# Patient Record
Sex: FEMALE | Race: WHITE | NOT HISPANIC OR LATINO | Employment: OTHER | URBAN - METROPOLITAN AREA
[De-identification: names, ages, dates, MRNs, and addresses within clinical notes are randomized per-mention and may not be internally consistent; named-entity substitution may affect disease eponyms.]

---

## 2017-04-10 ENCOUNTER — APPOINTMENT (OUTPATIENT)
Dept: MRI IMAGING | Facility: HOSPITAL | Age: 67
End: 2017-04-10

## 2017-10-05 ENCOUNTER — TRANSCRIBE ORDERS (OUTPATIENT)
Dept: ADMINISTRATIVE | Facility: HOSPITAL | Age: 67
End: 2017-10-05

## 2017-10-05 DIAGNOSIS — Z12.39 SCREENING BREAST EXAMINATION: Primary | ICD-10-CM

## 2017-10-18 ENCOUNTER — HOSPITAL ENCOUNTER (OUTPATIENT)
Dept: MAMMOGRAPHY | Facility: HOSPITAL | Age: 67
Discharge: HOME | End: 2017-10-18
Admitting: INTERNAL MEDICINE

## 2017-10-18 DIAGNOSIS — Z12.39 SCREENING BREAST EXAMINATION: ICD-10-CM

## 2017-10-18 PROCEDURE — G0202 SCR MAMMO BI INCL CAD: HCPCS

## 2018-08-21 ENCOUNTER — OFFICE VISIT (OUTPATIENT)
Dept: ORTHOPEDIC SURGERY | Facility: CLINIC | Age: 68
End: 2018-08-21

## 2018-08-21 VITALS — BODY MASS INDEX: 26.13 KG/M2 | WEIGHT: 142 LBS | TEMPERATURE: 98.1 F | HEIGHT: 62 IN

## 2018-08-21 DIAGNOSIS — G89.29 CHRONIC LOW BACK PAIN, UNSPECIFIED BACK PAIN LATERALITY, WITH SCIATICA PRESENCE UNSPECIFIED: Primary | ICD-10-CM

## 2018-08-21 DIAGNOSIS — M48.061 SPINAL STENOSIS OF LUMBAR REGION WITHOUT NEUROGENIC CLAUDICATION: ICD-10-CM

## 2018-08-21 DIAGNOSIS — M43.16 SPONDYLOLISTHESIS OF LUMBAR REGION: ICD-10-CM

## 2018-08-21 DIAGNOSIS — M54.5 CHRONIC LOW BACK PAIN, UNSPECIFIED BACK PAIN LATERALITY, WITH SCIATICA PRESENCE UNSPECIFIED: Primary | ICD-10-CM

## 2018-08-21 DIAGNOSIS — R32 URINARY INCONTINENCE, UNSPECIFIED TYPE: ICD-10-CM

## 2018-08-21 PROCEDURE — 99204 OFFICE O/P NEW MOD 45 MIN: CPT | Performed by: ORTHOPAEDIC SURGERY

## 2018-08-21 PROCEDURE — 72100 X-RAY EXAM L-S SPINE 2/3 VWS: CPT | Performed by: ORTHOPAEDIC SURGERY

## 2018-08-21 RX ORDER — ESTRADIOL 0.1 MG/G
2 CREAM VAGINAL DAILY PRN
COMMUNITY
Start: 2018-08-20 | End: 2021-01-27

## 2018-08-21 RX ORDER — SULFAMETHOXAZOLE AND TRIMETHOPRIM 400; 80 MG/1; MG/1
1 TABLET ORAL DAILY
Refills: 3 | Status: ON HOLD | COMMUNITY
Start: 2018-08-14 | End: 2018-10-01

## 2018-08-21 RX ORDER — LANOLIN ALCOHOL/MO/W.PET/CERES
1 CREAM (GRAM) TOPICAL DAILY
Refills: 0 | COMMUNITY
Start: 2018-06-28 | End: 2018-09-21

## 2018-08-21 RX ORDER — CEPHALEXIN 500 MG/1
CAPSULE ORAL
COMMUNITY
Start: 2018-08-15 | End: 2018-09-21

## 2018-08-21 NOTE — PROGRESS NOTES
New patient or new problem visit    Chief Complaint   Patient presents with   • Lumbar Spine - Pain, Establish Care       HPI: She is complaining of urinary incontinence, and occasional back pain.  Not much in way of leg pain.  I saw her in 2006 and at that time was the getting ready to perform surgery for high-grade spondylolisthesis.  She had disappeared then the digit cardiovascular exercise and got along well for many years obviously.  Her symptoms are mild apart from the the urinary incontinence which has occurred over the last year or 2.  She sees a urologist and they're contemplating placing a stimulator after which she could get no further MRI images.  Pain presently is mild worse with activity denies balance or bowel symptoms.    PFSH: See chart- reviewed    Review of Systems   Constitutional: Negative for chills, fever and unexpected weight change.   HENT: Negative for trouble swallowing and voice change.    Eyes: Positive for visual disturbance.   Respiratory: Negative for cough and shortness of breath.    Cardiovascular: Negative for chest pain and leg swelling.   Gastrointestinal: Negative for abdominal pain, nausea and vomiting.   Endocrine: Negative for cold intolerance and heat intolerance.   Genitourinary: Negative for difficulty urinating, frequency and urgency.   Musculoskeletal: Positive for back pain.   Skin: Negative for rash and wound.   Allergic/Immunologic: Negative for immunocompromised state.   Neurological: Positive for numbness. Negative for weakness.   Hematological: Does not bruise/bleed easily.   Psychiatric/Behavioral: Negative for dysphoric mood. The patient is not nervous/anxious.        PE: Constitutional: Vital signs above-noted.  Awake, alert and oriented    Psychiatric: Affect and insight do not appear grossly disturbed.    Pulmonary: Breathing is unlabored, color is good.    Skin: Warm, dry and normal turgor    Cardiac:  pedal pulses intact.  No edema.    Eyesight and hearing  appear adequate for examination purposes      Musculoskeletal:  There is no tenderness to percussion and palpation of the spine. Motion appears undisturbed.  Posture is unremarkable to coronal and sagittal inspection.    The skin about the area is intact.  There is a visible and palpable indentation at the lumbosacral junction deformity.  There is no local spasm.       Neurologic:   Reflexes are 2+ and symmetrical in the patellae and absent in the Achilles.   Motor function is undisturbed in quadriceps, EHL, and gastrocnemius      Sensation appears symmetrically intact to light touch   .  In the bilateral lower extremities there is no evidence of atrophy.   Clonus is absent..  Gait appears undisturbed.  SLR test negative      MEDICAL DECISION MAKING    XRAY: She has a grade 3 spondylolisthesis at L5-S1 and otherwise good looking spine.  No comparison views are available.    Other: Presently I do not have access to prior records but am attempting to obtain those    Impression: Grade 3 spondylolisthesis with urinary incontinence which is likely neurogenic    Plan: We need MRI for further evaluation.  She's having little symptoms otherwise and there still could be a role for bladder stimulation but I want to see if she has severe stenosis.

## 2018-08-23 DIAGNOSIS — R32 URINARY INCONTINENCE, UNSPECIFIED TYPE: ICD-10-CM

## 2018-08-28 ENCOUNTER — TELEPHONE (OUTPATIENT)
Dept: ORTHOPEDIC SURGERY | Facility: CLINIC | Age: 68
End: 2018-08-28

## 2018-08-30 ENCOUNTER — TELEPHONE (OUTPATIENT)
Dept: ORTHOPEDIC SURGERY | Facility: CLINIC | Age: 68
End: 2018-08-30

## 2018-08-30 ENCOUNTER — PREP FOR SURGERY (OUTPATIENT)
Dept: OTHER | Facility: HOSPITAL | Age: 68
End: 2018-08-30

## 2018-08-30 DIAGNOSIS — M48.062 SPINAL STENOSIS OF LUMBAR REGION WITH NEUROGENIC CLAUDICATION: Primary | ICD-10-CM

## 2018-08-30 RX ORDER — CEFAZOLIN SODIUM 2 G/100ML
2 INJECTION, SOLUTION INTRAVENOUS ONCE
Status: CANCELLED | OUTPATIENT
Start: 2018-10-01 | End: 2018-10-01

## 2018-09-04 PROBLEM — M48.062 SPINAL STENOSIS OF LUMBAR REGION WITH NEUROGENIC CLAUDICATION: Status: ACTIVE | Noted: 2018-09-04

## 2018-09-13 ENCOUNTER — TELEPHONE (OUTPATIENT)
Dept: ORTHOPEDIC SURGERY | Facility: CLINIC | Age: 68
End: 2018-09-13

## 2018-09-21 ENCOUNTER — APPOINTMENT (OUTPATIENT)
Dept: PREADMISSION TESTING | Facility: HOSPITAL | Age: 68
End: 2018-09-21

## 2018-09-21 VITALS
TEMPERATURE: 97.9 F | HEART RATE: 79 BPM | BODY MASS INDEX: 25.76 KG/M2 | SYSTOLIC BLOOD PRESSURE: 122 MMHG | HEIGHT: 62 IN | DIASTOLIC BLOOD PRESSURE: 85 MMHG | OXYGEN SATURATION: 98 % | WEIGHT: 140 LBS

## 2018-09-21 LAB
ANION GAP SERPL CALCULATED.3IONS-SCNC: 12.9 MMOL/L
BUN BLD-MCNC: 24 MG/DL (ref 8–23)
BUN/CREAT SERPL: 28.6 (ref 7–25)
CALCIUM SPEC-SCNC: 9.8 MG/DL (ref 8.6–10.5)
CHLORIDE SERPL-SCNC: 102 MMOL/L (ref 98–107)
CO2 SERPL-SCNC: 22.1 MMOL/L (ref 22–29)
CREAT BLD-MCNC: 0.84 MG/DL (ref 0.57–1)
DEPRECATED RDW RBC AUTO: 43.1 FL (ref 37–54)
ERYTHROCYTE [DISTWIDTH] IN BLOOD BY AUTOMATED COUNT: 11.9 % (ref 11.7–13)
GFR SERPL CREATININE-BSD FRML MDRD: 67 ML/MIN/1.73
GLUCOSE BLD-MCNC: 107 MG/DL (ref 65–99)
HCT VFR BLD AUTO: 41.7 % (ref 35.6–45.5)
HGB BLD-MCNC: 13.9 G/DL (ref 11.9–15.5)
MCH RBC QN AUTO: 33.1 PG (ref 26.9–32)
MCHC RBC AUTO-ENTMCNC: 33.3 G/DL (ref 32.4–36.3)
MCV RBC AUTO: 99.3 FL (ref 80.5–98.2)
PLATELET # BLD AUTO: 177 10*3/MM3 (ref 140–500)
PMV BLD AUTO: 11.7 FL (ref 6–12)
POTASSIUM BLD-SCNC: 3.9 MMOL/L (ref 3.5–5.2)
RBC # BLD AUTO: 4.2 10*6/MM3 (ref 3.9–5.2)
SODIUM BLD-SCNC: 137 MMOL/L (ref 136–145)
WBC NRBC COR # BLD: 6.82 10*3/MM3 (ref 4.5–10.7)

## 2018-09-21 PROCEDURE — 93005 ELECTROCARDIOGRAM TRACING: CPT

## 2018-09-21 PROCEDURE — 85027 COMPLETE CBC AUTOMATED: CPT | Performed by: ORTHOPAEDIC SURGERY

## 2018-09-21 PROCEDURE — 36415 COLL VENOUS BLD VENIPUNCTURE: CPT

## 2018-09-21 PROCEDURE — 93010 ELECTROCARDIOGRAM REPORT: CPT | Performed by: INTERNAL MEDICINE

## 2018-09-21 PROCEDURE — 80048 BASIC METABOLIC PNL TOTAL CA: CPT | Performed by: ORTHOPAEDIC SURGERY

## 2018-09-21 RX ORDER — CIPROFLOXACIN 500 MG/1
500 TABLET, FILM COATED ORAL 2 TIMES DAILY
Status: ON HOLD | COMMUNITY
End: 2018-10-01

## 2018-09-21 RX ORDER — SULFAMETHOXAZOLE AND TRIMETHOPRIM 800; 160 MG/1; MG/1
1 TABLET ORAL 2 TIMES DAILY PRN
Status: ON HOLD | COMMUNITY
End: 2018-10-01

## 2018-09-21 RX ORDER — IBUPROFEN 200 MG
200 TABLET ORAL EVERY 6 HOURS PRN
Status: ON HOLD | COMMUNITY
End: 2018-10-01

## 2018-09-21 NOTE — DISCHARGE INSTRUCTIONS
Take the following medications the morning of surgery with a small sip of water:    AMLODIPINE    ARRIVE AT 9:30    General Instructions:  • Do not eat solid food after midnight the night before surgery.  • You may drink clear liquids day of surgery but must stop at least one hour before your hospital arrival time.  • It is beneficial for you to have a clear drink that contains carbohydrates the day of surgery.  We suggest a 12 to 20 ounce bottle of Gatorade or Powerade for non-diabetic patients or a 12 to 20 ounce bottle of G2 or Powerade Zero for diabetic patients. (Pediatric patients, are not advised to drink a 12 to 20 ounce carbohydrate drink)    Clear liquids are liquids you can see through.  Nothing red in color.     Plain water                               Sports drinks  Sodas                                   Gelatin (Jell-O)  Fruit juices without pulp such as white grape juice and apple juice  Popsicles that contain no fruit or yogurt  Tea or coffee (no cream or milk added)  Gatorade / Powerade  G2 / Powerade Zero    • Infants may have breast milk up to four hours before surgery.  • Infants drinking formula may drink formula up to six hours before surgery.   • Patients who avoid smoking, chewing tobacco and alcohol for 4 weeks prior to surgery have a reduced risk of post-operative complications.  Quit smoking as many days before surgery as you can.  • Do not smoke, use chewing tobacco or drink alcohol the day of surgery.   • If applicable bring your C-PAP/ BI-PAP machine.  • Bring any papers given to you in the doctor’s office.  • Wear clean comfortable clothes and socks.  • Do not wear contact lenses or make-up.  Bring a case for your glasses.   • Bring crutches or walker if applicable.  • Remove all piercings.  Leave jewelry and any other valuables at home.  • Hair extensions with metal clips must be removed prior to surgery.  • The Pre-Admission Testing nurse will instruct you to bring medications if  unable to obtain an accurate list in Pre-Admission Testing.        If you were given a blood bank ID arm band remember to bring it with you the day of surgery.    Preventing a Surgical Site Infection:  • For 2 to 3 days before surgery, avoid shaving with a razor because the razor can irritate skin and make it easier to develop an infection.    • Any areas of open skin can increase the risk of a post-operative wound infection by allowing bacteria to enter and travel throughout the body.  Notify your surgeon if you have any skin wounds / rashes even if it is not near the expected surgical site.  The area will need assessed to determine if surgery should be delayed until it is healed.  • The night prior to surgery sleep in a clean bed with clean clothing.  Do not allow pets to sleep with you.  • Shower on the morning of surgery using a fresh bar of anti-bacterial soap (such as Dial) and clean washcloth.  Dry with a clean towel and dress in clean clothing.  • Ask your surgeon if you will be receiving antibiotics prior to surgery.  • Make sure you, your family, and all healthcare providers clean their hands with soap and water or an alcohol based hand  before caring for you or your wound.    Day of surgery:  Upon arrival, a Pre-op nurse and Anesthesiologist will review your health history, obtain vital signs, and answer questions you may have.  The only belongings needed at this time will be your home medications and if applicable your C-PAP/BI-PAP machine.  If you are staying overnight your family can leave the rest of your belongings in the car and bring them to your room later.  A Pre-op nurse will start an IV and you may receive medication in preparation for surgery, including something to help you relax.  Your family will be able to see you in the Pre-op area.  While you are in surgery your family should notify the waiting room  if they leave the waiting room area and provide a contact phone  number.    Please be aware that surgery does come with discomfort.  We want to make every effort to control your discomfort so please discuss any uncontrolled symptoms with your nurse.   Your doctor will most likely have prescribed pain medications.      If you are going home after surgery you will receive individualized written care instructions before being discharged.  A responsible adult must drive you to and from the hospital on the day of your surgery and stay with you for 24 hours.    If you are staying overnight following surgery, you will be transported to your hospital room following the recovery period.  [facility] has all private rooms.    You have received a list of surgical assistants for your reference.  If you have any questions please call Pre-Admission Testing at [phone].  Deductibles and co-payments are collected on the day of service. Please be prepared to pay the required co-pay, deductible or deposit on the day of service as defined by your plan.

## 2018-10-01 ENCOUNTER — APPOINTMENT (OUTPATIENT)
Dept: GENERAL RADIOLOGY | Facility: HOSPITAL | Age: 68
End: 2018-10-01

## 2018-10-01 ENCOUNTER — ANESTHESIA (OUTPATIENT)
Dept: PERIOP | Facility: HOSPITAL | Age: 68
End: 2018-10-01

## 2018-10-01 ENCOUNTER — ANESTHESIA EVENT (OUTPATIENT)
Dept: PERIOP | Facility: HOSPITAL | Age: 68
End: 2018-10-01

## 2018-10-01 ENCOUNTER — HOSPITAL ENCOUNTER (INPATIENT)
Facility: HOSPITAL | Age: 68
LOS: 3 days | Discharge: HOME HEALTH | End: 2018-10-04
Attending: ORTHOPAEDIC SURGERY | Admitting: ORTHOPAEDIC SURGERY

## 2018-10-01 DIAGNOSIS — R26.2 DIFFICULTY WALKING: ICD-10-CM

## 2018-10-01 DIAGNOSIS — R09.02 HYPOXIA: ICD-10-CM

## 2018-10-01 DIAGNOSIS — M48.062 SPINAL STENOSIS OF LUMBAR REGION WITH NEUROGENIC CLAUDICATION: Primary | ICD-10-CM

## 2018-10-01 PROBLEM — L29.8 ITCHING DUE TO DRUG: Status: ACTIVE | Noted: 2018-10-01

## 2018-10-01 PROBLEM — N39.3 STRESS INCONTINENCE: Chronic | Status: ACTIVE | Noted: 2018-10-01

## 2018-10-01 PROBLEM — T50.905A ITCHING DUE TO DRUG: Status: ACTIVE | Noted: 2018-10-01

## 2018-10-01 PROBLEM — N39.0 FREQUENT UTI: Chronic | Status: ACTIVE | Noted: 2018-10-01

## 2018-10-01 PROBLEM — I10 HYPERTENSION: Chronic | Status: ACTIVE | Noted: 2018-10-01

## 2018-10-01 PROBLEM — M48.061 LUMBAR SPINAL STENOSIS: Status: ACTIVE | Noted: 2018-10-01

## 2018-10-01 LAB
HCT VFR BLD AUTO: 40.7 % (ref 35.6–45.5)
HGB BLD-MCNC: 12.7 G/DL (ref 11.9–15.5)

## 2018-10-01 PROCEDURE — 0SG0071 FUSION OF LUMBAR VERTEBRAL JOINT WITH AUTOLOGOUS TISSUE SUBSTITUTE, POSTERIOR APPROACH, POSTERIOR COLUMN, OPEN APPROACH: ICD-10-PCS | Performed by: ORTHOPAEDIC SURGERY

## 2018-10-01 PROCEDURE — 22614 ARTHRD PST TQ 1NTRSPC EA ADD: CPT | Performed by: ORTHOPAEDIC SURGERY

## 2018-10-01 PROCEDURE — 85018 HEMOGLOBIN: CPT | Performed by: ORTHOPAEDIC SURGERY

## 2018-10-01 PROCEDURE — 25810000003 POTASSIUM CHLORIDE PER 2 MEQ: Performed by: ORTHOPAEDIC SURGERY

## 2018-10-01 PROCEDURE — 85014 HEMATOCRIT: CPT | Performed by: ORTHOPAEDIC SURGERY

## 2018-10-01 PROCEDURE — 25010000002 FENTANYL CITRATE (PF) 100 MCG/2ML SOLUTION: Performed by: NURSE ANESTHETIST, CERTIFIED REGISTERED

## 2018-10-01 PROCEDURE — 25010000002 DEXAMETHASONE PER 1 MG: Performed by: NURSE ANESTHETIST, CERTIFIED REGISTERED

## 2018-10-01 PROCEDURE — 63710000001 DIPHENHYDRAMINE PER 50 MG: Performed by: HOSPITALIST

## 2018-10-01 PROCEDURE — 72100 X-RAY EXAM L-S SPINE 2/3 VWS: CPT

## 2018-10-01 PROCEDURE — 63047 LAM FACETEC & FORAMOT LUMBAR: CPT | Performed by: ORTHOPAEDIC SURGERY

## 2018-10-01 PROCEDURE — 22842 INSERT SPINE FIXATION DEVICE: CPT | Performed by: ORTHOPAEDIC SURGERY

## 2018-10-01 PROCEDURE — 25010000002 PHENYLEPHRINE PER 1 ML: Performed by: NURSE ANESTHETIST, CERTIFIED REGISTERED

## 2018-10-01 PROCEDURE — 25010000002 HYDROMORPHONE PER 4 MG: Performed by: NURSE ANESTHETIST, CERTIFIED REGISTERED

## 2018-10-01 PROCEDURE — 0SG3071 FUSION OF LUMBOSACRAL JOINT WITH AUTOLOGOUS TISSUE SUBSTITUTE, POSTERIOR APPROACH, POSTERIOR COLUMN, OPEN APPROACH: ICD-10-PCS | Performed by: ORTHOPAEDIC SURGERY

## 2018-10-01 PROCEDURE — C1713 ANCHOR/SCREW BN/BN,TIS/BN: HCPCS | Performed by: ORTHOPAEDIC SURGERY

## 2018-10-01 PROCEDURE — 25010000002 ONDANSETRON PER 1 MG: Performed by: NURSE ANESTHETIST, CERTIFIED REGISTERED

## 2018-10-01 PROCEDURE — 25010000002 HEPARIN (PORCINE) PER 1000 UNITS: Performed by: ORTHOPAEDIC SURGERY

## 2018-10-01 PROCEDURE — 25010000002 PROPOFOL 10 MG/ML EMULSION: Performed by: NURSE ANESTHETIST, CERTIFIED REGISTERED

## 2018-10-01 PROCEDURE — 25010000002 PROMETHAZINE PER 50 MG: Performed by: NURSE ANESTHETIST, CERTIFIED REGISTERED

## 2018-10-01 PROCEDURE — 25010000003 CEFAZOLIN PER 500 MG: Performed by: ORTHOPAEDIC SURGERY

## 2018-10-01 PROCEDURE — 25010000002 MIDAZOLAM PER 1 MG: Performed by: ANESTHESIOLOGY

## 2018-10-01 PROCEDURE — 22612 ARTHRD PST TQ 1NTRSPC LUMBAR: CPT | Performed by: ORTHOPAEDIC SURGERY

## 2018-10-01 PROCEDURE — S0260 H&P FOR SURGERY: HCPCS | Performed by: ORTHOPAEDIC SURGERY

## 2018-10-01 PROCEDURE — 63048 LAM FACETEC &FORAMOT EA ADDL: CPT | Performed by: ORTHOPAEDIC SURGERY

## 2018-10-01 PROCEDURE — 25010000003 CEFAZOLIN IN DEXTROSE 2-4 GM/100ML-% SOLUTION: Performed by: ORTHOPAEDIC SURGERY

## 2018-10-01 PROCEDURE — 76000 FLUOROSCOPY <1 HR PHYS/QHP: CPT

## 2018-10-01 DEVICE — SCRW EXPEDIUM PA TI 7X45MM: Type: IMPLANTABLE DEVICE | Site: SPINE LUMBAR | Status: FUNCTIONAL

## 2018-10-01 DEVICE — SCRW INNR EXPEDIUM: Type: IMPLANTABLE DEVICE | Site: SPINE LUMBAR | Status: FUNCTIONAL

## 2018-10-01 DEVICE — SCRW EXPEDIUM PA TI 7X35MM: Type: IMPLANTABLE DEVICE | Site: SPINE LUMBAR | Status: FUNCTIONAL

## 2018-10-01 DEVICE — ROD PRELRD SPINE EXPEDIUM W/LINE 65MM: Type: IMPLANTABLE DEVICE | Site: SPINE LUMBAR | Status: FUNCTIONAL

## 2018-10-01 DEVICE — SCRW EXPEDIUM PA TI 7X40MM: Type: IMPLANTABLE DEVICE | Site: SPINE LUMBAR | Status: FUNCTIONAL

## 2018-10-01 DEVICE — SEALANT FIBRIN TISSEEL FZ 4ML: Type: IMPLANTABLE DEVICE | Site: SPINE LUMBAR | Status: FUNCTIONAL

## 2018-10-01 DEVICE — SCRW EXPEDIUM PA TI 6X50MM: Type: IMPLANTABLE DEVICE | Site: SPINE LUMBAR | Status: FUNCTIONAL

## 2018-10-01 RX ORDER — DEXAMETHASONE SODIUM PHOSPHATE 10 MG/ML
INJECTION INTRAMUSCULAR; INTRAVENOUS AS NEEDED
Status: DISCONTINUED | OUTPATIENT
Start: 2018-10-01 | End: 2018-10-01 | Stop reason: SURG

## 2018-10-01 RX ORDER — FENTANYL CITRATE 50 UG/ML
50 INJECTION, SOLUTION INTRAMUSCULAR; INTRAVENOUS
Status: DISCONTINUED | OUTPATIENT
Start: 2018-10-01 | End: 2018-10-01 | Stop reason: HOSPADM

## 2018-10-01 RX ORDER — ENALAPRIL MALEATE 20 MG/1
20 TABLET ORAL EVERY MORNING
Status: DISCONTINUED | OUTPATIENT
Start: 2018-10-02 | End: 2018-10-03

## 2018-10-01 RX ORDER — OXYCODONE HYDROCHLORIDE AND ACETAMINOPHEN 5; 325 MG/1; MG/1
2 TABLET ORAL EVERY 4 HOURS PRN
Status: DISCONTINUED | OUTPATIENT
Start: 2018-10-01 | End: 2018-10-04 | Stop reason: HOSPADM

## 2018-10-01 RX ORDER — ONDANSETRON 2 MG/ML
4 INJECTION INTRAMUSCULAR; INTRAVENOUS ONCE AS NEEDED
Status: COMPLETED | OUTPATIENT
Start: 2018-10-01 | End: 2018-10-01

## 2018-10-01 RX ORDER — LIDOCAINE HYDROCHLORIDE 10 MG/ML
0.5 INJECTION, SOLUTION EPIDURAL; INFILTRATION; INTRACAUDAL; PERINEURAL ONCE AS NEEDED
Status: DISCONTINUED | OUTPATIENT
Start: 2018-10-01 | End: 2018-10-01 | Stop reason: HOSPADM

## 2018-10-01 RX ORDER — LABETALOL HYDROCHLORIDE 5 MG/ML
5 INJECTION, SOLUTION INTRAVENOUS
Status: DISCONTINUED | OUTPATIENT
Start: 2018-10-01 | End: 2018-10-01 | Stop reason: HOSPADM

## 2018-10-01 RX ORDER — CEFAZOLIN SODIUM 2 G/100ML
2 INJECTION, SOLUTION INTRAVENOUS ONCE
Status: COMPLETED | OUTPATIENT
Start: 2018-10-01 | End: 2018-10-01

## 2018-10-01 RX ORDER — SODIUM CHLORIDE 0.9 % (FLUSH) 0.9 %
1-10 SYRINGE (ML) INJECTION AS NEEDED
Status: DISCONTINUED | OUTPATIENT
Start: 2018-10-01 | End: 2018-10-01 | Stop reason: HOSPADM

## 2018-10-01 RX ORDER — EPHEDRINE SULFATE 50 MG/ML
5 INJECTION, SOLUTION INTRAVENOUS ONCE AS NEEDED
Status: DISCONTINUED | OUTPATIENT
Start: 2018-10-01 | End: 2018-10-01 | Stop reason: HOSPADM

## 2018-10-01 RX ORDER — ROCURONIUM BROMIDE 10 MG/ML
INJECTION, SOLUTION INTRAVENOUS AS NEEDED
Status: DISCONTINUED | OUTPATIENT
Start: 2018-10-01 | End: 2018-10-01 | Stop reason: SURG

## 2018-10-01 RX ORDER — SULFAMETHOXAZOLE AND TRIMETHOPRIM 400; 80 MG/1; MG/1
1 TABLET ORAL DAILY
COMMUNITY
End: 2018-10-04 | Stop reason: HOSPADM

## 2018-10-01 RX ORDER — ONDANSETRON 2 MG/ML
4 INJECTION INTRAMUSCULAR; INTRAVENOUS EVERY 6 HOURS PRN
Status: DISCONTINUED | OUTPATIENT
Start: 2018-10-01 | End: 2018-10-04 | Stop reason: HOSPADM

## 2018-10-01 RX ORDER — TEMAZEPAM 15 MG/1
15 CAPSULE ORAL NIGHTLY PRN
Status: DISCONTINUED | OUTPATIENT
Start: 2018-10-01 | End: 2018-10-04 | Stop reason: HOSPADM

## 2018-10-01 RX ORDER — SODIUM CHLORIDE 0.9 % (FLUSH) 0.9 %
1-10 SYRINGE (ML) INJECTION AS NEEDED
Status: DISCONTINUED | OUTPATIENT
Start: 2018-10-01 | End: 2018-10-04 | Stop reason: HOSPADM

## 2018-10-01 RX ORDER — MIDAZOLAM HYDROCHLORIDE 1 MG/ML
2 INJECTION INTRAMUSCULAR; INTRAVENOUS
Status: DISCONTINUED | OUTPATIENT
Start: 2018-10-01 | End: 2018-10-01 | Stop reason: HOSPADM

## 2018-10-01 RX ORDER — SENNA AND DOCUSATE SODIUM 50; 8.6 MG/1; MG/1
1 TABLET, FILM COATED ORAL 2 TIMES DAILY
Status: DISCONTINUED | OUTPATIENT
Start: 2018-10-01 | End: 2018-10-04 | Stop reason: HOSPADM

## 2018-10-01 RX ORDER — BISACODYL 10 MG
10 SUPPOSITORY, RECTAL RECTAL DAILY PRN
Status: DISCONTINUED | OUTPATIENT
Start: 2018-10-01 | End: 2018-10-04 | Stop reason: HOSPADM

## 2018-10-01 RX ORDER — SODIUM CHLORIDE, SODIUM LACTATE, POTASSIUM CHLORIDE, CALCIUM CHLORIDE 600; 310; 30; 20 MG/100ML; MG/100ML; MG/100ML; MG/100ML
9 INJECTION, SOLUTION INTRAVENOUS CONTINUOUS
Status: DISCONTINUED | OUTPATIENT
Start: 2018-10-01 | End: 2018-10-02

## 2018-10-01 RX ORDER — FENTANYL CITRATE 50 UG/ML
INJECTION, SOLUTION INTRAMUSCULAR; INTRAVENOUS AS NEEDED
Status: DISCONTINUED | OUTPATIENT
Start: 2018-10-01 | End: 2018-10-01 | Stop reason: SURG

## 2018-10-01 RX ORDER — PROPOFOL 10 MG/ML
VIAL (ML) INTRAVENOUS AS NEEDED
Status: DISCONTINUED | OUTPATIENT
Start: 2018-10-01 | End: 2018-10-01 | Stop reason: SURG

## 2018-10-01 RX ORDER — PROMETHAZINE HYDROCHLORIDE 25 MG/ML
12.5 INJECTION, SOLUTION INTRAMUSCULAR; INTRAVENOUS ONCE AS NEEDED
Status: COMPLETED | OUTPATIENT
Start: 2018-10-01 | End: 2018-10-01

## 2018-10-01 RX ORDER — HYDROCODONE BITARTRATE AND ACETAMINOPHEN 5; 325 MG/1; MG/1
1 TABLET ORAL ONCE AS NEEDED
Status: DISCONTINUED | OUTPATIENT
Start: 2018-10-01 | End: 2018-10-01 | Stop reason: HOSPADM

## 2018-10-01 RX ORDER — FAMOTIDINE 10 MG/ML
20 INJECTION, SOLUTION INTRAVENOUS ONCE
Status: COMPLETED | OUTPATIENT
Start: 2018-10-01 | End: 2018-10-01

## 2018-10-01 RX ORDER — CYCLOBENZAPRINE HCL 10 MG
10 TABLET ORAL 3 TIMES DAILY PRN
Status: DISCONTINUED | OUTPATIENT
Start: 2018-10-01 | End: 2018-10-04 | Stop reason: HOSPADM

## 2018-10-01 RX ORDER — LIDOCAINE HYDROCHLORIDE 20 MG/ML
INJECTION, SOLUTION INFILTRATION; PERINEURAL AS NEEDED
Status: DISCONTINUED | OUTPATIENT
Start: 2018-10-01 | End: 2018-10-01 | Stop reason: SURG

## 2018-10-01 RX ORDER — DIPHENHYDRAMINE HCL 25 MG
25 CAPSULE ORAL EVERY 6 HOURS PRN
Status: DISCONTINUED | OUTPATIENT
Start: 2018-10-01 | End: 2018-10-04 | Stop reason: HOSPADM

## 2018-10-01 RX ORDER — PROMETHAZINE HYDROCHLORIDE 25 MG/1
25 TABLET ORAL ONCE AS NEEDED
Status: COMPLETED | OUTPATIENT
Start: 2018-10-01 | End: 2018-10-01

## 2018-10-01 RX ORDER — HYDROMORPHONE HYDROCHLORIDE 1 MG/ML
0.25 INJECTION, SOLUTION INTRAMUSCULAR; INTRAVENOUS; SUBCUTANEOUS
Status: DISCONTINUED | OUTPATIENT
Start: 2018-10-01 | End: 2018-10-01 | Stop reason: HOSPADM

## 2018-10-01 RX ORDER — SODIUM CHLORIDE, SODIUM LACTATE, POTASSIUM CHLORIDE, CALCIUM CHLORIDE 600; 310; 30; 20 MG/100ML; MG/100ML; MG/100ML; MG/100ML
100 INJECTION, SOLUTION INTRAVENOUS CONTINUOUS
Status: DISCONTINUED | OUTPATIENT
Start: 2018-10-01 | End: 2018-10-02

## 2018-10-01 RX ORDER — ONDANSETRON 2 MG/ML
INJECTION INTRAMUSCULAR; INTRAVENOUS AS NEEDED
Status: DISCONTINUED | OUTPATIENT
Start: 2018-10-01 | End: 2018-10-01 | Stop reason: SURG

## 2018-10-01 RX ORDER — ONDANSETRON 4 MG/1
4 TABLET, ORALLY DISINTEGRATING ORAL EVERY 6 HOURS PRN
Status: DISCONTINUED | OUTPATIENT
Start: 2018-10-01 | End: 2018-10-04 | Stop reason: HOSPADM

## 2018-10-01 RX ORDER — SODIUM CHLORIDE AND POTASSIUM CHLORIDE 150; 450 MG/100ML; MG/100ML
100 INJECTION, SOLUTION INTRAVENOUS CONTINUOUS
Status: DISCONTINUED | OUTPATIENT
Start: 2018-10-01 | End: 2018-10-02

## 2018-10-01 RX ORDER — NALOXONE HCL 0.4 MG/ML
0.1 VIAL (ML) INJECTION
Status: DISCONTINUED | OUTPATIENT
Start: 2018-10-01 | End: 2018-10-04 | Stop reason: HOSPADM

## 2018-10-01 RX ORDER — HYDROMORPHONE HCL IN 0.9% NACL 10 MG/50ML
PATIENT CONTROLLED ANALGESIA SYRINGE INTRAVENOUS CONTINUOUS
Status: DISCONTINUED | OUTPATIENT
Start: 2018-10-01 | End: 2018-10-02

## 2018-10-01 RX ORDER — CEFAZOLIN SODIUM 2 G/100ML
2 INJECTION, SOLUTION INTRAVENOUS EVERY 8 HOURS
Status: COMPLETED | OUTPATIENT
Start: 2018-10-01 | End: 2018-10-02

## 2018-10-01 RX ORDER — MIDAZOLAM HYDROCHLORIDE 1 MG/ML
1 INJECTION INTRAMUSCULAR; INTRAVENOUS
Status: DISCONTINUED | OUTPATIENT
Start: 2018-10-01 | End: 2018-10-01 | Stop reason: HOSPADM

## 2018-10-01 RX ORDER — ALBUTEROL SULFATE 2.5 MG/3ML
2.5 SOLUTION RESPIRATORY (INHALATION) ONCE AS NEEDED
Status: DISCONTINUED | OUTPATIENT
Start: 2018-10-01 | End: 2018-10-01 | Stop reason: HOSPADM

## 2018-10-01 RX ORDER — PROMETHAZINE HYDROCHLORIDE 25 MG/1
25 SUPPOSITORY RECTAL ONCE AS NEEDED
Status: COMPLETED | OUTPATIENT
Start: 2018-10-01 | End: 2018-10-01

## 2018-10-01 RX ORDER — AMLODIPINE BESYLATE 10 MG/1
10 TABLET ORAL DAILY
Status: DISCONTINUED | OUTPATIENT
Start: 2018-10-01 | End: 2018-10-03

## 2018-10-01 RX ORDER — NALOXONE HCL 0.4 MG/ML
0.2 VIAL (ML) INJECTION AS NEEDED
Status: DISCONTINUED | OUTPATIENT
Start: 2018-10-01 | End: 2018-10-01 | Stop reason: HOSPADM

## 2018-10-01 RX ORDER — FLUMAZENIL 0.1 MG/ML
0.2 INJECTION INTRAVENOUS AS NEEDED
Status: DISCONTINUED | OUTPATIENT
Start: 2018-10-01 | End: 2018-10-01 | Stop reason: HOSPADM

## 2018-10-01 RX ORDER — ESTRADIOL 0.1 MG/G
2 CREAM VAGINAL DAILY
Status: DISCONTINUED | OUTPATIENT
Start: 2018-10-01 | End: 2018-10-04 | Stop reason: HOSPADM

## 2018-10-01 RX ORDER — ONDANSETRON 4 MG/1
4 TABLET, FILM COATED ORAL EVERY 6 HOURS PRN
Status: DISCONTINUED | OUTPATIENT
Start: 2018-10-01 | End: 2018-10-04 | Stop reason: HOSPADM

## 2018-10-01 RX ADMIN — FENTANYL CITRATE 50 MCG: 50 INJECTION, SOLUTION INTRAMUSCULAR; INTRAVENOUS at 14:57

## 2018-10-01 RX ADMIN — HYDROMORPHONE HYDROCHLORIDE 0.25 MG: 1 INJECTION, SOLUTION INTRAMUSCULAR; INTRAVENOUS; SUBCUTANEOUS at 15:05

## 2018-10-01 RX ADMIN — HYDROMORPHONE HYDROCHLORIDE 0.25 MG: 1 INJECTION, SOLUTION INTRAMUSCULAR; INTRAVENOUS; SUBCUTANEOUS at 14:53

## 2018-10-01 RX ADMIN — ONDANSETRON 4 MG: 2 INJECTION INTRAMUSCULAR; INTRAVENOUS at 15:17

## 2018-10-01 RX ADMIN — ROCURONIUM BROMIDE 50 MG: 10 INJECTION INTRAVENOUS at 11:19

## 2018-10-01 RX ADMIN — FENTANYL CITRATE 25 MCG: 50 INJECTION INTRAMUSCULAR; INTRAVENOUS at 14:33

## 2018-10-01 RX ADMIN — PHENYLEPHRINE HYDROCHLORIDE 50 MCG: 10 INJECTION INTRAVENOUS at 12:54

## 2018-10-01 RX ADMIN — FAMOTIDINE 20 MG: 10 INJECTION, SOLUTION INTRAVENOUS at 10:37

## 2018-10-01 RX ADMIN — POTASSIUM CHLORIDE AND SODIUM CHLORIDE 100 ML/HR: 450; 150 INJECTION, SOLUTION INTRAVENOUS at 18:48

## 2018-10-01 RX ADMIN — PROMETHAZINE HYDROCHLORIDE 6.25 MG: 25 INJECTION, SOLUTION INTRAMUSCULAR; INTRAVENOUS at 15:20

## 2018-10-01 RX ADMIN — FENTANYL CITRATE 100 MCG: 50 INJECTION INTRAMUSCULAR; INTRAVENOUS at 11:18

## 2018-10-01 RX ADMIN — HYDROMORPHONE HYDROCHLORIDE: 10 INJECTION INTRAMUSCULAR; INTRAVENOUS; SUBCUTANEOUS at 14:42

## 2018-10-01 RX ADMIN — HYDROMORPHONE HYDROCHLORIDE 0.25 MG: 1 INJECTION, SOLUTION INTRAMUSCULAR; INTRAVENOUS; SUBCUTANEOUS at 14:35

## 2018-10-01 RX ADMIN — ONDANSETRON 4 MG: 2 INJECTION INTRAMUSCULAR; INTRAVENOUS at 13:52

## 2018-10-01 RX ADMIN — AMLODIPINE BESYLATE 10 MG: 10 TABLET ORAL at 18:48

## 2018-10-01 RX ADMIN — SODIUM CHLORIDE, POTASSIUM CHLORIDE, SODIUM LACTATE AND CALCIUM CHLORIDE: 600; 310; 30; 20 INJECTION, SOLUTION INTRAVENOUS at 13:59

## 2018-10-01 RX ADMIN — SODIUM CHLORIDE, POTASSIUM CHLORIDE, SODIUM LACTATE AND CALCIUM CHLORIDE 9 ML/HR: 600; 310; 30; 20 INJECTION, SOLUTION INTRAVENOUS at 10:37

## 2018-10-01 RX ADMIN — OXYCODONE HYDROCHLORIDE AND ACETAMINOPHEN 2 TABLET: 5; 325 TABLET ORAL at 23:35

## 2018-10-01 RX ADMIN — SUGAMMADEX 200 MG: 100 INJECTION, SOLUTION INTRAVENOUS at 14:13

## 2018-10-01 RX ADMIN — MIDAZOLAM HYDROCHLORIDE 2 MG: 2 INJECTION, SOLUTION INTRAMUSCULAR; INTRAVENOUS at 10:37

## 2018-10-01 RX ADMIN — DEXAMETHASONE SODIUM PHOSPHATE 8 MG: 10 INJECTION INTRAMUSCULAR; INTRAVENOUS at 12:08

## 2018-10-01 RX ADMIN — FENTANYL CITRATE 50 MCG: 50 INJECTION, SOLUTION INTRAMUSCULAR; INTRAVENOUS at 14:48

## 2018-10-01 RX ADMIN — CEFAZOLIN SODIUM 2 G: 2 INJECTION, SOLUTION INTRAVENOUS at 11:15

## 2018-10-01 RX ADMIN — SODIUM CHLORIDE, POTASSIUM CHLORIDE, SODIUM LACTATE AND CALCIUM CHLORIDE 100 ML/HR: 600; 310; 30; 20 INJECTION, SOLUTION INTRAVENOUS at 15:57

## 2018-10-01 RX ADMIN — HYDROMORPHONE HYDROCHLORIDE 0.25 MG: 1 INJECTION, SOLUTION INTRAMUSCULAR; INTRAVENOUS; SUBCUTANEOUS at 15:00

## 2018-10-01 RX ADMIN — LIDOCAINE HYDROCHLORIDE 100 MG: 20 INJECTION, SOLUTION INFILTRATION; PERINEURAL at 11:19

## 2018-10-01 RX ADMIN — CEFAZOLIN SODIUM 2 G: 2 INJECTION, SOLUTION INTRAVENOUS at 18:48

## 2018-10-01 RX ADMIN — PROPOFOL 160 MG: 10 INJECTION, EMULSION INTRAVENOUS at 11:19

## 2018-10-01 RX ADMIN — DIPHENHYDRAMINE HYDROCHLORIDE 25 MG: 25 CAPSULE ORAL at 20:00

## 2018-10-01 RX ADMIN — FENTANYL CITRATE 50 MCG: 50 INJECTION, SOLUTION INTRAMUSCULAR; INTRAVENOUS at 15:04

## 2018-10-01 RX ADMIN — FENTANYL CITRATE 25 MCG: 50 INJECTION INTRAMUSCULAR; INTRAVENOUS at 13:09

## 2018-10-01 RX ADMIN — PHENYLEPHRINE HYDROCHLORIDE 50 MCG: 10 INJECTION INTRAVENOUS at 13:35

## 2018-10-01 RX ADMIN — FENTANYL CITRATE 50 MCG: 50 INJECTION INTRAMUSCULAR; INTRAVENOUS at 14:27

## 2018-10-01 NOTE — ANESTHESIA POSTPROCEDURE EVALUATION
"Patient: Tayler Lugo    Procedure Summary     Date:  10/01/18 Room / Location:  [facility] OR [address] [place] [address] MAIN OR    Anesthesia Start:  1115 Anesthesia Stop:  1435    Procedure:  L4-5, L5-S1  laminectomy and fusion with instrumentation (N/A Spine Lumbar) Diagnosis:       Spinal stenosis of lumbar region with neurogenic claudication      (Spinal stenosis of lumbar region with neurogenic claudication [M48.062])    Surgeon:  Juno Kim MD Provider:  Tera Hernandez MD    Anesthesia Type:  general ASA Status:  3          Anesthesia Type: general  Last vitals  BP   101/54 (10/01/18 1530)   Temp   36.8 °C (98.2 °F) (10/01/18 1530)   Pulse   76 (10/01/18 1530)   Resp   16 (10/01/18 1530)     SpO2   95 % (10/01/18 1530)     Post Anesthesia Care and Evaluation    Patient location during evaluation: bedside  Patient participation: complete - patient participated  Level of consciousness: awake and alert  Pain score: 3  Pain management: adequate  Airway patency: patent  Anesthetic complications: No anesthetic complications  PONV Status: none  Cardiovascular status: acceptable  Respiratory status: acceptable  Hydration status: acceptable    Comments: /54 (BP Location: Right arm, Patient Position: Lying)   Pulse 76   Temp 36.8 °C (98.2 °F) (Oral)   Resp 16   Ht 157.5 cm (62\")   Wt 64 kg (141 lb)   SpO2 95%   BMI 25.79 kg/m²         "

## 2018-10-01 NOTE — OP NOTE
Operative note    Pre-op diagnosis: L4-5 disc degeneration, L5-S1 grade 3 spondylolisthesis with spinal stenosis    Postoperative diagnosis: Same    Procedure: L4 to S1 laminectomy medial facetectomy foraminotomy and bilateral posterior lateral fusion with [employer] expedient segmental instrumentation with local bone graft  Surgeon: Juno Kim Jr, M.D.    Asst.: Lindsay Orona    EBL: 300 cc    Anesthetic: Gen.    Condition: Satisfactory      Description of procedure: Patient was anesthetized and positioned prone.  Bony prominences were well padded.  The back was prepped and draped in sterile fashion.  Incision was made down to lumbodorsal fascia.  The muscle was stripped subperiosteally to the tips of transverse processes.  Curettes and rongeurs removed adherent soft tissue.  Packs were placed for hemostasis.  The graft was decorticated.  A Steffee probe was inserted at the intersection of the anatomic landmarks.  A flexible probe was inserted to check the integrity of the pedicle.  Screws were placed from L4 to S1 since I did not feel I could get adequate fixation at the L5 levels alone.  I also ultimately needed to extend the decompression into the L4 5 area as well as this tightness was not appreciated on the preoperative the MRI.  Screws were placed then from L4 to S1 on the left and at L4 and S1 on the right.  Contoured rods were later added, nuts were tightened and torqued.  Biplanar image intensification showed appropriate screw position and anatomic level and satisfactory posture.  I performed a Morris laminectomy at L5-S1 for decompression.  I then performed an L4 5 laminectomy:  The interspinous ligament was excised.  The upper lamina was removed [completely] out to within 8-10 mm of the pars intra-articularis.  A small portion of the cephalad aspect of the caudal lamina was excised with a Kerrison rongeur.  Medial facetectomies were performed bilaterally using Kerrison rongeur and osteotomes.  A  high-speed scott was used as well.  Foraminotomies were accomplished with a foraminotomy rongeur.   [The disc was determined to be not impinging and stable and was not excised].  The L5 roots remained quite tight bilaterally even after decompression and I attempted to place an interbody fusion cage but couldn't achieve no distraction at the L5-S1 disc space.  There was clearly motion but not enough to allow insertion of an interbody fusion cage to widen the foramina.  I did completely unroofed the spinal canal and was able to pass a ball probe between the pedicles bilaterally.  Ends of scarring was present at the spondylolisthesis site and there emanated small amount of clear fluid, the source of which was never determined.  Clearly this was CSF but I could not locate a leak in the dural sac.  Upon completion of the decompression I could pass a ball probe through the affected foramina, and easily retract  the nerve roots  toward the midline.  Bleeding was controlled with bipolar cautery,and Gelfoam with thrombin and/ or FloSeal hemostatic matrix.  .     Laminectomy bone, and bone from decortication of the graft bed was cleaned at the back table throughout the case and available for bone graft.  The morcellized bone was placed in the decorticated lateral gutter bilaterally.   Hemostasis was assured.  I then applied Tisseel in layers allowing each layer to cure before applying a second layer after which there was no evidence of any CSF fluid to.  This appeared stable to Valsalva maneuver.  The dural sac was full with normal turgor at the time of wound closure.  The wound was then closed with running and interrupted #1 Vicryl for the dorsal fascia, 2-0 Vicryl subcutaneously, then 4-0 Monocryl and Dermabond for the skin.  A sterile dressing was applied.  The patient is about to be recovered.

## 2018-10-01 NOTE — ANESTHESIA PROCEDURE NOTES
Airway  Urgency: elective    Airway not difficult    General Information and Staff    Patient location during procedure: OR  CRNA: DAVID [place]    Indications and Patient Condition  Indications for airway management: airway protection    Preoxygenated: yes  MILS maintained throughout  Mask difficulty assessment: 1 - vent by mask    Final Airway Details  Final airway type: endotracheal airway      Successful airway: ETT    Successful intubation technique: direct laryngoscopy  Facilitating devices/methods: Bougie  Blade: Zoila  Blade size: 3  ETT size: 7.0 mm  Cormack-Lehane Classification: grade I - full view of glottis  Placement verified by: chest auscultation   Measured from: teeth  ETT to teeth (cm): 21  Number of attempts at approach: 1

## 2018-10-01 NOTE — ANESTHESIA PREPROCEDURE EVALUATION
Anesthesia Evaluation     Patient summary reviewed and Nursing notes reviewed   NPO Solid Status: > 8 hours  NPO Liquid Status: > 4 hours           Airway   Mallampati: II  TM distance: >3 FB  Neck ROM: full  no difficulty expected  Dental - normal exam     Pulmonary - normal exam   Cardiovascular - normal exam    (+) hypertension,       Neuro/Psych  GI/Hepatic/Renal/Endo    (+)   liver disease (Fatty Liver),     Musculoskeletal     Abdominal  - normal exam   Substance History      OB/GYN          Other   (+) arthritis                     Anesthesia Plan    ASA 3     general     intravenous induction   Anesthetic plan, all risks, benefits, and alternatives have been provided, discussed and informed consent has been obtained with: patient.

## 2018-10-01 NOTE — H&P
Lumbar Spine - Pain, Establish Care         HPI: She is complaining of urinary incontinence, and occasional back pain.  Not much in way of leg pain.  I saw her in 2006 and at that time was the getting ready to perform surgery for high-grade spondylolisthesis.  She had disappeared then the digit cardiovascular exercise and got along well for many years obviously.  Her symptoms are mild apart from the the urinary incontinence which has occurred over the last year or 2.  She sees a urologist and they're contemplating placing a stimulator after which she could get no further MRI images.  Pain presently is mild worse with activity denies balance or bowel symptoms.     PFSH: See chart- reviewed     Review of Systems   Constitutional: Negative for chills, fever and unexpected weight change.   HENT: Negative for trouble swallowing and voice change.    Eyes: Positive for visual disturbance.   Respiratory: Negative for cough and shortness of breath.    Cardiovascular: Negative for chest pain and leg swelling.   Gastrointestinal: Negative for abdominal pain, nausea and vomiting.   Endocrine: Negative for cold intolerance and heat intolerance.   Genitourinary: Negative for difficulty urinating, frequency and urgency.   Musculoskeletal: Positive for back pain.   Skin: Negative for rash and wound.   Allergic/Immunologic: Negative for immunocompromised state.   Neurological: Positive for numbness. Negative for weakness.   Hematological: Does not bruise/bleed easily.   Psychiatric/Behavioral: Negative for dysphoric mood. The patient is not nervous/anxious.          PE: Constitutional: Vital signs above-noted.  Awake, alert and oriented     Psychiatric: Affect and insight do not appear grossly disturbed.     Pulmonary: Breathing is unlabored, color is good.     Skin: Warm, dry and normal turgor     Cardiac:  pedal pulses intact.  No edema.     Eyesight and hearing appear adequate for examination purposes        Musculoskeletal:   There is no tenderness to percussion and palpation of the spine. Motion appears undisturbed.  Posture is unremarkable to coronal and sagittal inspection.    The skin about the area is intact.  There is a visible and palpable indentation at the lumbosacral junction deformity.  There is no local spasm.       Neurologic:   Reflexes are 2+ and symmetrical in the patellae and absent in the Achilles.   Motor function is undisturbed in quadriceps, EHL, and gastrocnemius      Sensation appears symmetrically intact to light touch   .  In the bilateral lower extremities there is no evidence of atrophy.   Clonus is absent..  Gait appears undisturbed.  SLR test negative        MEDICAL DECISION MAKING     XRAY: She has a grade 3 spondylolisthesis at L5-S1 and otherwise good looking spine.  No comparison views are available.     Other: Presently I do not have access to prior records but am attempting to obtain those     Impression: Grade 3 spondylolisthesis with urinary incontinence which is likely neurogenic     Plan:I spoke personally with the patient and we spent 15 minutes in conversation.  I apologize for having kept her waiting, but reassured her that I been looking at the MRI and the thinking a lot about this.  There is a substantial chance the decompression will not relieve her urinary incontinence, but the extent of notices is so significant, and the spondylolisthesis so substantial, that I think that we should do a lumbar decompression and fusion before she undergoes a salvage procedure for urinary incontinence.  She does have some leg pain and some back pain from which she stands to benefit as well.  Therefore I plan L5-S1 laminectomy and fusion with instrumentation.I discussed the risks and benefits of posterior spinal fusion, including where applicable, laminectomy.  Risks include adverse anesthetic events such as death, stroke and myocardial infarction.  More specific surgical risks include infection, nonunion,  hardware failure, spinal fluid leakage, nerve root injury or paralysis, visceral or vascular injury, persistent pain, deep venous thrombosis, and pulmonary embolism among others. There is a 70 to 90 % chance of success.   Alternatives have been discussed.  After careful consideration the patient wishes to proceed with surgery.

## 2018-10-02 LAB
ALBUMIN SERPL-MCNC: 3.5 G/DL (ref 3.5–5.2)
ALBUMIN/GLOB SERPL: 1.5 G/DL
ALP SERPL-CCNC: 56 U/L (ref 39–117)
ALT SERPL W P-5'-P-CCNC: 99 U/L (ref 1–33)
ANION GAP SERPL CALCULATED.3IONS-SCNC: 9.8 MMOL/L
AST SERPL-CCNC: 77 U/L (ref 1–32)
BASOPHILS # BLD AUTO: 0.02 10*3/MM3 (ref 0–0.2)
BASOPHILS NFR BLD AUTO: 0.1 % (ref 0–1.5)
BILIRUB SERPL-MCNC: 0.2 MG/DL (ref 0.1–1.2)
BUN BLD-MCNC: 12 MG/DL (ref 8–23)
BUN/CREAT SERPL: 16.7 (ref 7–25)
CALCIUM SPEC-SCNC: 8.6 MG/DL (ref 8.6–10.5)
CHLORIDE SERPL-SCNC: 100 MMOL/L (ref 98–107)
CO2 SERPL-SCNC: 25.2 MMOL/L (ref 22–29)
CREAT BLD-MCNC: 0.72 MG/DL (ref 0.57–1)
DEPRECATED RDW RBC AUTO: 41.8 FL (ref 37–54)
EOSINOPHIL # BLD AUTO: 0 10*3/MM3 (ref 0–0.7)
EOSINOPHIL NFR BLD AUTO: 0 % (ref 0.3–6.2)
ERYTHROCYTE [DISTWIDTH] IN BLOOD BY AUTOMATED COUNT: 11.5 % (ref 11.7–13)
GFR SERPL CREATININE-BSD FRML MDRD: 80 ML/MIN/1.73
GLOBULIN UR ELPH-MCNC: 2.4 GM/DL
GLUCOSE BLD-MCNC: 146 MG/DL (ref 65–99)
HCT VFR BLD AUTO: 35.3 % (ref 35.6–45.5)
HGB BLD-MCNC: 11.2 G/DL (ref 11.9–15.5)
IMM GRANULOCYTES # BLD: 0.05 10*3/MM3 (ref 0–0.03)
IMM GRANULOCYTES NFR BLD: 0.3 % (ref 0–0.5)
LYMPHOCYTES # BLD AUTO: 1.45 10*3/MM3 (ref 0.9–4.8)
LYMPHOCYTES NFR BLD AUTO: 9 % (ref 19.6–45.3)
MCH RBC QN AUTO: 31.8 PG (ref 26.9–32)
MCHC RBC AUTO-ENTMCNC: 31.7 G/DL (ref 32.4–36.3)
MCV RBC AUTO: 100.3 FL (ref 80.5–98.2)
MONOCYTES # BLD AUTO: 2.13 10*3/MM3 (ref 0.2–1.2)
MONOCYTES NFR BLD AUTO: 13.3 % (ref 5–12)
NEUTROPHILS # BLD AUTO: 12.42 10*3/MM3 (ref 1.9–8.1)
NEUTROPHILS NFR BLD AUTO: 77.3 % (ref 42.7–76)
PLATELET # BLD AUTO: 252 10*3/MM3 (ref 140–500)
PMV BLD AUTO: 11.6 FL (ref 6–12)
POTASSIUM BLD-SCNC: 4.5 MMOL/L (ref 3.5–5.2)
PROT SERPL-MCNC: 5.9 G/DL (ref 6–8.5)
RBC # BLD AUTO: 3.52 10*6/MM3 (ref 3.9–5.2)
SODIUM BLD-SCNC: 135 MMOL/L (ref 136–145)
WBC NRBC COR # BLD: 16.07 10*3/MM3 (ref 4.5–10.7)

## 2018-10-02 PROCEDURE — 63710000001 DIPHENHYDRAMINE PER 50 MG: Performed by: HOSPITALIST

## 2018-10-02 PROCEDURE — 85025 COMPLETE CBC W/AUTO DIFF WBC: CPT | Performed by: HOSPITALIST

## 2018-10-02 PROCEDURE — 80053 COMPREHEN METABOLIC PANEL: CPT | Performed by: HOSPITALIST

## 2018-10-02 PROCEDURE — 94799 UNLISTED PULMONARY SVC/PX: CPT

## 2018-10-02 PROCEDURE — 25810000003 POTASSIUM CHLORIDE PER 2 MEQ: Performed by: ORTHOPAEDIC SURGERY

## 2018-10-02 PROCEDURE — 25010000003 CEFAZOLIN IN DEXTROSE 2-4 GM/100ML-% SOLUTION: Performed by: ORTHOPAEDIC SURGERY

## 2018-10-02 PROCEDURE — 99024 POSTOP FOLLOW-UP VISIT: CPT | Performed by: ORTHOPAEDIC SURGERY

## 2018-10-02 RX ORDER — AMPICILLIN 500 MG/1
500 CAPSULE ORAL 3 TIMES DAILY
Status: DISCONTINUED | OUTPATIENT
Start: 2018-10-03 | End: 2018-10-04 | Stop reason: HOSPADM

## 2018-10-02 RX ADMIN — ENALAPRIL MALEATE 20 MG: 20 TABLET ORAL at 04:59

## 2018-10-02 RX ADMIN — OXYCODONE HYDROCHLORIDE AND ACETAMINOPHEN 2 TABLET: 5; 325 TABLET ORAL at 09:13

## 2018-10-02 RX ADMIN — AMLODIPINE BESYLATE 10 MG: 10 TABLET ORAL at 09:14

## 2018-10-02 RX ADMIN — DIPHENHYDRAMINE HYDROCHLORIDE 25 MG: 25 CAPSULE ORAL at 16:37

## 2018-10-02 RX ADMIN — OXYCODONE HYDROCHLORIDE AND ACETAMINOPHEN 2 TABLET: 5; 325 TABLET ORAL at 12:57

## 2018-10-02 RX ADMIN — OXYCODONE HYDROCHLORIDE AND ACETAMINOPHEN 2 TABLET: 5; 325 TABLET ORAL at 17:01

## 2018-10-02 RX ADMIN — POTASSIUM CHLORIDE AND SODIUM CHLORIDE 100 ML/HR: 450; 150 INJECTION, SOLUTION INTRAVENOUS at 06:31

## 2018-10-02 RX ADMIN — CEFAZOLIN SODIUM 2 G: 2 INJECTION, SOLUTION INTRAVENOUS at 03:43

## 2018-10-02 RX ADMIN — OXYCODONE HYDROCHLORIDE AND ACETAMINOPHEN 1 TABLET: 5; 325 TABLET ORAL at 04:57

## 2018-10-02 RX ADMIN — DOCUSATE SODIUM -SENNOSIDES 1 TABLET: 50; 8.6 TABLET, COATED ORAL at 09:14

## 2018-10-02 RX ADMIN — DOCUSATE SODIUM -SENNOSIDES 1 TABLET: 50; 8.6 TABLET, COATED ORAL at 21:40

## 2018-10-02 RX ADMIN — POLYETHYLENE GLYCOL 3350 17 G: 17 POWDER, FOR SOLUTION ORAL at 09:14

## 2018-10-02 RX ADMIN — OXYCODONE HYDROCHLORIDE AND ACETAMINOPHEN 2 TABLET: 5; 325 TABLET ORAL at 21:40

## 2018-10-02 NOTE — PLAN OF CARE
Problem: Patient Care Overview  Goal: Plan of Care Review  Outcome: Ongoing (interventions implemented as appropriate)   10/02/18 0327   Coping/Psychosocial   Plan of Care Reviewed With patient   Plan of Care Review   Progress no change   OTHER   Outcome Summary pt s/p lami with dura tear. HOB flat, F/C in place. Pt c/o Dilaudid PCA causing itching. Using Percocet for pain relief. Will continue to monitor and await further orders       Problem: Fall Risk (Adult)  Goal: Identify Related Risk Factors and Signs and Symptoms  Outcome: Ongoing (interventions implemented as appropriate)    Goal: Absence of Fall  Outcome: Ongoing (interventions implemented as appropriate)      Problem: Skin Injury Risk (Adult)  Goal: Identify Related Risk Factors and Signs and Symptoms  Outcome: Ongoing (interventions implemented as appropriate)    Goal: Skin Health and Integrity  Outcome: Ongoing (interventions implemented as appropriate)

## 2018-10-02 NOTE — NURSING NOTE
"This nurse attempted to remove Shipman Cath however patient refused per statement \"I cannot empty my bladder completely this is why I had this surgery and when my bladder cannot empty I will have a urinary tract infection please call my doctor at [facility]. I will not let you remove this catheter until I can get out of bed.\" Educated the patient on the risks of leaving the Shipman cath in, patient continues to refuse the removal of Shipman cath until she is allowed out of bed. Will continue to monitor & brook THOMAS will await call back.   "

## 2018-10-02 NOTE — PROGRESS NOTES
Postop day 1: No headache.  AVSS awake alert oriented no new complaints back pain but no leg pain.  Moves the legs well.  Hemoglobin 11.2.  Lets keep her flat in bed today.

## 2018-10-02 NOTE — PROGRESS NOTES
Discharge Planning Assessment  [facility]     Patient Name: Tayler Lugo  MRN: [id number]  Today's Date: 10/2/2018    Admit Date: 10/1/2018          Discharge Needs Assessment     Row Name 10/02/18 1557       Living Environment    Lives With alone    Current Living Arrangements home/apartment/condo    Primary Care Provided by self    Provides Primary Care For no one, unable/limited ability to care for self    Family Caregiver if Needed sibling(s)    Family Caregiver Names Sister Jonnathan Manjarrez [phone]    Quality of Family Relationships helpful    Able to Return to Prior Arrangements yes       Resource/Environmental Concerns    Resource/Environmental Concerns home accessibility    Home Accessibility Concerns stairs to enter home   her home- 3 steps with 2 handrails/single story home; sister's home- 3 steps with 2 handrails to enter 2 story home and patient will remain on the main level in the home; home in [place]- 4 steps with 1 handrail to enter her single story home.     Transportation Concerns car, none       Transition Planning    Patient/Family Anticipates Transition to home with family    Patient/Family Anticipated Services at Transition none    Transportation Anticipated family or friend will provide       Discharge Needs Assessment    Concerns to be Addressed discharge planning    Equipment Currently Used at Home cane, straight    Anticipated Changes Related to Illness none    Equipment Needed After Discharge walker, rolling;shower chair;commode    Outpatient/Agency/Support Group Needs homecare agency    Discharge Facility/Level of Care Needs home with home health    Offered/Gave Vendor List yes    Current Discharge Risk physical impairment            Discharge Plan     Row Name 10/02/18 7127       Plan    Plan Initially to her sister's home in [place] after d/c.  Follow for any IV antibiotic, HH or DME needs.    Patient/Family in Agreement with Plan yes    Plan Comments Met with the patient at  bedside; explained role of CCP, checked IMM, verified facesheet and discussed discharge planning needs.   The patient plans to discharge to her sister's home in [place] after d/c as the patient resides in [facility]) alone.  She has a cane and at her home- 3 steps with 2 handrails/single story home; sister's home- 3 steps with 2 handrails to enter 2 story home and patient will remain on the main level in the home; home in [place]- 4 steps with 1 handrail to enter her single story home.  The patient uses [facility] in [place] when residing in [place], [facility] [address] [facility] when residing in [place] and [facility] [place] in [place].  The patient denies any trouble remembering to take her medication or with affording her medication, denies any HH/SNF history, was provided with a HH/SNF list and currently denies any d/c needs.  The patient hs her duaghters listed as her Healthcare Surrogate in [facility] and her sister will transport her home upon d/c.  CCP will follow up to obtain the patient's sister's address and will follow for any IV antibiotic, HH or DME needs.  NISHA Dykes        Destination     No service coordination in this encounter.      Durable Medical Equipment     No service coordination in this encounter.      Dialysis/Infusion     No service coordination in this encounter.      Home Medical Care     No service coordination in this encounter.      Social Care     No service coordination in this encounter.                Demographic Summary     Row Name 10/02/18 8194       General Information    Admission Type inpatient    Arrived From home    Referral Source physician;nursing    Reason for Consult discharge planning    Preferred Language English     Used During This Interaction no            Functional Status     Row Name 10/02/18 0537       Functional Status    Usual Activity Tolerance good    Current Activity Tolerance fair       Functional  Status, IADL    Medications assistive equipment    Meal Preparation assistive equipment    Housekeeping assistive equipment    Laundry assistive equipment    Shopping assistive equipment       Mental Status    General Appearance WDL WDL       Mental Status Summary    Recent Changes in Mental Status/Cognitive Functioning no changes            Psychosocial    No documentation.           Abuse/Neglect    No documentation.           Legal    No documentation.           Substance Abuse    No documentation.           Patient Forms     Row Name 10/02/18 1556       Patient Forms    Provider Choice List Delivered    Delivered to Patient    Method of delivery In person          NISHA Harris

## 2018-10-02 NOTE — PROGRESS NOTES
"     LOS: 1 day   Primary Care Physician: Tayler Trujillo MD     Subjective   Left eye feels fine now.  Is at bedrest, flat in the bed.  No shortness of breath.  On O2.  Last bowel movement was yesterday.  No abdominal pain  Concerned about her urinary retention.  Has been seeing urology.    Vital Signs  Body mass index is 25.79 kg/m².  Temp:  [97.8 °F (36.6 °C)-98.2 °F (36.8 °C)] 98.2 °F (36.8 °C)  Heart Rate:  [73-90] 90  Resp:  [16-18] 18  BP: (108-124)/(66-77) 117/71    On 2 L oxygen    Objective:  General Appearance:  In no acute distress.    Vital signs: (most recent): Blood pressure 117/71, pulse 90, temperature 98.2 °F (36.8 °C), temperature source Oral, resp. rate 18, height 157.5 cm (62\"), weight 64 kg (141 lb), SpO2 91 %.    HEENT: (Very minimal injection left conjunctiva.  No thrush.  Neck supple.  No lymphadenopathy.  Trachea midline.)    Lungs:  She is not in respiratory distress.  No stridor.  There are decreased breath sounds.  No rales, wheezes or rhonchi.    Heart: Normal rate.  Regular rhythm.  No murmur.   Abdomen: Abdomen is soft and non-distended.  Bowel sounds are normal.   There is no abdominal tenderness.   There is no splenomegaly. There is no hepatomegaly.   Extremities: There is no dependent edema.    Neurological: Patient is alert.    Skin:  Warm and dry.          Results Review:    I reviewed the patient's new clinical results.      Results from last 7 days  Lab Units 10/02/18  0529 10/01/18  1711   WBC 10*3/mm3 16.07*  --    HEMOGLOBIN g/dL 11.2* 12.7   PLATELETS 10*3/mm3 252  --        Results from last 7 days  Lab Units 10/02/18  0529   SODIUM mmol/L 135*   POTASSIUM mmol/L 4.5   CHLORIDE mmol/L 100   CO2 mmol/L 25.2   BUN mg/dL 12   CREATININE mg/dL 0.72   CALCIUM mg/dL 8.6   GLUCOSE mg/dL 146*         Hemoglobin A1C:No results found for: HGBA1C    Glucose Range:No results found for: POCGLU    No results found for: [id number]    No results found for: TSH    Assessment & " Plan      Medication Review: Yes    Active Hospital Problems    Diagnosis Date Noted   • **Spinal stenosis of lumbar region with neurogenic claudication [M48.062] 09/04/2018   • Lumbar spinal stenosis [M48.061] 10/01/2018   • Frequent UTI [N39.0] 10/01/2018   • Hypertension [I10] 10/01/2018   • Stress incontinence [N39.3] 10/01/2018   • Itching due to drug [L29.8, T50.905A] 10/01/2018      Resolved Hospital Problems    Diagnosis Date Noted Date Resolved   No resolved problems to display.       Assessment/Plan  1.  Left eye pain, resolved.  Likely some dryness.  Eyedrops as needed.  2.  Hypoxia.  Continue incentive spirometer.  Activity when able.  3.  Hypertension.  I wrote parameters for which to hold Vasotec  4.  History of urinary retention.  She still has Shipman.  I put in a request for her urologist to see.    Safia Odom MD  10/02/18  4:22 PM

## 2018-10-02 NOTE — PLAN OF CARE
Problem: Patient Care Overview  Goal: Plan of Care Review  Outcome: Ongoing (interventions implemented as appropriate)   10/02/18 0851   Coping/Psychosocial   Plan of Care Reviewed With patient   Plan of Care Review   Progress no change   OTHER   Outcome Summary vital signs stable. no C/O nausea or vomiting. LOCX4. PT refuses removal of Shipman Cath after Education on risk of CAUTI. PT remains flat in bed until tomorrow due to dural tear. pain well controlled with PRN pain medications. Dilaudid PCA D/C'ed this shift per patient request. will continue to monitor.        Problem: Fall Risk (Adult)  Goal: Identify Related Risk Factors and Signs and Symptoms  Outcome: Ongoing (interventions implemented as appropriate)   10/02/18 0327   Fall Risk (Adult)   Related Risk Factors (Fall Risk) age-related changes;fatigue/slow reaction;fear of falling;gait/mobility problems;history of falls;homeostatic imbalance;inadequate lighting;sensory deficits;sleep pattern alteration;environment unfamiliar   Signs and Symptoms (Fall Risk) presence of risk factors     Goal: Absence of Fall  Outcome: Ongoing (interventions implemented as appropriate)      Problem: Skin Injury Risk (Adult)  Goal: Identify Related Risk Factors and Signs and Symptoms  Outcome: Ongoing (interventions implemented as appropriate)   10/02/18 0327   Skin Injury Risk (Adult)   Related Risk Factors (Skin Injury Risk) medication;mobility impaired;moisture;tissue perfusion altered     Goal: Skin Health and Integrity  Outcome: Ongoing (interventions implemented as appropriate)

## 2018-10-03 LAB
ANION GAP SERPL CALCULATED.3IONS-SCNC: 9.7 MMOL/L
BUN BLD-MCNC: 9 MG/DL (ref 8–23)
BUN/CREAT SERPL: 16.4 (ref 7–25)
CALCIUM SPEC-SCNC: 8.8 MG/DL (ref 8.6–10.5)
CHLORIDE SERPL-SCNC: 101 MMOL/L (ref 98–107)
CO2 SERPL-SCNC: 26.3 MMOL/L (ref 22–29)
CREAT BLD-MCNC: 0.55 MG/DL (ref 0.57–1)
DEPRECATED RDW RBC AUTO: 42.7 FL (ref 37–54)
ERYTHROCYTE [DISTWIDTH] IN BLOOD BY AUTOMATED COUNT: 11.6 % (ref 11.7–13)
GFR SERPL CREATININE-BSD FRML MDRD: 109 ML/MIN/1.73
GLUCOSE BLD-MCNC: 112 MG/DL (ref 65–99)
HCT VFR BLD AUTO: 32.8 % (ref 35.6–45.5)
HGB BLD-MCNC: 10.3 G/DL (ref 11.9–15.5)
MCH RBC QN AUTO: 31.7 PG (ref 26.9–32)
MCHC RBC AUTO-ENTMCNC: 31.4 G/DL (ref 32.4–36.3)
MCV RBC AUTO: 100.9 FL (ref 80.5–98.2)
PLATELET # BLD AUTO: 208 10*3/MM3 (ref 140–500)
PMV BLD AUTO: 11.5 FL (ref 6–12)
POTASSIUM BLD-SCNC: 4.2 MMOL/L (ref 3.5–5.2)
RBC # BLD AUTO: 3.25 10*6/MM3 (ref 3.9–5.2)
SODIUM BLD-SCNC: 137 MMOL/L (ref 136–145)
WBC NRBC COR # BLD: 14.99 10*3/MM3 (ref 4.5–10.7)

## 2018-10-03 PROCEDURE — 99024 POSTOP FOLLOW-UP VISIT: CPT | Performed by: ORTHOPAEDIC SURGERY

## 2018-10-03 PROCEDURE — 94799 UNLISTED PULMONARY SVC/PX: CPT

## 2018-10-03 PROCEDURE — 85027 COMPLETE CBC AUTOMATED: CPT | Performed by: INTERNAL MEDICINE

## 2018-10-03 PROCEDURE — 97162 PT EVAL MOD COMPLEX 30 MIN: CPT

## 2018-10-03 PROCEDURE — 97110 THERAPEUTIC EXERCISES: CPT

## 2018-10-03 PROCEDURE — 80048 BASIC METABOLIC PNL TOTAL CA: CPT | Performed by: INTERNAL MEDICINE

## 2018-10-03 RX ORDER — AMLODIPINE BESYLATE 5 MG/1
5 TABLET ORAL DAILY
Status: DISCONTINUED | OUTPATIENT
Start: 2018-10-04 | End: 2018-10-04 | Stop reason: HOSPADM

## 2018-10-03 RX ORDER — ENALAPRIL MALEATE 10 MG/1
10 TABLET ORAL DAILY
Status: DISCONTINUED | OUTPATIENT
Start: 2018-10-04 | End: 2018-10-04 | Stop reason: HOSPADM

## 2018-10-03 RX ADMIN — CYCLOBENZAPRINE 10 MG: 10 TABLET, FILM COATED ORAL at 08:44

## 2018-10-03 RX ADMIN — AMLODIPINE BESYLATE 10 MG: 10 TABLET ORAL at 08:45

## 2018-10-03 RX ADMIN — POLYETHYLENE GLYCOL 3350 17 G: 17 POWDER, FOR SOLUTION ORAL at 08:43

## 2018-10-03 RX ADMIN — OXYCODONE HYDROCHLORIDE AND ACETAMINOPHEN 2 TABLET: 5; 325 TABLET ORAL at 02:39

## 2018-10-03 RX ADMIN — CYCLOBENZAPRINE 10 MG: 10 TABLET, FILM COATED ORAL at 14:37

## 2018-10-03 RX ADMIN — AMPICILLIN 500 MG: 500 CAPSULE ORAL at 08:45

## 2018-10-03 RX ADMIN — OXYCODONE HYDROCHLORIDE AND ACETAMINOPHEN 2 TABLET: 5; 325 TABLET ORAL at 08:44

## 2018-10-03 RX ADMIN — ENALAPRIL MALEATE 20 MG: 20 TABLET ORAL at 08:45

## 2018-10-03 RX ADMIN — ESTRADIOL 2 G: 0.1 CREAM VAGINAL at 08:45

## 2018-10-03 RX ADMIN — OXYCODONE HYDROCHLORIDE AND ACETAMINOPHEN 2 TABLET: 5; 325 TABLET ORAL at 14:37

## 2018-10-03 RX ADMIN — AMPICILLIN 500 MG: 500 CAPSULE ORAL at 17:32

## 2018-10-03 RX ADMIN — DOCUSATE SODIUM -SENNOSIDES 1 TABLET: 50; 8.6 TABLET, COATED ORAL at 20:55

## 2018-10-03 RX ADMIN — OXYCODONE HYDROCHLORIDE AND ACETAMINOPHEN 2 TABLET: 5; 325 TABLET ORAL at 20:55

## 2018-10-03 NOTE — PROGRESS NOTES
Orthopedic Progress Note      Patient: Tayler Lugo    YOB: 1950    Medical Record Number: [id number]    Attending Physician: Juno Kim MD    Date of Admission: 10/1/2018  7:18 AM    Admitting Dx:  Spinal stenosis of lumbar region with neurogenic claudication [M48.062]  Lumbar spinal stenosis [M48.061]    Status Post: L5-S1 laminectomy and fusion with instrumentation    Post Operative Day Number: 2    Current Problem List:   Patient Active Problem List   Diagnosis   • Spinal stenosis of lumbar region with neurogenic claudication   • Lumbar spinal stenosis   • Frequent UTI   • Hypertension   • Stress incontinence   • Itching due to drug         Past Medical History:   Diagnosis Date   • Elevated liver enzymes    • Fatty liver    • Frequent UTI    • Hypertension    • Lumbar stenosis    • Spondylarthritis     LOW BACK   • Stress incontinence    • Urinary retention    • UTI (urinary tract infection)     ON ANTIBIOTIC       SUBJECTIVE: 68 y.o.  female. Awake and alert.  Doing well.  Denies Headache    OBJECTIVE:   Vitals:    10/02/18 1727 10/02/18 2206 10/03/18 0450 10/03/18 0845   BP: 111/77 133/76 109/66 133/66   BP Location: Left arm Left arm Left arm Left arm   Patient Position: Lying Lying Lying Lying   Pulse: 94 95 84 100   Resp: 17 16 18 18   Temp: 98.2 °F (36.8 °C) 98.1 °F (36.7 °C) 98.1 °F (36.7 °C) 98 °F (36.7 °C)   TempSrc: Oral Oral Oral Oral   SpO2: 92% 91% 92% 92%   Weight:       Height:         I/O last 3 completed shifts:  In: 1690 [P.O.:1690]  Out: 5100 [Urine:5100]    Current Medications:  Scheduled Meds:  amLODIPine 10 mg Oral Daily   ampicillin 500 mg Oral TID   enalapril 20 mg Oral QAM   estradiol 2 g Vaginal Daily   polyethylene glycol 17 g Oral Daily   sennosides-docusate sodium 1 tablet Oral BID     PRN Meds:.bisacodyl  •  cyclobenzaprine  •  diphenhydrAMINE  •  Glycerin-Hypromellose-  •  naloxone  •  ondansetron **OR** ondansetron ODT **OR** ondansetron  •   oxyCODONE-acetaminophen  •  sodium chloride  •  temazepam    Diagnostic Tests:   Lab Results (last 24 hours)     Procedure Component Value Units Date/Time    Basic Metabolic Panel [[id number]]  (Abnormal) Collected:  10/03/18 0449    Specimen:  Blood Updated:  10/03/18 0548     Glucose 112 (H) mg/dL      BUN 9 mg/dL      Creatinine 0.55 (L) mg/dL      Sodium 137 mmol/L      Potassium 4.2 mmol/L      Chloride 101 mmol/L      CO2 26.3 mmol/L      Calcium 8.8 mg/dL      eGFR Non African Amer 109 mL/min/1.73      BUN/Creatinine Ratio 16.4     Anion Gap 9.7 mmol/L     Narrative:       GFR Normal >60  Chronic Kidney Disease <60  Kidney Failure <15    CBC (No Diff) [[id number]]  (Abnormal) Collected:  10/03/18 0449    Specimen:  Blood Updated:  10/03/18 0525     WBC 14.99 (H) 10*3/mm3      RBC 3.25 (L) 10*6/mm3      Hemoglobin 10.3 (L) g/dL      Hematocrit 32.8 (L) %      .9 (H) fL      MCH 31.7 pg      MCHC 31.4 (L) g/dL      RDW 11.6 (L) %      RDW-SD 42.7 fl      MPV 11.5 fL      Platelets 208 10*3/mm3           PHYSICAL EXAM:  Back dressing dry and intact.  Moving legs well.  Not having much pain although she has not been OOB.       ASSESSMENT & PLAN:   Will restart PT as long as she does not develop a headache when she gets up.  Plans to go to Rehab facility post-op.          Date: 10/3/2018    SHAWNA Kirby MD

## 2018-10-03 NOTE — PLAN OF CARE
Problem: Patient Care Overview  Goal: Plan of Care Review   10/03/18 1141   Coping/Psychosocial   Plan of Care Reviewed With patient   OTHER   Outcome Summary Pt is a 68 y.o. female admitted to [facility] and is s/p L4-S1 laminectomy and bilateral posterior fusion on 10/1/2018 with spinal precautions and back brace to be worn when OOB. Pt presents today with decreased activity tolerance, generalized weakness, and decreased functional mobility. Pt required min A for bed mobility, CGA for STS transfers, and CGA for ambulation with FWW for 30 ft. Pt will benefit from skilled PT to address the previous deficits and improve overall safety with functional mobility. PTA pt lives alone, and amb with no AD, but plans to DC to Sister's house which has 4 WING and opportunity for first floor set up. Pt will likely need FWW at DC as well as HH PT.

## 2018-10-03 NOTE — PROGRESS NOTES
"     LOS: 2 days   Primary Care Physician: Tayler Trujillo MD     Subjective   Feels better today.  Sat up in the chair and walked a little bit.    Vital Signs  Body mass index is 25.79 kg/m².  Temp:  [97.7 °F (36.5 °C)-98.2 °F (36.8 °C)] 97.7 °F (36.5 °C)  Heart Rate:  [] 94  Resp:  [16-18] 18  BP: (109-133)/(64-77) 117/64    On 2 L O2    Objective:  General Appearance:  In no acute distress.    Vital signs: (most recent): Blood pressure 117/64, pulse 94, temperature 97.7 °F (36.5 °C), temperature source Oral, resp. rate 18, height 157.5 cm (62\"), weight 64 kg (141 lb), SpO2 93 %.    Lungs:  She is not in respiratory distress.  No stridor.  There are decreased breath sounds.  No rales, wheezes or rhonchi.    Heart: Normal rate.  Regular rhythm.  Positive for murmur.  (Grade 2 to 3/6 systolic murmur best heard left sternal border)  Abdomen: Abdomen is soft and non-distended.  Bowel sounds are normal.   There is no abdominal tenderness.   There is no splenomegaly. There is no hepatomegaly.   Extremities: There is no dependent edema.    Neurological: Patient is alert.          Results Review:    I reviewed the patient's new clinical results.      Results from last 7 days  Lab Units 10/03/18  0449 10/02/18  0529   WBC 10*3/mm3 14.99* 16.07*   HEMOGLOBIN g/dL 10.3* 11.2*   PLATELETS 10*3/mm3 208 252       Results from last 7 days  Lab Units 10/03/18  0449 10/02/18  0529   SODIUM mmol/L 137 135*   POTASSIUM mmol/L 4.2 4.5   CHLORIDE mmol/L 101 100   CO2 mmol/L 26.3 25.2   BUN mg/dL 9 12   CREATININE mg/dL 0.55* 0.72   CALCIUM mg/dL 8.8 8.6   GLUCOSE mg/dL 112* 146*         Hemoglobin A1C:No results found for: HGBA1C    Glucose Range:No results found for: POCGLU    No results found for: [id number]    No results found for: TSH    Assessment & Plan      Medication Review: Yes    Active Hospital Problems    Diagnosis Date Noted   • **Spinal stenosis of lumbar region with neurogenic claudication [M48.062] 09/04/2018   • " Lumbar spinal stenosis [M48.061] 10/01/2018   • Frequent UTI [N39.0] 10/01/2018   • Hypertension [I10] 10/01/2018   • Stress incontinence [N39.3] 10/01/2018   • Itching due to drug [L29.8, T50.905A] 10/01/2018      Resolved Hospital Problems    Diagnosis Date Noted Date Resolved   No resolved problems to display.       Assessment/Plan  1.  Hypotension.  I decreased Norvasc and vasotec.  Parameters written for which to hold.  Asymptomatic.  Expected acute blood loss anemia noted.  Recheck labs in a.m.  2.  Hypoxia.  Continue with incentive spirometry.  Wean oxygen as tolerated.  Increase activity.  3.  Urinary retention.  Shipman out secondary to some irritation.  Follow protocol.  4.  Leukocytosis, probably reactive.  Repeat in a.m.    Safia Odom MD  10/03/18  3:35 PM

## 2018-10-03 NOTE — PLAN OF CARE
Problem: Patient Care Overview  Goal: Plan of Care Review  Outcome: Ongoing (interventions implemented as appropriate)   10/03/18 6186   Coping/Psychosocial   Plan of Care Reviewed With patient;daughter;sibling   Plan of Care Review   Progress improving   OTHER   Outcome Summary Patient is doing much better today. Shipman D/C'd and patient has voided x 2 this shift and no c/o's of pain while voiding. Has been up OOB and ambulated around unit with walker with standby assist. Education and encouragement given to continue to work with IS as patient is having trouble maintaining O2 saturation. Cough and deep breathing techniques also encouraged.. Patient currently on 3L N/C with sats b/w 87-93% Continue to monitor.       Problem: Fall Risk (Adult)  Goal: Identify Related Risk Factors and Signs and Symptoms  Outcome: Ongoing (interventions implemented as appropriate)    Goal: Absence of Fall  Outcome: Ongoing (interventions implemented as appropriate)      Problem: Pain, Acute (Adult)  Goal: Identify Related Risk Factors and Signs and Symptoms  Outcome: Ongoing (interventions implemented as appropriate)    Goal: Acceptable Pain Control/Comfort Level  Outcome: Ongoing (interventions implemented as appropriate)

## 2018-10-03 NOTE — PROGRESS NOTES
Continued Stay Note  [facility]     Patient Name: Tayler Lugo  MRN: [id number]  Today's Date: 10/3/2018    Admit Date: 10/1/2018          Discharge Plan     Row Name 10/03/18 1601       Plan    Plan To sister's home    Patient/Family in Agreement with Plan yes    Plan Comments Met with patient and her sister at bedside.  Plan is to go to sister's home at discharge Jonnathan Manjarrez, [id number], [id number].  Ordered 3:1 commode and rolling walker from [place]'s to be delivered to room.  Will follow.              Discharge Codes    No documentation.           Yovana Gross RN

## 2018-10-03 NOTE — THERAPY TREATMENT NOTE
Acute Care - Physical Therapy Treatment Note  [facility]     Patient Name: Tayler Lugo  : 1950  MRN: [id number]  Today's Date: 10/3/2018  Onset of Illness/Injury or Date of Surgery: 10/01/18  Date of Referral to PT: 10/03/18  Referring Physician: Juno Kim MD    Admit Date: 10/1/2018    Visit Dx:    ICD-10-CM ICD-9-CM   1. Spinal stenosis of lumbar region with neurogenic claudication M48.062 724.03   2. Difficulty walking R26.2 719.7     Patient Active Problem List   Diagnosis   • Spinal stenosis of lumbar region with neurogenic claudication   • Lumbar spinal stenosis   • Frequent UTI   • Hypertension   • Stress incontinence   • Itching due to drug       Therapy Treatment          Rehabilitation Treatment Summary     Row Name 10/03/18 1300             Treatment Time/Intention    Discipline physical therapist  -CA      Document Type therapy note (daily note)  -CA      Subjective Information complains of;pain  -CA      Mode of Treatment individual therapy;physical therapy  -CA      Patient/Family Observations pt seated in chair, no acute distress noted at rest  -CA      Care Plan Review care plan/treatment goals reviewed  -CA      Patient Effort excellent  -CA      Existing Precautions/Restrictions fall;spinal;brace worn when out of bed  -CA      Recorded by [CA] Angelina Davye, PT 10/03/18 1333      Row Name 10/03/18 1300             Cognitive Assessment/Intervention- PT/OT    Orientation Status (Cognition) oriented x 4  -CA      Follows Commands (Cognition) WFL  -CA      Personal Safety Interventions nonskid shoes/slippers when out of bed;gait belt;fall prevention program maintained  -CA      Recorded by [CA] Angelina Davey, PT 10/03/18 1333      Row Name 10/03/18 1300             Bed Mobility Assessment/Treatment    Comment (Bed Mobility) NT up in chair at start of session, in BR at end of session  -CA      Recorded by [CA] Angelina Davey, PT 10/03/18 1333      Row Name 10/03/18 1300              Sit-Stand Transfer    Sit-Stand [place] (Transfers) contact guard  -CA      Assistive Device (Sit-Stand Transfers) walker, front-wheeled  -CA      Recorded by [CA] Angelina Davey, PT 10/03/18 1333      Row Name 10/03/18 1300             Stand-Sit Transfer    Stand-Sit [place] (Transfers) contact guard  -CA      Assistive Device (Stand-Sit Transfers) walker, front-wheeled  -CA      Recorded by [CA] Angelina Davey, PT 10/03/18 1333      Row Name 10/03/18 1300             Gait/Stairs Assessment/Training    Gait/Stairs Assessment/Training gait/ambulation independence  -CA      [place] Level (Gait) contact guard  -CA      Assistive Device (Gait) walker, front-wheeled  -CA      Distance in Feet (Gait) 40  -CA      Pattern (Gait) step-through  -CA      Deviations/Abnormal Patterns (Gait) gait speed decreased;stride length decreased  -CA      Bilateral Gait Deviations forward flexed posture  -CA      Recorded by [CA] Angelina Davey, PT 10/03/18 1333      Row Name 10/03/18 1300             Positioning and Restraints    Pre-Treatment Position sitting in chair/recliner  -CA      Post Treatment Position bathroom  -CA      Bathroom notified nsg;sitting;call light within reach;encouraged to call for assist;with brace  -CA      Recorded by [CA] Angelina Davey, PT 10/03/18 1333      Row Name 10/03/18 1300             Pain Assessment    Additional Documentation Pain Scale: Numbers Pre/Post-Treatment (Group)  -CA      Recorded by [CA] Angelina Davey, PT 10/03/18 1333      Row Name 10/03/18 1300             Pain Scale: Numbers Pre/Post-Treatment    Pain Scale: Numbers, Pretreatment 5/10  -CA      Pain Scale: Numbers, Post-Treatment 5/10  -CA      Pain Location - Orientation incisional  -CA      Pain Location back  -CA      Pain Intervention(s) Ambulation/increased activity;Repositioned  -CA      Recorded by [CA] Angelina Davey, PT 10/03/18 1333      Row Name                Wound 10/01/18 1209 Other  (See comments) back incision    Wound - Properties Group Date first assessed: 10/01/18 [JT] Time first assessed: 1209 [JT] Side: Other (See comments) [JT] Location: back [JT] Type: incision [JT] Recorded by:  [JACINTA] Norah Ramirez RN 10/01/18 1209    Row Name 10/03/18 1300             Outcome Summary/Treatment Plan (PT)    Daily Summary of Progress (PT) progress toward functional goals is good  -CA      Anticipated Discharge Disposition (PT) home with assist;home with home health  -CA      Recorded by [CA] Angelina Davey, PT 10/03/18 1333        User Key  (r) = Recorded By, (t) = Taken By, (c) = Cosigned By    Initials Name Effective Dates Discipline    Norah Nicholson RN 06/16/16 -  Nurse    Angelina Child, PT 06/13/18 -  PT          Wound 10/01/18 1209 Other (See comments) back incision (Active)   Dressing Appearance dry;intact;no drainage 10/2/2018  9:40 PM   Closure BATOOL 10/2/2018  9:40 PM   Base dressing in place, unable to visualize 10/2/2018  9:40 PM   Drainage Amount none 10/2/2018  9:40 PM   Dressing Care, Wound gauze, dry;foam 10/2/2018  9:40 PM               PT Rehab Goals     Row Name 10/03/18 1100             Bed Mobility Goal 1 (PT)    Activity/Assistive Device (Bed Mobility Goal 1, PT) sidelying to sit/sit to sidelying;rolling to left;rolling to right  -CA      [place] Level/Cues Needed (Bed Mobility Goal 1, PT) supervision required  -CA      Time Frame (Bed Mobility Goal 1, PT) 1 week  -CA         Transfer Goal 1 (PT)    Activity/Assistive Device (Transfer Goal 1, PT) transfers, all  -CA      [place] Level/Cues Needed (Transfer Goal 1, PT) supervision required  -CA      Time Frame (Transfer Goal 1, PT) 1 week  -CA         Gait Training Goal 1 (PT)    Activity/Assistive Device (Gait Training Goal 1, PT) gait (walking locomotion);assistive device use;improve balance and speed;increase endurance/gait distance;walker, rolling  -CA      [place] Level (Gait Training Goal 1,  PT) supervision required  -CA      Distance (Gait Goal 1, PT) 400  -CA      Time Frame (Gait Training Goal 1, PT) 1 week  -CA         Stairs Goal 1 (PT)    Activity/Assistive Device (Stairs Goal 1, PT) stairs, all skills  -CA      [place] Level/Cues Needed (Stairs Goal 1, PT) supervision required  -CA      Number of Stairs (Stairs Goal 1, PT) 4  -CA      Time Frame (Stairs Goal 1, PT) 1 week  -CA         Patient Education Goal (PT)    Activity (Patient Education Goal, PT) Pt will demonstrate and verbalized good understanding of donning/doffing back brace  -CA      [place]/Cues/Accuracy (Memory Goal 2, PT) demonstrates adequately;independent;verbalizes understanding  -CA      Time Frame (Patient Education Goal, PT) 1 week  -CA        User Key  (r) = Recorded By, (t) = Taken By, (c) = Cosigned By    Initials Name Provider Type Discipline    Angelina Child, PT Physical Therapist PT          Physical Therapy Education     Title: PT OT SLP Therapies (Done)     Topic: Physical Therapy (Done)     Point: Mobility training (Done)    Learning Progress Summary     Learner Status Readiness Method Response Comment Documented by    Patient Done Acceptance E VU  CA 10/03/18 1333     Done Acceptance E VU  CA 10/03/18 1141          Point: Home exercise program (Done)    Learning Progress Summary     Learner Status Readiness Method Response Comment Documented by    Patient Done Acceptance E VU  CA 10/03/18 1333     Done Acceptance E VU  CA 10/03/18 1141          Point: Body mechanics (Done)    Learning Progress Summary     Learner Status Readiness Method Response Comment Documented by    Patient Done Acceptance E VU  CA 10/03/18 1333     Done Acceptance E VU  CA 10/03/18 1141          Point: Precautions (Done)    Learning Progress Summary     Learner Status Readiness Method Response Comment Documented by    Patient Done Acceptance E VU  CA 10/03/18 1333     Done Acceptance E VU  CA 10/03/18 1141                       User Key     Initials Effective Dates Name Provider Type Discipline    CA 06/13/18 -  Angelina Davey, PT Physical Therapist PT                    PT Recommendation and Plan  Anticipated Discharge Disposition (PT): home with assist, home with home health  Planned Therapy Interventions (PT Eval): balance training, bed mobility training, gait training, home exercise program, manual therapy techniques, neuromuscular re-education, patient/family education, postural re-education, ROM (range of motion), strengthening, stair training, stretching, transfer training  Therapy Frequency (PT Clinical Impression): 2 times/day  Outcome Summary/Treatment Plan (PT)  Daily Summary of Progress (PT): progress toward functional goals is good  Anticipated Equipment Needs at Discharge (PT): front wheeled walker  Anticipated Discharge Disposition (PT): home with assist, home with home health  Plan of Care Reviewed With: patient  Outcome Summary: Pt is a 68 y.o. female admitted to [facility] and is s/p L4-S1 laminectomy and bilateral posterior fusion on 10/1/2018 with spinal precautions and back brace to be worn when OOB. Pt presents today with decreased activity tolerance, generalized weakness, and decreased functional mobility. Pt required min A for bed mobility, CGA for STS transfers, and CGA for ambulation with FWW for 30 ft. Pt will benefit from skilled PT to address the previous deficits and improve overall safety with functional mobility. PTA pt lives alone, and amb with no AD, but plans to DC to [facility] which has 4 WING and opportunity for first floor set up. Pt will likely need FWW at DC as well as HH PT.          Outcome Measures     Row Name 10/03/18 1300 10/03/18 1100          How much help from another person do you currently need...    Turning from your back to your side while in flat bed without using bedrails? 3  -CA 3  -CA     Moving from lying on back to sitting on the side of a flat bed without bedrails? 3  -CA 3  -CA      Moving to and from a bed to a chair (including a wheelchair)? 3  -CA 3  -CA     Standing up from a chair using your arms (e.g., wheelchair, bedside chair)? 3  -CA 3  -CA     Climbing 3-5 steps with a railing? 2  -CA 2  -CA     To walk in hospital room? 3  -CA 3  -CA     AM-PAC 6 Clicks Score 17  -CA 17  -CA        Functional Assessment    Outcome Measure Options AM-PAC 6 Clicks Basic Mobility (PT)  -CA AM-PAC 6 Clicks Basic Mobility (PT)  -CA       User Key  (r) = Recorded By, (t) = Taken By, (c) = Cosigned By    Initials Name Provider Type    Angelina Child, LAMIN Physical Therapist           Time Calculation:         PT Charges     Row Name 10/03/18 1333 10/03/18 1146 10/03/18 1144       Time Calculation    Start Time 1322  -CA  -- 1115  -CA    Stop Time 1333  -CA  -- 1145  -CA    Time Calculation (min) 11 min  -CA  -- 30 min  -CA    PT Received On 10/03/18  -CA  -- 10/03/18  -CA    PT - Next Appointment 10/04/18  -CA 10/03/18  -CA 10/04/18  -CA    PT Goal Re-Cert Due Date  --  -- 10/10/18  -CA       Time Calculation- PT    Total Timed Code Minutes- PT 11 minute(s)  -CA  -- 10 minute(s)  -CA      User Key  (r) = Recorded By, (t) = Taken By, (c) = Cosigned By    Initials Name Provider Type    Angelina Child, LAMIN Physical Therapist        Therapy Suggested Charges     Code   Minutes Charges    None           Therapy Charges for Today     Code Description Service Date Service Provider Modifiers Qty    32364113574 HC PT EVAL MOD COMPLEXITY 1 10/3/2018 Angelina Davey, PT GP 1    56636426249 HC PT THER PROC EA 15 MIN 10/3/2018 Angelina Davey, PT GP 1    54360343080 HC PT THER PROC EA 15 MIN 10/3/2018 Angelina Davey, PT GP 1          PT G-Codes  Outcome Measure Options: AM-PAC 6 Clicks Basic Mobility (PT)  AM-PAC 6 Clicks Score: 17    Angelina Davey PT  10/3/2018

## 2018-10-03 NOTE — CONSULTS
FIRST UROLOGY CONSULT      Patient Identification:  NAME:  Tayler Lugo  Age:  68 y.o.   Sex:  female   :  1950   MRN:  [id number]       Chief complaint: retention    History of present illness:  68 years old  POD 1 from laminectomy with fusion  Urinary retention, chronic  Sees Dr Ghotra  Has used IC in past  Tried PNE for possible interstim 2018  The interstim permanent unit has not been placed yet  Shipman in place      Past medical history:  Past Medical History:   Diagnosis Date   • Elevated liver enzymes    • Fatty liver    • Frequent UTI    • Hypertension    • Lumbar stenosis    • Spondylarthritis     LOW BACK   • Stress incontinence    • Urinary retention    • UTI (urinary tract infection)     ON ANTIBIOTIC       Past surgical history:  Past Surgical History:   Procedure Laterality Date   • ABDOMINOPLASTY     • BILATERAL BREAST REDUCTION     • CATARACT EXTRACTION Bilateral    •  SECTION      X3   • COLONOSCOPY      2006   • COLONOSCOPY N/A 2016    Procedure: COLONOSCOPY;  Surgeon: Ho Arenas MD;  Location: [facility] ENDOSCOPY;  Service:    • LASIK     • LUMBAR DISCECTOMY FUSION INSTRUMENTATION N/A 10/1/2018    Procedure: L4-5, L5-S1  laminectomy and fusion with instrumentation;  Surgeon: Juno Kim MD;  Location: [facility] MAIN OR;  Service: Orthopedic Spine   • REDUCTION MAMMAPLASTY     • TONSILLECTOMY         Allergies:  Patient has no known allergies.    Home medications:  Prescriptions Prior to Admission   Medication Sig Dispense Refill Last Dose   • amLODIPine (NORVASC) 10 MG tablet Take 10 mg by mouth daily.  2 2018 at 0900   • enalapril (VASOTEC) 20 MG tablet Take 20 mg by mouth Every Morning.  2 2018 at 0900   • sulfamethoxazole-trimethoprim (BACTRIM,SEPTRA) 400-80 MG tablet Take 1 tablet by mouth Daily.   2018 at 0900   • ESTRACE VAGINAL 0.1 MG/GM vaginal cream Insert 2 g into the vagina Daily.   2018        Hospital medications:    amLODIPine  10 mg Oral Daily   [START ON 10/3/2018] ampicillin 500 mg Oral TID   enalapril 20 mg Oral QAM   estradiol 2 g Vaginal Daily   polyethylene glycol 17 g Oral Daily   sennosides-docusate sodium 1 tablet Oral BID        bisacodyl  •  cyclobenzaprine  •  diphenhydrAMINE  •  Glycerin-Hypromellose-  •  naloxone  •  ondansetron **OR** ondansetron ODT **OR** ondansetron  •  oxyCODONE-acetaminophen  •  sodium chloride  •  temazepam    Family history:  Family History   Problem Relation Age of Onset   • Colon polyps Father    • Malig Hyperthermia Neg Hx        Social history:  Social History   Substance Use Topics   • Smoking status: Never Smoker   • Smokeless tobacco: Not on file   • Alcohol use Yes      Comment: 3-4 glasses of wine a week       Review of systems:    gen no fever  Skin neg  Musculoskeletal POD 1 from laminectomy w fusion  Resp neg  Heart no CP  Psych neg  Neuro some lower ext numbness preop   chronic incomplete bladder emptying  GI neg  Endocrine neg      Objective:  TMax 24 hours:   Temp (24hrs), Av.1 °F (36.7 °C), Min:97.8 °F (36.6 °C), Max:98.2 °F (36.8 °C)      Vitals Ranges:   Temp:  [97.8 °F (36.6 °C)-98.2 °F (36.8 °C)] 98.2 °F (36.8 °C)  Heart Rate:  [81-94] 94  Resp:  [16-18] 17  BP: (111-124)/(67-77) 111/77    Intake/Output Last 3 shifts:  I/O last 3 completed shifts:  In: 2630 [P.O.:1330; I.V.:1300]  Out: 3700 [Urine:3550; Blood:150]     Physical Exam:       General Appearance:    Alert, cooperative, in no acute distress   Head:    Normocephalic, without obvious abnormality, atraumatic   Eyes:          PERRL, conjunctivae and corneas clear   Ears:    Normal external inspection   Throat:   No oral lesions, oral mucosa moist   Neck:   Supple, no LAD, trachea midline   Back:     No CVA tenderness   Lungs:     Respirations unlabored, symmetric excursion    Heart:    RRR, intact peripheral pulses   Abdomen:     Soft, NDNT, no masses, no guarding   :  Normal genitalia   Extremities:   No  edema, no deformity   Skin:   No bleeding, bruising or rashes   Neuro/Psych:   Orientation intact, mood/affect pleasant, no focal findings       Results review:   I reviewed the patient's new clinical results.    Data review:  Lab Results (last 24 hours)     Procedure Component Value Units Date/Time    Comprehensive Metabolic Panel [[id number]]  (Abnormal) Collected:  10/02/18 0529    Specimen:  Blood Updated:  10/02/18 0643     Glucose 146 (H) mg/dL      BUN 12 mg/dL      Creatinine 0.72 mg/dL      Sodium 135 (L) mmol/L      Potassium 4.5 mmol/L      Chloride 100 mmol/L      CO2 25.2 mmol/L      Calcium 8.6 mg/dL      Total Protein 5.9 (L) g/dL      Albumin 3.50 g/dL      ALT (SGPT) 99 (H) U/L      AST (SGOT) 77 (H) U/L      Alkaline Phosphatase 56 U/L      Total Bilirubin 0.2 mg/dL      eGFR Non African Amer 80 mL/min/1.73      Globulin 2.4 gm/dL      A/G Ratio 1.5 g/dL      BUN/Creatinine Ratio 16.7     Anion Gap 9.8 mmol/L     CBC & Differential [[id number]] Collected:  10/02/18 0529    Specimen:  Blood Updated:  10/02/18 0617    Narrative:       The following orders were created for panel order CBC & Differential.  Procedure                               Abnormality         Status                     ---------                               -----------         ------                     CBC Auto Differential[[id number]]        Abnormal            Final result                 Please view results for these tests on the individual orders.    CBC Auto Differential [[id number]]  (Abnormal) Collected:  10/02/18 0529    Specimen:  Blood Updated:  10/02/18 0617     WBC 16.07 (H) 10*3/mm3      RBC 3.52 (L) 10*6/mm3      Hemoglobin 11.2 (L) g/dL      Hematocrit 35.3 (L) %      .3 (H) fL      MCH 31.8 pg      MCHC 31.7 (L) g/dL      RDW 11.5 (L) %      RDW-SD 41.8 fl      MPV 11.6 fL      Platelets 252 10*3/mm3      Neutrophil % 77.3 (H) %      Lymphocyte % 9.0 (L) %      Monocyte % 13.3 (H) %      Eosinophil % 0.0 (L)  %      Basophil % 0.1 %      Immature Grans % 0.3 %      Neutrophils, Absolute 12.42 (H) 10*3/mm3      Lymphocytes, Absolute 1.45 10*3/mm3      Monocytes, Absolute 2.13 (H) 10*3/mm3      Eosinophils, Absolute 0.00 10*3/mm3      Basophils, Absolute 0.02 10*3/mm3      Immature Grans, Absolute 0.05 (H) 10*3/mm3            Imaging:  Imaging Results (last 24 hours)     ** No results found for the last 24 hours. **             Assessment:     Principal Problem:    Spinal stenosis of lumbar region with neurogenic claudication  Active Problems:    Lumbar spinal stenosis    Frequent UTI    Hypertension    Stress incontinence    Itching due to drug  urinary retention    Plan:     Shipman for now  Can go back to IC when she has recovered further  Follow up with dr Ghotra after discharge    Lloyd Chand Jr., MD  10/02/18  9:43 PM

## 2018-10-03 NOTE — THERAPY EVALUATION
Acute Care - Physical Therapy Initial Evaluation  [facility]     Patient Name: Tayler Lugo  : 1950  MRN: [id number]  Today's Date: 10/3/2018   Onset of Illness/Injury or Date of Surgery: 10/01/18  Date of Referral to PT: 10/03/18  Referring Physician: Juno Kim MD      Admit Date: 10/1/2018    Visit Dx:     ICD-10-CM ICD-9-CM   1. Spinal stenosis of lumbar region with neurogenic claudication M48.062 724.03   2. Difficulty walking R26.2 719.7     Patient Active Problem List   Diagnosis   • Spinal stenosis of lumbar region with neurogenic claudication   • Lumbar spinal stenosis   • Frequent UTI   • Hypertension   • Stress incontinence   • Itching due to drug     Past Medical History:   Diagnosis Date   • Elevated liver enzymes    • Fatty liver    • Frequent UTI    • Hypertension    • Lumbar stenosis    • Spondylarthritis     LOW BACK   • Stress incontinence    • Urinary retention    • UTI (urinary tract infection)     ON ANTIBIOTIC     Past Surgical History:   Procedure Laterality Date   • ABDOMINOPLASTY     • BILATERAL BREAST REDUCTION     • CATARACT EXTRACTION Bilateral    •  SECTION      X3   • COLONOSCOPY      2006   • COLONOSCOPY N/A 2016    Procedure: COLONOSCOPY;  Surgeon: Ho Arenas MD;  Location: [facility] ENDOSCOPY;  Service:    • LASIK     • LUMBAR DISCECTOMY FUSION INSTRUMENTATION N/A 10/1/2018    Procedure: L4-5, L5-S1  laminectomy and fusion with instrumentation;  Surgeon: Juno Kim MD;  Location: [facility] OR;  Service: Orthopedic Spine   • REDUCTION MAMMAPLASTY     • TONSILLECTOMY          PT ASSESSMENT (last 12 hours)      Physical Therapy Evaluation     Row Name 10/03/18 1100          PT Evaluation Time/Intention    Subjective Information no complaints  -CA     Document Type evaluation  -CA     Mode of Treatment individual therapy;physical therapy  -CA     Patient Effort excellent  -CA     Symptoms Noted During/After Treatment none  -CA     Row Name  10/03/18 1100          General Information    Patient Profile Reviewed? yes  -CA     Onset of Illness/Injury or Date of Surgery 10/01/18  -CA     Referring Physician uJno Kim MD  -CA     Patient Observations alert;cooperative;agree to therapy  -CA     Patient/Family Observations pt supine in bed, no acute distress noted at rest  -CA     Prior Level of Function independent:  -CA     Equipment Currently Used at Home none  -CA     Pertinent History of Current Functional Problem s/p L4-S1 laminectomy and B posterior fusion on 10/1/18  -CA     Existing Precautions/Restrictions fall;spinal;brace worn when out of bed  -CA     Barriers to Rehab none identified  -CA     Row Name 10/03/18 1100          Relationship/Environment    Lives With alone   Plans to go to sister's [place]  -CA     Row Name 10/03/18 1100          Resource/Environmental Concerns    Current Living Arrangements home/apartment/condo  -CA     Row Name 10/03/18 1100          Home Main Entrance    Number of Stairs, Main Entrance four  -CA     Stair Railings, Main Entrance railings on both sides of stairs  -CA     Row Name 10/03/18 1100          Cognitive Assessment/Intervention- PT/OT    Orientation Status (Cognition) oriented x 4  -CA     Follows Commands (Cognition) WFL  -CA     Personal Safety Interventions fall prevention program maintained;gait belt;nonskid shoes/slippers when out of bed  -CA     Row Name 10/03/18 1100          Bed Mobility Assessment/Treatment    Bed Mobility Assessment/Treatment sidelying-sit;rolling right  -CA     Rolling Right [place] (Bed Mobility) independent  -CA     Sidelying-Sit [place] (Bed Mobility) minimum assist (75% patient effort);verbal cues  -CA     Assistive Device (Bed Mobility) bed rails  -CA     Comment (Bed Mobility) Education on Log Roll  -CA     Row Name 10/03/18 1100          Transfer Assessment/Treatment    Transfer Assessment/Treatment sit-stand transfer;stand-sit transfer  -CA     Sit-Stand  [place] (Transfers) contact guard  -CA     Stand-Sit [place] (Transfers) contact guard  -CA     Row Name 10/03/18 1100          Sit-Stand Transfer    Assistive Device (Sit-Stand Transfers) walker, front-wheeled  -CA     Row Name 10/03/18 1100          Stand-Sit Transfer    Assistive Device (Stand-Sit Transfers) walker, front-wheeled  -CA     Row Name 10/03/18 1100          Gait/Stairs Assessment/Training    Gait/Stairs Assessment/Training gait/ambulation independence  -CA     [place] Level (Gait) contact guard  -CA     Assistive Device (Gait) walker, front-wheeled  -CA     Distance in Feet (Gait) 30  -CA     Pattern (Gait) step-through  -CA     Deviations/Abnormal Patterns (Gait) gait speed decreased;stride length decreased  -CA     Bilateral Gait Deviations forward flexed posture  -CA     Row Name 10/03/18 1100          General ROM    GENERAL ROM COMMENTS grossly wfl  -CA     Row Name 10/03/18 1100          MMT (Manual Muscle Testing)    General MMT Comments B LE grossly wfl  -CA     Row Name 10/03/18 1100          Motor Assessment/Intervention    Additional Documentation Therapeutic Exercise Interventions (Group)  -CA     Row Name 10/03/18 1100          Therapeutic Exercise    Comment (Therapeutic Exercise) AP, GS, QS x 10 ea.  -CA     Row Name 10/03/18 1100          Sensory Assessment/Intervention    Sensory General Assessment no sensation deficits identified  -CA     Row Name 10/03/18 1100          Pain Assessment    Additional Documentation Pain Scale: Numbers Pre/Post-Treatment (Group)  -CA     Row Name 10/03/18 1100          Pain Scale: Numbers Pre/Post-Treatment    Pain Scale: Numbers, Pretreatment 0/10 - no pain  -CA     Pain Scale: Numbers, Post-Treatment 0/10 - no pain  -CA     Row Name             Wound 10/01/18 1209 Other (See comments) back incision    Wound - Properties Group Date first assessed: 10/01/18  -JT Time first assessed: 1209  -JT Side: Other (See comments)  -JT Location: back   -JT Type: incision  -JT    Row Name 10/03/18 1100          Physical Therapy Clinical Impression    Date of Referral to PT 10/03/18  -CA     PT Diagnosis (PT Clinical Impression) difficulty walking  -CA     Prognosis (PT Clinical Impression) good  -CA     Functional Level at Time of Evaluation (PT Clinical Impression) min A  -CA     Criteria for Skilled Interventions Met (PT Clinical Impression) yes;treatment indicated  -CA     Impairments Found (describe specific impairments) aerobic capacity/endurance;gait, locomotion, and balance  -CA     Functional Limitations in Following Categories (Describe Specific Limitations) self-care;home management;community/leisure  -CA     Rehab Potential (PT Clinical Summary) good, to achieve stated therapy goals  -CA     Predicted Duration of Therapy (PT) 1 week  -CA     Care Plan Review (PT) evaluation/treatment results reviewed  -CA     Row Name 10/03/18 1100          Physical Therapy Goals    Bed Mobility Goal Selection (PT) bed mobility, PT goal 1  -CA     Transfer Goal Selection (PT) transfer, PT goal 1  -CA     Gait Training Goal Selection (PT) gait training, PT goal 1  -CA     Stairs Goal Selection (PT) stairs, PT goal 1  -CA     Additional Documentation Stairs Goal Selection (PT) (Row)  -CA     Row Name 10/03/18 1100          Bed Mobility Goal 1 (PT)    Activity/Assistive Device (Bed Mobility Goal 1, PT) sidelying to sit/sit to sidelying;rolling to left;rolling to right  -CA     [place] Level/Cues Needed (Bed Mobility Goal 1, PT) supervision required  -CA     Time Frame (Bed Mobility Goal 1, PT) 1 week  -CA     Row Name 10/03/18 1100          Transfer Goal 1 (PT)    Activity/Assistive Device (Transfer Goal 1, PT) transfers, all  -CA     [place] Level/Cues Needed (Transfer Goal 1, PT) supervision required  -CA     Time Frame (Transfer Goal 1, PT) 1 week  -CA     Row Name 10/03/18 1100          Gait Training Goal 1 (PT)    Activity/Assistive Device (Gait Training  Goal 1, PT) gait (walking locomotion);assistive device use;improve balance and speed;increase endurance/gait distance;walker, rolling  -CA     [place] Level (Gait Training Goal 1, PT) supervision required  -CA     Distance (Gait Goal 1, PT) 400  -CA     Time Frame (Gait Training Goal 1, PT) 1 week  -CA     Row Name 10/03/18 1100          Stairs Goal 1 (PT)    Activity/Assistive Device (Stairs Goal 1, PT) stairs, all skills  -CA     [place] Level/Cues Needed (Stairs Goal 1, PT) supervision required  -CA     Number of Stairs (Stairs Goal 1, PT) 4  -CA     Time Frame (Stairs Goal 1, PT) 1 week  -CA     Row Name 10/03/18 1100          Patient Education Goal (PT)    Activity (Patient Education Goal, PT) Pt will demonstrate and verbalized good understanding of donning/doffing back brace  -CA     [place]/Cues/Accuracy (Memory Goal 2, PT) demonstrates adequately;independent;verbalizes understanding  -CA     Time Frame (Patient Education Goal, PT) 1 week  -CA     Row Name 10/03/18 1100          Positioning and Restraints    Pre-Treatment Position in bed  -CA     Post Treatment Position chair  -CA     In Chair notified nsg;reclined;call light within reach;encouraged to call for assist  -CA     Row Name 10/03/18 1100          Living Environment    Home Accessibility stairs to enter home  -CA       User Key  (r) = Recorded By, (t) = Taken By, (c) = Cosigned By    Initials Name Provider Type    Norah Nicholson, RN Registered Nurse    Angelina Child, PT Physical Therapist          Physical Therapy Education     Title: PT OT SLP Therapies (Done)     Topic: Physical Therapy (Done)     Point: Mobility training (Done)    Learning Progress Summary     Learner Status Readiness Method Response Comment Documented by    Patient Done Acceptance E VU  CA 10/03/18 1141          Point: Home exercise program (Done)    Learning Progress Summary     Learner Status Readiness Method Response Comment Documented by     Patient Done Acceptance E [facility] 10/03/18 1141          Point: Body mechanics (Done)    Learning Progress Summary     Learner Status Readiness Method Response Comment Documented by    Patient Done Acceptance E [facility] 10/03/18 1141          Point: Precautions (Done)    Learning Progress Summary     Learner Status Readiness Method Response Comment Documented by    Patient Done Acceptance E [facility] 10/03/18 1141                      User Key     Initials Effective Dates Name Provider Type Discipline    CA 06/13/18 -  Angelina Davey, PT Physical Therapist PT                PT Recommendation and Plan  Anticipated Discharge Disposition (PT): home with assist, home with home health  Planned Therapy Interventions (PT Eval): balance training, bed mobility training, gait training, home exercise program, manual therapy techniques, neuromuscular re-education, patient/family education, postural re-education, ROM (range of motion), strengthening, stair training, stretching, transfer training  Therapy Frequency (PT Clinical Impression): 2 times/day  Outcome Summary/Treatment Plan (PT)  Anticipated Equipment Needs at Discharge (PT): front wheeled walker  Anticipated Discharge Disposition (PT): home with assist, home with home health  Plan of Care Reviewed With: patient  Outcome Summary: Pt is a 68 y.o. female admitted to [facility] and is s/p L4-S1 laminectomy and bilateral posterior fusion on 10/1/2018 with spinal precautions and back brace to be worn when OOB. Pt presents today with decreased activity tolerance, generalized weakness, and decreased functional mobility. Pt required min A for bed mobility, CGA for STS transfers, and CGA for ambulation with FWW for 30 ft. Pt will benefit from skilled PT to address the previous deficits and improve overall safety with functional mobility. PTA pt lives alone, and amb with no AD, but plans to DC to Sister's house which has 4 WING and opportunity for first floor set up. Pt will likely need FWW  at [place] as well as  PT.          Outcome Measures     Row Name 10/03/18 1100             How much help from another person do you currently need...    Turning from your back to your side while in flat bed without using bedrails? 3  -CA      Moving from lying on back to sitting on the side of a flat bed without bedrails? 3  -CA      Moving to and from a bed to a chair (including a wheelchair)? 3  -CA      Standing up from a chair using your arms (e.g., wheelchair, bedside chair)? 3  -CA      Climbing 3-5 steps with a railing? 2  -CA      To walk in hospital room? 3  -CA      AM-PAC 6 Clicks Score 17  -CA         Functional Assessment    Outcome Measure Options AM-PAC 6 Clicks Basic Mobility (PT)  -CA        User Key  (r) = Recorded By, (t) = Taken By, (c) = Cosigned By    Initials Name Provider Type    Angelina Child, PT Physical Therapist           Time Calculation:         PT Charges     Row Name 10/03/18 1144             Time Calculation    Start Time 1115  -CA      Stop Time 1145  -CA      Time Calculation (min) 30 min  -CA      PT Received On 10/03/18  -CA      PT - Next Appointment 10/04/18  -CA      PT Goal Re-Cert Due Date 10/10/18  -CA         Time Calculation- PT    Total Timed Code Minutes- PT 10 minute(s)  -CA        User Key  (r) = Recorded By, (t) = Taken By, (c) = Cosigned By    Initials Name Provider Type    Angelina Child, PT Physical Therapist        Therapy Suggested Charges     Code   Minutes Charges    None           Therapy Charges for Today     Code Description Service Date Service Provider Modifiers Qty    24843168899  PT EVAL MOD COMPLEXITY 1 10/3/2018 Angelina Davey, PT GP 1    [id number]  PT THER PROC EA 15 MIN 10/3/2018 Angelina Davey, PT GP 1          PT G-Codes  Outcome Measure Options: AM-PAC 6 Clicks Basic Mobility (PT)  AM-PAC 6 Clicks Score: 17      Angelina Davey PT  10/3/2018

## 2018-10-04 VITALS
TEMPERATURE: 97.1 F | SYSTOLIC BLOOD PRESSURE: 98 MMHG | HEIGHT: 62 IN | DIASTOLIC BLOOD PRESSURE: 67 MMHG | HEART RATE: 80 BPM | BODY MASS INDEX: 25.95 KG/M2 | WEIGHT: 141 LBS | RESPIRATION RATE: 16 BRPM | OXYGEN SATURATION: 85 %

## 2018-10-04 PROBLEM — J96.01 ACUTE RESPIRATORY FAILURE WITH HYPOXIA (HCC): Status: ACTIVE | Noted: 2018-10-04

## 2018-10-04 LAB
DEPRECATED RDW RBC AUTO: 41.2 FL (ref 37–54)
ERYTHROCYTE [DISTWIDTH] IN BLOOD BY AUTOMATED COUNT: 11.4 % (ref 11.7–13)
HCT VFR BLD AUTO: 30.9 % (ref 35.6–45.5)
HGB BLD-MCNC: 9.8 G/DL (ref 11.9–15.5)
MCH RBC QN AUTO: 31.7 PG (ref 26.9–32)
MCHC RBC AUTO-ENTMCNC: 31.7 G/DL (ref 32.4–36.3)
MCV RBC AUTO: 100 FL (ref 80.5–98.2)
PLATELET # BLD AUTO: 216 10*3/MM3 (ref 140–500)
PMV BLD AUTO: 11.6 FL (ref 6–12)
RBC # BLD AUTO: 3.09 10*6/MM3 (ref 3.9–5.2)
WBC NRBC COR # BLD: 13.65 10*3/MM3 (ref 4.5–10.7)

## 2018-10-04 PROCEDURE — 97116 GAIT TRAINING THERAPY: CPT

## 2018-10-04 PROCEDURE — 85027 COMPLETE CBC AUTOMATED: CPT | Performed by: INTERNAL MEDICINE

## 2018-10-04 PROCEDURE — 99024 POSTOP FOLLOW-UP VISIT: CPT | Performed by: ORTHOPAEDIC SURGERY

## 2018-10-04 RX ORDER — SENNA AND DOCUSATE SODIUM 50; 8.6 MG/1; MG/1
1 TABLET, FILM COATED ORAL 2 TIMES DAILY
Qty: 30 TABLET | Refills: 1 | Status: SHIPPED | OUTPATIENT
Start: 2018-10-04 | End: 2019-06-06

## 2018-10-04 RX ORDER — AMPICILLIN 500 MG/1
500 CAPSULE ORAL 3 TIMES DAILY
Qty: 27 CAPSULE | Refills: 0 | Status: SHIPPED | OUTPATIENT
Start: 2018-10-04 | End: 2018-10-13

## 2018-10-04 RX ORDER — OXYCODONE HYDROCHLORIDE AND ACETAMINOPHEN 5; 325 MG/1; MG/1
TABLET ORAL
Qty: 50 TABLET | Refills: 0 | Status: SHIPPED | OUTPATIENT
Start: 2018-10-04 | End: 2018-10-10 | Stop reason: SDUPTHER

## 2018-10-04 RX ADMIN — AMPICILLIN 500 MG: 500 CAPSULE ORAL at 08:15

## 2018-10-04 RX ADMIN — CYCLOBENZAPRINE 10 MG: 10 TABLET, FILM COATED ORAL at 00:27

## 2018-10-04 RX ADMIN — DOCUSATE SODIUM -SENNOSIDES 1 TABLET: 50; 8.6 TABLET, COATED ORAL at 08:16

## 2018-10-04 RX ADMIN — OXYCODONE HYDROCHLORIDE AND ACETAMINOPHEN 2 TABLET: 5; 325 TABLET ORAL at 08:15

## 2018-10-04 RX ADMIN — AMPICILLIN 500 MG: 500 CAPSULE ORAL at 00:27

## 2018-10-04 RX ADMIN — OXYCODONE HYDROCHLORIDE AND ACETAMINOPHEN 2 TABLET: 5; 325 TABLET ORAL at 04:15

## 2018-10-04 RX ADMIN — POLYETHYLENE GLYCOL 3350 17 G: 17 POWDER, FOR SOLUTION ORAL at 08:15

## 2018-10-04 NOTE — THERAPY TREATMENT NOTE
Acute Care - Physical Therapy Treatment Note  [facility]     Patient Name: Tayler Lugo  : 1950  MRN: [id number]  Today's Date: 10/4/2018  Onset of Illness/Injury or Date of Surgery: 10/01/18  Date of Referral to PT: 10/03/18  Referring Physician: Juno Kim MD    Admit Date: 10/1/2018    Visit Dx:    ICD-10-CM ICD-9-CM   1. Spinal stenosis of lumbar region with neurogenic claudication M48.062 724.03   2. Difficulty walking R26.2 719.7     Patient Active Problem List   Diagnosis   • Spinal stenosis of lumbar region with neurogenic claudication   • Lumbar spinal stenosis   • Frequent UTI   • Hypertension   • Stress incontinence   • Itching due to drug       Therapy Treatment          Rehabilitation Treatment Summary     Row Name 10/04/18 0943             Treatment Time/Intention    Discipline physical therapy assistant  -      Document Type therapy note (daily note)  -      Subjective Information no complaints  -EH      Mode of Treatment physical therapy  -EH      Patient/Family Observations Pt sitting in chair  -      Care Plan Review patient/other agree to care plan  -      Therapy Frequency (PT Clinical Impression) daily  -EH      Patient Effort excellent  -EH      Existing Precautions/Restrictions fall;spinal;brace worn when out of bed  -EH      Recorded by [EH] Maru Blair PTA 10/04/18 1046      Row Name 10/04/18 0943             Vital Signs    Pre SpO2 (%) 95  -EH      O2 Delivery Pre Treatment supplemental O2  -EH      Post SpO2 (%) 89  -EH      O2 Delivery Post Treatment room air  -EH      Recorded by [EH] Maru Blair PTA 10/04/18 1046      Row Name 10/04/18 0943             Cognitive Assessment/Intervention- PT/OT    Orientation Status (Cognition) oriented x 4  -EH      Follows Commands (Cognition) WFL  -EH      Personal Safety Interventions fall prevention program maintained;gait belt;nonskid shoes/slippers when out of bed  -EH      Recorded by [EH] Maru Blair PTA  10/04/18 1046      Row Name 10/04/18 0943             Sit-Stand Transfer    Sit-Stand [place] (Transfers) contact guard  -EH      Assistive Device (Sit-Stand Transfers) walker, front-wheeled  -EH      Recorded by [EH] Maru Blair, PTA 10/04/18 1046      Row Name 10/04/18 0943             Stand-Sit Transfer    Stand-Sit [place] (Transfers) contact guard  -EH      Assistive Device (Stand-Sit Transfers) walker, front-wheeled  -EH      Recorded by [EH] Maru Blair, PTA 10/04/18 1046      Row Name 10/04/18 0943             Gait/Stairs Assessment/Training    [place] Level (Gait) contact guard  -EH      Assistive Device (Gait) walker, front-wheeled  -EH      Distance in Feet (Gait) 400  -EH      Pattern (Gait) step-through  -EH      Deviations/Abnormal Patterns (Gait) stride length decreased;gait speed decreased  -EH      Bilateral Gait Deviations forward flexed posture  -EH      [place] Level (Stairs) contact guard  -EH      Assistive Device (Stairs) --   No AD  -EH      Handrail Location (Stairs) both sides  -EH      Number of Steps (Stairs) 4  -EH      Ascending Technique (Stairs) step-to-step  -EH      Descending Technique (Stairs) step-to-step  -EH      Comment (Gait/Stairs) Pt performed ascending/descending stairs X2 in a safe manner  -EH      Recorded by [EH] Maru Blair, PTA 10/04/18 1046      Row Name 10/04/18 0943             Positioning and Restraints    Pre-Treatment Position sitting in chair/recliner  -EH      Post Treatment Position chair  -EH      In Chair reclined;call light within reach;encouraged to call for assist  -EH      Recorded by [EH] Maru Blair, PTA 10/04/18 1046      Row Name                Wound 10/01/18 1209 Other (See comments) back incision    Wound - Properties Group Date first assessed: 10/01/18 [JT] Time first assessed: 1209 [JT] Side: Other (See comments) [JT] Location: back [JT] Type: incision [JT] Recorded by:  [JT] Norah Ramirez RN 10/01/18 1207       User Key  (r) = Recorded By, (t) = Taken By, (c) = Cosigned By    Initials Name Effective Dates Discipline    Norah Nicholson RN 06/16/16 -  Nurse     Maru Blair PTA 08/19/18 -  PT          Wound 10/01/18 1209 Other (See comments) back incision (Active)   Dressing Appearance dry;intact 10/4/2018  8:15 AM   Closure BATOOL 10/3/2018  8:55 PM   Base dressing in place, unable to visualize 10/3/2018  8:55 PM   Drainage Amount none 10/4/2018  8:15 AM   Dressing Care, Wound gauze 10/4/2018  8:15 AM             Physical Therapy Education     Title: PT OT SLP Therapies (Done)     Topic: Physical Therapy (Done)     Point: Mobility training (Done)    Learning Progress Summary     Learner Status Readiness Method Response Comment Documented by    [facility] 10/04/18 1049     Done Acceptance E VU  CA 10/03/18 1333     Done Acceptance E VU  CA 10/03/18 1141          Point: Home exercise program (Done)    Learning Progress Summary     Learner Status Readiness Method Response Comment Documented by    Patient Done Acceptance E,[facility] 10/04/18 1049     Done Acceptance E VU  CA 10/03/18 1333     Done Acceptance E VU  CA 10/03/18 1141          Point: Body mechanics (Done)    Learning Progress Summary     Learner Status Readiness Method Response Comment Documented by    Patient Done [facility] 10/04/18 1049     Done Acceptance E VU  CA 10/03/18 1333     Done Acceptance E VU  CA 10/03/18 1141          Point: Precautions (Done)    Learning Progress Summary     Learner Status Readiness Method Response Comment Documented by    Patient Done [facility] 10/04/18 1049     Done Acceptance E VU  CA 10/03/18 1333     Done Acceptance E VU  CA 10/03/18 1141                      User Key     Initials Effective Dates Name Provider Type Discipline     08/19/18 -  Maru Blair PTA Physical Therapy Assistant PT    CA 06/13/18 -  Angelina Davey PT Physical Therapist PT                     PT Recommendation and Plan  Therapy Frequency (PT Clinical Impression): daily  Plan of Care Reviewed With: patient  Progress: improving  Outcome Summary: Pt tolerated treatment today without complaints. Pt ambulated 400ft with CGA, rwx. Pt performed stairs ascending/descending 4 steps twice with hr on both side without AD. Pt  able to ambulate stairs in a ssafe manner.          Outcome Measures     Row Name 10/04/18 1000 10/03/18 1300 10/03/18 1100       How much help from another person do you currently need...    Turning from your back to your side while in flat bed without using bedrails? 3  -EH 3  -CA 3  -CA    Moving from lying on back to sitting on the side of a flat bed without bedrails? 3  -EH 3  -CA 3  -CA    Moving to and from a bed to a chair (including a wheelchair)? 3  -EH 3  -CA 3  -CA    Standing up from a chair using your arms (e.g., wheelchair, bedside chair)? 3  -EH 3  -CA 3  -CA    Climbing 3-5 steps with a railing? 3  -EH 2  -CA 2  -CA    To walk in hospital room? 3  -EH 3  -CA 3  -CA    AM-PAC 6 Clicks Score 18  -EH 17  -CA 17  -CA       Functional Assessment    Outcome Measure Options  -- AM-PAC 6 Clicks Basic Mobility (PT)  -CA AM-PAC 6 Clicks Basic Mobility (PT)  -CA      User Key  (r) = Recorded By, (t) = Taken By, (c) = Cosigned By    Initials Name Provider Type     Maru Blair PTA Physical Therapy Assistant    Angelina Child, PT Physical Therapist           Time Calculation:         PT Charges     Row Name 10/04/18 1049             Time Calculation    Start Time 0943  -      Stop Time 0957  -      Time Calculation (min) 14 min  -      PT Received On 10/04/18  -      PT - Next Appointment 10/05/18  -         Time Calculation- PT    Total Timed Code Minutes- PT 14 minute(s)  -        User Key  (r) = Recorded By, (t) = Taken By, (c) = Cosigned By    Initials Name Provider Type     Maru Blair PTA Physical Therapy Assistant        Therapy Suggested Charges      Code   Minutes Charges    None           Therapy Charges for Today     Code Description Service Date Service Provider Modifiers Qty    [id number] HC GAIT TRAINING EA 15 MIN 10/4/2018 Maru Blair, PTA GP 1          PT G-Codes  Outcome Measure Options: AM-PAC 6 Clicks Basic Mobility (PT)  AM-PAC 6 Clicks Score: 18    Maru Blair PTA  10/4/2018

## 2018-10-04 NOTE — DISCHARGE INSTRUCTIONS
LUMBAR FUSION SURGERY   HOME INSTRUCTIONS     1.     You will need to check your dressing or have a family member to check            your dressing EVERYDAY for the following signs:              Increase in redness            Increase in swelling around the incision or low back area            Increase in pain            Any drainage noted on the dressing or from the incision            Edges of the incision are pulling apart once dressing is removed            Increase in overall body temperature (greater than 100.5 degrees)            Dressings are to be changed only if directed by the physician             Call your physician if any of these listed above occur after you are           discharged from the hospital.  DO NOT wait until your office visit to           inform the physician.     2.     Continue with the exercise program twice a day as instructed by the           physical therapist at the hospital.  A more involved physical therapy           program may be started 3 months after your surgery.     3.     Walking must be done on a daily basis.  You should walk at least twice           a day and more if you are able.  Start with short distances and gradually           add a little distance everyday.     4.     You may wish to use an elevated toilet seat for up to12 weeks after surgery.     5.     You MUST wear your brace anytime you are up.  You do not have to wear             your brace at nighttime when walking a short distance to the bathroom.     6.     You may shower three (3) days after your surgery, as long as there is no                 drainage.  The dressing is waterproof and should be left on and intact until you are         seen in the office for your first post-operative visit. You may remove           your brace to shower.                                                                                                             7.     You can sit in a high-straight back chair with arms as  "tolerated, unless   instructed otherwise.  Do not flex your hips more than 90 degrees when        sitting.  Avoid low, soft chairs.     8.     You may want to sleep on a firm mattress.  Avoid waterbeds for 3-6 months             after surgery.     9.     Patients should not smoke.  Smoking interferes with the fusion and the                 healing time.     10.   Avoid pushing or pulling.  There will be no lifting of anything over 5 pounds for the           first few months.     11.   You may drive a car, usually 2-4 weeks after surgery, but only after you           have checked with your physician.     12.   You may ride in a car after your surgery and for no more than           30 minutes to one hour at a time.  Short distance riding to and from the           physician's office for follow-up visits is the only time you should be in a           car until after the 2-4 weeks time frame.     13.   You may go home in a car.  You must wear your seat belt.     14.   You will be sent home with pain medication, but it will only be used up to             4 weeks after surgery.  Refills may be obtained, but ONLY during office           hours.     15.   You may go and down stairs, but take it easy at first.  It is preferable that   the first few weeks at home, try to only use the stairs once or twice a day          until you have had a chance to regain your strength.     16.   NO bending at the waist.  You may need to use your \"reacher\" to assist you           in picking up items off of the floor. (not mandatory to have)  If you absolutely                   something, you may squat by bending at your hips and knees.           need to reach for.     17.   Sexual activity should be avoided for 2 weeks after surgery.  Be careful and use              common sense.  You may have to adjust your position to lessen the strain on your         back.     18.   Return to the office in 2 weeks to see Dr. Kim.  "

## 2018-10-04 NOTE — PROGRESS NOTES
Continued Stay Note  [facility]     Patient Name: Tayler Lugo  MRN: [id number]  Today's Date: 10/4/2018    Admit Date: 10/1/2018          Discharge Plan     Row Name 10/04/18 1345       Plan    Plan Comments Spoke with Bill with [facility] who will follow for nursing and PT.  Patient will need oxygen at home and arranged through [employer].  Walker and commode have been delivered    Final Discharge Disposition Code 06 - home with home health care    Final Note Home with [facility]              Discharge Codes    No documentation.       Expected Discharge Date and Time     Expected Discharge Date Expected Discharge Time    Oct 4, 2018             Yovana Gross RN

## 2018-10-04 NOTE — PROGRESS NOTES
Postop day 3: AVSS awake alert oriented no headache ambulating well good strength in the legs.  Dressing is dry.  She wants to go home today and will stay with her sister.  This seems safe to me in lieu of rehabilitation.  She will follow up in 2 weeks

## 2018-10-04 NOTE — PLAN OF CARE
Problem: Patient Care Overview  Goal: Plan of Care Review  Outcome: Ongoing (interventions implemented as appropriate)   10/04/18 1047   Coping/Psychosocial   Plan of Care Reviewed With patient   Plan of Care Review   Progress improving   OTHER   Outcome Summary Pt tolerated treatment today without complaints. Pt ambulated 400ft with CGA, rwx. Pt performed stairs ascending/descending 4 steps twice with hr on both side without AD. Pt able to ambulate stairs in a ssafe manner.

## 2018-10-04 NOTE — DISCHARGE SUMMARY
Orthopedic Discharge Summary      Patient: Tayler Lugo  YOB: 1950  Medical Record Number: [id number]    Attending Physician: Juno Kim MD  Consulting Physician(s):   Consults     Date and Time Order Name Status Description    10/2/2018 1622 Inpatient Urology Consult Completed     10/1/2018 1558 Inpatient Hospitalist Consult Completed           Date of Admission: 10/1/2018  7:18 AM  Date of Discharge:10/4/2018    Discharge Diagnosis: L5-S1 laminectomy and fusion with instrumentation,   Acute Blood Loss Anemia      Presenting Problem/History of Present Illness: Spinal stenosis of lumbar region with neurogenic claudication [M48.062]  Lumbar spinal stenosis [M48.061]        Allergies: No Known Allergies    Discharge Medications     Discharge Medications      New Medications      Instructions Start Date   ampicillin 500 MG capsule  Commonly known as:  PRINCIPEN   500 mg, Oral, 3 Times Daily      oxyCODONE-acetaminophen 5-325 MG per tablet  Commonly known as:  PERCOCET   1-2 tablets by mouth every 4-6 hours when necessary pain      sennosides-docusate sodium 8.6-50 MG tablet  Commonly known as:  SENOKOT-S   1 tablet, Oral, 2 Times Daily         Continue These Medications      Instructions Start Date   amLODIPine 10 MG tablet  Commonly known as:  NORVASC   10 mg, Oral, Daily      enalapril 20 MG tablet  Commonly known as:  VASOTEC   20 mg, Oral, Every Morning      ESTRACE VAGINAL 0.1 MG/GM vaginal cream  Generic drug:  estradiol   2 g, Vaginal, Daily         Stop These Medications    sulfamethoxazole-trimethoprim 400-80 MG tablet  Commonly known as:  BACTRIM,SEPTRA              Past Medical History:   Diagnosis Date   • Elevated liver enzymes    • Fatty liver    • Frequent UTI    • Hypertension    • Lumbar stenosis    • Spondylarthritis     LOW BACK   • Stress incontinence    • Urinary retention    • UTI (urinary tract infection)     ON ANTIBIOTIC        Past Surgical History:   Procedure  Laterality Date   • ABDOMINOPLASTY     • BILATERAL BREAST REDUCTION     • CATARACT EXTRACTION Bilateral    •  SECTION      X3   • COLONOSCOPY      2006   • COLONOSCOPY N/A 2016    Procedure: COLONOSCOPY;  Surgeon: Ho Arenas MD;  Location: [facility] ENDOSCOPY;  Service:    • LASIK     • LUMBAR DISCECTOMY FUSION INSTRUMENTATION N/A 10/1/2018    Procedure: L4-5, L5-S1  laminectomy and fusion with instrumentation;  Surgeon: Juno Kim MD;  Location: [facility] MAIN OR;  Service: Orthopedic Spine   • REDUCTION MAMMAPLASTY     • TONSILLECTOMY          Social History     Occupational History   • Not on file.     Social History Main Topics   • Smoking status: Never Smoker   • Smokeless tobacco: Not on file   • Alcohol use Yes      Comment: 3-4 glasses of wine a week   • Drug use: No   • Sexual activity: Defer    Social History     Social History Narrative   • No narrative on file        Family History   Problem Relation Age of Onset   • Colon polyps Father    • Malig Hyperthermia Neg Hx          Physical Exam: 68 y.o. female  General Appearance:    Alert, cooperative, in no acute distress                    Vitals:    10/03/18 1330 10/03/18 1731 10/03/18 2245 10/04/18 0523   BP: 117/64 115/64 102/61 96/59   BP Location: Right arm Left arm Left arm Left arm   Patient Position: Sitting Lying Lying Lying   Pulse: 94 95 87 81   Resp:  16   Temp: 97.7 °F (36.5 °C) 98.3 °F (36.8 °C) 98.4 °F (36.9 °C) 98.2 °F (36.8 °C)   TempSrc: Oral Oral Oral Oral   SpO2: 93% 93% 93% 93%   Weight:       Height:            DIAGNOSTIC TESTS:   Admission on 10/01/2018   Component Date Value Ref Range Status   • Hemoglobin 10/01/2018 12.7  11.9 - 15.5 g/dL Final   • Hematocrit 10/01/2018 40.7  35.6 - 45.5 % Final   • Glucose 10/02/2018 146* 65 - 99 mg/dL Final   • BUN 10/02/2018 12  8 - 23 mg/dL Final   • Creatinine 10/02/2018 0.72  0.57 - 1.00 mg/dL Final   • Sodium 10/02/2018 135* 136 - 145 mmol/L Final   • Potassium  10/02/2018 4.5  3.5 - 5.2 mmol/L Final   • Chloride 10/02/2018 100  98 - 107 mmol/L Final   • CO2 10/02/2018 25.2  22.0 - 29.0 mmol/L Final   • Calcium 10/02/2018 8.6  8.6 - 10.5 mg/dL Final   • Total Protein 10/02/2018 5.9* 6.0 - 8.5 g/dL Final   • Albumin 10/02/2018 3.50  3.50 - 5.20 g/dL Final   • ALT (SGPT) 10/02/2018 99* 1 - 33 U/L Final   • AST (SGOT) 10/02/2018 77* 1 - 32 U/L Final   • Alkaline Phosphatase 10/02/2018 56  39 - 117 U/L Final   • Total Bilirubin 10/02/2018 0.2  0.1 - 1.2 mg/dL Final   • eGFR Non African Amer 10/02/2018 80  >60 mL/min/1.73 Final   • Globulin 10/02/2018 2.4  gm/dL Final   • A/G Ratio 10/02/2018 1.5  g/dL Final   • BUN/Creatinine Ratio 10/02/2018 16.7  7.0 - 25.0 Final   • Anion Gap 10/02/2018 9.8  mmol/L Final   • WBC 10/02/2018 16.07* 4.50 - 10.70 10*3/mm3 Final   • RBC 10/02/2018 3.52* 3.90 - 5.20 10*6/mm3 Final   • Hemoglobin 10/02/2018 11.2* 11.9 - 15.5 g/dL Final   • Hematocrit 10/02/2018 35.3* 35.6 - 45.5 % Final   • MCV 10/02/2018 100.3* 80.5 - 98.2 fL Final   • MCH 10/02/2018 31.8  26.9 - 32.0 pg Final   • MCHC 10/02/2018 31.7* 32.4 - 36.3 g/dL Final   • RDW 10/02/2018 11.5* 11.7 - 13.0 % Final   • RDW-SD 10/02/2018 41.8  37.0 - 54.0 fl Final   • MPV 10/02/2018 11.6  6.0 - 12.0 fL Final   • Platelets 10/02/2018 252  140 - 500 10*3/mm3 Final   • Neutrophil % 10/02/2018 77.3* 42.7 - 76.0 % Final   • Lymphocyte % 10/02/2018 9.0* 19.6 - 45.3 % Final   • Monocyte % 10/02/2018 13.3* 5.0 - 12.0 % Final   • Eosinophil % 10/02/2018 0.0* 0.3 - 6.2 % Final   • Basophil % 10/02/2018 0.1  0.0 - 1.5 % Final   • Immature Grans % 10/02/2018 0.3  0.0 - 0.5 % Final   • Neutrophils, Absolute 10/02/2018 12.42* 1.90 - 8.10 10*3/mm3 Final   • Lymphocytes, Absolute 10/02/2018 1.45  0.90 - 4.80 10*3/mm3 Final   • Monocytes, Absolute 10/02/2018 2.13* 0.20 - 1.20 10*3/mm3 Final   • Eosinophils, Absolute 10/02/2018 0.00  0.00 - 0.70 10*3/mm3 Final   • Basophils, Absolute 10/02/2018 0.02  0.00 - 0.20  10*3/mm3 Final   • Immature Grans, Absolute 10/02/2018 0.05* 0.00 - 0.03 10*3/mm3 Final   • Glucose 10/03/2018 112* 65 - 99 mg/dL Final   • BUN 10/03/2018 9  8 - 23 mg/dL Final   • Creatinine 10/03/2018 0.55* 0.57 - 1.00 mg/dL Final   • Sodium 10/03/2018 137  136 - 145 mmol/L Final   • Potassium 10/03/2018 4.2  3.5 - 5.2 mmol/L Final   • Chloride 10/03/2018 101  98 - 107 mmol/L Final   • CO2 10/03/2018 26.3  22.0 - 29.0 mmol/L Final   • Calcium 10/03/2018 8.8  8.6 - 10.5 mg/dL Final   • eGFR Non African Amer 10/03/2018 109  >60 mL/min/1.73 Final   • BUN/Creatinine Ratio 10/03/2018 16.4  7.0 - 25.0 Final   • Anion Gap 10/03/2018 9.7  mmol/L Final   • WBC 10/03/2018 14.99* 4.50 - 10.70 10*3/mm3 Final   • RBC 10/03/2018 3.25* 3.90 - 5.20 10*6/mm3 Final   • Hemoglobin 10/03/2018 10.3* 11.9 - 15.5 g/dL Final   • Hematocrit 10/03/2018 32.8* 35.6 - 45.5 % Final   • MCV 10/03/2018 100.9* 80.5 - 98.2 fL Final   • MCH 10/03/2018 31.7  26.9 - 32.0 pg Final   • MCHC 10/03/2018 31.4* 32.4 - 36.3 g/dL Final   • RDW 10/03/2018 11.6* 11.7 - 13.0 % Final   • RDW-SD 10/03/2018 42.7  37.0 - 54.0 fl Final   • MPV 10/03/2018 11.5  6.0 - 12.0 fL Final   • Platelets 10/03/2018 208  140 - 500 10*3/mm3 Final   • WBC 10/04/2018 13.65* 4.50 - 10.70 10*3/mm3 Final   • RBC 10/04/2018 3.09* 3.90 - 5.20 10*6/mm3 Final   • Hemoglobin 10/04/2018 9.8* 11.9 - 15.5 g/dL Final   • Hematocrit 10/04/2018 30.9* 35.6 - 45.5 % Final   • MCV 10/04/2018 100.0* 80.5 - 98.2 fL Final   • MCH 10/04/2018 31.7  26.9 - 32.0 pg Final   • MCHC 10/04/2018 31.7* 32.4 - 36.3 g/dL Final   • RDW 10/04/2018 11.4* 11.7 - 13.0 % Final   • RDW-SD 10/04/2018 41.2  37.0 - 54.0 fl Final   • MPV 10/04/2018 11.6  6.0 - 12.0 fL Final   • Platelets 10/04/2018 216  140 - 500 10*3/mm3 Final       No results found.    Hospital Course:  68 y.o. female admitted to [facility] to services of Juno Kim MD with Spinal stenosis of lumbar region with neurogenic claudication  [M48.062]  Lumbar spinal stenosis [M48.061] on 10/1/2018 and underwent L5-S1 laminectomy and fusion with instrumentation  Per Juno Kim MD. Antibiotic and VTE prophylaxis were per [facility] protocols.  The patient was admitted to the floor where IV and/or oral pain medications were administered for postoperative pain.  At discharge the incisional pain was tolerable and preop neurologic function was intact.  The dressing was dry and the wound was clean.    Condition on Discharge:  Stable    Discharge Instructions: . Patient may weight bear as tolerated unless otherwise specified. Continue SHAKA hose daily (for two weeks) and ice regularly. Patient also instructed on incentive spirometer during hospitalization and encouraged to continue to use at home regularly. Patient may shower on POD #3 if and when all wound drainage has stopped.  Where applicable, the brace should be worn when up and about.  It need not be worn to the bathroom and certainly not in the shower.  A detailed list of instructions specific to the operation was given to the patient at the time of discharge.    Follow up Instructions:  Follow up in the office with Dr. Juno Kim Jr. in 2-3 weeks - patient to call the office at [phone] to schedule. Prescriptions were given for pain medication.    Follow-up Appointments  Future Appointments  Date Time Provider Department Center   10/17/2018 2:20 PM Juno Kim MD [facility] [address] None         Discharge Disposition Plan:today to home    Date: 10/4/2018    Juno Kim MD  10/04/18  7:58 AM

## 2018-10-04 NOTE — PROGRESS NOTES
"     LOS: 3 days   Primary Care Physician: Tayler Trujillo MD     Subjective   Feels okay.  Not much results with bowel movements but no abdominal pain.  He is working on deep breathing and coughing.  Still requiring 1 L of oxygen and desaturates to 88% on room air.    Vital Signs  Body mass index is 25.79 kg/m².  Temp:  [97.1 °F (36.2 °C)-98.4 °F (36.9 °C)] 97.1 °F (36.2 °C)  Heart Rate:  [80-95] 80  Resp:  [16-18] 16  BP: ()/(59-67) 98/67      Objective:  General Appearance:  In no acute distress.    Vital signs: (most recent): Blood pressure 98/67, pulse 80, temperature 97.1 °F (36.2 °C), temperature source Oral, resp. rate 16, height 157.5 cm (62\"), weight 64 kg (141 lb), SpO2 (!) 85 %.    Lungs:  She is not in respiratory distress.  No stridor.  There are decreased breath sounds.  No rales, wheezes or rhonchi.    Heart: Normal rate.  Regular rhythm.  No murmur.   Abdomen: Abdomen is soft and non-distended.  (Obese).  Bowel sounds are normal.   There is no abdominal tenderness.   There is no splenomegaly. There is no hepatomegaly.   Extremities: There is no dependent edema.    Neurological: Patient is alert.    Skin:  Warm and dry.          Results Review:    I reviewed the patient's new clinical results.      Results from last 7 days  Lab Units 10/04/18  0602 10/03/18  0449   WBC 10*3/mm3 13.65* 14.99*   HEMOGLOBIN g/dL 9.8* 10.3*   PLATELETS 10*3/mm3 216 208       Results from last 7 days  Lab Units 10/03/18  0449 10/02/18  0529   SODIUM mmol/L 137 135*   POTASSIUM mmol/L 4.2 4.5   CHLORIDE mmol/L 101 100   CO2 mmol/L 26.3 25.2   BUN mg/dL 9 12   CREATININE mg/dL 0.55* 0.72   CALCIUM mg/dL 8.8 8.6   GLUCOSE mg/dL 112* 146*         Hemoglobin A1C:No results found for: HGBA1C    Glucose Range:No results found for: POCGLU    No results found for: [id number]    No results found for: TSH    Assessment & Plan      Medication Review: Yes    Active Hospital Problems    Diagnosis Date Noted   • **Spinal stenosis " of lumbar region with neurogenic claudication [M48.062] 09/04/2018   • Lumbar spinal stenosis [M48.061] 10/01/2018   • Frequent UTI [N39.0] 10/01/2018   • Hypertension [I10] 10/01/2018   • Stress incontinence [N39.3] 10/01/2018   • Itching due to drug [L29.8, T50.905A] 10/01/2018      Resolved Hospital Problems    Diagnosis Date Noted Date Resolved   No resolved problems to display.       Assessment/Plan  1.  Hypotension.  I stopped Norvasc and Vasotec.  I removed them from the discharge med rec.  Discussed with patient.  Home health can check her blood pressure and she will follow-up with her primary care provider in one week.  2.  Acute hypoxic respiratory failure.  She will need O2 at discharge.  I ordered it.  Increase activity as tolerated.  Continue spirometry.  3.  Leukocytosis.  Improving.  No evidence of infection  4.  Lumbar spinal stenosis.  Plans for discharge home noted.  Expected acute blood loss anemia.    Safia Odom MD  10/04/18  2:04 PM

## 2018-10-10 RX ORDER — OXYCODONE HYDROCHLORIDE AND ACETAMINOPHEN 5; 325 MG/1; MG/1
TABLET ORAL
Qty: 50 TABLET | Refills: 0 | Status: SHIPPED | OUTPATIENT
Start: 2018-10-10 | End: 2018-10-10 | Stop reason: SDUPTHER

## 2018-10-10 RX ORDER — OXYCODONE HYDROCHLORIDE AND ACETAMINOPHEN 5; 325 MG/1; MG/1
TABLET ORAL
Qty: 50 TABLET | Refills: 0 | Status: SHIPPED | OUTPATIENT
Start: 2018-10-10 | End: 2018-10-17

## 2018-10-10 NOTE — TELEPHONE ENCOUNTER
SX 10/01/18 L4-5, L5-S1  laminectomy and fusion with instrumentation    Per Jah last filled percocet 5/325 mg qty 50 # days 7   10/04/18     jah on file

## 2018-10-17 ENCOUNTER — OFFICE VISIT (OUTPATIENT)
Dept: ORTHOPEDIC SURGERY | Facility: CLINIC | Age: 68
End: 2018-10-17

## 2018-10-17 VITALS — HEIGHT: 62 IN | WEIGHT: 138 LBS | TEMPERATURE: 97.4 F | BODY MASS INDEX: 25.4 KG/M2

## 2018-10-17 DIAGNOSIS — Z98.1 S/P LUMBAR FUSION: Primary | ICD-10-CM

## 2018-10-17 PROCEDURE — 72100 X-RAY EXAM L-S SPINE 2/3 VWS: CPT | Performed by: ORTHOPAEDIC SURGERY

## 2018-10-17 PROCEDURE — 99024 POSTOP FOLLOW-UP VISIT: CPT | Performed by: ORTHOPAEDIC SURGERY

## 2018-10-17 RX ORDER — HYDROCODONE BITARTRATE AND ACETAMINOPHEN 5; 325 MG/1; MG/1
1 TABLET ORAL EVERY 6 HOURS PRN
Qty: 40 TABLET | Refills: 0 | Status: SHIPPED | OUTPATIENT
Start: 2018-10-17 | End: 2018-12-05

## 2018-10-17 RX ORDER — SULFAMETHOXAZOLE AND TRIMETHOPRIM 400; 80 MG/1; MG/1
1 TABLET ORAL 2 TIMES DAILY
COMMUNITY
End: 2019-06-06

## 2018-10-17 NOTE — PROGRESS NOTES
Postoperatively the patient expresses normal incisional pain.  There is little or no radiating pain.  Good strength on exam.  The wound is intact.  2 view x-rays of the lumbar spine obtained to evaluate hardware position and fusion bone show good position thereof and are unchanged from intraoperative images.  Instructions given.  We'll need lumbar x-rays on return visit.  We will stop supplemental oxygen and I refilled hydrocodone instead of Percocet

## 2018-12-05 ENCOUNTER — OFFICE VISIT (OUTPATIENT)
Dept: ORTHOPEDIC SURGERY | Facility: CLINIC | Age: 68
End: 2018-12-05

## 2018-12-05 VITALS — BODY MASS INDEX: 25.4 KG/M2 | HEIGHT: 62 IN | TEMPERATURE: 98.2 F | WEIGHT: 138 LBS

## 2018-12-05 DIAGNOSIS — Z98.1 S/P LAMINECTOMY WITH SPINAL FUSION: Primary | ICD-10-CM

## 2018-12-05 PROCEDURE — 72100 X-RAY EXAM L-S SPINE 2/3 VWS: CPT | Performed by: ORTHOPAEDIC SURGERY

## 2018-12-05 PROCEDURE — 99024 POSTOP FOLLOW-UP VISIT: CPT | Performed by: ORTHOPAEDIC SURGERY

## 2018-12-05 RX ORDER — NITROFURANTOIN 25; 75 MG/1; MG/1
CAPSULE ORAL
COMMUNITY
Start: 2018-12-04 | End: 2019-06-06

## 2018-12-05 RX ORDER — VENLAFAXINE 75 MG/1
75 TABLET ORAL
COMMUNITY
End: 2019-06-06

## 2019-04-18 ENCOUNTER — TRANSCRIBE ORDERS (OUTPATIENT)
Dept: ADMINISTRATIVE | Facility: HOSPITAL | Age: 69
End: 2019-04-18

## 2019-04-18 DIAGNOSIS — R79.89 ABNORMAL LFTS: Primary | ICD-10-CM

## 2019-04-24 ENCOUNTER — HOSPITAL ENCOUNTER (OUTPATIENT)
Dept: CT IMAGING | Facility: HOSPITAL | Age: 69
Discharge: HOME | End: 2019-04-24
Admitting: INTERNAL MEDICINE

## 2019-04-24 DIAGNOSIS — R79.89 ABNORMAL LFTS: ICD-10-CM

## 2019-04-24 LAB — CREAT BLDA-MCNC: 0.4 MG/DL (ref 0.6–1.3)

## 2019-04-24 PROCEDURE — 74177 CT ABD & PELVIS W/CONTRAST: CPT

## 2019-04-24 PROCEDURE — 25010000002 IOPAMIDOL 61 % SOLUTION: Performed by: INTERNAL MEDICINE

## 2019-04-24 PROCEDURE — 82565 ASSAY OF CREATININE: CPT

## 2019-04-24 RX ADMIN — IOPAMIDOL 85 ML: 612 INJECTION, SOLUTION INTRAVENOUS at 15:31

## 2019-05-07 ENCOUNTER — OFFICE VISIT (OUTPATIENT)
Dept: ORTHOPEDIC SURGERY | Facility: CLINIC | Age: 69
End: 2019-05-07

## 2019-05-07 VITALS — WEIGHT: 134 LBS | BODY MASS INDEX: 24.66 KG/M2 | TEMPERATURE: 97.9 F | HEIGHT: 62 IN

## 2019-05-07 DIAGNOSIS — Z98.1 S/P LUMBAR FUSION: Primary | ICD-10-CM

## 2019-05-07 PROCEDURE — 72100 X-RAY EXAM L-S SPINE 2/3 VWS: CPT | Performed by: ORTHOPAEDIC SURGERY

## 2019-05-07 PROCEDURE — 99213 OFFICE O/P EST LOW 20 MIN: CPT | Performed by: ORTHOPAEDIC SURGERY

## 2019-05-07 RX ORDER — POTASSIUM CHLORIDE 600 MG/1
8 TABLET, FILM COATED, EXTENDED RELEASE ORAL 2 TIMES DAILY PRN
COMMUNITY
Start: 2019-04-03 | End: 2020-11-17 | Stop reason: HOSPADM

## 2019-05-07 RX ORDER — AMLODIPINE BESYLATE 10 MG/1
10 TABLET ORAL EVERY MORNING
Refills: 3 | COMMUNITY
Start: 2019-02-06 | End: 2020-11-17 | Stop reason: HOSPADM

## 2019-05-07 RX ORDER — ENALAPRIL MALEATE 20 MG/1
TABLET ORAL
Refills: 3 | COMMUNITY
Start: 2019-03-04 | End: 2019-06-06

## 2019-05-07 RX ORDER — METOPROLOL SUCCINATE 25 MG/1
25 TABLET, EXTENDED RELEASE ORAL EVERY MORNING
COMMUNITY
Start: 2019-02-15 | End: 2020-11-17 | Stop reason: HOSPADM

## 2019-05-07 NOTE — PROGRESS NOTES
She is 4 months out and doing great no new complaints.  Status post laminectomy and fusion with instrumentation good strength on exam no balance difficulties good gait.  X-rays suggest a solid fusion compared to prior films doing well follow-up PRN

## 2019-05-13 ENCOUNTER — OFFICE VISIT (OUTPATIENT)
Dept: ORTHOPEDIC SURGERY | Facility: CLINIC | Age: 69
End: 2019-05-13

## 2019-05-13 VITALS — HEIGHT: 62 IN | TEMPERATURE: 97.6 F | WEIGHT: 132 LBS | BODY MASS INDEX: 24.29 KG/M2

## 2019-05-13 DIAGNOSIS — M75.101 TEAR OF RIGHT ROTATOR CUFF, UNSPECIFIED TEAR EXTENT: ICD-10-CM

## 2019-05-13 DIAGNOSIS — M25.511 ACUTE PAIN OF RIGHT SHOULDER: Primary | ICD-10-CM

## 2019-05-13 PROCEDURE — 73030 X-RAY EXAM OF SHOULDER: CPT | Performed by: ORTHOPAEDIC SURGERY

## 2019-05-13 PROCEDURE — 99214 OFFICE O/P EST MOD 30 MIN: CPT | Performed by: ORTHOPAEDIC SURGERY

## 2019-05-13 NOTE — PROGRESS NOTES
Patient: Tayler Lugo    YOB: 1950    Medical Record Number: [id number]    Chief Complaints:   Right shoulder pain    History of Present Illness:     68 y.o. female patient who presents for evaluation of right shoulder pain and weakness.  In May 2018, she tripped in the parking lot of a hotel while loading her car.  Reports she fell into a wall hitting directly on her right palm with her arm fully extended.  She had immediate, severe pain in the shoulder.  She reports constant aching pain since the injury.  Reports her pain is worse at night and when using a hair dryer or curling iron.  She tried physical therapy from February to April while in [place] and has continued to do home exercises with mild improvement.  In January, she had a steroid injection which provided mild relief.  She was told that she needs a shoulder replacement.  She lives here in [place] and wanted to come back here to get that done.    Allergies: No Known Allergies    Home Medications:    Current Outpatient Medications:   •  amLODIPine (NORVASC) 10 MG tablet, Take 10 mg by mouth Daily. Taking 1/2 tablet qd, Disp: , Rfl: 3  •  enalapril (VASOTEC) 20 MG tablet, TK 1 T PO ONCE D, Disp: , Rfl: 3  •  KLOR-CON 8 MEQ CR tablet, , Disp: , Rfl:   •  metoprolol succinate XL (TOPROL-XL) 25 MG 24 hr tablet, , Disp: , Rfl:   •  venlafaxine (EFFEXOR) 75 MG tablet, Take 75 mg by mouth., Disp: , Rfl:   •  ESTRACE VAGINAL 0.1 MG/GM vaginal cream, Insert 2 g into the vagina Daily., Disp: , Rfl:   •  nitrofurantoin, macrocrystal-monohydrate, (MACROBID) 100 MG capsule, , Disp: , Rfl:   •  sennosides-docusate sodium (SENOKOT-S) 8.6-50 MG tablet, Take 1 tablet by mouth 2 (Two) Times a Day., Disp: 30 tablet, Rfl: 1  •  sulfamethoxazole-trimethoprim (BACTRIM,SEPTRA) 400-80 MG tablet, Take 1 tablet by mouth 2 (Two) Times a Day., Disp: , Rfl:     Past Medical History:   Diagnosis Date   • Elevated liver enzymes    • Fatty liver    • Frequent UTI     • Hypertension    • Lumbar stenosis    • Spondylarthritis     LOW BACK   • Stress incontinence    • Urinary retention    • UTI (urinary tract infection)     ON ANTIBIOTIC       Past Surgical History:   Procedure Laterality Date   • ABDOMINOPLASTY     • BILATERAL BREAST REDUCTION     • CATARACT EXTRACTION Bilateral    •  SECTION      X3   • COLONOSCOPY      2006   • COLONOSCOPY N/A 2016    Procedure: COLONOSCOPY;  Surgeon: Ho Arenas MD;  Location: [facility] ENDOSCOPY;  Service:    • LASIK     • LUMBAR DISCECTOMY FUSION INSTRUMENTATION N/A 10/1/2018    Procedure: L4-5, L5-S1  laminectomy and fusion with instrumentation;  Surgeon: Juno Kim MD;  Location: [facility] MAIN OR;  Service: Orthopedic Spine   • REDUCTION MAMMAPLASTY     • TONSILLECTOMY         Social History     Occupational History   • Not on file   Tobacco Use   • Smoking status: Never Smoker   Substance and Sexual Activity   • Alcohol use: Yes     Comment: 3-4 glasses of wine a week   • Drug use: No   • Sexual activity: Defer      Social History     Social History Narrative   • Not on file   She is right-hand dominant.    Family History   Problem Relation Age of Onset   • Colon polyps Father    • Malig Hyperthermia Neg Hx      Review of Systems:      Constitutional: Denies fever, shaking or chills   Eyes: Denies change in visual acuity   HEENT: Denies nasal congestion or sore throat   Respiratory: Denies cough or shortness of breath   Cardiovascular: Denies chest pain or edema  Endocrine: Denies tremors, palpitations, intolerance of heat or cold, polyuria, polydipsia.  GI: Denies abdominal pain, nausea, vomiting, bloody stools or diarrhea  : Denies frequency, urgency, incontinence, retention, or nocturia.  Musculoskeletal: Denies numbness, tingling or loss of motor function except as above  Integument: Denies rash, lesion or ulceration   Neurologic: Denies headache or focal weakness, deficits  Heme: Denies spontaneous or  "excessive bleeding, epistaxis, hematuria, melena, fatigue, enlarged or tender lymph nodes.      All other pertinent positives and negatives as noted above in HPI.    Physical Exam: 68 y.o. female    Vitals:    05/13/19 1319   Temp: 97.6 °F (36.4 °C)   TempSrc: Temporal   Weight: 59.9 kg (132 lb)   Height: 157.5 cm (62\")     General:  Patient is awake and alert.  Appears in no acute distress or discomfort.    Psych:  Affect and demeanor are appropriate.    Eyes:  Conjunctiva and sclera appear grossly normal.  Eyes track well and EOM seem to be intact.    Ears:  No gross abnormalities.  Hearing adequate for the exam.    Cardiovascular:  Regular rate and rhythm.    Lungs:  Good chest expansion.  Breathing unlabored.    Lymph:  No palpable adenopathy about neck or axilla.    Neck:  Supple.  Normal ROM.  Negative Spurling's for shoulder or arm pain.    Right upper extremity:  Skin is benign.  No gross abnormalities on inspection including any atrophy, swellings, or masses.  No palpable masses or adenopathy.  No focal areas of significant tenderness.  Full shoulder motion with discomfort.  No evident instability or apprehension.  Mildly positive Neer, Clement.  NWeakness with forward elevation in the scapular plane and external rotation.  Positive external rotation lag sign but she has a negative Hornblower's.  Belly press is uncomfortable but she seems to have good strength.  Good strength in the deltoid, biceps, triceps, and .  Intact sensation throughout the arm.  Brisk cap refill.  Palpable radial pulse.  Good skin turgor.         Radiology:   AP, scapular Y, and axillary views of the right shoulder are ordered by myself and reviewed to evaluate the patient's complaint.  No comparison films are immediately available.  The x-rays show moderate cuff tear arthropathy with an absent acromiohumeral interval and abutment of the humeral head to the undersurface of the acromion.  MRI is reviewed along with the associated " report.  She has a massive, retracted rotator cuff tear with muscular atrophy and early cuff tear arthropathy.    Assessment/Plan:   Right rotator cuff tear arthropathy    Discussed options in detail including conservative versus surgical options.  She has tried activity modifications, anti-inflammatories, physical therapy and one injection.  Her symptoms have been persistent and progressively worsening.  She has expressed interest in pursuing an arthroplasty.  The risks, benefits and alternatives were discussed and she consents to proceed with that.  We will look at getting that scheduled for her.  I want to see her back for a preoperative visit.    MD Yajaira Fair, APRN    05/13/2019    CC to Tayler Trujillo MD

## 2019-05-14 PROBLEM — M75.101 TEAR OF RIGHT ROTATOR CUFF: Status: ACTIVE | Noted: 2019-05-14

## 2019-05-14 RX ORDER — ACETAMINOPHEN 325 MG/1
1000 TABLET ORAL ONCE
Status: CANCELLED | OUTPATIENT
Start: 2019-06-13 | End: 2019-05-14

## 2019-05-14 RX ORDER — PREGABALIN 75 MG/1
150 CAPSULE ORAL ONCE
Status: CANCELLED | OUTPATIENT
Start: 2019-06-13 | End: 2019-05-14

## 2019-05-14 RX ORDER — CEFAZOLIN SODIUM 2 G/100ML
2 INJECTION, SOLUTION INTRAVENOUS ONCE
Status: CANCELLED | OUTPATIENT
Start: 2019-06-13 | End: 2019-05-14

## 2019-05-14 RX ORDER — MELOXICAM 15 MG/1
15 TABLET ORAL ONCE
Status: CANCELLED | OUTPATIENT
Start: 2019-06-13 | End: 2019-05-14

## 2019-05-14 RX ORDER — VANCOMYCIN HYDROCHLORIDE 1 G/200ML
15 INJECTION, SOLUTION INTRAVENOUS ONCE
Status: CANCELLED | OUTPATIENT
Start: 2019-06-13 | End: 2019-05-14

## 2019-05-29 ENCOUNTER — OFFICE (OUTPATIENT)
Dept: URBAN - METROPOLITAN AREA CLINIC 66 | Facility: CLINIC | Age: 69
End: 2019-05-29
Payer: MEDICARE

## 2019-05-29 ENCOUNTER — TRANSCRIBE ORDERS (OUTPATIENT)
Dept: ADMINISTRATIVE | Facility: HOSPITAL | Age: 69
End: 2019-05-29

## 2019-05-29 VITALS
HEART RATE: 88 BPM | HEIGHT: 63 IN | SYSTOLIC BLOOD PRESSURE: 159 MMHG | WEIGHT: 136 LBS | DIASTOLIC BLOOD PRESSURE: 93 MMHG

## 2019-05-29 DIAGNOSIS — R74.01 ELEVATED AST (SGOT): Primary | ICD-10-CM

## 2019-05-29 DIAGNOSIS — R74.0 NONSPECIFIC ELEVATION OF LEVELS OF TRANSAMINASE AND LACTIC A: ICD-10-CM

## 2019-05-29 DIAGNOSIS — R77.8 OTHER SPECIFIED ABNORMALITIES OF PLASMA PROTEINS: ICD-10-CM

## 2019-05-29 PROCEDURE — 99202 OFFICE O/P NEW SF 15 MIN: CPT

## 2019-06-06 ENCOUNTER — APPOINTMENT (OUTPATIENT)
Dept: PREADMISSION TESTING | Facility: HOSPITAL | Age: 69
End: 2019-06-06

## 2019-06-06 VITALS
DIASTOLIC BLOOD PRESSURE: 79 MMHG | WEIGHT: 135 LBS | TEMPERATURE: 97.8 F | BODY MASS INDEX: 24.84 KG/M2 | OXYGEN SATURATION: 95 % | RESPIRATION RATE: 16 BRPM | HEART RATE: 104 BPM | HEIGHT: 62 IN | SYSTOLIC BLOOD PRESSURE: 153 MMHG

## 2019-06-06 DIAGNOSIS — M75.101 TEAR OF RIGHT ROTATOR CUFF, UNSPECIFIED TEAR EXTENT: ICD-10-CM

## 2019-06-06 LAB
ANION GAP SERPL CALCULATED.3IONS-SCNC: 13.7 MMOL/L
BUN BLD-MCNC: 11 MG/DL (ref 8–23)
BUN/CREAT SERPL: 18.6 (ref 7–25)
CALCIUM SPEC-SCNC: 9.2 MG/DL (ref 8.6–10.5)
CHLORIDE SERPL-SCNC: 98 MMOL/L (ref 98–107)
CO2 SERPL-SCNC: 26.3 MMOL/L (ref 22–29)
CREAT BLD-MCNC: 0.59 MG/DL (ref 0.57–1)
DEPRECATED RDW RBC AUTO: 41.1 FL (ref 37–54)
ERYTHROCYTE [DISTWIDTH] IN BLOOD BY AUTOMATED COUNT: 11.5 % (ref 12.3–15.4)
GFR SERPL CREATININE-BSD FRML MDRD: 101 ML/MIN/1.73
GLUCOSE BLD-MCNC: 120 MG/DL (ref 65–99)
HCT VFR BLD AUTO: 43.6 % (ref 34–46.6)
HGB BLD-MCNC: 14.6 G/DL (ref 12–15.9)
MCH RBC QN AUTO: 32.4 PG (ref 26.6–33)
MCHC RBC AUTO-ENTMCNC: 33.5 G/DL (ref 31.5–35.7)
MCV RBC AUTO: 96.9 FL (ref 79–97)
PLATELET # BLD AUTO: 203 10*3/MM3 (ref 140–450)
PMV BLD AUTO: 11.4 FL (ref 6–12)
POTASSIUM BLD-SCNC: 3.8 MMOL/L (ref 3.5–5.2)
RBC # BLD AUTO: 4.5 10*6/MM3 (ref 3.77–5.28)
SODIUM BLD-SCNC: 138 MMOL/L (ref 136–145)
WBC NRBC COR # BLD: 6.42 10*3/MM3 (ref 3.4–10.8)

## 2019-06-06 PROCEDURE — 93010 ELECTROCARDIOGRAM REPORT: CPT | Performed by: INTERNAL MEDICINE

## 2019-06-06 PROCEDURE — 36415 COLL VENOUS BLD VENIPUNCTURE: CPT

## 2019-06-06 PROCEDURE — 93005 ELECTROCARDIOGRAM TRACING: CPT | Performed by: ORTHOPAEDIC SURGERY

## 2019-06-06 PROCEDURE — 85027 COMPLETE CBC AUTOMATED: CPT | Performed by: ORTHOPAEDIC SURGERY

## 2019-06-06 PROCEDURE — 80048 BASIC METABOLIC PNL TOTAL CA: CPT | Performed by: ORTHOPAEDIC SURGERY

## 2019-06-06 RX ORDER — ENALAPRIL MALEATE 20 MG/1
20 TABLET ORAL DAILY
COMMUNITY
End: 2020-11-17 | Stop reason: HOSPADM

## 2019-06-06 NOTE — DISCHARGE INSTRUCTIONS
PLEASE ARRIVE AT 5:30AM ON 6/13/2019      Take the following medications the morning of surgery with a small sip of water:  AMLODIPINE AND METOPROLOL      General Instructions:  • Do not eat solid food after midnight the night before surgery.  • You may drink clear liquids day of surgery but must stop at least one hour before your hospital arrival time.  **STOP FLUIDS AT 4:30 AM DAY OF SURGERY**  • It is beneficial for you to have a clear drink that contains carbohydrates the day of surgery.  We suggest a 12 to 20 ounce bottle of Gatorade or Powerade for non-diabetic patients or a 12 to 20 ounce bottle of G2 or Powerade Zero for diabetic patients. (Pediatric patients, are not advised to drink a 12 to 20 ounce carbohydrate drink)    Clear liquids are liquids you can see through.  Nothing red in color.     Plain water                               Sports drinks  Sodas                                   Gelatin (Jell-O)  Fruit juices without pulp such as white grape juice and apple juice  Popsicles that contain no fruit or yogurt  Tea or coffee (no cream or milk added)  Gatorade / Powerade  G2 / Powerade Zero    • Infants may have breast milk up to four hours before surgery.  • Infants drinking formula may drink formula up to six hours before surgery.   • Patients who avoid smoking, chewing tobacco and alcohol for 4 weeks prior to surgery have a reduced risk of post-operative complications.  Quit smoking as many days before surgery as you can.  • Do not smoke, use chewing tobacco or drink alcohol the day of surgery.   • If applicable bring your C-PAP/ BI-PAP machine.  • Bring any papers given to you in the doctor’s office.  • Wear clean comfortable clothes and socks.  • Do not wear contact lenses, false eyelashes or make-up.  Bring a case for your glasses.   • Bring crutches or walker if applicable.  • Remove all piercings.  Leave jewelry and any other valuables at home.  • Hair extensions with metal clips must be  removed prior to surgery.  • The Pre-Admission Testing nurse will instruct you to bring medications if unable to obtain an accurate list in Pre-Admission Testing.            Preventing a Surgical Site Infection:  • For 2 to 3 days before surgery, avoid shaving with a razor because the razor can irritate skin and make it easier to develop an infection.    • Any areas of open skin can increase the risk of a post-operative wound infection by allowing bacteria to enter and travel throughout the body.  Notify your surgeon if you have any skin wounds / rashes even if it is not near the expected surgical site.  The area will need assessed to determine if surgery should be delayed until it is healed.  • The night prior to surgery sleep in a clean bed with clean clothing.  Do not allow pets to sleep with you.  • Shower on the morning of surgery using a fresh bar of anti-bacterial soap (such as Dial) and clean washcloth.  Dry with a clean towel and dress in clean clothing.  • Ask your surgeon if you will be receiving antibiotics prior to surgery.  • Make sure you, your family, and all healthcare providers clean their hands with soap and water or an alcohol based hand  before caring for you or your wound.    Day of surgery:  Upon arrival, a Pre-op nurse and Anesthesiologist will review your health history, obtain vital signs, and answer questions you may have.  The only belongings needed at this time will be a list of your home medications and if applicable your C-PAP/BI-PAP machine.  If you are staying overnight your family can leave the rest of your belongings in the car and bring them to your room later.  A Pre-op nurse will start an IV and you may receive medication in preparation for surgery, including something to help you relax.  Your family will be able to see you in the Pre-op area.  While you are in surgery your family should notify the waiting room  if they leave the waiting room area and  provide a contact phone number.    Please be aware that surgery does come with discomfort.  We want to make every effort to control your discomfort so please discuss any uncontrolled symptoms with your nurse.   Your doctor will most likely have prescribed pain medications.      If you are going home after surgery you will receive individualized written care instructions before being discharged.  A responsible adult must drive you to and from the hospital on the day of your surgery and stay with you for 24 hours.    If you are staying overnight following surgery, you will be transported to your hospital room following the recovery period.  [facility] has all private rooms.    You have received a list of surgical assistants for your reference.  If you have any questions please call Pre-Admission Testing at [phone].  Deductibles and co-payments are collected on the day of service. Please be prepared to pay the required co-pay, deductible or deposit on the day of service as defined by your plan.    2% CHLORAHEXIDINE GLUCONATE* CLOTH  Preparing or “prepping” skin before surgery can reduce the risk of infection at the surgical site. To make the process easier, [facility] has chosen disposable cloths moistened with a rinse-free, 2% Chlorhexidine Gluconate (CHG) antiseptic solution. The steps below outline the prepping process and should be carefully followed.        Use the prep cloth on the area that is circled in the diagram             Directions Night before Surgery  1) Shower using a fresh bar of anti-bacterial soap (such as Dial) and clean washcloth.  Use a clean towel to completely dry your skin.  2) Do not use any lotions, oils or creams on your skin.  3) Open the package and remove 1 cloth, wipe your skin for 30 seconds in a circular motion.  Allow to dry for 3 minutes.  4) Repeat #3 with second cloth.  5) Do not touch your eyes, ears, or mouth with the prep cloth.  6) Allow the wet  prep solution to air dry.  7) Discard the prep cloth and wash your hands with soap and water.   8) Dress in clean bed clothes and sleep on fresh clean bed sheets.   9) You may experience some temporary itching after the prep.    Directions Day of Surgery  1) Repeat steps 1,2,3,4,5,6,7, and 9.   2) Dress in clean clothes before coming to the hospital.    BACTROBAN NASAL OINTMENT  There are many germs normally in your nose. Bactroban is an ointment that will help reduce these germs. Please follow these instructions for Bactroban use:      _1ST___The day before surgery in the morning  Date___6/12_____    _2ND___The day before surgery in the evening              Date___6/12_____    __3RD__The day of surgery in the morning    Date__6/13______    **Squirt ½ package of Bactroban Ointment onto a cotton applicator and apply to inside of 1st nostril.  Squirt the remaining Bactroban and apply to the inside of the other nostril.

## 2019-06-07 ENCOUNTER — APPOINTMENT (OUTPATIENT)
Dept: PREADMISSION TESTING | Facility: HOSPITAL | Age: 69
End: 2019-06-07

## 2019-06-10 ENCOUNTER — OFFICE VISIT (OUTPATIENT)
Dept: ORTHOPEDIC SURGERY | Facility: CLINIC | Age: 69
End: 2019-06-10

## 2019-06-10 VITALS — TEMPERATURE: 98.4 F | WEIGHT: 134 LBS | BODY MASS INDEX: 24.66 KG/M2 | HEIGHT: 62 IN

## 2019-06-10 DIAGNOSIS — Z01.818 PREOP EXAMINATION: Primary | ICD-10-CM

## 2019-06-10 PROCEDURE — S0260 H&P FOR SURGERY: HCPCS | Performed by: NURSE PRACTITIONER

## 2019-06-10 NOTE — H&P (VIEW-ONLY)
History & Physical         Patient: Tayler Lugo    YOB: 1950    Medical Record Number: [id number]    Chief Complaints:   Chief Complaint   Patient presents with   • Right Shoulder - Pre-op Exam       History of Present Illness: 68 y.o. female comes in today of upcoming shoulder replacement surgery. Reports a long history of progressively worsening pain, motion loss, and dysfunction.  Describes current pain as severe.  Has failed prolonged conservative treatment.  Denies any new complaints or issues.    Allergies: No Known Allergies    Medications:   Home Medications:    Current Outpatient Medications:   •  amLODIPine (NORVASC) 10 MG tablet, Take 5 mg by mouth Every Morning., Disp: , Rfl: 3  •  enalapril (VASOTEC) 20 MG tablet, Take 20 mg by mouth Daily., Disp: , Rfl:   •  ESTRACE VAGINAL 0.1 MG/GM vaginal cream, Insert 2 g into the vagina Daily., Disp: , Rfl:   •  KLOR-CON 8 MEQ CR tablet, Take 8 mEq by mouth 2 (Two) Times a Day., Disp: , Rfl:   •  metoprolol succinate XL (TOPROL-XL) 25 MG 24 hr tablet, Take 25 mg by mouth Every Morning., Disp: , Rfl:   •  Chlorhexidine Gluconate 2 % pads, Apply  topically. AS DIRECTED FOR PRE OP, Disp: , Rfl:   •  mupirocin (BACTROBAN) 2 % ointment, Apply  topically to the appropriate area as directed. AS DIRECTED FOR PRE OP, Disp: , Rfl:   No current facility-administered medications for this visit.     Facility-Administered Medications Ordered in Other Visits:   •  mupirocin (BACTROBAN) 2 % nasal ointment, , Nasal, BID, David Marion MD    Past Medical History:   Diagnosis Date   • Elevated liver enzymes    • Fatty liver    • Frequent UTI    • History of depression    • Hypertension    • Lumbar stenosis    • OA (osteoarthritis)    • Spondylarthritis     LOW BACK   • Stress incontinence    • Urinary retention         Past Surgical History:   Procedure Laterality Date   • ABDOMINOPLASTY     • CATARACT EXTRACTION Bilateral    •  SECTION      X3   •  "COLONOSCOPY      2006   • COLONOSCOPY N/A 11/1/2016    Procedure: COLONOSCOPY;  Surgeon: Ho Arenas MD;  Location: [facility] ENDOSCOPY;  Service:    • LASIK     • LUMBAR DISCECTOMY FUSION INSTRUMENTATION N/A 10/1/2018    Procedure: L4-5, L5-S1  laminectomy and fusion with instrumentation;  Surgeon: Juno Kim MD;  Location: [facility] MAIN OR;  Service: Orthopedic Spine   • REDUCTION MAMMAPLASTY     • TONSILLECTOMY          Social History     Occupational History   • Not on file   Tobacco Use   • Smoking status: Never Smoker   • Smokeless tobacco: Never Used   Substance and Sexual Activity   • Alcohol use: Yes     Comment: 3-4 glasses of wine a week   • Drug use: No   • Sexual activity: Defer      Social History     Social History Narrative   • Not on file        Family History   Problem Relation Age of Onset   • Colon polyps Father    • Malig Hyperthermia Neg Hx      Review of Systems:     Constitutional:  Denies fever, shaking or chills   Eyes:  Denies change in visual acuity   HEENT:  Denies nasal congestion or sore throat   Respiratory:  Denies cough or shortness of breath   Cardiovascular:  Denies chest pain or edema   GI:  Denies abdominal pain, nausea, vomiting, bloody stools or diarrhea   Musculoskeletal:  Denies numbness tingling or loss of motor function except as outlined above in history of present illness.  Integument:  Denies rash, lesion or ulceration   Neurologic:  Denies headache or focal weakness, deficits      All other pertinent positives and negatives as noted above in HPI.    Physical Exam: 68 y.o. female    Vitals:    06/10/19 1115   Temp: 98.4 °F (36.9 °C)   Weight: 60.8 kg (134 lb)   Height: 157.5 cm (62\")     General:  Patient is awake and alert.  Appears in no acute distress or discomfort.    Psych:  Affect and demeanor are appropriate.    Eyes:  Conjunctiva and sclera appear grossly normal.  Eyes track well and EOM seem to be intact.    Ears:  No gross abnormalities.  Hearing " adequate for the exam.    Cardiovascular:  Regular rate and rhythm.    Lungs:  Good chest expansion.  Breathing unlabored.    Lymph:  No palpable adenopathy about neck or axilla.    Neck:  Supple.  Normal ROM.  Negative Spurling's for shoulder or arm pain.    Right upper extremity:  Skin benign and intact without evidence for swelling, masses or atrophy.  No palpable masses.  Moderate anterior tenderness.  Shoulder ROM limited and uncomfortable.  No evident instability or apprehension.  Weakness with elevation in the scapular plane and external rotation.  Good strength in deltoid, wrist and hand.  Intact sensation in arm, hand.  Palpable radial pulse with brisk cap refill.    Diagnostic Tests:  Lab Results   Component Value Date    GLUCOSE 120 (H) 06/06/2019    CALCIUM 9.2 06/06/2019     06/06/2019    K 3.8 06/06/2019    CO2 26.3 06/06/2019    CL 98 06/06/2019    BUN 11 06/06/2019    CREATININE 0.59 06/06/2019    EGFRIFNONA 101 06/06/2019    BCR 18.6 06/06/2019    ANIONGAP 13.7 06/06/2019     Lab Results   Component Value Date    WBC 6.42 06/06/2019    HGB 14.6 06/06/2019    HCT 43.6 06/06/2019    MCV 96.9 06/06/2019     06/06/2019     No results found for: INR, PROTIME    Imaging:  Previous x-rays of the shoulder are reviewed. The x-rays show significant rotator cuff tear arthropathy with a diminished acromiohumeral interval, joint space narrowing, osteophyte formation, and subchondral sclerosis.      Assessment:  Right shoulder rotator cuff tear arthropathy    Plan: We had a thorough discussion regarding the risks, benefits and alternatives to an arthroplasty and non-surgical management versus surgery.  I explained that surgical risks include infection, hematoma, hardware related complications including failure of fixation, loosening, fracture, persistent pain and/or loss of motion, iatrogenic nerve and/or blood vessel injury resulting in permanent weakness, numbness or dysfunction, DVT, PE,  positioning related neuropraxia, and anesthesia related complications resulting in death.  We discussed the indication for a reverse as opposed to a standard total shoulder and the risks inherent to the reverse including notching, glenoid loosening, instability, and traction related neuropraxia, any of which could result in persistent pain or problems requiring further surgery.  Lastly, we discussed the rehab and all that will be expected of the patient post operatively to ensure an optimal outcome.  The patient voiced understanding of the risks, benefits, and alternative forms of treatment that were discussed and the patient consents to proceed with the reverse arthroplasty.        Date: 6/10/2019    TIKA Brown

## 2019-06-13 ENCOUNTER — APPOINTMENT (OUTPATIENT)
Dept: GENERAL RADIOLOGY | Facility: HOSPITAL | Age: 69
End: 2019-06-13

## 2019-06-13 ENCOUNTER — HOSPITAL ENCOUNTER (INPATIENT)
Facility: HOSPITAL | Age: 69
LOS: 1 days | Discharge: HOME | End: 2019-06-14
Attending: ORTHOPAEDIC SURGERY | Admitting: ORTHOPAEDIC SURGERY

## 2019-06-13 ENCOUNTER — ANESTHESIA EVENT (OUTPATIENT)
Dept: PERIOP | Facility: HOSPITAL | Age: 69
End: 2019-06-13

## 2019-06-13 ENCOUNTER — ANESTHESIA (OUTPATIENT)
Dept: PERIOP | Facility: HOSPITAL | Age: 69
End: 2019-06-13

## 2019-06-13 DIAGNOSIS — M75.101 TEAR OF RIGHT ROTATOR CUFF, UNSPECIFIED TEAR EXTENT: ICD-10-CM

## 2019-06-13 PROBLEM — M25.511 ACUTE PAIN OF RIGHT SHOULDER: Status: ACTIVE | Noted: 2019-06-13

## 2019-06-13 PROCEDURE — 25010000002 FENTANYL CITRATE (PF) 100 MCG/2ML SOLUTION: Performed by: ANESTHESIOLOGY

## 2019-06-13 PROCEDURE — 25010000002 PROPOFOL 10 MG/ML EMULSION: Performed by: NURSE ANESTHETIST, CERTIFIED REGISTERED

## 2019-06-13 PROCEDURE — C1776 JOINT DEVICE (IMPLANTABLE): HCPCS | Performed by: ORTHOPAEDIC SURGERY

## 2019-06-13 PROCEDURE — 25010000002 DIPHENHYDRAMINE PER 50 MG: Performed by: NURSE ANESTHETIST, CERTIFIED REGISTERED

## 2019-06-13 PROCEDURE — C9290 INJ, BUPIVACAINE LIPOSOME: HCPCS | Performed by: ANESTHESIOLOGY

## 2019-06-13 PROCEDURE — 25010000003 CEFAZOLIN IN DEXTROSE 2-4 GM/100ML-% SOLUTION: Performed by: ORTHOPAEDIC SURGERY

## 2019-06-13 PROCEDURE — 25010000002 MIDAZOLAM PER 1 MG: Performed by: ANESTHESIOLOGY

## 2019-06-13 PROCEDURE — 25010000002 ROPIVACAINE PER 1 MG: Performed by: ANESTHESIOLOGY

## 2019-06-13 PROCEDURE — 0LS30ZZ REPOSITION RIGHT UPPER ARM TENDON, OPEN APPROACH: ICD-10-PCS | Performed by: ORTHOPAEDIC SURGERY

## 2019-06-13 PROCEDURE — 25010000002 VANCOMYCIN PER 500 MG: Performed by: ORTHOPAEDIC SURGERY

## 2019-06-13 PROCEDURE — 25010000003 MEPIVACAINE PER 10 ML: Performed by: ANESTHESIOLOGY

## 2019-06-13 PROCEDURE — 25010000002 ONDANSETRON PER 1 MG: Performed by: NURSE ANESTHETIST, CERTIFIED REGISTERED

## 2019-06-13 PROCEDURE — C1713 ANCHOR/SCREW BN/BN,TIS/BN: HCPCS | Performed by: ORTHOPAEDIC SURGERY

## 2019-06-13 PROCEDURE — 23472 RECONSTRUCT SHOULDER JOINT: CPT | Performed by: ORTHOPAEDIC SURGERY

## 2019-06-13 PROCEDURE — 25010000002 NEOSTIGMINE PER 0.5 MG: Performed by: NURSE ANESTHETIST, CERTIFIED REGISTERED

## 2019-06-13 PROCEDURE — 25010000002 DEXAMETHASONE PER 1 MG: Performed by: NURSE ANESTHETIST, CERTIFIED REGISTERED

## 2019-06-13 PROCEDURE — 0RRJ00Z REPLACEMENT OF RIGHT SHOULDER JOINT WITH REVERSE BALL AND SOCKET SYNTHETIC SUBSTITUTE, OPEN APPROACH: ICD-10-PCS | Performed by: ORTHOPAEDIC SURGERY

## 2019-06-13 PROCEDURE — 25010000003 BUPIVACAINE LIPOSOME 1.3 % SUSPENSION: Performed by: ANESTHESIOLOGY

## 2019-06-13 PROCEDURE — 94799 UNLISTED PULMONARY SVC/PX: CPT

## 2019-06-13 PROCEDURE — 73020 X-RAY EXAM OF SHOULDER: CPT

## 2019-06-13 DEVICE — TRY HUM/SHLDR COMPREHENSIVE/REVERSE MINI COCR STD 40MM: Type: IMPLANTABLE DEVICE | Site: SHOULDER | Status: FUNCTIONAL

## 2019-06-13 DEVICE — IMPLANTABLE DEVICE
Type: IMPLANTABLE DEVICE | Site: SHOULDER | Status: FUNCTIONAL
Brand: COMPREHENSIVE SHOULDER SYSTEM

## 2019-06-13 DEVICE — IMPLANTABLE DEVICE
Type: IMPLANTABLE DEVICE | Site: SHOULDER | Status: FUNCTIONAL
Brand: COMPREHENSIVE REVERSE SHOULDER

## 2019-06-13 DEVICE — BEAR HUM PROLNG PLS3 36MM: Type: IMPLANTABLE DEVICE | Site: SHOULDER | Status: FUNCTIONAL

## 2019-06-13 DEVICE — IMPLANTABLE DEVICE
Type: IMPLANTABLE DEVICE | Site: SHOULDER | Status: FUNCTIONAL
Brand: COMPREHENSIVE® REVERSE SHOULDER

## 2019-06-13 DEVICE — SUT NONABS MAXBRAID/PE NMBR2 C7 38IN BLU 900335: Type: IMPLANTABLE DEVICE | Site: SHOULDER | Status: FUNCTIONAL

## 2019-06-13 DEVICE — IMPLANTABLE DEVICE: Type: IMPLANTABLE DEVICE | Site: SHOULDER | Status: FUNCTIONAL

## 2019-06-13 RX ORDER — PROMETHAZINE HYDROCHLORIDE 25 MG/ML
12.5 INJECTION, SOLUTION INTRAMUSCULAR; INTRAVENOUS 3 TIMES DAILY
Status: DISCONTINUED | OUTPATIENT
Start: 2019-06-13 | End: 2019-06-13 | Stop reason: HOSPADM

## 2019-06-13 RX ORDER — ONDANSETRON 2 MG/ML
INJECTION INTRAMUSCULAR; INTRAVENOUS AS NEEDED
Status: DISCONTINUED | OUTPATIENT
Start: 2019-06-13 | End: 2019-06-13 | Stop reason: SURG

## 2019-06-13 RX ORDER — CEFAZOLIN SODIUM 2 G/100ML
2 INJECTION, SOLUTION INTRAVENOUS EVERY 8 HOURS
Status: COMPLETED | OUTPATIENT
Start: 2019-06-13 | End: 2019-06-14

## 2019-06-13 RX ORDER — ROCURONIUM BROMIDE 10 MG/ML
INJECTION, SOLUTION INTRAVENOUS AS NEEDED
Status: DISCONTINUED | OUTPATIENT
Start: 2019-06-13 | End: 2019-06-13 | Stop reason: SURG

## 2019-06-13 RX ORDER — EPHEDRINE SULFATE 50 MG/ML
INJECTION, SOLUTION INTRAVENOUS AS NEEDED
Status: DISCONTINUED | OUTPATIENT
Start: 2019-06-13 | End: 2019-06-13 | Stop reason: SURG

## 2019-06-13 RX ORDER — LIDOCAINE HYDROCHLORIDE 10 MG/ML
0.5 INJECTION, SOLUTION EPIDURAL; INFILTRATION; INTRACAUDAL; PERINEURAL ONCE AS NEEDED
Status: DISCONTINUED | OUTPATIENT
Start: 2019-06-13 | End: 2019-06-13 | Stop reason: HOSPADM

## 2019-06-13 RX ORDER — DIPHENHYDRAMINE HYDROCHLORIDE 50 MG/ML
12.5 INJECTION INTRAMUSCULAR; INTRAVENOUS
Status: DISCONTINUED | OUTPATIENT
Start: 2019-06-13 | End: 2019-06-13 | Stop reason: HOSPADM

## 2019-06-13 RX ORDER — NALOXONE HCL 0.4 MG/ML
0.2 VIAL (ML) INJECTION AS NEEDED
Status: DISCONTINUED | OUTPATIENT
Start: 2019-06-13 | End: 2019-06-13 | Stop reason: HOSPADM

## 2019-06-13 RX ORDER — IPRATROPIUM BROMIDE AND ALBUTEROL SULFATE 2.5; .5 MG/3ML; MG/3ML
3 SOLUTION RESPIRATORY (INHALATION) ONCE AS NEEDED
Status: DISCONTINUED | OUTPATIENT
Start: 2019-06-13 | End: 2019-06-13 | Stop reason: HOSPADM

## 2019-06-13 RX ORDER — MEPERIDINE HYDROCHLORIDE 25 MG/ML
6.25 INJECTION INTRAMUSCULAR; INTRAVENOUS; SUBCUTANEOUS
Status: DISCONTINUED | OUTPATIENT
Start: 2019-06-13 | End: 2019-06-13 | Stop reason: HOSPADM

## 2019-06-13 RX ORDER — PROMETHAZINE HYDROCHLORIDE 25 MG/ML
5 INJECTION, SOLUTION INTRAMUSCULAR; INTRAVENOUS 3 TIMES DAILY
Status: DISCONTINUED | OUTPATIENT
Start: 2019-06-13 | End: 2019-06-13 | Stop reason: HOSPADM

## 2019-06-13 RX ORDER — HYDROMORPHONE HYDROCHLORIDE 1 MG/ML
0.5 INJECTION, SOLUTION INTRAMUSCULAR; INTRAVENOUS; SUBCUTANEOUS
Status: DISCONTINUED | OUTPATIENT
Start: 2019-06-13 | End: 2019-06-13 | Stop reason: HOSPADM

## 2019-06-13 RX ORDER — LIDOCAINE HYDROCHLORIDE 20 MG/ML
INJECTION, SOLUTION INFILTRATION; PERINEURAL AS NEEDED
Status: DISCONTINUED | OUTPATIENT
Start: 2019-06-13 | End: 2019-06-13 | Stop reason: SURG

## 2019-06-13 RX ORDER — PROMETHAZINE HYDROCHLORIDE 25 MG/1
25 SUPPOSITORY RECTAL 3 TIMES DAILY
Status: DISCONTINUED | OUTPATIENT
Start: 2019-06-13 | End: 2019-06-13 | Stop reason: HOSPADM

## 2019-06-13 RX ORDER — ONDANSETRON 2 MG/ML
4 INJECTION INTRAMUSCULAR; INTRAVENOUS ONCE AS NEEDED
Status: DISCONTINUED | OUTPATIENT
Start: 2019-06-13 | End: 2019-06-13 | Stop reason: HOSPADM

## 2019-06-13 RX ORDER — GLYCOPYRROLATE 0.2 MG/ML
INJECTION INTRAMUSCULAR; INTRAVENOUS AS NEEDED
Status: DISCONTINUED | OUTPATIENT
Start: 2019-06-13 | End: 2019-06-13 | Stop reason: SURG

## 2019-06-13 RX ORDER — MIDAZOLAM HYDROCHLORIDE 1 MG/ML
2 INJECTION INTRAMUSCULAR; INTRAVENOUS
Status: DISCONTINUED | OUTPATIENT
Start: 2019-06-13 | End: 2019-06-13 | Stop reason: HOSPADM

## 2019-06-13 RX ORDER — NALOXONE HCL 0.4 MG/ML
0.1 VIAL (ML) INJECTION
Status: DISCONTINUED | OUTPATIENT
Start: 2019-06-13 | End: 2019-06-14 | Stop reason: HOSPADM

## 2019-06-13 RX ORDER — ACETAMINOPHEN 325 MG/1
650 TABLET ORAL ONCE AS NEEDED
Status: DISCONTINUED | OUTPATIENT
Start: 2019-06-13 | End: 2019-06-13 | Stop reason: HOSPADM

## 2019-06-13 RX ORDER — DOCUSATE SODIUM 100 MG/1
100 CAPSULE, LIQUID FILLED ORAL 2 TIMES DAILY
Status: DISCONTINUED | OUTPATIENT
Start: 2019-06-13 | End: 2019-06-14 | Stop reason: HOSPADM

## 2019-06-13 RX ORDER — PROMETHAZINE HYDROCHLORIDE 25 MG/1
25 TABLET ORAL 3 TIMES DAILY
Status: DISCONTINUED | OUTPATIENT
Start: 2019-06-13 | End: 2019-06-13 | Stop reason: HOSPADM

## 2019-06-13 RX ORDER — ENALAPRIL MALEATE 20 MG/1
20 TABLET ORAL DAILY
Status: DISCONTINUED | OUTPATIENT
Start: 2019-06-13 | End: 2019-06-14 | Stop reason: HOSPADM

## 2019-06-13 RX ORDER — HYDROMORPHONE HYDROCHLORIDE 1 MG/ML
0.5 INJECTION, SOLUTION INTRAMUSCULAR; INTRAVENOUS; SUBCUTANEOUS
Status: DISCONTINUED | OUTPATIENT
Start: 2019-06-13 | End: 2019-06-14 | Stop reason: HOSPADM

## 2019-06-13 RX ORDER — ROPIVACAINE HYDROCHLORIDE 5 MG/ML
INJECTION, SOLUTION EPIDURAL; INFILTRATION; PERINEURAL
Status: COMPLETED | OUTPATIENT
Start: 2019-06-13 | End: 2019-06-13

## 2019-06-13 RX ORDER — AMLODIPINE BESYLATE 5 MG/1
5 TABLET ORAL EVERY MORNING
Status: DISCONTINUED | OUTPATIENT
Start: 2019-06-13 | End: 2019-06-14 | Stop reason: HOSPADM

## 2019-06-13 RX ORDER — MIDAZOLAM HYDROCHLORIDE 1 MG/ML
1 INJECTION INTRAMUSCULAR; INTRAVENOUS
Status: DISCONTINUED | OUTPATIENT
Start: 2019-06-13 | End: 2019-06-13 | Stop reason: HOSPADM

## 2019-06-13 RX ORDER — FLUMAZENIL 0.1 MG/ML
0.2 INJECTION INTRAVENOUS AS NEEDED
Status: DISCONTINUED | OUTPATIENT
Start: 2019-06-13 | End: 2019-06-13 | Stop reason: HOSPADM

## 2019-06-13 RX ORDER — EPHEDRINE SULFATE 50 MG/ML
5 INJECTION, SOLUTION INTRAVENOUS ONCE AS NEEDED
Status: DISCONTINUED | OUTPATIENT
Start: 2019-06-13 | End: 2019-06-13 | Stop reason: HOSPADM

## 2019-06-13 RX ORDER — SODIUM CHLORIDE, SODIUM LACTATE, POTASSIUM CHLORIDE, CALCIUM CHLORIDE 600; 310; 30; 20 MG/100ML; MG/100ML; MG/100ML; MG/100ML
9 INJECTION, SOLUTION INTRAVENOUS CONTINUOUS
Status: DISCONTINUED | OUTPATIENT
Start: 2019-06-13 | End: 2019-06-13 | Stop reason: HOSPADM

## 2019-06-13 RX ORDER — SODIUM CHLORIDE, SODIUM LACTATE, POTASSIUM CHLORIDE, CALCIUM CHLORIDE 600; 310; 30; 20 MG/100ML; MG/100ML; MG/100ML; MG/100ML
100 INJECTION, SOLUTION INTRAVENOUS CONTINUOUS
Status: DISCONTINUED | OUTPATIENT
Start: 2019-06-13 | End: 2019-06-14 | Stop reason: HOSPADM

## 2019-06-13 RX ORDER — FENTANYL CITRATE 50 UG/ML
50 INJECTION, SOLUTION INTRAMUSCULAR; INTRAVENOUS
Status: DISCONTINUED | OUTPATIENT
Start: 2019-06-13 | End: 2019-06-13 | Stop reason: HOSPADM

## 2019-06-13 RX ORDER — SODIUM CHLORIDE 0.9 % (FLUSH) 0.9 %
1-10 SYRINGE (ML) INJECTION AS NEEDED
Status: DISCONTINUED | OUTPATIENT
Start: 2019-06-13 | End: 2019-06-13 | Stop reason: HOSPADM

## 2019-06-13 RX ORDER — MELOXICAM 15 MG/1
15 TABLET ORAL ONCE
Status: COMPLETED | OUTPATIENT
Start: 2019-06-13 | End: 2019-06-13

## 2019-06-13 RX ORDER — HYDROCODONE BITARTRATE AND ACETAMINOPHEN 7.5; 325 MG/1; MG/1
1 TABLET ORAL ONCE AS NEEDED
Status: DISCONTINUED | OUTPATIENT
Start: 2019-06-13 | End: 2019-06-13 | Stop reason: HOSPADM

## 2019-06-13 RX ORDER — HYDROCODONE BITARTRATE AND ACETAMINOPHEN 7.5; 325 MG/1; MG/1
1 TABLET ORAL EVERY 4 HOURS PRN
Status: DISCONTINUED | OUTPATIENT
Start: 2019-06-13 | End: 2019-06-14 | Stop reason: HOSPADM

## 2019-06-13 RX ORDER — HYDRALAZINE HYDROCHLORIDE 20 MG/ML
5 INJECTION INTRAMUSCULAR; INTRAVENOUS
Status: DISCONTINUED | OUTPATIENT
Start: 2019-06-13 | End: 2019-06-13 | Stop reason: HOSPADM

## 2019-06-13 RX ORDER — BISACODYL 10 MG
10 SUPPOSITORY, RECTAL RECTAL DAILY PRN
Status: DISCONTINUED | OUTPATIENT
Start: 2019-06-13 | End: 2019-06-14 | Stop reason: HOSPADM

## 2019-06-13 RX ORDER — ACETAMINOPHEN 325 MG/1
1000 TABLET ORAL ONCE
Status: COMPLETED | OUTPATIENT
Start: 2019-06-13 | End: 2019-06-13

## 2019-06-13 RX ORDER — PREGABALIN 75 MG/1
150 CAPSULE ORAL ONCE
Status: COMPLETED | OUTPATIENT
Start: 2019-06-13 | End: 2019-06-13

## 2019-06-13 RX ORDER — DIPHENHYDRAMINE HCL 25 MG
25 CAPSULE ORAL
Status: DISCONTINUED | OUTPATIENT
Start: 2019-06-13 | End: 2019-06-13 | Stop reason: HOSPADM

## 2019-06-13 RX ORDER — LIDOCAINE HYDROCHLORIDE 40 MG/ML
SOLUTION TOPICAL AS NEEDED
Status: DISCONTINUED | OUTPATIENT
Start: 2019-06-13 | End: 2019-06-13 | Stop reason: SURG

## 2019-06-13 RX ORDER — HYDROCODONE BITARTRATE AND ACETAMINOPHEN 7.5; 325 MG/1; MG/1
2 TABLET ORAL EVERY 4 HOURS PRN
Status: DISCONTINUED | OUTPATIENT
Start: 2019-06-13 | End: 2019-06-14 | Stop reason: HOSPADM

## 2019-06-13 RX ORDER — OXYCODONE AND ACETAMINOPHEN 7.5; 325 MG/1; MG/1
1 TABLET ORAL ONCE AS NEEDED
Status: DISCONTINUED | OUTPATIENT
Start: 2019-06-13 | End: 2019-06-13 | Stop reason: HOSPADM

## 2019-06-13 RX ORDER — ACETAMINOPHEN 325 MG/1
650 TABLET ORAL EVERY 4 HOURS PRN
Status: DISCONTINUED | OUTPATIENT
Start: 2019-06-13 | End: 2019-06-14 | Stop reason: HOSPADM

## 2019-06-13 RX ORDER — CEFAZOLIN SODIUM 2 G/100ML
2 INJECTION, SOLUTION INTRAVENOUS ONCE
Status: COMPLETED | OUTPATIENT
Start: 2019-06-13 | End: 2019-06-13

## 2019-06-13 RX ORDER — VANCOMYCIN HYDROCHLORIDE 1 G/200ML
15 INJECTION, SOLUTION INTRAVENOUS ONCE
Status: COMPLETED | OUTPATIENT
Start: 2019-06-13 | End: 2019-06-13

## 2019-06-13 RX ORDER — FAMOTIDINE 10 MG/ML
20 INJECTION, SOLUTION INTRAVENOUS ONCE
Status: COMPLETED | OUTPATIENT
Start: 2019-06-13 | End: 2019-06-13

## 2019-06-13 RX ORDER — DEXAMETHASONE SODIUM PHOSPHATE 10 MG/ML
INJECTION INTRAMUSCULAR; INTRAVENOUS AS NEEDED
Status: DISCONTINUED | OUTPATIENT
Start: 2019-06-13 | End: 2019-06-13 | Stop reason: SURG

## 2019-06-13 RX ORDER — PROPOFOL 10 MG/ML
VIAL (ML) INTRAVENOUS AS NEEDED
Status: DISCONTINUED | OUTPATIENT
Start: 2019-06-13 | End: 2019-06-13 | Stop reason: SURG

## 2019-06-13 RX ORDER — METOPROLOL SUCCINATE 25 MG/1
25 TABLET, EXTENDED RELEASE ORAL EVERY MORNING
Status: DISCONTINUED | OUTPATIENT
Start: 2019-06-14 | End: 2019-06-14 | Stop reason: HOSPADM

## 2019-06-13 RX ADMIN — VANCOMYCIN HYDROCHLORIDE 1000 MG: 1 INJECTION, SOLUTION INTRAVENOUS at 06:08

## 2019-06-13 RX ADMIN — EPHEDRINE SULFATE 10 MG: 50 INJECTION INTRAMUSCULAR; INTRAVENOUS; SUBCUTANEOUS at 07:25

## 2019-06-13 RX ADMIN — MELOXICAM 15 MG: 15 TABLET ORAL at 06:08

## 2019-06-13 RX ADMIN — ACETAMINOPHEN 975 MG: 325 TABLET, FILM COATED ORAL at 06:08

## 2019-06-13 RX ADMIN — SODIUM CHLORIDE, POTASSIUM CHLORIDE, SODIUM LACTATE AND CALCIUM CHLORIDE 9 ML/HR: 600; 310; 30; 20 INJECTION, SOLUTION INTRAVENOUS at 06:35

## 2019-06-13 RX ADMIN — LIDOCAINE HYDROCHLORIDE 40 MG: 20 INJECTION, SOLUTION INFILTRATION; PERINEURAL at 07:11

## 2019-06-13 RX ADMIN — MEPIVACAINE HYDROCHLORIDE 10 ML: 15 INJECTION, SOLUTION EPIDURAL; INFILTRATION at 06:40

## 2019-06-13 RX ADMIN — EPHEDRINE SULFATE 5 MG: 50 INJECTION INTRAMUSCULAR; INTRAVENOUS; SUBCUTANEOUS at 08:15

## 2019-06-13 RX ADMIN — FENTANYL CITRATE 100 MCG: 50 INJECTION INTRAMUSCULAR; INTRAVENOUS at 06:44

## 2019-06-13 RX ADMIN — FAMOTIDINE 20 MG: 10 INJECTION INTRAVENOUS at 06:35

## 2019-06-13 RX ADMIN — NEOSTIGMINE METHYLSULFATE 4 MG: 1 INJECTION INTRAMUSCULAR; INTRAVENOUS; SUBCUTANEOUS at 08:20

## 2019-06-13 RX ADMIN — PROPOFOL 100 MG: 10 INJECTION, EMULSION INTRAVENOUS at 07:11

## 2019-06-13 RX ADMIN — PROPOFOL 50 MG: 10 INJECTION, EMULSION INTRAVENOUS at 07:16

## 2019-06-13 RX ADMIN — DIPHENHYDRAMINE HYDROCHLORIDE 5 MG: 50 INJECTION INTRAMUSCULAR; INTRAVENOUS at 07:28

## 2019-06-13 RX ADMIN — LIDOCAINE HYDROCHLORIDE 1 EACH: 40 SOLUTION TOPICAL at 07:19

## 2019-06-13 RX ADMIN — EPHEDRINE SULFATE 10 MG: 50 INJECTION INTRAMUSCULAR; INTRAVENOUS; SUBCUTANEOUS at 07:30

## 2019-06-13 RX ADMIN — VANCOMYCIN HYDROCHLORIDE 1000 MG: 1 INJECTION, SOLUTION INTRAVENOUS at 17:08

## 2019-06-13 RX ADMIN — CEFAZOLIN SODIUM 2 G: 2 INJECTION, SOLUTION INTRAVENOUS at 07:11

## 2019-06-13 RX ADMIN — ROCURONIUM BROMIDE 40 MG: 10 INJECTION INTRAVENOUS at 07:11

## 2019-06-13 RX ADMIN — BUPIVACAINE 10 ML: 13.3 INJECTION, SUSPENSION, LIPOSOMAL INFILTRATION at 06:40

## 2019-06-13 RX ADMIN — EPHEDRINE SULFATE 5 MG: 50 INJECTION INTRAMUSCULAR; INTRAVENOUS; SUBCUTANEOUS at 08:21

## 2019-06-13 RX ADMIN — DOCUSATE SODIUM 100 MG: 100 CAPSULE, LIQUID FILLED ORAL at 12:54

## 2019-06-13 RX ADMIN — EPHEDRINE SULFATE 10 MG: 50 INJECTION INTRAMUSCULAR; INTRAVENOUS; SUBCUTANEOUS at 08:30

## 2019-06-13 RX ADMIN — ROPIVACAINE HYDROCHLORIDE 10 ML: 5 INJECTION, SOLUTION EPIDURAL; INFILTRATION; PERINEURAL at 06:40

## 2019-06-13 RX ADMIN — MIDAZOLAM 2 MG: 1 INJECTION INTRAMUSCULAR; INTRAVENOUS at 06:44

## 2019-06-13 RX ADMIN — PREGABALIN 150 MG: 75 CAPSULE ORAL at 06:08

## 2019-06-13 RX ADMIN — MUPIROCIN 1 APPLICATION: 20 OINTMENT TOPICAL at 20:03

## 2019-06-13 RX ADMIN — CEFAZOLIN SODIUM 2 G: 2 INJECTION, SOLUTION INTRAVENOUS at 15:31

## 2019-06-13 RX ADMIN — DOCUSATE SODIUM 100 MG: 100 CAPSULE, LIQUID FILLED ORAL at 20:03

## 2019-06-13 RX ADMIN — SUGAMMADEX 123 MG: 100 INJECTION, SOLUTION INTRAVENOUS at 08:49

## 2019-06-13 RX ADMIN — DEXAMETHASONE SODIUM PHOSPHATE 6 MG: 10 INJECTION INTRAMUSCULAR; INTRAVENOUS at 07:30

## 2019-06-13 RX ADMIN — ONDANSETRON 4 MG: 2 INJECTION INTRAMUSCULAR; INTRAVENOUS at 08:30

## 2019-06-13 RX ADMIN — GLYCOPYRROLATE 0.6 MG: 0.2 INJECTION INTRAMUSCULAR; INTRAVENOUS at 08:20

## 2019-06-13 RX ADMIN — HYDROCODONE BITARTRATE AND ACETAMINOPHEN 2 TABLET: 7.5; 325 TABLET ORAL at 12:55

## 2019-06-13 RX ADMIN — HYDROCODONE BITARTRATE AND ACETAMINOPHEN 2 TABLET: 7.5; 325 TABLET ORAL at 20:03

## 2019-06-13 NOTE — BRIEF OP NOTE
TOTAL SHOULDER REVERSE ARTHROPLASTY  Progress Note    Tayler Lugo  6/13/2019    Pre-op Diagnosis:   Tear of right rotator cuff, unspecified tear extent [M75.101]       Post-Op Diagnosis Codes:     * Tear of right rotator cuff, unspecified tear extent [M75.101]    Procedure/CPT® Codes:      Procedure(s):  TOTAL SHOULDER REVERSE ARTHROPLASTY, BICEPS TENODESIS    Surgeon(s):  David Marion MD    Anesthesia: General with Block    Staff:   Circulator: Nayeli Malone RN; Nayeli Irby RN  Scrub Person: Dorothea Wells  Vendor Representative: Vladimir Adams  Assistant: Shaquille Obando CSA    Estimated Blood Loss: minimal    Urine Voided: * No values recorded between 6/13/2019  6:59 AM and 6/13/2019  8:35 AM *    Specimens:                None          Drains:      Findings: SEE DICTATION    Complications: NONE      David Marion MD     Date: 6/13/2019  Time: 8:35 AM

## 2019-06-13 NOTE — PROGRESS NOTES
Discharge Planning Assessment  [facility]     Patient Name: Tayler Lugo  MRN: [id number]  Today's Date: 6/13/2019    Admit Date: 6/13/2019    Discharge Needs Assessment     Row Name 06/13/19 1130       Living Environment    Lives With  alone    Current Living Arrangements  home/apartment/condo    Primary Care Provided by  self    Provides Primary Care For  no one    Family Caregiver if Needed  sibling(s)    Family Caregiver Names  Jonnathan/Sister    Quality of Family Relationships  helpful;involved    Able to Return to Prior Arrangements  yes    Living Arrangement Comments  Pt lives alone in a single level house with 6 steps to enter; plans to stay with sister        Resource/Environmental Concerns    Resource/Environmental Concerns  none       Transition Planning    Patient/Family Anticipates Transition to  home with family    Patient/Family Anticipated Services at Transition  none    Transportation Anticipated  family or friend will provide       Discharge Needs Assessment    Readmission Within the Last 30 Days  no previous admission in last 30 days    Concerns to be Addressed  no discharge needs identified;denies needs/concerns at this time    Equipment Currently Used at Home  none    Anticipated Changes Related to Illness  none    Equipment Needed After Discharge  none    Offered/Gave Vendor List  yes    Discharge Coordination/Progress  Pt plans to go to her sisters house at d/c.         Discharge Plan     Row Name 06/13/19 1131       Plan    Plan  Home with sister at d/c.     Patient/Family in Agreement with Plan  yes    Plan Comments  Checked IMM. Met with pt bedside. Confirmed facesheet correct. Explained role of CCP. Pt reports she lives alone in a single level house with 6 steps in [facility]. Pt reports her sister lives in [place] in [place] and she plans to stay with her for a while after her surgery. Pt reports her PCP is Dr. Tayler Trujillo and she plans to use [facility] for  prescriptions. Pt reports she has used HH in past but not sure of agency. Pt has no hx of SNF. Pt reports she has spoken with [facility] and plans outpatient therapist but if HH needed [facility] is [facility]. CCP will need to get address of sister if that is the case. CCP to follow…Paula CSW        Destination      No service coordination in this encounter.      Durable Medical Equipment      No service coordination in this encounter.      Dialysis/Infusion      No service coordination in this encounter.      Home Medical Care      No service coordination in this encounter.      Therapy      No service coordination in this encounter.      Community Resources      No service coordination in this encounter.          Demographic Summary     Row Name 06/13/19 1128       General Information    Admission Type  inpatient    Required Notices Provided  Important Message from Medicare    Reason for Consult  discharge planning    Preferred Language  English     Used During This Interaction  no       Contact Information    Permission Granted to Share Info With  facility ;family/designee        Functional Status     Row Name 06/13/19 1128       Functional Status    Usual Activity Tolerance  good    Current Activity Tolerance  good       Functional Status, IADL    Medications  independent    Meal Preparation  independent    Housekeeping  independent    Laundry  independent    Shopping  independent       Mental Status    General Appearance WDL  WDL       Mental Status Summary    Recent Changes in Mental Status/Cognitive Functioning  no changes        Psychosocial    No documentation.       Abuse/Neglect    No documentation.       Legal    No documentation.       Substance Abuse    No documentation.       Patient Forms    No documentation.           Patt Rudolph

## 2019-06-13 NOTE — PLAN OF CARE
Problem: Patient Care Overview  Goal: Plan of Care Review  Outcome: Ongoing (interventions implemented as appropriate)   06/13/19 1718   Coping/Psychosocial   Plan of Care Reviewed With patient   Plan of Care Review   Progress improving   OTHER   Outcome Summary A&OX4, UP TO CHAIR, AMBULATING TO BRP, VOIDING, ADEQUATE PO INTAKE, KVO, IV ABX, PAIN CONTROLLED C PO MEDS, IS BLOCK STILL IN EFFECT, VSS     Goal: Individualization and Mutuality  Outcome: Ongoing (interventions implemented as appropriate)    Goal: Discharge Needs Assessment  Outcome: Ongoing (interventions implemented as appropriate)    Goal: Interprofessional Rounds/Family Conf  Outcome: Ongoing (interventions implemented as appropriate)      Problem: Shoulder Arthroplasty (Adult)  Goal: Signs and Symptoms of Listed Potential Problems Will be Absent, Minimized or Managed (Shoulder Arthroplasty)  Outcome: Ongoing (interventions implemented as appropriate)    Goal: Anesthesia/Sedation Recovery  Outcome: Ongoing (interventions implemented as appropriate)      Problem: Fall Risk (Adult)  Goal: Identify Related Risk Factors and Signs and Symptoms  Outcome: Outcome(s) achieved Date Met: 06/13/19 06/13/19 9838   Fall Risk (Adult)   Related Risk Factors (Fall Risk) environment unfamiliar     Goal: Absence of Fall  Outcome: Ongoing (interventions implemented as appropriate)

## 2019-06-13 NOTE — ANESTHESIA PREPROCEDURE EVALUATION
Anesthesia Evaluation     Patient summary reviewed and Nursing notes reviewed                Airway   Mallampati: II  Dental      Pulmonary - negative pulmonary ROS   Cardiovascular     ECG reviewed  Rhythm: regular  Rate: normal    (+) hypertension,       Neuro/Psych- negative ROS  GI/Hepatic/Renal/Endo    (+)   liver disease,     Musculoskeletal     Abdominal    Substance History - negative use     OB/GYN negative ob/gyn ROS         Other   (+) arthritis                     Anesthesia Plan    ASA 2     general with block   (Right ISB Exparel)  intravenous induction   Anesthetic plan, all risks, benefits, and alternatives have been provided, discussed and informed consent has been obtained with: patient.

## 2019-06-13 NOTE — ANESTHESIA PROCEDURE NOTES
Airway  Urgency: elective    Airway not difficult    General Information and Staff    Patient location during procedure: OR  Anesthesiologist: Cecilio Ruiz MD  CRNA: Anne Vu CRNA    Indications and Patient Condition  Indications for airway management: airway protection    Preoxygenated: yes  MILS maintained throughout  Mask difficulty assessment: 1 - vent by mask    Final Airway Details  Final airway type: endotracheal airway      Successful airway: ETT  Cuffed: yes   Successful intubation technique: direct laryngoscopy  Endotracheal tube insertion site: oral  Blade: Zoila  Blade size: 3  ETT size (mm): 7.0  Cormack-Lehane Classification: grade IIb - view of arytenoids or posterior of glottis only  Placement verified by: chest auscultation and capnometry   Cuff volume (mL): 5  Measured from: lips  ETT to lips (cm): 21  Number of attempts at approach: 2    Additional Comments  Atraumatic ET Tube placement.  Teeth as pre-op. BLEBS.  -ABD sounds.  +ET CO2.  Secured to face

## 2019-06-13 NOTE — ANESTHESIA POSTPROCEDURE EVALUATION
Patient: Tayler Lugo    Procedure Summary     Date:  06/13/19 Room / Location:  [facility] OR [address] [place] [address] MAIN OR    Anesthesia Start:  0705 Anesthesia Stop:  0859    Procedure:  TOTAL SHOULDER REVERSE ARTHROPLASTY (Right Shoulder) Diagnosis:       Tear of right rotator cuff, unspecified tear extent      (Tear of right rotator cuff, unspecified tear extent [M75.101])    Surgeon:  David Marion MD Provider:  Cecilio Ruiz MD    Anesthesia Type:  general with block ASA Status:  2          Anesthesia Type: general with block  Last vitals  BP   144/88 (06/13/19 0900)   Temp   36.4 °C (97.6 °F) (06/13/19 0841)   Pulse   85 (06/13/19 0900)   Resp   16 (06/13/19 0900)     SpO2   95 % (06/13/19 0900)     Post Anesthesia Care and Evaluation    Patient location during evaluation: PACU  Patient participation: complete - patient participated  Level of consciousness: awake and alert  Pain management: adequate  Airway patency: patent  Anesthetic complications: No anesthetic complications    Cardiovascular status: acceptable  Respiratory status: acceptable  Hydration status: acceptable    Comments: --------------------            06/13/19 0900     --------------------   BP:       144/88     Pulse:      85       Resp:       16       Temp:                SpO2:      95%      --------------------

## 2019-06-13 NOTE — OP NOTE
Orthopaedic Operative Note    Facility: [facility]    Patient: Tayler Lugo    Medical Record Number: [id number]    YOB: 1950    Dictating Surgeon: David Marion M.D.*    Primary Care Physician: Tayler Trujillo MD    Date of Operation: 6/13/2019    Pre-Operative Diagnosis:  Right rotator cuff tear arthropathy    Post-Operative Diagnosis:  Right rotator cuff tear arthropathy    Procedure Performed:  1.  Right reverse total shoulder arthroplasty  2.  Tenodesis of long head of biceps tendon    Surgeon: David Marion MD     Assistant:  SUGEY Anton    Anesthesia: Regional followed by Gen.    Complications: None.     Estimated Blood Loss: Less than 50 mL.     Implants: 1.  Biomet size 11 mini comprehensive stem  2.  Mini glenoid baseplate with one 6.5 mm central compression screw and 4 peripheral 4.75 mm locking screws  3.  Size 36 glenosphere  4.  Standard mini tray with 36+3 humeral bearing    Specimens: * No orders in the log *    Brief Operative Indication:  Ms. Lugo had a history of worsening shoulder pain and dysfunction which had been nonresponsive to conservative treatment.  We had a thorough discussion regarding the risks, benefits and alternatives to an arthroplasty and non-surgical management versus surgery.  I explained that surgical risks include infection, hematoma, hardware related complications including failure of fixation, loosening, fracture, persistent pain and/or loss of motion, iatrogenic nerve and/or blood vessel injury resulting in permanent weakness, numbness or dysfunction, DVT, PE, positioning related neuropraxia, and anesthesia related complications resulting in death.  We discussed the indication for a reverse as opposed to a standard total shoulder and the risks inherent to the reverse including notching, glenoid loosening, instability, and traction related neuropraxia, any of which could result in persistent pain or problems requiring  further surgery.  Last, we discussed the rehab and all that will be expected of the patient post operatively to ensure an optimal outcome.  The patient voiced understanding of the risks, benefits, and alternative forms of treatment that were discussed and the patient consented to proceed.    Description of the procedure in detail:  The patient and operative site were identified in the preoperative holding area.  The surgical site was marked.  Adequate regional anesthesia was administered.  The patient was then taken to the operating room.  The patient was placed on the operating table where adequate general anesthesia was administered.  The patient was then repositioned into the modified beachchair position with the head and neck in neutral alignment.  All bony prominences were carefully padded and protected.    The right upper extremity was then prepped and draped in the standard, sterile fashion.  The extremity was cleaned with an alcohol solution.  A Hibiclens scrub was performed and then the extremity was prepped with 2 ChloraPrep preps.  I allowed this to dry for 3 minutes before the draping procedure was carried out.    A timeout was taken and preoperative antibiotics administered.  Following this, I began by fashioning an approximately 6 cm incision over the anterior aspect of the shoulder.  This was carried down through the skin and subcutaneous tissues.  Full-thickness medial and lateral skin flaps were developed.  The interval between the deltoid and pectoralis was carefully identified and developed.  The underlying cephalic vein was dissected out and retracted laterally.  This structure was kept protected throughout the case.    The biceps groove was identified.  The biceps tendon was carefully dissected out.  The biceps was released from its attachment to the supraglenoid tubercle using curved Arce scissors.  The diseased, intra-articular portion of the biceps was removed.  The remaining dissection was  tenodesed to the pectoralis tendon using multiple max braid sutures which were tied using 6 throw surgeon's knots.    The clavipectoral fascia was divided and the shoulder exposed.  As expected, there was a large, retracted tear of the rotator cuff involving the entirety of the supraspinatus and infraspinatus.  There was significant arthrosis of the visible portion of the humeral head.  The remaining subscapularis was carefully released under direct visualization.   The humeral head was then completely exposed.  The periarticular osteophytes were carefully removed with a rongeur.    The cutting guide for the head cut was inserted.  This was set to 20° of retroversion which I judged off of the forearm.  The guide in position, I demarcated the cut and then carried out the cut using an oscillating saw.  The cut portion of the bone was removed and taken to the back table for possible bone grafting later in the case, if needed.    Having completed the humeral sided preparations, I then directed my attention to the glenoid.  The axillary nerve was carefully dissected out and identified.  This structure was protected.  Retractors were carefully positioned along the anterior, posterior and inferior glenoid rim.  I carefully exposed the caudal rim of the glenoid using the electrocautery.  The anterior, inferior and posterior glenoid labrum were then carefully debrided and removed along with the remaining biceps stump superiorly.  The centering guide for the baseplate was inserted.  The center pin for the baseplate was drilled in the center of the glenoid vault.  I then carefully reamed and prepared the glenoid in typical fashion, taking care to maintain appropriate inferior tilt for proper positioning of the baseplate.    Once I had completed the reaming process and made sure that the reaming was adequate, the baseplate was impacted into position.  This was secured with a single screw central compression screw which got  great purchase.  The 4 peripheral locking screws were then placed without complication.  All 4 screws were confirmed to lock into the baseplate.  With the baseplate secured, I examined the remaining glenoid.  I did determine that a 36 glenosphere would fit best in this case.  The appropriate size implant was selected for use and then impacted.  I took care to make sure that the De Jesus taper was fully engaged and that the implant was well-seated.  I used a right angle clamp to pass this beneath the implant and pull up.  When doing so, the entire scapula moved.  Once I was satisfied that this was well seated, I then directed my attention back to the humerus.    The humeral sided preparations were carried out in the typical fashion.  I reamed and broached the humerus up to a 11 which seemed to fit well.  The appropriate size implant was impacted into position, taking care to maintain appropriate retroversion as judged off of the forearm.  The implants seated well.  I then trialed off of the stem.  The +3 mm trial seemed to fit best.  This allowed for excellent motion and stability.  The trial component was removed and then the final component impacted.  Again, I took care to make sure that the De Jesus taper was fully engaged before reducing it and carrying the shoulder through range of motion.    Again, the shoulder demonstrated excellent motion and stability.  I could fully elevate, abduct and externally rotate the shoulder without any impingement or limitation.  The shoulder demonstrated excellent motion and absolutely no instability.  There was good tension in the periarticular soft tissue structures.  At this point, I irrigated the wound with 500 cc of a Betadine-containing saline solution.  I then irrigated with 3 L of sterile saline mixed with bacitracin via pulsatile lavage.  I made sure that we had good hemostasis.  The wound was sequentially closed in a layered fashion.  Vicryl was used to repair the subcutaneous  tissues.  A running subcuticular Monocryl stitch was used to close the skin followed by Dermabond.  Sterile dressings were applied.  The drapes were withdrawn.  The arm was placed in a sling.  The patient was awakened and taken to the recovery room in good condition.    David Marion MD  06/13/19

## 2019-06-13 NOTE — ANESTHESIA PROCEDURE NOTES
Peripheral Block    Pre-sedation assessment completed: 6/13/2019 6:40 AM    Patient reassessed immediately prior to procedure    Patient location during procedure: holding area  Start time: 6/13/2019 6:40 AM  Stop time: 6/13/2019 6:50 AM  Reason for block: at surgeon's request and post-op pain management  Performed by  Anesthesiologist: Cecilio Ruiz MD  Preanesthetic Checklist  Completed: patient identified, site marked, surgical consent, pre-op evaluation, timeout performed, IV checked, risks and benefits discussed and monitors and equipment checked  Prep:  Sterile barriers:cap, gloves, gown, mask and sterile barriers  Prep: ChloraPrep  Patient monitoring: blood pressure monitoring, continuous pulse oximetry and EKG  Procedure  Sedation:yes    Guidance:ultrasound guided  ULTRASOUND INTERPRETATION.  Using ultrasound guidance a 21 G gauge needle was placed in close proximity to the brachial plexus nerve, at which point, under ultrasound guidance anesthetic was injected in the area of the nerve and spread of the anesthesia was seen on ultrasound in close proximity thereto.  There were no abnormalities seen on ultrasound; a digital image was taken; and the patient tolerated the procedure with no complications. Images:still images obtained    Laterality:right  Block Type:interscalene  Injection Technique:single-shot  Needle Type:echogenic  Needle Gauge:21 G      Medications Used: bupivacaine liposome (EXPAREL) 1.3 % injection, 10 mL  ropivacaine (NAROPIN) 0.5 % injection, 10 mL  mepivacaine (CARBOCAINE) 1.5 % injection, 10 mL      Post Assessment  Injection Assessment: negative aspiration for heme, no paresthesia on injection and incremental injection  Patient Tolerance:comfortable throughout block  Complications:no

## 2019-06-14 VITALS
RESPIRATION RATE: 16 BRPM | SYSTOLIC BLOOD PRESSURE: 110 MMHG | DIASTOLIC BLOOD PRESSURE: 70 MMHG | TEMPERATURE: 97.5 F | HEART RATE: 83 BPM | WEIGHT: 135.9 LBS | BODY MASS INDEX: 25.01 KG/M2 | OXYGEN SATURATION: 92 % | HEIGHT: 62 IN

## 2019-06-14 PROCEDURE — 25010000003 CEFAZOLIN IN DEXTROSE 2-4 GM/100ML-% SOLUTION: Performed by: ORTHOPAEDIC SURGERY

## 2019-06-14 RX ORDER — HYDROCODONE BITARTRATE AND ACETAMINOPHEN 7.5; 325 MG/1; MG/1
TABLET ORAL
Qty: 60 TABLET | Refills: 0
Start: 2019-06-14 | End: 2019-06-28 | Stop reason: SDUPTHER

## 2019-06-14 RX ORDER — PSEUDOEPHEDRINE HCL 30 MG
100 TABLET ORAL 2 TIMES DAILY
Qty: 60 EACH | Refills: 0 | Status: SHIPPED | OUTPATIENT
Start: 2019-06-14 | End: 2020-10-26

## 2019-06-14 RX ADMIN — CEFAZOLIN SODIUM 2 G: 2 INJECTION, SOLUTION INTRAVENOUS at 00:18

## 2019-06-14 RX ADMIN — HYDROCODONE BITARTRATE AND ACETAMINOPHEN 1 TABLET: 7.5; 325 TABLET ORAL at 12:05

## 2019-06-14 RX ADMIN — METOPROLOL SUCCINATE 25 MG: 25 TABLET, FILM COATED, EXTENDED RELEASE ORAL at 08:55

## 2019-06-14 RX ADMIN — AMLODIPINE BESYLATE 5 MG: 5 TABLET ORAL at 08:55

## 2019-06-14 RX ADMIN — DOCUSATE SODIUM 100 MG: 100 CAPSULE, LIQUID FILLED ORAL at 08:55

## 2019-06-14 RX ADMIN — MUPIROCIN 1 APPLICATION: 20 OINTMENT TOPICAL at 09:00

## 2019-06-14 RX ADMIN — POLYETHYLENE GLYCOL 3350 17 G: 17 POWDER, FOR SOLUTION ORAL at 08:55

## 2019-06-14 RX ADMIN — ENALAPRIL MALEATE 20 MG: 20 TABLET ORAL at 08:55

## 2019-06-14 RX ADMIN — HYDROCODONE BITARTRATE AND ACETAMINOPHEN 2 TABLET: 7.5; 325 TABLET ORAL at 08:55

## 2019-06-14 NOTE — PLAN OF CARE
Problem: Patient Care Overview  Goal: Plan of Care Review   06/14/19 2328   Coping/Psychosocial   Plan of Care Reviewed With patient   OTHER   Outcome Summary Pt admit with RTC tear and is s/p reverse TSA with PT orders for pendulum ex. Pt was indep with pendulum ex from prior OPPT . Pt educated on TSA protocol including Pendulum ex, gripping, elbow fles/ext, forearm pron/sup. Written instructions provided. PT reports block is still in effect. SHe was partially able to perform elbow/hand ex today. Pt will perform pendulums at home when block is worn off. Pt to d/c home this morning with sling in place.

## 2019-06-14 NOTE — DISCHARGE SUMMARY
Date of Admission:  6/13/2019    Date of Discharge:  6/14/2019    Discharge Diagnosis: s/p Right reverse total shoulder arthroplasty    Admitting Physician: David Marion    Consults: none    DETAILS OF HOSPITAL STAY:  Patient is a 68 y.o. female was admitted to the floor following a reverse total shoulder arthroplasty.  Post-operatively the patient was transferred to the post-operative floor where the patient underwent physical therapy including both active and passive ROM exercises. Opioids were titrated to achieve appropriate pain management to allow for participation in mobilization exercises. Vital signs are now stable. The incision is benign without signs or symptoms of infection. Operative extremity neurovascular status remains intact. Appropriate education re: incision care, activity levels, medications, and follow up visits was completed and all questions were answered. The patient is now deemed stable for discharge to home.    Condition on Discharge:  Stable    Discharge Medications     Discharge Medications      New Medications      Instructions Start Date   docusate sodium 100 MG capsule   100 mg, Oral, 2 Times Daily      HYDROcodone-acetaminophen 7.5-325 MG per tablet  Commonly known as:  NORCO   Take 1-2 tabs po q 4 hours prn pain         Continue These Medications      Instructions Start Date   amLODIPine 10 MG tablet  Commonly known as:  NORVASC   5 mg, Oral, Every Morning      enalapril 20 MG tablet  Commonly known as:  VASOTEC   20 mg, Oral, Daily      ESTRACE VAGINAL 0.1 MG/GM vaginal cream  Generic drug:  estradiol   2 g, Vaginal, Daily      KLOR-CON 8 MEQ CR tablet  Generic drug:  potassium chloride   8 mEq, Oral, 2 Times Daily      metoprolol succinate XL 25 MG 24 hr tablet  Commonly known as:  TOPROL-XL   25 mg, Oral, Every Morning         Stop These Medications    Chlorhexidine Gluconate 2 % pads     mupirocin 2 % ointment  Commonly known as:  BACTROBAN            Discharge Diet:  regular    Activity at Discharge: as tolerated    Discharge Instructions:   1)  Patient is to continue with physical therapy exercises daily and continue working with the physical therapist as ordered.  2)  Continue to follow precautions as instructed.   3)  Patient is instructed to continue use of the sling except when performing their physical therapy exercising and while dressing or showering.  4)  Continue to ice regularly. Patient also instructed on incentive spirometer during hospitalization and encouraged to continue to use at home regularly.   5)  The dressing should be left in place. If waterproof dressing is intact the patient may shower immediately following discharge. If the dressing becomes disloged or saturated it should be changed and patient must wait until POD #5 to shower. If dressing is changed, apply dry sterile dressing after showering.  6)  Follow up appointment in 2 weeks - patient to call the office at [phone] to schedule.     Follow-up Appointments  2 weeks with Dr Sanam Vega, RN  06/14/19  9:17 AM

## 2019-06-14 NOTE — PLAN OF CARE
Problem: Patient Care Overview  Goal: Plan of Care Review  Outcome: Ongoing (interventions implemented as appropriate)   06/14/19 0027   Coping/Psychosocial   Plan of Care Reviewed With patient   Plan of Care Review   Progress improving   OTHER   Outcome Summary Pt POD 1 from right reverse total shoulder. Dressing clean dry and intact. Sling in place. A&O x4. Pt voiding without difficulty at this time. Pain well controlled with PO pain medication. Pt plans to d/c home today if stable. Will continue to monitor. Pt educated on importance of BP monitoring.      Goal: Individualization and Mutuality  Outcome: Ongoing (interventions implemented as appropriate)    Goal: Discharge Needs Assessment  Outcome: Ongoing (interventions implemented as appropriate)    Goal: Interprofessional Rounds/Family Conf  Outcome: Ongoing (interventions implemented as appropriate)      Problem: Shoulder Arthroplasty (Adult)  Goal: Signs and Symptoms of Listed Potential Problems Will be Absent, Minimized or Managed (Shoulder Arthroplasty)  Outcome: Ongoing (interventions implemented as appropriate)    Goal: Anesthesia/Sedation Recovery  Outcome: Outcome(s) achieved Date Met: 06/14/19      Problem: Fall Risk (Adult)  Goal: Absence of Fall  Outcome: Ongoing (interventions implemented as appropriate)

## 2019-06-14 NOTE — PROGRESS NOTES
Case Management Discharge Note    Final Note: pt d/c home with family.     Destination      No service has been selected for the patient.      Durable Medical Equipment      No service has been selected for the patient.      Dialysis/Infusion      No service has been selected for the patient.      Home Medical Care      No service has been selected for the patient.      Therapy      No service has been selected for the patient.      Community Resources      No service has been selected for the patient.        Transportation Services  Private: Car    Final Discharge Disposition Code: 01 - home or self-care

## 2019-06-27 ENCOUNTER — HOSPITAL ENCOUNTER (OUTPATIENT)
Dept: ULTRASOUND IMAGING | Facility: HOSPITAL | Age: 69
Discharge: HOME | End: 2019-06-27
Admitting: NURSE PRACTITIONER

## 2019-06-27 DIAGNOSIS — R74.01 ELEVATED AST (SGOT): ICD-10-CM

## 2019-06-27 PROCEDURE — 76705 ECHO EXAM OF ABDOMEN: CPT

## 2019-06-28 ENCOUNTER — OFFICE VISIT (OUTPATIENT)
Dept: ORTHOPEDIC SURGERY | Facility: CLINIC | Age: 69
End: 2019-06-28

## 2019-06-28 VITALS — HEIGHT: 62 IN | TEMPERATURE: 97.7 F | BODY MASS INDEX: 24.29 KG/M2 | WEIGHT: 132 LBS

## 2019-06-28 DIAGNOSIS — Z09 SURGERY FOLLOW-UP: Primary | ICD-10-CM

## 2019-06-28 PROCEDURE — 73030 X-RAY EXAM OF SHOULDER: CPT | Performed by: ORTHOPAEDIC SURGERY

## 2019-06-28 PROCEDURE — 99024 POSTOP FOLLOW-UP VISIT: CPT | Performed by: ORTHOPAEDIC SURGERY

## 2019-06-28 RX ORDER — HYDROCODONE BITARTRATE AND ACETAMINOPHEN 7.5; 325 MG/1; MG/1
TABLET ORAL
Qty: 42 TABLET | Refills: 0 | Status: SHIPPED | OUTPATIENT
Start: 2019-06-28 | End: 2019-08-02

## 2019-06-28 NOTE — PROGRESS NOTES
"Tayler Lugo : 1950 MRN: [id number] DATE: 2019    DIAGNOSIS:  2 week follow up right shoulder arthroplasty      SUBJECTIVE:  Patient returns today for 2 week follow up of right shoulder replacement. Patient reports doing well with no unusual complaints.      OBJECTIVE:    Temp 97.7 °F (36.5 °C) (Temporal)   Ht 157.5 cm (62\")   Wt 59.9 kg (132 lb)   BMI 24.14 kg/m²     Exam:. The incision is well approximated. No erythema or drainage. Shoulder moves fluidly with pendulums . The arm is soft and nontender.  Good strength in hand and wrist.  Distal sensation intact.  Palpable radial pulse.    DIAGNOSTIC STUDIES    Xrays: 2 views of the right shoulder (AP, scapular Y) were ordered and reviewed for evaluation of shoulder replacement.  They demonstrate a well positioned, well aligned replacement without complicating factors noted.  In comparison with previous films, there has been no change.    ASSESSMENT: 2 week follow up right shoulder replacement.    PLAN:   1.  Begin PT per protocol--prescription given along with 2 copies of my protocol.  2.  Continue sling for 2 more weeks.   3.  Counseled patient about appropriate activity modifications and restrictions, including no driving until she is out of the sling.    Yajaira Salazar, APRN    2019      "

## 2019-07-02 ENCOUNTER — TRANSCRIBE ORDERS (OUTPATIENT)
Dept: ADMINISTRATIVE | Facility: HOSPITAL | Age: 69
End: 2019-07-02

## 2019-07-02 ENCOUNTER — TELEPHONE (OUTPATIENT)
Dept: ORTHOPEDIC SURGERY | Facility: CLINIC | Age: 69
End: 2019-07-02

## 2019-07-02 DIAGNOSIS — M81.0 SENILE OSTEOPOROSIS: Primary | ICD-10-CM

## 2019-07-02 DIAGNOSIS — Z96.611 S/P REVERSE TOTAL SHOULDER ARTHROPLASTY, RIGHT: Primary | ICD-10-CM

## 2019-07-02 NOTE — TELEPHONE ENCOUNTER
"Patient called re: her PT referral from 6/28/19. It needs to be corrected and faxed to [facility] @ [phone], Attn: Lolita.    The referral they got is not signed and the diagnosis says \"surgery f/u\" and needs to have the body part/surgery type on the referral.  Patient has appt on 7/8/19.    "

## 2019-07-23 ENCOUNTER — OFFICE (OUTPATIENT)
Dept: URBAN - METROPOLITAN AREA CLINIC 66 | Facility: CLINIC | Age: 69
End: 2019-07-23
Payer: MEDICARE

## 2019-07-23 ENCOUNTER — TRANSCRIBE ORDERS (OUTPATIENT)
Dept: ADMINISTRATIVE | Facility: HOSPITAL | Age: 69
End: 2019-07-23

## 2019-07-23 VITALS
WEIGHT: 135 LBS | HEIGHT: 63 IN | DIASTOLIC BLOOD PRESSURE: 84 MMHG | HEART RATE: 98 BPM | SYSTOLIC BLOOD PRESSURE: 154 MMHG

## 2019-07-23 DIAGNOSIS — R10.11 RIGHT UPPER QUADRANT PAIN: Primary | ICD-10-CM

## 2019-07-23 DIAGNOSIS — F10.20 ALCOHOL DEPENDENCE, UNCOMPLICATED: ICD-10-CM

## 2019-07-23 DIAGNOSIS — K76.0 FATTY (CHANGE OF) LIVER, NOT ELSEWHERE CLASSIFIED: ICD-10-CM

## 2019-07-23 PROCEDURE — 99214 OFFICE O/P EST MOD 30 MIN: CPT

## 2019-08-02 ENCOUNTER — OFFICE VISIT (OUTPATIENT)
Dept: ORTHOPEDIC SURGERY | Facility: CLINIC | Age: 69
End: 2019-08-02

## 2019-08-02 VITALS — TEMPERATURE: 98.3 F | WEIGHT: 132 LBS | BODY MASS INDEX: 24.29 KG/M2 | HEIGHT: 62 IN

## 2019-08-02 DIAGNOSIS — Z96.611 S/P REVERSE TOTAL SHOULDER ARTHROPLASTY, RIGHT: Primary | ICD-10-CM

## 2019-08-02 PROCEDURE — 99024 POSTOP FOLLOW-UP VISIT: CPT | Performed by: NURSE PRACTITIONER

## 2019-08-02 PROCEDURE — 73030 X-RAY EXAM OF SHOULDER: CPT | Performed by: NURSE PRACTITIONER

## 2019-08-02 NOTE — PROGRESS NOTES
"Tayler Lugo : 1950 MRN: [id number] DATE: 2019    DIAGNOSIS: 6 week follow up right shoulder arthroplasty      SUBJECTIVE:  Patient returns today for 6 week follow up of right shoulder replacement. Patient reports doing well with no unusual complaints.     OBJECTIVE:    Temp 98.3 °F (36.8 °C)   Ht 157.5 cm (62\")   Wt 59.9 kg (132 lb)   BMI 24.14 kg/m²     Exam:. The incision is well healed. No erythema or drainage. Shoulder moves fluidly with pendulums.  Motion is on track per protocol.  The arm is soft and nontender.  Good motor and sensory function.  Palpable distal pulses.     DIAGNOSTIC STUDIES    Xrays: 3 views of the right shoulder (AP, scapular Y, and axillary) were ordered and reviewed for evaluation of shoulder replacement. They demonstrate a well positioned, well aligned replacement without complicating factors noted. In comparison with previous films there has been no change.    ASSESSMENT: 6 week follow up right shoulder replacement.    PLAN:   1.  Continue PT per protocol.  2.  Discontinue sling and begin working on progressing ROM as tolerated.  3.  Counseled patient about appropriate activity modifications and restrictions--released to drive at this point.    Yajaira Salazar, APRN     2019      "

## 2019-08-05 ENCOUNTER — HOSPITAL ENCOUNTER (OUTPATIENT)
Dept: BONE DENSITY | Facility: HOSPITAL | Age: 69
Discharge: HOME | End: 2019-08-05
Admitting: INTERNAL MEDICINE

## 2019-08-05 DIAGNOSIS — M81.0 SENILE OSTEOPOROSIS: ICD-10-CM

## 2019-08-05 PROCEDURE — 77080 DXA BONE DENSITY AXIAL: CPT

## 2019-09-11 ENCOUNTER — OFFICE VISIT (OUTPATIENT)
Dept: ORTHOPEDIC SURGERY | Facility: CLINIC | Age: 69
End: 2019-09-11

## 2019-09-11 VITALS — TEMPERATURE: 97.5 F | WEIGHT: 130.1 LBS | BODY MASS INDEX: 23.94 KG/M2 | HEIGHT: 62 IN

## 2019-09-11 DIAGNOSIS — Z96.611 S/P REVERSE TOTAL SHOULDER ARTHROPLASTY, RIGHT: Primary | ICD-10-CM

## 2019-09-11 PROCEDURE — 73030 X-RAY EXAM OF SHOULDER: CPT | Performed by: ORTHOPAEDIC SURGERY

## 2019-09-11 PROCEDURE — 99024 POSTOP FOLLOW-UP VISIT: CPT | Performed by: ORTHOPAEDIC SURGERY

## 2019-09-11 NOTE — PROGRESS NOTES
"CC: Follow-up status post right reverse total shoulder arthroplasty    Ms. Lugo comes in for follow-up of her right shoulder.  She is now about 3 months out from surgery.  She is frustrated.  She feels like there has been little communication between us and her therapist in [place].  She wants to get back to golfing and she feels like she still has a lot of weakness.  She does have some soreness because she states that she \"slept on it wrong last night\" otherwise, her pain is much better.  She does report significant improvement in her motion since surgery.    Incision is healed.  No significant tenderness or effusion.  No bony tenderness over the acromion.  Her motion is actually really good.  She is got forward elevation of 160 degrees, abduction of about 100 degrees.  Her external rotation is a little limited, roughly 20 to 25 degrees.  Internal rotation is only to her side pocket.  She admits that she has not been working on rotation at all in therapy.  She was instructed not to.    AP and scapular Y views of the right shoulder are ordered and reviewed.  These are compared to previous x-rays.  Her components appear well fixed and well-positioned.  I do not see a complicating process.    Assessment: 3-month status post right reverse total shoulder arthroplasty    Plan: She seems really frustrated but I think she is actually doing quite well.  Her motion is tremendously improved relative to before surgery.  Her rotation is poor but she has not been doing anything in therapy to work on this.  I told her that she needs to start working on progressive rotation and especially strengthening.  I am going to have her come back in for follow-up in about 3 months.  She is in agreement with that plan.  "

## 2019-09-16 ENCOUNTER — TRANSCRIBE ORDERS (OUTPATIENT)
Dept: ADMINISTRATIVE | Facility: HOSPITAL | Age: 69
End: 2019-09-16

## 2019-09-16 DIAGNOSIS — N63.10 BREAST MASS, RIGHT: Primary | ICD-10-CM

## 2019-09-18 ENCOUNTER — APPOINTMENT (OUTPATIENT)
Dept: ULTRASOUND IMAGING | Facility: HOSPITAL | Age: 69
End: 2019-09-18

## 2019-09-26 ENCOUNTER — HOSPITAL ENCOUNTER (OUTPATIENT)
Dept: ULTRASOUND IMAGING | Facility: HOSPITAL | Age: 69
Discharge: HOME | End: 2019-09-26

## 2019-09-26 ENCOUNTER — HOSPITAL ENCOUNTER (OUTPATIENT)
Dept: MAMMOGRAPHY | Facility: HOSPITAL | Age: 69
Discharge: HOME | End: 2019-09-26
Admitting: INTERNAL MEDICINE

## 2019-09-26 DIAGNOSIS — N63.10 BREAST MASS, RIGHT: ICD-10-CM

## 2019-09-26 PROCEDURE — 76642 ULTRASOUND BREAST LIMITED: CPT

## 2019-09-26 PROCEDURE — 77065 DX MAMMO INCL CAD UNI: CPT

## 2019-10-02 ENCOUNTER — TRANSCRIBE ORDERS (OUTPATIENT)
Dept: ADMINISTRATIVE | Facility: HOSPITAL | Age: 69
End: 2019-10-02

## 2019-10-02 DIAGNOSIS — Z09 FOLLOW UP: Primary | ICD-10-CM

## 2019-10-24 ENCOUNTER — TELEPHONE (OUTPATIENT)
Dept: ORTHOPEDIC SURGERY | Facility: CLINIC | Age: 69
End: 2019-10-24

## 2019-10-24 DIAGNOSIS — Z96.611 S/P REVERSE TOTAL SHOULDER ARTHROPLASTY, RIGHT: Primary | ICD-10-CM

## 2019-10-24 NOTE — TELEPHONE ENCOUNTER
Patient called to request an outpatient physical therapy order for patient's Right Shldr be faxed to [facility] at FAX # [phone]. Patient will be in [place] till April 2020 (with the exception of coming back for patient's 3 month FU with [facility] on 12/23/19). Thanks /srh

## 2019-12-16 ENCOUNTER — TELEPHONE (OUTPATIENT)
Dept: ORTHOPEDIC SURGERY | Facility: CLINIC | Age: 69
End: 2019-12-16

## 2019-12-16 NOTE — TELEPHONE ENCOUNTER
I spoke with MsNishant Brittney.  I told her she is fine to restart her Alendronate per Dr. Marion.  Also, she told me her physical therapist will send a progress reports prior to her next appointment with Dr. Marion on 12/23/2019.  She appreciated the return call.

## 2019-12-16 NOTE — TELEPHONE ENCOUNTER
Patient would like for [facility] to return her call, says she has a question regarding the Alendronate 70mg tabs. Wants to know if she should take it while in physical therapy.

## 2019-12-20 ENCOUNTER — OFFICE (OUTPATIENT)
Dept: URBAN - METROPOLITAN AREA CLINIC 66 | Facility: CLINIC | Age: 69
End: 2019-12-20
Payer: MEDICARE

## 2019-12-20 VITALS
SYSTOLIC BLOOD PRESSURE: 157 MMHG | WEIGHT: 135 LBS | DIASTOLIC BLOOD PRESSURE: 82 MMHG | HEIGHT: 63 IN | HEART RATE: 89 BPM

## 2019-12-20 DIAGNOSIS — R74.0 NONSPECIFIC ELEVATION OF LEVELS OF TRANSAMINASE AND LACTIC A: ICD-10-CM

## 2019-12-20 DIAGNOSIS — F10.20 ALCOHOL DEPENDENCE, UNCOMPLICATED: ICD-10-CM

## 2019-12-20 DIAGNOSIS — K76.0 FATTY (CHANGE OF) LIVER, NOT ELSEWHERE CLASSIFIED: ICD-10-CM

## 2019-12-20 PROCEDURE — 99214 OFFICE O/P EST MOD 30 MIN: CPT

## 2019-12-23 ENCOUNTER — OFFICE VISIT (OUTPATIENT)
Dept: ORTHOPEDIC SURGERY | Facility: CLINIC | Age: 69
End: 2019-12-23

## 2019-12-23 VITALS — TEMPERATURE: 98.2 F | WEIGHT: 145 LBS | BODY MASS INDEX: 26.68 KG/M2 | HEIGHT: 62 IN

## 2019-12-23 DIAGNOSIS — Z96.611 S/P REVERSE TOTAL SHOULDER ARTHROPLASTY, RIGHT: Primary | ICD-10-CM

## 2019-12-23 DIAGNOSIS — Z09 SURGERY FOLLOW-UP: ICD-10-CM

## 2019-12-23 PROCEDURE — 73030 X-RAY EXAM OF SHOULDER: CPT | Performed by: ORTHOPAEDIC SURGERY

## 2019-12-23 PROCEDURE — 99212 OFFICE O/P EST SF 10 MIN: CPT | Performed by: ORTHOPAEDIC SURGERY

## 2019-12-23 NOTE — PROGRESS NOTES
Chief Complaint: Follow-up now 6-month status post right reverse total shoulder    HPI:  Ms. Lugo is now 6 months out.  She says the shoulder is doing well.  Her motion is steadily getting better.  Pain is fairly minimal.  She is anxious to get back to golfing and she is considering taking up pickleball.    Exam: Her surgical incision is healed.  No tenderness.  Motion is much better.  Forward elevation about 160 degrees, external rotation about 40 to 45 degrees internal rotation to roughly her back pocket.  Good strength with abduction and elevation.    Imaging: AP, scapular Y and axillary views of the right shoulder are ordered, reviewed, and compared to previous x-rays.  No concerning findings noted.  Implants appear well fixed and well-positioned.    Assessment: 6-month status post right reverse total shoulder arthroplasty    Plan: She is doing well.  Antibiotic prophylaxis recommendations were discussed.  Appropriate activity modifications were discussed as well.  I will see her back in 1 year.    David Marion MD     12/23/2019

## 2020-03-02 NOTE — CONSULTS
Inpatient Hospitalist Consult  Consult performed by: ROGER IRENE  Consult ordered by: TSERING PAULA  Reason for consult: HTN, itching, eye irritation, post-op hypoxia          Patient Care Team:  Tayler Trujillo MD as PCP - General    Chief complaint: itching    Subjective     Very pleasant 69yo woman admitted after elective lumbar spine surgery. We are asked to see for medical issues including HTN, chronic urinary incontinence with recurrent UTIs, etc. RN called me to see pt with report of itching all over and left eye irritation/pain/tearing. Benadryl was ordered and pt is feeling much better.         Review of Systems   Constitutional: Negative for appetite change, chills, diaphoresis, fatigue and fever.   HENT: Negative for ear pain, facial swelling, hearing loss, mouth sores, nosebleeds, postnasal drip, rhinorrhea, sinus pressure, sneezing, sore throat, tinnitus, trouble swallowing and voice change.    Eyes: Positive for pain, discharge and itching. Negative for photophobia and visual disturbance.   Respiratory: Negative for cough, choking, chest tightness, shortness of breath, wheezing and stridor.    Cardiovascular: Negative for chest pain, palpitations and leg swelling.   Gastrointestinal: Negative for abdominal pain, blood in stool, diarrhea, nausea and vomiting.   Endocrine: Negative for cold intolerance and heat intolerance.   Genitourinary: Negative for hematuria.   Musculoskeletal: Positive for back pain. Negative for neck pain.   Skin: Negative for color change, pallor and rash.   Allergic/Immunologic: Negative for environmental allergies, food allergies and immunocompromised state.   Neurological: Negative for tremors, seizures, syncope, facial asymmetry, speech difficulty, light-headedness, numbness and headaches.   Hematological: Negative for adenopathy. Does not bruise/bleed easily.   Psychiatric/Behavioral: Negative for behavioral problems, confusion and hallucinations.        Past Medical  History:   Diagnosis Date   • Elevated liver enzymes    • Fatty liver    • Frequent UTI    • Hypertension    • Lumbar stenosis    • Spondylarthritis     LOW BACK   • Stress incontinence    • Urinary retention    • UTI (urinary tract infection)     ON ANTIBIOTIC   ,   Past Surgical History:   Procedure Laterality Date   • ABDOMINOPLASTY     • BILATERAL BREAST REDUCTION     • CATARACT EXTRACTION Bilateral    •  SECTION      X3   • COLONOSCOPY      2006   • COLONOSCOPY N/A 2016    Procedure: COLONOSCOPY;  Surgeon: Ho Arenas MD;  Location: [facility] ENDOSCOPY;  Service:    • LASIK     • REDUCTION MAMMAPLASTY     • TONSILLECTOMY     ,   Family History   Problem Relation Age of Onset   • Colon polyps Father    • Malig Hyperthermia Neg Hx    ,   Social History   Substance Use Topics   • Smoking status: Never Smoker   • Smokeless tobacco: Not on file   • Alcohol use Yes      Comment: 3-4 glasses of wine a week   ,   Prescriptions Prior to Admission   Medication Sig Dispense Refill Last Dose   • amLODIPine (NORVASC) 10 MG tablet Take 10 mg by mouth daily.  2 2018 at 0900   • enalapril (VASOTEC) 20 MG tablet Take 20 mg by mouth Every Morning.  2 2018 at 0900   • sulfamethoxazole-trimethoprim (BACTRIM,SEPTRA) 400-80 MG tablet Take 1 tablet by mouth Daily.   2018 at 0900   • ESTRACE VAGINAL 0.1 MG/GM vaginal cream Insert 2 g into the vagina Daily.   2018   , Scheduled Meds:    amLODIPine 10 mg Oral Daily   ceFAZolin 2 g Intravenous Q8H   [START ON 10/2/2018] enalapril 20 mg Oral QAM   estradiol 2 g Vaginal Daily   polyethylene glycol 17 g Oral Daily   sennosides-docusate sodium 1 tablet Oral BID   , PRN Meds:  bisacodyl  •  cyclobenzaprine  •  diphenhydrAMINE  •  Glycerin-Hypromellose-  •  naloxone  •  ondansetron **OR** ondansetron ODT **OR** ondansetron  •  oxyCODONE-acetaminophen  •  sodium chloride  •  temazepam and Allergies:  Patient has no known allergies.    Objective       Vital Signs  Temp:  [98 °F (36.7 °C)-100 °F (37.8 °C)] 98.1 °F (36.7 °C)  Heart Rate:  [73-86] 86  Resp:  [16-18] 16  BP: (101-136)/(54-75) 124/66    Physical Exam   Constitutional: She is oriented to person, place, and time. She appears well-developed and well-nourished. No distress.   HENT:   Head: Normocephalic and atraumatic.   Mouth/Throat: Oropharynx is clear and moist. No oropharyngeal exudate.   Eyes: Pupils are equal, round, and reactive to light. Conjunctivae and EOM are normal. Right eye exhibits no discharge. Left eye exhibits no discharge. No scleral icterus.   Very mild tearing from left eye   Neck: Normal range of motion. Neck supple. No thyromegaly present.   Cardiovascular: Normal rate, regular rhythm, normal heart sounds and intact distal pulses.    No murmur heard.  Pulmonary/Chest: Effort normal and breath sounds normal. No stridor. No respiratory distress. She has no wheezes.   Abdominal: Soft. Bowel sounds are normal. She exhibits no distension. There is no tenderness.   Musculoskeletal: She exhibits no edema or deformity.   BLEs are neurovascularly intact   Lymphadenopathy:     She has no cervical adenopathy.   Neurological: She is alert and oriented to person, place, and time. No cranial nerve deficit or sensory deficit.   Skin: Skin is warm and dry. Capillary refill takes less than 2 seconds. No rash noted. She is not diaphoretic. No erythema.   Psychiatric: She has a normal mood and affect. Her behavior is normal. Thought content normal.   Nursing note and vitals reviewed.      Results Review:    I reviewed the patient's new clinical results.  I reviewed the patient's other test results and agree with the interpretation  Discussed with patient and RN        Assessment/Plan     Principal Problem:    Spinal stenosis of lumbar region with neurogenic claudication  Active Problems:    Lumbar spinal stenosis    Frequent UTI    Hypertension    Stress incontinence    Itching due to  drug          Plan:   (Thanks for consult!  Seems much better after oral Benadryl--eye isn't bothering her as much now either  RN was worried that she had suffered an injury to eye during surgery or that her recent cataract extraction was somehow affected by lying prone on operating table--exam is very reassuring, most likely was mild mechanical irritation or allergic reaction that has responded very well to antihistamine  Hsipman for now, will have to monitor voiding closely after its removal--also watch for CAUTI  Try to wean O2 as able, encouraged IS use  BPs look good, continue to monitor  Will follow along with you while pt is hospitalized).       I discussed the patients findings and my recommendations with patient and nursing staff    Marek Rodriguez MD  10/01/18  9:44 PM    Time: 35min       Curettage Text: The wound bed was treated with curettage after the biopsy was performed.

## 2020-07-10 ENCOUNTER — TELEPHONE (OUTPATIENT)
Dept: URGENT CARE | Facility: CLINIC | Age: 70
End: 2020-07-10

## 2020-07-10 DIAGNOSIS — N30.01 ACUTE CYSTITIS WITH HEMATURIA: Primary | ICD-10-CM

## 2020-07-10 RX ORDER — NITROFURANTOIN 25; 75 MG/1; MG/1
100 CAPSULE ORAL 2 TIMES DAILY
Qty: 14 CAPSULE | Refills: 0 | Status: SHIPPED | OUTPATIENT
Start: 2020-07-10 | End: 2020-07-17

## 2020-07-10 NOTE — TELEPHONE ENCOUNTER
Pt called stating that she is unable to tolerate Bactrim. Adv pt to discontinue and sent in new rx.

## 2020-08-04 ENCOUNTER — OFFICE (OUTPATIENT)
Dept: URBAN - METROPOLITAN AREA CLINIC 66 | Facility: CLINIC | Age: 70
End: 2020-08-04
Payer: COMMERCIAL

## 2020-08-04 ENCOUNTER — TRANSCRIBE ORDERS (OUTPATIENT)
Dept: ADMINISTRATIVE | Facility: HOSPITAL | Age: 70
End: 2020-08-04

## 2020-08-04 VITALS
HEART RATE: 91 BPM | SYSTOLIC BLOOD PRESSURE: 130 MMHG | TEMPERATURE: 97.8 F | WEIGHT: 132 LBS | HEIGHT: 63 IN | DIASTOLIC BLOOD PRESSURE: 69 MMHG

## 2020-08-04 DIAGNOSIS — K76.0 FATTY (CHANGE OF) LIVER, NOT ELSEWHERE CLASSIFIED: ICD-10-CM

## 2020-08-04 DIAGNOSIS — K70.10 ALCOHOLIC HEPATITIS WITHOUT ASCITES: ICD-10-CM

## 2020-08-04 DIAGNOSIS — K76.0 STEATOSIS OF LIVER: Primary | ICD-10-CM

## 2020-08-04 PROCEDURE — 99214 OFFICE O/P EST MOD 30 MIN: CPT | Performed by: INTERNAL MEDICINE

## 2020-08-11 ENCOUNTER — LAB (OUTPATIENT)
Dept: LAB | Facility: HOSPITAL | Age: 70
End: 2020-08-11

## 2020-08-11 ENCOUNTER — TRANSCRIBE ORDERS (OUTPATIENT)
Dept: ADMINISTRATIVE | Facility: HOSPITAL | Age: 70
End: 2020-08-11

## 2020-08-11 ENCOUNTER — HOSPITAL ENCOUNTER (OUTPATIENT)
Dept: ULTRASOUND IMAGING | Facility: HOSPITAL | Age: 70
Discharge: HOME | End: 2020-08-11
Admitting: INTERNAL MEDICINE

## 2020-08-11 DIAGNOSIS — K76.0 STEATOSIS OF LIVER: ICD-10-CM

## 2020-08-11 DIAGNOSIS — R74.01 ELEVATED AST (SGOT): ICD-10-CM

## 2020-08-11 DIAGNOSIS — K70.0 ALCOHOLIC FATTY LIVER: ICD-10-CM

## 2020-08-11 DIAGNOSIS — F10.20 ACUTE ALCOHOLISM (HCC): Primary | ICD-10-CM

## 2020-08-11 DIAGNOSIS — F10.20 ACUTE ALCOHOLISM (HCC): ICD-10-CM

## 2020-08-11 DIAGNOSIS — R77.8 ELEVATED TOTAL PROTEIN: ICD-10-CM

## 2020-08-11 LAB
ALBUMIN SERPL-MCNC: 4.1 G/DL (ref 3.5–5.2)
ALBUMIN/GLOB SERPL: 1.5 G/DL
ALP SERPL-CCNC: 91 U/L (ref 39–117)
ALT SERPL W P-5'-P-CCNC: 99 U/L (ref 1–33)
ANION GAP SERPL CALCULATED.3IONS-SCNC: 12.1 MMOL/L (ref 5–15)
AST SERPL-CCNC: 181 U/L (ref 1–32)
BASOPHILS # BLD AUTO: 0.05 10*3/MM3 (ref 0–0.2)
BASOPHILS NFR BLD AUTO: 0.8 % (ref 0–1.5)
BILIRUB SERPL-MCNC: 0.7 MG/DL (ref 0–1.2)
BUN SERPL-MCNC: 7 MG/DL (ref 8–23)
BUN/CREAT SERPL: 14.9 (ref 7–25)
CALCIUM SPEC-SCNC: 9.7 MG/DL (ref 8.6–10.5)
CHLORIDE SERPL-SCNC: 99 MMOL/L (ref 98–107)
CO2 SERPL-SCNC: 24.9 MMOL/L (ref 22–29)
CREAT SERPL-MCNC: 0.47 MG/DL (ref 0.57–1)
DEPRECATED RDW RBC AUTO: 42.1 FL (ref 37–54)
EOSINOPHIL # BLD AUTO: 0.06 10*3/MM3 (ref 0–0.4)
EOSINOPHIL NFR BLD AUTO: 0.9 % (ref 0.3–6.2)
ERYTHROCYTE [DISTWIDTH] IN BLOOD BY AUTOMATED COUNT: 12.1 % (ref 12.3–15.4)
GFR SERPL CREATININE-BSD FRML MDRD: 130 ML/MIN/1.73
GLOBULIN UR ELPH-MCNC: 2.8 GM/DL
GLUCOSE SERPL-MCNC: 101 MG/DL (ref 65–99)
HCT VFR BLD AUTO: 34.7 % (ref 34–46.6)
HGB BLD-MCNC: 12.2 G/DL (ref 12–15.9)
IMM GRANULOCYTES # BLD AUTO: 0.02 10*3/MM3 (ref 0–0.05)
IMM GRANULOCYTES NFR BLD AUTO: 0.3 % (ref 0–0.5)
LYMPHOCYTES # BLD AUTO: 1.97 10*3/MM3 (ref 0.7–3.1)
LYMPHOCYTES NFR BLD AUTO: 29.7 % (ref 19.6–45.3)
MCH RBC QN AUTO: 33.7 PG (ref 26.6–33)
MCHC RBC AUTO-ENTMCNC: 35.2 G/DL (ref 31.5–35.7)
MCV RBC AUTO: 95.9 FL (ref 79–97)
MONOCYTES # BLD AUTO: 0.78 10*3/MM3 (ref 0.1–0.9)
MONOCYTES NFR BLD AUTO: 11.8 % (ref 5–12)
NEUTROPHILS NFR BLD AUTO: 3.75 10*3/MM3 (ref 1.7–7)
NEUTROPHILS NFR BLD AUTO: 56.5 % (ref 42.7–76)
NRBC BLD AUTO-RTO: 0 /100 WBC (ref 0–0.2)
PLATELET # BLD AUTO: 212 10*3/MM3 (ref 140–450)
PMV BLD AUTO: 11.2 FL (ref 6–12)
POTASSIUM SERPL-SCNC: 4.2 MMOL/L (ref 3.5–5.2)
PROT SERPL-MCNC: 6.9 G/DL (ref 6–8.5)
RBC # BLD AUTO: 3.62 10*6/MM3 (ref 3.77–5.28)
SODIUM SERPL-SCNC: 136 MMOL/L (ref 136–145)
WBC # BLD AUTO: 6.63 10*3/MM3 (ref 3.4–10.8)

## 2020-08-11 PROCEDURE — 76705 ECHO EXAM OF ABDOMEN: CPT

## 2020-08-11 PROCEDURE — 80053 COMPREHEN METABOLIC PANEL: CPT

## 2020-08-11 PROCEDURE — 36415 COLL VENOUS BLD VENIPUNCTURE: CPT

## 2020-08-11 PROCEDURE — 85025 COMPLETE CBC W/AUTO DIFF WBC: CPT

## 2020-08-31 ENCOUNTER — TRANSCRIBE ORDERS (OUTPATIENT)
Dept: ADMINISTRATIVE | Facility: HOSPITAL | Age: 70
End: 2020-08-31

## 2020-08-31 DIAGNOSIS — N60.01 BREAST CYST, RIGHT: Primary | ICD-10-CM

## 2020-09-02 ENCOUNTER — TRANSCRIBE ORDERS (OUTPATIENT)
Dept: ADMINISTRATIVE | Facility: HOSPITAL | Age: 70
End: 2020-09-02

## 2020-09-02 DIAGNOSIS — N60.01 BREAST CYST, RIGHT: Primary | ICD-10-CM

## 2020-09-21 ENCOUNTER — HOSPITAL ENCOUNTER (OUTPATIENT)
Dept: ULTRASOUND IMAGING | Facility: HOSPITAL | Age: 70
Discharge: HOME | End: 2020-09-21

## 2020-09-21 ENCOUNTER — HOSPITAL ENCOUNTER (OUTPATIENT)
Dept: MAMMOGRAPHY | Facility: HOSPITAL | Age: 70
Discharge: HOME | End: 2020-09-21

## 2020-09-21 DIAGNOSIS — N60.01 BREAST CYST, RIGHT: ICD-10-CM

## 2020-09-21 PROCEDURE — 76642 ULTRASOUND BREAST LIMITED: CPT

## 2020-09-21 PROCEDURE — 77066 DX MAMMO INCL CAD BI: CPT

## 2020-09-21 PROCEDURE — G0279 TOMOSYNTHESIS, MAMMO: HCPCS

## 2020-09-22 ENCOUNTER — TRANSCRIBE ORDERS (OUTPATIENT)
Dept: ADMINISTRATIVE | Facility: HOSPITAL | Age: 70
End: 2020-09-22

## 2020-09-22 DIAGNOSIS — N60.01 BREAST CYST, RIGHT: Primary | ICD-10-CM

## 2020-10-01 ENCOUNTER — APPOINTMENT (OUTPATIENT)
Dept: ULTRASOUND IMAGING | Facility: HOSPITAL | Age: 70
End: 2020-10-01

## 2020-10-08 ENCOUNTER — HOSPITAL ENCOUNTER (OUTPATIENT)
Dept: ULTRASOUND IMAGING | Facility: HOSPITAL | Age: 70
Discharge: HOME | End: 2020-10-08

## 2020-10-08 ENCOUNTER — HOSPITAL ENCOUNTER (OUTPATIENT)
Dept: MAMMOGRAPHY | Facility: HOSPITAL | Age: 70
Discharge: HOME | End: 2020-10-08

## 2020-10-08 VITALS
SYSTOLIC BLOOD PRESSURE: 129 MMHG | HEIGHT: 62 IN | RESPIRATION RATE: 16 BRPM | OXYGEN SATURATION: 98 % | HEART RATE: 98 BPM | DIASTOLIC BLOOD PRESSURE: 81 MMHG | TEMPERATURE: 98.1 F | BODY MASS INDEX: 23.92 KG/M2 | WEIGHT: 130 LBS

## 2020-10-08 DIAGNOSIS — Z98.890 STATUS POST BIOPSY: ICD-10-CM

## 2020-10-08 DIAGNOSIS — N60.01 BREAST CYST, RIGHT: ICD-10-CM

## 2020-10-08 PROCEDURE — 77065 DX MAMMO INCL CAD UNI: CPT

## 2020-10-08 PROCEDURE — 88341 IMHCHEM/IMCYTCHM EA ADD ANTB: CPT | Performed by: INTERNAL MEDICINE

## 2020-10-08 PROCEDURE — 25010000003 LIDOCAINE 1 % SOLUTION: Performed by: RADIOLOGY

## 2020-10-08 PROCEDURE — 88342 IMHCHEM/IMCYTCHM 1ST ANTB: CPT | Performed by: INTERNAL MEDICINE

## 2020-10-08 PROCEDURE — A4648 IMPLANTABLE TISSUE MARKER: HCPCS

## 2020-10-08 PROCEDURE — 88305 TISSUE EXAM BY PATHOLOGIST: CPT | Performed by: INTERNAL MEDICINE

## 2020-10-08 RX ORDER — LIDOCAINE HYDROCHLORIDE 10 MG/ML
10 INJECTION, SOLUTION INFILTRATION; PERINEURAL ONCE
Status: COMPLETED | OUTPATIENT
Start: 2020-10-08 | End: 2020-10-08

## 2020-10-08 RX ORDER — LIDOCAINE HYDROCHLORIDE AND EPINEPHRINE 10; 10 MG/ML; UG/ML
10 INJECTION, SOLUTION INFILTRATION; PERINEURAL ONCE
Status: COMPLETED | OUTPATIENT
Start: 2020-10-08 | End: 2020-10-08

## 2020-10-08 RX ADMIN — LIDOCAINE HYDROCHLORIDE 1 ML: 10 INJECTION, SOLUTION INFILTRATION; PERINEURAL at 15:30

## 2020-10-08 RX ADMIN — LIDOCAINE HYDROCHLORIDE,EPINEPHRINE BITARTRATE 3 ML: 10; .01 INJECTION, SOLUTION INFILTRATION; PERINEURAL at 15:30

## 2020-10-08 NOTE — NURSING NOTE
Biopsy site to right breast clear with Skin Affix and steristrips, clean, dry and intact. No firmness or swelling noted at or around biopsy site. Post mammo completed. Denies pain. Ice pack with protective covering applied to biopsy site. Discharge instructions discussed with understanding voiced by patient. Copies provided to patient. No distress noted. To home via private vehicle.

## 2020-10-08 NOTE — H&P
Name: Tayler Lugo ADMIT: 10/8/2020   : 1950  PCP: Tayler Trujillo MD    MRN: [id number] LOS: 0 days   AGE/SEX: 70 y.o. female  ROOM: Room/bed info not found       Chief complaint right breast mass    Present Illness or Internal History:  Patient is a 70 y.o. female presents with right breast mass for us bx.     Past Surgical History:  Past Surgical History:   Procedure Laterality Date   • ABDOMINOPLASTY     • BREAST SURGERY     • CATARACT EXTRACTION Bilateral    •  SECTION      X3   • COLONOSCOPY      2006   • COLONOSCOPY N/A 2016    Procedure: COLONOSCOPY;  Surgeon: Ho Arenas MD;  Location: [facility] ENDOSCOPY;  Service:    • LASIK     • LUMBAR DISCECTOMY FUSION INSTRUMENTATION N/A 10/1/2018    Procedure: L4-5, L5-S1  laminectomy and fusion with instrumentation;  Surgeon: Juno Kim MD;  Location: [facility] OR;  Service: Orthopedic Spine   • REDUCTION MAMMAPLASTY     • TONSILLECTOMY     • TOTAL SHOULDER ARTHROPLASTY W/ DISTAL CLAVICLE EXCISION Right 2019    Procedure: TOTAL SHOULDER REVERSE ARTHROPLASTY;  Surgeon: David Marion MD;  Location: [facility] OR;  Service: Orthopedics       Past Medical History:  Past Medical History:   Diagnosis Date   • Elevated liver enzymes    • Fatty liver    • Frequent UTI    • History of depression    • Hypertension    • Lumbar stenosis    • OA (osteoarthritis)    • Post-menopausal    • Spondylarthritis     LOW BACK   • Stress incontinence    • Urinary retention        Home Medications:  (Not in a hospital admission)      Allergies:  Patient has no known allergies.    Family History:  Family History   Problem Relation Age of Onset   • Colon polyps Father    • Breast cancer Maternal Aunt    • Malig Hyperthermia Neg Hx        Social History:  Social History     Tobacco Use   • Smoking status: Never Smoker   • Smokeless tobacco: Never Used   Substance Use Topics   • Alcohol use: Yes     Comment: 3-4 glasses of wine a week   •  Drug use: No        Objective     Physical Exam:    No exam performed today,    Vital Signs  Temp:  [98.1 °F (36.7 °C)] 98.1 °F (36.7 °C)  Heart Rate:  [96] 96  Resp:  [16] 16  BP: (98)/(65) 98/65    Anticipated Surgical Procedure:  Right breast ultrasound guided core needle biopsy    The risks, benefits and alternatives of this procedure have been discussed with the patient or responsible party: Yes        Kari Carvalho MD  10/08/20  14:18 EDT

## 2020-10-08 NOTE — NURSING NOTE
VSS. Denies pain. Medical/surgical history and medications reviewed. No distress noted. Procedural education completed with patient's questions addressed.

## 2020-10-12 LAB
CYTO UR: NORMAL
LAB AP CASE REPORT: NORMAL
LAB AP CLINICAL INFORMATION: NORMAL
LAB AP DIAGNOSIS COMMENT: NORMAL
PATH REPORT.FINAL DX SPEC: NORMAL
PATH REPORT.GROSS SPEC: NORMAL

## 2020-11-04 ENCOUNTER — APPOINTMENT (OUTPATIENT)
Dept: GENERAL RADIOLOGY | Facility: HOSPITAL | Age: 70
End: 2020-11-04

## 2020-11-04 ENCOUNTER — HOSPITAL ENCOUNTER (INPATIENT)
Facility: HOSPITAL | Age: 70
LOS: 13 days | Discharge: HOME HEALTH | End: 2020-11-17
Attending: EMERGENCY MEDICINE | Admitting: HOSPITALIST

## 2020-11-04 ENCOUNTER — APPOINTMENT (OUTPATIENT)
Dept: CT IMAGING | Facility: HOSPITAL | Age: 70
End: 2020-11-04

## 2020-11-04 DIAGNOSIS — A41.9 SEPSIS WITH ACUTE RENAL FAILURE, DUE TO UNSPECIFIED ORGANISM, UNSPECIFIED ACUTE RENAL FAILURE TYPE, UNSPECIFIED WHETHER SEPTIC SHOCK PRESENT (HCC): ICD-10-CM

## 2020-11-04 DIAGNOSIS — E43 SEVERE MALNUTRITION (HCC): ICD-10-CM

## 2020-11-04 DIAGNOSIS — R65.20 SEPSIS WITH ACUTE RENAL FAILURE, DUE TO UNSPECIFIED ORGANISM, UNSPECIFIED ACUTE RENAL FAILURE TYPE, UNSPECIFIED WHETHER SEPTIC SHOCK PRESENT (HCC): ICD-10-CM

## 2020-11-04 DIAGNOSIS — K76.7 HEPATORENAL SYNDROME (HCC): Primary | ICD-10-CM

## 2020-11-04 DIAGNOSIS — M48.062 SPINAL STENOSIS OF LUMBAR REGION WITH NEUROGENIC CLAUDICATION: ICD-10-CM

## 2020-11-04 DIAGNOSIS — N17.9 SEPSIS WITH ACUTE RENAL FAILURE, DUE TO UNSPECIFIED ORGANISM, UNSPECIFIED ACUTE RENAL FAILURE TYPE, UNSPECIFIED WHETHER SEPTIC SHOCK PRESENT (HCC): ICD-10-CM

## 2020-11-04 PROBLEM — K70.11 ASCITES DUE TO ALCOHOLIC HEPATITIS: Status: ACTIVE | Noted: 2020-11-04

## 2020-11-04 LAB
ALBUMIN FLD-MCNC: 0.9 G/DL
ALBUMIN SERPL-MCNC: 3.2 G/DL (ref 3.5–5.2)
ALBUMIN/GLOB SERPL: 1.2 G/DL
ALP SERPL-CCNC: 264 U/L (ref 39–117)
ALT SERPL W P-5'-P-CCNC: 96 U/L (ref 1–33)
AMMONIA BLD-SCNC: 37 UMOL/L (ref 11–51)
AMORPH URATE CRY URNS QL MICRO: ABNORMAL /HPF
ANION GAP SERPL CALCULATED.3IONS-SCNC: 28.4 MMOL/L (ref 5–15)
APPEARANCE FLD: CLEAR
APTT PPP: 32.6 SECONDS (ref 24.3–38.1)
AST SERPL-CCNC: 270 U/L (ref 1–32)
BACTERIA UR QL AUTO: ABNORMAL /HPF
BASOPHILS # BLD AUTO: 0.02 10*3/MM3 (ref 0–0.2)
BASOPHILS NFR BLD AUTO: 0.1 % (ref 0–1.5)
BASOPHILS NFR FLD: 0 %
BILIRUB SERPL-MCNC: 9.3 MG/DL (ref 0–1.2)
BILIRUB UR QL STRIP: ABNORMAL
BLASTS NFR FLD: 0 %
BUN SERPL-MCNC: 46 MG/DL (ref 8–23)
BUN/CREAT SERPL: 11.5 (ref 7–25)
CALCIUM SPEC-SCNC: 8.6 MG/DL (ref 8.6–10.5)
CHLORIDE SERPL-SCNC: 78 MMOL/L (ref 98–107)
CK SERPL-CCNC: 195 U/L (ref 20–180)
CLARITY UR: ABNORMAL
CO2 SERPL-SCNC: 13.6 MMOL/L (ref 22–29)
COLOR FLD: YELLOW
COLOR UR: ABNORMAL
CREAT SERPL-MCNC: 3.99 MG/DL (ref 0.57–1)
D-LACTATE SERPL-SCNC: 4.1 MMOL/L (ref 0.5–2)
D-LACTATE SERPL-SCNC: 4.8 MMOL/L (ref 0.5–2)
DEPRECATED RDW RBC AUTO: 59.8 FL (ref 37–54)
DEPRECATED RDW RBC AUTO: 60.2 FL (ref 37–54)
EOSINOPHIL # BLD AUTO: 0.01 10*3/MM3 (ref 0–0.4)
EOSINOPHIL NFR BLD AUTO: 0.1 % (ref 0.3–6.2)
EOSINOPHIL NFR FLD MANUAL: 1 %
ERYTHROCYTE [DISTWIDTH] IN BLOOD BY AUTOMATED COUNT: 14.4 % (ref 12.3–15.4)
ERYTHROCYTE [DISTWIDTH] IN BLOOD BY AUTOMATED COUNT: 14.6 % (ref 12.3–15.4)
FOLATE SERPL-MCNC: 2.45 NG/ML (ref 4.78–24.2)
GFR SERPL CREATININE-BSD FRML MDRD: 11 ML/MIN/1.73
GFR SERPL CREATININE-BSD FRML MDRD: ABNORMAL ML/MIN/{1.73_M2}
GLOBULIN UR ELPH-MCNC: 2.6 GM/DL
GLUCOSE SERPL-MCNC: 59 MG/DL (ref 65–99)
GLUCOSE UR STRIP-MCNC: ABNORMAL MG/DL
HAV IGM SERPL QL IA: NORMAL
HBV CORE IGM SERPL QL IA: NORMAL
HBV SURFACE AG SERPL QL IA: NORMAL
HCT VFR BLD AUTO: 33.5 % (ref 34–46.6)
HCT VFR BLD AUTO: 34 % (ref 34–46.6)
HCV AB SER DONR QL: NORMAL
HGB BLD-MCNC: 11 G/DL (ref 12–15.9)
HGB BLD-MCNC: 11 G/DL (ref 12–15.9)
HGB UR QL STRIP.AUTO: ABNORMAL
HYALINE CASTS UR QL AUTO: ABNORMAL /LPF
IMM GRANULOCYTES # BLD AUTO: 0.16 10*3/MM3 (ref 0–0.05)
IMM GRANULOCYTES NFR BLD AUTO: 0.8 % (ref 0–0.5)
INR PPP: 1.28 (ref 0.9–1.1)
INR PPP: 1.32 (ref 0.9–1.1)
IRON 24H UR-MRATE: 104 MCG/DL (ref 37–145)
IRON SATN MFR SERPL: ABNORMAL %
KETONES UR QL STRIP: ABNORMAL
LACTATE HOLD SPECIMEN: NORMAL
LEUKOCYTE ESTERASE UR QL STRIP.AUTO: ABNORMAL
LIPASE SERPL-CCNC: 364 U/L (ref 13–60)
LYMPHOCYTES # BLD AUTO: 1.59 10*3/MM3 (ref 0.7–3.1)
LYMPHOCYTES NFR BLD AUTO: 8.2 % (ref 19.6–45.3)
LYMPHOCYTES NFR FLD MANUAL: 38 %
MACROCYTES BLD QL SMEAR: NORMAL
MACROPHAGE FLUID: 4 %
MCH RBC QN AUTO: 36.2 PG (ref 26.6–33)
MCH RBC QN AUTO: 36.7 PG (ref 26.6–33)
MCHC RBC AUTO-ENTMCNC: 32.4 G/DL (ref 31.5–35.7)
MCHC RBC AUTO-ENTMCNC: 32.8 G/DL (ref 31.5–35.7)
MCV RBC AUTO: 111.7 FL (ref 79–97)
MCV RBC AUTO: 111.8 FL (ref 79–97)
MESOTHL CELL NFR FLD MANUAL: 0 %
MONOCYTES # BLD AUTO: 2.11 10*3/MM3 (ref 0.1–0.9)
MONOCYTES NFR BLD AUTO: 10.9 % (ref 5–12)
MONOCYTES NFR FLD: 37 %
MONOS+MACROS NFR FLD: 1 %
NEUTROPHILS NFR BLD AUTO: 15.42 10*3/MM3 (ref 1.7–7)
NEUTROPHILS NFR BLD AUTO: 79.9 % (ref 42.7–76)
NEUTROPHILS NFR FLD MANUAL: 19 %
NITRITE UR QL STRIP: POSITIVE
PH UR STRIP.AUTO: 5.5 [PH] (ref 4.5–8)
PLASMA CELLS NFR FLD: 0 %
PLATELET # BLD AUTO: 334 10*3/MM3 (ref 140–450)
PLATELET # BLD AUTO: 340 10*3/MM3 (ref 140–450)
PMV BLD AUTO: 11 FL (ref 6–12)
PMV BLD AUTO: 11 FL (ref 6–12)
POTASSIUM SERPL-SCNC: 5.1 MMOL/L (ref 3.5–5.2)
PROCALCITONIN SERPL-MCNC: 1.04 NG/ML (ref 0–0.25)
PROT SERPL-MCNC: 5.8 G/DL (ref 6–8.5)
PROT UR QL STRIP: ABNORMAL
PROTHROMBIN TIME: 15.6 SECONDS (ref 12.1–15)
PROTHROMBIN TIME: 15.9 SECONDS (ref 12.1–15)
RBC # BLD AUTO: 3 10*6/MM3 (ref 3.77–5.28)
RBC # BLD AUTO: 3.04 10*6/MM3 (ref 3.77–5.28)
RBC # FLD AUTO: 0 /MM3
RBC # UR: ABNORMAL /HPF
REF LAB TEST METHOD: ABNORMAL
SARS-COV-2 RNA PNL SPEC NAA+PROBE: NOT DETECTED
SMALL PLATELETS BLD QL SMEAR: ADEQUATE
SODIUM SERPL-SCNC: 120 MMOL/L (ref 136–145)
SP GR UR STRIP: 1.02 (ref 1–1.03)
SQUAMOUS #/AREA URNS HPF: ABNORMAL /HPF
T4 FREE SERPL-MCNC: 1.03 NG/DL (ref 0.93–1.7)
TIBC SERPL-MCNC: ABNORMAL UG/DL
TSH SERPL DL<=0.05 MIU/L-ACNC: 16.95 UIU/ML (ref 0.27–4.2)
UIBC SERPL-MCNC: <16 MCG/DL (ref 112–346)
UNCLASSIFIED CELLS, FLUID: 0 %
UROBILINOGEN UR QL STRIP: ABNORMAL
VIT B12 BLD-MCNC: 1307 PG/ML (ref 211–946)
WBC # BLD AUTO: 17.47 10*3/MM3 (ref 3.4–10.8)
WBC # BLD AUTO: 19.31 10*3/MM3 (ref 3.4–10.8)
WBC # FLD AUTO: 105 /MM3
WBC MORPH BLD: NORMAL
WBC UR QL AUTO: ABNORMAL /HPF

## 2020-11-04 PROCEDURE — 0097U HC BIOFIRE FILMARRAY GI PANEL: CPT | Performed by: HOSPITALIST

## 2020-11-04 PROCEDURE — 49082 ABD PARACENTESIS: CPT | Performed by: HOSPITALIST

## 2020-11-04 PROCEDURE — 89051 BODY FLUID CELL COUNT: CPT | Performed by: HOSPITALIST

## 2020-11-04 PROCEDURE — 83690 ASSAY OF LIPASE: CPT | Performed by: EMERGENCY MEDICINE

## 2020-11-04 PROCEDURE — 84439 ASSAY OF FREE THYROXINE: CPT | Performed by: HOSPITALIST

## 2020-11-04 PROCEDURE — 85007 BL SMEAR W/DIFF WBC COUNT: CPT | Performed by: EMERGENCY MEDICINE

## 2020-11-04 PROCEDURE — 82550 ASSAY OF CK (CPK): CPT | Performed by: HOSPITALIST

## 2020-11-04 PROCEDURE — 99285 EMERGENCY DEPT VISIT HI MDM: CPT

## 2020-11-04 PROCEDURE — 71045 X-RAY EXAM CHEST 1 VIEW: CPT

## 2020-11-04 PROCEDURE — 85610 PROTHROMBIN TIME: CPT | Performed by: EMERGENCY MEDICINE

## 2020-11-04 PROCEDURE — 80074 ACUTE HEPATITIS PANEL: CPT | Performed by: HOSPITALIST

## 2020-11-04 PROCEDURE — 87040 BLOOD CULTURE FOR BACTERIA: CPT | Performed by: EMERGENCY MEDICINE

## 2020-11-04 PROCEDURE — 81001 URINALYSIS AUTO W/SCOPE: CPT | Performed by: EMERGENCY MEDICINE

## 2020-11-04 PROCEDURE — 82746 ASSAY OF FOLIC ACID SERUM: CPT | Performed by: HOSPITALIST

## 2020-11-04 PROCEDURE — 74176 CT ABD & PELVIS W/O CONTRAST: CPT

## 2020-11-04 PROCEDURE — 87070 CULTURE OTHR SPECIMN AEROBIC: CPT | Performed by: HOSPITALIST

## 2020-11-04 PROCEDURE — 80053 COMPREHEN METABOLIC PANEL: CPT | Performed by: EMERGENCY MEDICINE

## 2020-11-04 PROCEDURE — 25010000002 LORAZEPAM PER 2 MG: Performed by: HOSPITALIST

## 2020-11-04 PROCEDURE — 25010000002 THIAMINE PER 100 MG: Performed by: HOSPITALIST

## 2020-11-04 PROCEDURE — 85027 COMPLETE CBC AUTOMATED: CPT | Performed by: HOSPITALIST

## 2020-11-04 PROCEDURE — 82607 VITAMIN B-12: CPT | Performed by: HOSPITALIST

## 2020-11-04 PROCEDURE — 82140 ASSAY OF AMMONIA: CPT | Performed by: EMERGENCY MEDICINE

## 2020-11-04 PROCEDURE — 83605 ASSAY OF LACTIC ACID: CPT | Performed by: EMERGENCY MEDICINE

## 2020-11-04 PROCEDURE — 84145 PROCALCITONIN (PCT): CPT | Performed by: HOSPITALIST

## 2020-11-04 PROCEDURE — 84443 ASSAY THYROID STIM HORMONE: CPT | Performed by: HOSPITALIST

## 2020-11-04 PROCEDURE — 99223 1ST HOSP IP/OBS HIGH 75: CPT | Performed by: HOSPITALIST

## 2020-11-04 PROCEDURE — 87075 CULTR BACTERIA EXCEPT BLOOD: CPT | Performed by: HOSPITALIST

## 2020-11-04 PROCEDURE — 99285 EMERGENCY DEPT VISIT HI MDM: CPT | Performed by: EMERGENCY MEDICINE

## 2020-11-04 PROCEDURE — 87635 SARS-COV-2 COVID-19 AMP PRB: CPT | Performed by: EMERGENCY MEDICINE

## 2020-11-04 PROCEDURE — 0W9G3ZZ DRAINAGE OF PERITONEAL CAVITY, PERCUTANEOUS APPROACH: ICD-10-PCS | Performed by: HOSPITALIST

## 2020-11-04 PROCEDURE — 87086 URINE CULTURE/COLONY COUNT: CPT | Performed by: EMERGENCY MEDICINE

## 2020-11-04 PROCEDURE — 25010000002 CEFTRIAXONE SODIUM-DEXTROSE 2-2.22 GM-%(50ML) RECONSTITUTED SOLUTION: Performed by: EMERGENCY MEDICINE

## 2020-11-04 PROCEDURE — 82042 OTHER SOURCE ALBUMIN QUAN EA: CPT | Performed by: HOSPITALIST

## 2020-11-04 PROCEDURE — 25010000002 ENOXAPARIN PER 10 MG: Performed by: HOSPITALIST

## 2020-11-04 PROCEDURE — 85025 COMPLETE CBC W/AUTO DIFF WBC: CPT | Performed by: EMERGENCY MEDICINE

## 2020-11-04 PROCEDURE — 85730 THROMBOPLASTIN TIME PARTIAL: CPT | Performed by: EMERGENCY MEDICINE

## 2020-11-04 PROCEDURE — 85610 PROTHROMBIN TIME: CPT | Performed by: HOSPITALIST

## 2020-11-04 PROCEDURE — 87205 SMEAR GRAM STAIN: CPT | Performed by: HOSPITALIST

## 2020-11-04 PROCEDURE — 83550 IRON BINDING TEST: CPT | Performed by: HOSPITALIST

## 2020-11-04 PROCEDURE — 83540 ASSAY OF IRON: CPT | Performed by: HOSPITALIST

## 2020-11-04 RX ORDER — LORAZEPAM 2 MG/ML
1 INJECTION INTRAMUSCULAR
Status: DISCONTINUED | OUTPATIENT
Start: 2020-11-04 | End: 2020-11-11

## 2020-11-04 RX ORDER — LORAZEPAM 2 MG/ML
2 INJECTION INTRAMUSCULAR
Status: DISCONTINUED | OUTPATIENT
Start: 2020-11-04 | End: 2020-11-11

## 2020-11-04 RX ORDER — ALBUMIN (HUMAN) 12.5 G/50ML
12.5 SOLUTION INTRAVENOUS ONCE
Status: CANCELLED | OUTPATIENT
Start: 2020-11-05

## 2020-11-04 RX ORDER — LORAZEPAM 1 MG/1
2 TABLET ORAL
Status: DISCONTINUED | OUTPATIENT
Start: 2020-11-04 | End: 2020-11-11

## 2020-11-04 RX ORDER — LORAZEPAM 2 MG/ML
0.5 INJECTION INTRAMUSCULAR
Status: DISCONTINUED | OUTPATIENT
Start: 2020-11-04 | End: 2020-11-11

## 2020-11-04 RX ORDER — LIDOCAINE HYDROCHLORIDE AND EPINEPHRINE 10; 10 MG/ML; UG/ML
10 INJECTION, SOLUTION INFILTRATION; PERINEURAL ONCE
Status: DISCONTINUED | OUTPATIENT
Start: 2020-11-04 | End: 2020-11-04

## 2020-11-04 RX ORDER — SODIUM CHLORIDE 9 MG/ML
100 INJECTION, SOLUTION INTRAVENOUS CONTINUOUS
Status: DISCONTINUED | OUTPATIENT
Start: 2020-11-04 | End: 2020-11-06

## 2020-11-04 RX ORDER — LORAZEPAM 1 MG/1
1 TABLET ORAL
Status: DISCONTINUED | OUTPATIENT
Start: 2020-11-04 | End: 2020-11-11

## 2020-11-04 RX ORDER — CEFTRIAXONE 2 G/50ML
2 INJECTION, SOLUTION INTRAVENOUS ONCE
Status: DISCONTINUED | OUTPATIENT
Start: 2020-11-05 | End: 2020-11-05

## 2020-11-04 RX ORDER — SODIUM CHLORIDE 0.9 % (FLUSH) 0.9 %
10 SYRINGE (ML) INJECTION EVERY 12 HOURS SCHEDULED
Status: DISCONTINUED | OUTPATIENT
Start: 2020-11-04 | End: 2020-11-06 | Stop reason: SDUPTHER

## 2020-11-04 RX ORDER — LORAZEPAM 2 MG/ML
0.5 INJECTION INTRAMUSCULAR EVERY 8 HOURS
Status: DISCONTINUED | OUTPATIENT
Start: 2020-11-05 | End: 2020-11-06

## 2020-11-04 RX ORDER — SODIUM CHLORIDE 0.9 % (FLUSH) 0.9 %
10 SYRINGE (ML) INJECTION AS NEEDED
Status: DISCONTINUED | OUTPATIENT
Start: 2020-11-04 | End: 2020-11-17 | Stop reason: HOSPADM

## 2020-11-04 RX ORDER — LORAZEPAM 0.5 MG/1
0.5 TABLET ORAL
Status: DISCONTINUED | OUTPATIENT
Start: 2020-11-04 | End: 2020-11-11

## 2020-11-04 RX ORDER — METOPROLOL SUCCINATE 25 MG/1
25 TABLET, EXTENDED RELEASE ORAL EVERY MORNING
Status: DISCONTINUED | OUTPATIENT
Start: 2020-11-05 | End: 2020-11-04

## 2020-11-04 RX ORDER — SODIUM CHLORIDE 9 MG/ML
40 INJECTION, SOLUTION INTRAVENOUS AS NEEDED
Status: DISCONTINUED | OUTPATIENT
Start: 2020-11-04 | End: 2020-11-17 | Stop reason: HOSPADM

## 2020-11-04 RX ORDER — SODIUM CHLORIDE 0.9 % (FLUSH) 0.9 %
10 SYRINGE (ML) INJECTION AS NEEDED
Status: DISCONTINUED | OUTPATIENT
Start: 2020-11-04 | End: 2020-11-06 | Stop reason: SDUPTHER

## 2020-11-04 RX ORDER — LORAZEPAM 2 MG/ML
0.5 INJECTION INTRAMUSCULAR EVERY 6 HOURS
Status: DISCONTINUED | OUTPATIENT
Start: 2020-11-04 | End: 2020-11-05

## 2020-11-04 RX ORDER — CEFTRIAXONE 2 G/50ML
2 INJECTION, SOLUTION INTRAVENOUS ONCE
Status: COMPLETED | OUTPATIENT
Start: 2020-11-04 | End: 2020-11-04

## 2020-11-04 RX ADMIN — LORAZEPAM 0.5 MG: 2 INJECTION INTRAMUSCULAR; INTRAVENOUS at 18:00

## 2020-11-04 RX ADMIN — LORAZEPAM 1 MG: 2 INJECTION INTRAMUSCULAR; INTRAVENOUS at 21:10

## 2020-11-04 RX ADMIN — SODIUM CHLORIDE 500 ML: 9 INJECTION, SOLUTION INTRAVENOUS at 11:50

## 2020-11-04 RX ADMIN — LORAZEPAM 2 MG: 2 INJECTION INTRAMUSCULAR; INTRAVENOUS at 20:25

## 2020-11-04 RX ADMIN — SODIUM CHLORIDE, POTASSIUM CHLORIDE, SODIUM LACTATE AND CALCIUM CHLORIDE 1000 ML: 600; 310; 30; 20 INJECTION, SOLUTION INTRAVENOUS at 12:57

## 2020-11-04 RX ADMIN — ENOXAPARIN SODIUM 30 MG: 40 INJECTION SUBCUTANEOUS at 18:26

## 2020-11-04 RX ADMIN — Medication 10 ML: at 20:41

## 2020-11-04 RX ADMIN — SODIUM CHLORIDE, POTASSIUM CHLORIDE, SODIUM LACTATE AND CALCIUM CHLORIDE 500 ML: 600; 310; 30; 20 INJECTION, SOLUTION INTRAVENOUS at 14:05

## 2020-11-04 RX ADMIN — CEFTRIAXONE 2 G: 2 INJECTION, SOLUTION INTRAVENOUS at 11:51

## 2020-11-04 RX ADMIN — FOLIC ACID 100 ML/HR: 5 INJECTION, SOLUTION INTRAMUSCULAR; INTRAVENOUS; SUBCUTANEOUS at 17:58

## 2020-11-04 RX ADMIN — LORAZEPAM 0.5 MG: 2 INJECTION INTRAMUSCULAR; INTRAVENOUS at 23:54

## 2020-11-04 NOTE — ED NOTES
"Pt stated she fell outside a few days ago after dx UTI, back pain since fall last week.  Pt stated she is not taking any antibiotics anymore because she started having diarrhea and \"sometimes I forget\".       Patt Arellano RN  11/04/20 [id number]    "

## 2020-11-04 NOTE — ED NOTES
PT stated that she was depressed but had not thoughts of hurting herself.  Called Jacqui, daughter and POA to update     Patt Arellano RN  11/04/20 [id number]

## 2020-11-04 NOTE — PLAN OF CARE
Goal Outcome Evaluation:  Plan of Care Reviewed With: patient     Outcome Summary: new admit to ICU with ascites/ and alcohol abuse. since spouse  she drinks to ease her depression, ascites noted to abdomen and bedside paracentesis done per Dr.. Carpenter and 2,160 ml noted output, fluid was clear and sent to lab for analysis. Iv has banana bag infusing at 100 ml hr. she is RA and her o2 sats are 96%, b/p 107/67, afebrile, HR -114. , herrera patent and urine very dark brown, she is jaundiced and Hep panel pending. consult to kathrine, and nephro..Stephanie cl liq, last CIWA 3 and per orders 0.5 mg ativan given

## 2020-11-04 NOTE — H&P
"[facility] HOSPITALIST     Tayler Trujillo MD    CHIEF COMPLAINT: Diarrhea; Increasing Abdominal Girth    HISTORY OF PRESENT ILLNESS:    Ms. Lugo is a pleasant, 71 y/o  female who presents w/ a 1 week hx/o of diarrhea.  So is a difficult historian, but she states she has had intermittent diarrheal episodes since June.  They are non-bloody, but sometimes she'll have yellow mucous.  Initially, she denies associated abdominal pain, but has noticed intermittent generalized pain over the last week.  She doesn't seem to think it gets better w/ movement of her bowels.  She was also treated for \"bronchitis\", but the initial diarrhea before that.  She does not know what drug she was prescribed.  She denies nausea and vomiting.  She describes frequent chills, but no fever.  She denies chest pain and dyspnea.  She does note episodes of thrush when she takes an antibiotic on her tongue.  Slight sore throat.  She denies runny nose and cough.  She has also noticed her pants fitting tighter over about the last month.  She denies lower extremity edema.  She did have a slip and fall a few days ago which was not preceded by near syncope or head strike.  She reports she [place] [address] [place] [address]  She drinks 4-5 Vodkas a night, and has done so since her  passed away 3 years ago.  She prefers to drink in the evening.  She has never gone through withdrawals.    She moved here from [place] to be closer to family.  Her daughter brought her to the ER today because of the diarrhea and edema.       Past Medical History:   Diagnosis Date   • Elevated liver enzymes    • Fatty liver    • Frequent UTI    • History of depression    • Hypertension    • Lumbar stenosis    • OA (osteoarthritis)    • Post-menopausal    • Spondylarthritis     LOW BACK   • Stress incontinence    • Urinary retention      Past Surgical History:   Procedure Laterality Date   • ABDOMINOPLASTY     • BREAST " "SURGERY     • CATARACT EXTRACTION Bilateral    •  SECTION      X3   • COLONOSCOPY      2006   • COLONOSCOPY N/A 2016    Procedure: COLONOSCOPY;  Surgeon: Ho Arenas MD;  Location: [facility] ENDOSCOPY;  Service:    • LASIK     • LUMBAR DISCECTOMY FUSION INSTRUMENTATION N/A 10/1/2018    Procedure: L4-5, L5-S1  laminectomy and fusion with instrumentation;  Surgeon: Juno Kim MD;  Location: [facility] MAIN OR;  Service: Orthopedic Spine   • REDUCTION MAMMAPLASTY     • TONSILLECTOMY     • TOTAL SHOULDER ARTHROPLASTY W/ DISTAL CLAVICLE EXCISION Right 2019    Procedure: TOTAL SHOULDER REVERSE ARTHROPLASTY;  Surgeon: David Marion MD;  Location: [facility] MAIN OR;  Service: Orthopedics     Family History   Problem Relation Age of Onset   • Colon polyps Father    • Breast cancer Maternal Aunt    • Malig Hyperthermia Neg Hx      Social History     Tobacco Use   • Smoking status: Never Smoker   • Smokeless tobacco: Never Used   Substance Use Topics   • Alcohol use: Yes     Comment: \"too much\"   • Drug use: No     Medications Prior to Admission   Medication Sig Dispense Refill Last Dose   • amLODIPine (NORVASC) 10 MG tablet Take 10 mg by mouth Every Morning.  3 11/3/2020 at 0900   • Cholecalciferol (Vitamin D3) 1.25 MG (69067 UT) capsule Pt takes on wednesday   10/28/2020 at 0900   • enalapril (VASOTEC) 20 MG tablet Take 20 mg by mouth Daily.   11/3/2020 at 0900   • ESTRACE VAGINAL 0.1 MG/GM vaginal cream Insert 2 g into the vagina Daily As Needed.   Unknown at Unknown time   • Fluzone High-Dose Quadrivalent 0.7 ML suspension prefilled syringe ADM 0.7ML IM UTD   Unknown at Unknown time   • KLOR-CON 8 MEQ CR tablet Take 8 mEq by mouth 2 (Two) Times a Day As Needed. Pt does not remember when she had this last   Unknown at Unknown time   • metoprolol succinate XL (TOPROL-XL) 25 MG 24 hr tablet Take 25 mg by mouth Every Morning.   11/3/2020 at 0900     Allergies:  Patient has no known allergies.    REVIEW " "OF SYSTEMS:  Please see the above history of present illness for pertinent positives and negatives.  The remainder of the patient's systems have been reviewed and are negative.    Vital Signs  Temp:  [94.6 °F (34.8 °C)-97.1 °F (36.2 °C)] 97.1 °F (36.2 °C)  Heart Rate:  [] 94  Resp:  [24] 24  BP: ()/(52-83) 124/71  Oxygen Therapy  SpO2: 98 %  Pulse Oximetry Type: Continuous  Device (Oxygen Therapy): room air}  Body mass index is 23.78 kg/m².  Flowsheet Rows      First Filed Value   Admission Height  157.5 cm (62\") Documented at 11/04/2020 1107   Admission Weight  59 kg (130 lb) Documented at 11/04/2020 1107           Physical Exam:  Physical Exam   Constitutional: Appears older than stated age in NAD.  HEENT:   Head: Normocephalic and atraumatic.   Eyes:  Pupils are equal, round, and reactive to light. EOM are intact. Sclerae are icteric and noninjected.  Mouth and Throat: Patient has moist mucous membranes. Oropharynx is clear of any erythema or exudate.     Neck: Neck supple. No JVD present. No thyromegaly present. No lymphadenopathy present.  Cardiovascular: Regular rate, regular rhythm, S1 normal and S2 normal.  Exam reveals no gallop and no friction rub.  No murmur heard.  Radial and pedal pulses are 2+ and symmetric.  Pulmonary/Chest: Lungs are clear to auscultation bilaterally. No respiratory distress. No wheezes. No rhonchi. No rales.   Abdominal: Distended but soft.  No peritoneal signs.  Bowel sounds are diminished.  Ascites noted.  Musculoskeletal: Normal muscle tone  Extremities: Trace edema  Neurological: Cranial nerves II-XII are grossly intact with no focal deficits.  Muscle bulk is diminished throughout.  Posterior columns are abnormal.  Skin: Skin is warm. No rash noted. No cyanosis or erythema.  Jaundice present.    Emotional Behavior:    Judgement and Insight: Poor   Mental Status:  Alertness  Normal   Memory:  Appears intact   Mood and Affect:         Depression  None               " Anxiety  None    Debilities:   Physical Weakness  As per HPI   Handicaps  None   Disabilities  None   Agitation  None     Results Review:    I reviewed the patient's new clinical results.  Lab Results (most recent)     Procedure Component Value Units Date/Time    Lactic Acid, Reflex Timer (This will reflex a repeat order 3-3:15 hours after ordered.) [[id number]] Collected: 11/04/20 1136    Specimen: Blood Updated: 11/04/20 1515     Hold Tube Hold for add-ons.     Comment: Auto resulted.       Urinalysis, Microscopic Only - Urine, Clean Catch [[id number]]  (Abnormal) Collected: 11/04/20 1402    Specimen: Urine, Clean Catch Updated: 11/04/20 1435     RBC, UA 0-2 /HPF      WBC, UA 21-30 /HPF      Bacteria, UA 2+ /HPF      Squamous Epithelial Cells, UA 3-6 /HPF      Hyaline Casts, UA 3-6 /LPF      Amorphous Crystals, UA Small/1+ /HPF      Methodology Manual Light Microscopy    Urine Culture - Urine, Urine, Clean Catch [[id number]] Collected: 11/04/20 1402    Specimen: Urine, Clean Catch Updated: 11/04/20 1435    Urinalysis With Culture If Indicated - Urine, Clean Catch [[id number]]  (Abnormal) Collected: 11/04/20 1402    Specimen: Urine, Clean Catch Updated: 11/04/20 1434     Color, UA Other     Appearance, UA Cloudy     pH, UA 5.5     Specific Gravity, UA 1.020     Glucose,  mg/dL (Trace)     Ketones, UA Trace     Bilirubin, UA Large (3+)     Blood, UA Trace     Protein,  mg/dL (2+)     Leuk Esterase, UA Large (3+)     Nitrite, UA Positive     Urobilinogen, UA >=8.0 E.U./dL    Procalcitonin [[id number]]  (Abnormal) Collected: 11/04/20 1136    Specimen: Blood Updated: 11/04/20 1432     Procalcitonin 1.04 ng/mL     Narrative:      Results may be falsely decreased if patient taking Biotin.     COVID-19,[facility] IN-HOUSE,Nasal Swab No Transport Media 3-4 HR TAT - Swab, Nasal Cavity [[id number]]  (Normal) Collected: 11/04/20 1136    Specimen: Swab from Nasal Cavity Updated: 11/04/20 1214     COVID19 Not Detected     Narrative:      Fact sheet for providers: https://www.fda.gov/media/419823/download     Fact sheet for patients: https://www.fda.gov/media/757526/download    Scan Slide [[id number]] Collected: 11/04/20 1136    Specimen: Blood Updated: 11/04/20 1211     Macrocytes Mod/2+     WBC Morphology Normal     Platelet Estimate Adequate    Lactic Acid, Plasma [[id number]]  (Abnormal) Collected: 11/04/20 1136    Specimen: Blood Updated: 11/04/20 1210     Lactate 4.8 mmol/L     Lipase [[id number]]  (Abnormal) Collected: 11/04/20 1136    Specimen: Blood Updated: 11/04/20 1209     Lipase 364 U/L     Comprehensive Metabolic Panel [[id number]]  (Abnormal) Collected: 11/04/20 1136    Specimen: Blood Updated: 11/04/20 1209     Glucose 59 mg/dL      BUN 46 mg/dL      Creatinine 3.99 mg/dL      Sodium 120 mmol/L      Potassium 5.1 mmol/L      Chloride 78 mmol/L      CO2 13.6 mmol/L      Calcium 8.6 mg/dL      Total Protein 5.8 g/dL      Albumin 3.20 g/dL      ALT (SGPT) 96 U/L      AST (SGOT) 270 U/L      Alkaline Phosphatase 264 U/L      Total Bilirubin 9.3 mg/dL      eGFR Non African Amer 11 mL/min/1.73      Comment: <15 Indicative of kidney failure.        eGFR   Amer --     Comment: <15 Indicative of kidney failure.        Globulin 2.6 gm/dL      A/G Ratio 1.2 g/dL      BUN/Creatinine Ratio 11.5     Anion Gap 28.4 mmol/L     Narrative:      GFR Normal >60  Chronic Kidney Disease <60  Kidney Failure <15      Ammonia [[id number]]  (Normal) Collected: 11/04/20 1136    Specimen: Blood Updated: 11/04/20 1207     Ammonia 37 umol/L     Protime-INR [[id number]]  (Abnormal) Collected: 11/04/20 1136    Specimen: Blood Updated: 11/04/20 1157     Protime 15.9 Seconds      INR 1.32    Narrative:      Therapeutic Ranges for INR: 2.0-3.0 (PT 20-30)                              2.5-3.5 (PT 25-34)    aPTT [[id number]]  (Normal) Collected: 11/04/20 1136    Specimen: Blood Updated: 11/04/20 1157     PTT 32.6 seconds     Narrative:      PTT = The  equivalent PTT values for the therapeutic range of heparin levels at 0.1 to 0.7 U/ml are 53 to 110 seconds.      CBC & Differential [[id number]]  (Abnormal) Collected: 11/04/20 1136    Specimen: Blood Updated: 11/04/20 1148    Narrative:      The following orders were created for panel order CBC & Differential.  Procedure                               Abnormality         Status                     ---------                               -----------         ------                     CBC Auto Differential[[id number]]        Abnormal            Final result                 Please view results for these tests on the individual orders.    CBC Auto Differential [[id number]]  (Abnormal) Collected: 11/04/20 1136    Specimen: Blood Updated: 11/04/20 1148     WBC 19.31 10*3/mm3      RBC 3.00 10*6/mm3      Hemoglobin 11.0 g/dL      Hematocrit 33.5 %      .7 fL      MCH 36.7 pg      MCHC 32.8 g/dL      RDW 14.6 %      RDW-SD 60.2 fl      MPV 11.0 fL      Platelets 334 10*3/mm3      Neutrophil % 79.9 %      Lymphocyte % 8.2 %      Monocyte % 10.9 %      Eosinophil % 0.1 %      Basophil % 0.1 %      Immature Grans % 0.8 %      Neutrophils, Absolute 15.42 10*3/mm3      Lymphocytes, Absolute 1.59 10*3/mm3      Monocytes, Absolute 2.11 10*3/mm3      Eosinophils, Absolute 0.01 10*3/mm3      Basophils, Absolute 0.02 10*3/mm3      Immature Grans, Absolute 0.16 10*3/mm3     Blood Culture - Blood, Blood, Venous Line [[id number]] Collected: 11/04/20 1136    Specimen: Blood, Venous Line Updated: 11/04/20 1142          Imaging Results (Most Recent)     Procedure Component Value Units Date/Time    CT Abdomen Pelvis Without Contrast [[id number]] Collected: 11/04/20 1318     Updated: 11/04/20 1320    Narrative:      CT Abdomen Pelvis WO    INDICATION:   70-year-old emergency department patient with abdominal pain diffusely and fever for one week. Recent antibiotic therapy for chronic urinary tract infection.    TECHNIQUE:   CT of the  abdomen and pelvis without IV contrast. Coronal and sagittal reconstructions were obtained.  Radiation dose reduction techniques included automated exposure control or exposure modulation based on body size. Count of known CT and cardiac nuc  med studies performed in previous 12 months: 0.     COMPARISON:   Ultrasound liver 8/11/2020, CT abdomen and pelvis 4/24/2019    FINDINGS:  Abdomen: The included images through the lung bases demonstrate no acute pulmonary density or pleural effusion. The distal esophagus appears normal  On these noncontrasted images the liver is diffusely abnormal with diffuse low attenuation in a mottled appearance. Liver is enlarged measuring 23.6 cm cephalocaudad previously measuring 17.3 cm on the ultrasound of 8/11/2020 and 18.4 cm on the CT scan  of 4/24/2019 suggesting a significant interval increase in size. The spleen is normal in size and density, however there is a new large amount of ascites in the abdomen, paracolic gutters and pelvis. The gallbladder appears dense relative to the low  density liver but no stones are seen. There is pericholecystic fluid likely part of the generalized ascites. The pancreas, pancreatic duct, common bile ducts and adrenal glands are normal. The kidneys are unremarkable    The stomach, small bowel and colon have a normal noncontrasted appearance.    Pelvis: The bladder is distended. The wall appears slightly prominent diffusely but no nodularity is seen. No bladder stone. This is small postmenopausal uterus. No adnexal mass.  There is mild diffuse subcutaneous edema over the anterior abdominal wall which could be part of anasarca.    Bone window images demonstrate demonstrates stable spinal fusion hardware posteriorly from L4 through S1 with severe anterolisthesis of L5 on S1 unchanged.      Impression:        1. Marked interval increase in size of the liver with diffusely mottled low attenuation as compared to the prior studies of 11/20 and  4/24/2019. Patient has had underlying steatosis. The current changes could represent progression of steatosis or acute  superimposed hepatitis. There is new ascites diffusely in the abdomen or pelvis which is moderate to large in amount. No splenomegaly. The portal vein and splenic vein do not appear enlarged.  2. No gallstones seen  3. No definite distention of the stomach, small bowel or colon.  4. The bladder is distended. The wall is mildly thickened but no definite nodularity or bladder stone.          Signer Name: Sheron Lundberg MD   Signed: 11/4/2020 1:18 PM   Workstation Name: [id number]    [facility]    XR Chest 1 View [[id number]] Collected: 11/04/20 1256     Updated: 11/04/20 1259    Narrative:      XR CHEST 1 VW-: 11/4/2020 12:48 PM     INDICATION:   Short of air and weakness several days.      COMPARISON:    None available.     FINDINGS:  Single Portable AP view(s) of the chest. Status post right shoulder  replacement. Cardiac silhouette is within normal limits. Shallow lung  expansion and bronchovascular crowding. Probable platelike atelectasis  in the left base and atelectatic change in the right base. Mild  eventration or elevation of the right hemidiaphragm. No distinct  effusion or pneumothorax.       Impression:         1. Shallow lung expansion with bronchovascular crowding and probable  bibasilar atelectasis.     This report was finalized on 11/4/2020 12:57 PM by Dr. Waqar Jerez MD.           reviewed    ECG/EMG Results (most recent)     None            Assessment/Plan     1.  Ascites: I suspect this is due to Alcoholic Hepatitis from Vodka abuse.  Her discriminant score is less than 32, but her MELD-Na is 35.  I've performed a diagnostic and therapeutic paracentesis, so those labs are pending.  I've discussed the case w/ Dr. Stanton who will see the patient in consultation.  I've added a Hepatitis panel as well.  Continue to monitor in ICU.  Repeat paracentesis  in AM.  Continue Rocephin until culture and cell count data known, but procal is positive.    2.  Acute Renal Failure: Appears to have a normal baseline.  Hepatorenal dose come to mind, but I suspect this is more pre-renal given the diarrhea and poor PO intake described.  Check urine studies.  I will continue her on IVF and monitor.  Plan for repeat paracentesis as noted above.  Ask renal to see.    3.  Anion Gap Metabolic Acidosis:  This is likely multifactorial from uremia and lactic acidosis from decreased clearance due to liver dysfunction.  Repeat lactic acid was decreased so will continue fluids.    4.  Hypotension: She has been stable in the ICU.  Continue to monitor.    5.  EtOH Abuse: Started on protocol.  Last drink was last night.    6.  Diarrhea: None in unit and none reported in ER.  Check GI panel.  C-diff seems unlikely given negative CT findings.    7.  Bladder Prolapse: Has seen [facility] in the past, but was unhappy.  Add urine culture.  May need different [facility] physician given their predominance in the city.    I discussed the patients findings and my recommendations with patient.     Jonathan Carpenter MD  11/04/20  15:45 EST

## 2020-11-04 NOTE — PROCEDURES
After informed consent was procured, a time out was performed.  After this, I marked an area about 3.0cm under the umbilicus line.  The area was prepped and draped in a sterile fashion.  While I was anesthetizing the area, straw colored ascitic fluid was encountered.  I drew 60cc of ascitic fluid which I used to inoculate the culture bottles.  I then filled the tubes w/ the remainder.  After making a stab incision, I easily passed the catheter removing the stylet.  2,050mL of ascitic fluid was removed.  I gently adjusted the catheter to try and obtain more fluid, but none was encountered.  The procedure was stopped.  No immediate complications.   Last prescribe 4/6/18, > almost 2 years since last prescribe.  Last office visit with pcp 4/13/18  Please advise on refill.  Sagrario Madden RN

## 2020-11-04 NOTE — ED NOTES
"Pt stated \"I drink too much\".  She stated her   about 3 years ago and has been drinking too much since then.  Pt appears to have yellowish tone to abdomen and eyes.     Patt Arellano RN  20 1125    "

## 2020-11-04 NOTE — ED PROVIDER NOTES
EMERGENCY DEPARTMENT ENCOUNTER      Room Number: ICU5/1      HPI:    Chief complaint: Diarrhea and chills    Location: Generalized chills    Quality/Severity: Diarrhea is mild to moderate    Timing/Duration: Started a few days ago    Modifying Factors: None    Associated Symptoms: Chills general malaise, frequent urination    Narrative: Pt is a 70 y.o. female who presents complaining of diarrhea for the past few days.  She says there is no blood in it says is watery cannot tell me how any times a day that is happening.  She says that she suspect she is dehydrated due to all the diarrhea.  Additionally, she says about a week ago she was diagnosed with a urinary tract infection after she had started taking some amoxicillin she had around the house and does not think that she finished her course of Macrobid that was prescribed to her from urgent care.  She says that she feels feverish but has not checked her temperature and also has had some chills.     .PMD: Tayler Trujillo MD    REVIEW OF SYSTEMS  Review of Systems   Constitutional: Negative for activity change, appetite change, chills and fever.   Eyes: Negative for discharge and itching.   Respiratory: Negative for chest tightness and shortness of breath.    Cardiovascular: Negative for chest pain and leg swelling.   Gastrointestinal: Positive for abdominal distention. Negative for abdominal pain, nausea and vomiting.   Genitourinary: Negative for difficulty urinating and dysuria.   Musculoskeletal: Negative for arthralgias and joint swelling.   Skin: Positive for color change. Negative for rash.   Neurological: Negative for dizziness and weakness.   Hematological: Negative for adenopathy. Does not bruise/bleed easily.   Psychiatric/Behavioral: Negative for agitation and confusion.         PAST MEDICAL HISTORY  Active Ambulatory Problems     Diagnosis Date Noted   • Spinal stenosis of lumbar region with neurogenic claudication 09/04/2018   • Lumbar spinal stenosis  10/01/2018   • Frequent UTI 10/01/2018   • Hypertension 10/01/2018   • Stress incontinence 10/01/2018   • Itching due to drug 10/01/2018   • Acute respiratory failure with hypoxia (CMS/HCC) 10/04/2018   • Tear of right rotator cuff 2019   • Acute pain of right shoulder 2019     Resolved Ambulatory Problems     Diagnosis Date Noted   • No Resolved Ambulatory Problems     Past Medical History:   Diagnosis Date   • Elevated liver enzymes    • Fatty liver    • History of depression    • Lumbar stenosis    • OA (osteoarthritis)    • Post-menopausal    • Spondylarthritis    • Urinary retention        PAST SURGICAL HISTORY  Past Surgical History:   Procedure Laterality Date   • ABDOMINOPLASTY     • BREAST SURGERY     • CATARACT EXTRACTION Bilateral    •  SECTION      X3   • COLONOSCOPY      2006   • COLONOSCOPY N/A 2016    Procedure: COLONOSCOPY;  Surgeon: Ho Arenas MD;  Location: [facility] ENDOSCOPY;  Service:    • LASIK     • LUMBAR DISCECTOMY FUSION INSTRUMENTATION N/A 10/1/2018    Procedure: L4-5, L5-S1  laminectomy and fusion with instrumentation;  Surgeon: Juno Kim MD;  Location: [facility] OR;  Service: Orthopedic Spine   • REDUCTION MAMMAPLASTY     • TONSILLECTOMY     • TOTAL SHOULDER ARTHROPLASTY W/ DISTAL CLAVICLE EXCISION Right 2019    Procedure: TOTAL SHOULDER REVERSE ARTHROPLASTY;  Surgeon: David Marion MD;  Location: [facility] OR;  Service: Orthopedics       FAMILY HISTORY  Family History   Problem Relation Age of Onset   • Colon polyps Father    • Breast cancer Maternal Aunt    • Malig Hyperthermia Neg Hx        SOCIAL HISTORY  Social History     Socioeconomic History   • Marital status:      Spouse name: Not on file   • Number of children: Not on file   • Years of education: Not on file   • Highest education level: Not on file   Tobacco Use   • Smoking status: Never Smoker   • Smokeless tobacco: Never Used   Substance and Sexual Activity   • Alcohol  "use: Yes     Alcohol/week: 4.0 standard drinks     Types: 4 Shots of liquor per week     Comment: \"too much\" per day sijnce her spouse    • Drug use: No   • Sexual activity: Defer       ALLERGIES  Patient has no known allergies.      Current Facility-Administered Medications:   •  enoxaparin (LOVENOX) syringe 30 mg, 30 mg, Subcutaneous, Q24H, Jonathan Carpenter MD  •  LORazepam (ATIVAN) tablet 0.5 mg, 0.5 mg, Oral, Q2H PRN **OR** LORazepam (ATIVAN) injection 0.5 mg, 0.5 mg, Intravenous, Q2H PRN **OR** LORazepam (ATIVAN) tablet 1 mg, 1 mg, Oral, Q1H PRN **OR** LORazepam (ATIVAN) injection 1 mg, 1 mg, Intravenous, Q1H PRN **OR** LORazepam (ATIVAN) injection 1 mg, 1 mg, Intravenous, Q15 Min PRN **OR** LORazepam (ATIVAN) injection 1 mg, 1 mg, Intramuscular, Q15 Min PRN **OR** LORazepam (ATIVAN) injection 2 mg, 2 mg, Intravenous, Q1H PRN **OR** LORazepam (ATIVAN) tablet 2 mg, 2 mg, Oral, Q1H PRN, Jonathan Carpenter MD  •  LORazepam (ATIVAN) injection 0.5 mg, 0.5 mg, Intravenous, Q6H **FOLLOWED BY** [START ON 2020] LORazepam (ATIVAN) injection 0.5 mg, 0.5 mg, Intravenous, Q8H, Jonathan Carpenter MD  •  multiple vitamin (M.V.I. Adult) 10 mL, thiamine (B-1) 100 mg, folic acid 1 mg in sodium chloride 0.9 % 1,000 mL infusion, 100 mL/hr, Intravenous, Daily, Jonathan Carpenter MD  •  [COMPLETED] Insert peripheral IV, , , Once **AND** sodium chloride 0.9 % flush 10 mL, 10 mL, Intravenous, PRN, Bishop Silverman MD  •  sodium chloride 0.9 % flush 10 mL, 10 mL, Intravenous, PRN, Jonathan Carpenter MD  •  sodium chloride 0.9 % flush 10 mL, 10 mL, Intravenous, Q12H, Jonathan Carpenter MD  •  sodium chloride 0.9 % infusion 40 mL, 40 mL, Intravenous, PRN, Jonathan Carpenter MD  •  sodium chloride 0.9 % infusion, 100 mL/hr, Intravenous, Continuous, Jonathan Carpenter MD    Facility-Administered Medications Ordered in Other Encounters:   •  mupirocin (BACTROBAN) 2 % nasal ointment, , Nasal, BID, David Marion MD    PHYSICAL EXAM  ED Triage Vitals "   Temp Heart Rate Resp BP SpO2   11/04/20 1109 11/04/20 1107 11/04/20 1107 11/04/20 1107 11/04/20 1107   94.6 °F (34.8 °C) 87 24 111/73 99 %      Temp src Heart Rate Source Patient Position BP Location FiO2 (%)   11/04/20 1109 11/04/20 1107 11/04/20 1107 11/04/20 1107 --   Oral Monitor Lying Right arm        Physical Exam  INITIAL VITAL SIGNS: Reviewed by me.  Pulse ox normal  GENERAL: Alert and interactive. No acute distress.  HEAD: Head is normocephalic.  EYES: EOMI. PERRL.  There is bilateral scleral icterus no conjunctival injection.  ENT: Moist mucous membranes.   NECK: Supple. Full range of motion.  RESPIRATORY: No tachypnea. Clear breath sounds bilaterally. No wheezing. No rales. No rhonchi.  CV: Regular rate and rhythm. No murmurs. No rubs or gallops.  ABDOMEN: Grossly distended abdomen that is nontender to palpation. No guarding.   BACK:  No obvious deformity.  EXTREMITIES: No deformity. No clubbing or cyanosis. No edema.   SKIN: Warm and dry.  No rashes, she is diffusely jaundiced but especially on the abdomen  NEUROLOGIC: Alert and oriented. Face is symmetric. Speech is normal. Moves all extremities equally. Motor and sensory distally intact.       LAB RESULTS  Results for orders placed or performed during the hospital encounter of 11/04/20   COVID-19,[facility] IN-HOUSE,Nasal Swab No Transport Media 3-4 HR TAT - Swab, Nasal Cavity    Specimen: Nasal Cavity; Swab   Result Value Ref Range    COVID19 Not Detected Not Detected - Ref. Range   Comprehensive Metabolic Panel    Specimen: Blood   Result Value Ref Range    Glucose 59 (L) 65 - 99 mg/dL    BUN 46 (H) 8 - 23 mg/dL    Creatinine 3.99 (H) 0.57 - 1.00 mg/dL    Sodium 120 (L) 136 - 145 mmol/L    Potassium 5.1 3.5 - 5.2 mmol/L    Chloride 78 (L) 98 - 107 mmol/L    CO2 13.6 (L) 22.0 - 29.0 mmol/L    Calcium 8.6 8.6 - 10.5 mg/dL    Total Protein 5.8 (L) 6.0 - 8.5 g/dL    Albumin 3.20 (L) 3.50 - 5.20 g/dL    ALT (SGPT) 96 (H) 1 - 33 U/L    AST (SGOT) 270 (H) 1  - 32 U/L    Alkaline Phosphatase 264 (H) 39 - 117 U/L    Total Bilirubin 9.3 (H) 0.0 - 1.2 mg/dL    eGFR Non African Amer 11 (L) >60 mL/min/1.73    eGFR  African Amer      Globulin 2.6 gm/dL    A/G Ratio 1.2 g/dL    BUN/Creatinine Ratio 11.5 7.0 - 25.0    Anion Gap 28.4 (H) 5.0 - 15.0 mmol/L   Protime-INR    Specimen: Blood   Result Value Ref Range    Protime 15.9 (H) 12.1 - 15.0 Seconds    INR 1.32 (H) 0.90 - 1.10   aPTT    Specimen: Blood   Result Value Ref Range    PTT 32.6 24.3 - 38.1 seconds   Lipase    Specimen: Blood   Result Value Ref Range    Lipase 364 (H) 13 - 60 U/L   Urinalysis With Culture If Indicated - Urine, Clean Catch    Specimen: Urine, Clean Catch   Result Value Ref Range    Color, UA Other (A) Yellow, Straw    Appearance, UA Cloudy (A) Clear    pH, UA 5.5 4.5 - 8.0    Specific Gravity, UA 1.020 1.003 - 1.030    Glucose,  mg/dL (Trace) (A) Negative    Ketones, UA Trace (A) Negative    Bilirubin, UA Large (3+) (A) Negative    Blood, UA Trace (A) Negative    Protein,  mg/dL (2+) (A) Negative    Leuk Esterase, UA Large (3+) (A) Negative    Nitrite, UA Positive (A) Negative    Urobilinogen, UA >=8.0 E.U./dL (A) 0.2 - 1.0 E.U./dL   Ammonia    Specimen: Blood   Result Value Ref Range    Ammonia 37 11 - 51 umol/L   Lactic Acid, Plasma    Specimen: Blood   Result Value Ref Range    Lactate 4.8 (C) 0.5 - 2.0 mmol/L   CBC Auto Differential    Specimen: Blood   Result Value Ref Range    WBC 19.31 (H) 3.40 - 10.80 10*3/mm3    RBC 3.00 (L) 3.77 - 5.28 10*6/mm3    Hemoglobin 11.0 (L) 12.0 - 15.9 g/dL    Hematocrit 33.5 (L) 34.0 - 46.6 %    .7 (H) 79.0 - 97.0 fL    MCH 36.7 (H) 26.6 - 33.0 pg    MCHC 32.8 31.5 - 35.7 g/dL    RDW 14.6 12.3 - 15.4 %    RDW-SD 60.2 (H) 37.0 - 54.0 fl    MPV 11.0 6.0 - 12.0 fL    Platelets 334 140 - 450 10*3/mm3    Neutrophil % 79.9 (H) 42.7 - 76.0 %    Lymphocyte % 8.2 (L) 19.6 - 45.3 %    Monocyte % 10.9 5.0 - 12.0 %    Eosinophil % 0.1 (L) 0.3 - 6.2 %     Basophil % 0.1 0.0 - 1.5 %    Immature Grans % 0.8 (H) 0.0 - 0.5 %    Neutrophils, Absolute 15.42 (H) 1.70 - 7.00 10*3/mm3    Lymphocytes, Absolute 1.59 0.70 - 3.10 10*3/mm3    Monocytes, Absolute 2.11 (H) 0.10 - 0.90 10*3/mm3    Eosinophils, Absolute 0.01 0.00 - 0.40 10*3/mm3    Basophils, Absolute 0.02 0.00 - 0.20 10*3/mm3    Immature Grans, Absolute 0.16 (H) 0.00 - 0.05 10*3/mm3   Scan Slide    Specimen: Blood   Result Value Ref Range    Macrocytes Mod/2+ None Seen    WBC Morphology Normal Normal    Platelet Estimate Adequate Normal   Lactic Acid, Reflex Timer (This will reflex a repeat order 3-3:15 hours after ordered.)    Specimen: Blood   Result Value Ref Range    Hold Tube Hold for add-ons.    Procalcitonin    Specimen: Blood   Result Value Ref Range    Procalcitonin 1.04 (H) 0.00 - 0.25 ng/mL   Urinalysis, Microscopic Only - Urine, Clean Catch    Specimen: Urine, Clean Catch   Result Value Ref Range    RBC, UA 0-2 (A) None Seen /HPF    WBC, UA 21-30 (A) None Seen /HPF    Bacteria, UA 2+ (A) None Seen /HPF    Squamous Epithelial Cells, UA 3-6 (A) None Seen, 0-2 /HPF    Hyaline Casts, UA 3-6 None Seen /LPF    Amorphous Crystals, UA Small/1+ None Seen /HPF    Methodology Manual Light Microscopy    Lactic Acid, Reflex    Specimen: Blood   Result Value Ref Range    Lactate 4.1 (C) 0.5 - 2.0 mmol/L   CBC (No Diff)    Specimen: Blood   Result Value Ref Range    WBC 17.47 (H) 3.40 - 10.80 10*3/mm3    RBC 3.04 (L) 3.77 - 5.28 10*6/mm3    Hemoglobin 11.0 (L) 12.0 - 15.9 g/dL    Hematocrit 34.0 34.0 - 46.6 %    .8 (H) 79.0 - 97.0 fL    MCH 36.2 (H) 26.6 - 33.0 pg    MCHC 32.4 31.5 - 35.7 g/dL    RDW 14.4 12.3 - 15.4 %    RDW-SD 59.8 (H) 37.0 - 54.0 fl    MPV 11.0 6.0 - 12.0 fL    Platelets 340 140 - 450 10*3/mm3   Protime-INR    Specimen: Blood   Result Value Ref Range    Protime 15.6 (H) 12.1 - 15.0 Seconds    INR 1.28 (H) 0.90 - 1.10   Body fluid cell count - Body Fluid, Peritoneum    Specimen: Peritoneum;  Body Fluid   Result Value Ref Range    Color, Fluid Yellow     Appearance, Fluid Clear Clear    WBC, Fluid 105 /mm3    RBC, Fluid 0 /mm3   Body fluid differential - Body Fluid, Peritoneum    Specimen: Peritoneum; Body Fluid   Result Value Ref Range    Neutrophils, Fluid 19 %    Lymphocytes, Fluid 38 %    Monocytes, Fluid 37 %    Eosinophils, Fluid 1 %    Basophils, Fluid 0 %    Mononuclear, Fluid 1 %    Plasma Cells, Fluid 0 %    Blasts, Fluid 0 %    Unclassified Cells, Fluid 0 %    Mesothelial Cells, Fluid 0 %    Macrophage, Fluid 4 %   CK    Specimen: Blood   Result Value Ref Range    Creatine Kinase 195 (H) 20 - 180 U/L         I ordered the above labs and reviewed the results    RADIOLOGY  Ct Abdomen Pelvis Without Contrast    Result Date: 11/4/2020  CT Abdomen Pelvis WO INDICATION: 70-year-old emergency department patient with abdominal pain diffusely and fever for one week. Recent antibiotic therapy for chronic urinary tract infection. TECHNIQUE: CT of the abdomen and pelvis without IV contrast. Coronal and sagittal reconstructions were obtained.  Radiation dose reduction techniques included automated exposure control or exposure modulation based on body size. Count of known CT and cardiac nuc med studies performed in previous 12 months: 0. COMPARISON: Ultrasound liver 8/11/2020, CT abdomen and pelvis 4/24/2019 FINDINGS: Abdomen: The included images through the lung bases demonstrate no acute pulmonary density or pleural effusion. The distal esophagus appears normal On these noncontrasted images the liver is diffusely abnormal with diffuse low attenuation in a mottled appearance. Liver is enlarged measuring 23.6 cm cephalocaudad previously measuring 17.3 cm on the ultrasound of 8/11/2020 and 18.4 cm on the CT scan of 4/24/2019 suggesting a significant interval increase in size. The spleen is normal in size and density, however there is a new large amount of ascites in the abdomen, paracolic gutters and pelvis.  The gallbladder appears dense relative to the low density liver but no stones are seen. There is pericholecystic fluid likely part of the generalized ascites. The pancreas, pancreatic duct, common bile ducts and adrenal glands are normal. The kidneys are unremarkable The stomach, small bowel and colon have a normal noncontrasted appearance. Pelvis: The bladder is distended. The wall appears slightly prominent diffusely but no nodularity is seen. No bladder stone. This is small postmenopausal uterus. No adnexal mass. There is mild diffuse subcutaneous edema over the anterior abdominal wall which could be part of anasarca. Bone window images demonstrate demonstrates stable spinal fusion hardware posteriorly from L4 through S1 with severe anterolisthesis of L5 on S1 unchanged.     1. Marked interval increase in size of the liver with diffusely mottled low attenuation as compared to the prior studies of 11/20 and 4/24/2019. Patient has had underlying steatosis. The current changes could represent progression of steatosis or acute superimposed hepatitis. There is new ascites diffusely in the abdomen or pelvis which is moderate to large in amount. No splenomegaly. The portal vein and splenic vein do not appear enlarged. 2. No gallstones seen 3. No definite distention of the stomach, small bowel or colon. 4. The bladder is distended. The wall is mildly thickened but no definite nodularity or bladder stone. Signer Name: Sheron Lundberg MD  Signed: 11/4/2020 1:18 PM  Workstation Name: [id number]  [facility]    Xr Chest 1 View    Result Date: 11/4/2020  XR CHEST 1 VW-: 11/4/2020 12:48 PM  INDICATION: Short of air and weakness several days.  COMPARISON:  None available.  FINDINGS: Single Portable AP view(s) of the chest. Status post right shoulder replacement. Cardiac silhouette is within normal limits. Shallow lung expansion and bronchovascular crowding. Probable platelike atelectasis in the left  base and atelectatic change in the right base. Mild eventration or elevation of the right hemidiaphragm. No distinct effusion or pneumothorax.       1. Shallow lung expansion with bronchovascular crowding and probable bibasilar atelectasis.  This report was finalized on 11/4/2020 12:57 PM by Dr. Waqar Jerez MD.        I ordered the above radiologic testing and reviewed the results    PROCEDURES  Procedures      PROGRESS AND CONSULTS           MEDICAL DECISION MAKING      MDM   70-year-old female presents to the ER complaining of diarrhea and recent urinary tract infection and feeling dehydrated.  On exam she is afebrile and without tachycardia, she does have scleral icterus and is generally jaundiced.  She  has  a distended abdomen that does feel like he has a fluid wave in it.  She appears in no distress and admits to drinking heavily for the last 3 years.  She will need to be worked up for urosepsis as well as acute liver failure and ascites.  Given her history of questionably treated UTI I will give her 2 g of Rocephin.    Patient with signs of sepsis, also signs of acute liver failure and acute renal failure, discussed with Dr. Carpenter who will admit her to the ICU.  She will need to have paracentesis to evaluate for SBP and Dr. Carpenter as he can do that in the ICU.  DIAGNOSIS  Final diagnoses:   Hepatorenal syndrome (CMS/HCC)   Sepsis with acute renal failure, due to unspecified organism, unspecified acute renal failure type, unspecified whether septic shock present (CMS/HCC)       Latest Documented Vital Signs:  As of 17:11 EST  BP- 124/71 HR- 94 Temp- 97.1 °F (36.2 °C) (Rectal) O2 sat- 98%    DISPOSITION  ICU      Discussed pertinent labs and imaging findings with the patient/family.  Patient/Family voiced understanding of need to follow-up for recheck, further testing as needed.  Return to the emergency Department warnings were given.         Medication List      No changes were made to your prescriptions  during this visit.                   Dictated utilizing Dragon dictation     Bishop Silverman MD  11/04/20 [id number]

## 2020-11-04 NOTE — ED NOTES
Urine had the appearance of black coffee.  No clots or sediment noted            Patt Arellano RN  11/04/20 1122

## 2020-11-04 NOTE — NURSING NOTE
Paracentesis done at bedside per .Pt tolerated procedure well. Fluid returned clear and output 2,160ml noted. VS monitored and remained stable. B/p 106/57, temp 98.4, resp 20 RA, HR 89, o2 sat 92% on RA.

## 2020-11-04 NOTE — ED NOTES
"Out call to Jacqui, .  She stated she is concerned pt is not being forthright about not taking her medication, about how often she is falling and how much she is drinking.  Jacqui said pt fell Monday going to a hair appointment and \"a  had to help her up and she did not tell me about it\".  She stated she found \"several wine bottles under her bed\".  Jacqui stated she is worried about depression.  She said pt has been admitted in [place] for dehydration and diarrhea twice \"it seems to be a recurring theme\".  She stated pt has UTI about every 6 weeks and does not finish her antibiotics.  She stated she is concerned b/c pt always tries to cover it up.  She said today pt said she was ok but when Jacqui got there her bed was covered in diarrhea and she was just lying in it.       Patt Arellano RN  11/04/20 7871    "

## 2020-11-04 NOTE — CONSULTS
Referring Provider: Dr. GIL Carpenter  Reason for Consultation: MONSTER    Subjective     Chief complaint   Chief Complaint   Patient presents with   • Diarrhea     multiple complaints today, dx UTI last week and fell, noncompliant with medication       History of present illness:  69 yo WF with normal renal function at baseline, alcoholism, chronic urinary retention (for which she had been told in the past to self-cath, which she does not do), admitted today for further evaluation of diarrhea for one week, poor appetite, weakness, and increasing abdominal swelling and discomfort.  SCR 4.0 with serum sodium level 120; still pertinent is bilirubin 9 and significant transaminitis.  Is already undergone 2-liter paracentesis this evening  · She believes she has lost between 5-10 pounds over the last several months  · Takes Vasotec for blood pressure control  · 4 vodkas every night, with last drink yesterday evening  · She has never had a paracentesis until tonight  · No shortness of breath at rest but does have SALAS; no orthopnea; no leg swelling    Past Medical History:   Diagnosis Date   • Elevated liver enzymes    • Fatty liver    • Frequent UTI    • History of depression    • Hypertension    • Lumbar stenosis    • OA (osteoarthritis)    • Post-menopausal    • Spondylarthritis     LOW BACK   • Stress incontinence    • Urinary retention      Past Surgical History:   Procedure Laterality Date   • ABDOMINOPLASTY     • BREAST SURGERY     • CATARACT EXTRACTION Bilateral    •  SECTION      X3   • COLONOSCOPY         • COLONOSCOPY N/A 2016    Procedure: COLONOSCOPY;  Surgeon: Ho Arenas MD;  [place] [facility] [place] [facility];  Service:    • LASIK     • LUMBAR DISCECTOMY FUSION INSTRUMENTATION N/A 10/1/2018    Procedure: L4-5, L5-S1  laminectomy and fusion with instrumentation;  Surgeon: Juno Kim MD;  [place] [facility] [place] [facility];  Service: Orthopedic Spine   • REDUCTION MAMMAPLASTY     • TONSILLECTOMY     •  "TOTAL SHOULDER ARTHROPLASTY W/ DISTAL CLAVICLE EXCISION Right 2019    Procedure: TOTAL SHOULDER REVERSE ARTHROPLASTY;  Surgeon: David Marion MD;  Location: [facility];  Service: Orthopedics     Family History   Problem Relation Age of Onset   • Colon polyps Father    • Breast cancer Maternal Aunt    • Malig Hyperthermia Neg Hx      Social History     Tobacco Use   • Smoking status: Never Smoker   • Smokeless tobacco: Never Used   Substance Use Topics   • Alcohol use: Yes     Alcohol/week: 4.0 standard drinks     Types: 4 Shots of liquor per week     Comment: \"too much\" per day sijnce her spouse    • Drug use: No     Medications Prior to Admission   Medication Sig Dispense Refill Last Dose   • amLODIPine (NORVASC) 10 MG tablet Take 10 mg by mouth Every Morning.  3 11/3/2020 at 0900   • Cholecalciferol (Vitamin D3) 1.25 MG (59645 UT) capsule Pt takes on wednesday   10/28/2020 at 0900   • enalapril (VASOTEC) 20 MG tablet Take 20 mg by mouth Daily.   11/3/2020 at 0900   • ESTRACE VAGINAL 0.1 MG/GM vaginal cream Insert 2 g into the vagina Daily As Needed.   Unknown at Unknown time   • Fluzone High-Dose Quadrivalent 0.7 ML suspension prefilled syringe ADM 0.7ML IM UTD   Unknown at Unknown time   • KLOR-CON 8 MEQ CR tablet Take 8 mEq by mouth 2 (Two) Times a Day As Needed. Pt does not remember when she had this last   Unknown at Unknown time   • metoprolol succinate XL (TOPROL-XL) 25 MG 24 hr tablet Take 25 mg by mouth Every Morning.   11/3/2020 at 0900     Allergies:  Patient has no known allergies.    Review of Systems  14-point ROS performed and all negative except for pertinent +/-'s detailed in HPI.     Objective     Vital Signs  Temp:  [94.6 °F (34.8 °C)-98.4 °F (36.9 °C)] 98.4 °F (36.9 °C)  Heart Rate:  [] 92  Resp:  [16-24] 16  BP: ()/(52-83) 107/67    Flowsheet Rows      First Filed Value   Admission Height  157.5 cm (62\") Documented at 2020 1107   Admission Weight  59 kg (130 " lb) Documented at 11/04/2020 1107           I/O this shift:  In: 2040 [P.O.:240; I.V.:250; IV Piggyback:1550]  Out: 3160 [Urine:1000; Other:2160]  No intake/output data recorded.    Intake/Output Summary (Last 24 hours) at 11/4/2020 1853  Last data filed at 11/4/2020 1829  Gross per 24 hour   Intake 2040 ml   Output 3160 ml   Net -1120 ml       Physical Exam:  NAD; pleasant; oriented fully but sensorium blunted; looks older than stated age  Chronically ill-appearing; jaundiced  Dry MM; AT/NC   No eye discharge; scleral icterus  No JVD; no carotid bruits  CTA bilat; not labored  RRR, no rub  Soft, +T but no G or R, +D, BS+  No edema  No clubbing  No asterixis  Moves all extremities   Speech is slow    Results Review:  Results from last 7 days   Lab Units 11/04/20  1136   SODIUM mmol/L 120*   POTASSIUM mmol/L 5.1   CHLORIDE mmol/L 78*   CO2 mmol/L 13.6*   BUN mg/dL 46*   CREATININE mg/dL 3.99*   CALCIUM mg/dL 8.6   BILIRUBIN mg/dL 9.3*   ALK PHOS U/L 264*   ALT (SGPT) U/L 96*   AST (SGOT) U/L 270*   GLUCOSE mg/dL 59*       Estimated Creatinine Clearance: 11.3 mL/min (A) (by C-G formula based on SCr of 3.99 mg/dL (H)).          Results from last 7 days   Lab Units 11/04/20  1550 11/04/20  1136   WBC 10*3/mm3 17.47* 19.31*   HEMOGLOBIN g/dL 11.0* 11.0*   PLATELETS 10*3/mm3 340 334       Results from last 7 days   Lab Units 11/04/20  1550 11/04/20  1136   INR  1.28* 1.32*       Active Medications  [START ON 11/5/2020] cefTRIAXone, 2 g, Intravenous, Once  enoxaparin, 30 mg, Subcutaneous, Q24H  LORazepam, 0.5 mg, Intravenous, Q6H    Followed by  [START ON 11/5/2020] LORazepam, 0.5 mg, Intravenous, Q8H  IV Fluids 1000 mL + additives, 100 mL/hr, Intravenous, Daily  sodium chloride, 10 mL, Intravenous, Q12H      sodium chloride, 100 mL/hr        Assessment/Plan   Assessment  1.  MONSTER, nonoliguric, likely prerenal due to hypotension, poor oral intake, and compromised renal autoregulation due to Vasotec.  Hepatorenal syndrome is  certainly a consideration, though lack of oliguria argues against this.  Urinalysis with 100 mg/dL of protein and modest pyuria (21-30 WBCs/hpf) along with 2+ bacteria; given her reported urinary retention (for which she has not been compliant with self-catheterization), a degree of pyuria and bacteria would be expected, so unclear whether true UTI present or not.  Hyponatremia multifactorial: liver disease, poor solute intake, hypothyroidism.  Mild hyperkalemia due to MONSTER, + AG metabolic acidosis, ACE inhibitor, and potassium supplement  2.  Alcoholic hepatitis  3.  Elevated lipase  4.  Coagulopathy  5.  Alcoholism  6.  Hypothyroidism with TSH 17      Hepatorenal syndrome (CMS/HCC)    Ascites due to alcoholic hepatitis      Plan  1.  Normal saline for now  2.  Urine studies: FENa and Uosm  3.  Thyroid replacement per Dr. Carpenter  4.  Surveillance labs    I discussed the patient's findings and my recommendations with the patient and with Dr. REMINGTON Odom MD  11/04/20  18:53 EST

## 2020-11-04 NOTE — ED NOTES
Patient stated that she is currently experiencing a sore throat and chills.      Louise Fisher  11/04/20 1109

## 2020-11-05 ENCOUNTER — APPOINTMENT (OUTPATIENT)
Dept: GENERAL RADIOLOGY | Facility: HOSPITAL | Age: 70
End: 2020-11-05

## 2020-11-05 ENCOUNTER — APPOINTMENT (OUTPATIENT)
Dept: ULTRASOUND IMAGING | Facility: HOSPITAL | Age: 70
End: 2020-11-05

## 2020-11-05 PROBLEM — I95.9 SEPSIS ASSOCIATED HYPOTENSION (HCC): Status: ACTIVE | Noted: 2020-11-05

## 2020-11-05 PROBLEM — A41.9 SEPSIS ASSOCIATED HYPOTENSION (HCC): Status: ACTIVE | Noted: 2020-11-05

## 2020-11-05 PROBLEM — F10.10 ALCOHOL ABUSE: Status: ACTIVE | Noted: 2020-11-05

## 2020-11-05 PROBLEM — R41.82 ALTERED MENTAL STATE: Status: ACTIVE | Noted: 2020-11-05

## 2020-11-05 PROBLEM — K72.00 ACUTE LIVER FAILURE: Status: ACTIVE | Noted: 2020-11-05

## 2020-11-05 PROBLEM — N17.9 ACUTE RENAL FAILURE (HCC): Status: ACTIVE | Noted: 2020-11-05

## 2020-11-05 PROBLEM — N81.10 BLADDER PROLAPSE, FEMALE, ACQUIRED: Status: ACTIVE | Noted: 2020-11-05

## 2020-11-05 LAB
ADV 40+41 DNA STL QL NAA+NON-PROBE: NOT DETECTED
ALBUMIN SERPL-MCNC: 2.5 G/DL (ref 3.5–5.2)
ALBUMIN/GLOB SERPL: 1.1 G/DL
ALP SERPL-CCNC: 199 U/L (ref 39–117)
ALT SERPL W P-5'-P-CCNC: 82 U/L (ref 1–33)
AMMONIA BLD-SCNC: 87 UMOL/L (ref 11–51)
ANION GAP SERPL CALCULATED.3IONS-SCNC: 18.2 MMOL/L (ref 5–15)
ANION GAP SERPL CALCULATED.3IONS-SCNC: 18.8 MMOL/L (ref 5–15)
AST SERPL-CCNC: 217 U/L (ref 1–32)
ASTRO TYP 1-8 RNA STL QL NAA+NON-PROBE: NOT DETECTED
BACTERIA SPEC AEROBE CULT: NO GROWTH
BASOPHILS # BLD AUTO: 0.03 10*3/MM3 (ref 0–0.2)
BASOPHILS NFR BLD AUTO: 0.2 % (ref 0–1.5)
BILIRUB SERPL-MCNC: 8 MG/DL (ref 0–1.2)
BUN SERPL-MCNC: 48 MG/DL (ref 8–23)
BUN SERPL-MCNC: 48 MG/DL (ref 8–23)
BUN/CREAT SERPL: 13.6 (ref 7–25)
BUN/CREAT SERPL: 16.2 (ref 7–25)
C CAYETANENSIS DNA STL QL NAA+NON-PROBE: NOT DETECTED
CALCIUM SPEC-SCNC: 7.2 MG/DL (ref 8.6–10.5)
CALCIUM SPEC-SCNC: 7.5 MG/DL (ref 8.6–10.5)
CAMPY SP DNA.DIARRHEA STL QL NAA+PROBE: NOT DETECTED
CHLORIDE SERPL-SCNC: 89 MMOL/L (ref 98–107)
CHLORIDE SERPL-SCNC: 95 MMOL/L (ref 98–107)
CO2 SERPL-SCNC: 10.8 MMOL/L (ref 22–29)
CO2 SERPL-SCNC: 16.2 MMOL/L (ref 22–29)
CORTIS SERPL-MCNC: 21.27 MCG/DL
CREAT SERPL-MCNC: 2.97 MG/DL (ref 0.57–1)
CREAT SERPL-MCNC: 3.54 MG/DL (ref 0.57–1)
CREAT UR-MCNC: 237.5 MG/DL
CRYPTOSP STL CULT: NOT DETECTED
DEPRECATED RDW RBC AUTO: 58.4 FL (ref 37–54)
E COLI DNA SPEC QL NAA+PROBE: NOT DETECTED
E HISTOLYT AG STL-ACNC: NOT DETECTED
EAEC PAA PLAS AGGR+AATA ST NAA+NON-PRB: NOT DETECTED
EC STX1 + STX2 GENES STL NAA+PROBE: NOT DETECTED
EOSINOPHIL # BLD AUTO: 0.03 10*3/MM3 (ref 0–0.4)
EOSINOPHIL NFR BLD AUTO: 0.2 % (ref 0.3–6.2)
EPEC EAE GENE STL QL NAA+NON-PROBE: NOT DETECTED
ERYTHROCYTE [DISTWIDTH] IN BLOOD BY AUTOMATED COUNT: 14.6 % (ref 12.3–15.4)
ETEC LTA+ST1A+ST1B TOX ST NAA+NON-PROBE: NOT DETECTED
G LAMBLIA DNA SPEC QL NAA+PROBE: NOT DETECTED
GFR SERPL CREATININE-BSD FRML MDRD: 13 ML/MIN/1.73
GFR SERPL CREATININE-BSD FRML MDRD: 16 ML/MIN/1.73
GFR SERPL CREATININE-BSD FRML MDRD: ABNORMAL ML/MIN/{1.73_M2}
GLOBULIN UR ELPH-MCNC: 2.2 GM/DL
GLUCOSE SERPL-MCNC: 83 MG/DL (ref 65–99)
GLUCOSE SERPL-MCNC: 85 MG/DL (ref 65–99)
HCT VFR BLD AUTO: 28 % (ref 34–46.6)
HGB BLD-MCNC: 9.5 G/DL (ref 12–15.9)
IMM GRANULOCYTES # BLD AUTO: 0.16 10*3/MM3 (ref 0–0.05)
IMM GRANULOCYTES NFR BLD AUTO: 1.1 % (ref 0–0.5)
INR PPP: 1.41 (ref 0.9–1.1)
LYMPHOCYTES # BLD AUTO: 1.72 10*3/MM3 (ref 0.7–3.1)
LYMPHOCYTES NFR BLD AUTO: 11.6 % (ref 19.6–45.3)
MAGNESIUM SERPL-MCNC: 1.4 MG/DL (ref 1.6–2.4)
MAGNESIUM SERPL-MCNC: 2.4 MG/DL (ref 1.6–2.4)
MCH RBC QN AUTO: 37.3 PG (ref 26.6–33)
MCHC RBC AUTO-ENTMCNC: 33.9 G/DL (ref 31.5–35.7)
MCV RBC AUTO: 109.8 FL (ref 79–97)
MONOCYTES # BLD AUTO: 1.45 10*3/MM3 (ref 0.1–0.9)
MONOCYTES NFR BLD AUTO: 9.8 % (ref 5–12)
NEUTROPHILS NFR BLD AUTO: 11.38 10*3/MM3 (ref 1.7–7)
NEUTROPHILS NFR BLD AUTO: 77.1 % (ref 42.7–76)
NOROVIRUS GI+II RNA STL QL NAA+NON-PROBE: NOT DETECTED
NRBC BLD AUTO-RTO: 0.5 /100 WBC (ref 0–0.2)
OSMOLALITY UR: 375 MOSM/KG
P SHIGELLOIDES DNA STL QL NAA+PROBE: NOT DETECTED
PHOSPHATE SERPL-MCNC: 2.8 MG/DL (ref 2.5–4.5)
PHOSPHATE SERPL-MCNC: 2.8 MG/DL (ref 2.5–4.5)
PLATELET # BLD AUTO: 257 10*3/MM3 (ref 140–450)
PMV BLD AUTO: 11.8 FL (ref 6–12)
POTASSIUM SERPL-SCNC: 4.9 MMOL/L (ref 3.5–5.2)
POTASSIUM SERPL-SCNC: 5 MMOL/L (ref 3.5–5.2)
PROCALCITONIN SERPL-MCNC: 0.97 NG/ML (ref 0–0.25)
PROT SERPL-MCNC: 4.7 G/DL (ref 6–8.5)
PROTHROMBIN TIME: 16.8 SECONDS (ref 12.1–15)
RBC # BLD AUTO: 2.55 10*6/MM3 (ref 3.77–5.28)
RV RNA STL NAA+PROBE: NOT DETECTED
SALMONELLA DNA SPEC QL NAA+PROBE: NOT DETECTED
SAPO I+II+IV+V RNA STL QL NAA+NON-PROBE: NOT DETECTED
SHIGELLA SP+EIEC IPAH STL QL NAA+PROBE: NOT DETECTED
SODIUM SERPL-SCNC: 124 MMOL/L (ref 136–145)
SODIUM SERPL-SCNC: 124 MMOL/L (ref 136–145)
SODIUM UR-SCNC: 30 MMOL/L
URATE SERPL-MCNC: 12.9 MG/DL (ref 2.4–5.7)
V CHOLERAE DNA SPEC QL NAA+PROBE: NOT DETECTED
VIBRIO DNA SPEC NAA+PROBE: NOT DETECTED
WBC # BLD AUTO: 14.77 10*3/MM3 (ref 3.4–10.8)
YERSINIA STL CULT: NOT DETECTED

## 2020-11-05 PROCEDURE — 84100 ASSAY OF PHOSPHORUS: CPT | Performed by: HOSPITALIST

## 2020-11-05 PROCEDURE — 83935 ASSAY OF URINE OSMOLALITY: CPT | Performed by: INTERNAL MEDICINE

## 2020-11-05 PROCEDURE — 84145 PROCALCITONIN (PCT): CPT | Performed by: HOSPITALIST

## 2020-11-05 PROCEDURE — 25010000003 MAGNESIUM SULFATE 4 GM/100ML SOLUTION: Performed by: INTERNAL MEDICINE

## 2020-11-05 PROCEDURE — 84100 ASSAY OF PHOSPHORUS: CPT | Performed by: INTERNAL MEDICINE

## 2020-11-05 PROCEDURE — C1751 CATH, INF, PER/CENT/MIDLINE: HCPCS

## 2020-11-05 PROCEDURE — 99291 CRITICAL CARE FIRST HOUR: CPT | Performed by: INTERNAL MEDICINE

## 2020-11-05 PROCEDURE — 82533 TOTAL CORTISOL: CPT | Performed by: INTERNAL MEDICINE

## 2020-11-05 PROCEDURE — 76705 ECHO EXAM OF ABDOMEN: CPT

## 2020-11-05 PROCEDURE — 25010000002 ALBUMIN HUMAN 25% PER 50 ML: Performed by: INTERNAL MEDICINE

## 2020-11-05 PROCEDURE — 25010000002 CEFTRIAXONE SODIUM-DEXTROSE 1-3.74 GM-%(50ML) RECONSTITUTED SOLUTION: Performed by: INTERNAL MEDICINE

## 2020-11-05 PROCEDURE — 82570 ASSAY OF URINE CREATININE: CPT | Performed by: HOSPITALIST

## 2020-11-05 PROCEDURE — 80053 COMPREHEN METABOLIC PANEL: CPT | Performed by: HOSPITALIST

## 2020-11-05 PROCEDURE — 85025 COMPLETE CBC W/AUTO DIFF WBC: CPT | Performed by: HOSPITALIST

## 2020-11-05 PROCEDURE — P9047 ALBUMIN (HUMAN), 25%, 50ML: HCPCS | Performed by: INTERNAL MEDICINE

## 2020-11-05 PROCEDURE — 83735 ASSAY OF MAGNESIUM: CPT | Performed by: INTERNAL MEDICINE

## 2020-11-05 PROCEDURE — 25010000002 LORAZEPAM PER 2 MG: Performed by: HOSPITALIST

## 2020-11-05 PROCEDURE — 25010000002 THIAMINE PER 100 MG: Performed by: HOSPITALIST

## 2020-11-05 PROCEDURE — 83735 ASSAY OF MAGNESIUM: CPT | Performed by: HOSPITALIST

## 2020-11-05 PROCEDURE — 99222 1ST HOSP IP/OBS MODERATE 55: CPT | Performed by: INTERNAL MEDICINE

## 2020-11-05 PROCEDURE — 84550 ASSAY OF BLOOD/URIC ACID: CPT | Performed by: INTERNAL MEDICINE

## 2020-11-05 PROCEDURE — 85610 PROTHROMBIN TIME: CPT | Performed by: HOSPITALIST

## 2020-11-05 PROCEDURE — 84300 ASSAY OF URINE SODIUM: CPT | Performed by: HOSPITALIST

## 2020-11-05 PROCEDURE — 82140 ASSAY OF AMMONIA: CPT | Performed by: INTERNAL MEDICINE

## 2020-11-05 PROCEDURE — 71045 X-RAY EXAM CHEST 1 VIEW: CPT

## 2020-11-05 RX ORDER — SODIUM CHLORIDE 0.9 % (FLUSH) 0.9 %
10 SYRINGE (ML) INJECTION EVERY 12 HOURS SCHEDULED
Status: DISCONTINUED | OUTPATIENT
Start: 2020-11-05 | End: 2020-11-06 | Stop reason: SDUPTHER

## 2020-11-05 RX ORDER — ALBUMIN (HUMAN) 12.5 G/50ML
25 SOLUTION INTRAVENOUS ONCE
Status: COMPLETED | OUTPATIENT
Start: 2020-11-05 | End: 2020-11-05

## 2020-11-05 RX ORDER — SODIUM CHLORIDE 0.9 % (FLUSH) 0.9 %
20 SYRINGE (ML) INJECTION AS NEEDED
Status: DISCONTINUED | OUTPATIENT
Start: 2020-11-05 | End: 2020-11-17 | Stop reason: HOSPADM

## 2020-11-05 RX ORDER — LEVOTHYROXINE SODIUM ANHYDROUS 100 UG/5ML
25 INJECTION, POWDER, LYOPHILIZED, FOR SOLUTION INTRAVENOUS
Status: DISCONTINUED | OUTPATIENT
Start: 2020-11-05 | End: 2020-11-08

## 2020-11-05 RX ORDER — SODIUM CHLORIDE 0.9 % (FLUSH) 0.9 %
10 SYRINGE (ML) INJECTION EVERY 12 HOURS SCHEDULED
Status: DISCONTINUED | OUTPATIENT
Start: 2020-11-05 | End: 2020-11-17 | Stop reason: HOSPADM

## 2020-11-05 RX ORDER — MAGNESIUM SULFATE HEPTAHYDRATE 40 MG/ML
4 INJECTION, SOLUTION INTRAVENOUS ONCE
Status: COMPLETED | OUTPATIENT
Start: 2020-11-05 | End: 2020-11-05

## 2020-11-05 RX ORDER — NOREPINEPHRINE BITARTRATE 1 MG/ML
INJECTION, SOLUTION INTRAVENOUS
Status: COMPLETED
Start: 2020-11-05 | End: 2020-11-05

## 2020-11-05 RX ORDER — SODIUM CHLORIDE 0.9 % (FLUSH) 0.9 %
10 SYRINGE (ML) INJECTION AS NEEDED
Status: DISCONTINUED | OUTPATIENT
Start: 2020-11-05 | End: 2020-11-06 | Stop reason: SDUPTHER

## 2020-11-05 RX ORDER — CEFTRIAXONE 1 G/50ML
1 INJECTION, SOLUTION INTRAVENOUS EVERY 24 HOURS
Status: DISCONTINUED | OUTPATIENT
Start: 2020-11-05 | End: 2020-11-13 | Stop reason: CLARIF

## 2020-11-05 RX ADMIN — SODIUM CHLORIDE 100 ML/HR: 9 INJECTION, SOLUTION INTRAVENOUS at 04:44

## 2020-11-05 RX ADMIN — LORAZEPAM 2 MG: 2 INJECTION INTRAMUSCULAR; INTRAVENOUS at 02:56

## 2020-11-05 RX ADMIN — NOREPINEPHRINE BITARTRATE 8000 MCG: 1 INJECTION, SOLUTION, CONCENTRATE INTRAVENOUS at 20:50

## 2020-11-05 RX ADMIN — LORAZEPAM 2 MG: 2 INJECTION INTRAMUSCULAR; INTRAVENOUS at 00:52

## 2020-11-05 RX ADMIN — SODIUM CHLORIDE, PRESERVATIVE FREE 10 ML: 5 INJECTION INTRAVENOUS at 20:55

## 2020-11-05 RX ADMIN — SODIUM CHLORIDE 250 ML: 900 INJECTION, SOLUTION INTRAVENOUS at 20:42

## 2020-11-05 RX ADMIN — MAGNESIUM SULFATE 4 G: 4 INJECTION INTRAVENOUS at 08:26

## 2020-11-05 RX ADMIN — FOLIC ACID 100 ML/HR: 5 INJECTION, SOLUTION INTRAMUSCULAR; INTRAVENOUS; SUBCUTANEOUS at 09:47

## 2020-11-05 RX ADMIN — Medication 10 ML: at 21:00

## 2020-11-05 RX ADMIN — LEVOTHYROXINE SODIUM ANHYDROUS 25 MCG: 100 INJECTION, POWDER, LYOPHILIZED, FOR SOLUTION INTRAVENOUS at 09:58

## 2020-11-05 RX ADMIN — CEFTRIAXONE 1 G: 1 INJECTION, SOLUTION INTRAVENOUS at 09:02

## 2020-11-05 RX ADMIN — SODIUM CHLORIDE, POTASSIUM CHLORIDE, SODIUM LACTATE AND CALCIUM CHLORIDE 1000 ML: 600; 310; 30; 20 INJECTION, SOLUTION INTRAVENOUS at 07:47

## 2020-11-05 RX ADMIN — SODIUM CHLORIDE, PRESERVATIVE FREE 10 ML: 5 INJECTION INTRAVENOUS at 09:47

## 2020-11-05 RX ADMIN — LORAZEPAM 0.5 MG: 2 INJECTION INTRAMUSCULAR; INTRAVENOUS at 18:07

## 2020-11-05 RX ADMIN — SODIUM CHLORIDE 1000 ML: 9 INJECTION, SOLUTION INTRAVENOUS at 08:03

## 2020-11-05 RX ADMIN — SODIUM CHLORIDE, PRESERVATIVE FREE 10 ML: 5 INJECTION INTRAVENOUS at 20:57

## 2020-11-05 RX ADMIN — SODIUM CHLORIDE 100 ML/HR: 9 INJECTION, SOLUTION INTRAVENOUS at 22:10

## 2020-11-05 RX ADMIN — Medication 10 ML: at 08:16

## 2020-11-05 RX ADMIN — ALBUMIN HUMAN 25 G: 0.25 SOLUTION INTRAVENOUS at 08:26

## 2020-11-05 RX ADMIN — LORAZEPAM 0.5 MG: 2 INJECTION INTRAMUSCULAR; INTRAVENOUS at 11:53

## 2020-11-05 RX ADMIN — SODIUM CHLORIDE 1000 ML: 9 INJECTION, SOLUTION INTRAVENOUS at 11:25

## 2020-11-05 RX ADMIN — SODIUM CHLORIDE 1000 ML: 9 INJECTION, SOLUTION INTRAVENOUS at 10:20

## 2020-11-05 RX ADMIN — NOREPINEPHRINE BITARTRATE 0.02 MCG/KG/MIN: 1 INJECTION, SOLUTION, CONCENTRATE INTRAVENOUS at 21:00

## 2020-11-05 NOTE — PROGRESS NOTES
NEPHROLOGY PROGRESS NOTE    PATIENT IDENTIFICATION:   Name:  Tayler Lugo      MRN:  [id number]     70 y.o.  female             Reason for visit: MONSTER    SUBJECTIVE:   Much more lethargic this morning than she was last night; received several doses of Ativan; blood pressure has fallen, but responding to 1-liter saline bolus.  Remains on room air    OBJECTIVE:  Vitals:    11/05/20 0710 11/05/20 0800 11/05/20 0810 11/05/20 0820   BP: (!) 86/59 97/60 90/55 93/60   BP Location: Right arm      Patient Position: Lying      Pulse: 103 102 107 105   Resp:       Temp:       TempSrc:       SpO2: 95% 95% 95% 94%   Weight:       Height:               Body mass index is 25.28 kg/m².    Intake/Output Summary (Last 24 hours) at 11/5/2020 0828  Last data filed at 11/5/2020 0747  Gross per 24 hour   Intake 4198 ml   Output 3460 ml   Net 738 ml     Wt Readings from Last 1 Encounters:   11/05/20 0600 62.7 kg (138 lb 3.7 oz)   11/04/20 1814 65.5 kg (144 lb 8 oz)   11/04/20 1107 59 kg (130 lb)     Wt Readings from Last 3 Encounters:   11/05/20 62.7 kg (138 lb 3.7 oz)   10/26/20 59 kg (130 lb)   10/08/20 59 kg (130 lb)         Exam:  Somnolent; lethargic but does arouse to vigorous stimulation; looks older than stated age  Chronically ill-appearing; jaundiced  Dry MM; AT/NC   No eye discharge; +scleral icterus  No JVD; no carotid bruits  Coarse bilat; shallow breaths; not labored  RR, tachycardic, no rub  Soft, +T but no G or R, +D, BS+  No significant edema  No clubbing  No asterixis  Moves all extremities   Mumbling speech    Scheduled meds:    enoxaparin, 30 mg, Subcutaneous, Q24H  LORazepam, 0.5 mg, Intravenous, Q6H    Followed by  LORazepam, 0.5 mg, Intravenous, Q8H  magnesium sulfate, 4 g, Intravenous, Once  IV Fluids 1000 mL + additives, 100 mL/hr, Intravenous, Daily  piperacillin-tazobactam, 3.375 g, Intravenous, Once  piperacillin-tazobactam, 3.375 g, Intravenous, Q12H  sodium chloride, 1,000 mL, Intravenous, Once  sodium  chloride, 10 mL, Intravenous, Q12H      IV meds:                        sodium chloride, 100 mL/hr, Last Rate: 100 mL/hr (11/05/20 0444)        Data Review:    Results from last 7 days   Lab Units 11/05/20  0601 11/04/20  1136   SODIUM mmol/L 124* 120*   POTASSIUM mmol/L 4.9 5.1   CHLORIDE mmol/L 89* 78*   CO2 mmol/L 16.2* 13.6*   BUN mg/dL 48* 46*   CREATININE mg/dL 3.54* 3.99*   CALCIUM mg/dL 7.5* 8.6   BILIRUBIN mg/dL 8.0* 9.3*   ALK PHOS U/L 199* 264*   ALT (SGPT) U/L 82* 96*   AST (SGOT) U/L 217* 270*   GLUCOSE mg/dL 85 59*     Estimated Creatinine Clearance: 12.5 mL/min (A) (by C-G formula based on SCr of 3.54 mg/dL (H)).  Results from last 7 days   Lab Units 11/05/20  0444   URIC ACID mg/dL 12.9*     Results from last 7 days   Lab Units 11/05/20  0444   MAGNESIUM mg/dL 1.4*   PHOSPHORUS mg/dL 2.8       Results from last 7 days   Lab Units 11/05/20  0444 11/04/20  1550 11/04/20  1136   WBC 10*3/mm3 14.77* 17.47* 19.31*   HEMOGLOBIN g/dL 9.5* 11.0* 11.0*   PLATELETS 10*3/mm3 257 340 334       Results from last 7 days   Lab Units 11/05/20  0444 11/04/20  1550 11/04/20  1136   INR  1.41* 1.28* 1.32*             ASSESSMENT:     Hepatorenal syndrome (CMS/HCC)    Ascites due to alcoholic hepatitis    1.  MONSTER, nonoliguric, improving: likely prerenal due to hypotension, poor oral intake, and compromised renal autoregulation due to Vasotec.  Hepatorenal syndrome is certainly a consideration, though lack of oliguria argues against this.  Urinalysis with 100 mg/dL of protein and modest pyuria (21-30 WBCs/hpf) along with 2+ bacteria; given her reported urinary retention (for which she has not been compliant with self-catheterization), a degree of pyuria and bacteria would be expected, so unclear whether true UTI present or not.  Hyponatremia, better, multifactorial: liver disease, poor solute intake, hypothyroidism.    Hyperkalemia improving, and anion gap starting to close; hypomagnesemia  2.  Alcoholic hepatitis with  coagulopathy: LFTs improving  3.  Hypotension and tachycardia  4.  Coagulopathy  5.  Alcoholism  6.  Hypothyroidism with TSH 17          PLAN:  1.  Saline bolus to support blood pressure  2.  Delay paracentesis until hemodynamics more stable   3.  Await urine studies  4.  Replace Mg  5.  Defer thyroid replacement to IM  6.  Empiric abx; f/u ascites fluid cx  7.  D/w Raza Bailon and Jamir Odom MD  11/5/2020    08:28 EST

## 2020-11-05 NOTE — PROGRESS NOTES
Adult Nutrition  Assessment/PES    Patient Name:  Tayler Lugo  YOB: 1950  MRN: [id number]  Admit Date:  11/4/2020    Assessment Date:  11/5/2020    Recommend:  Advance diet as clinically indicated with mental status.     Consider oral supplement such as Boost Plus once diet advanced.    No plan for EN at this time per MD note      Reason for Assessment     Row Name 11/05/20 1153          Reason for Assessment    Reason For Assessment  identified at risk by screening criteria     Diagnosis  substance use/abuse;infection/sepsis ETOH, Sepsis, MONSTER, Hepatitis, Enceph, s/p paracentesis     Identified At Risk by Screening Criteria  MST SCORE 2+         Nutrition/Diet History     Row Name 11/05/20 1154          Nutrition/Diet History    Typical Food/Fluid Intake  Spoke w RN who reports pt to lethargic to eat this am. Per rounds pt very sick.         Anthropometrics     Row Name 11/05/20 1155 11/05/20 0600       Anthropometrics    Weight  -- 138.2#  62.7 kg (138 lb 3.7 oz)       Body Mass Index (BMI)    BMI Assessment  BMI 25-29.9: overweight  --        Labs/Tests/Procedures/Meds     Row Name 11/05/20 1155          Labs/Procedures/Meds    Lab Results Reviewed  reviewed     Lab Results Comments  Na 124 L, BUn 48/creat 3.5 H, MG 1.4 L, tbili 8 H        Diagnostic Tests/Procedures    Diagnostic Test/Procedure Reviewed  reviewed        Medications    Pertinent Medications Reviewed  reviewed     Pertinent Medications Comments  MVI/Thiamine/Folic acid           Estimated/Assessed Needs     Row Name 11/05/20 1156          Estimated/Assessed Needs    Additional Documentation  Calorie Requirements (Group);Protein Requirements (Group);Fluid Requirements (Group)        Calorie Requirements    Estimated Calorie Need Method  Laura Colin     Estimated Calorie Requirement Comment  1310 kcal        Protein Requirements    Est Protein Requirement Amount (gms/kg)  1.0 gm protein 50 gm pro ( .8 gm/kg) *monitor renal  function        Fluid Requirements    Estimated Fluid Requirement Method  RDA Method 1310 ml         Nutrition Prescription Ordered     Row Name 11/05/20 1158          Nutrition Prescription PO    Current PO Diet  Clear Liquid         Evaluation of Received Nutrient/Fluid Intake     Row Name 11/05/20 1158          Fluid Intake Evaluation    Oral Fluid (mL)  240 insufficient data        PO Evaluation    Number of Meals  1     % PO Intake  25               Problem/Interventions:  Problem 1     Row Name 11/05/20 1158          Nutrition Diagnoses Problem 1    Problem 1  Predicted Suboptimal Intake     Etiology (related to)  Medical Diagnosis;Factors Affecting Nutrition     Signs/Symptoms (evidenced by)  Report/Observation               Intervention Goal     Row Name 11/05/20 1158          Intervention Goal    General  Meet nutritional needs for age/condition     PO  Establish PO;PO intake (%)     PO Intake %  50 % or greater     Weight  No significant weight loss         Nutrition Intervention     Row Name 11/05/20 1159          Nutrition Intervention    RD/Tech Action  Follow Tx progress           Education/Evaluation     Row Name 11/05/20 1159          Education    Education  Education not appropriate at this time        Monitor/Evaluation    Monitor  Per protocol;I&O;PO intake;Pertinent labs;Weight           Electronically signed by:  Maricruz Martinez RD  11/05/20 11:59 EST

## 2020-11-05 NOTE — PROGRESS NOTES
"SERVICE: [facility] HOSPITALIST    CHIEF COMPLAINT: Hypotension    SUBJECTIVE:    Called by nursing very early this morning, for patient's blood pressures being 60s over 40s.  It is responding to fluids, got a liter of fluids in the ER yesterday, and had been stable overnight with 100 cc an hour.  Patient is slightly more responsive when her blood pressures are not hypotensive.     Have discussed the patient's care with GI, nephrology, and intensivist.  Nephrology feels hepatorenal is less likely at this time, but still a possibility.    Located the critical but guarded prognosis to the daughter. daughter at bedside states her mother has been drinking heavily more than the 3 years she reported, and states that she was afraid this was coming for some time.    Abdomen is soft, and do not want to pursue further paracentesis this morning due to soft blood pressures.  Given the need for fluid boluses, patient will likely need paracentesis again tomorrow or in the near future, can put off further paracentesis until then.    Unable to perform review of systems due to mS    OBJECTIVE:    BP 95/58   Pulse 96   Temp 98.1 °F (36.7 °C) (Oral)   Resp 18   Ht 157.5 cm (62\")   Wt 62.7 kg (138 lb 3.7 oz)   LMP  (LMP Unknown)   SpO2 97%   BMI 25.28 kg/m²     MEDS/LABS REVIEWED AND ORDERED    cefTRIAXone, 1 g, Intravenous, Q24H  enoxaparin, 30 mg, Subcutaneous, Q24H  levothyroxine sodium, 25 mcg, Intravenous, Q AM  LORazepam, 0.5 mg, Intravenous, Q6H    Followed by  LORazepam, 0.5 mg, Intravenous, Q8H  IV Fluids 1000 mL + additives, 100 mL/hr, Intravenous, Daily  sodium chloride, 1,000 mL, Intravenous, Once  sodium chloride, 10 mL, Intravenous, Q12H  sodium chloride, 10 mL, Intravenous, Q12H  sodium chloride, 10 mL, Intravenous, Q12H  sodium chloride, 10 mL, Intravenous, Q12H        Physical Exam  Vitals signs and nursing note reviewed.   Constitutional:       General: She is not in acute distress.     " Appearance: She is not diaphoretic.   Eyes:      General: Scleral icterus present.      Pupils: Pupils are equal, round, and reactive to light.   Cardiovascular:      Rate and Rhythm: Normal rate and regular rhythm.      Pulses: Normal pulses.      Heart sounds: Normal heart sounds.   Pulmonary:      Effort: Pulmonary effort is normal. No respiratory distress.      Breath sounds: No wheezing or rhonchi.   Abdominal:      General: Bowel sounds are normal.      Palpations: Abdomen is soft.      Tenderness: There is no abdominal tenderness. There is no guarding.      Comments: Some leakage of clear straw-colored fluid from yesterday's right-sided paracentesis site   Musculoskeletal:      Right lower leg: Edema present.      Left lower leg: Edema present.   Skin:     General: Skin is warm and dry.      Coloration: Skin is jaundiced.   Neurological:      General: No focal deficit present.      Mental Status: She is lethargic.      GCS: GCS eye subscore is 1. GCS verbal subscore is 2. GCS motor subscore is 5.         LAB/DIAGNOSTICS:    Lab Results (last 24 hours)     Procedure Component Value Units Date/Time    Blood Culture - Blood, Blood, Venous Line [[id number]] Collected: 11/04/20 1136    Specimen: Blood, Venous Line Updated: 11/05/20 1145     Blood Culture No growth at 24 hours    Osmolality, Urine - Urine, Clean Catch [[id number]] Collected: 11/05/20 0843    Specimen: Urine, Clean Catch Updated: 11/05/20 1127     Osmolality, Urine 375 mOsm/kg     Narrative:      Osmo Normal Reference Ranges:    Random:  mOsm/kg H2O, depending on fluid intake.  Random: >850 mOsm/kg H20, after 12 hour fluid restriction.    24 Hour: 300-900 mOsm/kg H2O.    Body Fluid Culture - Body Fluid, Peritoneum [[id number]] Collected: 11/04/20 1552    Specimen: Body Fluid from Peritoneum Updated: 11/05/20 1002     Body Fluid Culture No growth     Gram Stain Rare (1+) WBCs seen      No organisms seen    Sodium, Urine, Random - Urine, Clean  Catch [[id number]] Collected: 11/05/20 0843    Specimen: Urine, Clean Catch Updated: 11/05/20 0909     Sodium, Urine 30 mmol/L     Narrative:      Reference intervals for random urine have not been established.  Clinical usage is dependent upon physician's interpretation in combination with other laboratory tests.       Creatinine, Urine, Random - Urine, Clean Catch [[id number]] Collected: 11/05/20 0843    Specimen: Urine, Clean Catch Updated: 11/05/20 0854    Cortisol [[id number]] Collected: 11/05/20 0601    Specimen: Blood Updated: 11/05/20 0842    Comprehensive Metabolic Panel [[id number]]  (Abnormal) Collected: 11/05/20 0601    Specimen: Blood Updated: 11/05/20 0625     Glucose 85 mg/dL      BUN 48 mg/dL      Creatinine 3.54 mg/dL      Sodium 124 mmol/L      Potassium 4.9 mmol/L      Chloride 89 mmol/L      CO2 16.2 mmol/L      Calcium 7.5 mg/dL      Total Protein 4.7 g/dL      Albumin 2.50 g/dL      ALT (SGPT) 82 U/L      AST (SGOT) 217 U/L      Alkaline Phosphatase 199 U/L      Total Bilirubin 8.0 mg/dL      eGFR Non African Amer 13 mL/min/1.73      Comment: <15 Indicative of kidney failure.        eGFR   Amer --     Comment: <15 Indicative of kidney failure.        Globulin 2.2 gm/dL      A/G Ratio 1.1 g/dL      BUN/Creatinine Ratio 13.6     Anion Gap 18.8 mmol/L     Narrative:      GFR Normal >60  Chronic Kidney Disease <60  Kidney Failure <15      CBC & Differential [[id number]]  (Abnormal) Collected: 11/05/20 0444    Specimen: Blood Updated: 11/05/20 0602    Narrative:      The following orders were created for panel order CBC & Differential.  Procedure                               Abnormality         Status                     ---------                               -----------         ------                     CBC Auto Differential[[id number]]        Abnormal            Final result                 Please view results for these tests on the individual orders.    CBC Auto Differential [[id number]]   (Abnormal) Collected: 11/05/20 0444    Specimen: Blood Updated: 11/05/20 0602     WBC 14.77 10*3/mm3      RBC 2.55 10*6/mm3      Hemoglobin 9.5 g/dL      Hematocrit 28.0 %      .8 fL      MCH 37.3 pg      MCHC 33.9 g/dL      RDW 14.6 %      RDW-SD 58.4 fl      MPV 11.8 fL      Platelets 257 10*3/mm3      Neutrophil % 77.1 %      Lymphocyte % 11.6 %      Monocyte % 9.8 %      Eosinophil % 0.2 %      Basophil % 0.2 %      Immature Grans % 1.1 %      Neutrophils, Absolute 11.38 10*3/mm3      Lymphocytes, Absolute 1.72 10*3/mm3      Monocytes, Absolute 1.45 10*3/mm3      Eosinophils, Absolute 0.03 10*3/mm3      Basophils, Absolute 0.03 10*3/mm3      Immature Grans, Absolute 0.16 10*3/mm3      nRBC 0.5 /100 WBC     Procalcitonin [[id number]]  (Abnormal) Collected: 11/05/20 0444    Specimen: Blood Updated: 11/05/20 0549     Procalcitonin 0.97 ng/mL     Narrative:      Results may be falsely decreased if patient taking Biotin.     Phosphorus [[id number]]  (Normal) Collected: 11/05/20 0444    Specimen: Blood Updated: 11/05/20 0549     Phosphorus 2.8 mg/dL     Uric Acid [[id number]]  (Abnormal) Collected: 11/05/20 0444    Specimen: Blood Updated: 11/05/20 0549     Uric Acid 12.9 mg/dL     Magnesium [[id number]]  (Abnormal) Collected: 11/05/20 0444    Specimen: Blood Updated: 11/05/20 0549     Magnesium 1.4 mg/dL     Protime-INR [[id number]]  (Abnormal) Collected: 11/05/20 0444    Specimen: Blood Updated: 11/05/20 0535     Protime 16.8 Seconds      INR 1.41    Narrative:      Therapeutic Ranges for INR: 2.0-3.0 (PT 20-30)                              2.5-3.5 (PT 25-34)    Vitamin B12 & Folate [[id number]]  (Abnormal) Collected: 11/04/20 1800    Specimen: Blood Updated: 11/04/20 2340     Folate 2.45 ng/mL      Vitamin B-12 1,307 pg/mL     Narrative:      Results may be falsely increased if patient taking Biotin.      Albumin, Fluid - Body Fluid, Peritoneum [[id number]] Collected: 11/04/20 1862    Specimen: Body Fluid from  Peritoneum Updated: 11/04/20 2057     Albumin, Fluid 0.90 g/dL     Narrative:      No Reference Ranges Established.    A Serous fluid albumin gradient (serum albumin-fluid) <1.1 g/dL suggests the fluid is an exudate.  Cirrhosis usually results in an ascites fluid albumin gradient >1.1 g/dL.    This test was developed, its performance characteristics determined and judged suitable for clinical purposes by [facility].  It has not been cleared or approved by the FDA.  The laboratory is regulated under CLIA as qualified to perfom high-complexity testing.      T4, Free [[id number]]  (Normal) Collected: 11/04/20 1136    Specimen: Blood Updated: 11/04/20 2053     Free T4 1.03 ng/dL     Narrative:      Results may be falsely increased if patient taking Biotin.      Hepatitis Panel, Acute [[id number]]  (Normal) Collected: 11/04/20 1610    Specimen: Blood Updated: 11/04/20 2050     Hepatitis B Surface Ag Non-Reactive     Hep A IgM Non-Reactive     Hep B C IgM Non-Reactive     Hepatitis C Ab Non-Reactive    Narrative:      Results may be falsely decreased if patient taking Biotin.     Iron Profile [[id number]]  (Abnormal) Collected: 11/04/20 1136    Specimen: Blood Updated: 11/04/20 1947     Iron 104 mcg/dL      Iron Saturation --     Comment: Unable to calculate        UIBC <16 mcg/dL      TIBC --     Comment: Unable to calculate       Gastrointestinal Panel, PCR - Stool, Per Rectum [[id number]] Collected: 11/04/20 1858    Specimen: Stool from Per Rectum Updated: 11/04/20 1901    TSH [[id number]]  (Abnormal) Collected: 11/04/20 1136    Specimen: Blood Updated: 11/04/20 1816     TSH 16.950 uIU/mL     Body Fluid Cell Count With Differential - Body Fluid, Peritoneum [[id number]] Collected: 11/04/20 1553    Specimen: Body Fluid from Peritoneum Updated: 11/04/20 1710    Narrative:      The following orders were created for panel order Body Fluid Cell Count With Differential - Body Fluid, Peritoneum.  Procedure                                Abnormality         Status                     ---------                               -----------         ------                     Body fluid cell count - ...[[id number]]                      Final result               Body fluid differential ...[[id number]]                      Final result                 Please view results for these tests on the individual orders.    Body fluid differential - Body Fluid, Peritoneum [[id number]] Collected: 11/04/20 1553    Specimen: Body Fluid from Peritoneum Updated: 11/04/20 1710     Neutrophils, Fluid 19 %      Lymphocytes, Fluid 38 %      Monocytes, Fluid 37 %      Eosinophils, Fluid 1 %      Basophils, Fluid 0 %      Mononuclear, Fluid 1 %      Plasma Cells, Fluid 0 %      Blasts, Fluid 0 %      Unclassified Cells, Fluid 0 %      Mesothelial Cells, Fluid 0 %      Macrophage, Fluid 4 %     CK [[id number]]  (Abnormal) Collected: 11/04/20 1136    Specimen: Blood Updated: 11/04/20 1705     Creatine Kinase 195 U/L     Body fluid cell count - Body Fluid, Peritoneum [[id number]] Collected: 11/04/20 1553    Specimen: Body Fluid from Peritoneum Updated: 11/04/20 1631     Color, Fluid Yellow     Appearance, Fluid Clear     WBC, Fluid 105 /mm3      RBC, Fluid 0 /mm3     Protime-INR [[id number]]  (Abnormal) Collected: 11/04/20 1550    Specimen: Blood Updated: 11/04/20 1627     Protime 15.6 Seconds      INR 1.28    Narrative:      Therapeutic Ranges for INR: 2.0-3.0 (PT 20-30)                              2.5-3.5 (PT 25-34)    Lactic Acid, Reflex [[id number]]  (Abnormal) Collected: 11/04/20 1550    Specimen: Blood Updated: 11/04/20 1623     Lactate 4.1 mmol/L     CBC (No Diff) [[id number]]  (Abnormal) Collected: 11/04/20 1550    Specimen: Blood Updated: 11/04/20 1620     WBC 17.47 10*3/mm3      RBC 3.04 10*6/mm3      Hemoglobin 11.0 g/dL      Hematocrit 34.0 %      .8 fL      MCH 36.2 pg      MCHC 32.4 g/dL      RDW 14.4 %      RDW-SD 59.8 fl      MPV 11.0  fL      Platelets 340 10*3/mm3     Anaerobic Culture - Body Fluid, Peritoneum [[id number]] Collected: 11/04/20 1552    Specimen: Body Fluid from Peritoneum Updated: 11/04/20 1603    Lactic Acid, Reflex Timer (This will reflex a repeat order 3-3:15 hours after ordered.) [[id number]] Collected: 11/04/20 1136    Specimen: Blood Updated: 11/04/20 1515     Hold Tube Hold for add-ons.     Comment: Auto resulted.       Urinalysis, Microscopic Only - Urine, Clean Catch [[id number]]  (Abnormal) Collected: 11/04/20 1402    Specimen: Urine, Clean Catch Updated: 11/04/20 1435     RBC, UA 0-2 /HPF      WBC, UA 21-30 /HPF      Bacteria, UA 2+ /HPF      Squamous Epithelial Cells, UA 3-6 /HPF      Hyaline Casts, UA 3-6 /LPF      Amorphous Crystals, UA Small/1+ /HPF      Methodology Manual Light Microscopy    Urine Culture - Urine, Urine, Clean Catch [[id number]] Collected: 11/04/20 1402    Specimen: Urine, Clean Catch Updated: 11/04/20 1435    Urinalysis With Culture If Indicated - Urine, Clean Catch [[id number]]  (Abnormal) Collected: 11/04/20 1402    Specimen: Urine, Clean Catch Updated: 11/04/20 1434     Color, UA Other     Appearance, UA Cloudy     pH, UA 5.5     Specific Gravity, UA 1.020     Glucose,  mg/dL (Trace)     Ketones, UA Trace     Bilirubin, UA Large (3+)     Blood, UA Trace     Protein,  mg/dL (2+)     Leuk Esterase, UA Large (3+)     Nitrite, UA Positive     Urobilinogen, UA >=8.0 E.U./dL    Procalcitonin [[id number]]  (Abnormal) Collected: 11/04/20 1136    Specimen: Blood Updated: 11/04/20 1432     Procalcitonin 1.04 ng/mL     Narrative:      Results may be falsely decreased if patient taking Biotin.     COVID-19,[facility] IN-HOUSE,Nasal Swab No Transport Media 3-4 HR TAT - Swab, Nasal Cavity [[id number]]  (Normal) Collected: 11/04/20 1136    Specimen: Swab from Nasal Cavity Updated: 11/04/20 1214     COVID19 Not Detected    Narrative:      Fact sheet for providers: https://www.fda.gov/media/348282/download      Fact sheet for patients: https://www.fda.gov/media/401225/download    Scan Slide [[id number]] Collected: 11/04/20 1136    Specimen: Blood Updated: 11/04/20 1211     Macrocytes Mod/2+     WBC Morphology Normal     Platelet Estimate Adequate    Lactic Acid, Plasma [[id number]]  (Abnormal) Collected: 11/04/20 1136    Specimen: Blood Updated: 11/04/20 1210     Lactate 4.8 mmol/L     Lipase [[id number]]  (Abnormal) Collected: 11/04/20 1136    Specimen: Blood Updated: 11/04/20 1209     Lipase 364 U/L     Comprehensive Metabolic Panel [[id number]]  (Abnormal) Collected: 11/04/20 1136    Specimen: Blood Updated: 11/04/20 1209     Glucose 59 mg/dL      BUN 46 mg/dL      Creatinine 3.99 mg/dL      Sodium 120 mmol/L      Potassium 5.1 mmol/L      Chloride 78 mmol/L      CO2 13.6 mmol/L      Calcium 8.6 mg/dL      Total Protein 5.8 g/dL      Albumin 3.20 g/dL      ALT (SGPT) 96 U/L      AST (SGOT) 270 U/L      Alkaline Phosphatase 264 U/L      Total Bilirubin 9.3 mg/dL      eGFR Non African Amer 11 mL/min/1.73      Comment: <15 Indicative of kidney failure.        eGFR   Amer --     Comment: <15 Indicative of kidney failure.        Globulin 2.6 gm/dL      A/G Ratio 1.2 g/dL      BUN/Creatinine Ratio 11.5     Anion Gap 28.4 mmol/L     Narrative:      GFR Normal >60  Chronic Kidney Disease <60  Kidney Failure <15      Ammonia [[id number]]  (Normal) Collected: 11/04/20 1136    Specimen: Blood Updated: 11/04/20 1207     Ammonia 37 umol/L         Results for orders placed in visit on 03/10/15   SCANNED - ECHOCARDIOGRAM     Ct Abdomen Pelvis Without Contrast    Result Date: 11/4/2020  1. Marked interval increase in size of the liver with diffusely mottled low attenuation as compared to the prior studies of 11/20 and 4/24/2019. Patient has had underlying steatosis. The current changes could represent progression of steatosis or acute superimposed hepatitis. There is new ascites diffusely in the abdomen or pelvis which is  moderate to large in amount. No splenomegaly. The portal vein and splenic vein do not appear enlarged. 2. No gallstones seen 3. No definite distention of the stomach, small bowel or colon. 4. The bladder is distended. The wall is mildly thickened but no definite nodularity or bladder stone. Signer Name: Sheron Lundberg MD  Signed: 11/4/2020 1:18 PM  Workstation Name: [id number]  [facility]    Xr Chest 1 View    Result Date: 11/4/2020   1. Shallow lung expansion with bronchovascular crowding and probable bibasilar atelectasis.  This report was finalized on 11/4/2020 12:57 PM by Dr. Waqar Jerez MD.      Us Abdomen Limited    Result Date: 11/5/2020  1. No ascites amenable to ultrasound-guided paracentesis.  This report was finalized on 11/5/2020 11:28 AM by Dr. Waqar Jerez MD.          ASSESSMENT/PLAN:    Hypotension  -Presumably treating sepsis as a source of hypotension  -Unclear source, urinalysis likely contaminated due to patient's Bladder prolapse, CXR unconvincing for pneumonia, ascites fluid negative for SBP, CT scan of the abdomen did not show any acute abnormalities, will continue Rocephin to cover the broadest possible sources, discussed with nephrology and pulmonary  -We will continue to bolus with normal saline, and 25% albumin as needed (avoiding Lactated Ringers as hepatic function my not metabolize lactate effectively)  -We will get PICC line to help with access, have already bolused 2 L of fluid this morning, patient getting close to being theoretically fluid replete, will monitor for fluid responsiveness, and may need to start Levophed if her blood pressures fail to respond to fluids, with goal to keep maps above 65  -If mental status was not an issue, would consider p.o. midodrine     AMS  - ?Hepatic Enceph Ammonia was only 37 in ER, will repeat  - Most likely due to WD and Sepsis  - Is on CIWA    MONSTER, Likely Pre-renal  - Nephrology following, probably not Hepatorenal,  will continue to monitor, support Blood pressures, and hold diuretics   - Cr slightly better today    Alcoholic Hepatitis with coagulopathy  - Maddry's low enough to not suggest steroids, await input from GI    New onset ascites  -Paracentesis yesterday removed 2 L of fluid, will delay further paracentesis today due to instability of blood pressures, patient will likely reaccumulate and need paracentesis later  -Fluid studies negative for SBP, also already covering sepsis with Rocephin as above    Hypothyroidism  - Will start 25mcg IV levothyroxine to help support pt through Critical illness    Hyponatermia  - , 124 this AM, monitor closely    Malnutrition  - Monitor electrolytes closely, vitamin sup  - Feeding tube once more clinically stable    Diarrhea  -Plan prior to admission, none seen here, GI panel negative    HAGMA w/ Lactic Acidosis  - Multifactorial     Bladder prolapse  -Follows with [facility] in [facility] past, culture urine, as sepsis above    Critical Care Time: 07:25-07:35, 09:00-09:20, 12:30-12:45

## 2020-11-05 NOTE — NURSING NOTE
"Pt to have a paracentesis today in radiology. Notified Dr. Odom of pts low Bp. Was 60/49 (55) at 0740. Currently a 1 L NS bolus is infusing and BP currently 93/60(72). Per Dr. Odom, \"wait to do paracentesis later this afternoon, if BP improves/more stable. If not, do it tomorrow since she had over 2 L taken off yesterday and is not currently showing any signs of respiratory distress.\" Notified Rachel in radiology. She is going to check with radiologist and see if pt can have paracentesis done later his afternoon, instead of this AM.   "

## 2020-11-05 NOTE — CONSULTS
Patient Care Team:  Tayler Trujillo MD as PCP - General    CHIEF COMPLAINT: Alcoholic Hepatitis    HISTORY OF PRESENT ILLNESS:  69 yo ALcoholic presents with jaundice found to havbe SevereAH and MONSTER, diwscussed with Dr Carpenter who, we decided, will not start steroids but did get her Tapped and r/o SBP. She was quite aggitated last PM and required 8 mg of Ativan for DTs but is waking up more calm this evening. Her BP is slightly better with 3 L of IVF boluses but has not required pressors. With her hydration she has dropped her HGB but no melena nor hematemesis  Her Creat has improved from 3.9 to 2.9    Past Medical History:   Diagnosis Date   • Elevated liver enzymes    • Fatty liver    • Frequent UTI    • History of depression    • Hypertension    • Lumbar stenosis    • OA (osteoarthritis)    • Post-menopausal    • Spondylarthritis     LOW BACK   • Stress incontinence    • Urinary retention      Past Surgical History:   Procedure Laterality Date   • ABDOMINOPLASTY     • BREAST SURGERY     • CATARACT EXTRACTION Bilateral    •  SECTION      X3   • COLONOSCOPY      2006   • COLONOSCOPY N/A 2016    Procedure: COLONOSCOPY;  Surgeon: Ho Arenas MD;  Location: [facility] ENDOSCOPY;  Service:    • LASIK     • LUMBAR DISCECTOMY FUSION INSTRUMENTATION N/A 10/1/2018    Procedure: L4-5, L5-S1  laminectomy and fusion with instrumentation;  Surgeon: Juno Kim MD;  Location: [facility] OR;  Service: Orthopedic Spine   • REDUCTION MAMMAPLASTY     • TONSILLECTOMY     • TOTAL SHOULDER ARTHROPLASTY W/ DISTAL CLAVICLE EXCISION Right 2019    Procedure: TOTAL SHOULDER REVERSE ARTHROPLASTY;  Surgeon: David Marion MD;  Location: [facility] OR;  Service: Orthopedics     Family History   Problem Relation Age of Onset   • Colon polyps Father    • Breast cancer Maternal Aunt    • Malig Hyperthermia Neg Hx      Social History     Tobacco Use   • Smoking status: Never Smoker   • Smokeless tobacco: Never Used  "  Substance Use Topics   • Alcohol use: Yes     Alcohol/week: 4.0 standard drinks     Types: 4 Shots of liquor per week     Comment: \"too much\" per day sijnce her spouse    • Drug use: No     Medications Prior to Admission   Medication Sig Dispense Refill Last Dose   • amLODIPine (NORVASC) 10 MG tablet Take 10 mg by mouth Every Morning.  3 11/3/2020 at 0900   • Cholecalciferol (Vitamin D3) 1.25 MG (50034 UT) capsule Pt takes on wednesday   10/28/2020 at 0900   • enalapril (VASOTEC) 20 MG tablet Take 20 mg by mouth Daily.   11/3/2020 at 0900   • ESTRACE VAGINAL 0.1 MG/GM vaginal cream Insert 2 g into the vagina Daily As Needed.   Unknown at Unknown time   • Fluzone High-Dose Quadrivalent 0.7 ML suspension prefilled syringe ADM 0.7ML IM UTD   Unknown at Unknown time   • KLOR-CON 8 MEQ CR tablet Take 8 mEq by mouth 2 (Two) Times a Day As Needed. Pt does not remember when she had this last   Unknown at Unknown time   • metoprolol succinate XL (TOPROL-XL) 25 MG 24 hr tablet Take 25 mg by mouth Every Morning.   11/3/2020 at 0900     Allergies:  Patient has no known allergies.    REVIEW OF SYSTEMS:  Please see the above history of present illness for pertinent positives and negatives.  The remainder of the patient's systems have been reviewed and are negative.     Vital Signs  Temp:  [98.1 °F (36.7 °C)-98.3 °F (36.8 °C)] 98.1 °F (36.7 °C)  Heart Rate:  [] 98  Resp:  [16-20] 18  BP: ()/(44-92) 94/60    Flowsheet Rows      First Filed Value   Admission Height  157.5 cm (62\") Documented at 2020 1107   Admission Weight  59 kg (130 lb) Documented at 2020 1107           Physical Exam:  Physical Exam   Constitutional: Patient appears well-developed and well-nourished and in no acute distress   HEENT:   Head: Normocephalic and atraumatic.   Eyes:  Pupils are equal, round, and reactive to light. EOM are intact. Sclerae areIcteric and non-injected.  Mouth and Throat: Patient has moist mucous membranes. " Oropharynx is clear of any erythema or exudate.     Neck: Neck supple. No JVD present. No thyromegaly present. No lymphadenopathy present.  Cardiovascular: Regular rate, regular rhythm, S1 normal and S2 normal.  Exam reveals no gallop and no friction rub.  No murmur heard.  Pulmonary/Chest: Lungs are clear to auscultation bilaterally. No respiratory distress. No wheezes. No rhonchi. No rales.   Abdominal: Soft. Bowel sounds are normal. No distension and no mass. There is no hepatosplenomegaly. There is no tenderness. NO percussible ascites  Musculoskeletal: Normal Muscle tone  Extremities: 1+ edema. Pulses are palpable in all 4 extremities.  Neurological: Patient is alert and oriented to person, place, and time. Cranial nerves II-XII are grossly intact with no focal deficits.  Skin: Skin is warm. No rash noted. Nails show no clubbing.  No cyanosis or erythema.palmar erythema and spider angiomata    Debilities/Disabilities Identified: None  Emotional Behavior: Appropriate     Results Review:    I reviewed the patient's new clinical results.  Lab Results (most recent)     Procedure Component Value Units Date/Time    Urine Culture - Urine, Urine, Clean Catch [[id number]]  (Normal) Collected: 11/04/20 1402    Specimen: Urine, Clean Catch Updated: 11/05/20 1723     Urine Culture No growth    Cortisol [[id number]] Collected: 11/05/20 0601    Specimen: Blood Updated: 11/05/20 1654     Cortisol 21.27 mcg/dL     Narrative:      Cortisol Reference Ranges:    Cortisol 6AM - 10AM Range: 6.02-18.40 mcg/dl  Cortisol 4PM - 8PM Range: 2.68-10.50 mcg/dl      Results may be falsely increased if patient taking Biotin.      Creatinine, Urine, Random - Urine, Clean Catch [[id number]] Collected: 11/05/20 0843    Specimen: Urine, Clean Catch Updated: 11/05/20 1643     Creatinine, Urine 237.5 mg/dL     Narrative:      Reference intervals for random urine have not been established.  Clinical usage is dependent upon physician's interpretation  in combination with other laboratory tests.       Ammonia [[id number]]  (Abnormal) Collected: 11/05/20 1341    Specimen: Blood Updated: 11/05/20 1535     Ammonia 87 umol/L     Magnesium [[id number]]  (Normal) Collected: 11/05/20 1341    Specimen: Blood Updated: 11/05/20 1404     Magnesium 2.4 mg/dL     Basic Metabolic Panel [[id number]]  (Abnormal) Collected: 11/05/20 1341    Specimen: Blood Updated: 11/05/20 1404     Glucose 83 mg/dL      BUN 48 mg/dL      Creatinine 2.97 mg/dL      Sodium 124 mmol/L      Potassium 5.0 mmol/L      Comment: Slight hemolysis detected by analyzer. Results may be affected.        Chloride 95 mmol/L      CO2 10.8 mmol/L      Calcium 7.2 mg/dL      eGFR Non African Amer 16 mL/min/1.73      BUN/Creatinine Ratio 16.2     Anion Gap 18.2 mmol/L     Narrative:      GFR Normal >60  Chronic Kidney Disease <60  Kidney Failure <15      Phosphorus [[id number]]  (Normal) Collected: 11/05/20 1341    Specimen: Blood Updated: 11/05/20 1403     Phosphorus 2.8 mg/dL     Gastrointestinal Panel, PCR - Stool, Per Rectum [[id number]]  (Normal) Collected: 11/04/20 1858    Specimen: Stool from Per Rectum Updated: 11/05/20 1209     Campylobacter Not Detected     Plesiomonas shigelloides Not Detected     Salmonella Not Detected     Vibrio Not Detected     Vibrio cholerae Not Detected     Yersinia enterocolitica Not Detected     Enteroaggregative E. coli (EAEC) Not Detected     Enteropathogenic E. coli (EPEC) Not Detected     Enterotoxigenic E. coli (ETEC) lt/st Not Detected     Shiga-like toxin-producing E. coli (STEC) stx1/stx2 Not Detected     E. coli O157 Not Detected     Shigella/Enteroinvasive E. coli (EIEC) Not Detected     Cryptosporidium Not Detected     Cyclospora cayetanensis Not Detected     Entamoeba histolytica Not Detected     Giardia lamblia Not Detected     Adenovirus F40/41 Not Detected     Astrovirus Not Detected     Norovirus GI/GII Not Detected     Rotavirus A Not Detected     Sapovirus (I, II,  IV or V) Not Detected    Narrative:      If Aeromonas, Staphylococcus aureus or Bacillus cereus are suspected, please order [id number]: Stool Culture, Aeromonas, S aureus, B Cereus.    Blood Culture - Blood, Blood, Venous Line [[id number]] Collected: 11/04/20 1136    Specimen: Blood, Venous Line Updated: 11/05/20 1145     Blood Culture No growth at 24 hours    Osmolality, Urine - Urine, Clean Catch [[id number]] Collected: 11/05/20 0843    Specimen: Urine, Clean Catch Updated: 11/05/20 1127     Osmolality, Urine 375 mOsm/kg     Narrative:      Osmo Normal Reference Ranges:    Random:  mOsm/kg H2O, depending on fluid intake.  Random: >850 mOsm/kg H20, after 12 hour fluid restriction.    24 Hour: 300-900 mOsm/kg H2O.    Body Fluid Culture - Body Fluid, Peritoneum [[id number]] Collected: 11/04/20 1552    Specimen: Body Fluid from Peritoneum Updated: 11/05/20 1002     Body Fluid Culture No growth     Gram Stain Rare (1+) WBCs seen      No organisms seen    Sodium, Urine, Random - Urine, Clean Catch [[id number]] Collected: 11/05/20 0843    Specimen: Urine, Clean Catch Updated: 11/05/20 0909     Sodium, Urine 30 mmol/L     Narrative:      Reference intervals for random urine have not been established.  Clinical usage is dependent upon physician's interpretation in combination with other laboratory tests.       Comprehensive Metabolic Panel [[id number]]  (Abnormal) Collected: 11/05/20 0601    Specimen: Blood Updated: 11/05/20 0625     Glucose 85 mg/dL      BUN 48 mg/dL      Creatinine 3.54 mg/dL      Sodium 124 mmol/L      Potassium 4.9 mmol/L      Chloride 89 mmol/L      CO2 16.2 mmol/L      Calcium 7.5 mg/dL      Total Protein 4.7 g/dL      Albumin 2.50 g/dL      ALT (SGPT) 82 U/L      AST (SGOT) 217 U/L      Alkaline Phosphatase 199 U/L      Total Bilirubin 8.0 mg/dL      eGFR Non African Amer 13 mL/min/1.73      Comment: <15 Indicative of kidney failure.        eGFR   Amer --     Comment: <15 Indicative of kidney  failure.        Globulin 2.2 gm/dL      A/G Ratio 1.1 g/dL      BUN/Creatinine Ratio 13.6     Anion Gap 18.8 mmol/L     Narrative:      GFR Normal >60  Chronic Kidney Disease <60  Kidney Failure <15      CBC & Differential [[id number]]  (Abnormal) Collected: 11/05/20 0444    Specimen: Blood Updated: 11/05/20 0602    Narrative:      The following orders were created for panel order CBC & Differential.  Procedure                               Abnormality         Status                     ---------                               -----------         ------                     CBC Auto Differential[[id number]]        Abnormal            Final result                 Please view results for these tests on the individual orders.    CBC Auto Differential [[id number]]  (Abnormal) Collected: 11/05/20 0444    Specimen: Blood Updated: 11/05/20 0602     WBC 14.77 10*3/mm3      RBC 2.55 10*6/mm3      Hemoglobin 9.5 g/dL      Hematocrit 28.0 %      .8 fL      MCH 37.3 pg      MCHC 33.9 g/dL      RDW 14.6 %      RDW-SD 58.4 fl      MPV 11.8 fL      Platelets 257 10*3/mm3      Neutrophil % 77.1 %      Lymphocyte % 11.6 %      Monocyte % 9.8 %      Eosinophil % 0.2 %      Basophil % 0.2 %      Immature Grans % 1.1 %      Neutrophils, Absolute 11.38 10*3/mm3      Lymphocytes, Absolute 1.72 10*3/mm3      Monocytes, Absolute 1.45 10*3/mm3      Eosinophils, Absolute 0.03 10*3/mm3      Basophils, Absolute 0.03 10*3/mm3      Immature Grans, Absolute 0.16 10*3/mm3      nRBC 0.5 /100 WBC     Procalcitonin [[id number]]  (Abnormal) Collected: 11/05/20 0444    Specimen: Blood Updated: 11/05/20 0549     Procalcitonin 0.97 ng/mL     Narrative:      Results may be falsely decreased if patient taking Biotin.     Phosphorus [[id number]]  (Normal) Collected: 11/05/20 0444    Specimen: Blood Updated: 11/05/20 0549     Phosphorus 2.8 mg/dL     Uric Acid [[id number]]  (Abnormal) Collected: 11/05/20 0444    Specimen: Blood Updated: 11/05/20 0549     Uric  Acid 12.9 mg/dL     Magnesium [[id number]]  (Abnormal) Collected: 11/05/20 0444    Specimen: Blood Updated: 11/05/20 0549     Magnesium 1.4 mg/dL     Protime-INR [[id number]]  (Abnormal) Collected: 11/05/20 0444    Specimen: Blood Updated: 11/05/20 0535     Protime 16.8 Seconds      INR 1.41    Narrative:      Therapeutic Ranges for INR: 2.0-3.0 (PT 20-30)                              2.5-3.5 (PT 25-34)    Vitamin B12 & Folate [[id number]]  (Abnormal) Collected: 11/04/20 1800    Specimen: Blood Updated: 11/04/20 2340     Folate 2.45 ng/mL      Vitamin B-12 1,307 pg/mL     Narrative:      Results may be falsely increased if patient taking Biotin.      Albumin, Fluid - Body Fluid, Peritoneum [[id number]] Collected: 11/04/20 1553    Specimen: Body Fluid from Peritoneum Updated: 11/04/20 2057     Albumin, Fluid 0.90 g/dL     Narrative:      No Reference Ranges Established.    A Serous fluid albumin gradient (serum albumin-fluid) <1.1 g/dL suggests the fluid is an exudate.  Cirrhosis usually results in an ascites fluid albumin gradient >1.1 g/dL.    This test was developed, its performance characteristics determined and judged suitable for clinical purposes by [facility].  It has not been cleared or approved by the FDA.  The laboratory is regulated under CLIA as qualified to perfom high-complexity testing.      T4, Free [[id number]]  (Normal) Collected: 11/04/20 1136    Specimen: Blood Updated: 11/04/20 2053     Free T4 1.03 ng/dL     Narrative:      Results may be falsely increased if patient taking Biotin.      Hepatitis Panel, Acute [[id number]]  (Normal) Collected: 11/04/20 1610    Specimen: Blood Updated: 11/04/20 2050     Hepatitis B Surface Ag Non-Reactive     Hep A IgM Non-Reactive     Hep B C IgM Non-Reactive     Hepatitis C Ab Non-Reactive    Narrative:      Results may be falsely decreased if patient taking Biotin.     Iron Profile [[id number]]  (Abnormal) Collected: 11/04/20 1136     Specimen: Blood Updated: 11/04/20 1947     Iron 104 mcg/dL      Iron Saturation --     Comment: Unable to calculate        UIBC <16 mcg/dL      TIBC --     Comment: Unable to calculate       TSH [[id number]]  (Abnormal) Collected: 11/04/20 1136    Specimen: Blood Updated: 11/04/20 1816     TSH 16.950 uIU/mL     Body Fluid Cell Count With Differential - Body Fluid, Peritoneum [[id number]] Collected: 11/04/20 1553    Specimen: Body Fluid from Peritoneum Updated: 11/04/20 1710    Narrative:      The following orders were created for panel order Body Fluid Cell Count With Differential - Body Fluid, Peritoneum.  Procedure                               Abnormality         Status                     ---------                               -----------         ------                     Body fluid cell count - ...[[id number]]                      Final result               Body fluid differential ...[[id number]]                      Final result                 Please view results for these tests on the individual orders.    Body fluid differential - Body Fluid, Peritoneum [[id number]] Collected: 11/04/20 1553    Specimen: Body Fluid from Peritoneum Updated: 11/04/20 1710     Neutrophils, Fluid 19 %      Lymphocytes, Fluid 38 %      Monocytes, Fluid 37 %      Eosinophils, Fluid 1 %      Basophils, Fluid 0 %      Mononuclear, Fluid 1 %      Plasma Cells, Fluid 0 %      Blasts, Fluid 0 %      Unclassified Cells, Fluid 0 %      Mesothelial Cells, Fluid 0 %      Macrophage, Fluid 4 %     CK [[id number]]  (Abnormal) Collected: 11/04/20 1136    Specimen: Blood Updated: 11/04/20 1705     Creatine Kinase 195 U/L     Body fluid cell count - Body Fluid, Peritoneum [[id number]] Collected: 11/04/20 1553    Specimen: Body Fluid from Peritoneum Updated: 11/04/20 1631     Color, Fluid Yellow     Appearance, Fluid Clear     WBC, Fluid 105 /mm3      RBC, Fluid 0 /mm3     Protime-INR [[id number]]  (Abnormal) Collected: 11/04/20 1550    Specimen:  Blood Updated: 11/04/20 1627     Protime 15.6 Seconds      INR 1.28    Narrative:      Therapeutic Ranges for INR: 2.0-3.0 (PT 20-30)                              2.5-3.5 (PT 25-34)    Lactic Acid, Reflex [[id number]]  (Abnormal) Collected: 11/04/20 1550    Specimen: Blood Updated: 11/04/20 1623     Lactate 4.1 mmol/L     CBC (No Diff) [[id number]]  (Abnormal) Collected: 11/04/20 1550    Specimen: Blood Updated: 11/04/20 1620     WBC 17.47 10*3/mm3      RBC 3.04 10*6/mm3      Hemoglobin 11.0 g/dL      Hematocrit 34.0 %      .8 fL      MCH 36.2 pg      MCHC 32.4 g/dL      RDW 14.4 %      RDW-SD 59.8 fl      MPV 11.0 fL      Platelets 340 10*3/mm3     Anaerobic Culture - Body Fluid, Peritoneum [[id number]] Collected: 11/04/20 1552    Specimen: Body Fluid from Peritoneum Updated: 11/04/20 1603    Lactic Acid, Reflex Timer (This will reflex a repeat order 3-3:15 hours after ordered.) [[id number]] Collected: 11/04/20 1136    Specimen: Blood Updated: 11/04/20 1515     Hold Tube Hold for add-ons.     Comment: Auto resulted.       Urinalysis, Microscopic Only - Urine, Clean Catch [[id number]]  (Abnormal) Collected: 11/04/20 1402    Specimen: Urine, Clean Catch Updated: 11/04/20 1435     RBC, UA 0-2 /HPF      WBC, UA 21-30 /HPF      Bacteria, UA 2+ /HPF      Squamous Epithelial Cells, UA 3-6 /HPF      Hyaline Casts, UA 3-6 /LPF      Amorphous Crystals, UA Small/1+ /HPF      Methodology Manual Light Microscopy    Urinalysis With Culture If Indicated - Urine, Clean Catch [[id number]]  (Abnormal) Collected: 11/04/20 1402    Specimen: Urine, Clean Catch Updated: 11/04/20 1434     Color, UA Other     Appearance, UA Cloudy     pH, UA 5.5     Specific Gravity, UA 1.020     Glucose,  mg/dL (Trace)     Ketones, UA Trace     Bilirubin, UA Large (3+)     Blood, UA Trace     Protein,  mg/dL (2+)     Leuk Esterase, UA Large (3+)     Nitrite, UA Positive     Urobilinogen, UA >=8.0 E.U./dL    Procalcitonin [[id number]]   (Abnormal) Collected: 11/04/20 1136    Specimen: Blood Updated: 11/04/20 1432     Procalcitonin 1.04 ng/mL     Narrative:      Results may be falsely decreased if patient taking Biotin.     COVID-19,[facility] IN-HOUSE,Nasal Swab No Transport Media 3-4 HR TAT - Swab, Nasal Cavity [[id number]]  (Normal) Collected: 11/04/20 1136    Specimen: Swab from Nasal Cavity Updated: 11/04/20 1214     COVID19 Not Detected    Narrative:      Fact sheet for providers: https://www.fda.gov/media/518897/download     Fact sheet for patients: https://www.fda.gov/media/303773/download    Scan Slide [[id number]] Collected: 11/04/20 1136    Specimen: Blood Updated: 11/04/20 1211     Macrocytes Mod/2+     WBC Morphology Normal     Platelet Estimate Adequate    Lactic Acid, Plasma [[id number]]  (Abnormal) Collected: 11/04/20 1136    Specimen: Blood Updated: 11/04/20 1210     Lactate 4.8 mmol/L     Lipase [[id number]]  (Abnormal) Collected: 11/04/20 1136    Specimen: Blood Updated: 11/04/20 1209     Lipase 364 U/L     Comprehensive Metabolic Panel [[id number]]  (Abnormal) Collected: 11/04/20 1136    Specimen: Blood Updated: 11/04/20 1209     Glucose 59 mg/dL      BUN 46 mg/dL      Creatinine 3.99 mg/dL      Sodium 120 mmol/L      Potassium 5.1 mmol/L      Chloride 78 mmol/L      CO2 13.6 mmol/L      Calcium 8.6 mg/dL      Total Protein 5.8 g/dL      Albumin 3.20 g/dL      ALT (SGPT) 96 U/L      AST (SGOT) 270 U/L      Alkaline Phosphatase 264 U/L      Total Bilirubin 9.3 mg/dL      eGFR Non African Amer 11 mL/min/1.73      Comment: <15 Indicative of kidney failure.        eGFR   Amer --     Comment: <15 Indicative of kidney failure.        Globulin 2.6 gm/dL      A/G Ratio 1.2 g/dL      BUN/Creatinine Ratio 11.5     Anion Gap 28.4 mmol/L     Narrative:      GFR Normal >60  Chronic Kidney Disease <60  Kidney Failure <15      Ammonia [[id number]]  (Normal) Collected: 11/04/20 1136    Specimen: Blood Updated: 11/04/20 1207     Ammonia 37 umol/L      aPTT [[id number]]  (Normal) Collected: 11/04/20 1136    Specimen: Blood Updated: 11/04/20 1157     PTT 32.6 seconds     Narrative:      PTT = The equivalent PTT values for the therapeutic range of heparin levels at 0.1 to 0.7 U/ml are 53 to 110 seconds.      CBC & Differential [[id number]]  (Abnormal) Collected: 11/04/20 1136    Specimen: Blood Updated: 11/04/20 1148    Narrative:      The following orders were created for panel order CBC & Differential.  Procedure                               Abnormality         Status                     ---------                               -----------         ------                     CBC Auto Differential[[id number]]        Abnormal            Final result                 Please view results for these tests on the individual orders.    CBC Auto Differential [[id number]]  (Abnormal) Collected: 11/04/20 1136    Specimen: Blood Updated: 11/04/20 1148     WBC 19.31 10*3/mm3      RBC 3.00 10*6/mm3      Hemoglobin 11.0 g/dL      Hematocrit 33.5 %      .7 fL      MCH 36.7 pg      MCHC 32.8 g/dL      RDW 14.6 %      RDW-SD 60.2 fl      MPV 11.0 fL      Platelets 334 10*3/mm3      Neutrophil % 79.9 %      Lymphocyte % 8.2 %      Monocyte % 10.9 %      Eosinophil % 0.1 %      Basophil % 0.1 %      Immature Grans % 0.8 %      Neutrophils, Absolute 15.42 10*3/mm3      Lymphocytes, Absolute 1.59 10*3/mm3      Monocytes, Absolute 2.11 10*3/mm3      Eosinophils, Absolute 0.01 10*3/mm3      Basophils, Absolute 0.02 10*3/mm3      Immature Grans, Absolute 0.16 10*3/mm3           Imaging Results (Most Recent)     Procedure Component Value Units Date/Time    XR Chest 1 View [[id number]] Collected: 11/05/20 1505     Updated: 11/05/20 1509    Narrative:      XR CHEST 1 VW-: 11/5/2020 2:52 PM     INDICATION:   70-year-old female presenting for PICC line placement      COMPARISON:    11/04/2020.     FINDINGS:  Single Portable AP view(s) of the chest. Status post right shoulder  replacement.  There is a new right-sided PICC line present. The tip  extends to the level of the right atrium. If positioning at the  cavoatrial junction is desired it could be retracted about 4 cm. No  pneumothorax. Cardiac silhouette borderline in size and stable. Shallow  lung expansion with low lung volumes and probable bibasilar atelectasis.  Persistent eventration or elevation of the right hemidiaphragm.       Impression:         1. New right-sided PICC line tip at the right atrial level. If  positioning at the cavoatrial junction is desired it could be retracted  about 4 cm. No pneumothorax.  2. No other significant interval change.     This report was finalized on 11/5/2020 3:07 PM by Dr. Waqar Jerez MD.       US Abdomen Limited [[id number]] Collected: 11/05/20 1125     Updated: 11/05/20 1416    Addenda:        Addendum: By report, the patient underwent a bedside paracentesis  following the abdomen and pelvis CT performed yesterday, which accounts  for the lack of ascites amenable to paracentesis today.     This report was finalized on 11/5/2020 2:14 PM by Dr. Waqar Jerez MD.     Signed: 11/05/20 1414 by Waqar Jerez MD    Narrative:      FOUR-QUADRANT ULTRASOUND TO ASSESS FOR THE PRESENCE OR ABSENCE OF  ASCITES FOR PLANNED PARACENTESIS     HISTORY:  70-year-old female with abnormal CT scan performed yesterday. Ascites on  prior CT.     TECHNIQUE:  Four-quadrant ultrasound was performed. Correlation made with CT  performed yesterday.     FINDINGS:  There is no ascites amenable to ultrasound-guided paracentesis on the  provided images. The majority of the ascites on the prior CT localizes  to the pelvis.       Impression:      1. No ascites amenable to ultrasound-guided paracentesis.     This report was finalized on 11/5/2020 11:28 AM by Dr. Waqar Jerez MD.       CT Abdomen Pelvis Without Contrast [[id number]] Collected: 11/04/20 1318     Updated: 11/04/20 1320    Narrative:      CT Abdomen Pelvis  WO    INDICATION:   70-year-old emergency department patient with abdominal pain diffusely and fever for one week. Recent antibiotic therapy for chronic urinary tract infection.    TECHNIQUE:   CT of the abdomen and pelvis without IV contrast. Coronal and sagittal reconstructions were obtained.  Radiation dose reduction techniques included automated exposure control or exposure modulation based on body size. Count of known CT and cardiac nuc  med studies performed in previous 12 months: 0.     COMPARISON:   Ultrasound liver 8/11/2020, CT abdomen and pelvis 4/24/2019    FINDINGS:  Abdomen: The included images through the lung bases demonstrate no acute pulmonary density or pleural effusion. The distal esophagus appears normal  On these noncontrasted images the liver is diffusely abnormal with diffuse low attenuation in a mottled appearance. Liver is enlarged measuring 23.6 cm cephalocaudad previously measuring 17.3 cm on the ultrasound of 8/11/2020 and 18.4 cm on the CT scan  of 4/24/2019 suggesting a significant interval increase in size. The spleen is normal in size and density, however there is a new large amount of ascites in the abdomen, paracolic gutters and pelvis. The gallbladder appears dense relative to the low  density liver but no stones are seen. There is pericholecystic fluid likely part of the generalized ascites. The pancreas, pancreatic duct, common bile ducts and adrenal glands are normal. The kidneys are unremarkable    The stomach, small bowel and colon have a normal noncontrasted appearance.    Pelvis: The bladder is distended. The wall appears slightly prominent diffusely but no nodularity is seen. No bladder stone. This is small postmenopausal uterus. No adnexal mass.  There is mild diffuse subcutaneous edema over the anterior abdominal wall which could be part of anasarca.    Bone window images demonstrate demonstrates stable spinal fusion hardware posteriorly from L4 through S1 with severe  anterolisthesis of L5 on S1 unchanged.      Impression:        1. Marked interval increase in size of the liver with diffusely mottled low attenuation as compared to the prior studies of 11/20 and 4/24/2019. Patient has had underlying steatosis. The current changes could represent progression of steatosis or acute  superimposed hepatitis. There is new ascites diffusely in the abdomen or pelvis which is moderate to large in amount. No splenomegaly. The portal vein and splenic vein do not appear enlarged.  2. No gallstones seen  3. No definite distention of the stomach, small bowel or colon.  4. The bladder is distended. The wall is mildly thickened but no definite nodularity or bladder stone.          Signer Name: Sheron Lundberg MD   Signed: 11/4/2020 1:18 PM   Workstation Name: [id number]    [facility]    XR Chest 1 View [[id number]] Collected: 11/04/20 1256     Updated: 11/04/20 1259    Narrative:      XR CHEST 1 VW-: 11/4/2020 12:48 PM     INDICATION:   Short of air and weakness several days.      COMPARISON:    None available.     FINDINGS:  Single Portable AP view(s) of the chest. Status post right shoulder  replacement. Cardiac silhouette is within normal limits. Shallow lung  expansion and bronchovascular crowding. Probable platelike atelectasis  in the left base and atelectatic change in the right base. Mild  eventration or elevation of the right hemidiaphragm. No distinct  effusion or pneumothorax.       Impression:         1. Shallow lung expansion with bronchovascular crowding and probable  bibasilar atelectasis.     This report was finalized on 11/4/2020 12:57 PM by Dr. Waqar Jerez MD.           reviewed    ECG/EMG Results (most recent)     None        reviewed    Assessment/Plan   Alcoholic Hepatitis   MONSTER  DTs  Hypotension    Would minimize her sedation due to its effect on her BP but she is  calmer now and her BP is better- agree with fluid management and hopefully can  start Diuretics as soon as tomorrow but will leave it to renal , for a repeat paracentesis is easily scheduled.Has clears ordered but hasnt been withit enough to even try today.NOthing specific to add but will follow along.        I discussed the patients findings and my recommendations with patient.     Terrell Stanton MD  11/05/20  18:21 EST    Time: 10 min prior to procedure.

## 2020-11-05 NOTE — NURSING NOTE
Continued Stay Note  DORIS Cobos     Patient Name: Tayler Lugo  MRN: [id number]  Today's Date: 11/5/2020    Admit Date: 11/4/2020    Discharge Plan     Row Name 11/05/20 1226       Plan    Plan Comments  Attempt to see patient, staff at bedside for PICC placement. CM will follow up this afternoon.        Discharge Codes    No documentation.             Michael Peraza RN

## 2020-11-05 NOTE — CONSULTS
"[facility]    Reason for consult: critical care co -management    HPI:  Mrs. Lugo is a 69yo WF with daily alcohol use.  She presented to the ER yesterday with about a 1 week history of diarrhea.  Prior to this it has been intermittent for months.  She has developed abdominal pain, generalized as well.  She had a fall about a few days ago as well.  She has had some edema and chills.  She got a paracentesis yesterday.    Past Medical History:   Diagnosis Date   • Elevated liver enzymes    • Fatty liver    • Frequent UTI    • History of depression    • Hypertension    • Lumbar stenosis    • OA (osteoarthritis)    • Post-menopausal    • Spondylarthritis     LOW BACK   • Stress incontinence    • Urinary retention      Social History     Socioeconomic History   • Marital status:      Spouse name: Not on file   • Number of children: Not on file   • Years of education: Not on file   • Highest education level: Not on file   Tobacco Use   • Smoking status: Never Smoker   • Smokeless tobacco: Never Used   Substance and Sexual Activity   • Alcohol use: Yes     Alcohol/week: 4.0 standard drinks     Types: 4 Shots of liquor per week     Comment: \"too much\" per day sijnce her spouse    • Drug use: No   • Sexual activity: Defer     Family History   Problem Relation Age of Onset   • Colon polyps Father    • Breast cancer Maternal Aunt    • Malig Hyperthermia Neg Hx      MEDS: reviewed as per chart  ALL: nkda  ROS: UTO    Vital Sign Min/Max for last 24 hours  Temp  Min: 94.6 °F (34.8 °C)  Max: 98.4 °F (36.9 °C)   BP  Min: 74/45  Max: 124/71   Pulse  Min: 76  Max: 114   Resp  Min: 16  Max: 24   SpO2  Min: 88 %  Max: 100 %   No data recorded   Weight  Min: 59 kg (130 lb)  Max: 65.5 kg (144 lb 8 oz)     GEN: appears ill,   HEENT: PERRL, EOMI, no icterus, mmm, no jvd, trachea midline, neck supple  CHEST: CTA bilat, no wheezes, no crackles, no use of accessory muscles  CV: RRR, no m/g/r  ABD: soft, nt, nd " +bs, no hepatosplenomegaly  EXT: no c/c/e  SKIN: no rashes, no xanthomas, nl turgor, warm, dry  LYMPH: no palpable cervical or supraclavicular lymphadenopathy  NEURO: CN 2-12 intact and symmetric bilaterally  PSYCH:  MSK: No kyphoscoliosis, 5/5 strength ue and le bilaterally    Labs: 11/5: reviewed:  Glucose 85  Bun 48  Cr 3.54 (3.99)  Na 124  Bicarb 16.2  Total protein 4.7  Albumin 2.5  Alt 82  ast 217  Alk phos 199  tbili 8  Mg 1.4  procal 0.97  Wbc 14.77  hgb 9.5  plts 257  Peritoneal fluid -- 105 wbc  11/4: lipase 364  tsh 16.95; t4 1.03  Ct abd/pelvis: no pleural effusion, visualized lung fields look clear  inr 1.4    A/P:  1. Sepsis with septic shock -- seems to be improving with iv fluids, albumin given. Pressors if needed, check cortisol, consider po midodrine if needed. procal is improving --consider keeping the rocephin as opposed to the zoysn?  2. MONSTER -- renal following, support bp, avoid nephrotoxins, support volume avoid further paracentesis  If able  3. ascites  4. Acute alcoholic hepatitis--defer need for steroids to GI  5. Hyponatremia  6. Alcohol abuse with dependence  7. hypoalbumin  8. Anemia  9. Encephalopathy -- check ammonia  10. Nutrition -- will hold off on feeding tube at this point.    Discussed with family at bedside, RN, Dr. Vieira and DR. Odom    CC 35 mins.

## 2020-11-06 LAB
ALBUMIN SERPL-MCNC: 2.6 G/DL (ref 3.5–5.2)
ALBUMIN/GLOB SERPL: 1.2 G/DL
ALP SERPL-CCNC: 180 U/L (ref 39–117)
ALT SERPL W P-5'-P-CCNC: 77 U/L (ref 1–33)
AMMONIA BLD-SCNC: 82 UMOL/L (ref 11–51)
ANION GAP SERPL CALCULATED.3IONS-SCNC: 18.8 MMOL/L (ref 5–15)
AST SERPL-CCNC: 223 U/L (ref 1–32)
BASOPHILS # BLD AUTO: 0.03 10*3/MM3 (ref 0–0.2)
BASOPHILS NFR BLD AUTO: 0.2 % (ref 0–1.5)
BILIRUB SERPL-MCNC: 7.6 MG/DL (ref 0–1.2)
BUN SERPL-MCNC: 46 MG/DL (ref 8–23)
BUN/CREAT SERPL: 26.6 (ref 7–25)
CALCIUM SPEC-SCNC: 7.1 MG/DL (ref 8.6–10.5)
CHLORIDE SERPL-SCNC: 99 MMOL/L (ref 98–107)
CO2 SERPL-SCNC: 14.2 MMOL/L (ref 22–29)
CORTIS SERPL-MCNC: 20.43 MCG/DL
CREAT SERPL-MCNC: 1.73 MG/DL (ref 0.57–1)
DEPRECATED RDW RBC AUTO: 60.6 FL (ref 37–54)
EOSINOPHIL # BLD AUTO: 0.1 10*3/MM3 (ref 0–0.4)
EOSINOPHIL NFR BLD AUTO: 0.7 % (ref 0.3–6.2)
ERYTHROCYTE [DISTWIDTH] IN BLOOD BY AUTOMATED COUNT: 14.6 % (ref 12.3–15.4)
GFR SERPL CREATININE-BSD FRML MDRD: 29 ML/MIN/1.73
GLOBULIN UR ELPH-MCNC: 2.2 GM/DL
GLUCOSE SERPL-MCNC: 83 MG/DL (ref 65–99)
HCT VFR BLD AUTO: 27 % (ref 34–46.6)
HGB BLD-MCNC: 8.7 G/DL (ref 12–15.9)
IMM GRANULOCYTES # BLD AUTO: 0.61 10*3/MM3 (ref 0–0.05)
IMM GRANULOCYTES NFR BLD AUTO: 4.3 % (ref 0–0.5)
INR PPP: 1.55 (ref 0.9–1.1)
LYMPHOCYTES # BLD AUTO: 1.67 10*3/MM3 (ref 0.7–3.1)
LYMPHOCYTES NFR BLD AUTO: 11.7 % (ref 19.6–45.3)
MAGNESIUM SERPL-MCNC: 1.9 MG/DL (ref 1.6–2.4)
MCH RBC QN AUTO: 36.6 PG (ref 26.6–33)
MCHC RBC AUTO-ENTMCNC: 32.2 G/DL (ref 31.5–35.7)
MCV RBC AUTO: 113.4 FL (ref 79–97)
MONOCYTES # BLD AUTO: 1.65 10*3/MM3 (ref 0.1–0.9)
MONOCYTES NFR BLD AUTO: 11.5 % (ref 5–12)
NEUTROPHILS NFR BLD AUTO: 10.27 10*3/MM3 (ref 1.7–7)
NEUTROPHILS NFR BLD AUTO: 71.6 % (ref 42.7–76)
NRBC BLD AUTO-RTO: 0.5 /100 WBC (ref 0–0.2)
PHOSPHATE SERPL-MCNC: 1.7 MG/DL (ref 2.5–4.5)
PHOSPHATE SERPL-MCNC: 1.9 MG/DL (ref 2.5–4.5)
PLATELET # BLD AUTO: 177 10*3/MM3 (ref 140–450)
PMV BLD AUTO: 11.3 FL (ref 6–12)
POTASSIUM SERPL-SCNC: 4.1 MMOL/L (ref 3.5–5.2)
PROCALCITONIN SERPL-MCNC: 0.65 NG/ML (ref 0–0.25)
PROT SERPL-MCNC: 4.8 G/DL (ref 6–8.5)
PROTHROMBIN TIME: 18 SECONDS (ref 12.1–15)
RBC # BLD AUTO: 2.38 10*6/MM3 (ref 3.77–5.28)
SODIUM SERPL-SCNC: 132 MMOL/L (ref 136–145)
WBC # BLD AUTO: 14.33 10*3/MM3 (ref 3.4–10.8)

## 2020-11-06 PROCEDURE — P9047 ALBUMIN (HUMAN), 25%, 50ML: HCPCS | Performed by: INTERNAL MEDICINE

## 2020-11-06 PROCEDURE — 82533 TOTAL CORTISOL: CPT | Performed by: INTERNAL MEDICINE

## 2020-11-06 PROCEDURE — 25010000002 ALBUMIN HUMAN 25% PER 50 ML: Performed by: INTERNAL MEDICINE

## 2020-11-06 PROCEDURE — 99232 SBSQ HOSP IP/OBS MODERATE 35: CPT | Performed by: INTERNAL MEDICINE

## 2020-11-06 PROCEDURE — 99233 SBSQ HOSP IP/OBS HIGH 50: CPT | Performed by: INTERNAL MEDICINE

## 2020-11-06 PROCEDURE — 83735 ASSAY OF MAGNESIUM: CPT | Performed by: INTERNAL MEDICINE

## 2020-11-06 PROCEDURE — 84100 ASSAY OF PHOSPHORUS: CPT | Performed by: INTERNAL MEDICINE

## 2020-11-06 PROCEDURE — 85025 COMPLETE CBC W/AUTO DIFF WBC: CPT | Performed by: INTERNAL MEDICINE

## 2020-11-06 PROCEDURE — 25010000002 THIAMINE PER 100 MG: Performed by: HOSPITALIST

## 2020-11-06 PROCEDURE — 84145 PROCALCITONIN (PCT): CPT | Performed by: INTERNAL MEDICINE

## 2020-11-06 PROCEDURE — 85610 PROTHROMBIN TIME: CPT | Performed by: INTERNAL MEDICINE

## 2020-11-06 PROCEDURE — 82140 ASSAY OF AMMONIA: CPT | Performed by: INTERNAL MEDICINE

## 2020-11-06 PROCEDURE — 80053 COMPREHEN METABOLIC PANEL: CPT | Performed by: INTERNAL MEDICINE

## 2020-11-06 PROCEDURE — 25010000002 CEFTRIAXONE SODIUM-DEXTROSE 1-3.74 GM-%(50ML) RECONSTITUTED SOLUTION: Performed by: INTERNAL MEDICINE

## 2020-11-06 RX ORDER — DEXTROSE AND SODIUM CHLORIDE 5; .45 G/100ML; G/100ML
100 INJECTION, SOLUTION INTRAVENOUS CONTINUOUS
Status: DISCONTINUED | OUTPATIENT
Start: 2020-11-06 | End: 2020-11-07

## 2020-11-06 RX ORDER — ALBUMIN (HUMAN) 12.5 G/50ML
25 SOLUTION INTRAVENOUS ONCE
Status: COMPLETED | OUTPATIENT
Start: 2020-11-06 | End: 2020-11-06

## 2020-11-06 RX ORDER — LACTULOSE 10 G/15ML
20 SOLUTION ORAL 3 TIMES DAILY
Status: DISCONTINUED | OUTPATIENT
Start: 2020-11-06 | End: 2020-11-11

## 2020-11-06 RX ORDER — MIDODRINE HYDROCHLORIDE 5 MG/1
2.5 TABLET ORAL
Status: DISCONTINUED | OUTPATIENT
Start: 2020-11-06 | End: 2020-11-17 | Stop reason: HOSPADM

## 2020-11-06 RX ORDER — LACTULOSE 10 G/15ML
30 SOLUTION ORAL ONCE
Status: COMPLETED | OUTPATIENT
Start: 2020-11-06 | End: 2020-11-06

## 2020-11-06 RX ADMIN — SODIUM CHLORIDE, PRESERVATIVE FREE 10 ML: 5 INJECTION INTRAVENOUS at 20:56

## 2020-11-06 RX ADMIN — LORAZEPAM 0.5 MG: 0.5 TABLET ORAL at 04:33

## 2020-11-06 RX ADMIN — SODIUM PHOSPHATE, MONOBASIC, MONOHYDRATE 9 MMOL: 276; 142 INJECTION, SOLUTION INTRAVENOUS at 09:21

## 2020-11-06 RX ADMIN — LACTULOSE 30 G: 20 SOLUTION ORAL at 10:10

## 2020-11-06 RX ADMIN — MIDODRINE HYDROCHLORIDE 2.5 MG: 5 TABLET ORAL at 18:54

## 2020-11-06 RX ADMIN — LEVOTHYROXINE SODIUM ANHYDROUS 25 MCG: 100 INJECTION, POWDER, LYOPHILIZED, FOR SOLUTION INTRAVENOUS at 06:33

## 2020-11-06 RX ADMIN — SODIUM PHOSPHATE, MONOBASIC, MONOHYDRATE AND SODIUM PHOSPHATE, DIBASIC, ANHYDROUS 15 MMOL: 276; 142 INJECTION, SOLUTION INTRAVENOUS at 20:57

## 2020-11-06 RX ADMIN — CEFTRIAXONE 1 G: 1 INJECTION, SOLUTION INTRAVENOUS at 08:46

## 2020-11-06 RX ADMIN — SODIUM CHLORIDE, PRESERVATIVE FREE 10 ML: 5 INJECTION INTRAVENOUS at 08:04

## 2020-11-06 RX ADMIN — SODIUM CHLORIDE, PRESERVATIVE FREE 10 ML: 5 INJECTION INTRAVENOUS at 08:03

## 2020-11-06 RX ADMIN — FOLIC ACID 100 ML/HR: 5 INJECTION, SOLUTION INTRAMUSCULAR; INTRAVENOUS; SUBCUTANEOUS at 08:03

## 2020-11-06 RX ADMIN — ALBUMIN HUMAN 25 G: 0.25 SOLUTION INTRAVENOUS at 08:41

## 2020-11-06 RX ADMIN — LACTULOSE 20 G: 20 SOLUTION ORAL at 20:56

## 2020-11-06 RX ADMIN — DEXTROSE AND SODIUM CHLORIDE 100 ML/HR: 5; 450 INJECTION, SOLUTION INTRAVENOUS at 15:45

## 2020-11-06 RX ADMIN — SODIUM CHLORIDE: 900 INJECTION, SOLUTION INTRAVENOUS at 21:33

## 2020-11-06 RX ADMIN — LACTULOSE 20 G: 20 SOLUTION ORAL at 15:45

## 2020-11-06 NOTE — NURSING NOTE
Discharge Planning Assessment   Jennifer Lunsford     Patient Name: Tayler Lugo  MRN: [id number]  Today's Date: 11/6/2020    Admit Date: 11/4/2020    Discharge Needs Assessment     Row Name 11/06/20 1440       Living Environment    Lives With  alone    Current Living Arrangements  home/apartment/condo    Primary Care Provided by  self    Provides Primary Care For  no one    Family Caregiver if Needed  child(eleuterio), adult;sibling(s)    Family Caregiver Names  Jonnathan/sister, Jacqui/dtr, Joleen/dtr    Quality of Family Relationships  helpful;involved;supportive    Able to Return to Prior Arrangements  other (see comments)       Resource/Environmental Concerns    Resource/Environmental Concerns  none    Transportation Concerns  car, none       Transition Planning    Patient/Family Anticipates Transition to  home with family    Patient/Family Anticipated Services at Transition  other (see comments)    Transportation Anticipated  car, drives self       Discharge Needs Assessment    Readmission Within the Last 30 Days  no previous admission in last 30 days    Equipment Currently Used at Home  none    Concerns to be Addressed  other (see comments)    Anticipated Changes Related to Illness  inability to care for self;other (see comments)    Equipment Needed After Discharge  other (see comments)    Provided Post Acute Provider List?  Yes    Post Acute Provider List  Home Health;Inpatient Rehab    Delivered To  Support Person    Method of Delivery  In person        Discharge Plan     Row Name 11/06/20 1444       Plan    Plan  plan to be determined    Plan Comments  Follow up visit, patient is sleeping. Spoke with patients daughter, Kim, at bedside. Face sheet verified. Patient has previously lived alone in a patio home in [place]. She was independent of ADLs including driving. She has not used DME/HH/Rehab. She has a living will - encoouraged to bring a copy to be scanned to medical record.She uses [employer] and there  are no issues obtaining medications. Currently, the plan is for patient to dc home with her daughter Jacqui Zee, [address] [facility], [place] [address]. Jacqui is a stay at home mother of two children and can provide 24/7 care with assistance from Kim and patients sister Jonnathan Manjarrez. CM returns to room to talk with Jacqui, she confirms current plan. Resources provided r/t in home private pay agencies and [facility]. Discussed that CM will continue to follow and can assist with DME, HH or Rehab depending on patient needs. CM # placed on white board, will continue to follow.        Continued Care and Services - Admitted Since 11/4/2020    Coordination has not been started for this encounter.         Demographic Summary     Row Name 11/06/20 1440       General Information    Admission Type  inpatient    Arrived From  home    Required Notices Provided  Important Message from Medicare    Referral Source  admission list    Reason for Consult  discharge planning    Preferred Language  English     Used During This Interaction  no       Contact Information    Permission Granted to Share Info With          Functional Status    No documentation.       Psychosocial    No documentation.       Abuse/Neglect    No documentation.       Legal    No documentation.       Substance Abuse    No documentation.       Patient Forms    No documentation.           Michael Peraza RN

## 2020-11-06 NOTE — PROGRESS NOTES
GI Daily Progress Note    Assessment/Plan:      Acute liver failure    Frequent UTI    Hypertension    Acute respiratory failure with hypoxia (CMS/HCC)    Ascites due to alcoholic hepatitis    Bladder prolapse, female, acquired    Sepsis associated hypotension (CMS/HCC)    Altered mental state    Acute renal failure (CMS/HCC)    Alcohol abuse       LOS: 2 days     Tayler Lugo is a 70 y.o. female who was admitted with Acute liver failure. She reports the symptoms are unchanged. Awakens but not poriented, Creat imporved and her BP is better.     Subjective:    Patient expresses No verbal complaints  Patient denies abdominal pain, vomiting and bloody stools    Objective:    Vital signs in last 24 hours:  Temp:  [98.3 °F (36.8 °C)-98.7 °F (37.1 °C)] 98.7 °F (37.1 °C)  Heart Rate:  [] 91  Resp:  [16-22] 18  BP: ()/() 110/71    Intake/Output last 3 shifts:  I/O last 3 completed shifts:  In: 4223 [I.V.:3223; IV Piggyback:1000]  Out: 805 [Urine:805]  Intake/Output this shift:  I/O this shift:  In: 720 [P.O.:720]  Out: 380 [Urine:380]    Physical Exam:Abdomen  Sounds Normal Active Bowel Sounds   Distension Soft and Distended No percussible ascites   Tenderness Nontender     Imaging Results (Last 72 Hours)     Procedure Component Value Units Date/Time    XR Chest 1 View [[id number]] Collected: 11/05/20 1505     Updated: 11/05/20 1509    Narrative:      XR CHEST 1 VW-: 11/5/2020 2:52 PM     INDICATION:   70-year-old female presenting for PICC line placement      COMPARISON:    11/04/2020.     FINDINGS:  Single Portable AP view(s) of the chest. Status post right shoulder  replacement. There is a new right-sided PICC line present. The tip  extends to the level of the right atrium. If positioning at the  cavoatrial junction is desired it could be retracted about 4 cm. No  pneumothorax. Cardiac silhouette borderline in size and stable. Shallow  lung expansion with low lung volumes and probable bibasilar  atelectasis.  Persistent eventration or elevation of the right hemidiaphragm.       Impression:         1. New right-sided PICC line tip at the right atrial level. If  positioning at the cavoatrial junction is desired it could be retracted  about 4 cm. No pneumothorax.  2. No other significant interval change.     This report was finalized on 11/5/2020 3:07 PM by Dr. Waqar Jerez MD.       US Abdomen Limited [[id number]] Collected: 11/05/20 1125     Updated: 11/05/20 1416    Addenda:        Addendum: By report, the patient underwent a bedside paracentesis  following the abdomen and pelvis CT performed yesterday, which accounts  for the lack of ascites amenable to paracentesis today.     This report was finalized on 11/5/2020 2:14 PM by Dr. Waqar Jerez MD.     Signed: 11/05/20 1414 by Waqar Jerez MD    Narrative:      FOUR-QUADRANT ULTRASOUND TO ASSESS FOR THE PRESENCE OR ABSENCE OF  ASCITES FOR PLANNED PARACENTESIS     HISTORY:  70-year-old female with abnormal CT scan performed yesterday. Ascites on  prior CT.     TECHNIQUE:  Four-quadrant ultrasound was performed. Correlation made with CT  performed yesterday.     FINDINGS:  There is no ascites amenable to ultrasound-guided paracentesis on the  provided images. The majority of the ascites on the prior CT localizes  to the pelvis.       Impression:      1. No ascites amenable to ultrasound-guided paracentesis.     This report was finalized on 11/5/2020 11:28 AM by Dr. Waqar Jerez MD.       CT Abdomen Pelvis Without Contrast [[id number]] Collected: 11/04/20 1318     Updated: 11/04/20 1320    Narrative:      CT Abdomen Pelvis WO    INDICATION:   70-year-old emergency department patient with abdominal pain diffusely and fever for one week. Recent antibiotic therapy for chronic urinary tract infection.    TECHNIQUE:   CT of the abdomen and pelvis without IV contrast. Coronal and sagittal reconstructions were obtained.  Radiation dose reduction techniques  included automated exposure control or exposure modulation based on body size. Count of known CT and cardiac nuc  med studies performed in previous 12 months: 0.     COMPARISON:   Ultrasound liver 8/11/2020, CT abdomen and pelvis 4/24/2019    FINDINGS:  Abdomen: The included images through the lung bases demonstrate no acute pulmonary density or pleural effusion. The distal esophagus appears normal  On these noncontrasted images the liver is diffusely abnormal with diffuse low attenuation in a mottled appearance. Liver is enlarged measuring 23.6 cm cephalocaudad previously measuring 17.3 cm on the ultrasound of 8/11/2020 and 18.4 cm on the CT scan  of 4/24/2019 suggesting a significant interval increase in size. The spleen is normal in size and density, however there is a new large amount of ascites in the abdomen, paracolic gutters and pelvis. The gallbladder appears dense relative to the low  density liver but no stones are seen. There is pericholecystic fluid likely part of the generalized ascites. The pancreas, pancreatic duct, common bile ducts and adrenal glands are normal. The kidneys are unremarkable    The stomach, small bowel and colon have a normal noncontrasted appearance.    Pelvis: The bladder is distended. The wall appears slightly prominent diffusely but no nodularity is seen. No bladder stone. This is small postmenopausal uterus. No adnexal mass.  There is mild diffuse subcutaneous edema over the anterior abdominal wall which could be part of anasarca.    Bone window images demonstrate demonstrates stable spinal fusion hardware posteriorly from L4 through S1 with severe anterolisthesis of L5 on S1 unchanged.      Impression:        1. Marked interval increase in size of the liver with diffusely mottled low attenuation as compared to the prior studies of 11/20 and 4/24/2019. Patient has had underlying steatosis. The current changes could represent progression of steatosis or acute  superimposed  hepatitis. There is new ascites diffusely in the abdomen or pelvis which is moderate to large in amount. No splenomegaly. The portal vein and splenic vein do not appear enlarged.  2. No gallstones seen  3. No definite distention of the stomach, small bowel or colon.  4. The bladder is distended. The wall is mildly thickened but no definite nodularity or bladder stone.          Signer Name: Sheron Lundberg MD   Signed: 11/4/2020 1:18 PM   Workstation Name: [id number]    [facility]    XR Chest 1 View [[id number]] Collected: 11/04/20 1256     Updated: 11/04/20 1259    Narrative:      XR CHEST 1 VW-: 11/4/2020 12:48 PM     INDICATION:   Short of air and weakness several days.      COMPARISON:    None available.     FINDINGS:  Single Portable AP view(s) of the chest. Status post right shoulder  replacement. Cardiac silhouette is within normal limits. Shallow lung  expansion and bronchovascular crowding. Probable platelike atelectasis  in the left base and atelectatic change in the right base. Mild  eventration or elevation of the right hemidiaphragm. No distinct  effusion or pneumothorax.       Impression:         1. Shallow lung expansion with bronchovascular crowding and probable  bibasilar atelectasis.     This report was finalized on 11/4/2020 12:57 PM by Dr. Waqar Jerez MD.             WBC   Date Value Ref Range Status   11/06/2020 14.33 (H) 3.40 - 10.80 10*3/mm3 Final     RBC   Date Value Ref Range Status   11/06/2020 2.38 (L) 3.77 - 5.28 10*6/mm3 Final     Hemoglobin   Date Value Ref Range Status   11/06/2020 8.7 (L) 12.0 - 15.9 g/dL Final     Hematocrit   Date Value Ref Range Status   11/06/2020 27.0 (L) 34.0 - 46.6 % Final     MCV   Date Value Ref Range Status   11/06/2020 113.4 (H) 79.0 - 97.0 fL Final     MCH   Date Value Ref Range Status   11/06/2020 36.6 (H) 26.6 - 33.0 pg Final     MCHC   Date Value Ref Range Status   11/06/2020 32.2 31.5 - 35.7 g/dL Final     RDW   Date Value  Ref Range Status   11/06/2020 14.6 12.3 - 15.4 % Final     RDW-SD   Date Value Ref Range Status   11/06/2020 60.6 (H) 37.0 - 54.0 fl Final     MPV   Date Value Ref Range Status   11/06/2020 11.3 6.0 - 12.0 fL Final     Platelets   Date Value Ref Range Status   11/06/2020 177 140 - 450 10*3/mm3 Final     Neutrophil %   Date Value Ref Range Status   11/06/2020 71.6 42.7 - 76.0 % Final     Lymphocyte %   Date Value Ref Range Status   11/06/2020 11.7 (L) 19.6 - 45.3 % Final     Monocyte %   Date Value Ref Range Status   11/06/2020 11.5 5.0 - 12.0 % Final     Eosinophil %   Date Value Ref Range Status   11/06/2020 0.7 0.3 - 6.2 % Final     Basophil %   Date Value Ref Range Status   11/06/2020 0.2 0.0 - 1.5 % Final     Immature Grans %   Date Value Ref Range Status   11/06/2020 4.3 (H) 0.0 - 0.5 % Final     Neutrophils, Absolute   Date Value Ref Range Status   11/06/2020 10.27 (H) 1.70 - 7.00 10*3/mm3 Final     Lymphocytes, Absolute   Date Value Ref Range Status   11/06/2020 1.67 0.70 - 3.10 10*3/mm3 Final     Monocytes, Absolute   Date Value Ref Range Status   11/06/2020 1.65 (H) 0.10 - 0.90 10*3/mm3 Final     Eosinophils, Absolute   Date Value Ref Range Status   11/06/2020 0.10 0.00 - 0.40 10*3/mm3 Final     Basophils, Absolute   Date Value Ref Range Status   11/06/2020 0.03 0.00 - 0.20 10*3/mm3 Final     Immature Grans, Absolute   Date Value Ref Range Status   11/06/2020 0.61 (H) 0.00 - 0.05 10*3/mm3 Final     nRBC   Date Value Ref Range Status   11/06/2020 0.5 (H) 0.0 - 0.2 /100 WBC Final       Glucose   Date Value Ref Range Status   11/06/2020 83 65 - 99 mg/dL Final     Sodium   Date Value Ref Range Status   11/06/2020 132 (L) 136 - 145 mmol/L Final     Potassium   Date Value Ref Range Status   11/06/2020 4.1 3.5 - 5.2 mmol/L Final     CO2   Date Value Ref Range Status   11/06/2020 14.2 (L) 22.0 - 29.0 mmol/L Final     Chloride   Date Value Ref Range Status   11/06/2020 99 98 - 107 mmol/L Final     Anion Gap   Date  Value Ref Range Status   11/06/2020 18.8 (H) 5.0 - 15.0 mmol/L Final     Creatinine   Date Value Ref Range Status   11/06/2020 1.73 (H) 0.57 - 1.00 mg/dL Final     BUN   Date Value Ref Range Status   11/06/2020 46 (H) 8 - 23 mg/dL Final     BUN/Creatinine Ratio   Date Value Ref Range Status   11/06/2020 26.6 (H) 7.0 - 25.0 Final     Calcium   Date Value Ref Range Status   11/06/2020 7.1 (L) 8.6 - 10.5 mg/dL Final     eGFR Non  Amer   Date Value Ref Range Status   11/06/2020 29 (L) >60 mL/min/1.73 Final     Alkaline Phosphatase   Date Value Ref Range Status   11/06/2020 180 (H) 39 - 117 U/L Final     Total Protein   Date Value Ref Range Status   11/06/2020 4.8 (L) 6.0 - 8.5 g/dL Final     ALT (SGPT)   Date Value Ref Range Status   11/06/2020 77 (H) 1 - 33 U/L Final     AST (SGOT)   Date Value Ref Range Status   11/06/2020 223 (H) 1 - 32 U/L Final     Total Bilirubin   Date Value Ref Range Status   11/06/2020 7.6 (H) 0.0 - 1.2 mg/dL Final     Albumin   Date Value Ref Range Status   11/06/2020 2.60 (L) 3.50 - 5.20 g/dL Final     Globulin   Date Value Ref Range Status   11/06/2020 2.2 gm/dL Final     Alcoholic Hepatitis   MONSTER  DTs  Hypotension     Noted IVFs added for her sodium but would D/C asap to avoid reaccumulating Ascites but no detectable fluid today.Still working thru her DTs

## 2020-11-06 NOTE — PLAN OF CARE
Goal Outcome Evaluation:  Plan of Care Reviewed With: patient, daughter  Pt continues to detox and is on CIWA protocol. Pt has been very drowsy today and has only been given a total of 1 mg of Ativan since 7 AM.  Urine output remains low with only 155 cc of urine output this shift, MD aware. Pt received a total of 3 x 1 L NS boluses d/t low BP. BP responded well to fluids, but remains low. MAPs hanging out right around 65. PICC line placed this afternoon. Low grade temp this evening, 99.2. Paracentesis not done today d/t pt being unstable/low bp. Abdominal u/s done at bedside. Lovenox d/c'd.

## 2020-11-06 NOTE — PLAN OF CARE
Goal Outcome Evaluation:  Plan of Care Reviewed With: patient  Progress: no change  Outcome Summary: Pt on CIWA protocol. More alert today as scheduled Ativan has been changed to CIWA protocol orders. Hypotension now resolved. . Levo on hold while BP closely monitored. Phos was low and has been replaced.

## 2020-11-06 NOTE — PROGRESS NOTES
[facility]     Mar/chart reviewed  Follow up critical care co-management  Patient now on levophed  Confused, unable to provide meaningful subjective    Vital Sign Min/Max for last 24 hours  Temp  Min: 98.1 °F (36.7 °C)  Max: 99.2 °F (37.3 °C)   BP  Min: 61/37  Max: 123/82   Pulse  Min: 87  Max: 119   Resp  Min: 16  Max: 20   SpO2  Min: 92 %  Max: 98 %   No data recorded   Weight  Min: 70.2 kg (154 lb 12.2 oz)  Max: 70.2 kg (154 lb 12.2 oz)   3065/505  Appears ill, confused, sleepy, difficult to arouse   perrl, eomi, + icterus, no palpable thyroid ndoules  mmm, no jvd, trachea midline, neck supple,  chest cta bilaterally, no crackles, no wheezes,   Tachy, regular   soft, nt, distended+bs,  no c/c/ e  Skin warm, dry no rashes    Labs; 11/6: reviewed:  Mg 1.9  Phos 1.9  Glucose 83  Bun 46  Cr 1.73 (2.9)  Na 132  Bicarb 14.2  Total protein 4.8  Albumin 2.6  Alt 77  ast 223  Alk phos 180  Total bili 7.6  inr 1.55  procal 0.65 (0.97)  Ammonia 82  Wbc 14  hgb 8.7  plts 177    A/P:  1. Sepsis with septic shock -- seems to be improving with iv fluids, albumin given. Pressors if needed, check cortisol, consider po midodrine if needed. procal is improving -- keep rocephin for now  2. MONSTER -- renal following, support bp, avoid nephrotoxins,   3. ascites  4. Acute alcoholic hepatitis--defer need for steroids to GI  5. Hyponatremia  6. Alcohol abuse with dependence  7. hypoalbumin  8. Anemia  9. Encephalopathy --  Ammonia high  10. Nutrition --consider placement of feeding tube to start nutrition, lactulose and maybe midodrine.     CC 35 mins.

## 2020-11-06 NOTE — PROGRESS NOTES
NEPHROLOGY PROGRESS NOTE    PATIENT IDENTIFICATION:   Name:  Tayler Lugo      MRN:  [id number]     70 y.o.  female             Reason for visit: MONSTER    SUBJECTIVE:   Much more lethargic this morning than she was last night; received several doses of Ativan; blood pressure has fallen, but responding to 1-liter saline bolus.  Remains on room air    OBJECTIVE:  Vitals:    11/06/20 1216 11/06/20 1246 11/06/20 1301 11/06/20 1316   BP: 106/68 111/61 127/76 111/74   BP Location: Left arm Left arm Left arm Left arm   Patient Position: Lying Lying Lying Lying   Pulse: 114 98 109 92   Resp: 20 18 18 18   Temp:       TempSrc:       SpO2: 96% 97% 92% 97%   Weight:       Height:               Body mass index is 28.31 kg/m².    Intake/Output Summary (Last 24 hours) at 11/6/2020 1353  Last data filed at 11/6/2020 1300  Gross per 24 hour   Intake 2785 ml   Output 785 ml   Net 2000 ml     Wt Readings from Last 1 Encounters:   11/06/20 0444 70.2 kg (154 lb 12.2 oz)   11/05/20 0600 62.7 kg (138 lb 3.7 oz)   11/04/20 1814 65.5 kg (144 lb 8 oz)   11/04/20 1107 59 kg (130 lb)     Wt Readings from Last 3 Encounters:   11/06/20 70.2 kg (154 lb 12.2 oz)   10/26/20 59 kg (130 lb)   10/08/20 59 kg (130 lb)         Exam:  General Appearance: Somnolent, arousable, recognizes 2020 but could not give me any other dates, no acute distress, chronically ill  Skin: warm and dry, jaundice  HEENT: pupils round and reactive to light, oral mucosa dry, icteric sclerae  Neck: supple, no JVD, trachea midline  Lungs: CTA, unlabored breathing effort  Heart: RRR, normal S1 and S2, no S3, no rub  Abdomen: soft, nontender, distended  Extremities: no edema, cyanosis or clubbing  Neuro: Moving all extremities, mumbling speech        Scheduled meds:    cefTRIAXone, 1 g, Intravenous, Q24H  levothyroxine sodium, 25 mcg, Intravenous, Q AM  LORazepam, 0.5 mg, Intravenous, Q8H  sodium chloride, 10 mL, Intravenous, Q12H  sodium chloride, 10 mL, Intravenous,  Q12H  sodium chloride, 10 mL, Intravenous, Q12H      IV meds:                        norepinephrine, 0.02-0.3 mcg/kg/min, Last Rate: 0.02 mcg/kg/min (11/06/20 0150)  sodium chloride, 100 mL/hr, Last Rate: 100 mL/hr (11/05/20 2210)        Data Review:    Results from last 7 days   Lab Units 11/06/20  0442 11/05/20  1341 11/05/20  0601 11/04/20  1136   SODIUM mmol/L 132* 124* 124* 120*   POTASSIUM mmol/L 4.1 5.0 4.9 5.1   CHLORIDE mmol/L 99 95* 89* 78*   CO2 mmol/L 14.2* 10.8* 16.2* 13.6*   BUN mg/dL 46* 48* 48* 46*   CREATININE mg/dL 1.73* 2.97* 3.54* 3.99*   CALCIUM mg/dL 7.1* 7.2* 7.5* 8.6   BILIRUBIN mg/dL 7.6*  --  8.0* 9.3*   ALK PHOS U/L 180*  --  199* 264*   ALT (SGPT) U/L 77*  --  82* 96*   AST (SGOT) U/L 223*  --  217* 270*   GLUCOSE mg/dL 83 83 85 59*     Estimated Creatinine Clearance: 27 mL/min (A) (by C-G formula based on SCr of 1.73 mg/dL (H)).  Results from last 7 days   Lab Units 11/05/20  0843 11/05/20  0444   SODIUM UR mmol/L 30  --    CREATININE UR mg/dL 237.5  --    OSMOLALITY UR mOsm/kg 375  --    URIC ACID mg/dL  --  12.9*     Results from last 7 days   Lab Units 11/06/20  0442 11/05/20  1341 11/05/20  0444   MAGNESIUM mg/dL 1.9 2.4 1.4*   PHOSPHORUS mg/dL 1.9* 2.8 2.8       Results from last 7 days   Lab Units 11/06/20  0442 11/05/20  0444 11/04/20  1550 11/04/20  1136   WBC 10*3/mm3 14.33* 14.77* 17.47* 19.31*   HEMOGLOBIN g/dL 8.7* 9.5* 11.0* 11.0*   PLATELETS 10*3/mm3 177 257 340 334       Results from last 7 days   Lab Units 11/06/20  0442 11/05/20  0444 11/04/20  1550 11/04/20  1136   INR  1.55* 1.41* 1.28* 1.32*             ASSESSMENT:     Acute liver failure    Frequent UTI    Hypertension    Acute respiratory failure with hypoxia (CMS/HCC)    Ascites due to alcoholic hepatitis    Bladder prolapse, female, acquired    Sepsis associated hypotension (CMS/HCC)    Altered mental state    Acute renal failure (CMS/HCC)    Alcohol abuse    1.  MONSTER, nonoliguric, improving: likely prerenal due to  hypotension, poor oral intake, and compromised renal autoregulation due to Vasotec.  Creatinine is improving down to 1.73 this morning, the electrolytes within acceptable range, except total CO2 is 14.2 which has improved since yesterday and her volume status within acceptable range at present.  That makes hepatorenal syndrome unlikely at this time.   2.  Alcoholic hepatitis with coagulopathy: Patient is currently jaundiced  3.  Hypotension and tachycardia, significantly improved  4.  Coagulopathy  5.  Alcoholism  6.  Hypothyroidism with TSH 17          PLAN:  1.  I agree with the present treatment and IV fluids  2.  Paracentesis whenever she is more hemodynamically stable  3.  Surveillance lab  4.  I discussed the case with the patient's nurse          Farzad Gallegos MD  11/6/2020    13:53 EST

## 2020-11-06 NOTE — PROGRESS NOTES
"SERVICE: [facility] HOSPITALIST    CHIEF COMPLAINT: AMS    SUBJECTIVE:    Patient's mental status improved this morning.  When on rounds just prior to 10 AM, the patient was awake, and interactive, with sister at the bedside.  Discussed that it appeared unlikely for the patient to have hepatorenal syndrome, which in proves her chances of survival, but still stressed that she had some very severe disease.    Blood pressure is improved overnight, and she was titrated off the Levophed.    ROS positive for nausea, withdrawal, lethergy, but negative for fever and chills, or diarrhea    OBJECTIVE:    /64 (BP Location: Left arm, Patient Position: Lying)   Pulse 98   Temp 98.7 °F (37.1 °C) (Oral)   Resp 18   Ht 157.5 cm (62\")   Wt 70.2 kg (154 lb 12.2 oz)   LMP  (LMP Unknown)   SpO2 96%   BMI 28.31 kg/m²     MEDS/LABS REVIEWED AND ORDERED    cefTRIAXone, 1 g, Intravenous, Q24H  levothyroxine sodium, 25 mcg, Intravenous, Q AM  LORazepam, 0.5 mg, Intravenous, Q8H  sodium chloride, 10 mL, Intravenous, Q12H  sodium chloride, 10 mL, Intravenous, Q12H  sodium chloride, 10 mL, Intravenous, Q12H        Physical Exam  Vitals signs and nursing note reviewed.   Constitutional:       General: She is not in acute distress.     Appearance: She is ill-appearing. She is not toxic-appearing or diaphoretic.      Comments: Awake and interactive this morning   Eyes:      General: Scleral icterus present.      Extraocular Movements: Extraocular movements intact.      Pupils: Pupils are equal, round, and reactive to light.   Cardiovascular:      Rate and Rhythm: Regular rhythm. Tachycardia present.      Heart sounds: No murmur.   Pulmonary:      Effort: Pulmonary effort is normal. No respiratory distress.      Breath sounds: No wheezing or rhonchi.   Abdominal:      General: Bowel sounds are normal. There is distension.      Tenderness: There is no abdominal tenderness. There is no guarding.      Comments: Some " give to the abdomen, however increasing abdominal distention suggestive of reaccumulation of ascites   Skin:     Coloration: Skin is jaundiced.      Findings: Erythema present.   Neurological:      General: No focal deficit present.      Mental Status: She is oriented to person, place, and time. Mental status is at baseline.      Cranial Nerves: No cranial nerve deficit.   Psychiatric:         Behavior: Behavior normal.      Comments: Constricted affect, deferred mood         LAB/DIAGNOSTICS:    Lab Results (last 24 hours)     Procedure Component Value Units Date/Time    Body Fluid Culture - Body Fluid, Peritoneum [[id number]] Collected: 11/04/20 1552    Specimen: Body Fluid from Peritoneum Updated: 11/06/20 1215     Body Fluid Culture No growth at 2 days     Gram Stain Rare (1+) WBCs seen      No organisms seen    Blood Culture - Blood, Blood, Venous Line [[id number]] Collected: 11/04/20 1136    Specimen: Blood, Venous Line Updated: 11/06/20 1145     Blood Culture No growth at 2 days    Cortisol [[id number]] Collected: 11/06/20 0442    Specimen: Blood Updated: 11/06/20 1052     Cortisol 20.43 mcg/dL     Narrative:      Cortisol Reference Ranges:    Cortisol 6AM - 10AM Range: 6.02-18.40 mcg/dl  Cortisol 4PM - 8PM Range: 2.68-10.50 mcg/dl      Results may be falsely increased if patient taking Biotin.      Phosphorus [[id number]]  (Abnormal) Collected: 11/06/20 0442    Specimen: Blood Updated: 11/06/20 0539     Phosphorus 1.9 mg/dL     Comprehensive Metabolic Panel [[id number]]  (Abnormal) Collected: 11/06/20 0442    Specimen: Blood Updated: 11/06/20 0539     Glucose 83 mg/dL      BUN 46 mg/dL      Creatinine 1.73 mg/dL      Sodium 132 mmol/L      Potassium 4.1 mmol/L      Chloride 99 mmol/L      CO2 14.2 mmol/L      Calcium 7.1 mg/dL      Total Protein 4.8 g/dL      Albumin 2.60 g/dL      ALT (SGPT) 77 U/L      AST (SGOT) 223 U/L      Alkaline Phosphatase 180 U/L      Total Bilirubin 7.6 mg/dL      eGFR Non African Amer  29 mL/min/1.73      Globulin 2.2 gm/dL      A/G Ratio 1.2 g/dL      BUN/Creatinine Ratio 26.6     Anion Gap 18.8 mmol/L     Narrative:      GFR Normal >60  Chronic Kidney Disease <60  Kidney Failure <15      Magnesium [[id number]]  (Normal) Collected: 11/06/20 0442    Specimen: Blood Updated: 11/06/20 0531     Magnesium 1.9 mg/dL     Procalcitonin [[id number]]  (Abnormal) Collected: 11/06/20 0442    Specimen: Blood Updated: 11/06/20 0525     Procalcitonin 0.65 ng/mL     Narrative:      Results may be falsely decreased if patient taking Biotin.     Ammonia [[id number]]  (Abnormal) Collected: 11/06/20 0442    Specimen: Blood Updated: 11/06/20 0518     Ammonia 82 umol/L     Protime-INR [[id number]]  (Abnormal) Collected: 11/06/20 0442    Specimen: Blood Updated: 11/06/20 0515     Protime 18.0 Seconds      INR 1.55    Narrative:      Therapeutic Ranges for INR: 2.0-3.0 (PT 20-30)                              2.5-3.5 (PT 25-34)    CBC & Differential [[id number]]  (Abnormal) Collected: 11/06/20 0442    Specimen: Blood Updated: 11/06/20 0505    Narrative:      The following orders were created for panel order CBC & Differential.  Procedure                               Abnormality         Status                     ---------                               -----------         ------                     CBC Auto Differential[[id number]]        Abnormal            Final result                 Please view results for these tests on the individual orders.    CBC Auto Differential [[id number]]  (Abnormal) Collected: 11/06/20 0442    Specimen: Blood Updated: 11/06/20 0505     WBC 14.33 10*3/mm3      RBC 2.38 10*6/mm3      Hemoglobin 8.7 g/dL      Hematocrit 27.0 %      .4 fL      MCH 36.6 pg      MCHC 32.2 g/dL      RDW 14.6 %      RDW-SD 60.6 fl      MPV 11.3 fL      Platelets 177 10*3/mm3      Neutrophil % 71.6 %      Lymphocyte % 11.7 %      Monocyte % 11.5 %      Eosinophil % 0.7 %      Basophil % 0.2 %      Immature Grans %  4.3 %      Neutrophils, Absolute 10.27 10*3/mm3      Lymphocytes, Absolute 1.67 10*3/mm3      Monocytes, Absolute 1.65 10*3/mm3      Eosinophils, Absolute 0.10 10*3/mm3      Basophils, Absolute 0.03 10*3/mm3      Immature Grans, Absolute 0.61 10*3/mm3      nRBC 0.5 /100 WBC     Urine Culture - Urine, Urine, Clean Catch [[id number]]  (Normal) Collected: 11/04/20 1402    Specimen: Urine, Clean Catch Updated: 11/05/20 1723     Urine Culture No growth    Cortisol [[id number]] Collected: 11/05/20 0601    Specimen: Blood Updated: 11/05/20 1654     Cortisol 21.27 mcg/dL     Narrative:      Cortisol Reference Ranges:    Cortisol 6AM - 10AM Range: 6.02-18.40 mcg/dl  Cortisol 4PM - 8PM Range: 2.68-10.50 mcg/dl      Results may be falsely increased if patient taking Biotin.      Creatinine, Urine, Random - Urine, Clean Catch [[id number]] Collected: 11/05/20 0843    Specimen: Urine, Clean Catch Updated: 11/05/20 1643     Creatinine, Urine 237.5 mg/dL     Narrative:      Reference intervals for random urine have not been established.  Clinical usage is dependent upon physician's interpretation in combination with other laboratory tests.       Ammonia [[id number]]  (Abnormal) Collected: 11/05/20 1341    Specimen: Blood Updated: 11/05/20 1535     Ammonia 87 umol/L         Results for orders placed in visit on 03/10/15   SCANNED - ECHOCARDIOGRAM     Xr Chest 1 View    Result Date: 11/5/2020   1. New right-sided PICC line tip at the right atrial level. If positioning at the cavoatrial junction is desired it could be retracted about 4 cm. No pneumothorax. 2. No other significant interval change.  This report was finalized on 11/5/2020 3:07 PM by Dr. Waqar Jerez MD.      Us Abdomen Limited    Addendum Date: 11/5/2020    Addendum: By report, the patient underwent a bedside paracentesis following the abdomen and pelvis CT performed yesterday, which accounts for the lack of ascites amenable to paracentesis today.  This report was finalized  on 11/5/2020 2:14 PM by Dr. Waqar Jerez MD.      Result Date: 11/5/2020  1. No ascites amenable to ultrasound-guided paracentesis.  This report was finalized on 11/5/2020 11:28 AM by Dr. Waqar Jerez MD.          ASSESSMENT/PLAN:    Bacterial Sepsis from unclear source, with shock refractory to fluids  -Unclear source, urinalysis likely contaminated due to patient's Bladder prolapse, CXR unconvincing for pneumonia, ascites fluid negative for SBP, CT scan of the abdomen did not show any acute abnormalities, will continue Rocephin to cover the broadest possible sources, discussed with nephrology and pulmonary  - Was started on Levophed last night for MAPS < 65, after 3L of fluid and 25g of 25% albumin, was able to be titrated off Levophed last night, blood pressure still soft this morning  -Started patient on midodrine, to treat hypotension dated to liver failure  - Procal improving  -Improving on Rocephin, blood culture/abdominal fluid culture pending, urine culture was negative     Multifactorial altered mental status due to alcohol withdrawal, sepsis, and hepatic encephalopathy  -was on scheduled IV Ativan to replace Librium, and CIWA, CIWA scores have been improving, no longer on scheduled Ativan, but will continue as needed Ativan  -Ammonia level elevated will add lactulose, either via enema or oral depending on mental status     MONSTER, Likely Pre-renal  - Nephrology following, probably not Hepatorenal  - Cr continues to improve  - GI is deferring to renal on when to start Diuretics     Alcoholic Hepatitis with coagulopathy  - Maddry's low enough to not suggest steroids, await input from GI     New onset ascites  -Paracentesis on 11/4 removed 2 L of fluid  -Fluid studies negative for SBP, also already covering sepsis with Rocephin as above  -Renal once hemodynamics more stable prior to paracentesis, abdomen appears to be reaccumulating, but still soft     Hypothyroidism  - started on 25mcg IV levothyroxine to  support pt through Critical illness, can switch to 50mcg po once regularly taking po     Hyponatermia, hypophosphotemia  -  on admit, 132 today, switch fluids to D5 1/2NS, monitor  -Gently replace Phos, and recheck electrolytes in afternoon     Malnutrition  - Monitor electrolytes closely, vitamin sup  - Feeding tube once more clinically stable     Diarrhea  -present prior to admission, none seen here, GI panel negative     Bladder prolapse  -[facility], culture urine, as sepsis above    Etoh Abuse  - Family is on board with recovery, brief discussion with pt this AM about alcohol being underlying etiology

## 2020-11-06 NOTE — PLAN OF CARE
Goal Outcome Evaluation:   pt started on levophed gtt to maintain MAP > 65.ScV02 was setup to monitor. Pt with less anxiety this shift. Pt has moments of confusion. 0.5 mg IV ativan given entire shift.

## 2020-11-07 LAB
ALBUMIN SERPL-MCNC: 2.8 G/DL (ref 3.5–5.2)
ALBUMIN/GLOB SERPL: 1.6 G/DL
ALP SERPL-CCNC: 160 U/L (ref 39–117)
ALT SERPL W P-5'-P-CCNC: 73 U/L (ref 1–33)
ANION GAP SERPL CALCULATED.3IONS-SCNC: 12.2 MMOL/L (ref 5–15)
ANION GAP SERPL CALCULATED.3IONS-SCNC: 13 MMOL/L (ref 5–15)
AST SERPL-CCNC: 189 U/L (ref 1–32)
BASOPHILS # BLD AUTO: 0.06 10*3/MM3 (ref 0–0.2)
BASOPHILS NFR BLD AUTO: 0.5 % (ref 0–1.5)
BILIRUB SERPL-MCNC: 7.8 MG/DL (ref 0–1.2)
BUN SERPL-MCNC: 25 MG/DL (ref 8–23)
BUN SERPL-MCNC: 32 MG/DL (ref 8–23)
BUN/CREAT SERPL: 54.3 (ref 7–25)
BUN/CREAT SERPL: 66.7 (ref 7–25)
CALCIUM SPEC-SCNC: 7.2 MG/DL (ref 8.6–10.5)
CALCIUM SPEC-SCNC: 7.4 MG/DL (ref 8.6–10.5)
CHLORIDE SERPL-SCNC: 104 MMOL/L (ref 98–107)
CHLORIDE SERPL-SCNC: 105 MMOL/L (ref 98–107)
CO2 SERPL-SCNC: 19 MMOL/L (ref 22–29)
CO2 SERPL-SCNC: 19.8 MMOL/L (ref 22–29)
CREAT SERPL-MCNC: 0.46 MG/DL (ref 0.57–1)
CREAT SERPL-MCNC: 0.48 MG/DL (ref 0.57–1)
DEPRECATED RDW RBC AUTO: 61.6 FL (ref 37–54)
EOSINOPHIL # BLD AUTO: 0.15 10*3/MM3 (ref 0–0.4)
EOSINOPHIL NFR BLD AUTO: 1.2 % (ref 0.3–6.2)
ERYTHROCYTE [DISTWIDTH] IN BLOOD BY AUTOMATED COUNT: 14.7 % (ref 12.3–15.4)
GFR SERPL CREATININE-BSD FRML MDRD: 127 ML/MIN/1.73
GFR SERPL CREATININE-BSD FRML MDRD: 134 ML/MIN/1.73
GLOBULIN UR ELPH-MCNC: 1.8 GM/DL
GLUCOSE SERPL-MCNC: 137 MG/DL (ref 65–99)
GLUCOSE SERPL-MCNC: 157 MG/DL (ref 65–99)
HCT VFR BLD AUTO: 25.7 % (ref 34–46.6)
HGB BLD-MCNC: 8.4 G/DL (ref 12–15.9)
IMM GRANULOCYTES # BLD AUTO: 0.8 10*3/MM3 (ref 0–0.05)
IMM GRANULOCYTES NFR BLD AUTO: 6.5 % (ref 0–0.5)
INR PPP: 1.58 (ref 0.9–1.1)
LYMPHOCYTES # BLD AUTO: 1.9 10*3/MM3 (ref 0.7–3.1)
LYMPHOCYTES NFR BLD AUTO: 15.5 % (ref 19.6–45.3)
MAGNESIUM SERPL-MCNC: 1.3 MG/DL (ref 1.6–2.4)
MAGNESIUM SERPL-MCNC: 1.7 MG/DL (ref 1.6–2.4)
MCH RBC QN AUTO: 37.2 PG (ref 26.6–33)
MCHC RBC AUTO-ENTMCNC: 32.7 G/DL (ref 31.5–35.7)
MCV RBC AUTO: 113.7 FL (ref 79–97)
MONOCYTES # BLD AUTO: 1.54 10*3/MM3 (ref 0.1–0.9)
MONOCYTES NFR BLD AUTO: 12.6 % (ref 5–12)
NEUTROPHILS NFR BLD AUTO: 63.7 % (ref 42.7–76)
NEUTROPHILS NFR BLD AUTO: 7.81 10*3/MM3 (ref 1.7–7)
NRBC BLD AUTO-RTO: 1.1 /100 WBC (ref 0–0.2)
PHOSPHATE SERPL-MCNC: 1.6 MG/DL (ref 2.5–4.5)
PHOSPHATE SERPL-MCNC: 3.5 MG/DL (ref 2.5–4.5)
PLATELET # BLD AUTO: 145 10*3/MM3 (ref 140–450)
PMV BLD AUTO: 11 FL (ref 6–12)
POTASSIUM SERPL-SCNC: 3.1 MMOL/L (ref 3.5–5.2)
POTASSIUM SERPL-SCNC: 4.4 MMOL/L (ref 3.5–5.2)
PROT SERPL-MCNC: 4.6 G/DL (ref 6–8.5)
PROTHROMBIN TIME: 18.3 SECONDS (ref 12.1–15)
RBC # BLD AUTO: 2.26 10*6/MM3 (ref 3.77–5.28)
SODIUM SERPL-SCNC: 136 MMOL/L (ref 136–145)
SODIUM SERPL-SCNC: 137 MMOL/L (ref 136–145)
URATE SERPL-MCNC: 11.1 MG/DL (ref 2.4–5.7)
WBC # BLD AUTO: 12.26 10*3/MM3 (ref 3.4–10.8)

## 2020-11-07 PROCEDURE — 99232 SBSQ HOSP IP/OBS MODERATE 35: CPT | Performed by: INTERNAL MEDICINE

## 2020-11-07 PROCEDURE — 85610 PROTHROMBIN TIME: CPT | Performed by: INTERNAL MEDICINE

## 2020-11-07 PROCEDURE — 84100 ASSAY OF PHOSPHORUS: CPT | Performed by: INTERNAL MEDICINE

## 2020-11-07 PROCEDURE — 80053 COMPREHEN METABOLIC PANEL: CPT | Performed by: INTERNAL MEDICINE

## 2020-11-07 PROCEDURE — 84550 ASSAY OF BLOOD/URIC ACID: CPT | Performed by: INTERNAL MEDICINE

## 2020-11-07 PROCEDURE — 83735 ASSAY OF MAGNESIUM: CPT | Performed by: INTERNAL MEDICINE

## 2020-11-07 PROCEDURE — 85025 COMPLETE CBC W/AUTO DIFF WBC: CPT | Performed by: INTERNAL MEDICINE

## 2020-11-07 PROCEDURE — 25010000002 MAGNESIUM SULFATE IN D5W 1G/100ML (PREMIX) 1-5 GM/100ML-% SOLUTION: Performed by: NURSE PRACTITIONER

## 2020-11-07 PROCEDURE — 25010000002 CEFTRIAXONE SODIUM-DEXTROSE 1-3.74 GM-%(50ML) RECONSTITUTED SOLUTION: Performed by: INTERNAL MEDICINE

## 2020-11-07 PROCEDURE — 99233 SBSQ HOSP IP/OBS HIGH 50: CPT | Performed by: INTERNAL MEDICINE

## 2020-11-07 PROCEDURE — 25010000002 FUROSEMIDE PER 20 MG: Performed by: INTERNAL MEDICINE

## 2020-11-07 RX ORDER — FUROSEMIDE 10 MG/ML
40 INJECTION INTRAMUSCULAR; INTRAVENOUS EVERY 8 HOURS
Status: DISCONTINUED | OUTPATIENT
Start: 2020-11-07 | End: 2020-11-09

## 2020-11-07 RX ORDER — MAGNESIUM SULFATE HEPTAHYDRATE 40 MG/ML
2 INJECTION, SOLUTION INTRAVENOUS AS NEEDED
Status: DISCONTINUED | OUTPATIENT
Start: 2020-11-07 | End: 2020-11-17 | Stop reason: HOSPADM

## 2020-11-07 RX ORDER — POTASSIUM CHLORIDE 20 MEQ/1
40 TABLET, EXTENDED RELEASE ORAL AS NEEDED
Status: DISCONTINUED | OUTPATIENT
Start: 2020-11-07 | End: 2020-11-17 | Stop reason: HOSPADM

## 2020-11-07 RX ORDER — MAGNESIUM SULFATE 1 G/100ML
1 INJECTION INTRAVENOUS AS NEEDED
Status: DISCONTINUED | OUTPATIENT
Start: 2020-11-07 | End: 2020-11-17 | Stop reason: HOSPADM

## 2020-11-07 RX ORDER — MAGNESIUM SULFATE HEPTAHYDRATE 40 MG/ML
4 INJECTION, SOLUTION INTRAVENOUS AS NEEDED
Status: DISCONTINUED | OUTPATIENT
Start: 2020-11-07 | End: 2020-11-17 | Stop reason: HOSPADM

## 2020-11-07 RX ORDER — POTASSIUM CHLORIDE 7.45 MG/ML
10 INJECTION INTRAVENOUS
Status: DISCONTINUED | OUTPATIENT
Start: 2020-11-07 | End: 2020-11-17 | Stop reason: HOSPADM

## 2020-11-07 RX ORDER — POTASSIUM CHLORIDE 1.5 G/1.77G
40 POWDER, FOR SOLUTION ORAL AS NEEDED
Status: DISCONTINUED | OUTPATIENT
Start: 2020-11-07 | End: 2020-11-17 | Stop reason: HOSPADM

## 2020-11-07 RX ADMIN — LACTULOSE 20 G: 20 SOLUTION ORAL at 17:00

## 2020-11-07 RX ADMIN — LACTULOSE 20 G: 20 SOLUTION ORAL at 08:00

## 2020-11-07 RX ADMIN — MAGNESIUM SULFATE HEPTAHYDRATE 1 G: 1 INJECTION, SOLUTION INTRAVENOUS at 09:38

## 2020-11-07 RX ADMIN — SODIUM CHLORIDE, PRESERVATIVE FREE 10 ML: 5 INJECTION INTRAVENOUS at 20:57

## 2020-11-07 RX ADMIN — POTASSIUM PHOSPHATE, MONOBASIC AND POTASSIUM PHOSPHATE, DIBASIC 15 MMOL: 224; 236 INJECTION, SOLUTION INTRAVENOUS at 09:44

## 2020-11-07 RX ADMIN — FUROSEMIDE 40 MG: 10 INJECTION, SOLUTION INTRAMUSCULAR; INTRAVENOUS at 17:01

## 2020-11-07 RX ADMIN — MIDODRINE HYDROCHLORIDE 2.5 MG: 5 TABLET ORAL at 06:06

## 2020-11-07 RX ADMIN — LACTULOSE 20 G: 20 SOLUTION ORAL at 20:56

## 2020-11-07 RX ADMIN — FUROSEMIDE 40 MG: 10 INJECTION, SOLUTION INTRAMUSCULAR; INTRAVENOUS at 09:37

## 2020-11-07 RX ADMIN — POTASSIUM CHLORIDE 40 MEQ: 1.5 FOR SOLUTION ORAL at 07:49

## 2020-11-07 RX ADMIN — MIDODRINE HYDROCHLORIDE 2.5 MG: 5 TABLET ORAL at 13:03

## 2020-11-07 RX ADMIN — LEVOTHYROXINE SODIUM ANHYDROUS 25 MCG: 100 INJECTION, POWDER, LYOPHILIZED, FOR SOLUTION INTRAVENOUS at 06:20

## 2020-11-07 RX ADMIN — MIDODRINE HYDROCHLORIDE 2.5 MG: 5 TABLET ORAL at 17:00

## 2020-11-07 RX ADMIN — POTASSIUM & SODIUM PHOSPHATES POWDER PACK 280-160-250 MG 1 PACKET: 280-160-250 PACK at 06:07

## 2020-11-07 RX ADMIN — SODIUM CHLORIDE, PRESERVATIVE FREE 10 ML: 5 INJECTION INTRAVENOUS at 08:05

## 2020-11-07 RX ADMIN — MAGNESIUM SULFATE HEPTAHYDRATE 1 G: 1 INJECTION, SOLUTION INTRAVENOUS at 07:49

## 2020-11-07 RX ADMIN — POTASSIUM CHLORIDE 40 MEQ: 1.5 FOR SOLUTION ORAL at 14:03

## 2020-11-07 RX ADMIN — SODIUM CHLORIDE 250 ML: 900 INJECTION, SOLUTION INTRAVENOUS at 06:07

## 2020-11-07 RX ADMIN — POTASSIUM CHLORIDE 40 MEQ: 1.5 FOR SOLUTION ORAL at 06:07

## 2020-11-07 RX ADMIN — DEXTROSE AND SODIUM CHLORIDE 100 ML/HR: 5; 450 INJECTION, SOLUTION INTRAVENOUS at 01:37

## 2020-11-07 RX ADMIN — CEFTRIAXONE 1 G: 1 INJECTION, SOLUTION INTRAVENOUS at 09:58

## 2020-11-07 RX ADMIN — MAGNESIUM SULFATE HEPTAHYDRATE 1 G: 1 INJECTION, SOLUTION INTRAVENOUS at 06:07

## 2020-11-07 NOTE — PLAN OF CARE
Goal Outcome Evaluation:  Plan of Care Reviewed With: patient  Progress: no change  Outcome Summary: Phos, magnesium, and potassium replaced per protocol. CIWIA 0. 850 out of F/C. 300cc fluid leaked from paracentesis site. VSS. Appetite very poor.

## 2020-11-07 NOTE — PROGRESS NOTES
LOS: 3 days   Patient Care Team:  Tayler Trujillo MD as PCP - General    Subjective     Patient reports she has some mild nausea and some generalized abdominal pain no specific area.  No shortness of breath no cough no chest pain    Review of Systems:          Objective     Vital Signs  Vital Sign Min/Max for last 24 hours  Temp  Min: 97.3 °F (36.3 °C)  Max: 98.7 °F (37.1 °C)   BP  Min: 73/55  Max: 133/80   Pulse  Min: 85  Max: 119   Resp  Min: 15  Max: 22   SpO2  Min: 87 %  Max: 98 %   No data recorded   No data recorded        Ventilator/Non-Invasive Ventilation Settings (From admission, onward)    None                       Body mass index is 28.31 kg/m².  I/O last 3 completed shifts:  In: 3785 [P.O.:720; I.V.:2065; IV Piggyback:1000]  Out: 885 [Urine:885]  I/O this shift:  In: 1662 [I.V.:1662]  Out: 1000 [Urine:1000]        Physical Exam:  General Appearance: Well-developed white female resting in bed does not appear in acute distress  Eyes: Conjunctiva are clear but icteric sclera, pupils are equal  ENT: Mucous membranes are little dry no erythema no exudates nasal septum midline  Neck: No adenopathy no jugular venous tension trachea midline  Lungs: Clear no wheezes no rales no rhonchi nonlabored symmetric expansion  Cardiac: Regular rate rhythm no murmur no gallop  Abdomen: Mildly distended soft nontender no guarding no rebound tenderness no palpable hepatosplenomegaly or masses, she does have a fluid wave  : Not examined  Musculoskeletal: Grossly normal  Skin: Mildly jaundiced warm and dry  Neuro: She was alert and oriented she was cooperative  Extremities/P Vascular: No clubbing no cyanosis no edema palpable radial and dorsalis pedis pulses bilaterally  MSE: Hard to tell       Labs:  Results from last 7 days   Lab Units 11/07/20  0446 11/06/20  0442 11/05/20  1341 11/05/20  0601 11/05/20  0444 11/04/20  1136   GLUCOSE mg/dL 137* 83 83 85  --  59*   SODIUM mmol/L 137 132* 124* 124*  --  120*    POTASSIUM mmol/L 3.1* 4.1 5.0 4.9  --  5.1   MAGNESIUM mg/dL 1.3* 1.9 2.4  --  1.4*  --    CO2 mmol/L 19.0* 14.2* 10.8* 16.2*  --  13.6*   CHLORIDE mmol/L 105 99 95* 89*  --  78*   ANION GAP mmol/L 13.0 18.8* 18.2* 18.8*  --  28.4*   CREATININE mg/dL 0.48* 1.73* 2.97* 3.54*  --  3.99*   BUN mg/dL 32* 46* 48* 48*  --  46*   BUN / CREAT RATIO  66.7* 26.6* 16.2 13.6  --  11.5   CALCIUM mg/dL 7.2* 7.1* 7.2* 7.5*  --  8.6   EGFR IF NONAFRICN AM mL/min/1.73 127 29* 16* 13*  --  11*   ALK PHOS U/L 160* 180*  --  199*  --  264*   TOTAL PROTEIN g/dL 4.6* 4.8*  --  4.7*  --  5.8*   ALT (SGPT) U/L 73* 77*  --  82*  --  96*   AST (SGOT) U/L 189* 223*  --  217*  --  270*   BILIRUBIN mg/dL 7.8* 7.6*  --  8.0*  --  9.3*   ALBUMIN g/dL 2.80* 2.60*  --  2.50*  --  3.20*   GLOBULIN gm/dL 1.8 2.2  --  2.2  --  2.6     Estimated Creatinine Clearance: 58.3 mL/min (A) (by C-G formula based on SCr of 0.48 mg/dL (L)).      Results from last 7 days   Lab Units 11/07/20 0446 11/06/20  0442 11/05/20  0444 11/04/20  1550 11/04/20  1136   WBC 10*3/mm3 12.26* 14.33* 14.77* 17.47* 19.31*   RBC 10*6/mm3 2.26* 2.38* 2.55* 3.04* 3.00*   HEMOGLOBIN g/dL 8.4* 8.7* 9.5* 11.0* 11.0*   HEMATOCRIT % 25.7* 27.0* 28.0* 34.0 33.5*   MCV fL 113.7* 113.4* 109.8* 111.8* 111.7*   MCH pg 37.2* 36.6* 37.3* 36.2* 36.7*   MCHC g/dL 32.7 32.2 33.9 32.4 32.8   RDW % 14.7 14.6 14.6 14.4 14.6   RDW-SD fl 61.6* 60.6* 58.4* 59.8* 60.2*   MPV fL 11.0 11.3 11.8 11.0 11.0   PLATELETS 10*3/mm3 145 177 257 340 334   NEUTROPHIL % % 63.7 71.6 77.1*  --  79.9*   LYMPHOCYTE % % 15.5* 11.7* 11.6*  --  8.2*   MONOCYTES % % 12.6* 11.5 9.8  --  10.9   EOSINOPHIL % % 1.2 0.7 0.2*  --  0.1*   BASOPHIL % % 0.5 0.2 0.2  --  0.1   IMM GRAN % % 6.5* 4.3* 1.1*  --  0.8*   NEUTROS ABS 10*3/mm3 7.81* 10.27* 11.38*  --  15.42*   LYMPHS ABS 10*3/mm3 1.90 1.67 1.72  --  1.59   MONOS ABS 10*3/mm3 1.54* 1.65* 1.45*  --  2.11*   EOS ABS 10*3/mm3 0.15 0.10 0.03  --  0.01   BASOS ABS 10*3/mm3 0.06  0.03 0.03  --  0.02   IMMATURE GRANS (ABS) 10*3/mm3 0.80* 0.61* 0.16*  --  0.16*   NRBC /100 WBC 1.1* 0.5* 0.5*  --   --          Results from last 7 days   Lab Units 11/04/20  1136   CK TOTAL U/L 195*         Results from last 7 days   Lab Units 11/04/20  1136   TSH uIU/mL 16.950*   FREE T4 ng/dL 1.03     Results from last 7 days   Lab Units 11/06/20  0442 11/05/20  0444 11/04/20  1550 11/04/20  1136   LACTATE mmol/L  --   --  4.1* 4.8*   PROCALCITONIN ng/mL 0.65* 0.97*  --  1.04*     Results from last 7 days   Lab Units 11/07/20  0446 11/06/20  0442 11/05/20  0444 11/04/20  1550 11/04/20  1136   INR  1.58* 1.55* 1.41* 1.28* 1.32*     Microbiology Results (last 10 days)     Procedure Component Value - Date/Time    Gastrointestinal Panel, PCR - Stool, Per Rectum [[id number]]  (Normal) Collected: 11/04/20 1858    Lab Status: Final result Specimen: Stool from Per Rectum Updated: 11/05/20 1209     Campylobacter Not Detected     Plesiomonas shigelloides Not Detected     Salmonella Not Detected     Vibrio Not Detected     Vibrio cholerae Not Detected     Yersinia enterocolitica Not Detected     Enteroaggregative E. coli (EAEC) Not Detected     Enteropathogenic E. coli (EPEC) Not Detected     Enterotoxigenic E. coli (ETEC) lt/st Not Detected     Shiga-like toxin-producing E. coli (STEC) stx1/stx2 Not Detected     E. coli O157 Not Detected     Shigella/Enteroinvasive E. coli (EIEC) Not Detected     Cryptosporidium Not Detected     Cyclospora cayetanensis Not Detected     Entamoeba histolytica Not Detected     Giardia lamblia Not Detected     Adenovirus F40/41 Not Detected     Astrovirus Not Detected     Norovirus GI/GII Not Detected     Rotavirus A Not Detected     Sapovirus (I, II, IV or V) Not Detected    Narrative:      If Aeromonas, Staphylococcus aureus or Bacillus cereus are suspected, please order [id number]: Stool Culture, Aeromonas, S aureus, B Cereus.    Body Fluid Culture - Body Fluid, Peritoneum [[id number]]  Collected: 11/04/20 1552    Lab Status: Preliminary result Specimen: Body Fluid from Peritoneum Updated: 11/06/20 1215     Body Fluid Culture No growth at 2 days     Gram Stain Rare (1+) WBCs seen      No organisms seen    Urine Culture - Urine, Urine, Clean Catch [[id number]]  (Normal) Collected: 11/04/20 1402    Lab Status: Final result Specimen: Urine, Clean Catch Updated: 11/05/20 1723     Urine Culture No growth    COVID-19,[facility] IN-HOUSE,Nasal Swab No Transport Media 3-4 HR TAT - Swab, Nasal Cavity [[id number]]  (Normal) Collected: 11/04/20 1136    Lab Status: Final result Specimen: Swab from Nasal Cavity Updated: 11/04/20 1214     COVID19 Not Detected    Narrative:      Fact sheet for providers: https://www.fda.gov/media/472584/download     Fact sheet for patients: https://www.fda.gov/media/207018/download    Blood Culture - Blood, Blood, Venous Line [[id number]] Collected: 11/04/20 1136    Lab Status: Preliminary result Specimen: Blood, Venous Line Updated: 11/06/20 1145     Blood Culture No growth at 2 days              cefTRIAXone, 1 g, Intravenous, Q24H  lactulose, 20 g, Oral, TID  levothyroxine sodium, 25 mcg, Intravenous, Q AM  midodrine, 2.5 mg, Oral, TID AC  sodium chloride, 10 mL, Intravenous, Q12H  sodium chloride, 10 mL, Intravenous, Q12H  sodium chloride, 10 mL, Intravenous, Q12H      dextrose 5 % and sodium chloride 0.45 %, 100 mL/hr, Last Rate: 100 mL/hr (11/07/20 0137)        Diagnostics:  Ct Abdomen Pelvis Without Contrast    Result Date: 11/4/2020  CT Abdomen Pelvis WO INDICATION: 70-year-old emergency department patient with abdominal pain diffusely and fever for one week. Recent antibiotic therapy for chronic urinary tract infection. TECHNIQUE: CT of the abdomen and pelvis without IV contrast. Coronal and sagittal reconstructions were obtained.  Radiation dose reduction techniques included automated exposure control or exposure modulation based on body size. Count of known CT and cardiac  nuc med studies performed in previous 12 months: 0. COMPARISON: Ultrasound liver 8/11/2020, CT abdomen and pelvis 4/24/2019 FINDINGS: Abdomen: The included images through the lung bases demonstrate no acute pulmonary density or pleural effusion. The distal esophagus appears normal On these noncontrasted images the liver is diffusely abnormal with diffuse low attenuation in a mottled appearance. Liver is enlarged measuring 23.6 cm cephalocaudad previously measuring 17.3 cm on the ultrasound of 8/11/2020 and 18.4 cm on the CT scan of 4/24/2019 suggesting a significant interval increase in size. The spleen is normal in size and density, however there is a new large amount of ascites in the abdomen, paracolic gutters and pelvis. The gallbladder appears dense relative to the low density liver but no stones are seen. There is pericholecystic fluid likely part of the generalized ascites. The pancreas, pancreatic duct, common bile ducts and adrenal glands are normal. The kidneys are unremarkable The stomach, small bowel and colon have a normal noncontrasted appearance. Pelvis: The bladder is distended. The wall appears slightly prominent diffusely but no nodularity is seen. No bladder stone. This is small postmenopausal uterus. No adnexal mass. There is mild diffuse subcutaneous edema over the anterior abdominal wall which could be part of anasarca. Bone window images demonstrate demonstrates stable spinal fusion hardware posteriorly from L4 through S1 with severe anterolisthesis of L5 on S1 unchanged.     1. Marked interval increase in size of the liver with diffusely mottled low attenuation as compared to the prior studies of 11/20 and 4/24/2019. Patient has had underlying steatosis. The current changes could represent progression of steatosis or acute superimposed hepatitis. There is new ascites diffusely in the abdomen or pelvis which is moderate to large in amount. No splenomegaly. The portal vein and splenic vein do  not appear enlarged. 2. No gallstones seen 3. No definite distention of the stomach, small bowel or colon. 4. The bladder is distended. The wall is mildly thickened but no definite nodularity or bladder stone. Signer Name: Sheron Lundberg MD  Signed: 11/4/2020 1:18 PM  Workstation Name: [id number]  [facility]    Mammo Diagnostic Right With Cad    Result Date: 10/12/2020  MAMMO DIAGNOSTIC RIGHT W CAD-, US GUIDED BREAST BIOPSY W WO DEVICE INITIAL-  CLINICAL INDICATION: 70 years old female presents for ultrasound-guided core needle biopsy of a cluster of cysts at 1:00 in the right breast, which increased in size on follow-up ultrasound from 9/21/2020.  PRE-PROCEDURAL CONSULTATION: Details of the procedure and possible limitations and complications were discussed with the patient. After addressing her questions and concerns, written informed consent was obtained.  PROCEDURE: The mass at 1:00, 4 cm from the nipple in the right breast was targeted under ultrasound. The skin of the breast was cleansed and prepped. 1 mL of 1% lidocaine and 3 mL of 1% lidocaine with epinephrine were used for local anesthesia. A small skin incision was then made to permit passage of a 12-gauge spring activated biopsy device. 5 core specimens were obtained. A bowtie clip was then deployed at the biopsy site. The patient tolerated the procedure well with no immediate complications. Given the superficial location of the mass, some skin was acquired with the breast biopsy samples. The incision was closed with Steri-Strips and Dermabond.  Post-procedural digital mammographic views of the right breast demonstrate the bowtie clip at the biopsy site in the slightly upper inner middle right breast. The clip is located closer to the 2-3 o'clock location on mammogram but is considered appropriately positioned due to corresponding biopsy changes at this location and adequate sampling of the mass during biopsy. Differences in  location may be due to differences in patient positioning/ultrasound technique.  PATHOLOGY:  Final Diagnosis  1. Skin and Breast Tissue, Right Breast Mass at 1 o'clock 4 cm from Nipple, Core Biopsy: A. Benign skin with dermal fibrosis (scar). B. Focal dense stromal fibrosis/sclerotic fibroadenomatous hyperplasia with focal organizing fat necrosis and?foreign body giant cell reaction. C. Microcalcification noted within benign breast tissue. D. No in situ nor infiltrating carcinoma identified by routine staining (see comment).  Electronically signed by Juno Walter MD on 10/12/2020 at 1426        1. Ultrasound-guided core needle biopsy of a cluster of cysts at 1:00, 4 cm from the nipple in the right breast, marked with a bowtie clip. Pathology is benign and concordant with the imaging assessment. Recommend annual bilateral screening mammogram in September 2021.  This report was finalized on 10/12/2020 4:08 PM by Dr. Kari Carvalho M.D.      Xr Chest 1 View    Result Date: 11/5/2020  XR CHEST 1 VW-: 11/5/2020 2:52 PM  INDICATION: 70-year-old female presenting for PICC line placement  COMPARISON:  11/04/2020.  FINDINGS: Single Portable AP view(s) of the chest. Status post right shoulder replacement. There is a new right-sided PICC line present. The tip extends to the level of the right atrium. If positioning at the cavoatrial junction is desired it could be retracted about 4 cm. No pneumothorax. Cardiac silhouette borderline in size and stable. Shallow lung expansion with low lung volumes and probable bibasilar atelectasis. Persistent eventration or elevation of the right hemidiaphragm.       1. New right-sided PICC line tip at the right atrial level. If positioning at the cavoatrial junction is desired it could be retracted about 4 cm. No pneumothorax. 2. No other significant interval change.  This report was finalized on 11/5/2020 3:07 PM by Dr. Waqar Jerez MD.      Xr Chest 1 View    Result Date: 11/4/2020  XR  CHEST 1 VW-: 11/4/2020 12:48 PM  INDICATION: Short of air and weakness several days.  COMPARISON:  None available.  FINDINGS: Single Portable AP view(s) of the chest. Status post right shoulder replacement. Cardiac silhouette is within normal limits. Shallow lung expansion and bronchovascular crowding. Probable platelike atelectasis in the left base and atelectatic change in the right base. Mild eventration or elevation of the right hemidiaphragm. No distinct effusion or pneumothorax.       1. Shallow lung expansion with bronchovascular crowding and probable bibasilar atelectasis.  This report was finalized on 11/4/2020 12:57 PM by Dr. Waqar Jerez MD.      Us Abdomen Limited    Addendum Date: 11/5/2020    Addendum: By report, the patient underwent a bedside paracentesis following the abdomen and pelvis CT performed yesterday, which accounts for the lack of ascites amenable to paracentesis today.  This report was finalized on 11/5/2020 2:14 PM by Dr. Waqar Jerez MD.      Result Date: 11/5/2020  FOUR-QUADRANT ULTRASOUND TO ASSESS FOR THE PRESENCE OR ABSENCE OF ASCITES FOR PLANNED PARACENTESIS  HISTORY: 70-year-old female with abnormal CT scan performed yesterday. Ascites on prior CT.  TECHNIQUE: Four-quadrant ultrasound was performed. Correlation made with CT performed yesterday.  FINDINGS: There is no ascites amenable to ultrasound-guided paracentesis on the provided images. The majority of the ascites on the prior CT localizes to the pelvis.      1. No ascites amenable to ultrasound-guided paracentesis.  This report was finalized on 11/5/2020 11:28 AM by Dr. Waqar Jerez MD.      Us Guided Breast Biopsy With & Without Device Initial    Result Date: 10/12/2020  MAMMO DIAGNOSTIC RIGHT W CAD-, US GUIDED BREAST BIOPSY W WO DEVICE INITIAL-  CLINICAL INDICATION: 70 years old female presents for ultrasound-guided core needle biopsy of a cluster of cysts at 1:00 in the right breast, which increased in size on follow-up  ultrasound from 9/21/2020.  PRE-PROCEDURAL CONSULTATION: Details of the procedure and possible limitations and complications were discussed with the patient. After addressing her questions and concerns, written informed consent was obtained.  PROCEDURE: The mass at 1:00, 4 cm from the nipple in the right breast was targeted under ultrasound. The skin of the breast was cleansed and prepped. 1 mL of 1% lidocaine and 3 mL of 1% lidocaine with epinephrine were used for local anesthesia. A small skin incision was then made to permit passage of a 12-gauge spring activated biopsy device. 5 core specimens were obtained. A bowtie clip was then deployed at the biopsy site. The patient tolerated the procedure well with no immediate complications. Given the superficial location of the mass, some skin was acquired with the breast biopsy samples. The incision was closed with Steri-Strips and Dermabond.  Post-procedural digital mammographic views of the right breast demonstrate the bowtie clip at the biopsy site in the slightly upper inner middle right breast. The clip is located closer to the 2-3 o'clock location on mammogram but is considered appropriately positioned due to corresponding biopsy changes at this location and adequate sampling of the mass during biopsy. Differences in location may be due to differences in patient positioning/ultrasound technique.  PATHOLOGY:  Final Diagnosis  1. Skin and Breast Tissue, Right Breast Mass at 1 o'clock 4 cm from Nipple, Core Biopsy: A. Benign skin with dermal fibrosis (scar). B. Focal dense stromal fibrosis/sclerotic fibroadenomatous hyperplasia with focal organizing fat necrosis and?foreign body giant cell reaction. C. Microcalcification noted within benign breast tissue. D. No in situ nor infiltrating carcinoma identified by routine staining (see comment).  Electronically signed by Juno Walter MD on 10/12/2020 at 9034        1. Ultrasound-guided core needle biopsy of a cluster  of cysts at 1:00, 4 cm from the nipple in the right breast, marked with a bowtie clip. Pathology is benign and concordant with the imaging assessment. Recommend annual bilateral screening mammogram in September 2021.  This report was finalized on 10/12/2020 4:08 PM by Dr. Kari Carvalho M.D.      Results for orders placed in visit on 03/10/15   SCANNED - ECHOCARDIOGRAM           Active Hospital Problems    Diagnosis  POA   • **Acute liver failure [K72.00]  Yes   • Bladder prolapse, female, acquired [N81.10]  Yes   • Sepsis associated hypotension (CMS/HCC) [A41.9, I95.9]  Yes   • Altered mental state [R41.82]  Yes   • Acute renal failure (CMS/HCC) [N17.9]  Yes   • Alcohol abuse [F10.10]  Yes   • Ascites due to alcoholic hepatitis [K70.11]  Yes   • Acute respiratory failure with hypoxia (CMS/HCC) [J96.01]  Yes   • Hypertension [I10]  Yes   • Frequent UTI [N39.0]  Yes      Resolved Hospital Problems   No resolved problems to display.         Assessment/Plan     1. Sepsis with septic shock? vasopressors off, blood pressures adequate but not by any means high, but liver disease often see blood pressures running on the lower side.  Source of sepsis not clear, no clear respiratory infection urine looks clear and ascitic fluid not growing anything.  2. Acute kidney injury nephrology following looks like this is resolving or has resolved.  3. Acute alcoholic hepatitis discriminant function is not high enough to require steroids  4. Ascites secondary to liver disease  5. Hyponatremia resolved  6. Alcohol abuse and dependence  7. Hypoalbuminemia  8. Metabolic encephalopathy probably at least a complement of portosystemic encephalopathy  9. Coagulopathy secondary to liver disease  10. Anemia hemoglobin relatively stable  11. Bladder prolapse chronic  12. Hypothyroidism treatment initiated  13. Fluids/electrolytes/nutrition phosphorus, magnesium and potassium are low may benefit from replacement    Plan for disposition:    Hank  Chay Lofton MD  11/07/20  06:14 EST    Time:

## 2020-11-07 NOTE — PROGRESS NOTES
"SERVICE: [facility] HOSPITALIST    CHIEF COMPLAINT: \"Feeling lousy\"    SUBJECTIVE:    Patient is arousable, and interactive this morning on exam.  Keeps stating over and over again that she \"does not know how I got this bad\", even after explaining that this appeared to be liver failure from alcohol use.    Notes reviewed, no acute events overnight.  Discussed with sister at bedside, who asked about possibility of liver transplant, told that if she stops drinking she is likely to make an recovery with out need for transplant, and that substance abuse would preclude her from a liver transplant in the unlikely case that she needed such.  Sister is on board with getting patient substance abuse rehab, but discussed that it had to be the patient's choice, and at this early stage there are some signs that she shows lack of insight into her condition, but that may change over the course of her hospitalization.    She does still have some lethargy, nonspecific not feeling well feeling, nausea, but denies fever chills and dysuria.      OBJECTIVE:    BP 99/74   Pulse 115   Temp 98.1 °F (36.7 °C) (Oral)   Resp 18   Ht 157.5 cm (62\")   Wt 70.1 kg (154 lb 8.7 oz)   LMP  (LMP Unknown)   SpO2 95%   BMI 28.27 kg/m²     MEDS/LABS REVIEWED AND ORDERED    cefTRIAXone, 1 g, Intravenous, Q24H  furosemide, 40 mg, Intravenous, Q8H  lactulose, 20 g, Oral, TID  levothyroxine sodium, 25 mcg, Intravenous, Q AM  midodrine, 2.5 mg, Oral, TID AC  sodium chloride, 10 mL, Intravenous, Q12H  sodium chloride, 10 mL, Intravenous, Q12H  sodium chloride, 10 mL, Intravenous, Q12H        Physical Exam  Vitals signs and nursing note reviewed.   Constitutional:       General: She is not in acute distress.     Appearance: She is ill-appearing. She is not toxic-appearing or diaphoretic.   Eyes:      General: Scleral icterus present.      Pupils: Pupils are equal, round, and reactive to light.   Cardiovascular:      Rate and Rhythm: " Regular rhythm. Tachycardia present.      Heart sounds: Normal heart sounds. No murmur.   Pulmonary:      Effort: No respiratory distress.      Breath sounds: Normal breath sounds. No wheezing, rhonchi or rales.      Comments: Decreased chest expansion, diminished throughout  Abdominal:      General: There is distension.      Tenderness: There is no abdominal tenderness. There is no guarding.      Comments: Positive fluid wave, increased abdominal distention from yesterday   Musculoskeletal:      Comments: Bilateral trace pitting edema   Skin:     General: Skin is warm and dry.      Coloration: Skin is jaundiced.      Findings: Bruising present. No rash.   Neurological:      General: No focal deficit present.      Mental Status: She is alert.      Cranial Nerves: No cranial nerve deficit.   Psychiatric:         Attention and Perception: She is attentive. She does not perceive auditory hallucinations.         Mood and Affect: Affect is blunt and flat. Affect is not angry or inappropriate.         Speech: Speech is delayed. Speech is not slurred or tangential.         Behavior: Behavior is slowed and withdrawn.         Thought Content: Thought content is not paranoid or delusional.      Comments: Deferred mood         LAB/DIAGNOSTICS:    Lab Results (last 24 hours)     Procedure Component Value Units Date/Time    Blood Culture - Blood, Blood, Venous Line [[id number]] Collected: 11/04/20 1136    Specimen: Blood, Venous Line Updated: 11/07/20 1145     Blood Culture No growth at 3 days    Anaerobic Culture - Body Fluid, Peritoneum [[id number]] Collected: 11/04/20 1552    Specimen: Body Fluid from Peritoneum Updated: 11/07/20 0737     Anaerobic Culture No anaerobes isolated at 3 days    Body Fluid Culture - Body Fluid, Peritoneum [[id number]] Collected: 11/04/20 1552    Specimen: Body Fluid from Peritoneum Updated: 11/07/20 0726     Body Fluid Culture No growth at 3 days     Gram Stain Rare (1+) WBCs seen      No organisms  seen    Phosphorus [[id number]]  (Abnormal) Collected: 11/07/20 0446    Specimen: Blood Updated: 11/07/20 0520     Phosphorus 1.6 mg/dL     Comprehensive Metabolic Panel [[id number]]  (Abnormal) Collected: 11/07/20 0446    Specimen: Blood Updated: 11/07/20 0519     Glucose 137 mg/dL      BUN 32 mg/dL      Creatinine 0.48 mg/dL      Sodium 137 mmol/L      Potassium 3.1 mmol/L      Chloride 105 mmol/L      CO2 19.0 mmol/L      Calcium 7.2 mg/dL      Total Protein 4.6 g/dL      Albumin 2.80 g/dL      ALT (SGPT) 73 U/L      AST (SGOT) 189 U/L      Alkaline Phosphatase 160 U/L      Total Bilirubin 7.8 mg/dL      eGFR Non African Amer 127 mL/min/1.73      Globulin 1.8 gm/dL      A/G Ratio 1.6 g/dL      BUN/Creatinine Ratio 66.7     Anion Gap 13.0 mmol/L     Narrative:      GFR Normal >60  Chronic Kidney Disease <60  Kidney Failure <15      Magnesium [[id number]]  (Abnormal) Collected: 11/07/20 0446    Specimen: Blood Updated: 11/07/20 0519     Magnesium 1.3 mg/dL     Uric Acid [[id number]]  (Abnormal) Collected: 11/07/20 0446    Specimen: Blood Updated: 11/07/20 0517     Uric Acid 11.1 mg/dL     Protime-INR [[id number]]  (Abnormal) Collected: 11/07/20 0446    Specimen: Blood Updated: 11/07/20 0514     Protime 18.3 Seconds      INR 1.58    Narrative:      Therapeutic Ranges for INR: 2.0-3.0 (PT 20-30)                              2.5-3.5 (PT 25-34)    CBC & Differential [[id number]]  (Abnormal) Collected: 11/07/20 0446    Specimen: Blood Updated: 11/07/20 0459    Narrative:      The following orders were created for panel order CBC & Differential.  Procedure                               Abnormality         Status                     ---------                               -----------         ------                     CBC Auto Differential[[id number]]        Abnormal            Final result                 Please view results for these tests on the individual orders.    CBC Auto Differential [[id number]]  (Abnormal) Collected:  11/07/20 0446    Specimen: Blood Updated: 11/07/20 0459     WBC 12.26 10*3/mm3      RBC 2.26 10*6/mm3      Hemoglobin 8.4 g/dL      Hematocrit 25.7 %      .7 fL      MCH 37.2 pg      MCHC 32.7 g/dL      RDW 14.7 %      RDW-SD 61.6 fl      MPV 11.0 fL      Platelets 145 10*3/mm3      Neutrophil % 63.7 %      Lymphocyte % 15.5 %      Monocyte % 12.6 %      Eosinophil % 1.2 %      Basophil % 0.5 %      Immature Grans % 6.5 %      Neutrophils, Absolute 7.81 10*3/mm3      Lymphocytes, Absolute 1.90 10*3/mm3      Monocytes, Absolute 1.54 10*3/mm3      Eosinophils, Absolute 0.15 10*3/mm3      Basophils, Absolute 0.06 10*3/mm3      Immature Grans, Absolute 0.80 10*3/mm3      nRBC 1.1 /100 WBC     Phosphorus [[id number]]  (Abnormal) Collected: 11/06/20 1745    Specimen: Blood Updated: 11/06/20 1810     Phosphorus 1.7 mg/dL         Results for orders placed in visit on 03/10/15   SCANNED - ECHOCARDIOGRAM     Xr Chest 1 View    Result Date: 11/5/2020   1. New right-sided PICC line tip at the right atrial level. If positioning at the cavoatrial junction is desired it could be retracted about 4 cm. No pneumothorax. 2. No other significant interval change.  This report was finalized on 11/5/2020 3:07 PM by Dr. Waqar Jerez MD.          ASSESSMENT/PLAN:    Bacterial Sepsis from unclear source, with shock refractory to fluids  -Unclear source, urinalysis likely contaminated due to patient's Bladder prolapse, CXR unconvincing for pneumonia, ascites fluid negative for SBP, CT scan of the abdomen did not show any acute abnormalities  - Needed Levophed for about 10 hours on night of 115-11/6. Has been off levophed since  -Started patient on midodrine yesterday  - Procal improving on Rocephin, urine culture was negative, blood culture/abdominal fluid culture pending     Multifactorial altered mental status due to alcohol withdrawal, sepsis, and hepatic encephalopathy  - MS improving  - CIWA scores have come down to 0 since last  night  -Yesterday, Ammonia level elevated, added lactulose, check level in AM     MONSTER, Likely Pre-renal  - Nephrology following, probably not Hepatorenal  - Cr now back to baseline  - Renal typically chart checks later in the afternoon, will start Lasix 40 mg IV q. 8, unless renal has other recommendations      Alcoholic Hepatitis with coagulopathy  - No steroids, LFT's and Bili stable, but MELD-NA now down to 21 (from 34 on admission)     New onset ascites  -Paracentesis on 11/4 removed 2 L of fluid  -Fluid studies negative for SBP, also already covering sepsis with Rocephin as above  -Renal once hemodynamics more stable prior to paracentesis, abdomen is reaccumulating, will likely need repeat para on Monday     Hypothyroidism  - started on 25mcg IV levothyroxine to support pt through Critical illness, can switch to 50mcg po once regularly taking po     Hyponatermia, hypophosphatemia, hypomag  -  on admit, 137 today, was given D5 half-normal overnight, but stopping fluids and will start Lasix today, monitor sodium  -Renal function remarkably improved today, will aggressively replace phosphorus magnesium and potassium, with recheck of labs in the afternoon     Malnutrition  -Patient awake, will advance diet as tolerated, may need still need feeding tube for high quality protein intake     Diarrhea  -present prior to admission, none seen here, GI panel negative     Bladder prolapse  -[facility]s with [facility] in [facility] past, culture urine, as sepsis above      Etoh Abuse  - Family is on board with recovery  -Will see how patient feels closer to discharge about [place] azeb

## 2020-11-07 NOTE — PROGRESS NOTES
Contacted by staff regarding patient has had 10 stools thus far today requiring frequent linen/brief changes, requesting rectal tube. The patient has no skin breakdown thus far.     By med rec she received 30 gm at 1010 am, 20 gm at 1545 pm and 2056 pm.     Concern regarding possible rectal trauma with rectal tube, therefore hold off on this for now    Will plan to d/w day team and decrease lactulose dosing rather than placing rectal tube.    Staff feels patient mentation has improved quite a bit today with use of lactulose.    Patient is conversant, thanking them for changing her and for warm blankets which was previously not the case    Next scheduled is 0900 am    Monitor only for now

## 2020-11-07 NOTE — PROGRESS NOTES
GI Daily Progress Note    Assessment/Plan:      Acute liver failure    Frequent UTI    Hypertension    Acute respiratory failure with hypoxia (CMS/HCC)    Ascites due to alcoholic hepatitis    Bladder prolapse, female, acquired    Sepsis associated hypotension (CMS/HCC)    Altered mental state    Acute renal failure (CMS/HCC)    Alcohol abuse       LOS: 3 days     Tayler Lugo is a 70 y.o. female who was admitted with Acute liver failure. She reports the symptoms are improving with treatment. Awake and conversant, oriented to place too and not eating very much. Creat normal today as well and hgb stable    Subjective:    Patient expresses loss of appetite  Patient denies abdominal pain, vomiting, diarrhea and bloody stools    Objective:    Vital signs in last 24 hours:  Temp:  [97.9 °F (36.6 °C)-98.1 °F (36.7 °C)] 98.1 °F (36.7 °C)  Heart Rate:  [] 106  Resp:  [15-20] 20  BP: ()/(52-97) 102/68    Intake/Output last 3 shifts:  I/O last 3 completed shifts:  In: 4447 [P.O.:720; I.V.:3727]  Out: 1730 [Urine:1730]  Intake/Output this shift:  I/O this shift:  In: 460 [P.O.:460]  Out: 1750 [Urine:1750]    Physical Exam:Abdomen  Sounds Normal Active Bowel Sounds   Distension Soft and Distended   Tenderness Nontender     Imaging Results (Last 72 Hours)     Procedure Component Value Units Date/Time    XR Chest 1 View [[id number]] Collected: 11/05/20 1505     Updated: 11/05/20 1509    Narrative:      XR CHEST 1 VW-: 11/5/2020 2:52 PM     INDICATION:   70-year-old female presenting for PICC line placement      COMPARISON:    11/04/2020.     FINDINGS:  Single Portable AP view(s) of the chest. Status post right shoulder  replacement. There is a new right-sided PICC line present. The tip  extends to the level of the right atrium. If positioning at the  cavoatrial junction is desired it could be retracted about 4 cm. No  pneumothorax. Cardiac silhouette borderline in size and stable. Shallow  lung expansion with low  lung volumes and probable bibasilar atelectasis.  Persistent eventration or elevation of the right hemidiaphragm.       Impression:         1. New right-sided PICC line tip at the right atrial level. If  positioning at the cavoatrial junction is desired it could be retracted  about 4 cm. No pneumothorax.  2. No other significant interval change.     This report was finalized on 11/5/2020 3:07 PM by Dr. Waqar Jerez MD.       US Abdomen Limited [[id number]] Collected: 11/05/20 1125     Updated: 11/05/20 1416    Addenda:        Addendum: By report, the patient underwent a bedside paracentesis  following the abdomen and pelvis CT performed yesterday, which accounts  for the lack of ascites amenable to paracentesis today.     This report was finalized on 11/5/2020 2:14 PM by Dr. Waqar Jerez MD.     Signed: 11/05/20 1414 by Waqar Jerez MD    Narrative:      FOUR-QUADRANT ULTRASOUND TO ASSESS FOR THE PRESENCE OR ABSENCE OF  ASCITES FOR PLANNED PARACENTESIS     HISTORY:  70-year-old female with abnormal CT scan performed yesterday. Ascites on  prior CT.     TECHNIQUE:  Four-quadrant ultrasound was performed. Correlation made with CT  performed yesterday.     FINDINGS:  There is no ascites amenable to ultrasound-guided paracentesis on the  provided images. The majority of the ascites on the prior CT localizes  to the pelvis.       Impression:      1. No ascites amenable to ultrasound-guided paracentesis.     This report was finalized on 11/5/2020 11:28 AM by Dr. Waqar Jerez MD.             WBC   Date Value Ref Range Status   11/07/2020 12.26 (H) 3.40 - 10.80 10*3/mm3 Final     RBC   Date Value Ref Range Status   11/07/2020 2.26 (L) 3.77 - 5.28 10*6/mm3 Final     Hemoglobin   Date Value Ref Range Status   11/07/2020 8.4 (L) 12.0 - 15.9 g/dL Final     Hematocrit   Date Value Ref Range Status   11/07/2020 25.7 (L) 34.0 - 46.6 % Final     MCV   Date Value Ref Range Status   11/07/2020 113.7 (H) 79.0 - 97.0 fL Final      MCH   Date Value Ref Range Status   11/07/2020 37.2 (H) 26.6 - 33.0 pg Final     MCHC   Date Value Ref Range Status   11/07/2020 32.7 31.5 - 35.7 g/dL Final     RDW   Date Value Ref Range Status   11/07/2020 14.7 12.3 - 15.4 % Final     RDW-SD   Date Value Ref Range Status   11/07/2020 61.6 (H) 37.0 - 54.0 fl Final     MPV   Date Value Ref Range Status   11/07/2020 11.0 6.0 - 12.0 fL Final     Platelets   Date Value Ref Range Status   11/07/2020 145 140 - 450 10*3/mm3 Final     Neutrophil %   Date Value Ref Range Status   11/07/2020 63.7 42.7 - 76.0 % Final     Lymphocyte %   Date Value Ref Range Status   11/07/2020 15.5 (L) 19.6 - 45.3 % Final     Monocyte %   Date Value Ref Range Status   11/07/2020 12.6 (H) 5.0 - 12.0 % Final     Eosinophil %   Date Value Ref Range Status   11/07/2020 1.2 0.3 - 6.2 % Final     Basophil %   Date Value Ref Range Status   11/07/2020 0.5 0.0 - 1.5 % Final     Immature Grans %   Date Value Ref Range Status   11/07/2020 6.5 (H) 0.0 - 0.5 % Final     Neutrophils, Absolute   Date Value Ref Range Status   11/07/2020 7.81 (H) 1.70 - 7.00 10*3/mm3 Final     Lymphocytes, Absolute   Date Value Ref Range Status   11/07/2020 1.90 0.70 - 3.10 10*3/mm3 Final     Monocytes, Absolute   Date Value Ref Range Status   11/07/2020 1.54 (H) 0.10 - 0.90 10*3/mm3 Final     Eosinophils, Absolute   Date Value Ref Range Status   11/07/2020 0.15 0.00 - 0.40 10*3/mm3 Final     Basophils, Absolute   Date Value Ref Range Status   11/07/2020 0.06 0.00 - 0.20 10*3/mm3 Final     Immature Grans, Absolute   Date Value Ref Range Status   11/07/2020 0.80 (H) 0.00 - 0.05 10*3/mm3 Final     nRBC   Date Value Ref Range Status   11/07/2020 1.1 (H) 0.0 - 0.2 /100 WBC Final       Glucose   Date Value Ref Range Status   11/07/2020 157 (H) 65 - 99 mg/dL Final     Sodium   Date Value Ref Range Status   11/07/2020 136 136 - 145 mmol/L Final     Potassium   Date Value Ref Range Status   11/07/2020 4.4 3.5 - 5.2 mmol/L Final      CO2   Date Value Ref Range Status   11/07/2020 19.8 (L) 22.0 - 29.0 mmol/L Final     Chloride   Date Value Ref Range Status   11/07/2020 104 98 - 107 mmol/L Final     Anion Gap   Date Value Ref Range Status   11/07/2020 12.2 5.0 - 15.0 mmol/L Final     Creatinine   Date Value Ref Range Status   11/07/2020 0.46 (L) 0.57 - 1.00 mg/dL Final     BUN   Date Value Ref Range Status   11/07/2020 25 (H) 8 - 23 mg/dL Final     BUN/Creatinine Ratio   Date Value Ref Range Status   11/07/2020 54.3 (H) 7.0 - 25.0 Final     Calcium   Date Value Ref Range Status   11/07/2020 7.4 (L) 8.6 - 10.5 mg/dL Final     eGFR Non  Amer   Date Value Ref Range Status   11/07/2020 134 >60 mL/min/1.73 Final     Alkaline Phosphatase   Date Value Ref Range Status   11/07/2020 160 (H) 39 - 117 U/L Final     Total Protein   Date Value Ref Range Status   11/07/2020 4.6 (L) 6.0 - 8.5 g/dL Final     ALT (SGPT)   Date Value Ref Range Status   11/07/2020 73 (H) 1 - 33 U/L Final     AST (SGOT)   Date Value Ref Range Status   11/07/2020 189 (H) 1 - 32 U/L Final     Total Bilirubin   Date Value Ref Range Status   11/07/2020 7.8 (H) 0.0 - 1.2 mg/dL Final     Albumin   Date Value Ref Range Status   11/07/2020 2.80 (L) 3.50 - 5.20 g/dL Final     Globulin   Date Value Ref Range Status   11/07/2020 1.8 gm/dL Final     Alcoholic Hepatitis   MONSTER  DTs  Hypotension    Still leaking fron para site and her MONSTER has resolved, will push nutrition for now and follow along.

## 2020-11-08 LAB
ALBUMIN SERPL-MCNC: 2.8 G/DL (ref 3.5–5.2)
ALBUMIN/GLOB SERPL: 1.5 G/DL
ALP SERPL-CCNC: 178 U/L (ref 39–117)
ALT SERPL W P-5'-P-CCNC: 81 U/L (ref 1–33)
AMMONIA BLD-SCNC: 86 UMOL/L (ref 11–51)
ANION GAP SERPL CALCULATED.3IONS-SCNC: 13.2 MMOL/L (ref 5–15)
AST SERPL-CCNC: 179 U/L (ref 1–32)
BASOPHILS # BLD AUTO: 0.07 10*3/MM3 (ref 0–0.2)
BASOPHILS NFR BLD AUTO: 0.5 % (ref 0–1.5)
BILIRUB SERPL-MCNC: 7.2 MG/DL (ref 0–1.2)
BUN SERPL-MCNC: 20 MG/DL (ref 8–23)
BUN/CREAT SERPL: 57.1 (ref 7–25)
CALCIUM SPEC-SCNC: 7.9 MG/DL (ref 8.6–10.5)
CHLORIDE SERPL-SCNC: 103 MMOL/L (ref 98–107)
CO2 SERPL-SCNC: 21.8 MMOL/L (ref 22–29)
CREAT SERPL-MCNC: 0.35 MG/DL (ref 0.57–1)
DEPRECATED RDW RBC AUTO: 61.1 FL (ref 37–54)
EOSINOPHIL # BLD AUTO: 0.1 10*3/MM3 (ref 0–0.4)
EOSINOPHIL NFR BLD AUTO: 0.7 % (ref 0.3–6.2)
ERYTHROCYTE [DISTWIDTH] IN BLOOD BY AUTOMATED COUNT: 15.1 % (ref 12.3–15.4)
GFR SERPL CREATININE-BSD FRML MDRD: >150 ML/MIN/1.73
GLOBULIN UR ELPH-MCNC: 1.9 GM/DL
GLUCOSE SERPL-MCNC: 123 MG/DL (ref 65–99)
HCT VFR BLD AUTO: 27.5 % (ref 34–46.6)
HGB BLD-MCNC: 9 G/DL (ref 12–15.9)
IMM GRANULOCYTES # BLD AUTO: 1.16 10*3/MM3 (ref 0–0.05)
IMM GRANULOCYTES NFR BLD AUTO: 7.8 % (ref 0–0.5)
INR PPP: 1.46 (ref 0.9–1.1)
LYMPHOCYTES # BLD AUTO: 2.37 10*3/MM3 (ref 0.7–3.1)
LYMPHOCYTES # BLD MANUAL: 1.19 10*3/MM3 (ref 0.7–3.1)
LYMPHOCYTES NFR BLD AUTO: 15.9 % (ref 19.6–45.3)
LYMPHOCYTES NFR BLD MANUAL: 11 % (ref 5–12)
LYMPHOCYTES NFR BLD MANUAL: 8 % (ref 19.6–45.3)
MACROCYTES BLD QL SMEAR: ABNORMAL
MAGNESIUM SERPL-MCNC: 1.1 MG/DL (ref 1.6–2.4)
MCH RBC QN AUTO: 36.6 PG (ref 26.6–33)
MCHC RBC AUTO-ENTMCNC: 32.7 G/DL (ref 31.5–35.7)
MCV RBC AUTO: 111.8 FL (ref 79–97)
MONOCYTES # BLD AUTO: 1.63 10*3/MM3 (ref 0.1–0.9)
MONOCYTES # BLD AUTO: 1.98 10*3/MM3 (ref 0.1–0.9)
MONOCYTES NFR BLD AUTO: 13.3 % (ref 5–12)
NEUTROPHILS # BLD AUTO: 11.89 10*3/MM3 (ref 1.7–7)
NEUTROPHILS NFR BLD AUTO: 61.8 % (ref 42.7–76)
NEUTROPHILS NFR BLD AUTO: 9.18 10*3/MM3 (ref 1.7–7)
NEUTROPHILS NFR BLD MANUAL: 77 % (ref 42.7–76)
NEUTS BAND NFR BLD MANUAL: 3 % (ref 0–5)
NRBC BLD AUTO-RTO: 1.6 /100 WBC (ref 0–0.2)
NRBC SPEC MANUAL: 1 /100 WBC (ref 0–0.2)
PHOSPHATE SERPL-MCNC: 1.5 MG/DL (ref 2.5–4.5)
PHOSPHATE SERPL-MCNC: 2.1 MG/DL (ref 2.5–4.5)
PLAT MORPH BLD: NORMAL
PLATELET # BLD AUTO: 142 10*3/MM3 (ref 140–450)
PMV BLD AUTO: 11.2 FL (ref 6–12)
POLYCHROMASIA BLD QL SMEAR: ABNORMAL
POTASSIUM SERPL-SCNC: 3 MMOL/L (ref 3.5–5.2)
POTASSIUM SERPL-SCNC: 3.6 MMOL/L (ref 3.5–5.2)
PROCALCITONIN SERPL-MCNC: 0.27 NG/ML (ref 0–0.25)
PROT SERPL-MCNC: 4.7 G/DL (ref 6–8.5)
PROTHROMBIN TIME: 17.2 SECONDS (ref 12.1–15)
RBC # BLD AUTO: 2.46 10*6/MM3 (ref 3.77–5.28)
SODIUM SERPL-SCNC: 138 MMOL/L (ref 136–145)
VARIANT LYMPHS NFR BLD MANUAL: 1 % (ref 0–5)
WBC # BLD AUTO: 14.86 10*3/MM3 (ref 3.4–10.8)
WBC MORPH BLD: NORMAL

## 2020-11-08 PROCEDURE — 25010000003 POTASSIUM CHLORIDE 10 MEQ/100ML SOLUTION: Performed by: INTERNAL MEDICINE

## 2020-11-08 PROCEDURE — 99232 SBSQ HOSP IP/OBS MODERATE 35: CPT | Performed by: INTERNAL MEDICINE

## 2020-11-08 PROCEDURE — 25010000002 FUROSEMIDE PER 20 MG: Performed by: INTERNAL MEDICINE

## 2020-11-08 PROCEDURE — 97162 PT EVAL MOD COMPLEX 30 MIN: CPT

## 2020-11-08 PROCEDURE — 83735 ASSAY OF MAGNESIUM: CPT | Performed by: INTERNAL MEDICINE

## 2020-11-08 PROCEDURE — 99233 SBSQ HOSP IP/OBS HIGH 50: CPT | Performed by: INTERNAL MEDICINE

## 2020-11-08 PROCEDURE — 82140 ASSAY OF AMMONIA: CPT | Performed by: INTERNAL MEDICINE

## 2020-11-08 PROCEDURE — 84132 ASSAY OF SERUM POTASSIUM: CPT | Performed by: HOSPITALIST

## 2020-11-08 PROCEDURE — 85007 BL SMEAR W/DIFF WBC COUNT: CPT | Performed by: INTERNAL MEDICINE

## 2020-11-08 PROCEDURE — 84145 PROCALCITONIN (PCT): CPT | Performed by: INTERNAL MEDICINE

## 2020-11-08 PROCEDURE — 80053 COMPREHEN METABOLIC PANEL: CPT | Performed by: INTERNAL MEDICINE

## 2020-11-08 PROCEDURE — 25010000002 MAGNESIUM SULFATE IN D5W 1G/100ML (PREMIX) 1-5 GM/100ML-% SOLUTION: Performed by: INTERNAL MEDICINE

## 2020-11-08 PROCEDURE — 85025 COMPLETE CBC W/AUTO DIFF WBC: CPT | Performed by: INTERNAL MEDICINE

## 2020-11-08 PROCEDURE — 84100 ASSAY OF PHOSPHORUS: CPT | Performed by: HOSPITALIST

## 2020-11-08 PROCEDURE — 85610 PROTHROMBIN TIME: CPT | Performed by: INTERNAL MEDICINE

## 2020-11-08 PROCEDURE — 25010000002 CEFTRIAXONE SODIUM-DEXTROSE 1-3.74 GM-%(50ML) RECONSTITUTED SOLUTION: Performed by: INTERNAL MEDICINE

## 2020-11-08 PROCEDURE — 84100 ASSAY OF PHOSPHORUS: CPT | Performed by: INTERNAL MEDICINE

## 2020-11-08 RX ORDER — LEVOTHYROXINE SODIUM 0.05 MG/1
50 TABLET ORAL
Status: DISCONTINUED | OUTPATIENT
Start: 2020-11-09 | End: 2020-11-17 | Stop reason: HOSPADM

## 2020-11-08 RX ORDER — ACETAMINOPHEN 500 MG
500 TABLET ORAL EVERY 6 HOURS PRN
Status: DISCONTINUED | OUTPATIENT
Start: 2020-11-08 | End: 2020-11-17 | Stop reason: HOSPADM

## 2020-11-08 RX ORDER — FUROSEMIDE 10 MG/ML
20 INJECTION INTRAMUSCULAR; INTRAVENOUS ONCE
Status: COMPLETED | OUTPATIENT
Start: 2020-11-08 | End: 2020-11-08

## 2020-11-08 RX ORDER — ALBUMIN (HUMAN) 12.5 G/50ML
12.5 SOLUTION INTRAVENOUS ONCE
Status: CANCELLED | OUTPATIENT
Start: 2020-11-08 | End: 2020-11-08

## 2020-11-08 RX ADMIN — POTASSIUM & SODIUM PHOSPHATES POWDER PACK 280-160-250 MG 1 PACKET: 280-160-250 PACK at 13:15

## 2020-11-08 RX ADMIN — SODIUM CHLORIDE, PRESERVATIVE FREE 10 ML: 5 INJECTION INTRAVENOUS at 20:29

## 2020-11-08 RX ADMIN — POTASSIUM CHLORIDE 10 MEQ: 7.46 INJECTION, SOLUTION INTRAVENOUS at 06:22

## 2020-11-08 RX ADMIN — LACTULOSE 20 G: 20 SOLUTION ORAL at 08:33

## 2020-11-08 RX ADMIN — SODIUM CHLORIDE, PRESERVATIVE FREE 10 ML: 5 INJECTION INTRAVENOUS at 08:37

## 2020-11-08 RX ADMIN — POTASSIUM CHLORIDE 40 MEQ: 1500 TABLET, EXTENDED RELEASE ORAL at 14:34

## 2020-11-08 RX ADMIN — MAGNESIUM SULFATE HEPTAHYDRATE 1 G: 1 INJECTION, SOLUTION INTRAVENOUS at 15:39

## 2020-11-08 RX ADMIN — FUROSEMIDE 40 MG: 10 INJECTION, SOLUTION INTRAMUSCULAR; INTRAVENOUS at 09:28

## 2020-11-08 RX ADMIN — FUROSEMIDE 40 MG: 10 INJECTION, SOLUTION INTRAMUSCULAR; INTRAVENOUS at 17:01

## 2020-11-08 RX ADMIN — POTASSIUM CHLORIDE 40 MEQ: 1500 TABLET, EXTENDED RELEASE ORAL at 08:33

## 2020-11-08 RX ADMIN — LACTULOSE 20 G: 20 SOLUTION ORAL at 16:58

## 2020-11-08 RX ADMIN — LORAZEPAM 0.5 MG: 0.5 TABLET ORAL at 15:24

## 2020-11-08 RX ADMIN — MAGNESIUM SULFATE HEPTAHYDRATE 1 G: 1 INJECTION, SOLUTION INTRAVENOUS at 14:35

## 2020-11-08 RX ADMIN — SODIUM CHLORIDE, PRESERVATIVE FREE 10 ML: 5 INJECTION INTRAVENOUS at 20:30

## 2020-11-08 RX ADMIN — POTASSIUM & SODIUM PHOSPHATES POWDER PACK 280-160-250 MG 1 PACKET: 280-160-250 PACK at 17:02

## 2020-11-08 RX ADMIN — ACETAMINOPHEN 500 MG: 500 TABLET, FILM COATED ORAL at 11:42

## 2020-11-08 RX ADMIN — MIDODRINE HYDROCHLORIDE 2.5 MG: 5 TABLET ORAL at 08:33

## 2020-11-08 RX ADMIN — FUROSEMIDE 40 MG: 10 INJECTION, SOLUTION INTRAMUSCULAR; INTRAVENOUS at 02:28

## 2020-11-08 RX ADMIN — LACTULOSE 20 G: 20 SOLUTION ORAL at 20:29

## 2020-11-08 RX ADMIN — CEFTRIAXONE 1 G: 1 INJECTION, SOLUTION INTRAVENOUS at 08:46

## 2020-11-08 RX ADMIN — MIDODRINE HYDROCHLORIDE 2.5 MG: 5 TABLET ORAL at 16:58

## 2020-11-08 RX ADMIN — LEVOTHYROXINE SODIUM ANHYDROUS 25 MCG: 100 INJECTION, POWDER, LYOPHILIZED, FOR SOLUTION INTRAVENOUS at 06:57

## 2020-11-08 RX ADMIN — POTASSIUM CHLORIDE 40 MEQ: 1500 TABLET, EXTENDED RELEASE ORAL at 20:29

## 2020-11-08 RX ADMIN — MAGNESIUM SULFATE HEPTAHYDRATE 1 G: 1 INJECTION, SOLUTION INTRAVENOUS at 06:21

## 2020-11-08 RX ADMIN — FUROSEMIDE 20 MG: 10 INJECTION, SOLUTION INTRAMUSCULAR; INTRAVENOUS at 11:40

## 2020-11-08 RX ADMIN — MIDODRINE HYDROCHLORIDE 2.5 MG: 5 TABLET ORAL at 11:41

## 2020-11-08 NOTE — PROGRESS NOTES
LOS: 4 days   Patient Care Team:  Tayler Trujillo MD as PCP - General    Subjective     Patient denies any nausea says she thinks she is a little better than yesterday.  The only pain she has is in her right flank currently just mild when she rolls onto that side.  Review of Systems:          Objective     Vital Signs  Vital Sign Min/Max for last 24 hours  Temp  Min: 98 °F (36.7 °C)  Max: 98.3 °F (36.8 °C)   BP  Min: 91/59  Max: 118/72   Pulse  Min: 91  Max: 120   Resp  Min: 18  Max: 20   SpO2  Min: 92 %  Max: 96 %   No data recorded   Weight  Min: 70.1 kg (154 lb 8.7 oz)  Max: 70.1 kg (154 lb 8.7 oz)        Ventilator/Non-Invasive Ventilation Settings (From admission, onward)    None                       Body mass index is 28.27 kg/m².  I/O last 3 completed shifts:  In: 2842 [P.O.:1180; I.V.:1662]  Out: 4280 [Urine:3980; Other:300]  No intake/output data recorded.        Physical Exam:  General Appearance: Well-developed white female resting in bed does not appear in acute distress, intact she was sleeping soundly on my arrival.  Eyes: Conjunctiva are clear but icteric sclera, pupils are equal  ENT: Mucous membranes are little dry no erythema no exudates nasal septum midline  Neck: No adenopathy no jugular venous distension trachea midline  Lungs: Clear no wheezes no rales no rhonchi nonlabored symmetric expansion  Cardiac: Regular rate rhythm no murmur no gallop  Abdomen: Mildly distended soft nontender no guarding no rebound tenderness no palpable hepatosplenomegaly or masses, she does have a fluid wave  : Not examined  Musculoskeletal: Grossly normal  Skin: Mildly jaundiced warm and dry  Neuro: She was alert and oriented she was cooperative she did not seem to have any recollection of yesterday's conversations.  Extremities/P Vascular: No clubbing no cyanosis no edema palpable radial and dorsalis pedis pulses bilaterally  MSE: Hard to tell       Labs:  Results from last 7 days   Lab Units  11/07/20  1407 11/07/20  0446 11/06/20  0442 11/05/20  1341 11/05/20  0601 11/05/20  0444 11/04/20  1136   GLUCOSE mg/dL 157* 137* 83 83 85  --  59*   SODIUM mmol/L 136 137 132* 124* 124*  --  120*   POTASSIUM mmol/L 4.4 3.1* 4.1 5.0 4.9  --  5.1   MAGNESIUM mg/dL 1.7 1.3* 1.9 2.4  --  1.4*  --    CO2 mmol/L 19.8* 19.0* 14.2* 10.8* 16.2*  --  13.6*   CHLORIDE mmol/L 104 105 99 95* 89*  --  78*   ANION GAP mmol/L 12.2 13.0 18.8* 18.2* 18.8*  --  28.4*   CREATININE mg/dL 0.46* 0.48* 1.73* 2.97* 3.54*  --  3.99*   BUN mg/dL 25* 32* 46* 48* 48*  --  46*   BUN / CREAT RATIO  54.3* 66.7* 26.6* 16.2 13.6  --  11.5   CALCIUM mg/dL 7.4* 7.2* 7.1* 7.2* 7.5*  --  8.6   EGFR IF NONAFRICN AM mL/min/1.73 134 127 29* 16* 13*  --  11*   ALK PHOS U/L  --  160* 180*  --  199*  --  264*   TOTAL PROTEIN g/dL  --  4.6* 4.8*  --  4.7*  --  5.8*   ALT (SGPT) U/L  --  73* 77*  --  82*  --  96*   AST (SGOT) U/L  --  189* 223*  --  217*  --  270*   BILIRUBIN mg/dL  --  7.8* 7.6*  --  8.0*  --  9.3*   ALBUMIN g/dL  --  2.80* 2.60*  --  2.50*  --  3.20*   GLOBULIN gm/dL  --  1.8 2.2  --  2.2  --  2.6     Estimated Creatinine Clearance: 58.3 mL/min (A) (by C-G formula based on SCr of 0.46 mg/dL (L)).      Results from last 7 days   Lab Units 11/08/20  0516 11/07/20  0446 11/06/20  0442 11/05/20  0444 11/04/20  1550 11/04/20  1136   WBC 10*3/mm3 14.86* 12.26* 14.33* 14.77* 17.47* 19.31*   RBC 10*6/mm3 2.46* 2.26* 2.38* 2.55* 3.04* 3.00*   HEMOGLOBIN g/dL 9.0* 8.4* 8.7* 9.5* 11.0* 11.0*   HEMATOCRIT % 27.5* 25.7* 27.0* 28.0* 34.0 33.5*   MCV fL 111.8* 113.7* 113.4* 109.8* 111.8* 111.7*   MCH pg 36.6* 37.2* 36.6* 37.3* 36.2* 36.7*   MCHC g/dL 32.7 32.7 32.2 33.9 32.4 32.8   RDW % 15.1 14.7 14.6 14.6 14.4 14.6   RDW-SD fl 61.1* 61.6* 60.6* 58.4* 59.8* 60.2*   MPV fL 11.2 11.0 11.3 11.8 11.0 11.0   PLATELETS 10*3/mm3 142 145 177 257 340 334   NEUTROPHIL % % 61.8 63.7 71.6 77.1*  --  79.9*   LYMPHOCYTE % % 15.9* 15.5* 11.7* 11.6*  --  8.2*    MONOCYTES % % 13.3* 12.6* 11.5 9.8  --  10.9   EOSINOPHIL % % 0.7 1.2 0.7 0.2*  --  0.1*   BASOPHIL % % 0.5 0.5 0.2 0.2  --  0.1   IMM GRAN % % 7.8* 6.5* 4.3* 1.1*  --  0.8*   NEUTROS ABS 10*3/mm3 9.18* 7.81* 10.27* 11.38*  --  15.42*   LYMPHS ABS 10*3/mm3 2.37 1.90 1.67 1.72  --  1.59   MONOS ABS 10*3/mm3 1.98* 1.54* 1.65* 1.45*  --  2.11*   EOS ABS 10*3/mm3 0.10 0.15 0.10 0.03  --  0.01   BASOS ABS 10*3/mm3 0.07 0.06 0.03 0.03  --  0.02   IMMATURE GRANS (ABS) 10*3/mm3 1.16* 0.80* 0.61* 0.16*  --  0.16*   NRBC /100 WBC 1.6* 1.1* 0.5* 0.5*  --   --          Results from last 7 days   Lab Units 11/04/20  1136   CK TOTAL U/L 195*         Results from last 7 days   Lab Units 11/04/20  1136   TSH uIU/mL 16.950*   FREE T4 ng/dL 1.03     Results from last 7 days   Lab Units 11/06/20  0442 11/05/20  0444 11/04/20  1550 11/04/20  1136   LACTATE mmol/L  --   --  4.1* 4.8*   PROCALCITONIN ng/mL 0.65* 0.97*  --  1.04*     Results from last 7 days   Lab Units 11/07/20  0446 11/06/20  0442 11/05/20  0444 11/04/20  1550 11/04/20  1136   INR  1.58* 1.55* 1.41* 1.28* 1.32*     Microbiology Results (last 10 days)     Procedure Component Value - Date/Time    Gastrointestinal Panel, PCR - Stool, Per Rectum [[id number]]  (Normal) Collected: 11/04/20 1858    Lab Status: Final result Specimen: Stool from Per Rectum Updated: 11/05/20 1208     Campylobacter Not Detected     Plesiomonas shigelloides Not Detected     Salmonella Not Detected     Vibrio Not Detected     Vibrio cholerae Not Detected     Yersinia enterocolitica Not Detected     Enteroaggregative E. coli (EAEC) Not Detected     Enteropathogenic E. coli (EPEC) Not Detected     Enterotoxigenic E. coli (ETEC) lt/st Not Detected     Shiga-like toxin-producing E. coli (STEC) stx1/stx2 Not Detected     E. coli O157 Not Detected     Shigella/Enteroinvasive E. coli (EIEC) Not Detected     Cryptosporidium Not Detected     Cyclospora cayetanensis Not Detected     Entamoeba histolytica Not  Detected     Giardia lamblia Not Detected     Adenovirus F40/41 Not Detected     Astrovirus Not Detected     Norovirus GI/GII Not Detected     Rotavirus A Not Detected     Sapovirus (I, II, IV or V) Not Detected    Narrative:      If Aeromonas, Staphylococcus aureus or Bacillus cereus are suspected, please order [id number]: Stool Culture, Aeromonas, S aureus, B Cereus.    Body Fluid Culture - Body Fluid, Peritoneum [[id number]] Collected: 11/04/20 1552    Lab Status: Preliminary result Specimen: Body Fluid from Peritoneum Updated: 11/07/20 0726     Body Fluid Culture No growth at 3 days     Gram Stain Rare (1+) WBCs seen      No organisms seen    Anaerobic Culture - Body Fluid, Peritoneum [[id number]] Collected: 11/04/20 1552    Lab Status: Preliminary result Specimen: Body Fluid from Peritoneum Updated: 11/07/20 0737     Anaerobic Culture No anaerobes isolated at 3 days    Urine Culture - Urine, Urine, Clean Catch [[id number]]  (Normal) Collected: 11/04/20 1402    Lab Status: Final result Specimen: Urine, Clean Catch Updated: 11/05/20 1723     Urine Culture No growth    COVID-19,[facility] IN-HOUSE,Nasal Swab No Transport Media 3-4 HR TAT - Swab, Nasal Cavity [[id number]]  (Normal) Collected: 11/04/20 1136    Lab Status: Final result Specimen: Swab from Nasal Cavity Updated: 11/04/20 1214     COVID19 Not Detected    Narrative:      Fact sheet for providers: https://www.fda.gov/media/940548/download     Fact sheet for patients: https://www.fda.gov/media/734230/download    Blood Culture - Blood, Blood, Venous Line [[id number]] Collected: 11/04/20 1136    Lab Status: Preliminary result Specimen: Blood, Venous Line Updated: 11/07/20 1145     Blood Culture No growth at 3 days              cefTRIAXone, 1 g, Intravenous, Q24H  furosemide, 40 mg, Intravenous, Q8H  lactulose, 20 g, Oral, TID  levothyroxine sodium, 25 mcg, Intravenous, Q AM  midodrine, 2.5 mg, Oral, TID AC  sodium chloride, 10 mL, Intravenous, Q12H  sodium chloride,  10 mL, Intravenous, Q12H  sodium chloride, 10 mL, Intravenous, Q12H           Diagnostics:  Ct Abdomen Pelvis Without Contrast    Result Date: 11/4/2020  CT Abdomen Pelvis WO INDICATION: 70-year-old emergency department patient with abdominal pain diffusely and fever for one week. Recent antibiotic therapy for chronic urinary tract infection. TECHNIQUE: CT of the abdomen and pelvis without IV contrast. Coronal and sagittal reconstructions were obtained.  Radiation dose reduction techniques included automated exposure control or exposure modulation based on body size. Count of known CT and cardiac nuc med studies performed in previous 12 months: 0. COMPARISON: Ultrasound liver 8/11/2020, CT abdomen and pelvis 4/24/2019 FINDINGS: Abdomen: The included images through the lung bases demonstrate no acute pulmonary density or pleural effusion. The distal esophagus appears normal On these noncontrasted images the liver is diffusely abnormal with diffuse low attenuation in a mottled appearance. Liver is enlarged measuring 23.6 cm cephalocaudad previously measuring 17.3 cm on the ultrasound of 8/11/2020 and 18.4 cm on the CT scan of 4/24/2019 suggesting a significant interval increase in size. The spleen is normal in size and density, however there is a new large amount of ascites in the abdomen, paracolic gutters and pelvis. The gallbladder appears dense relative to the low density liver but no stones are seen. There is pericholecystic fluid likely part of the generalized ascites. The pancreas, pancreatic duct, common bile ducts and adrenal glands are normal. The kidneys are unremarkable The stomach, small bowel and colon have a normal noncontrasted appearance. Pelvis: The bladder is distended. The wall appears slightly prominent diffusely but no nodularity is seen. No bladder stone. This is small postmenopausal uterus. No adnexal mass. There is mild diffuse subcutaneous edema over the anterior abdominal wall which could be  part of anasarca. Bone window images demonstrate demonstrates stable spinal fusion hardware posteriorly from L4 through S1 with severe anterolisthesis of L5 on S1 unchanged.     1. Marked interval increase in size of the liver with diffusely mottled low attenuation as compared to the prior studies of 11/20 and 4/24/2019. Patient has had underlying steatosis. The current changes could represent progression of steatosis or acute superimposed hepatitis. There is new ascites diffusely in the abdomen or pelvis which is moderate to large in amount. No splenomegaly. The portal vein and splenic vein do not appear enlarged. 2. No gallstones seen 3. No definite distention of the stomach, small bowel or colon. 4. The bladder is distended. The wall is mildly thickened but no definite nodularity or bladder stone. Signer Name: Sheron Lundberg MD  Signed: 11/4/2020 1:18 PM  Workstation Name: [id number]  [facility]    Mammo Diagnostic Right With Cad    Result Date: 10/12/2020  MAMMO DIAGNOSTIC RIGHT W CAD-, US GUIDED BREAST BIOPSY W WO DEVICE INITIAL-  CLINICAL INDICATION: 70 years old female presents for ultrasound-guided core needle biopsy of a cluster of cysts at 1:00 in the right breast, which increased in size on follow-up ultrasound from 9/21/2020.  PRE-PROCEDURAL CONSULTATION: Details of the procedure and possible limitations and complications were discussed with the patient. After addressing her questions and concerns, written informed consent was obtained.  PROCEDURE: The mass at 1:00, 4 cm from the nipple in the right breast was targeted under ultrasound. The skin of the breast was cleansed and prepped. 1 mL of 1% lidocaine and 3 mL of 1% lidocaine with epinephrine were used for local anesthesia. A small skin incision was then made to permit passage of a 12-gauge spring activated biopsy device. 5 core specimens were obtained. A bowtie clip was then deployed at the biopsy site. The patient  tolerated the procedure well with no immediate complications. Given the superficial location of the mass, some skin was acquired with the breast biopsy samples. The incision was closed with Steri-Strips and Dermabond.  Post-procedural digital mammographic views of the right breast demonstrate the bowtie clip at the biopsy site in the slightly upper inner middle right breast. The clip is located closer to the 2-3 o'clock location on mammogram but is considered appropriately positioned due to corresponding biopsy changes at this location and adequate sampling of the mass during biopsy. Differences in location may be due to differences in patient positioning/ultrasound technique.  PATHOLOGY:  Final Diagnosis  1. Skin and Breast Tissue, Right Breast Mass at 1 o'clock 4 cm from Nipple, Core Biopsy: A. Benign skin with dermal fibrosis (scar). B. Focal dense stromal fibrosis/sclerotic fibroadenomatous hyperplasia with focal organizing fat necrosis and?foreign body giant cell reaction. C. Microcalcification noted within benign breast tissue. D. No in situ nor infiltrating carcinoma identified by routine staining (see comment).  Electronically signed by Juno Walter MD on 10/12/2020 at 1426        1. Ultrasound-guided core needle biopsy of a cluster of cysts at 1:00, 4 cm from the nipple in the right breast, marked with a bowtie clip. Pathology is benign and concordant with the imaging assessment. Recommend annual bilateral screening mammogram in September 2021.  This report was finalized on 10/12/2020 4:08 PM by Dr. Kari Carvalho M.D.      Xr Chest 1 View    Result Date: 11/5/2020  XR CHEST 1 VW-: 11/5/2020 2:52 PM  INDICATION: 70-year-old female presenting for PICC line placement  COMPARISON:  11/04/2020.  FINDINGS: Single Portable AP view(s) of the chest. Status post right shoulder replacement. There is a new right-sided PICC line present. The tip extends to the level of the right atrium. If positioning at the  cavoatrial junction is desired it could be retracted about 4 cm. No pneumothorax. Cardiac silhouette borderline in size and stable. Shallow lung expansion with low lung volumes and probable bibasilar atelectasis. Persistent eventration or elevation of the right hemidiaphragm.       1. New right-sided PICC line tip at the right atrial level. If positioning at the cavoatrial junction is desired it could be retracted about 4 cm. No pneumothorax. 2. No other significant interval change.  This report was finalized on 11/5/2020 3:07 PM by Dr. Waqar Jerez MD.      Xr Chest 1 View    Result Date: 11/4/2020  XR CHEST 1 VW-: 11/4/2020 12:48 PM  INDICATION: Short of air and weakness several days.  COMPARISON:  None available.  FINDINGS: Single Portable AP view(s) of the chest. Status post right shoulder replacement. Cardiac silhouette is within normal limits. Shallow lung expansion and bronchovascular crowding. Probable platelike atelectasis in the left base and atelectatic change in the right base. Mild eventration or elevation of the right hemidiaphragm. No distinct effusion or pneumothorax.       1. Shallow lung expansion with bronchovascular crowding and probable bibasilar atelectasis.  This report was finalized on 11/4/2020 12:57 PM by Dr. Waqar Jerez MD.      Us Abdomen Limited    Addendum Date: 11/5/2020    Addendum: By report, the patient underwent a bedside paracentesis following the abdomen and pelvis CT performed yesterday, which accounts for the lack of ascites amenable to paracentesis today.  This report was finalized on 11/5/2020 2:14 PM by Dr. Waqra Jerez MD.      Result Date: 11/5/2020  FOUR-QUADRANT ULTRASOUND TO ASSESS FOR THE PRESENCE OR ABSENCE OF ASCITES FOR PLANNED PARACENTESIS  HISTORY: 70-year-old female with abnormal CT scan performed yesterday. Ascites on prior CT.  TECHNIQUE: Four-quadrant ultrasound was performed. Correlation made with CT performed yesterday.  FINDINGS: There is no ascites  amenable to ultrasound-guided paracentesis on the provided images. The majority of the ascites on the prior CT localizes to the pelvis.      1. No ascites amenable to ultrasound-guided paracentesis.  This report was finalized on 11/5/2020 11:28 AM by Dr. Waqar Jerez MD.      Us Guided Breast Biopsy With & Without Device Initial    Result Date: 10/12/2020  MAMMO DIAGNOSTIC RIGHT W CAD-, US GUIDED BREAST BIOPSY W WO DEVICE INITIAL-  CLINICAL INDICATION: 70 years old female presents for ultrasound-guided core needle biopsy of a cluster of cysts at 1:00 in the right breast, which increased in size on follow-up ultrasound from 9/21/2020.  PRE-PROCEDURAL CONSULTATION: Details of the procedure and possible limitations and complications were discussed with the patient. After addressing her questions and concerns, written informed consent was obtained.  PROCEDURE: The mass at 1:00, 4 cm from the nipple in the right breast was targeted under ultrasound. The skin of the breast was cleansed and prepped. 1 mL of 1% lidocaine and 3 mL of 1% lidocaine with epinephrine were used for local anesthesia. A small skin incision was then made to permit passage of a 12-gauge spring activated biopsy device. 5 core specimens were obtained. A bowtie clip was then deployed at the biopsy site. The patient tolerated the procedure well with no immediate complications. Given the superficial location of the mass, some skin was acquired with the breast biopsy samples. The incision was closed with Steri-Strips and Dermabond.  Post-procedural digital mammographic views of the right breast demonstrate the bowtie clip at the biopsy site in the slightly upper inner middle right breast. The clip is located closer to the 2-3 o'clock location on mammogram but is considered appropriately positioned due to corresponding biopsy changes at this location and adequate sampling of the mass during biopsy. Differences in location may be due to differences in  patient positioning/ultrasound technique.  PATHOLOGY:  Final Diagnosis  1. Skin and Breast Tissue, Right Breast Mass at 1 o'clock 4 cm from Nipple, Core Biopsy: A. Benign skin with dermal fibrosis (scar). B. Focal dense stromal fibrosis/sclerotic fibroadenomatous hyperplasia with focal organizing fat necrosis and?foreign body giant cell reaction. C. Microcalcification noted within benign breast tissue. D. No in situ nor infiltrating carcinoma identified by routine staining (see comment).  Electronically signed by Juno Walter MD on 10/12/2020 at 1426        1. Ultrasound-guided core needle biopsy of a cluster of cysts at 1:00, 4 cm from the nipple in the right breast, marked with a bowtie clip. Pathology is benign and concordant with the imaging assessment. Recommend annual bilateral screening mammogram in September 2021.  This report was finalized on 10/12/2020 4:08 PM by Dr. Kari Carvalho M.D.      Results for orders placed in visit on 03/10/15   SCANNED - ECHOCARDIOGRAM           Active Hospital Problems    Diagnosis  POA   • **Acute liver failure [K72.00]  Yes   • Bladder prolapse, female, acquired [N81.10]  Yes   • Sepsis associated hypotension (CMS/HCC) [A41.9, I95.9]  Yes   • Altered mental state [R41.82]  Yes   • Acute renal failure (CMS/HCC) [N17.9]  Yes   • Alcohol abuse [F10.10]  Yes   • Ascites due to alcoholic hepatitis [K70.11]  Yes   • Acute respiratory failure with hypoxia (CMS/HCC) [J96.01]  Yes   • Hypertension [I10]  Yes   • Frequent UTI [N39.0]  Yes      Resolved Hospital Problems   No resolved problems to display.         Assessment/Plan     1. Sepsis with septic shock?  She has remained off vasopressors and blood pressure has remained stable.  2. Acute kidney injury nephrology following looks like this is resolving or has resolved.  3. Acute alcoholic hepatitis on alcoholic cirrhosis.  Discriminant function is not high enough to require steroids  4. Ascites secondary to liver  disease  5. Hyponatremia resolved  6. Alcohol abuse and dependence  7. Hypoalbuminemia  8. Metabolic encephalopathy probably at least a complement of portosystemic encephalopathy  9. Coagulopathy secondary to liver disease  10. Anemia hemoglobin relatively stable  11. Bladder prolapse chronic  12. Hypothyroidism treatment initiated  13. Fluids/electrolytes/nutrition , magnesium and potassium are low on replacement protocol    Plan for disposition: Not much to add from a pulmonary or critical care standpoint at this time, we will see as needed    Hank Lofton MD  11/08/20  05:57 EST    Time:

## 2020-11-08 NOTE — PLAN OF CARE
Goal Outcome Evaluation:  Plan of Care Reviewed With: patient  Progress: no change  Outcome Summary: Patient up to chair this morning and to BSC. Appetite poor, requires encouragement to finish boost. Phos, mag, and K replaced per protocol. Shipman discontinued. Frequent bowel movements.

## 2020-11-08 NOTE — PROGRESS NOTES
"SERVICE: [facility] HOSPITALIST    CHIEF COMPLAINT: Abdominal Distension and pain    SUBJECTIVE:    Patient sitting up in chair when seen on rounds today, holding her belly.  States that she is feeling worsening abdominal pain from worsening distention, and at the paracentesis site, with drainage.  Having some difficulty taking a deep breath.  However despite all of that, she still feels much better than she did yesterday.  Had questions about if she was detoxing from alcohol or not, explained that it appears that we have gotten her through most of her delirium tremens, but she has several reasons for her confusion.    Had more productive discussion about her disease, and prognosis.  Discussed based on meld scores, that her labs on admission gave a predicted mortality of 65% in 3 months, and she has now improved to a 7 to 10% mortality at 3 months.  Again reiterated that if she stops drinking she is fairly likely to live out the remainder of her natural life, but if she does not stop drinking she is highly likely to pass away in the next 6 to 9 months.  She states that she is very interested in \"doing what ever needs to be done to get better\".    Review of systems is negative for fever, chills, nausea, vomiting or diarrhea.    OBJECTIVE:    BP 93/75   Pulse 105   Temp 98 °F (36.7 °C) (Oral)   Resp 18   Ht 157.5 cm (62\")   Wt 70.1 kg (154 lb 8.7 oz)   LMP  (LMP Unknown)   SpO2 97%   BMI 28.27 kg/m²     MEDS/LABS REVIEWED AND ORDERED    cefTRIAXone, 1 g, Intravenous, Q24H  furosemide, 40 mg, Intravenous, Q8H  lactulose, 20 g, Oral, TID  levothyroxine sodium, 25 mcg, Intravenous, Q AM  midodrine, 2.5 mg, Oral, TID AC  sodium chloride, 10 mL, Intravenous, Q12H  sodium chloride, 10 mL, Intravenous, Q12H  sodium chloride, 10 mL, Intravenous, Q12H      Physical Exam  Vitals signs and nursing note reviewed.   Constitutional:       General: She is not in acute distress.     Appearance: She is " ill-appearing. She is not toxic-appearing or diaphoretic.   Eyes:      General: Scleral icterus present.      Extraocular Movements: Extraocular movements intact.      Pupils: Pupils are equal, round, and reactive to light.   Cardiovascular:      Rate and Rhythm: Normal rate and regular rhythm.   Pulmonary:      Effort: Pulmonary effort is normal. No respiratory distress.      Breath sounds: No wheezing, rhonchi or rales.   Abdominal:      General: Bowel sounds are normal. There is distension.      Tenderness: There is no abdominal tenderness. There is no guarding or rebound.      Comments: Positive fluid wave, worsening distension, though still with a very small amount of give   Musculoskeletal:      Comments: 1+ to trace pitting edema in the lower extremities   Skin:     Coloration: Skin is jaundiced.   Neurological:      General: No focal deficit present.      Mental Status: She is alert.      Cranial Nerves: No cranial nerve deficit.   Psychiatric:      Comments: Grossly euthymic given the situation, concerned affect         LAB/DIAGNOSTICS:    Lab Results (last 24 hours)     Procedure Component Value Units Date/Time    Phosphorus [[id number]]  (Abnormal) Collected: 11/08/20 0516    Specimen: Blood Updated: 11/08/20 1148     Phosphorus 1.5 mg/dL     Blood Culture - Blood, Blood, Venous Line [[id number]] Collected: 11/04/20 1136    Specimen: Blood, Venous Line Updated: 11/08/20 1145     Blood Culture No growth at 4 days    Procalcitonin [[id number]]  (Abnormal) Collected: 11/08/20 0516    Specimen: Blood Updated: 11/08/20 1141     Procalcitonin 0.27 ng/mL     Narrative:      Results may be falsely decreased if patient taking Biotin.     Body Fluid Culture - Body Fluid, Peritoneum [[id number]] Collected: 11/04/20 1552    Specimen: Body Fluid from Peritoneum Updated: 11/08/20 0708     Body Fluid Culture No growth at 4 days     Gram Stain Rare (1+) WBCs seen      No organisms seen    Manual Differential [[id number]]   (Abnormal) Collected: 11/08/20 0516    Specimen: Blood Updated: 11/08/20 0616     Neutrophil % 77.0 %      Lymphocyte % 8.0 %      Monocyte % 11.0 %      Bands %  3.0 %      Atypical Lymphocyte % 1.0 %      Neutrophils Absolute 11.89 10*3/mm3      Lymphocytes Absolute 1.19 10*3/mm3      Monocytes Absolute 1.63 10*3/mm3      nRBC 1.0 /100 WBC      Macrocytes Mod/2+     Polychromasia Slight/1+     WBC Morphology Normal     Platelet Morphology Normal    Protime-INR [[id number]]  (Abnormal) Collected: 11/08/20 0516    Specimen: Blood Updated: 11/08/20 0606     Protime 17.2 Seconds      INR 1.46    Narrative:      Therapeutic Ranges for INR: 2.0-3.0 (PT 20-30)                              2.5-3.5 (PT 25-34)    Comprehensive Metabolic Panel [[id number]]  (Abnormal) Collected: 11/08/20 0516    Specimen: Blood Updated: 11/08/20 0601     Glucose 123 mg/dL      BUN 20 mg/dL      Creatinine 0.35 mg/dL      Sodium 138 mmol/L      Potassium 3.0 mmol/L      Chloride 103 mmol/L      CO2 21.8 mmol/L      Calcium 7.9 mg/dL      Total Protein 4.7 g/dL      Albumin 2.80 g/dL      ALT (SGPT) 81 U/L      AST (SGOT) 179 U/L      Alkaline Phosphatase 178 U/L      Total Bilirubin 7.2 mg/dL      eGFR Non African Amer >150 mL/min/1.73      Globulin 1.9 gm/dL      A/G Ratio 1.5 g/dL      BUN/Creatinine Ratio 57.1     Anion Gap 13.2 mmol/L     Narrative:      GFR Normal >60  Chronic Kidney Disease <60  Kidney Failure <15      Magnesium [[id number]]  (Abnormal) Collected: 11/08/20 0516    Specimen: Blood Updated: 11/08/20 0601     Magnesium 1.1 mg/dL     Ammonia [[id number]]  (Abnormal) Collected: 11/08/20 0516    Specimen: Blood Updated: 11/08/20 0548     Ammonia 86 umol/L     CBC & Differential [[id number]]  (Abnormal) Collected: 11/08/20 0516    Specimen: Blood Updated: 11/08/20 0535    Narrative:      The following orders were created for panel order CBC & Differential.  Procedure                               Abnormality         Status                      ---------                               -----------         ------                     CBC Auto Differential[[id number]]        Abnormal            Final result                 Please view results for these tests on the individual orders.    CBC Auto Differential [[id number]]  (Abnormal) Collected: 11/08/20 0516    Specimen: Blood Updated: 11/08/20 0535     WBC 14.86 10*3/mm3      RBC 2.46 10*6/mm3      Hemoglobin 9.0 g/dL      Hematocrit 27.5 %      .8 fL      MCH 36.6 pg      MCHC 32.7 g/dL      RDW 15.1 %      RDW-SD 61.1 fl      MPV 11.2 fL      Platelets 142 10*3/mm3      Neutrophil % 61.8 %      Lymphocyte % 15.9 %      Monocyte % 13.3 %      Eosinophil % 0.7 %      Basophil % 0.5 %      Immature Grans % 7.8 %      Neutrophils, Absolute 9.18 10*3/mm3      Lymphocytes, Absolute 2.37 10*3/mm3      Monocytes, Absolute 1.98 10*3/mm3      Eosinophils, Absolute 0.10 10*3/mm3      Basophils, Absolute 0.07 10*3/mm3      Immature Grans, Absolute 1.16 10*3/mm3      nRBC 1.6 /100 WBC     Phosphorus [[id number]]  (Normal) Collected: 11/07/20 1407    Specimen: Blood Updated: 11/07/20 1455     Phosphorus 3.5 mg/dL     Magnesium [[id number]]  (Normal) Collected: 11/07/20 1407    Specimen: Blood Updated: 11/07/20 1455     Magnesium 1.7 mg/dL     Basic Metabolic Panel [[id number]]  (Abnormal) Collected: 11/07/20 1407    Specimen: Blood Updated: 11/07/20 1455     Glucose 157 mg/dL      BUN 25 mg/dL      Creatinine 0.46 mg/dL      Sodium 136 mmol/L      Potassium 4.4 mmol/L      Chloride 104 mmol/L      CO2 19.8 mmol/L      Calcium 7.4 mg/dL      eGFR Non African Amer 134 mL/min/1.73      BUN/Creatinine Ratio 54.3     Anion Gap 12.2 mmol/L     Narrative:      GFR Normal >60  Chronic Kidney Disease <60  Kidney Failure <15          Results for orders placed in visit on 03/10/15   SCANNED - ECHOCARDIOGRAM     No radiology results for the last day      ASSESSMENT/PLAN:       Bacterial Sepsis from unclear source, with  shock refractory to fluids  -Unclear source, urinalysis likely contaminated due to patient's Bladder prolapse, CXR unconvincing for pneumonia, ascites fluid negative for SBP, CT scan of the abdomen did not show any acute abnormalities  - Needed Levophed for about 10 hours on night of -. No further need for vasopressors, appreciate intensivist support, they have signed off  -Started patient on midodrine   - Procal continues to improve on Rocephin, urine culture was negative, blood culture/abdominal fluid culture pending, will finish 5 day course of rocephin w/ next dose. Let abx  and monitor  - DC herrera, and monitor     Multifactorial altered mental status due to alcohol withdrawal, sepsis, and hepatic encephalopathy  - MS improving  - CIWA scores have remained low  -Ammonia level still elevated, on lactulose, documented >4 BM's yesterday, continue     MONSTER, Likely Pre-renal  - Nephrology following, probably not Hepatorenal  - Cr < 0.5  -Started 40 mg of IV Lasix 3 times daily yesterday, will give an additional 20 mg today with her morning dose for total of 60 mg and attempt to help her with the pain from her distention      Alcoholic Hepatitis with coagulopathy  - No steroids, LFT's and Bili stable, but MELD-NA now down to 21 (from 34 on admission)     New onset ascites  -Paracentesis on  removed 2 L of fluid  -Fluid studies negative for SBP, also already covering sepsis with Rocephin as above  -Renal once hemodynamics more stable prior to paracentesis, abdomen is reaccumulating, will likely need repeat para tomorrow     Hypothyroidism  - TSH 16.9, but Free t4 was normal, but in the setting of critical illness, started on 50mcg po to support pt through severe illness, recheck in 3 weeks     Hyponatermia, hypophosphatemia, hypomag  -  on admit, 138 today,   -Renal function continues to improve, still requires aggressive placement of phosphorus and potassium, with recheck of labs in AM       Malnutrition  -Patient awake, will advance diet as tolerated, may need still need feeding tube for high quality protein intake  -Encouraged high-protein intake     Diarrhea  -present prior to admission, none seen here, GI panel negative     Bladder prolapse  -[facility], culture urine, as sepsis above  - Removed herrera      Etoh Abuse  - Family is on board with recovery  -Patient currently very interested in substance abuse rehab and abstinence

## 2020-11-08 NOTE — PROGRESS NOTES
GI Daily Progress Note    Assessment/Plan:      Acute liver failure    Frequent UTI    Hypertension    Acute respiratory failure with hypoxia (CMS/HCC)    Ascites due to alcoholic hepatitis    Bladder prolapse, female, acquired    Sepsis associated hypotension (CMS/HCC)    Altered mental state    Acute renal failure (CMS/HCC)    Alcohol abuse       LOS: 4 days     Tayler Lugo is a 70 y.o. female who was admitted with Acute liver failure. She reports the symptoms are improving with treatment. Conversant and pleasant complains of dizzy and bloated but no nausea not vomiting- not eating well but drinking her boost    Subjective:    Patient expresses loss of appetite and BLoated and dizzy  Patient denies vomiting and bloody stools    Objective:    Vital signs in last 24 hours:  Temp:  [98 °F (36.7 °C)-98.3 °F (36.8 °C)] 98 °F (36.7 °C)  Heart Rate:  [] 105  Resp:  [18-20] 18  BP: ()/(59-79) 118/72    Intake/Output last 3 shifts:  I/O last 3 completed shifts:  In: 3432 [P.O.:1420; I.V.:1762; IV Piggyback:250]  Out: 5150 [Urine:4650; Other:500]  Intake/Output this shift:  No intake/output data recorded.    Physical Exam:Abdomen  Sounds Normal Active Bowel Sounds   Distension Soft and Distended   Tenderness Nontender     Imaging Results (Last 72 Hours)     Procedure Component Value Units Date/Time    XR Chest 1 View [[id number]] Collected: 11/05/20 1505     Updated: 11/05/20 1509    Narrative:      XR CHEST 1 VW-: 11/5/2020 2:52 PM     INDICATION:   70-year-old female presenting for PICC line placement      COMPARISON:    11/04/2020.     FINDINGS:  Single Portable AP view(s) of the chest. Status post right shoulder  replacement. There is a new right-sided PICC line present. The tip  extends to the level of the right atrium. If positioning at the  cavoatrial junction is desired it could be retracted about 4 cm. No  pneumothorax. Cardiac silhouette borderline in size and stable. Shallow  lung expansion with  low lung volumes and probable bibasilar atelectasis.  Persistent eventration or elevation of the right hemidiaphragm.       Impression:         1. New right-sided PICC line tip at the right atrial level. If  positioning at the cavoatrial junction is desired it could be retracted  about 4 cm. No pneumothorax.  2. No other significant interval change.     This report was finalized on 11/5/2020 3:07 PM by Dr. Waqar Jerez MD.       US Abdomen Limited [[id number]] Collected: 11/05/20 1125     Updated: 11/05/20 1416    Addenda:        Addendum: By report, the patient underwent a bedside paracentesis  following the abdomen and pelvis CT performed yesterday, which accounts  for the lack of ascites amenable to paracentesis today.     This report was finalized on 11/5/2020 2:14 PM by Dr. Waqar Jerez MD.     Signed: 11/05/20 1414 by Waqar Jerez MD    Narrative:      FOUR-QUADRANT ULTRASOUND TO ASSESS FOR THE PRESENCE OR ABSENCE OF  ASCITES FOR PLANNED PARACENTESIS     HISTORY:  70-year-old female with abnormal CT scan performed yesterday. Ascites on  prior CT.     TECHNIQUE:  Four-quadrant ultrasound was performed. Correlation made with CT  performed yesterday.     FINDINGS:  There is no ascites amenable to ultrasound-guided paracentesis on the  provided images. The majority of the ascites on the prior CT localizes  to the pelvis.       Impression:      1. No ascites amenable to ultrasound-guided paracentesis.     This report was finalized on 11/5/2020 11:28 AM by Dr. Waqar Jerez MD.             WBC   Date Value Ref Range Status   11/08/2020 14.86 (H) 3.40 - 10.80 10*3/mm3 Final     RBC   Date Value Ref Range Status   11/08/2020 2.46 (L) 3.77 - 5.28 10*6/mm3 Final     Hemoglobin   Date Value Ref Range Status   11/08/2020 9.0 (L) 12.0 - 15.9 g/dL Final     Hematocrit   Date Value Ref Range Status   11/08/2020 27.5 (L) 34.0 - 46.6 % Final     MCV   Date Value Ref Range Status   11/08/2020 111.8 (H) 79.0 - 97.0 fL  Final     MCH   Date Value Ref Range Status   11/08/2020 36.6 (H) 26.6 - 33.0 pg Final     MCHC   Date Value Ref Range Status   11/08/2020 32.7 31.5 - 35.7 g/dL Final     RDW   Date Value Ref Range Status   11/08/2020 15.1 12.3 - 15.4 % Final     RDW-SD   Date Value Ref Range Status   11/08/2020 61.1 (H) 37.0 - 54.0 fl Final     MPV   Date Value Ref Range Status   11/08/2020 11.2 6.0 - 12.0 fL Final     Platelets   Date Value Ref Range Status   11/08/2020 142 140 - 450 10*3/mm3 Final     Neutrophil %   Date Value Ref Range Status   11/08/2020 61.8 42.7 - 76.0 % Final     Lymphocyte %   Date Value Ref Range Status   11/08/2020 15.9 (L) 19.6 - 45.3 % Final     Monocyte %   Date Value Ref Range Status   11/08/2020 13.3 (H) 5.0 - 12.0 % Final     Eosinophil %   Date Value Ref Range Status   11/08/2020 0.7 0.3 - 6.2 % Final     Basophil %   Date Value Ref Range Status   11/08/2020 0.5 0.0 - 1.5 % Final     Immature Grans %   Date Value Ref Range Status   11/08/2020 7.8 (H) 0.0 - 0.5 % Final     Neutrophils Absolute   Date Value Ref Range Status   11/08/2020 11.89 (H) 1.70 - 7.00 10*3/mm3 Final     Neutrophils, Absolute   Date Value Ref Range Status   11/08/2020 9.18 (H) 1.70 - 7.00 10*3/mm3 Final     Lymphocytes, Absolute   Date Value Ref Range Status   11/08/2020 2.37 0.70 - 3.10 10*3/mm3 Final     Monocytes, Absolute   Date Value Ref Range Status   11/08/2020 1.98 (H) 0.10 - 0.90 10*3/mm3 Final     Eosinophils, Absolute   Date Value Ref Range Status   11/08/2020 0.10 0.00 - 0.40 10*3/mm3 Final     Basophils, Absolute   Date Value Ref Range Status   11/08/2020 0.07 0.00 - 0.20 10*3/mm3 Final     Immature Grans, Absolute   Date Value Ref Range Status   11/08/2020 1.16 (H) 0.00 - 0.05 10*3/mm3 Final     nRBC   Date Value Ref Range Status   11/08/2020 1.6 (H) 0.0 - 0.2 /100 WBC Final   11/08/2020 1.0 (H) 0.0 - 0.2 /100 WBC Final       Glucose   Date Value Ref Range Status   11/08/2020 123 (H) 65 - 99 mg/dL Final     Sodium    Date Value Ref Range Status   11/08/2020 138 136 - 145 mmol/L Final     Potassium   Date Value Ref Range Status   11/08/2020 3.0 (L) 3.5 - 5.2 mmol/L Final     CO2   Date Value Ref Range Status   11/08/2020 21.8 (L) 22.0 - 29.0 mmol/L Final     Chloride   Date Value Ref Range Status   11/08/2020 103 98 - 107 mmol/L Final     Anion Gap   Date Value Ref Range Status   11/08/2020 13.2 5.0 - 15.0 mmol/L Final     Creatinine   Date Value Ref Range Status   11/08/2020 0.35 (L) 0.57 - 1.00 mg/dL Final     BUN   Date Value Ref Range Status   11/08/2020 20 8 - 23 mg/dL Final     BUN/Creatinine Ratio   Date Value Ref Range Status   11/08/2020 57.1 (H) 7.0 - 25.0 Final     Calcium   Date Value Ref Range Status   11/08/2020 7.9 (L) 8.6 - 10.5 mg/dL Final     eGFR Non  Amer   Date Value Ref Range Status   11/08/2020 >150 >60 mL/min/1.73 Final     Alkaline Phosphatase   Date Value Ref Range Status   11/08/2020 178 (H) 39 - 117 U/L Final     Total Protein   Date Value Ref Range Status   11/08/2020 4.7 (L) 6.0 - 8.5 g/dL Final     ALT (SGPT)   Date Value Ref Range Status   11/08/2020 81 (H) 1 - 33 U/L Final     AST (SGOT)   Date Value Ref Range Status   11/08/2020 179 (H) 1 - 32 U/L Final     Total Bilirubin   Date Value Ref Range Status   11/08/2020 7.2 (H) 0.0 - 1.2 mg/dL Final     Albumin   Date Value Ref Range Status   11/08/2020 2.80 (L) 3.50 - 5.20 g/dL Final     Globulin   Date Value Ref Range Status   11/08/2020 1.9 gm/dL Final     Alcoholic Hepatitis   MONSTER  DTs- mostly resolved  Hypotension-improved    Would add PT ,and consider D/Cing herrera but again encourage nutrition and hopefully 2000 Deni a day.

## 2020-11-08 NOTE — PLAN OF CARE
Goal Outcome Evaluation:  Plan of Care Reviewed With: patient  Progress: no change  Outcome Summary: Restless most of shift. Dosing intermittently.  Much more alert. was able to tell me when she needed to have a BM by end of shift. Started K+ and Mag replacement per protocol.

## 2020-11-08 NOTE — PLAN OF CARE
Problem: Adult Inpatient Plan of Care  Goal: Plan of Care Review  Recent Flowsheet Documentation  Taken 11/8/2020 1031 by Lorraine Abarca PT  Plan of Care Reviewed With: patient  Outcome Summary: Physical therapy evaluation complete.  Patient oriented x3, requires encouragement for participation with mobility activities.  Patient performs supine to sit with mod assist and sit to stand with min assist from bed surface.  Patient performs gait x5 feet with rolling walker, min assist with significant verbal cues for safety and sequencing of device.  Patient will benefit from physical therapy to address deficits in functional mobility, strength and activity tolerance.  Will continue to assess discharge recommendations.

## 2020-11-08 NOTE — THERAPY EVALUATION
Patient Name: Tayler Lugo  : 1950    MRN: [id number]                              Today's Date: 2020       Admit Date: 2020    Visit Dx:     ICD-10-CM ICD-9-CM   1. Hepatorenal syndrome (CMS/HCC)  K76.7 572.4   2. Sepsis with acute renal failure, due to unspecified organism, unspecified acute renal failure type, unspecified whether septic shock present (CMS/HCC)  A41.9 038.9    R65.20 995.92    N17.9 584.9     Patient Active Problem List   Diagnosis   • Spinal stenosis of lumbar region with neurogenic claudication   • Lumbar spinal stenosis   • Frequent UTI   • Hypertension   • Stress incontinence   • Itching due to drug   • Acute respiratory failure with hypoxia (CMS/HCC)   • Tear of right rotator cuff   • Acute pain of right shoulder   • Hepatorenal syndrome (CMS/HCC)   • Ascites due to alcoholic hepatitis   • Bladder prolapse, female, acquired   • Sepsis associated hypotension (CMS/HCC)   • Acute liver failure   • Altered mental state   • Acute renal failure (CMS/HCC)   • Alcohol abuse     Past Medical History:   Diagnosis Date   • Elevated liver enzymes    • Fatty liver    • Frequent UTI    • History of depression    • Hypertension    • Lumbar stenosis    • OA (osteoarthritis)    • Post-menopausal    • Spondylarthritis     LOW BACK   • Stress incontinence    • Urinary retention      Past Surgical History:   Procedure Laterality Date   • ABDOMINOPLASTY     • BREAST SURGERY     • CATARACT EXTRACTION Bilateral    •  SECTION      X3   • COLONOSCOPY      2006   • COLONOSCOPY N/A 2016    Procedure: COLONOSCOPY;  Surgeon: Ho Arenas MD;  Location: [facility] ENDOSCOPY;  Service:    • LASIK     • LUMBAR DISCECTOMY FUSION INSTRUMENTATION N/A 10/1/2018    Procedure: L4-5, L5-S1  laminectomy and fusion with instrumentation;  Surgeon: Juno Kim MD;  Location: [facility] OR;  Service: Orthopedic Spine   • REDUCTION MAMMAPLASTY     • TONSILLECTOMY     • TOTAL SHOULDER  ARTHROPLASTY W/ DISTAL CLAVICLE EXCISION Right 6/13/2019    Procedure: TOTAL SHOULDER REVERSE ARTHROPLASTY;  Surgeon: David Marion MD;  Location: [facility] OR;  Service: Orthopedics     General Information     Row Name 11/08/20 1025          Physical Therapy Time and Intention    Document Type  evaluation  -     Mode of Treatment  physical therapy  -     Row Name 11/08/20 1025          General Information    Patient Profile Reviewed  yes  -     Prior Level of Function  independent:;all household mobility;community mobility;ADL's  -     Existing Precautions/Restrictions  fall  -     Barriers to Rehab  medically complex  -     Row Name 11/08/20 1025          Living Environment    Lives With  alone  -     Row Name 11/08/20 1025          Home Main Entrance    Number of Stairs, Main Entrance  none  -     Row Name 11/08/20 1025          Stairs Within Home, Primary    Stairs, Within Home, Primary  -- pt reports flight of steps in the condo, does not use 2nd level.  -     Row Name 11/08/20 1025          Cognition    Orientation Status (Cognition)  oriented x 3  -     Row Name 11/08/20 1025          Safety Issues, Functional Mobility    Safety Issues Affecting Function (Mobility)  impulsivity;insight into deficits/self-awareness;judgment;positioning of assistive device;problem-solving  -     Impairments Affecting Function (Mobility)  balance;strength;coordination  -       User Key  (r) = Recorded By, (t) = Taken By, (c) = Cosigned By    Initials Name Provider Type    JW Lorraine Abarca, PT Physical Therapist        Mobility     Row Name 11/08/20 1027          Bed Mobility    Bed Mobility  supine-sit  -     Supine-Sit [place] (Bed Mobility)  moderate assist (50% patient effort)  -     Assistive Device (Bed Mobility)  bed rails;head of bed elevated  -     Comment (Bed Mobility)  pt requires encouragement to attempt bed mobility prior to requesting assistance  -     Row Name 11/08/20  1027          Transfers    Comment (Transfers)  requires verbal cues for hand placement and controlled descent into sitting  -     Row Name 11/08/20 1027          Sit-Stand Transfer    Sit-Stand [place] (Transfers)  minimum assist (75% patient effort);verbal cues  -     Assistive Device (Sit-Stand Transfers)  walker, front-wheeled  -     Row Name 11/08/20 1027          Gait/Stairs (Locomotion)    [place] Level (Gait)  minimum assist (75% patient effort)  -     Assistive Device (Gait)  walker, front-wheeled  -     Distance in Feet (Gait)  5  -     Deviations/Abnormal Patterns (Gait)  base of support, narrow;arcenio decreased  -     Bilateral Gait Deviations  forward flexed posture  -     Comment (Gait/Stairs)  pt with difficulty sequencing gait and use of walker.  repeated verbal cues required for safety and managing device during direction change  -       User Key  (r) = Recorded By, (t) = Taken By, (c) = Cosigned By    Initials Name Provider Type     Lorraine Abarca PT Physical Therapist        Obj/Interventions     Row Name 11/08/20 1029          Range of Motion Comprehensive    Comment, General Range of Motion  LE ROM WFL bilaterally, left UE strength grossly 75% functional range, right UE 50% functional range  -     Row Name 11/08/20 1029          Strength Comprehensive (MMT)    Comment, General Manual Muscle Testing (MMT) Assessment  LE strength 4-/5 bilaterally, UE strength 3-/5 bilaterally  -     Row Name 11/08/20 1029          Balance    Comment, Balance  pt with intermittent right lateral lean while sitting EOB, requires cues to maintain proper midline  -       User Key  (r) = Recorded By, (t) = Taken By, (c) = Cosigned By    Initials Name Provider Type     Lorraine Abarca PT Physical Therapist        Goals/Plan     Row Name 11/08/20 1033          Bed Mobility Goal 1 (PT)    Activity/Assistive Device (Bed Mobility Goal 1, PT)  bed mobility activities, all  -      "[place] Level/Cues Needed (Bed Mobility Goal 1, PT)  supervision required  -     Time Frame (Bed Mobility Goal 1, PT)  3 days  -JW     Progress/Outcomes (Bed Mobility Goal 1, PT)  goal ongoing  -     Row Name 11/08/20 1033          Transfer Goal 1 (PT)    Activity/Assistive Device (Transfer Goal 1, PT)  transfers, all  -     [place] Level/Cues Needed (Transfer Goal 1, PT)  supervision required  -JW     Time Frame (Transfer Goal 1, PT)  3 days  -     Row Name 11/08/20 1033          Gait Training Goal 1 (PT)    Activity/Assistive Device (Gait Training Goal 1, PT)  gait (walking locomotion);assistive device use  -JW     [place] Level (Gait Training Goal 1, PT)  supervision required  -JW     Distance (Gait Training Goal 1, PT)  100  -JW     Time Frame (Gait Training Goal 1, PT)  3 days  -JW     Progress/Outcome (Gait Training Goal 1, PT)  goal ongoing  -       User Key  (r) = Recorded By, (t) = Taken By, (c) = Cosigned By    Initials Name Provider Type    Lorraine Segura, PT Physical Therapist        Clinical Impression     Row Name 11/08/20 1031          Plan of Care Review    Plan of Care Reviewed With  patient  -     Outcome Summary  Physical therapy evaluation complete.  Patient oriented x3, requires encouragement for participation with mobility activities.  Patient performs supine to sit with mod assist and sit to stand with min assist from bed surface.  Patient performs gait x5 feet with rolling walker, min assist with significant verbal cues for safety and sequencing of device.  Patient will benefit from physical therapy to address deficits in functional mobility, strength and activity tolerance.  Will continue to assess discharge recommendations.  -     Row Name 11/08/20 1031          Therapy Assessment/Plan (PT)    Patient/Family Therapy Goals Statement (PT)  \"go home\"  -     Rehab Potential (PT)  good, to achieve stated therapy goals  -     Criteria for Skilled Interventions " Met (PT)  yes;skilled treatment is necessary  -     Predicted Duration of Therapy Intervention (PT)  3 days  -     Row Name 11/08/20 1031          Positioning and Restraints    Pre-Treatment Position  in bed  -     Post Treatment Position  chair  -JW     In Chair  notified nsg;reclined;call light within reach;encouraged to call for assist  -       User Key  (r) = Recorded By, (t) = Taken By, (c) = Cosigned By    Initials Name Provider Type    Lorraine Segura PT Physical Therapist        Outcome Measures     Row Name 11/08/20 1034          How much help from another person do you currently need...    Turning from your back to your side while in flat bed without using bedrails?  2  -JW     Moving from lying on back to sitting on the side of a flat bed without bedrails?  2  -JW     Moving to and from a bed to a chair (including a wheelchair)?  3  -JW     Standing up from a chair using your arms (e.g., wheelchair, bedside chair)?  3  -JW     Climbing 3-5 steps with a railing?  2  -JW     To walk in hospital room?  2  -JW     AM-PAC 6 Clicks Score (PT)  14  -     Row Name 11/08/20 1034          Functional Assessment    Outcome Measure Options  AM-PAC 6 Clicks Basic Mobility (PT)  -       User Key  (r) = Recorded By, (t) = Taken By, (c) = Cosigned By    Initials Name Provider Type    Lorraine Segura PT Physical Therapist        Physical Therapy Education                 Title: PT OT SLP Therapies (In Progress)     Topic: Physical Therapy (In Progress)     Point: Mobility training (In Progress)     Learning Progress Summary           Patient Acceptance, E,TB, NR by  at 11/8/2020 1034                   Point: Home exercise program (Not Started)     Learner Progress:  Not documented in this visit.                      User Key     Initials Effective Dates Name Provider Type Jayjay     04/03/18 -  Lorraine Abarca, LAMIN Physical Therapist PT              PT Recommendation and Plan  Planned Therapy  Interventions (PT): balance training, bed mobility training, gait training, home exercise program, patient/family education, strengthening, transfer training  Plan of Care Reviewed With: patient  Outcome Summary: Physical therapy evaluation complete.  Patient oriented x3, requires encouragement for participation with mobility activities.  Patient performs supine to sit with mod assist and sit to stand with min assist from bed surface.  Patient performs gait x5 feet with rolling walker, min assist with significant verbal cues for safety and sequencing of device.  Patient will benefit from physical therapy to address deficits in functional mobility, strength and activity tolerance.  Will continue to assess discharge recommendations.     Time Calculation:   PT Charges     Row Name 11/08/20 1034             Time Calculation    Start Time  0935  -      Stop Time  1003  -KALYN      Time Calculation (min)  28 min  -KALYN      PT Received On  11/08/20  -KALYN      PT - Next Appointment  11/09/20  -KALYN        User Key  (r) = Recorded By, (t) = Taken By, (c) = Cosigned By    Initials Name Provider Type    Lorraine Segura, PT Physical Therapist        Therapy Charges for Today     Code Description Service Date Service Provider Modifiers Qty    41932171837 HC PT EVAL MOD COMPLEXITY 2 11/8/2020 Lorraine Abarca, PT GP 1    23979261650 HC PT THER SUPP EA 15 MIN 11/8/2020 Lorraine Abarca, PT GP 2          PT G-Codes  Outcome Measure Options: AM-PAC 6 Clicks Basic Mobility (PT)  AM-PAC 6 Clicks Score (PT): 14    Lorraine Abarca PT  11/8/2020

## 2020-11-09 ENCOUNTER — APPOINTMENT (OUTPATIENT)
Dept: ULTRASOUND IMAGING | Facility: HOSPITAL | Age: 70
End: 2020-11-09

## 2020-11-09 LAB
ALBUMIN SERPL-MCNC: 2.7 G/DL (ref 3.5–5.2)
ALBUMIN/GLOB SERPL: 1.3 G/DL
ALP SERPL-CCNC: 180 U/L (ref 39–117)
ALT SERPL W P-5'-P-CCNC: 80 U/L (ref 1–33)
AMMONIA BLD-SCNC: 81 UMOL/L (ref 11–51)
ANION GAP SERPL CALCULATED.3IONS-SCNC: 10.4 MMOL/L (ref 5–15)
AST SERPL-CCNC: 158 U/L (ref 1–32)
BACTERIA FLD CULT: NORMAL
BACTERIA SPEC AEROBE CULT: NORMAL
BACTERIA SPEC ANAEROBE CULT: NORMAL
BACTERIA UR QL AUTO: ABNORMAL /HPF
BASOPHILS # BLD AUTO: 0.1 10*3/MM3 (ref 0–0.2)
BASOPHILS NFR BLD AUTO: 0.5 % (ref 0–1.5)
BILIRUB SERPL-MCNC: 6.6 MG/DL (ref 0–1.2)
BILIRUB UR QL STRIP: ABNORMAL
BUN SERPL-MCNC: 19 MG/DL (ref 8–23)
BUN/CREAT SERPL: 47.5 (ref 7–25)
CALCIUM SPEC-SCNC: 8.3 MG/DL (ref 8.6–10.5)
CHLORIDE SERPL-SCNC: 105 MMOL/L (ref 98–107)
CLARITY UR: ABNORMAL
CO2 SERPL-SCNC: 21.6 MMOL/L (ref 22–29)
COLOR UR: ABNORMAL
CREAT SERPL-MCNC: 0.4 MG/DL (ref 0.57–1)
DEPRECATED RDW RBC AUTO: 64.8 FL (ref 37–54)
EOSINOPHIL # BLD AUTO: 0.15 10*3/MM3 (ref 0–0.4)
EOSINOPHIL NFR BLD AUTO: 0.8 % (ref 0.3–6.2)
ERYTHROCYTE [DISTWIDTH] IN BLOOD BY AUTOMATED COUNT: 16.2 % (ref 12.3–15.4)
FERRITIN SERPL-MCNC: 2997 NG/ML (ref 13–150)
GFR SERPL CREATININE-BSD FRML MDRD: >150 ML/MIN/1.73
GLOBULIN UR ELPH-MCNC: 2.1 GM/DL
GLUCOSE SERPL-MCNC: 118 MG/DL (ref 65–99)
GLUCOSE UR STRIP-MCNC: NEGATIVE MG/DL
GRAM STN SPEC: NORMAL
GRAM STN SPEC: NORMAL
HBA1C MFR BLD: 4.7 % (ref 4.8–5.6)
HCT VFR BLD AUTO: 28.7 % (ref 34–46.6)
HGB BLD-MCNC: 9.3 G/DL (ref 12–15.9)
HGB UR QL STRIP.AUTO: ABNORMAL
HYALINE CASTS UR QL AUTO: ABNORMAL /LPF
IMM GRANULOCYTES # BLD AUTO: 1.3 10*3/MM3 (ref 0–0.05)
IMM GRANULOCYTES NFR BLD AUTO: 6.8 % (ref 0–0.5)
INR PPP: 1.47 (ref 0.9–1.1)
IRON 24H UR-MRATE: 67 MCG/DL (ref 37–145)
IRON SATN MFR SERPL: ABNORMAL %
KETONES UR QL STRIP: NEGATIVE
LEUKOCYTE ESTERASE UR QL STRIP.AUTO: NEGATIVE
LYMPHOCYTES # BLD AUTO: 2.58 10*3/MM3 (ref 0.7–3.1)
LYMPHOCYTES NFR BLD AUTO: 13.4 % (ref 19.6–45.3)
MAGNESIUM SERPL-MCNC: 1.5 MG/DL (ref 1.6–2.4)
MCH RBC QN AUTO: 36.6 PG (ref 26.6–33)
MCHC RBC AUTO-ENTMCNC: 32.4 G/DL (ref 31.5–35.7)
MCV RBC AUTO: 113 FL (ref 79–97)
MONOCYTES # BLD AUTO: 2.78 10*3/MM3 (ref 0.1–0.9)
MONOCYTES NFR BLD AUTO: 14.5 % (ref 5–12)
NEUTROPHILS NFR BLD AUTO: 12.29 10*3/MM3 (ref 1.7–7)
NEUTROPHILS NFR BLD AUTO: 64 % (ref 42.7–76)
NITRITE UR QL STRIP: NEGATIVE
NRBC BLD AUTO-RTO: 0.7 /100 WBC (ref 0–0.2)
PH UR STRIP.AUTO: <=5 [PH] (ref 4.5–8)
PHOSPHATE SERPL-MCNC: 2.1 MG/DL (ref 2.5–4.5)
PLATELET # BLD AUTO: 130 10*3/MM3 (ref 140–450)
PMV BLD AUTO: 11.5 FL (ref 6–12)
POTASSIUM SERPL-SCNC: 4.9 MMOL/L (ref 3.5–5.2)
PROCALCITONIN SERPL-MCNC: 0.35 NG/ML (ref 0–0.25)
PROT SERPL-MCNC: 4.8 G/DL (ref 6–8.5)
PROT UR QL STRIP: NEGATIVE
PROTHROMBIN TIME: 17.3 SECONDS (ref 12.1–15)
RBC # BLD AUTO: 2.54 10*6/MM3 (ref 3.77–5.28)
RBC # UR: ABNORMAL /HPF
REF LAB TEST METHOD: ABNORMAL
SODIUM SERPL-SCNC: 137 MMOL/L (ref 136–145)
SP GR UR STRIP: 1.02 (ref 1–1.03)
SQUAMOUS #/AREA URNS HPF: ABNORMAL /HPF
TIBC SERPL-MCNC: ABNORMAL UG/DL
TSH SERPL DL<=0.05 MIU/L-ACNC: 15.2 UIU/ML (ref 0.27–4.2)
UIBC SERPL-MCNC: <16 MCG/DL (ref 112–346)
UROBILINOGEN UR QL STRIP: ABNORMAL
WBC # BLD AUTO: 19.2 10*3/MM3 (ref 3.4–10.8)
WBC UR QL AUTO: ABNORMAL /HPF

## 2020-11-09 PROCEDURE — 84443 ASSAY THYROID STIM HORMONE: CPT | Performed by: NURSE PRACTITIONER

## 2020-11-09 PROCEDURE — 25010000002 FUROSEMIDE PER 20 MG: Performed by: INTERNAL MEDICINE

## 2020-11-09 PROCEDURE — 99233 SBSQ HOSP IP/OBS HIGH 50: CPT | Performed by: NURSE PRACTITIONER

## 2020-11-09 PROCEDURE — 82728 ASSAY OF FERRITIN: CPT | Performed by: NURSE PRACTITIONER

## 2020-11-09 PROCEDURE — 83735 ASSAY OF MAGNESIUM: CPT | Performed by: INTERNAL MEDICINE

## 2020-11-09 PROCEDURE — 83036 HEMOGLOBIN GLYCOSYLATED A1C: CPT | Performed by: NURSE PRACTITIONER

## 2020-11-09 PROCEDURE — 85610 PROTHROMBIN TIME: CPT | Performed by: INTERNAL MEDICINE

## 2020-11-09 PROCEDURE — 94799 UNLISTED PULMONARY SVC/PX: CPT

## 2020-11-09 PROCEDURE — 83540 ASSAY OF IRON: CPT | Performed by: NURSE PRACTITIONER

## 2020-11-09 PROCEDURE — 82140 ASSAY OF AMMONIA: CPT | Performed by: INTERNAL MEDICINE

## 2020-11-09 PROCEDURE — 81001 URINALYSIS AUTO W/SCOPE: CPT | Performed by: NURSE PRACTITIONER

## 2020-11-09 PROCEDURE — 84145 PROCALCITONIN (PCT): CPT | Performed by: INTERNAL MEDICINE

## 2020-11-09 PROCEDURE — 83550 IRON BINDING TEST: CPT | Performed by: NURSE PRACTITIONER

## 2020-11-09 PROCEDURE — 25010000002 MAGNESIUM SULFATE IN D5W 1G/100ML (PREMIX) 1-5 GM/100ML-% SOLUTION: Performed by: INTERNAL MEDICINE

## 2020-11-09 PROCEDURE — 25010000002 FUROSEMIDE PER 20 MG: Performed by: NURSE PRACTITIONER

## 2020-11-09 PROCEDURE — 85025 COMPLETE CBC W/AUTO DIFF WBC: CPT | Performed by: INTERNAL MEDICINE

## 2020-11-09 PROCEDURE — 84100 ASSAY OF PHOSPHORUS: CPT | Performed by: INTERNAL MEDICINE

## 2020-11-09 PROCEDURE — 97116 GAIT TRAINING THERAPY: CPT

## 2020-11-09 PROCEDURE — 80053 COMPREHEN METABOLIC PANEL: CPT | Performed by: INTERNAL MEDICINE

## 2020-11-09 PROCEDURE — 25010000002 CEFTRIAXONE SODIUM-DEXTROSE 1-3.74 GM-%(50ML) RECONSTITUTED SOLUTION: Performed by: NURSE PRACTITIONER

## 2020-11-09 PROCEDURE — 76705 ECHO EXAM OF ABDOMEN: CPT

## 2020-11-09 RX ORDER — FUROSEMIDE 10 MG/ML
40 INJECTION INTRAMUSCULAR; INTRAVENOUS EVERY 12 HOURS
Status: DISCONTINUED | OUTPATIENT
Start: 2020-11-09 | End: 2020-11-09

## 2020-11-09 RX ADMIN — SODIUM CHLORIDE, PRESERVATIVE FREE 10 ML: 5 INJECTION INTRAVENOUS at 10:41

## 2020-11-09 RX ADMIN — LORAZEPAM 0.5 MG: 0.5 TABLET ORAL at 03:57

## 2020-11-09 RX ADMIN — SODIUM CHLORIDE, PRESERVATIVE FREE 10 ML: 5 INJECTION INTRAVENOUS at 21:19

## 2020-11-09 RX ADMIN — POTASSIUM CHLORIDE 40 MEQ: 1500 TABLET, EXTENDED RELEASE ORAL at 00:19

## 2020-11-09 RX ADMIN — MAGNESIUM SULFATE HEPTAHYDRATE 1 G: 1 INJECTION, SOLUTION INTRAVENOUS at 12:03

## 2020-11-09 RX ADMIN — LACTULOSE 20 G: 20 SOLUTION ORAL at 09:00

## 2020-11-09 RX ADMIN — LACTULOSE 20 G: 20 SOLUTION ORAL at 21:18

## 2020-11-09 RX ADMIN — POTASSIUM & SODIUM PHOSPHATES POWDER PACK 280-160-250 MG 1 PACKET: 280-160-250 PACK at 00:24

## 2020-11-09 RX ADMIN — CEFTRIAXONE 1 G: 1 INJECTION, SOLUTION INTRAVENOUS at 09:02

## 2020-11-09 RX ADMIN — MIDODRINE HYDROCHLORIDE 2.5 MG: 5 TABLET ORAL at 12:03

## 2020-11-09 RX ADMIN — MIDODRINE HYDROCHLORIDE 2.5 MG: 5 TABLET ORAL at 16:32

## 2020-11-09 RX ADMIN — FUROSEMIDE 40 MG: 10 INJECTION, SOLUTION INTRAMUSCULAR; INTRAVENOUS at 02:38

## 2020-11-09 RX ADMIN — SODIUM PHOSPHATE, MONOBASIC, MONOHYDRATE 9 MMOL: 276; 142 INJECTION, SOLUTION INTRAVENOUS at 10:33

## 2020-11-09 RX ADMIN — MAGNESIUM SULFATE HEPTAHYDRATE 1 G: 1 INJECTION, SOLUTION INTRAVENOUS at 10:39

## 2020-11-09 RX ADMIN — LORAZEPAM 0.5 MG: 0.5 TABLET ORAL at 16:31

## 2020-11-09 RX ADMIN — FUROSEMIDE 40 MG: 10 INJECTION, SOLUTION INTRAMUSCULAR; INTRAVENOUS at 14:45

## 2020-11-09 RX ADMIN — MIDODRINE HYDROCHLORIDE 2.5 MG: 5 TABLET ORAL at 06:35

## 2020-11-09 RX ADMIN — SODIUM CHLORIDE, PRESERVATIVE FREE 10 ML: 5 INJECTION INTRAVENOUS at 10:40

## 2020-11-09 RX ADMIN — LACTULOSE 20 G: 20 SOLUTION ORAL at 16:09

## 2020-11-09 RX ADMIN — SODIUM CHLORIDE, PRESERVATIVE FREE 10 ML: 5 INJECTION INTRAVENOUS at 21:18

## 2020-11-09 RX ADMIN — MAGNESIUM SULFATE HEPTAHYDRATE 1 G: 1 INJECTION, SOLUTION INTRAVENOUS at 13:29

## 2020-11-09 RX ADMIN — LEVOTHYROXINE SODIUM 50 MCG: 50 TABLET ORAL at 06:35

## 2020-11-09 NOTE — PLAN OF CARE
Goal Outcome Evaluation:  Plan of Care Reviewed With: patient  Progress: improving  Outcome Summary: General weakness but upin chair and to bedside commode today (c/o dizziness when up ). Replaced Magnesium and  Phosphorus . Moniter labs.Multiple stools with lactulose . Drain to Right lower abdomen patent.

## 2020-11-09 NOTE — PLAN OF CARE
Goal Outcome Evaluation:  Plan of Care Reviewed With: patient  Progress: no change  Outcome Summary: rested intermit. overnight-frequent stools/on lactulose. vss npo for planned paracentesis. am labs ordered

## 2020-11-09 NOTE — PROGRESS NOTES
Adult Nutrition  Assessment/PES    Patient Name:  Tayler Lugo  YOB: 1950  MRN: [id number]  Admit Date:  11/4/2020    Assessment Date:  11/9/2020    Comments:  Pt drinking Boost Plus some, if drinking the 3 ordered pt could meet at least 80% of needs.  Encourage po and voice prefs. Will cont to follow.       Reason for Assessment     Row Name 11/09/20 1351          Reason for Assessment    Reason For Assessment  follow-up protocol     Diagnosis  gastrointestinal disease;substance use/abuse Acute LIver Failure, ETOH, MONSTRE, met enceph, Ascites         Nutrition/Diet History     Row Name 11/09/20 1356          Nutrition/Diet History    Typical Food/Fluid Intake  RN & CNA providing care at visit earlier to floor. Noted PT worked w pt today and diet is in place, drinking Boost Plus per GI note over weekend. Per rounds pt lots of stool burden no paracentesis at this time.Stooling noted.           Labs/Tests/Procedures/Meds     Row Name 11/09/20 1357          Labs/Procedures/Meds    Lab Results Reviewed  reviewed     Lab Results Comments  NH3 81 H, phos 2.1 L, mg 1.5 L, ALT 80 H        Diagnostic Tests/Procedures    Diagnostic Test/Procedure Reviewed  reviewed        Medications    Pertinent Medications Reviewed  reviewed     Pertinent Medications Comments  lasix, lacutlose             Nutrition Prescription Ordered     Row Name 11/09/20 1353          Nutrition Prescription PO    Supplement  Boost Plus (Ensure Enlive, Ensure Plus)     Supplement Frequency  3 times a day     Common Modifiers  GI Soft/Wood         Evaluation of Received Nutrient/Fluid Intake     Row Name 11/09/20 1354          Fluid Intake Evaluation    Oral Fluid (mL)  [address] x 3, 72%        PO Evaluation    % PO Intake  0-25% meals , % of boost recorded, limited data available               Problem/Interventions:  Problem 1     Row Name 11/09/20 1357          Nutrition Diagnoses Problem 1    Problem 1  Predicted Suboptimal Intake      Etiology (related to)  Medical Diagnosis;Factors Affecting Nutrition     Signs/Symptoms (evidenced by)  Report/Observation               Intervention Goal     Row Name 11/09/20 1400          Intervention Goal    General  Meet nutritional needs for age/condition     PO  Tolerate PO;PO intake (%)     PO Intake %  50 % or greater of meals/supplements.         Nutrition Intervention     Row Name 11/09/20 1400          Nutrition Intervention    RD/Tech Action  Encourage intake;Follow Tx progress           Education/Evaluation     Row Name 11/09/20 1400          Education    Education  Education not appropriate at this time        Monitor/Evaluation    Monitor  Per protocol;I&O;PO intake;Supplement intake;Pertinent labs;Weight;Symptoms           Electronically signed by:  Maricruz Martinez RD  11/09/20 14:00 EST

## 2020-11-09 NOTE — NURSING NOTE
Continued Stay Note  DORIS Cobos     Patient Name: Tayler Lugo  MRN: [id number]  Today's Date: 11/9/2020    Admit Date: 11/4/2020    Discharge Plan     Row Name 11/09/20 1539       Plan    Plan  to be determined    Plan Comments  Follow up visit, Cristiana MATOS at bedside. Patient is awake and oriented. She says she is doing much better. Cristiana MATOS assisting to position of comfort. CM will follow for dc needs and possible [place] eval prior to dc.        Discharge Codes    No documentation.             Michael Peraza, RN

## 2020-11-09 NOTE — PLAN OF CARE
Problem: Adult Inpatient Plan of Care  Goal: Plan of Care Review  Recent Flowsheet Documentation  Taken 11/9/2020 0844 by Lorraine Abarca, PT  Outcome Summary: PT: Patient performs supine to sit with min assist and sit to/from stand with CGA requiring cues for hand placement and controlled descent into sitting.  Patient performs gait x23 feet with CGA/min assist with 1 seated rest break due to fatigue.  Patient requires increased assistance for safety and sequencing around obstacles and with direction changes.  Patient continues to require frequent cues for safety throughout session.

## 2020-11-09 NOTE — PROGRESS NOTES
"SERVICE: [facility] HOSPITALIST    CONSULTANTS: GI, pulmonary    CHIEF COMPLAINT: Follow-up acute hepatitis, alcohol withdrawal    SUBJECTIVE: Patient reports she is feeling much better today.  She notes no appetite but she is trying to eat.  Plans for inpatient alcohol rehab after acute illness, denies SI/HI, wants to remain alcohol free.  Staff notes no concerns overnight other than excessive stooling.  Denies f/c/cough/soa/chest pain/n/v/d/abdominal pain or other new concerns.    OBJECTIVE:    /70 (BP Location: Left arm, Patient Position: Lying)   Pulse 98   Temp 97.9 °F (36.6 °C) (Oral)   Resp 20   Ht 157.5 cm (62\")   Wt 68.6 kg (151 lb 4.8 oz) Comment: without scd pump  LMP  (LMP Unknown)   SpO2 96%   BMI 27.67 kg/m²     MEDS/LABS REVIEWED AND ORDERED    cefTRIAXone, 1 g, Intravenous, Q24H  furosemide, 40 mg, Intravenous, Q12H  lactulose, 20 g, Oral, TID  levothyroxine, 50 mcg, Oral, Q AM  midodrine, 2.5 mg, Oral, TID AC  sodium chloride, 10 mL, Intravenous, Q12H  sodium chloride, 10 mL, Intravenous, Q12H  sodium chloride, 10 mL, Intravenous, Q12H      Physical Exam  Vitals signs reviewed.   Constitutional:       General: She is not in acute distress.     Appearance: She is not ill-appearing.   HENT:      Head: Normocephalic and atraumatic.   Eyes:      General: Scleral icterus present.      Extraocular Movements: Extraocular movements intact.      Pupils: Pupils are equal, round, and reactive to light.   Cardiovascular:      Rate and Rhythm: Normal rate and regular rhythm.   Pulmonary:      Effort: Pulmonary effort is normal. No respiratory distress.      Breath sounds: Normal breath sounds. No wheezing or rales.   Abdominal:      General: Bowel sounds are normal. There is distension.      Tenderness: There is no abdominal tenderness. There is no guarding.   Musculoskeletal:         General: No swelling.   Skin:     General: Skin is warm and dry.      Capillary Refill: Capillary " refill takes less than 2 seconds.      Coloration: Skin is jaundiced.   Neurological:      General: No focal deficit present.      Mental Status: She is alert and oriented to person, place, and time.   Psychiatric:         Mood and Affect: Mood normal.         Behavior: Behavior normal.       LAB/DIAGNOSTICS:    Lab Results (last 24 hours)       Procedure Component Value Units Date/Time    Comprehensive Metabolic Panel [[id number]]  (Abnormal) Collected: 11/09/20 0607    Specimen: Blood Updated: 11/09/20 0724     Glucose 118 mg/dL      BUN 19 mg/dL      Creatinine 0.40 mg/dL      Sodium 137 mmol/L      Potassium 4.9 mmol/L      Chloride 105 mmol/L      CO2 21.6 mmol/L      Calcium 8.3 mg/dL      Total Protein 4.8 g/dL      Albumin 2.70 g/dL      ALT (SGPT) 80 U/L      AST (SGOT) 158 U/L      Alkaline Phosphatase 180 U/L      Total Bilirubin 6.6 mg/dL      eGFR Non African Amer >150 mL/min/1.73      Globulin 2.1 gm/dL      A/G Ratio 1.3 g/dL      BUN/Creatinine Ratio 47.5     Anion Gap 10.4 mmol/L     Narrative:      GFR Normal >60  Chronic Kidney Disease <60  Kidney Failure <15      Magnesium [[id number]]  (Abnormal) Collected: 11/09/20 0607    Specimen: Blood Updated: 11/09/20 0724     Magnesium 1.5 mg/dL     Procalcitonin [[id number]]  (Abnormal) Collected: 11/09/20 0607    Specimen: Blood Updated: 11/09/20 0713     Procalcitonin 0.35 ng/mL     Narrative:      Results may be falsely decreased if patient taking Biotin.     Phosphorus [[id number]]  (Abnormal) Collected: 11/09/20 0607    Specimen: Blood Updated: 11/09/20 0711     Phosphorus 2.1 mg/dL     Ammonia [[id number]]  (Abnormal) Collected: 11/09/20 0607    Specimen: Blood Updated: 11/09/20 0705     Ammonia 81 umol/L     Protime-INR [[id number]]  (Abnormal) Collected: 11/09/20 0607    Specimen: Blood Updated: 11/09/20 0657     Protime 17.3 Seconds      INR 1.47    Narrative:      Therapeutic Ranges for INR: 2.0-3.0 (PT 20-30)                              2.5-3.5 (PT  25-34)    CBC & Differential [[id number]]  (Abnormal) Collected: 11/09/20 0607    Specimen: Blood Updated: 11/09/20 0655    Narrative:      The following orders were created for panel order CBC & Differential.  Procedure                               Abnormality         Status                     ---------                               -----------         ------                     CBC Auto Differential[[id number]]        Abnormal            Final result                 Please view results for these tests on the individual orders.    CBC Auto Differential [[id number]]  (Abnormal) Collected: 11/09/20 0607    Specimen: Blood Updated: 11/09/20 0655     WBC 19.20 10*3/mm3      RBC 2.54 10*6/mm3      Hemoglobin 9.3 g/dL      Hematocrit 28.7 %      .0 fL      MCH 36.6 pg      MCHC 32.4 g/dL      RDW 16.2 %      RDW-SD 64.8 fl      MPV 11.5 fL      Platelets 130 10*3/mm3      Neutrophil % 64.0 %      Lymphocyte % 13.4 %      Monocyte % 14.5 %      Eosinophil % 0.8 %      Basophil % 0.5 %      Immature Grans % 6.8 %      Neutrophils, Absolute 12.29 10*3/mm3      Lymphocytes, Absolute 2.58 10*3/mm3      Monocytes, Absolute 2.78 10*3/mm3      Eosinophils, Absolute 0.15 10*3/mm3      Basophils, Absolute 0.10 10*3/mm3      Immature Grans, Absolute 1.30 10*3/mm3      nRBC 0.7 /100 WBC     Body Fluid Culture - Body Fluid, Peritoneum [[id number]] Collected: 11/04/20 1552    Specimen: Body Fluid from Peritoneum Updated: 11/09/20 0646     Body Fluid Culture No growth at 5 days     Gram Stain Rare (1+) WBCs seen      No organisms seen    Phosphorus [[id number]]  (Abnormal) Collected: 11/08/20 2313    Specimen: Blood Updated: 11/08/20 2334     Phosphorus 2.1 mg/dL     Potassium [[id number]]  (Normal) Collected: 11/08/20 1841    Specimen: Blood Updated: 11/08/20 1925     Potassium 3.6 mmol/L     Phosphorus [[id number]]  (Abnormal) Collected: 11/08/20 0516    Specimen: Blood Updated: 11/08/20 1148     Phosphorus 1.5 mg/dL     Blood  Culture - Blood, Blood, Venous Line [[id number]] Collected: 11/04/20 1136    Specimen: Blood, Venous Line Updated: 11/08/20 1145     Blood Culture No growth at 4 days    Procalcitonin [[id number]]  (Abnormal) Collected: 11/08/20 0516    Specimen: Blood Updated: 11/08/20 1141     Procalcitonin 0.27 ng/mL     Narrative:      Results may be falsely decreased if patient taking Biotin.           No orders to display     Results for orders placed in visit on 03/10/15   SCANNED - ECHOCARDIOGRAM     Us Abdomen Limited    Result Date: 11/9/2020  No ascites is identified  This report was finalized on 11/9/2020 7:59 AM by Dr. Solitario Jain MD.      ASSESSMENT/PLAN:  Acute alcoholic hepatitis with coagulopathy, ascites:   Presumed sepsis, possible UTI, r/o SBP with septic shock:   Leukocytosis:   Elevated alkaline phosphatase/hyperbilirubinemia GI following  Required Levophed for brief time, now stabilized  Ammonia remains elevated but slightly improved on lactulose, trend  Very high meld score  Recheck ua today, discontinue [place], continue rocephin pending repeat UA C&S  Trend inflammatory markers  US abdomen today did not show any fluid for paracentesis   Monitor    Acute metabolic encephalopathy 2/2 acute alcohol withdrawal:  Continues on CIWA protocol  Monitor closely, much less DTs  Wants rehab after acute illness    Acute kidney injury:  Hyponatremia:  Electrolyte imbalance:   Decrease Lasix to 40 mg every 12 hours  Continue electrolyte replacement protocol    Macrocytic anemia:  Coagulopathy:  Hemoglobin 9.3, no active blood loss noted  Recheck anemia panels  Monitor     Folate deficiency:  Recheck lab, add oral dosing folic acid if needed    Suboptimal intake 2/2 diagnoses: Dietitian monitoring on full diet and boost supplements    New diagnosis hypothyroidism: Levothyroxine 50 mcg daily added this admission  Recheck TSH    Deconditioning: PT/OT working with patient    PLAN FOR DISPOSITION: TBD, likely psych facility for  "alcohol rehab    TIKA Arzate  Hospitalist, [facility]  11/09/20  08:20 EST    \"Dictated utilizing Dragon dictation\"    "

## 2020-11-09 NOTE — THERAPY TREATMENT NOTE
Acute Care - Physical Therapy Treatment Note   Jennifer Lunsford     Patient Name: Tayler Lugo  : 1950  MRN: [id number]  Today's Date: 2020           PT Assessment (last 12 hours)      PT Evaluation and Treatment     Row Name 20          Physical Therapy Time and Intention    Subjective Information  complains of;fatigue  -     Document Type  therapy note (daily note)  -     Mode of Treatment  physical therapy  -     Patient Effort  adequate  -     Symptoms Noted During/After Treatment  fatigue  -     Row Name 20          General Information    Patient Observations  alert;cooperative;agree to therapy  -     Patient/Family/Caregiver Comments/Observations  pt supine, daughter present  -     Existing Precautions/Restrictions  fall periods of confusion within task  -     Limitations/Impairments  safety/cognitive  -     Barriers to Rehab  medically complex  -     Row Name 20          Cognition    Personal Safety Interventions  gait belt;nonskid shoes/slippers when out of bed  -     Row Name 20          Pain Scale: Word Pre/Post-Treatment    Pre/Posttreatment Pain Comment  no c/o pain  -     Row Name 20          Bed Mobility    Bed Mobility  supine-sit  -     Supine-Sit Forrest (Bed Mobility)  minimum assist (75% patient effort);verbal cues  -     Assistive Device (Bed Mobility)  bed rails;head of bed elevated  -     Row Name 20          Transfers    Transfers  sit-stand transfer;stand-sit transfer  -     Comment (Transfers)  pt requires verbal cues for hand placement and controlled descent into sitting  -     Sit-Stand Forrest (Transfers)  contact guard;verbal cues  -     Stand-Sit Forrest (Transfers)  contact guard;verbal cues  -     Row Name 20          Sit-Stand Transfer    Assistive Device (Sit-Stand Transfers)  walker, front-wheeled  -     Row Name 20           Stand-Sit Transfer    Assistive Device (Stand-Sit Transfers)  walker, front-wheeled  -     Row Name 11/09/20 0844          Gait/Stairs (Locomotion)    Chambers Level (Gait)  contact guard;minimum assist (75% patient effort);verbal cues  -     Assistive Device (Gait)  walker, front-wheeled  -     Distance in Feet (Gait)  23 with 1 rest break  -     Pattern (Gait)  swing-to  -JW     Deviations/Abnormal Patterns (Gait)  base of support, narrow  -JW     Bilateral Gait Deviations  forward flexed posture  -     Comment (Gait/Stairs)  pt with difficulty sequencing activity, requires repeated verbal cues for safety and management of device around obstacles and with direction changes  -     Row Name 11/09/20 0844          Safety Issues, Functional Mobility    Safety Issues Affecting Function (Mobility)  insight into deficits/self-awareness;positioning of assistive device;problem-solving;sequencing abilities  -     Row Name 11/09/20 0844          Plan of Care Review    Outcome Summary  PT: Patient performs supine to sit with min assist and sit to/from stand with CGA requiring cues for hand placement and controlled descent into sitting.  Patient performs gait x23 feet with CGA/min assist with 1 seated rest break due to fatigue.  Patient requires increased assistance for safety and sequencing around obstacles and with direction changes.  Patient continues to require frequent cues for safety throughout session.  -     Row Name 11/09/20 0844          Positioning and Restraints    Pre-Treatment Position  in bed  -JW     Post Treatment Position  chair  -JW     In Chair  reclined;call light within reach;encouraged to call for assist;with family/caregiver;with nsg  -     Row Name 11/09/20 0844          Progress Summary (PT)    Progress Toward Functional Goals (PT)  progress toward functional goals is fair  -     Barriers to Overall Progress (PT)  medical status  -       User Key  (r) = Recorded By, (t) =  Taken By, (c) = Cosigned By    Initials Name Provider Type    Lorraine Segura, PT Physical Therapist        Physical Therapy Education                 Title: PT OT SLP Therapies (In Progress)     Topic: Physical Therapy (In Progress)     Point: Mobility training (Done)     Learning Progress Summary           Patient Acceptance, E,TB, VU,NR by KALYN at 11/9/2020 0946    Acceptance, E,TB, NR by KALYN at 11/8/2020 1034                   Point: Home exercise program (Not Started)     Learner Progress:  Not documented in this visit.                      User Key     Initials Effective Dates Name Provider Type Discipline    KALYN 04/03/18 -  Lorraine Abarca PT Physical Therapist PT              PT Recommendation and Plan  Anticipated Discharge Disposition (PT): (will continue to assess)  Planned Therapy Interventions (PT): balance training, bed mobility training, gait training, home exercise program, patient/family education, strengthening, transfer training  Therapy Frequency (PT): daily  Progress Summary (PT)  Progress Toward Functional Goals (PT): progress toward functional goals is fair  Barriers to Overall Progress (PT): medical status  Plan of Care Reviewed With: patient  Outcome Summary: PT: Patient performs supine to sit with min assist and sit to/from stand with CGA requiring cues for hand placement and controlled descent into sitting.  Patient performs gait x23 feet with CGA/min assist with 1 seated rest break due to fatigue.  Patient requires increased assistance for safety and sequencing around obstacles and with direction changes.  Patient continues to require frequent cues for safety throughout session.  Outcome Measures     Row Name 11/09/20 0844             How much help from another person do you currently need...    Turning from your back to your side while in flat bed without using bedrails?  3  -JW      Moving from lying on back to sitting on the side of a flat bed without bedrails?  3  -JW      Moving to  and from a bed to a chair (including a wheelchair)?  3  -JW      Standing up from a chair using your arms (e.g., wheelchair, bedside chair)?  3  -JW      Climbing 3-5 steps with a railing?  2  -JW      To walk in hospital room?  3  -JW      AM-PAC 6 Clicks Score (PT)  17  -         Functional Assessment    Outcome Measure Options  AM-PAC 6 Clicks Basic Mobility (PT)  -JW        User Key  (r) = Recorded By, (t) = Taken By, (c) = Cosigned By    Initials Name Provider Type    Lorraine Segura, PT Physical Therapist           Time Calculation:   PT Charges     Row Name 11/09/20 0946             Time Calculation    Start Time  0844  -      Stop Time  0859  -      Time Calculation (min)  15 min  -      PT Received On  11/09/20  -KALYN      PT - Next Appointment  11/10/20  -KALYN         Timed Charges    69203 - Gait Training Minutes   15  -        User Key  (r) = Recorded By, (t) = Taken By, (c) = Cosigned By    Initials Name Provider Type    Lorraine Segura PT Physical Therapist        Therapy Charges for Today     Code Description Service Date Service Provider Modifiers Qty    44088074732 HC PT EVAL MOD COMPLEXITY 2 11/8/2020 Lorraine Abarca, PT GP 1    86161423740 HC PT THER SUPP EA 15 MIN 11/8/2020 Lorraine Abarca, PT GP 2    69092448413 HC GAIT TRAINING EA 15 MIN 11/9/2020 Lorraine Abarca, PT GP 1          PT G-Codes  Outcome Measure Options: AM-PAC 6 Clicks Basic Mobility (PT)  AM-PAC 6 Clicks Score (PT): 17    Lorraine Abarca PT  11/9/2020

## 2020-11-09 NOTE — PROGRESS NOTES
NEPHROLOGY PROGRESS NOTE    PATIENT IDENTIFICATION:   Name:  Tayler Lugo      MRN:  [id number]     70 y.o.  female             Reason for visit: MONSTER    SUBJECTIVE:   Awake; feels weak; multiple bowel movement; dizzy when standing; no shortness of breath on room air    OBJECTIVE:  Vitals:    11/09/20 0635 11/09/20 0759 11/09/20 1155 11/09/20 1300   BP: 109/83 108/70 112/94    BP Location:  Left arm Left arm    Patient Position:  Lying Lying    Pulse: 101 98 94    Resp:  20 18    Temp:  97.9 °F (36.6 °C) 97.5 °F (36.4 °C)    TempSrc:  Oral Oral    SpO2:  96% 98% 98%   Weight:       Height:               Body mass index is 27.67 kg/m².    Intake/Output Summary (Last 24 hours) at 11/9/2020 1443  Last data filed at 11/9/2020 1300  Gross per 24 hour   Intake 1200 ml   Output 1050 ml   Net 150 ml     Wt Readings from Last 1 Encounters:   11/09/20 0545 68.6 kg (151 lb 4.8 oz)   11/07/20 0615 70.1 kg (154 lb 8.7 oz)   11/06/20 0444 70.2 kg (154 lb 12.2 oz)   11/05/20 0600 62.7 kg (138 lb 3.7 oz)   11/04/20 1814 65.5 kg (144 lb 8 oz)   11/04/20 1107 59 kg (130 lb)     Wt Readings from Last 3 Encounters:   11/09/20 68.6 kg (151 lb 4.8 oz)   10/26/20 59 kg (130 lb)   10/08/20 59 kg (130 lb)         Exam:  General Appearance: Blunted but oriented; pleasant, no acute distress, chronically ill  Skin: warm and dry, jaundice  HEENT: pupils round and reactive to light, oral mucosa dry, icteric sclerae  Neck: supple, no JVD, trachea midline  Lungs: CTA, unlabored breathing effort on room air  Heart: RR, tachycardic, normal S1 and S2, no S3, no rub  Abdomen: soft, nontender, distended, BS +  Extremities: no edema, cyanosis or clubbing  Neuro: Moving all extremities; speech is fluent        Scheduled meds:    cefTRIAXone, 1 g, Intravenous, Q24H  furosemide, 40 mg, Intravenous, Q12H  lactulose, 20 g, Oral, TID  levothyroxine, 50 mcg, Oral, Q AM  midodrine, 2.5 mg, Oral, TID AC  sodium chloride, 10 mL, Intravenous, Q12H  sodium  chloride, 10 mL, Intravenous, Q12H  sodium chloride, 10 mL, Intravenous, Q12H      IV meds:                             Data Review:    Results from last 7 days   Lab Units 11/09/20  0607 11/08/20  1841 11/08/20  0516 11/07/20  1407 11/07/20  0446   SODIUM mmol/L 137  --  138 136 137   POTASSIUM mmol/L 4.9 3.6 3.0* 4.4 3.1*   CHLORIDE mmol/L 105  --  103 104 105   CO2 mmol/L 21.6*  --  21.8* 19.8* 19.0*   BUN mg/dL 19 --  20 25* 32*   CREATININE mg/dL 0.40*  --  0.35* 0.46* 0.48*   CALCIUM mg/dL 8.3*  --  7.9* 7.4* 7.2*   BILIRUBIN mg/dL 6.6*  --  7.2*  --  7.8*   ALK PHOS U/L 180*  --  178*  --  160*   ALT (SGPT) U/L 80*  --  81*  --  73*   AST (SGOT) U/L 158*  --  179*  --  189*   GLUCOSE mg/dL 118*  --  123* 157* 137*     Estimated Creatinine Clearance: 57.7 mL/min (A) (by C-G formula based on SCr of 0.4 mg/dL (L)).  Results from last 7 days   Lab Units 11/07/20 0446 11/05/20  0843   SODIUM UR mmol/L  --  30   CREATININE UR mg/dL  --  237.5   OSMOLALITY UR mOsm/kg  --  375   URIC ACID mg/dL 11.1*  --      Results from last 7 days   Lab Units 11/09/20  0607 11/08/20  2313 11/08/20  0516 11/07/20  1407   MAGNESIUM mg/dL 1.5*  --  1.1* 1.7   PHOSPHORUS mg/dL 2.1* 2.1* 1.5* 3.5       Results from last 7 days   Lab Units 11/09/20  0607 11/08/20  0516 11/07/20  0446 11/06/20  0442 11/05/20  0444   WBC 10*3/mm3 19.20* 14.86* 12.26* 14.33* 14.77*   HEMOGLOBIN g/dL 9.3* 9.0* 8.4* 8.7* 9.5*   PLATELETS 10*3/mm3 130* 142 145 177 257       Results from last 7 days   Lab Units 11/09/20  0607 11/08/20  0516 11/07/20 0446 11/06/20  0442 11/05/20  0444   INR  1.47* 1.46* 1.58* 1.55* 1.41*             ASSESSMENT:     Acute liver failure    Frequent UTI    Hypertension    Acute respiratory failure with hypoxia (CMS/HCC)    Ascites due to alcoholic hepatitis    Bladder prolapse, female, acquired    Sepsis associated hypotension (CMS/HCC)    Altered mental state    Acute renal failure (CMS/HCC)    Alcohol abuse    1.  MONSTER,  nonoliguric, resolved:  prerenal due to hypotension, poor oral intake, and compromised renal autoregulation due to Vasotec.  Volume status fine; low magnesium and phosphorus; serum sodium level normal  2.  Alcoholic hepatitis with coagulopathy: patient is currently jaundiced  3.  Hypotension and tachycardia  4.  Coagulopathy  5.  Alcoholism  6.  Hypothyroidism with TSH 17          PLAN:  1.  Discontinue IV diuretic for now: not eating well, +diarrhea, no ascites  2.  Magnesium and sodium phosphate per protocol  3.  Surveillance lab      Dustin Odom MD  11/9/2020    14:43 EST

## 2020-11-10 ENCOUNTER — APPOINTMENT (OUTPATIENT)
Dept: GENERAL RADIOLOGY | Facility: HOSPITAL | Age: 70
End: 2020-11-10

## 2020-11-10 LAB
ALBUMIN SERPL-MCNC: 2.7 G/DL (ref 3.5–5.2)
ALBUMIN/GLOB SERPL: 1.2 G/DL
ALP SERPL-CCNC: 179 U/L (ref 39–117)
ALT SERPL W P-5'-P-CCNC: 76 U/L (ref 1–33)
ANION GAP SERPL CALCULATED.3IONS-SCNC: 9.9 MMOL/L (ref 5–15)
AST SERPL-CCNC: 151 U/L (ref 1–32)
BASOPHILS # BLD AUTO: 0.09 10*3/MM3 (ref 0–0.2)
BASOPHILS NFR BLD AUTO: 0.5 % (ref 0–1.5)
BILIRUB SERPL-MCNC: 7.2 MG/DL (ref 0–1.2)
BUN SERPL-MCNC: 19 MG/DL (ref 8–23)
BUN/CREAT SERPL: 51.4 (ref 7–25)
CALCIUM SPEC-SCNC: 8.7 MG/DL (ref 8.6–10.5)
CHLORIDE SERPL-SCNC: 104 MMOL/L (ref 98–107)
CO2 SERPL-SCNC: 22.1 MMOL/L (ref 22–29)
CREAT SERPL-MCNC: 0.37 MG/DL (ref 0.57–1)
DEPRECATED RDW RBC AUTO: 70.9 FL (ref 37–54)
EOSINOPHIL # BLD AUTO: 0.14 10*3/MM3 (ref 0–0.4)
EOSINOPHIL NFR BLD AUTO: 0.7 % (ref 0.3–6.2)
ERYTHROCYTE [DISTWIDTH] IN BLOOD BY AUTOMATED COUNT: 17 % (ref 12.3–15.4)
FOLATE SERPL-MCNC: 6.39 NG/ML (ref 4.78–24.2)
GFR SERPL CREATININE-BSD FRML MDRD: >150 ML/MIN/1.73
GLOBULIN UR ELPH-MCNC: 2.3 GM/DL
GLUCOSE SERPL-MCNC: 116 MG/DL (ref 65–99)
HCT VFR BLD AUTO: 29.4 % (ref 34–46.6)
HGB BLD-MCNC: 9.5 G/DL (ref 12–15.9)
IMM GRANULOCYTES # BLD AUTO: 1.05 10*3/MM3 (ref 0–0.05)
IMM GRANULOCYTES NFR BLD AUTO: 5.4 % (ref 0–0.5)
INR PPP: 1.51 (ref 0.9–1.1)
LYMPHOCYTES # BLD AUTO: 2.49 10*3/MM3 (ref 0.7–3.1)
LYMPHOCYTES NFR BLD AUTO: 12.8 % (ref 19.6–45.3)
MAGNESIUM SERPL-MCNC: 1.8 MG/DL (ref 1.6–2.4)
MCH RBC QN AUTO: 37.5 PG (ref 26.6–33)
MCHC RBC AUTO-ENTMCNC: 32.3 G/DL (ref 31.5–35.7)
MCV RBC AUTO: 116.2 FL (ref 79–97)
MONOCYTES # BLD AUTO: 2.61 10*3/MM3 (ref 0.1–0.9)
MONOCYTES NFR BLD AUTO: 13.4 % (ref 5–12)
NEUTROPHILS NFR BLD AUTO: 13.04 10*3/MM3 (ref 1.7–7)
NEUTROPHILS NFR BLD AUTO: 67.2 % (ref 42.7–76)
NRBC BLD AUTO-RTO: 0.5 /100 WBC (ref 0–0.2)
PHOSPHATE SERPL-MCNC: 2.8 MG/DL (ref 2.5–4.5)
PLATELET # BLD AUTO: 115 10*3/MM3 (ref 140–450)
PMV BLD AUTO: 12.2 FL (ref 6–12)
POTASSIUM SERPL-SCNC: 4.2 MMOL/L (ref 3.5–5.2)
PROCALCITONIN SERPL-MCNC: 0.3 NG/ML (ref 0–0.25)
PROT SERPL-MCNC: 5 G/DL (ref 6–8.5)
PROTHROMBIN TIME: 17.7 SECONDS (ref 12.1–15)
RBC # BLD AUTO: 2.53 10*6/MM3 (ref 3.77–5.28)
SODIUM SERPL-SCNC: 136 MMOL/L (ref 136–145)
VIT B12 BLD-MCNC: >2000 PG/ML (ref 211–946)
WBC # BLD AUTO: 19.42 10*3/MM3 (ref 3.4–10.8)

## 2020-11-10 PROCEDURE — 83735 ASSAY OF MAGNESIUM: CPT | Performed by: NURSE PRACTITIONER

## 2020-11-10 PROCEDURE — 84145 PROCALCITONIN (PCT): CPT | Performed by: NURSE PRACTITIONER

## 2020-11-10 PROCEDURE — 85025 COMPLETE CBC W/AUTO DIFF WBC: CPT | Performed by: INTERNAL MEDICINE

## 2020-11-10 PROCEDURE — 82746 ASSAY OF FOLIC ACID SERUM: CPT | Performed by: NURSE PRACTITIONER

## 2020-11-10 PROCEDURE — 85610 PROTHROMBIN TIME: CPT | Performed by: INTERNAL MEDICINE

## 2020-11-10 PROCEDURE — 99233 SBSQ HOSP IP/OBS HIGH 50: CPT | Performed by: NURSE PRACTITIONER

## 2020-11-10 PROCEDURE — 71046 X-RAY EXAM CHEST 2 VIEWS: CPT

## 2020-11-10 PROCEDURE — 82607 VITAMIN B-12: CPT | Performed by: NURSE PRACTITIONER

## 2020-11-10 PROCEDURE — 97110 THERAPEUTIC EXERCISES: CPT

## 2020-11-10 PROCEDURE — 80053 COMPREHEN METABOLIC PANEL: CPT | Performed by: INTERNAL MEDICINE

## 2020-11-10 PROCEDURE — 25010000002 CEFTRIAXONE SODIUM-DEXTROSE 1-3.74 GM-%(50ML) RECONSTITUTED SOLUTION: Performed by: NURSE PRACTITIONER

## 2020-11-10 PROCEDURE — 84100 ASSAY OF PHOSPHORUS: CPT | Performed by: NURSE PRACTITIONER

## 2020-11-10 RX ORDER — FUROSEMIDE 10 MG/ML
20 INJECTION INTRAMUSCULAR; INTRAVENOUS ONCE
Status: DISCONTINUED | OUTPATIENT
Start: 2020-11-10 | End: 2020-11-10

## 2020-11-10 RX ORDER — TORSEMIDE 20 MG/1
20 TABLET ORAL DAILY
Status: DISCONTINUED | OUTPATIENT
Start: 2020-11-10 | End: 2020-11-17 | Stop reason: HOSPADM

## 2020-11-10 RX ORDER — FOLIC ACID 1 MG/1
1 TABLET ORAL DAILY
Status: DISCONTINUED | OUTPATIENT
Start: 2020-11-10 | End: 2020-11-17 | Stop reason: HOSPADM

## 2020-11-10 RX ADMIN — SODIUM CHLORIDE, PRESERVATIVE FREE 10 ML: 5 INJECTION INTRAVENOUS at 08:14

## 2020-11-10 RX ADMIN — POTASSIUM & SODIUM PHOSPHATES POWDER PACK 280-160-250 MG 1 PACKET: 280-160-250 PACK at 08:08

## 2020-11-10 RX ADMIN — TORSEMIDE 20 MG: 20 TABLET ORAL at 09:56

## 2020-11-10 RX ADMIN — FOLIC ACID 1 MG: 1 TABLET ORAL at 12:28

## 2020-11-10 RX ADMIN — MIDODRINE HYDROCHLORIDE 2.5 MG: 5 TABLET ORAL at 17:02

## 2020-11-10 RX ADMIN — CEFTRIAXONE 1 G: 1 INJECTION, SOLUTION INTRAVENOUS at 09:56

## 2020-11-10 RX ADMIN — MIDODRINE HYDROCHLORIDE 2.5 MG: 5 TABLET ORAL at 12:28

## 2020-11-10 RX ADMIN — SODIUM CHLORIDE, PRESERVATIVE FREE 10 ML: 5 INJECTION INTRAVENOUS at 20:41

## 2020-11-10 RX ADMIN — LORAZEPAM 0.5 MG: 0.5 TABLET ORAL at 23:33

## 2020-11-10 RX ADMIN — SODIUM CHLORIDE, PRESERVATIVE FREE 10 ML: 5 INJECTION INTRAVENOUS at 08:13

## 2020-11-10 RX ADMIN — SODIUM CHLORIDE, PRESERVATIVE FREE 10 ML: 5 INJECTION INTRAVENOUS at 08:09

## 2020-11-10 RX ADMIN — POTASSIUM & SODIUM PHOSPHATES POWDER PACK 280-160-250 MG 1 PACKET: 280-160-250 PACK at 17:03

## 2020-11-10 RX ADMIN — LACTULOSE 20 G: 20 SOLUTION ORAL at 20:40

## 2020-11-10 RX ADMIN — LACTULOSE 20 G: 20 SOLUTION ORAL at 17:00

## 2020-11-10 RX ADMIN — LEVOTHYROXINE SODIUM 50 MCG: 50 TABLET ORAL at 05:01

## 2020-11-10 RX ADMIN — MIDODRINE HYDROCHLORIDE 2.5 MG: 5 TABLET ORAL at 08:08

## 2020-11-10 NOTE — PROGRESS NOTES
"SERVICE: [facility] HOSPITALIST    CONSULTANTS: GI, nephrology, pulmonary    CHIEF COMPLAINT: Follow-up acute alcoholic hepatitis, liver failure, MONSTER, encephalopathy    SUBJECTIVE: The patient is showing daily improvement, mentation back to baseline, ambulating with walker and cooperative with PT/OT.  She notes frequent stools.  Tolerating boost supplement and attempting diet as able.  She repeats her history and cause for her alcoholism and again notes that she would like inpatient treatment at a facility as she is highly motivated to remain alcohol free.  Daughter at bedside agreeable to all, discussed severity of liver disease.  She otherwise denies f/c/cough/soa/chest pain/n/v/d/abdominal pain or other new concerns.    OBJECTIVE:    /87   Pulse 110   Temp 98.5 °F (36.9 °C) (Oral)   Resp 22   Ht 157.5 cm (62\")   Wt 69.2 kg (152 lb 9.6 oz) Comment: without scd pump  LMP  (LMP Unknown)   SpO2 97%   BMI 27.91 kg/m²     MEDS/LABS REVIEWED AND ORDERED    cefTRIAXone, 1 g, Intravenous, Q24H  folic acid, 1 mg, Oral, Daily  lactulose, 20 g, Oral, TID  levothyroxine, 50 mcg, Oral, Q AM  midodrine, 2.5 mg, Oral, TID AC  sodium chloride, 10 mL, Intravenous, Q12H  sodium chloride, 10 mL, Intravenous, Q12H  sodium chloride, 10 mL, Intravenous, Q12H  torsemide, 20 mg, Oral, Daily      Physical Exam  Vitals signs reviewed.   Constitutional:       General: She is not in acute distress.     Appearance: She is ill-appearing.   HENT:      Head: Normocephalic and atraumatic.   Eyes:      Extraocular Movements: Extraocular movements intact.      Pupils: Pupils are equal, round, and reactive to light.   Cardiovascular:      Rate and Rhythm: Regular rhythm. Tachycardia present.   Pulmonary:      Comments: Tachypneic, crackles bilateral mid to lower lung fields  Abdominal:      General: Bowel sounds are normal. There is distension.      Tenderness: There is no abdominal tenderness. There is no guarding. "   Musculoskeletal:      Comments: Trace bilateral ankle pitting edema   Skin:     General: Skin is warm and dry.      Capillary Refill: Capillary refill takes less than 2 seconds.      Coloration: Skin is jaundiced.   Neurological:      General: No focal deficit present.      Mental Status: She is alert and oriented to person, place, and time.   Psychiatric:         Mood and Affect: Mood normal.         Behavior: Behavior normal.       LAB/DIAGNOSTICS:    Lab Results (last 24 hours)     Procedure Component Value Units Date/Time    Phosphorus [[id number]]  (Normal) Collected: 11/10/20 0505    Specimen: Blood Updated: 11/10/20 0720     Phosphorus 2.8 mg/dL     Comprehensive Metabolic Panel [[id number]]  (Abnormal) Collected: 11/10/20 0505    Specimen: Blood Updated: 11/10/20 0720     Glucose 116 mg/dL      BUN 19 mg/dL      Creatinine 0.37 mg/dL      Sodium 136 mmol/L      Potassium 4.2 mmol/L      Chloride 104 mmol/L      CO2 22.1 mmol/L      Calcium 8.7 mg/dL      Total Protein 5.0 g/dL      Albumin 2.70 g/dL      ALT (SGPT) 76 U/L      AST (SGOT) 151 U/L      Alkaline Phosphatase 179 U/L      Total Bilirubin 7.2 mg/dL      eGFR Non African Amer >150 mL/min/1.73      Globulin 2.3 gm/dL      A/G Ratio 1.2 g/dL      BUN/Creatinine Ratio 51.4     Anion Gap 9.9 mmol/L     Narrative:      GFR Normal >60  Chronic Kidney Disease <60  Kidney Failure <15      Magnesium [[id number]]  (Normal) Collected: 11/10/20 0505    Specimen: Blood Updated: 11/10/20 0708     Magnesium 1.8 mg/dL     Protime-INR [[id number]]  (Abnormal) Collected: 11/10/20 0505    Specimen: Blood Updated: 11/10/20 0701     Protime 17.7 Seconds      INR 1.51    Narrative:      Therapeutic Ranges for INR: 2.0-3.0 (PT 20-30)                              2.5-3.5 (PT 25-34)    CBC & Differential [[id number]]  (Abnormal) Collected: 11/10/20 0505    Specimen: Blood Updated: 11/10/20 0622    Narrative:      The following orders were created for panel order CBC &  Differential.  Procedure                               Abnormality         Status                     ---------                               -----------         ------                     CBC Auto Differential[[id number]]        Abnormal            Final result                 Please view results for these tests on the individual orders.    CBC Auto Differential [[id number]]  (Abnormal) Collected: 11/10/20 0505    Specimen: Blood Updated: 11/10/20 0622     WBC 19.42 10*3/mm3      RBC 2.53 10*6/mm3      Hemoglobin 9.5 g/dL      Hematocrit 29.4 %      .2 fL      MCH 37.5 pg      MCHC 32.3 g/dL      RDW 17.0 %      RDW-SD 70.9 fl      MPV 12.2 fL      Platelets 115 10*3/mm3      Neutrophil % 67.2 %      Lymphocyte % 12.8 %      Monocyte % 13.4 %      Eosinophil % 0.7 %      Basophil % 0.5 %      Immature Grans % 5.4 %      Neutrophils, Absolute 13.04 10*3/mm3      Lymphocytes, Absolute 2.49 10*3/mm3      Monocytes, Absolute 2.61 10*3/mm3      Eosinophils, Absolute 0.14 10*3/mm3      Basophils, Absolute 0.09 10*3/mm3      Immature Grans, Absolute 1.05 10*3/mm3      nRBC 0.5 /100 WBC     Folate [[id number]] Collected: 11/10/20 0505    Specimen: Blood Updated: 11/10/20 0616    Vitamin B12 [[id number]] Collected: 11/10/20 0505    Specimen: Blood Updated: 11/10/20 0616    Urinalysis, Microscopic Only - Urine, Clean Catch [[id number]]  (Abnormal) Collected: 11/09/20 1054    Specimen: Urine, Clean Catch Updated: 11/09/20 1208     RBC, UA 3-5 /HPF      WBC, UA None Seen /HPF      Bacteria, UA None Seen /HPF      Squamous Epithelial Cells, UA 0-2 /HPF      Hyaline Casts, UA None Seen /LPF      Methodology Manual Light Microscopy    Blood Culture - Blood, Blood, Venous Line [[id number]] Collected: 11/04/20 1136    Specimen: Blood, Venous Line Updated: 11/09/20 1145     Blood Culture No growth at 5 days    Urinalysis With Culture If Indicated - Urine, Clean Catch [[id number]]  (Abnormal) Collected: 11/09/20 1054    Specimen:  Urine, Clean Catch Updated: 11/09/20 1114     Color, UA Dark Yellow     Appearance, UA Slightly Cloudy     pH, UA <=5.0     Specific Gravity, UA 1.020     Glucose, UA Negative     Ketones, UA Negative     Bilirubin, UA Moderate (2+)     Blood, UA Trace     Protein, UA Negative     Leuk Esterase, UA Negative     Nitrite, UA Negative     Urobilinogen, UA 0.2 E.U./dL        No orders to display     Results for orders placed in visit on 03/10/15   SCANNED - ECHOCARDIOGRAM     Us Abdomen Limited    Result Date: 11/9/2020  No ascites is identified  This report was finalized on 11/9/2020 7:59 AM by Dr. Solitario Jain MD.      ASSESSMENT/PLAN:  Acute alcoholic hepatitis with coagulopathy, ascites:   Acute metabolic encephalopathy 2/2 above:  Presumed sepsis, ruled out UTI, r/o SBP with septic shock:   Transaminitis/elevated alk phos/hyperbilirubinemia:   Persistent leukocytosis: GI following  Previously on Levophed, now off  Remains on lactulose for elevated ammonia with mentation dramatically improved  UA done yesterday without signs of infection after Shipman removed  Ultrasound abdomen yesterday showed no fluid to remove  Meld score 20  Continues Rocephin  Check CXR PA and lateral, procalcitonin  Recheck ammonia in a.m.     Acute metabolic encephalopathy 2/2 acute alcohol withdrawal:  Continue CIWA, much improved daily  Consult [facility] when able, hopeful inpatient  illness     Acute kidney injury:  Hyponatremia:  Electrolyte imbalance:   Volume overload: Nephrology following  Clinically mildly overloaded with crackles/ankle edema  Torsemide started today per nephrology, monitor for effect  Continue Daily weight, strict I&O     Macrocytic anemia:  Coagulopathy:  Folate deficiency:  Elevated ferritin:  Add folic acid 1 mg daily  Hemoglobin stable at 9.5, no active blood loss noted     Suboptimal intake 2/2 diagnoses: Dietitian monitoring on full diet and boost supplements     New diagnosis hypothyroidism: Levothyroxine 50 mcg  "daily added this admission  Recheck TFTs in 6 to 8 weeks by PCP     Deconditioning: PT/OT working with patient, doing well    PLAN FOR DISPOSITION: TIKA Concepcion  Hospitalist, [facility]  11/10/20  07:49 EST    \"Dictated utilizing Dragon dictation\"    "

## 2020-11-10 NOTE — PLAN OF CARE
Goal Outcome Evaluation:  Plan of Care Reviewed With: patient, daughter  Progress: improving  Outcome Summary: vss. afebrile. ambulating more today and up in chair for majority of shift. drain output well.

## 2020-11-10 NOTE — PLAN OF CARE
Goal Outcome Evaluation:  Plan of Care Reviewed With: patient  Progress: no change  Outcome Summary: rested intermit. vss, SR/ST on the monitor. Remains very weak-making plans regarding increasing nutrition and activity for strength recovery

## 2020-11-10 NOTE — THERAPY TREATMENT NOTE
Acute Care - Physical Therapy Treatment Note   Jennifer Lunsford     Patient Name: Tayler Lugo  : 1950  MRN: [id number]  Today's Date: 11/10/2020           PT Assessment (last 12 hours)      PT Evaluation and Treatment     Row Name 11/10/20 0832          Physical Therapy Time and Intention    Subjective Information  complains of;fatigue  -     Document Type  therapy note (daily note)  -     Mode of Treatment  physical therapy  -     Patient Effort  adequate  -     Symptoms Noted During/After Treatment  fatigue  -     Row Name 11/10/20 0832          General Information    Existing Precautions/Restrictions  fall  -     Limitations/Impairments  safety/cognitive pt requires frequent cues for safety/sequencing  -     Row Name 11/10/20 0832          Cognition    Personal Safety Interventions  gait belt;nonskid shoes/slippers when out of bed  -     Row Name 11/10/20 0832          Pain Scale: Word Pre/Post-Treatment    Pre/Posttreatment Pain Comment  no c/o pain  -     Row Name 11/10/20 0832          Bed Mobility    Comment (Bed Mobility)  deferred-up in chair  -     Row Name 11/10/20 0832          Transfers    Transfers  sit-stand transfer;stand-sit transfer  -     Comment (Transfers)  pt requires verbal cues for hand placement   -     Sit-Stand [place] (Transfers)  contact guard;verbal cues  -     Stand-Sit [place] (Transfers)  contact guard;verbal cues  -     Row Name 11/10/20 0832          Sit-Stand Transfer    Assistive Device (Sit-Stand Transfers)  walker, front-wheeled  -     Row Name 11/10/20 0832          Stand-Sit Transfer    Assistive Device (Stand-Sit Transfers)  walker, front-wheeled  -     Row Name 11/10/20 0832          Gait/Stairs (Locomotion)    [place] Level (Gait)  contact guard;verbal cues;minimum assist (75% patient effort)  -     Assistive Device (Gait)  walker, front-wheeled  -     Distance in Feet (Gait)  15 feet x2 with seated rest break  -      Pattern (Gait)  swing-through  -     Deviations/Abnormal Patterns (Gait)  base of support, narrow  -     Bilateral Gait Deviations  forward flexed posture  -     Comment (Gait/Stairs)  pt requires verbal cues for upright posture and proper management of device around obstacles and with direction changes.   -     Row Name 11/10/20 0832          Plan of Care Review    Outcome Summary  PT: Patient performs sit to stand with CGA and gait with rolling walker 2x15 feet with seated rest break.  patient requires CGA/min assist for gait with assistance for device management around obstacles and with direction changes.  Patient continues to require verbal cues for sequencing and safety throughout.    -     Row Name 11/10/20 0832          Positioning and Restraints    Pre-Treatment Position  sitting in chair/recliner  -     Post Treatment Position  chair  -JW     In Chair  sitting;call light within reach;encouraged to call for assist;with family/caregiver with APRN  -       User Key  (r) = Recorded By, (t) = Taken By, (c) = Cosigned By    Initials Name Provider Type    Lorraine Segura, LAMIN Physical Therapist        Physical Therapy Education                 Title: PT OT SLP Therapies (In Progress)     Topic: Physical Therapy (In Progress)     Point: Mobility training (Done)     Learning Progress Summary           Patient Acceptance, E,TB, VU by  at 11/10/2020 0939    Acceptance, E,TB, VU,NR by KALYN at 11/9/2020 0946    Acceptance, E,TB, NR by KALYN at 11/8/2020 1034                   Point: Home exercise program (Not Started)     Learner Progress:  Not documented in this visit.                      User Key     Initials Effective Dates Name Provider Type Discipline     04/03/18 -  Lorraine Abarca, LAMIN Physical Therapist PT              PT Recommendation and Plan  Anticipated Discharge Disposition (PT): (pt reports she is interested in alcohol rehab at d/c)  Planned Therapy Interventions (PT): balance training,  bed mobility training, gait training, home exercise program, patient/family education, strengthening, transfer training  Therapy Frequency (PT): daily  Progress Summary (PT)  Progress Toward Functional Goals (PT): progress toward functional goals is fair  Barriers to Overall Progress (PT): medical status  Plan of Care Reviewed With: patient  Outcome Summary: PT: Patient performs sit to stand with CGA and gait with rolling walker 2x15 feet with seated rest break.  patient requires CGA/min assist for gait with assistance for device management around obstacles and with direction changes.  Patient continues to require verbal cues for sequencing and safety throughout.    Outcome Measures     Row Name 11/10/20 0832 11/09/20 0844          How much help from another person do you currently need...    Turning from your back to your side while in flat bed without using bedrails?  3  -JW  3  -JW     Moving from lying on back to sitting on the side of a flat bed without bedrails?  3  -JW  3  -JW     Moving to and from a bed to a chair (including a wheelchair)?  3  -JW  3  -JW     Standing up from a chair using your arms (e.g., wheelchair, bedside chair)?  3  -JW  3  -JW     Climbing 3-5 steps with a railing?  2  -JW  2  -JW     To walk in hospital room?  3  -JW  3  -JW     AM-PAC 6 Clicks Score (PT)  17  -JW  17  -        Functional Assessment    Outcome Measure Options  AM-PAC 6 Clicks Basic Mobility (PT)  -  AM-PAC 6 Clicks Basic Mobility (PT)  -       User Key  (r) = Recorded By, (t) = Taken By, (c) = Cosigned By    Initials Name Provider Type    Lorraine Segura, PT Physical Therapist           Time Calculation:   PT Charges     Row Name 11/10/20 0940             Time Calculation    Start Time  0832  -      Stop Time  0852  -      Time Calculation (min)  20 min  -KALYN      PT Received On  11/10/20  -KALYN      PT - Next Appointment  11/11/20  -KALYN         Timed Charges    34963 - PT Therapeutic Exercise Minutes  20   -KALYN        User Key  (r) = Recorded By, (t) = Taken By, (c) = Cosigned By    Initials Name Provider Type    Lorraine Segura, PT Physical Therapist        Therapy Charges for Today     Code Description Service Date Service Provider Modifiers Qty    57547960557  GAIT TRAINING EA 15 MIN 11/9/2020 Lorraine Abarca, PT GP 1    [id number]  PT THER PROC EA 15 MIN 11/10/2020 Lorraine Abarca, PT GP 1    [id number]  PT THER SUPP EA 15 MIN 11/10/2020 Lorraine Abarca, PT GP 1          PT G-Codes  Outcome Measure Options: AM-PAC 6 Clicks Basic Mobility (PT)  AM-PAC 6 Clicks Score (PT): 17    Lorraine Abarca PT  11/10/2020

## 2020-11-10 NOTE — PLAN OF CARE
Problem: Adult Inpatient Plan of Care  Goal: Plan of Care Review  Recent Flowsheet Documentation  Taken 11/10/2020 0832 by Lorraine Abarca, PT  Outcome Summary: PT: Patient performs sit to stand with CGA and gait with rolling walker 2x15 feet with seated rest break, noted increased shaking at end of gait distance.  patient requires CGA/min assist for gait with assistance for device management around obstacles and with direction changes.  Patient continues to require verbal cues for sequencing and safety throughout.

## 2020-11-10 NOTE — PROGRESS NOTES
NEPHROLOGY PROGRESS NOTE    PATIENT IDENTIFICATION:   Name:  Tayler Lugo      MRN:  [id number]     70 y.o.  female             Reason for visit: MONSTER    SUBJECTIVE:   Feels stronger; getting ready to work with PT; appetite improving; frequent diarrhea due to lactulose    OBJECTIVE:  Vitals:    11/10/20 0429 11/10/20 0451 11/10/20 0754 11/10/20 0800   BP: 101/71  128/93 122/87   BP Location:   Left arm    Patient Position:   Sitting    Pulse:   110    Resp:   22    Temp: 97.5 °F (36.4 °C)  98.5 °F (36.9 °C)    TempSrc: Oral  Oral    SpO2: 96%  96% 97%   Weight:  69.2 kg (152 lb 9.6 oz)     Height:               Body mass index is 27.91 kg/m².    Intake/Output Summary (Last 24 hours) at 11/10/2020 0855  Last data filed at 11/10/2020 0800  Gross per 24 hour   Intake 1690 ml   Output 500 ml   Net 1190 ml     Wt Readings from Last 1 Encounters:   11/10/20 0451 69.2 kg (152 lb 9.6 oz)   11/09/20 0545 68.6 kg (151 lb 4.8 oz)   11/07/20 0615 70.1 kg (154 lb 8.7 oz)   11/06/20 0444 70.2 kg (154 lb 12.2 oz)   11/05/20 0600 62.7 kg (138 lb 3.7 oz)   11/04/20 1814 65.5 kg (144 lb 8 oz)   11/04/20 1107 59 kg (130 lb)     Wt Readings from Last 3 Encounters:   11/10/20 69.2 kg (152 lb 9.6 oz)   10/26/20 59 kg (130 lb)   10/08/20 59 kg (130 lb)         Exam:  General Appearance: knows mo/yr but not day of week; pleasant, NAD, chr ill  Skin: warm and dry, jaundiced  HEENT: pupils round and reactive to light, oral mucosa dry, +icteric sclerae  Neck: supple, no JVD, trachea midline  Lungs: A few crackles right base more than left; not labored  Heart: RR, tachycardic, normal S1 and S2, no S3, no rub  Abdomen: soft, nontender, distended, BS+  Extremities: Trace-+1 edema; no cyanosis or clubbing  Neuro: Moving all extremities; speech is fluent        Scheduled meds:    cefTRIAXone, 1 g, Intravenous, Q24H  lactulose, 20 g, Oral, TID  levothyroxine, 50 mcg, Oral, Q AM  midodrine, 2.5 mg, Oral, TID AC  sodium chloride, 10 mL,  Intravenous, Q12H  sodium chloride, 10 mL, Intravenous, Q12H  sodium chloride, 10 mL, Intravenous, Q12H      IV meds:                             Data Review:    Results from last 7 days   Lab Units 11/10/20  0505 11/09/20  0607 11/08/20  1841 11/08/20  0516   SODIUM mmol/L 136 137  --  138   POTASSIUM mmol/L 4.2 4.9 3.6 3.0*   CHLORIDE mmol/L 104 105  --  103   CO2 mmol/L 22.1 21.6*  --  21.8*   BUN mg/dL 19 19  --  20   CREATININE mg/dL 0.37* 0.40*  --  0.35*   CALCIUM mg/dL 8.7 8.3*  --  7.9*   BILIRUBIN mg/dL 7.2* 6.6*  --  7.2*   ALK PHOS U/L 179* 180*  --  178*   ALT (SGPT) U/L 76* 80*  --  81*   AST (SGOT) U/L 151* 158*  --  179*   GLUCOSE mg/dL 116* 118*  --  123*     Estimated Creatinine Clearance: 57.9 mL/min (A) (by C-G formula based on SCr of 0.37 mg/dL (L)).  Results from last 7 days   Lab Units 11/07/20 0446 11/05/20  0843   SODIUM UR mmol/L  --  30   CREATININE UR mg/dL  --  237.5   OSMOLALITY UR mOsm/kg  --  375   URIC ACID mg/dL 11.1*  --      Results from last 7 days   Lab Units 11/10/20  0505 11/09/20  0607 11/08/20  2313 11/08/20  0516   MAGNESIUM mg/dL 1.8 1.5*  --  1.1*   PHOSPHORUS mg/dL 2.8 2.1* 2.1* 1.5*       Results from last 7 days   Lab Units 11/10/20  0505 11/09/20  0607 11/08/20  0516 11/07/20  0446 11/06/20  0442   WBC 10*3/mm3 19.42* 19.20* 14.86* 12.26* 14.33*   HEMOGLOBIN g/dL 9.5* 9.3* 9.0* 8.4* 8.7*   PLATELETS 10*3/mm3 115* 130* 142 145 177       Results from last 7 days   Lab Units 11/10/20  0505 11/09/20  0607 11/08/20  0516 11/07/20  0446 11/06/20  0442   INR  1.51* 1.47* 1.46* 1.58* 1.55*             ASSESSMENT:     Acute liver failure    Frequent UTI    Hypertension    Acute respiratory failure with hypoxia (CMS/HCC)    Ascites due to alcoholic hepatitis    Bladder prolapse, female, acquired    Sepsis associated hypotension (CMS/HCC)    Altered mental state    Acute renal failure (CMS/HCC)    Alcohol abuse    1.  MONSTER, nonoliguric, resolved:  prerenal due to hypotension,  poor oral intake, and compromised renal autoregulation due to Vasotec.  Minimal peripheral edema; electrolytes are stable   2.  Alcoholic hepatitis with coagulopathy and jaundice  3.  Hypotension and tachycardia  4.  Coagulopathy  5.  Alcoholism  6.  Hypothyroidism with TSH 17          PLAN:  1.  Begin low-dose torsemide 20 mg daily  2.  Encouraged her to eat  3.  Will need regular monitoring of electrolytes, especially in the setting of diarrhea and lackluster nutrition  4.  D/w with daughter at bedside  5.  Will sign off, but please call if any questions      Dustin Odom MD  11/10/2020    08:55 EST

## 2020-11-11 PROBLEM — E43 SEVERE MALNUTRITION: Status: ACTIVE | Noted: 2020-11-11

## 2020-11-11 LAB
ALBUMIN SERPL-MCNC: 2.7 G/DL (ref 3.5–5.2)
ALBUMIN/GLOB SERPL: 1.1 G/DL
ALP SERPL-CCNC: 181 U/L (ref 39–117)
ALT SERPL W P-5'-P-CCNC: 72 U/L (ref 1–33)
AMMONIA BLD-SCNC: 38 UMOL/L (ref 11–51)
ANION GAP SERPL CALCULATED.3IONS-SCNC: 13 MMOL/L (ref 5–15)
AST SERPL-CCNC: 139 U/L (ref 1–32)
BASOPHILS # BLD AUTO: 0.12 10*3/MM3 (ref 0–0.2)
BASOPHILS NFR BLD AUTO: 0.6 % (ref 0–1.5)
BILIRUB SERPL-MCNC: 7.7 MG/DL (ref 0–1.2)
BUN SERPL-MCNC: 21 MG/DL (ref 8–23)
BUN/CREAT SERPL: 36.8 (ref 7–25)
CALCIUM SPEC-SCNC: 8.9 MG/DL (ref 8.6–10.5)
CHLORIDE SERPL-SCNC: 100 MMOL/L (ref 98–107)
CO2 SERPL-SCNC: 23 MMOL/L (ref 22–29)
CREAT SERPL-MCNC: 0.57 MG/DL (ref 0.57–1)
DEPRECATED RDW RBC AUTO: 71.8 FL (ref 37–54)
EOSINOPHIL # BLD AUTO: 0.12 10*3/MM3 (ref 0–0.4)
EOSINOPHIL NFR BLD AUTO: 0.6 % (ref 0.3–6.2)
ERYTHROCYTE [DISTWIDTH] IN BLOOD BY AUTOMATED COUNT: 17 % (ref 12.3–15.4)
GFR SERPL CREATININE-BSD FRML MDRD: 104 ML/MIN/1.73
GLOBULIN UR ELPH-MCNC: 2.4 GM/DL
GLUCOSE SERPL-MCNC: 107 MG/DL (ref 65–99)
HCT VFR BLD AUTO: 32.4 % (ref 34–46.6)
HGB BLD-MCNC: 10.1 G/DL (ref 12–15.9)
IMM GRANULOCYTES # BLD AUTO: 0.74 10*3/MM3 (ref 0–0.05)
IMM GRANULOCYTES NFR BLD AUTO: 3.5 % (ref 0–0.5)
INR PPP: 1.38 (ref 0.9–1.1)
LYMPHOCYTES # BLD AUTO: 2.47 10*3/MM3 (ref 0.7–3.1)
LYMPHOCYTES NFR BLD AUTO: 11.8 % (ref 19.6–45.3)
MAGNESIUM SERPL-MCNC: 1.3 MG/DL (ref 1.6–2.4)
MCH RBC QN AUTO: 36.2 PG (ref 26.6–33)
MCHC RBC AUTO-ENTMCNC: 31.2 G/DL (ref 31.5–35.7)
MCV RBC AUTO: 116.1 FL (ref 79–97)
MONOCYTES # BLD AUTO: 2.66 10*3/MM3 (ref 0.1–0.9)
MONOCYTES NFR BLD AUTO: 12.8 % (ref 5–12)
NEUTROPHILS NFR BLD AUTO: 14.75 10*3/MM3 (ref 1.7–7)
NEUTROPHILS NFR BLD AUTO: 70.7 % (ref 42.7–76)
NRBC BLD AUTO-RTO: 0.2 /100 WBC (ref 0–0.2)
PLATELET # BLD AUTO: 142 10*3/MM3 (ref 140–450)
PMV BLD AUTO: 11.7 FL (ref 6–12)
POTASSIUM SERPL-SCNC: 3.4 MMOL/L (ref 3.5–5.2)
PROCALCITONIN SERPL-MCNC: 0.45 NG/ML (ref 0–0.25)
PROT SERPL-MCNC: 5.1 G/DL (ref 6–8.5)
PROTHROMBIN TIME: 16.5 SECONDS (ref 12.1–15)
RBC # BLD AUTO: 2.79 10*6/MM3 (ref 3.77–5.28)
SODIUM SERPL-SCNC: 136 MMOL/L (ref 136–145)
WBC # BLD AUTO: 20.86 10*3/MM3 (ref 3.4–10.8)

## 2020-11-11 PROCEDURE — 99232 SBSQ HOSP IP/OBS MODERATE 35: CPT | Performed by: INTERNAL MEDICINE

## 2020-11-11 PROCEDURE — 85610 PROTHROMBIN TIME: CPT | Performed by: INTERNAL MEDICINE

## 2020-11-11 PROCEDURE — 83735 ASSAY OF MAGNESIUM: CPT | Performed by: HOSPITALIST

## 2020-11-11 PROCEDURE — 84145 PROCALCITONIN (PCT): CPT | Performed by: HOSPITALIST

## 2020-11-11 PROCEDURE — 82140 ASSAY OF AMMONIA: CPT | Performed by: NURSE PRACTITIONER

## 2020-11-11 PROCEDURE — 25010000002 CEFTRIAXONE SODIUM-DEXTROSE 1-3.74 GM-%(50ML) RECONSTITUTED SOLUTION: Performed by: NURSE PRACTITIONER

## 2020-11-11 PROCEDURE — 25010000002 MAGNESIUM SULFATE IN D5W 1G/100ML (PREMIX) 1-5 GM/100ML-% SOLUTION: Performed by: INTERNAL MEDICINE

## 2020-11-11 PROCEDURE — 94799 UNLISTED PULMONARY SVC/PX: CPT

## 2020-11-11 PROCEDURE — 97116 GAIT TRAINING THERAPY: CPT

## 2020-11-11 PROCEDURE — 85025 COMPLETE CBC W/AUTO DIFF WBC: CPT | Performed by: INTERNAL MEDICINE

## 2020-11-11 PROCEDURE — 99233 SBSQ HOSP IP/OBS HIGH 50: CPT | Performed by: HOSPITALIST

## 2020-11-11 PROCEDURE — 80053 COMPREHEN METABOLIC PANEL: CPT | Performed by: INTERNAL MEDICINE

## 2020-11-11 RX ORDER — LACTULOSE 10 G/15ML
20 SOLUTION ORAL 2 TIMES DAILY
Status: DISCONTINUED | OUTPATIENT
Start: 2020-11-12 | End: 2020-11-17 | Stop reason: HOSPADM

## 2020-11-11 RX ADMIN — SODIUM CHLORIDE, PRESERVATIVE FREE 10 ML: 5 INJECTION INTRAVENOUS at 08:35

## 2020-11-11 RX ADMIN — TORSEMIDE 20 MG: 20 TABLET ORAL at 08:33

## 2020-11-11 RX ADMIN — MAGNESIUM SULFATE HEPTAHYDRATE 1 G: 1 INJECTION, SOLUTION INTRAVENOUS at 10:49

## 2020-11-11 RX ADMIN — POTASSIUM CHLORIDE 40 MEQ: 1500 TABLET, EXTENDED RELEASE ORAL at 12:48

## 2020-11-11 RX ADMIN — POTASSIUM CHLORIDE 40 MEQ: 1500 TABLET, EXTENDED RELEASE ORAL at 08:38

## 2020-11-11 RX ADMIN — SODIUM CHLORIDE, PRESERVATIVE FREE 10 ML: 5 INJECTION INTRAVENOUS at 22:06

## 2020-11-11 RX ADMIN — MIDODRINE HYDROCHLORIDE 2.5 MG: 5 TABLET ORAL at 12:22

## 2020-11-11 RX ADMIN — METRONIDAZOLE 500 MG: 500 INJECTION, SOLUTION INTRAVENOUS at 18:27

## 2020-11-11 RX ADMIN — LEVOTHYROXINE SODIUM 50 MCG: 50 TABLET ORAL at 06:52

## 2020-11-11 RX ADMIN — CEFTRIAXONE 1 G: 1 INJECTION, SOLUTION INTRAVENOUS at 08:47

## 2020-11-11 RX ADMIN — RIFAXIMIN 600 MG: 200 TABLET ORAL at 20:25

## 2020-11-11 RX ADMIN — LACTULOSE 20 G: 20 SOLUTION ORAL at 08:33

## 2020-11-11 RX ADMIN — MAGNESIUM SULFATE HEPTAHYDRATE 1 G: 1 INJECTION, SOLUTION INTRAVENOUS at 12:24

## 2020-11-11 RX ADMIN — METRONIDAZOLE 500 MG: 500 INJECTION, SOLUTION INTRAVENOUS at 10:49

## 2020-11-11 RX ADMIN — SODIUM CHLORIDE, PRESERVATIVE FREE 10 ML: 5 INJECTION INTRAVENOUS at 22:05

## 2020-11-11 RX ADMIN — MIDODRINE HYDROCHLORIDE 2.5 MG: 5 TABLET ORAL at 16:43

## 2020-11-11 RX ADMIN — MAGNESIUM SULFATE HEPTAHYDRATE 1 G: 1 INJECTION, SOLUTION INTRAVENOUS at 13:26

## 2020-11-11 RX ADMIN — LACTULOSE 20 G: 20 SOLUTION ORAL at 16:43

## 2020-11-11 RX ADMIN — MIDODRINE HYDROCHLORIDE 2.5 MG: 5 TABLET ORAL at 06:52

## 2020-11-11 RX ADMIN — FOLIC ACID 1 MG: 1 TABLET ORAL at 08:33

## 2020-11-11 NOTE — THERAPY TREATMENT NOTE
Acute Care - Physical Therapy Treatment Note  DORIS Cobos     Patient Name: Tayler Lugo  : 1950  MRN: [id number]  Today's Date: 2020           PT Assessment (last 12 hours)      PT Evaluation and Treatment     Row Name 20 1140          Physical Therapy Time and Intention    Subjective Information  no complaints  -BP     Document Type  therapy note (daily note)  -BP     Mode of Treatment  physical therapy  -BP     Patient Effort  good  -BP     Symptoms Noted During/After Treatment  none  -BP     Row Name 20 1140          General Information    Patient/Family/Caregiver Comments/Observations  Patient supine in bed with HOB elevated. RN presented to disconnect drain and IV line prior to session. Patient agreeable   -BP     Existing Precautions/Restrictions  fall  -BP     Risks Reviewed  patient:;LOB;increased discomfort  -BP     Benefits Reviewed  patient:;increase independence;improve function;increase strength  -BP     Barriers to Rehab  medically complex  -BP     Row Name 20 1140          Pain    Additional Documentation  Pain Scale: Numbers Pre/Post-Treatment (Group)  -BP     Row Name 20 1140          Pain Scale: Numbers Pre/Post-Treatment    Pre/Posttreatment Pain Comment  Patient does not complain of pain   -BP     Row Name 20 1140          Bed Mobility    Bed Mobility  supine-sit  -BP     Supine-Sit [place] (Bed Mobility)  minimum assist (75% patient effort);verbal cues  -BP     Assistive Device (Bed Mobility)  bed rails;head of bed elevated  -BP     Comment (Bed Mobility)  Patient requires cues for sequencing   -BP     Row Name 20 1140          Transfers    Transfers  sit-stand transfer;stand-sit transfer  -BP     Comment (Transfers)  Verbal cues for hand placement   -BP     Sit-Stand [place] (Transfers)  contact guard;verbal cues  -BP     Stand-Sit [place] (Transfers)  contact guard;verbal cues  -BP     Row Name 20 1140           Sit-Stand Transfer    Assistive Device (Sit-Stand Transfers)  walker, front-wheeled  -BP     Row Name 11/11/20 1140          Stand-Sit Transfer    Assistive Device (Stand-Sit Transfers)  walker, front-wheeled  -BP     Row Name 11/11/20 1140          Gait/Stairs (Locomotion)    [place] Level (Gait)  contact guard;verbal cues  -BP     Assistive Device (Gait)  walker, front-wheeled  -BP     Distance in Feet (Gait)  45  -BP     Pattern (Gait)  swing-through  -BP     Deviations/Abnormal Patterns (Gait)  arcenio decreased;stride length decreased  -BP     Bilateral Gait Deviations  forward flexed posture  -BP     Comment (Gait/Stairs)  Patient requires cues for upright posture and improved distance to AD. No loss of balance noted.   -BP     Row Name 11/11/20 1140          Safety Issues, Functional Mobility    Safety Issues Affecting Function (Mobility)  positioning of assistive device;insight into deficits/self-awareness;awareness of need for assistance  -BP     Row Name 11/11/20 1140          Plan of Care Review    Plan of Care Reviewed With  patient  -BP     Outcome Summary  PT: patient performs supine to sit transfer with min A, sit to/from stand transfers with CGA and gait x 45 feet with CGA and use of FWW. Patient requires cues for improved gait mechanics and safety. Plan to continue therapy to advance mobility and maximize safety with use of device.   -BP     Row Name 11/11/20 1140          Positioning and Restraints    Pre-Treatment Position  in bed  -BP     Post Treatment Position  chair  -BP     In Chair  notified nsg;reclined;call light within reach;encouraged to call for assist  -BP     Row Name 11/11/20 1140          Progress Summary (PT)    Progress Toward Functional Goals (PT)  progress toward functional goals is gradual  -BP     Row Name 11/11/20 1140          Therapy Plan Review/Discharge Plan (PT)    Therapy Plan Review (PT)  care plan/treatment goals reviewed;risks/benefits reviewed;participants  included;patient  -BP     Patient/Family Concerns, Anticipated Discharge Disposition (PT)  Anticipate discharge to inpatient substance abuse rehab   -       User Key  (r) = Recorded By, (t) = Taken By, (c) = Cosigned By    Initials Name Provider Type    BP Blair Ricks, PT Physical Therapist        Physical Therapy Education                 Title: PT OT SLP Therapies (In Progress)     Topic: Physical Therapy (In Progress)     Point: Mobility training (Done)     Learning Progress Summary           Patient Acceptance, E,TB, VU by  at 11/11/2020 1318    Acceptance, E,TB, VU by  at 11/10/2020 0939    Acceptance, E,TB, VU,NR by  at 11/9/2020 0946    Acceptance, E,TB, NR by  at 11/8/2020 1034                   Point: Home exercise program (Not Started)     Learner Progress:  Not documented in this visit.                      User Key     Initials Effective Dates Name Provider Type Discipline     04/03/18 -  Blair Ricks, PT Physical Therapist PT    KALYN 04/03/18 -  Lorraine Abarca PT Physical Therapist PT              PT Recommendation and Plan     Progress Summary (PT)  Progress Toward Functional Goals (PT): progress toward functional goals is gradual  Plan of Care Reviewed With: patient  Outcome Summary: PT: patient performs supine to sit transfer with min A, sit to/from stand transfers with CGA and gait x 45 feet with CGA and use of FWW. Patient requires cues for improved gait mechanics and safety. Plan to continue therapy to advance mobility and maximize safety with use of device.   Outcome Measures     Row Name 11/11/20 1140 11/10/20 0832 11/09/20 0844       How much help from another person do you currently need...    Turning from your back to your side while in flat bed without using bedrails?  3  -BP  3  -JW  3  -JW    Moving from lying on back to sitting on the side of a flat bed without bedrails?  3  -BP  3  -JW  3  -JW    Moving to and from a bed to a chair (including a wheelchair)?  3  -BP   3  -JW  3  -JW    Standing up from a chair using your arms (e.g., wheelchair, bedside chair)?  3  -BP  3  -JW  3  -JW    Climbing 3-5 steps with a railing?  2  -BP  2  -JW  2  -JW    To walk in hospital room?  3  -BP  3  -JW  3  -JW    AM-PAC 6 Clicks Score (PT)  17  -BP  17  -JW  17  -JW       Functional Assessment    Outcome Measure Options  AM-PAC 6 Clicks Basic Mobility (PT)  -BP  AM-PAC 6 Clicks Basic Mobility (PT)  -JW  AM-PAC 6 Clicks Basic Mobility (PT)  -JW      User Key  (r) = Recorded By, (t) = Taken By, (c) = Cosigned By    Initials Name Provider Type    Blair Mcghee, PT Physical Therapist    JW Lorraine Abarca, PT Physical Therapist           Time Calculation:   PT Charges     Row Name 11/11/20 1318             Time Calculation    Start Time  1140  -BP      Stop Time  1156  -BP      Time Calculation (min)  16 min  -BP      PT Received On  11/11/20  -BP      PT - Next Appointment  11/12/20  -BP         Timed Charges    39519 - Gait Training Minutes   16  -BP        User Key  (r) = Recorded By, (t) = Taken By, (c) = Cosigned By    Initials Name Provider Type    Blair Mcghee, PT Physical Therapist        Therapy Charges for Today     Code Description Service Date Service Provider Modifiers Qty    [id number] HC GAIT TRAINING EA 15 MIN 11/11/2020 Blair Ricks, PT GP 1          PT G-Codes  Outcome Measure Options: AM-PAC 6 Clicks Basic Mobility (PT)  AM-PAC 6 Clicks Score (PT): 17    Blair Ricks PT  11/11/2020

## 2020-11-11 NOTE — PLAN OF CARE
Goal Outcome Evaluation:  Plan of Care Reviewed With: patient, daughter  Progress: improving  Outcome Summary: Patient feeling better today but still resting a lot, sat in chair for a few hours this am. Site from paracentesis from 11/4 still oozing & hooked up to CLWS. Potassium & magnesium replaced per protocol today. Started on IV flagyl. Continoues with rocephinee, torsemide & lactulose. Abdoment distended.

## 2020-11-11 NOTE — PROGRESS NOTES
GI Daily Progress Note    Assessment/Plan:      Acute liver failure    Frequent UTI    Hypertension    Acute respiratory failure with hypoxia (CMS/HCC)    Ascites due to alcoholic hepatitis    Bladder prolapse, female, acquired    Sepsis associated hypotension (CMS/HCC)    Altered mental state    Acute renal failure (CMS/HCC)    Alcohol abuse       LOS: 7 days     Tayler Lugo is a 70 y.o. female who was admitted with Acute liver failure. She reports the symptoms are unchanged. Awake and oriented in bed and fatigued but stillnot eating much butis getting up with PT.    Subjective:    Patient expresses diarrhea, loss of appetite and Weak  Patient denies abdominal pain and bloody stools    Objective:    Vital signs in last 24 hours:  Temp:  [96.8 °F (36 °C)-97.5 °F (36.4 °C)] 97.5 °F (36.4 °C)  Heart Rate:  [87-99] 91  Resp:  [16-20] 16  BP: ()/(57-67) 99/67    Intake/Output last 3 shifts:  I/O last 3 completed shifts:  In: 490 [P.O.:490]  Out: 600 [Other:600]  Intake/Output this shift:  I/O this shift:  In: 360 [P.O.:360]  Out: -     Physical Exam:Abdomen  Sounds Normal Active Bowel Sounds   Distension Soft and Distended   Tenderness Mildly Tender     Imaging Results (Last 72 Hours)     Procedure Component Value Units Date/Time    XR Chest PA & Lateral [[id number]] Collected: 11/10/20 1107     Updated: 11/10/20 1110    Narrative:      PA AND LATERAL CHEST, 11/10/2020 10:57 AM     HISTORY:  Tachypnea, crackles, persistent leukocytosis; K76.7-Hepatorenal  syndrome; A41.9-Sepsis, unspecified organism; R65.20-Severe sepsis  without septic shock; N17.9-Acute kidney failure, unspecified    increasing shortness of air for one day     COMPARISON:  11/05/2020     TECHNIQUE:  PA and lateral upright chest series.     FINDINGS:  There are low lung volumes in the right hemidiaphragm is elevated. There  is minimal basilar atelectasis. Rest the lungs are clear  . Right shoulder prosthesis is present. The PICC catheter tip  in the  right atrium. There    Impression:      Low lung volumes with minimal bibasilar atelectasis.     This report was finalized on 11/10/2020 11:08 AM by Dr. Solitario Jain MD.       US Abdomen Limited [[id number]] Collected: 11/09/20 0758     Updated: 11/09/20 0801    Narrative:      Exam: Limited abdomen ultrasound 11/09/2020     INDICATION: Abdominal distention evaluate for ascites     FINDINGS: Ultrasound of the 4 quadrants of the abdomen was performed. No  ascites is identified.       Impression:      No ascites is identified     This report was finalized on 11/9/2020 7:59 AM by Dr. Solitario Jain MD.             WBC   Date Value Ref Range Status   11/11/2020 20.86 (H) 3.40 - 10.80 10*3/mm3 Final     RBC   Date Value Ref Range Status   11/11/2020 2.79 (L) 3.77 - 5.28 10*6/mm3 Final     Hemoglobin   Date Value Ref Range Status   11/11/2020 10.1 (L) 12.0 - 15.9 g/dL Final     Hematocrit   Date Value Ref Range Status   11/11/2020 32.4 (L) 34.0 - 46.6 % Final     MCV   Date Value Ref Range Status   11/11/2020 116.1 (H) 79.0 - 97.0 fL Final     MCH   Date Value Ref Range Status   11/11/2020 36.2 (H) 26.6 - 33.0 pg Final     MCHC   Date Value Ref Range Status   11/11/2020 31.2 (L) 31.5 - 35.7 g/dL Final     RDW   Date Value Ref Range Status   11/11/2020 17.0 (H) 12.3 - 15.4 % Final     RDW-SD   Date Value Ref Range Status   11/11/2020 71.8 (H) 37.0 - 54.0 fl Final     MPV   Date Value Ref Range Status   11/11/2020 11.7 6.0 - 12.0 fL Final     Platelets   Date Value Ref Range Status   11/11/2020 142 140 - 450 10*3/mm3 Final     Neutrophil %   Date Value Ref Range Status   11/11/2020 70.7 42.7 - 76.0 % Final     Lymphocyte %   Date Value Ref Range Status   11/11/2020 11.8 (L) 19.6 - 45.3 % Final     Monocyte %   Date Value Ref Range Status   11/11/2020 12.8 (H) 5.0 - 12.0 % Final     Eosinophil %   Date Value Ref Range Status   11/11/2020 0.6 0.3 - 6.2 % Final     Basophil %   Date Value Ref Range Status   11/11/2020 0.6 0.0  - 1.5 % Final     Immature Grans %   Date Value Ref Range Status   11/11/2020 3.5 (H) 0.0 - 0.5 % Final     Neutrophils, Absolute   Date Value Ref Range Status   11/11/2020 14.75 (H) 1.70 - 7.00 10*3/mm3 Final     Lymphocytes, Absolute   Date Value Ref Range Status   11/11/2020 2.47 0.70 - 3.10 10*3/mm3 Final     Monocytes, Absolute   Date Value Ref Range Status   11/11/2020 2.66 (H) 0.10 - 0.90 10*3/mm3 Final     Eosinophils, Absolute   Date Value Ref Range Status   11/11/2020 0.12 0.00 - 0.40 10*3/mm3 Final     Basophils, Absolute   Date Value Ref Range Status   11/11/2020 0.12 0.00 - 0.20 10*3/mm3 Final     Immature Grans, Absolute   Date Value Ref Range Status   11/11/2020 0.74 (H) 0.00 - 0.05 10*3/mm3 Final     nRBC   Date Value Ref Range Status   11/11/2020 0.2 0.0 - 0.2 /100 WBC Final       Glucose   Date Value Ref Range Status   11/11/2020 107 (H) 65 - 99 mg/dL Final     Sodium   Date Value Ref Range Status   11/11/2020 136 136 - 145 mmol/L Final     Potassium   Date Value Ref Range Status   11/11/2020 3.4 (L) 3.5 - 5.2 mmol/L Final     CO2   Date Value Ref Range Status   11/11/2020 23.0 22.0 - 29.0 mmol/L Final     Chloride   Date Value Ref Range Status   11/11/2020 100 98 - 107 mmol/L Final     Anion Gap   Date Value Ref Range Status   11/11/2020 13.0 5.0 - 15.0 mmol/L Final     Creatinine   Date Value Ref Range Status   11/11/2020 0.57 0.57 - 1.00 mg/dL Final     BUN   Date Value Ref Range Status   11/11/2020 21 8 - 23 mg/dL Final     BUN/Creatinine Ratio   Date Value Ref Range Status   11/11/2020 36.8 (H) 7.0 - 25.0 Final     Calcium   Date Value Ref Range Status   11/11/2020 8.9 8.6 - 10.5 mg/dL Final     eGFR Non  Amer   Date Value Ref Range Status   11/11/2020 104 >60 mL/min/1.73 Final     Alkaline Phosphatase   Date Value Ref Range Status   11/11/2020 181 (H) 39 - 117 U/L Final     Total Protein   Date Value Ref Range Status   11/11/2020 5.1 (L) 6.0 - 8.5 g/dL Final     ALT (SGPT)   Date Value  Ref Range Status   11/11/2020 72 (H) 1 - 33 U/L Final     AST (SGOT)   Date Value Ref Range Status   11/11/2020 139 (H) 1 - 32 U/L Final     Total Bilirubin   Date Value Ref Range Status   11/11/2020 7.7 (H) 0.0 - 1.2 mg/dL Final     Albumin   Date Value Ref Range Status   11/11/2020 2.70 (L) 3.50 - 5.20 g/dL Final     Globulin   Date Value Ref Range Status   11/11/2020 2.4 gm/dL Final     Alcoholic Hepatitis   MONSTER  DTs- mostly resolved  Hypotension-improved    Will add Xifaxan so we can decrease Lactulose to BID and help with her incontinent diarrhea

## 2020-11-11 NOTE — PROGRESS NOTES
Adult Nutrition  Assessment/PES    Patient Name:  Tayler Lugo  YOB: 1950  MRN: [id number]  Admit Date:  11/4/2020    Assessment Date:  11/11/2020    Comments:  Agree with diet and supplement.  Pt appears to meet criteria for severe malnutrition with reduced po 2 months, reported wt loss, & muscle wasting and fat loss.   Encourage po and oral supplement.     Reason for Assessment     Row Name 11/11/20 1142          Reason for Assessment    Reason For Assessment  follow-up protocol     Diagnosis  substance use/abuse;liver disease liver faliure, ETOH, MONSTER, met enceph         Nutrition/Diet History     Row Name 11/11/20 1143          Nutrition/Diet History    Typical Food/Fluid Intake  Spoke w pt & female at bedside.( ? daughter). Pt agreeable to physical exam, limited by position & bruising on arm. Reports no appetite for 2 months, wt loss recently at MD appt 6# at least. Reports loves to cook but no desire lately. Drinking Boost Plus & obtained diff lunch prefs.         Anthropometrics     Row Name 11/11/20 1145          Anthropometrics    Weight  -- 152.6#        Body Mass Index (BMI)    BMI Assessment  BMI 25-29.9: overweight         Labs/Tests/Procedures/Meds     Row Name 11/11/20 1145          Labs/Procedures/Meds    Lab Results Reviewed  reviewed     Lab Results Comments  K 3.4 L, VIPUL 72 H, tbili 7.7 H        Diagnostic Tests/Procedures    Diagnostic Test/Procedure Reviewed  reviewed        Medications    Pertinent Medications Reviewed  reviewed     Pertinent Medications Comments  toresmide, folic acid, lactulose, synthroid         Physical Findings     Row Name 11/11/20 1142          Physical Findings    Overall Physical Appearance  loss of muscle mass;loss of subcutaneous fat     Skin  other (see comments) upper arm complete bruising ( reports from BP cuff)           Nutrition Prescription Ordered     Row Name 11/11/20 1146          Nutrition Prescription PO    Current PO Diet  Regular      Supplement  Boost Plus (Ensure Enlive, Ensure Plus)     Supplement Frequency  3 times a day     Common Modifiers  GI Soft/[place]         Evaluation of Received Nutrient/Fluid Intake     Row Name 11/11/20 1147          Fluid Intake Evaluation    Oral Fluid (mL)  [address] x 3, 56%        PO Evaluation    Number of Meals  6     % PO Intake  33           Malnutrition Severity Assessment     Row Name 11/11/20 1148          Malnutrition Severity Assessment    Malnutrition Type  Acute Disease or Injury - Related Malnutrition        Insufficient Energy Intake     Insufficient Energy Intake   <75% of est. energy requirement for > or equal to 1 month        Muscle Loss    [place]  Moderate - slight depression     Clavicle Bone Region  Moderate - some protrusion in females, visible in males     Acromion Bone Region  Severe - squared shoulders, bones, and acromion process protrusion prominent     Dorsal Hand Region  Moderate - slight depression     Posterior Calf Region  Moderate - some roundness, slight firmness        Fat Loss    Orbital Region   Moderate -  somewhat hollowness, slightly dark circles     Upper Arm Region  Severe - mostly skin, very little space between folds, fingers touch        Criteria Met (Must meet criteria for severity in at least 2 of these categories: M Wasting, Fat Loss, Fluid, Secondary Signs, Wt. Status, Intake)    Patient meets criteria for   Severe Malnutrition           Problem/Interventions:    Problem 2     Row Name 11/11/20 1149          Nutrition Diagnoses Problem 2    Problem 2  Malnutrition     Etiology (related to)  Medical Diagnosis;Factors Affecting Nutrition     Signs/Symptoms (evidenced by)  Report/Observation malnutrition severity assessment             Intervention Goal     Row Name 11/11/20 1149          Intervention Goal    General  Meet nutritional needs for age/condition     PO  PO intake (%)     PO Intake %  50 % or greater     Weight  No significant weight loss          Nutrition Intervention     Row Name 11/11/20 1150          Nutrition Intervention    RD/Tech Action  Interview for preference;Encourage intake;Menu adjusted;Advise alternate selection;Follow Tx progress           Education/Evaluation     Row Name 11/11/20 1150          Education    Education  Other (comment) Encourage adequate intake and oral supplement        Monitor/Evaluation    Monitor  Per protocol;I&O;PO intake;Supplement intake;Pertinent labs;Weight;Symptoms           Electronically signed by:  Maricruz Martinez RD  11/11/20 11:51 EST

## 2020-11-11 NOTE — PLAN OF CARE
Goal Outcome Evaluation:  Plan of Care Reviewed With: patient  Progress: improving  Outcome Summary: paracentesis site continues to drain. multiple large loose stools

## 2020-11-11 NOTE — PLAN OF CARE
Problem: Adult Inpatient Plan of Care  Goal: Plan of Care Review  Recent Flowsheet Documentation  Taken 11/11/2020 1140 by Blair Ricks, PT  Plan of Care Reviewed With: patient  Outcome Summary: PT: patient performs supine to sit transfer with min A, sit to/from stand transfers with CGA and gait x 45 feet with CGA and use of FWW. Patient requires cues for improved gait mechanics and safety. Plan to continue therapy to advance mobility and maximize safety with use of device.

## 2020-11-12 LAB
ALBUMIN SERPL-MCNC: 2.6 G/DL (ref 3.5–5.2)
ALBUMIN/GLOB SERPL: 1.1 G/DL
ALP SERPL-CCNC: 178 U/L (ref 39–117)
ALT SERPL W P-5'-P-CCNC: 64 U/L (ref 1–33)
ANION GAP SERPL CALCULATED.3IONS-SCNC: 13.1 MMOL/L (ref 5–15)
AST SERPL-CCNC: 135 U/L (ref 1–32)
BASOPHILS # BLD AUTO: 0.13 10*3/MM3 (ref 0–0.2)
BASOPHILS NFR BLD AUTO: 0.6 % (ref 0–1.5)
BILIRUB SERPL-MCNC: 7.5 MG/DL (ref 0–1.2)
BUN SERPL-MCNC: 21 MG/DL (ref 8–23)
BUN/CREAT SERPL: 48.8 (ref 7–25)
CALCIUM SPEC-SCNC: 9.3 MG/DL (ref 8.6–10.5)
CHLORIDE SERPL-SCNC: 101 MMOL/L (ref 98–107)
CO2 SERPL-SCNC: 21.9 MMOL/L (ref 22–29)
CREAT SERPL-MCNC: 0.43 MG/DL (ref 0.57–1)
DEPRECATED RDW RBC AUTO: 69.9 FL (ref 37–54)
EOSINOPHIL # BLD AUTO: 0.12 10*3/MM3 (ref 0–0.4)
EOSINOPHIL NFR BLD AUTO: 0.5 % (ref 0.3–6.2)
ERYTHROCYTE [DISTWIDTH] IN BLOOD BY AUTOMATED COUNT: 16.7 % (ref 12.3–15.4)
GFR SERPL CREATININE-BSD FRML MDRD: 144 ML/MIN/1.73
GLOBULIN UR ELPH-MCNC: 2.4 GM/DL
GLUCOSE SERPL-MCNC: 116 MG/DL (ref 65–99)
HCT VFR BLD AUTO: 32.7 % (ref 34–46.6)
HGB BLD-MCNC: 10.5 G/DL (ref 12–15.9)
IMM GRANULOCYTES # BLD AUTO: 0.41 10*3/MM3 (ref 0–0.05)
IMM GRANULOCYTES NFR BLD AUTO: 1.8 % (ref 0–0.5)
LYMPHOCYTES # BLD AUTO: 2 10*3/MM3 (ref 0.7–3.1)
LYMPHOCYTES NFR BLD AUTO: 8.9 % (ref 19.6–45.3)
MAGNESIUM SERPL-MCNC: 1.5 MG/DL (ref 1.6–2.4)
MCH RBC QN AUTO: 36.6 PG (ref 26.6–33)
MCHC RBC AUTO-ENTMCNC: 32.1 G/DL (ref 31.5–35.7)
MCV RBC AUTO: 113.9 FL (ref 79–97)
MONOCYTES # BLD AUTO: 3.06 10*3/MM3 (ref 0.1–0.9)
MONOCYTES NFR BLD AUTO: 13.6 % (ref 5–12)
NEUTROPHILS NFR BLD AUTO: 16.71 10*3/MM3 (ref 1.7–7)
NEUTROPHILS NFR BLD AUTO: 74.6 % (ref 42.7–76)
NRBC BLD AUTO-RTO: 0.2 /100 WBC (ref 0–0.2)
PLATELET # BLD AUTO: 131 10*3/MM3 (ref 140–450)
PMV BLD AUTO: 11.9 FL (ref 6–12)
POTASSIUM SERPL-SCNC: 4.1 MMOL/L (ref 3.5–5.2)
PROCALCITONIN SERPL-MCNC: 0.45 NG/ML (ref 0–0.25)
PROT SERPL-MCNC: 5 G/DL (ref 6–8.5)
RBC # BLD AUTO: 2.87 10*6/MM3 (ref 3.77–5.28)
SODIUM SERPL-SCNC: 136 MMOL/L (ref 136–145)
WBC # BLD AUTO: 22.43 10*3/MM3 (ref 3.4–10.8)

## 2020-11-12 PROCEDURE — 97116 GAIT TRAINING THERAPY: CPT

## 2020-11-12 PROCEDURE — 85025 COMPLETE CBC W/AUTO DIFF WBC: CPT | Performed by: INTERNAL MEDICINE

## 2020-11-12 PROCEDURE — 83735 ASSAY OF MAGNESIUM: CPT | Performed by: HOSPITALIST

## 2020-11-12 PROCEDURE — 87040 BLOOD CULTURE FOR BACTERIA: CPT | Performed by: HOSPITALIST

## 2020-11-12 PROCEDURE — 80053 COMPREHEN METABOLIC PANEL: CPT | Performed by: INTERNAL MEDICINE

## 2020-11-12 PROCEDURE — 99232 SBSQ HOSP IP/OBS MODERATE 35: CPT | Performed by: INTERNAL MEDICINE

## 2020-11-12 PROCEDURE — 25010000002 MAGNESIUM SULFATE IN D5W 1G/100ML (PREMIX) 1-5 GM/100ML-% SOLUTION: Performed by: INTERNAL MEDICINE

## 2020-11-12 PROCEDURE — 84145 PROCALCITONIN (PCT): CPT | Performed by: HOSPITALIST

## 2020-11-12 PROCEDURE — 25010000002 CEFTRIAXONE SODIUM-DEXTROSE 1-3.74 GM-%(50ML) RECONSTITUTED SOLUTION: Performed by: NURSE PRACTITIONER

## 2020-11-12 PROCEDURE — 99232 SBSQ HOSP IP/OBS MODERATE 35: CPT | Performed by: HOSPITALIST

## 2020-11-12 RX ORDER — L.ACID,PARA/B.BIFIDUM/S.THERM 8B CELL
1 CAPSULE ORAL DAILY
Status: DISCONTINUED | OUTPATIENT
Start: 2020-11-12 | End: 2020-11-17 | Stop reason: HOSPADM

## 2020-11-12 RX ADMIN — LACTULOSE 20 G: 20 SOLUTION ORAL at 07:52

## 2020-11-12 RX ADMIN — MAGNESIUM SULFATE HEPTAHYDRATE 1 G: 1 INJECTION, SOLUTION INTRAVENOUS at 08:54

## 2020-11-12 RX ADMIN — LACTULOSE 20 G: 20 SOLUTION ORAL at 20:27

## 2020-11-12 RX ADMIN — LEVOTHYROXINE SODIUM 50 MCG: 50 TABLET ORAL at 06:06

## 2020-11-12 RX ADMIN — CEFTRIAXONE 1 G: 1 INJECTION, SOLUTION INTRAVENOUS at 08:50

## 2020-11-12 RX ADMIN — METRONIDAZOLE 500 MG: 500 INJECTION, SOLUTION INTRAVENOUS at 20:15

## 2020-11-12 RX ADMIN — MAGNESIUM SULFATE HEPTAHYDRATE 1 G: 1 INJECTION, SOLUTION INTRAVENOUS at 07:43

## 2020-11-12 RX ADMIN — SODIUM CHLORIDE, PRESERVATIVE FREE 10 ML: 5 INJECTION INTRAVENOUS at 20:28

## 2020-11-12 RX ADMIN — FOLIC ACID 1 MG: 1 TABLET ORAL at 08:05

## 2020-11-12 RX ADMIN — MIDODRINE HYDROCHLORIDE 2.5 MG: 5 TABLET ORAL at 07:50

## 2020-11-12 RX ADMIN — MAGNESIUM SULFATE HEPTAHYDRATE 1 G: 1 INJECTION, SOLUTION INTRAVENOUS at 10:50

## 2020-11-12 RX ADMIN — SODIUM CHLORIDE, PRESERVATIVE FREE 10 ML: 5 INJECTION INTRAVENOUS at 20:27

## 2020-11-12 RX ADMIN — METRONIDAZOLE 500 MG: 500 INJECTION, SOLUTION INTRAVENOUS at 10:46

## 2020-11-12 RX ADMIN — SODIUM CHLORIDE, PRESERVATIVE FREE 10 ML: 5 INJECTION INTRAVENOUS at 08:06

## 2020-11-12 RX ADMIN — DOXYCYCLINE 100 MG: 100 INJECTION, POWDER, LYOPHILIZED, FOR SOLUTION INTRAVENOUS at 11:52

## 2020-11-12 RX ADMIN — MIDODRINE HYDROCHLORIDE 2.5 MG: 5 TABLET ORAL at 16:52

## 2020-11-12 RX ADMIN — RIFAXIMIN 600 MG: 200 TABLET ORAL at 20:27

## 2020-11-12 RX ADMIN — Medication 1 CAPSULE: at 16:51

## 2020-11-12 RX ADMIN — RIFAXIMIN 600 MG: 200 TABLET ORAL at 07:49

## 2020-11-12 RX ADMIN — TORSEMIDE 20 MG: 20 TABLET ORAL at 07:51

## 2020-11-12 RX ADMIN — METRONIDAZOLE 500 MG: 500 INJECTION, SOLUTION INTRAVENOUS at 03:24

## 2020-11-12 NOTE — PLAN OF CARE
"Goal Outcome Evaluation:  Plan of Care Reviewed With: patient  Progress: improving  Outcome Summary: Pt continued IV abx, magnesium replacement. Continued loose stools w/some incontinence of same. PICC dressing changed. Paracentesis site - 300 ml out, bright yellow. Dressing site is intact. Does not voice pain or nausea. Appetite is poor/fair; however, pt feels it is due to \"hospital food\". Will continue to monitor and assess.  "

## 2020-11-12 NOTE — THERAPY DISCHARGE NOTE
Acute Care - Physical Therapy Treatment Note/Discharge  [facility] [place]     Patient Name: Tayler Lugo  : 1950  MRN: [id number]  Today's Date: 2020                Admit Date: 2020    Visit Dx:    ICD-10-CM ICD-9-CM   1. Hepatorenal syndrome (CMS/HCC)  K76.7 572.4   2. Sepsis with acute renal failure, due to unspecified organism, unspecified acute renal failure type, unspecified whether septic shock present (CMS/HCC)  A41.9 038.9    R65.20 995.92    N17.9 584.9     Patient Active Problem List   Diagnosis   • Spinal stenosis of lumbar region with neurogenic claudication   • Lumbar spinal stenosis   • Frequent UTI   • Hypertension   • Stress incontinence   • Itching due to drug   • Acute respiratory failure with hypoxia (CMS/HCC)   • Tear of right rotator cuff   • Acute pain of right shoulder   • Hepatorenal syndrome (CMS/HCC)   • Ascites due to alcoholic hepatitis   • Bladder prolapse, female, acquired   • Sepsis associated hypotension (CMS/HCC)   • Acute liver failure   • Altered mental state   • Acute renal failure (CMS/HCC)   • Alcohol abuse   • Severe malnutrition (CMS/HCC)     Past Medical History:   Diagnosis Date   • Elevated liver enzymes    • Fatty liver    • Frequent UTI    • History of depression    • Hypertension    • Lumbar stenosis    • OA (osteoarthritis)    • Post-menopausal    • Spondylarthritis     LOW BACK   • Stress incontinence    • Urinary retention      Past Surgical History:   Procedure Laterality Date   • ABDOMINOPLASTY     • BREAST SURGERY     • CATARACT EXTRACTION Bilateral    •  SECTION      X3   • COLONOSCOPY      2006   • COLONOSCOPY N/A 2016    Procedure: COLONOSCOPY;  Surgeon: Ho Arenas MD;  Location: [facility] ENDOSCOPY;  Service:    • LASIK     • LUMBAR DISCECTOMY FUSION INSTRUMENTATION N/A 10/1/2018    Procedure: L4-5, L5-S1  laminectomy and fusion with instrumentation;  Surgeon: Juno Kim MD;  Location: [facility] OR;  Service:  Orthopedic Spine   • REDUCTION MAMMAPLASTY     • TONSILLECTOMY     • TOTAL SHOULDER ARTHROPLASTY W/ DISTAL CLAVICLE EXCISION Right 6/13/2019    Procedure: TOTAL SHOULDER REVERSE ARTHROPLASTY;  Surgeon: David Marion MD;  Location: [facility] OR;  Service: Orthopedics          PT Assessment (last 12 hours)      PT Evaluation and Treatment     Row Name 11/12/20 0827          Physical Therapy Time and Intention    Subjective Information  no complaints  -     Document Type  discharge treatment  -     Mode of Treatment  physical therapy  -     Patient Effort  good  -     Symptoms Noted During/After Treatment  none  -     Row Name 11/12/20 0827          General Information    Patient Observations  alert;cooperative;agree to therapy  -     Patient/Family/Caregiver Comments/Observations  pt supine, agreeable to therapy  -     Existing Precautions/Restrictions  fall  -     Row Name 11/12/20 0827          Cognition    Personal Safety Interventions  gait belt;nonskid shoes/slippers when out of bed  -     Row Name 11/12/20 0827          Pain Scale: Numbers Pre/Post-Treatment    Pre/Posttreatment Pain Comment  no c/o pain  -     Row Name 11/12/20 0827          Bed Mobility    Supine-Sit [place] (Bed Mobility)  supervision  -     Assistive Device (Bed Mobility)  bed rails;head of bed elevated  -     Row Name 11/12/20 0827          Transfers    Transfers  sit-stand transfer;stand-sit transfer  -     Comment (Transfers)  initial verbal cues for hand placement required  -     Sit-Stand [place] (Transfers)  supervision  -     Stand-Sit [place] (Transfers)  supervision  -     Row Name 11/12/20 0827          Sit-Stand Transfer    Assistive Device (Sit-Stand Transfers)  walker, front-wheeled  -     Row Name 11/12/20 0827          Stand-Sit Transfer    Assistive Device (Stand-Sit Transfers)  walker, front-wheeled  -     Row Name 11/12/20 0827          Gait/Stairs (Locomotion)     [place] Level (Gait)  supervision  -     Assistive Device (Gait)  walker, front-wheeled  -JW     Distance in Feet (Gait)  220  -JW     Pattern (Gait)  swing-through  -JW     Deviations/Abnormal Patterns (Gait)  base of support, narrow  -JW     Bilateral Gait Deviations  forward flexed posture  -JW     Comment (Gait/Stairs)  pt able to manage direction changes and obstacles during mobility without balance loss.  Patient demonstrates good safety with use of walker  -     Row Name 11/12/20 0827          Plan of Care Review    Outcome Summary  PT: Patient continues to improve with overall functional mobility.  Patient performs supine to sit with SBA and sit to/from stand with supervision.  Patient performs gait with rolling walker x220 feet with supervision.  patient displays no balance loss and able to navigate external obstacles and direction changes without difficulty.  At this time, recommend patient ambulate with nursing staff as tolerated during the day, no further skilled PT needs a this time.  Recommend continued use of rolling walker.  -     Row Name 11/12/20 0827          Positioning and Restraints    Pre-Treatment Position  in bed  -JW     Post Treatment Position  chair  -JW     In Chair  sitting;call light within reach;encouraged to call for assist  -     Row Name 11/12/20 0827          Progress Summary (PT)    Reason for Discharge (PT)  no further needs identified  -     Row Name 11/12/20 0827          Discharge Summary (PT)    Outcomes Achieved/Progress Made Upon Discharge (PT)  all goals met within established timeframes  -JW     Transfer to Another Level of Care or Facility (PT)  other (see comments) recommend rehab for alcohol use  -       User Key  (r) = Recorded By, (t) = Taken By, (c) = Cosigned By    Initials Name Provider Type    Lorraine Segura, PT Physical Therapist          Physical Therapy Education                 Title: PT OT SLP Therapies (In Progress)     Topic: Physical  Therapy (In Progress)     Point: Mobility training (Done)     Learning Progress Summary           Patient Acceptance, E,TB, VU by  at 11/12/2020 0918    Acceptance, E,TB, VU by  at 11/11/2020 1318    Acceptance, E,TB, VU by  at 11/10/2020 0939    Acceptance, E,TB, VU,NR by  at 11/9/2020 0946    Acceptance, E,TB, NR by  at 11/8/2020 1034                   Point: Home exercise program (Not Started)     Learner Progress:  Not documented in this visit.                      User Key     Initials Effective Dates Name Provider Type Discipline     04/03/18 -  Blair Ricks, PT Physical Therapist PT    KALYN 04/03/18 -  Lorraine Abarca, PT Physical Therapist PT                PT Recommendation and Plan  Anticipated Discharge Disposition (PT): (anticipate rehab related to alcohol use at discharge)  Planned Therapy Interventions (PT): balance training, bed mobility training, gait training, home exercise program, patient/family education, strengthening, transfer training  Therapy Frequency (PT): daily  Progress Summary (PT)  Progress Toward Functional Goals (PT): progress toward functional goals is fair  Barriers to Overall Progress (PT): medical status  Plan of Care Reviewed With: patient  Outcome Summary: PT: Patient continues to improve with overall functional mobility.  Patient performs supine to sit with SBA and sit to/from stand with supervision.  Patient performs gait with rolling walker x220 feet with supervision.  patient displays no balance loss and able to navigate external obstacles and direction changes without difficulty.  At this time, recommend patient ambulate with nursing staff as tolerated during the day, no further skilled PT needs a this time.  Recommend continued use of rolling walker.    Outcome Measures     Row Name 11/12/20 0827 11/11/20 1140 11/10/20 0832       How much help from another person do you currently need...    Turning from your back to your side while in flat bed without using  bedrails?  4  -JW  3  -BP  3  -JW    Moving from lying on back to sitting on the side of a flat bed without bedrails?  3  -JW  3  -BP  3  -JW    Moving to and from a bed to a chair (including a wheelchair)?  3  -JW  3  -BP  3  -JW    Standing up from a chair using your arms (e.g., wheelchair, bedside chair)?  3  -JW  3  -BP  3  -JW    Climbing 3-5 steps with a railing?  3  -JW  2  -BP  2  -JW    To walk in hospital room?  3  -JW  3  -BP  3  -JW    AM-PAC 6 Clicks Score (PT)  19  -JW  17  -BP  17  -JW       Functional Assessment    Outcome Measure Options  AM-PAC 6 Clicks Basic Mobility (PT)  -JW  AM-PAC 6 Clicks Basic Mobility (PT)  -BP  AM-PAC 6 Clicks Basic Mobility (PT)  -JW      User Key  (r) = Recorded By, (t) = Taken By, (c) = Cosigned By    Initials Name Provider Type    BP Blair Ricks, PT Physical Therapist    Lorraine Segura, PT Physical Therapist           Time Calculation:   PT Charges     Row Name 11/12/20 0919             Time Calculation    Start Time  0827  -      Stop Time  0844  -      Time Calculation (min)  17 min  -      PT Received On  11/12/20  -         Timed Charges    12200 - Gait Training Minutes   17  -JW        User Key  (r) = Recorded By, (t) = Taken By, (c) = Cosigned By    Initials Name Provider Type    Lorraine Segura, PT Physical Therapist        Therapy Charges for Today     Code Description Service Date Service Provider Modifiers Qty    23740100559 HC GAIT TRAINING EA 15 MIN 11/12/2020 Lorraine Abarca, PT GP 1          PT G-Codes  Outcome Measure Options: AM-PAC 6 Clicks Basic Mobility (PT)  AM-PAC 6 Clicks Score (PT): 19    PT Discharge Summary  Anticipated Discharge Disposition (PT): (anticipate rehab related to alcohol use at discharge)  Reason for Discharge: All goals achieved    Lorraine Abarca, LAMIN  11/12/2020

## 2020-11-12 NOTE — PLAN OF CARE
Problem: Adult Inpatient Plan of Care  Goal: Plan of Care Review  Recent Flowsheet Documentation  Taken 11/12/2020 0827 by Lorraine Abarca, PT  Outcome Summary: PT: Patient continues to improve with overall functional mobility.  Patient performs supine to sit with SBA and sit to/from stand with supervision.  Patient performs gait with rolling walker x220 feet with supervision.  patient displays no balance loss and able to navigate external obstacles and direction changes without difficulty.  At this time, recommend patient ambulate with nursing staff as tolerated during the day, no further skilled PT needs a this time.  Recommend continued use of rolling walker.

## 2020-11-12 NOTE — PROGRESS NOTES
"Hospitalist Team      Patient Care Team:  Tayler Trujillo MD as PCP - General        Chief Complaint:  Follow-up Leukocytosis; Acute Alcoholic Hepatitis    Subjective    Continues to a little better each day.  She felt \"bloated\" overnight.  She notes she is urinating w/ more frequency from diuretic.  She denies chest pain and dyspnea.  Tolerating PO.  She denies fever and chills.    Objective    Vital Signs  Temp:  [97.5 °F (36.4 °C)-98.1 °F (36.7 °C)] 97.6 °F (36.4 °C)  Heart Rate:  [] 98  Resp:  [16-17] 16  BP: ()/(61-73) 105/70  Oxygen Therapy  SpO2: 95 %  Pulse Oximetry Type: Continuous  Device (Oxygen Therapy): room air}    Flowsheet Rows      First Filed Value   Admission Height  157.5 cm (62\") Documented at 11/04/2020 1107   Admission Weight  59 kg (130 lb) Documented at 11/04/2020 1107          Physical Exam:    General: No acute distress  HEENT: Scleral icterus noted bilaterally.   Lungs: Minimal bibasilar rales, otherwise clear.  CV: Regular w/o murmur.  Radial pulses are 2+ and symmetric.  Abdomen: Decrease in distension.  Soft.  Mild tenderness around drain site.  No induration.  Dressing intact.  Bowel sounds are active.  MSK: No C/C.  Trace ankle edema bilaterally.  Neuro: CN II-XII grossly intact.  Psych: Pleasant affect.  AAOx3.    Results Review:     I reviewed the patient's new clinical results.    Lab Results (last 24 hours)     Procedure Component Value Units Date/Time    Comprehensive Metabolic Panel [[id number]]  (Abnormal) Collected: 11/12/20 0444    Specimen: Blood Updated: 11/12/20 0557     Glucose 116 mg/dL      BUN 21 mg/dL      Creatinine 0.43 mg/dL      Sodium 136 mmol/L      Potassium 4.1 mmol/L      Comment: Slight hemolysis detected by analyzer. Results may be affected.        Chloride 101 mmol/L      CO2 21.9 mmol/L      Calcium 9.3 mg/dL      Total Protein 5.0 g/dL      Albumin 2.60 g/dL      ALT (SGPT) 64 U/L      AST (SGOT) 135 U/L      Alkaline Phosphatase 178 U/L      " Total Bilirubin 7.5 mg/dL      eGFR Non African Amer 144 mL/min/1.73      Globulin 2.4 gm/dL      A/G Ratio 1.1 g/dL      BUN/Creatinine Ratio 48.8     Anion Gap 13.1 mmol/L     Narrative:      GFR Normal >60  Chronic Kidney Disease <60  Kidney Failure <15      Magnesium [[id number]]  (Abnormal) Collected: 11/12/20 0444    Specimen: Blood Updated: 11/12/20 0555     Magnesium 1.5 mg/dL     CBC & Differential [[id number]]  (Abnormal) Collected: 11/12/20 0444    Specimen: Blood Updated: 11/12/20 0534    Narrative:      The following orders were created for panel order CBC & Differential.  Procedure                               Abnormality         Status                     ---------                               -----------         ------                     CBC Auto Differential[[id number]]        Abnormal            Final result                 Please view results for these tests on the individual orders.    CBC Auto Differential [[id number]]  (Abnormal) Collected: 11/12/20 0444    Specimen: Blood Updated: 11/12/20 0534     WBC 22.43 10*3/mm3      RBC 2.87 10*6/mm3      Hemoglobin 10.5 g/dL      Hematocrit 32.7 %      .9 fL      MCH 36.6 pg      MCHC 32.1 g/dL      RDW 16.7 %      RDW-SD 69.9 fl      MPV 11.9 fL      Platelets 131 10*3/mm3      Neutrophil % 74.6 %      Lymphocyte % 8.9 %      Monocyte % 13.6 %      Eosinophil % 0.5 %      Basophil % 0.6 %      Immature Grans % 1.8 %      Neutrophils, Absolute 16.71 10*3/mm3      Lymphocytes, Absolute 2.00 10*3/mm3      Monocytes, Absolute 3.06 10*3/mm3      Eosinophils, Absolute 0.12 10*3/mm3      Basophils, Absolute 0.13 10*3/mm3      Immature Grans, Absolute 0.41 10*3/mm3      nRBC 0.2 /100 WBC     Magnesium [[id number]]  (Abnormal) Collected: 11/11/20 0505    Specimen: Blood Updated: 11/11/20 1030     Magnesium 1.3 mg/dL     Procalcitonin [[id number]]  (Abnormal) Collected: 11/11/20 0505    Specimen: Blood Updated: 11/11/20 1015     Procalcitonin 0.45 ng/mL      Narrative:      Results may be falsely decreased if patient taking Biotin.           Imaging Results (Last 24 Hours)     ** No results found for the last 24 hours. **            Medication Review:   I have reviewed the patient's current medication list    Current Facility-Administered Medications:   •  acetaminophen (TYLENOL) tablet 500 mg, 500 mg, Oral, Q6H PRN, Jamir Malik MD, 500 mg at 11/08/20 1142  •  cefTRIAXone (ROCEPHIN) IVPB 1 g/50ml dextrose (premix), 1 g, Intravenous, Q24H, Lacey Burton APRN, Last Rate: 100 mL/hr at 11/12/20 0850, 1 g at 11/12/20 0850  •  folic acid (FOLVITE) tablet 1 mg, 1 mg, Oral, Daily, Lacey Burton APRN, 1 mg at 11/12/20 0805  •  lactulose (CHRONULAC) 10 GM/15ML solution 20 g, 20 g, Oral, BID, Maximino, Terrell Denis MD, 20 g at 11/12/20 0752  •  levothyroxine (SYNTHROID, LEVOTHROID) tablet 50 mcg, 50 mcg, Oral, Q AM, Jamir Malik MD, 50 mcg at 11/12/20 0606  •  magnesium sulfate 4 gram infusion - Mg less than or equal to 1mg/dL, 4 g, Intravenous, PRN **OR** magnesium sulfate 3 gram infusion (1gm x 3) - Mg 1.1 - 1.5 mg/dL, 1 g, Intravenous, PRN, Last Rate: 100 mL/hr at 11/12/20 0854, 1 g at 11/12/20 0854 **OR** Magnesium Sulfate 2 gram infusion- Mg 1.6 - 1.9 mg/dL, 2 g, Intravenous, PRN, Jamir Malik MD  •  metroNIDAZOLE (FLAGYL) 500 mg/100mL IVPB, 500 mg, Intravenous, Q8H, Jonathan Carpenter MD, 500 mg at 11/12/20 0324  •  midodrine (PROAMATINE) tablet 2.5 mg, 2.5 mg, Oral, TID AC, Jamir Malik MD, 2.5 mg at 11/12/20 0750  •  potassium & sodium phosphates (PHOS-NAK) 280-160-250 MG packet - for Phosphorus 1.3-2.5 mg/dL, 1 packet, Oral, BID PRN, Jamir Malik MD, 1 packet at 11/10/20 1703  •  potassium chloride (K-DUR,KLOR-CON) CR tablet 40 mEq, 40 mEq, Oral, PRN, 40 mEq at 11/11/20 1248 **OR** potassium chloride (KLOR-CON) packet 40 mEq, 40 mEq, Oral, PRN, 40 mEq at 11/07/20 1403 **OR** potassium chloride 10 mEq in 100 mL  IVPB, 10 mEq, Intravenous, Q1H PRKing PARISI Jacquelene R, MD, Last Rate: 100 mL/hr at 11/08/20 0622, 10 mEq at 11/08/20 0622  •  potassium phosphate 21 mmol in sodium chloride 0.9 % 250 mL infusion, 21 mmol, Intravenous, PRN **OR** potassium phosphate 15 mmol in sodium chloride 0.9 % 100 mL infusion, 15 mmol, Intravenous, PRN, 15 mmol at 11/07/20 0944 **OR** potassium phosphate 9 mmol in sodium chloride 0.9 % 100 mL infusion, 9 mmol, Intravenous, PRN, Jamir Malik MD  •  rifAXIMin (XIFAXAN) tablet 600 mg, 600 mg, Oral, Q12H, Terrell Stanton MD, 600 mg at 11/12/20 0749  •  [COMPLETED] Insert peripheral IV, , , Once **AND** sodium chloride 0.9 % flush 10 mL, 10 mL, Intravenous, PRNKing Jacquelene R, MD  •  sodium chloride 0.9 % flush 10 mL, 10 mL, Intravenous, Q12H, Jamir Malik MD, 10 mL at 11/12/20 0806  •  sodium chloride 0.9 % flush 10 mL, 10 mL, Intravenous, Q12H, Jamir Malik MD, 10 mL at 11/12/20 0806  •  sodium chloride 0.9 % flush 10 mL, 10 mL, Intravenous, Q12H, Jamir Malik MD, 10 mL at 11/12/20 0806  •  sodium chloride 0.9 % flush 20 mL, 20 mL, Intravenous, King GREENE Jacquelene R, MD  •  sodium chloride 0.9 % flush 20 mL, 20 mL, Intravenous, King GREENE Jacquelene R, MD  •  sodium chloride 0.9 % infusion 40 mL, 40 mL, Intravenous, King GREENE Jacquelene R, MD, 250 mL at 11/07/20 0607  •  sodium phosphates 21 mmol in sodium chloride 0.9 % 250 mL IVPB, 21 mmol, Intravenous, PRN **OR** sodium phosphates 15 mmol in sodium chloride 0.9 % 250 mL IVPB, 15 mmol, Intravenous, PRN **OR** sodium phosphates 9 mmol in sodium chloride 0.9 % 250 mL IVPB, 9 mmol, Intravenous, PRN, Jamir Malik MD, Stopped at 11/09/20 1300  •  torsemide (DEMADEX) tablet 20 mg, 20 mg, Oral, Daily, Dustin Odom MD, 20 mg at 11/12/20 0751    Facility-Administered Medications Ordered in Other Encounters:   •  mupirocin (BACTROBAN) 2 % nasal ointment, , Nasal, BID,  David Marion MD      Assessment/Plan     1.  Leukocytosis: Continues an upward trend, despite broadening coverage yesterday.  There is a neutrophilic predominance.  Her fever curve is flat.  Her drain site appears intact, and her cultures remain no growth.  Abscess comes to mind, but w/o an associated fever, this seems unlikely.  I'm going to re-culture her.  I'm going to add anti-MRSA coverage with Doxycycline.  Continue to trend procalcitonin.    2.  Acute Alcoholic Hepatitis/Ascites/Coaguloapthy: LFT's trending down.  Appears drainage from abdomen picking up.  Weight not recorded, but abdominal distension is decreasing.  Goal will be to remove drain and achieve diuresis w/ Demedex.  Potssium stable.  Mag repletion per protocol.    3.  Acute Metabolic Encephalopathy: Resolved.    4.  Acute Renal Failure: Resolved.  Tolerating diuresis.  No evidence of Hepatorenal syndrome.    5.  Hypothyroidism: Continue current replacement dose.  Needs TFT's repeated in 6-8 weeks.    6.  Macrocytic Anemia: Hgb stable.  Check prn.  Continue folate treatment.    7.  Acute EtOH Withdrawal: Resolved.  CIWAs have been zero so will discontinue.    8.  Deconditioning: PT has signed off.      Plan for disposition: When leukocytosis and drain removed will be ready for [facility] eval.  Consult for TCU in the interim.    Jonathan Carpenter MD  11/12/20  09:00 EST

## 2020-11-12 NOTE — PLAN OF CARE
Goal Outcome Evaluation:  Plan of Care Reviewed With: patient  Progress: improving  Outcome Summary: Patient alert and oriented.  Up to bathroom with assist times one, gait belt, walker.  Drainage tube to paracentesis site intact to CLWS.  Triple lumen PICC to rt upper arm.

## 2020-11-12 NOTE — NURSING NOTE
Continued Stay Note  DORIS Cobos     Patient Name: Tayler Lugo  MRN: [id number]  Today's Date: 11/12/2020    Admit Date: 11/4/2020    Discharge Plan     Row Name 11/12/20 1043       Plan    Plan  [facility] referral    Plan Comments  Spoke with Dr Carpenter, request TCU eval. Patient will need [place] eval prior to dc home, but may be ready for SNF level of care in the next day or two. Spoke with [facility]- referral given. She will review and notify CM of ability to accept. CM will continue to follow.        Discharge Codes    No documentation.             Michael Peraza RN

## 2020-11-13 LAB
ALBUMIN SERPL-MCNC: 2.5 G/DL (ref 3.5–5.2)
ANION GAP SERPL CALCULATED.3IONS-SCNC: 12.5 MMOL/L (ref 5–15)
ANISOCYTOSIS BLD QL: NORMAL
BASOPHILS # BLD AUTO: 0.11 10*3/MM3 (ref 0–0.2)
BASOPHILS NFR BLD AUTO: 0.5 % (ref 0–1.5)
BUN SERPL-MCNC: 22 MG/DL (ref 8–23)
BUN/CREAT SERPL: 44.9 (ref 7–25)
CALCIUM SPEC-SCNC: 9.2 MG/DL (ref 8.6–10.5)
CHLORIDE SERPL-SCNC: 99 MMOL/L (ref 98–107)
CO2 SERPL-SCNC: 24.5 MMOL/L (ref 22–29)
CREAT SERPL-MCNC: 0.49 MG/DL (ref 0.57–1)
DEPRECATED RDW RBC AUTO: 69.7 FL (ref 37–54)
EOSINOPHIL # BLD AUTO: 0.08 10*3/MM3 (ref 0–0.4)
EOSINOPHIL NFR BLD AUTO: 0.4 % (ref 0.3–6.2)
ERYTHROCYTE [DISTWIDTH] IN BLOOD BY AUTOMATED COUNT: 16.5 % (ref 12.3–15.4)
GFR SERPL CREATININE-BSD FRML MDRD: 124 ML/MIN/1.73
GLUCOSE SERPL-MCNC: 98 MG/DL (ref 65–99)
HCT VFR BLD AUTO: 33.8 % (ref 34–46.6)
HGB BLD-MCNC: 10.7 G/DL (ref 12–15.9)
IMM GRANULOCYTES # BLD AUTO: 0.37 10*3/MM3 (ref 0–0.05)
IMM GRANULOCYTES NFR BLD AUTO: 1.7 % (ref 0–0.5)
INR PPP: 1.42 (ref 0.9–1.1)
LARGE PLATELETS: NORMAL
LYMPHOCYTES # BLD AUTO: 1.84 10*3/MM3 (ref 0.7–3.1)
LYMPHOCYTES NFR BLD AUTO: 8.5 % (ref 19.6–45.3)
MACROCYTES BLD QL SMEAR: NORMAL
MAGNESIUM SERPL-MCNC: 1.4 MG/DL (ref 1.6–2.4)
MCH RBC QN AUTO: 36.3 PG (ref 26.6–33)
MCHC RBC AUTO-ENTMCNC: 31.7 G/DL (ref 31.5–35.7)
MCV RBC AUTO: 114.6 FL (ref 79–97)
MONOCYTES # BLD AUTO: 2.68 10*3/MM3 (ref 0.1–0.9)
MONOCYTES NFR BLD AUTO: 12.4 % (ref 5–12)
NEUTROPHILS NFR BLD AUTO: 16.61 10*3/MM3 (ref 1.7–7)
NEUTROPHILS NFR BLD AUTO: 76.5 % (ref 42.7–76)
NRBC BLD AUTO-RTO: 0.1 /100 WBC (ref 0–0.2)
PHOSPHATE SERPL-MCNC: 4.5 MG/DL (ref 2.5–4.5)
PLATELET # BLD AUTO: 190 10*3/MM3 (ref 140–450)
PMV BLD AUTO: 12.4 FL (ref 6–12)
POTASSIUM SERPL-SCNC: 3.1 MMOL/L (ref 3.5–5.2)
POTASSIUM SERPL-SCNC: 4.3 MMOL/L (ref 3.5–5.2)
PROTHROMBIN TIME: 16.9 SECONDS (ref 12.1–15)
RBC # BLD AUTO: 2.95 10*6/MM3 (ref 3.77–5.28)
SODIUM SERPL-SCNC: 136 MMOL/L (ref 136–145)
WBC # BLD AUTO: 21.69 10*3/MM3 (ref 3.4–10.8)
WBC MORPH BLD: NORMAL

## 2020-11-13 PROCEDURE — 83735 ASSAY OF MAGNESIUM: CPT | Performed by: HOSPITALIST

## 2020-11-13 PROCEDURE — 25010000002 CEFTRIAXONE PER 250 MG: Performed by: NURSE PRACTITIONER

## 2020-11-13 PROCEDURE — 80069 RENAL FUNCTION PANEL: CPT | Performed by: HOSPITALIST

## 2020-11-13 PROCEDURE — 84132 ASSAY OF SERUM POTASSIUM: CPT | Performed by: HOSPITALIST

## 2020-11-13 PROCEDURE — 25010000002 MAGNESIUM SULFATE IN D5W 1G/100ML (PREMIX) 1-5 GM/100ML-% SOLUTION: Performed by: INTERNAL MEDICINE

## 2020-11-13 PROCEDURE — 85610 PROTHROMBIN TIME: CPT | Performed by: HOSPITALIST

## 2020-11-13 PROCEDURE — 85007 BL SMEAR W/DIFF WBC COUNT: CPT | Performed by: HOSPITALIST

## 2020-11-13 PROCEDURE — 99232 SBSQ HOSP IP/OBS MODERATE 35: CPT | Performed by: INTERNAL MEDICINE

## 2020-11-13 PROCEDURE — 85025 COMPLETE CBC W/AUTO DIFF WBC: CPT | Performed by: HOSPITALIST

## 2020-11-13 PROCEDURE — 94799 UNLISTED PULMONARY SVC/PX: CPT

## 2020-11-13 RX ADMIN — MIDODRINE HYDROCHLORIDE 2.5 MG: 5 TABLET ORAL at 17:21

## 2020-11-13 RX ADMIN — SODIUM CHLORIDE, PRESERVATIVE FREE 10 ML: 5 INJECTION INTRAVENOUS at 09:02

## 2020-11-13 RX ADMIN — FOLIC ACID 1 MG: 1 TABLET ORAL at 08:55

## 2020-11-13 RX ADMIN — Medication 1 CAPSULE: at 08:55

## 2020-11-13 RX ADMIN — DOXYCYCLINE 100 MG: 100 INJECTION, POWDER, LYOPHILIZED, FOR SOLUTION INTRAVENOUS at 00:15

## 2020-11-13 RX ADMIN — MAGNESIUM SULFATE HEPTAHYDRATE 1 G: 1 INJECTION, SOLUTION INTRAVENOUS at 12:04

## 2020-11-13 RX ADMIN — ACETAMINOPHEN 500 MG: 500 TABLET, FILM COATED ORAL at 23:29

## 2020-11-13 RX ADMIN — MIDODRINE HYDROCHLORIDE 2.5 MG: 5 TABLET ORAL at 08:55

## 2020-11-13 RX ADMIN — TORSEMIDE 20 MG: 20 TABLET ORAL at 08:55

## 2020-11-13 RX ADMIN — METRONIDAZOLE 500 MG: 500 INJECTION, SOLUTION INTRAVENOUS at 03:06

## 2020-11-13 RX ADMIN — MAGNESIUM SULFATE HEPTAHYDRATE 1 G: 1 INJECTION, SOLUTION INTRAVENOUS at 10:56

## 2020-11-13 RX ADMIN — RIFAXIMIN 600 MG: 200 TABLET ORAL at 08:54

## 2020-11-13 RX ADMIN — LACTULOSE 20 G: 20 SOLUTION ORAL at 08:55

## 2020-11-13 RX ADMIN — LEVOTHYROXINE SODIUM 50 MCG: 50 TABLET ORAL at 06:46

## 2020-11-13 RX ADMIN — SODIUM CHLORIDE, PRESERVATIVE FREE 10 ML: 5 INJECTION INTRAVENOUS at 23:20

## 2020-11-13 RX ADMIN — SODIUM CHLORIDE, PRESERVATIVE FREE 10 ML: 5 INJECTION INTRAVENOUS at 09:01

## 2020-11-13 RX ADMIN — SODIUM CHLORIDE, PRESERVATIVE FREE 10 ML: 5 INJECTION INTRAVENOUS at 23:22

## 2020-11-13 RX ADMIN — POTASSIUM CHLORIDE 40 MEQ: 1500 TABLET, EXTENDED RELEASE ORAL at 09:42

## 2020-11-13 RX ADMIN — DOXYCYCLINE 100 MG: 100 INJECTION, POWDER, LYOPHILIZED, FOR SOLUTION INTRAVENOUS at 13:18

## 2020-11-13 RX ADMIN — LACTULOSE 20 G: 20 SOLUTION ORAL at 23:16

## 2020-11-13 RX ADMIN — MIDODRINE HYDROCHLORIDE 2.5 MG: 5 TABLET ORAL at 12:28

## 2020-11-13 RX ADMIN — CEFTRIAXONE 1 G: 1 INJECTION, POWDER, FOR SOLUTION INTRAMUSCULAR; INTRAVENOUS at 10:56

## 2020-11-13 RX ADMIN — POTASSIUM CHLORIDE 40 MEQ: 1500 TABLET, EXTENDED RELEASE ORAL at 17:24

## 2020-11-13 RX ADMIN — MAGNESIUM SULFATE HEPTAHYDRATE 1 G: 1 INJECTION, SOLUTION INTRAVENOUS at 09:42

## 2020-11-13 RX ADMIN — METRONIDAZOLE 500 MG: 500 INJECTION, SOLUTION INTRAVENOUS at 12:04

## 2020-11-13 RX ADMIN — POTASSIUM CHLORIDE 40 MEQ: 1500 TABLET, EXTENDED RELEASE ORAL at 14:03

## 2020-11-13 RX ADMIN — RIFAXIMIN 600 MG: 200 TABLET ORAL at 23:17

## 2020-11-13 NOTE — NURSING NOTE
Continued Stay Note  DORIS Cobos     Patient Name: Tayler Lugo  MRN: [id number]  Today's Date: 11/13/2020    Admit Date: 11/4/2020    Discharge Plan     Row Name 11/13/20 0830       Plan    Plan Comments  LVM for [facility] r/t referral made yesterday. Await update on ability to accept.        Discharge Codes    No documentation.             Michael Peraza RN

## 2020-11-13 NOTE — NURSING NOTE
Continued Stay Note  DORIS Cobos     Patient Name: Tayler Lugo  MRN: [id number]  Today's Date: 11/13/2020    Admit Date: 11/4/2020    Discharge Plan     Row Name 11/13/20 1213       Plan    Plan  to be determined    Plan Comments  Per Dr Moss, plan from GI is to start patient on oral abx prior to dc. PT has discharged patient from their service. She will not qualify for STR. [facility] updated. CM will continue to follow for dc needs.    Row Name 11/13/20 1031       Plan    Plan  [facility] STR    Plan Comments  Follow up call to [facility] - she states she can accept patient today if she remains on IV abx. Message to update Dr Moss. CM will follow up with patient.        Discharge Codes    No documentation.             Michael Peraza RN

## 2020-11-13 NOTE — PLAN OF CARE
Goal Outcome Evaluation:  Plan of Care Reviewed With: patient  Progress: improving  Outcome Summary: Rested well, VSS

## 2020-11-13 NOTE — PROGRESS NOTES
"Hospitalist Team      Patient Care Team:  Tayler Trujillo MD as PCP - General        Chief Complaint:  Follow-up Leukocytosis; Acute Alcoholic Hepatitis    Subjective    Laying of abdominal distention today there is still some draining around the recent paracentesis site.  There is been no fever.  She is weak.    Objective    Vital Signs  Temp:  [96.8 °F (36 °C)-97 °F (36.1 °C)] 97 °F (36.1 °C)  Heart Rate:  [] 88  Resp:  [16-18] 18  BP: (102-114)/(60-74) 109/70  Oxygen Therapy  SpO2: 93 %  Pulse Oximetry Type: Continuous  Device (Oxygen Therapy): room air}    Flowsheet Rows      First Filed Value   Admission Height  157.5 cm (62\") Documented at 11/04/2020 1107   Admission Weight  59 kg (130 lb) Documented at 11/04/2020 1107          Physical Exam:    General: No acute distress  HEENT: Scleral icterus noted bilaterally.   Lungs: Minimal bibasilar rales, otherwise clear.  CV: Regular w/o murmur.  Radial pulses are 2+ and symmetric.  Abdomen: Distended drain over recent paracentesis site draining straw-colored fluid into canister  MSK: No C/C.  Trace ankle edema bilaterally.  Neuro: CN II-XII grossly intact.  Psych: Pleasant affect.  AAOx3.    Results Review:     I reviewed the patient's new clinical results.    Lab Results (last 24 hours)     Procedure Component Value Units Date/Time    Blood Culture - Blood, Arm, Left [[id number]] Collected: 11/12/20 1045    Specimen: Blood from Arm, Left Updated: 11/13/20 1100     Blood Culture No growth at 24 hours    Blood Culture - Blood, Hand, Left [[id number]] Collected: 11/12/20 1056    Specimen: Blood from Hand, Left Updated: 11/13/20 1100     Blood Culture No growth at 24 hours    Scan Slide [[id number]] Collected: 11/13/20 0733    Specimen: Blood Updated: 11/13/20 0921     Anisocytosis Slight/1+     Macrocytes Mod/2+     WBC Morphology Normal     Large Platelets Slight/1+    Renal Function Panel [[id number]]  (Abnormal) Collected: 11/13/20 0733    Specimen: Blood " Updated: 11/13/20 0826     Glucose 98 mg/dL      BUN 22 mg/dL      Creatinine 0.49 mg/dL      Sodium 136 mmol/L      Potassium 3.1 mmol/L      Chloride 99 mmol/L      CO2 24.5 mmol/L      Calcium 9.2 mg/dL      Albumin 2.50 g/dL      Phosphorus 4.5 mg/dL      Anion Gap 12.5 mmol/L      BUN/Creatinine Ratio 44.9     eGFR Non African Amer 124 mL/min/1.73     Narrative:      GFR Normal >60  Chronic Kidney Disease <60  Kidney Failure <15      Magnesium [[id number]]  (Abnormal) Collected: 11/13/20 0733    Specimen: Blood Updated: 11/13/20 0825     Magnesium 1.4 mg/dL     Protime-INR [[id number]]  (Abnormal) Collected: 11/13/20 0733    Specimen: Blood Updated: 11/13/20 0813     Protime 16.9 Seconds      INR 1.42    Narrative:      Therapeutic Ranges for INR: 2.0-3.0 (PT 20-30)                              2.5-3.5 (PT 25-34)    CBC & Differential [[id number]]  (Abnormal) Collected: 11/13/20 0733    Specimen: Blood Updated: 11/13/20 0804    Narrative:      The following orders were created for panel order CBC & Differential.  Procedure                               Abnormality         Status                     ---------                               -----------         ------                     CBC Auto Differential[[id number]]        Abnormal            Final result                 Please view results for these tests on the individual orders.    CBC Auto Differential [[id number]]  (Abnormal) Collected: 11/13/20 0733    Specimen: Blood Updated: 11/13/20 0804     WBC 21.69 10*3/mm3      RBC 2.95 10*6/mm3      Hemoglobin 10.7 g/dL      Hematocrit 33.8 %      .6 fL      MCH 36.3 pg      MCHC 31.7 g/dL      RDW 16.5 %      RDW-SD 69.7 fl      MPV 12.4 fL      Platelets 190 10*3/mm3      Neutrophil % 76.5 %      Lymphocyte % 8.5 %      Monocyte % 12.4 %      Eosinophil % 0.4 %      Basophil % 0.5 %      Immature Grans % 1.7 %      Neutrophils, Absolute 16.61 10*3/mm3      Lymphocytes, Absolute 1.84 10*3/mm3      Monocytes,  Absolute 2.68 10*3/mm3      Eosinophils, Absolute 0.08 10*3/mm3      Basophils, Absolute 0.11 10*3/mm3      Immature Grans, Absolute 0.37 10*3/mm3      nRBC 0.1 /100 WBC           Imaging Results (Last 24 Hours)     ** No results found for the last 24 hours. **            Medication Review:   I have reviewed the patient's current medication list    Current Facility-Administered Medications:   •  acetaminophen (TYLENOL) tablet 500 mg, 500 mg, Oral, Q6H PRN, Jamir Malik MD, 500 mg at 11/08/20 1142  •  folic acid (FOLVITE) tablet 1 mg, 1 mg, Oral, Daily, Lacey Burton APRN, 1 mg at 11/13/20 0855  •  lactobacillus acidophilus (RISAQUAD) capsule 1 capsule, 1 capsule, Oral, Daily, Jonathan Carpenter MD, 1 capsule at 11/13/20 0855  •  lactulose (CHRONULAC) 10 GM/15ML solution 20 g, 20 g, Oral, BID, MaximinoTerrell MD, 20 g at 11/13/20 0855  •  levothyroxine (SYNTHROID, LEVOTHROID) tablet 50 mcg, 50 mcg, Oral, Q AM, Jamir Malik MD, 50 mcg at 11/13/20 0646  •  magnesium sulfate 4 gram infusion - Mg less than or equal to 1mg/dL, 4 g, Intravenous, PRN **OR** magnesium sulfate 3 gram infusion (1gm x 3) - Mg 1.1 - 1.5 mg/dL, 1 g, Intravenous, PRN, Last Rate: 100 mL/hr at 11/13/20 1204, 1 g at 11/13/20 1204 **OR** Magnesium Sulfate 2 gram infusion- Mg 1.6 - 1.9 mg/dL, 2 g, Intravenous, PRN, Jamir Malik MD  •  midodrine (PROAMATINE) tablet 2.5 mg, 2.5 mg, Oral, TID AC, Jamir Malik MD, 2.5 mg at 11/13/20 1228  •  potassium & sodium phosphates (PHOS-NAK) 280-160-250 MG packet - for Phosphorus 1.3-2.5 mg/dL, 1 packet, Oral, BID PRN, Jamir Malik MD, 1 packet at 11/10/20 1703  •  potassium chloride (K-DUR,KLOR-CON) CR tablet 40 mEq, 40 mEq, Oral, PRN, 40 mEq at 11/13/20 1403 **OR** potassium chloride (KLOR-CON) packet 40 mEq, 40 mEq, Oral, PRN, 40 mEq at 11/07/20 1403 **OR** potassium chloride 10 mEq in 100 mL IVPB, 10 mEq, Intravenous, Q1H PRN, Jamir Malik MD,  Last Rate: 100 mL/hr at 11/08/20 0622, 10 mEq at 11/08/20 0622  •  potassium phosphate 21 mmol in sodium chloride 0.9 % 250 mL infusion, 21 mmol, Intravenous, PRN **OR** potassium phosphate 15 mmol in sodium chloride 0.9 % 100 mL infusion, 15 mmol, Intravenous, PRN, 15 mmol at 11/07/20 0944 **OR** potassium phosphate 9 mmol in sodium chloride 0.9 % 100 mL infusion, 9 mmol, Intravenous, PRN, Jamir Malik MD  •  rifAXIMin (XIFAXAN) tablet 600 mg, 600 mg, Oral, Q12H, Terrell Stanton MD, 600 mg at 11/13/20 0854  •  [COMPLETED] Insert peripheral IV, , , Once **AND** sodium chloride 0.9 % flush 10 mL, 10 mL, Intravenous, PRN, Jamir Malik MD  •  sodium chloride 0.9 % flush 10 mL, 10 mL, Intravenous, Q12H, Jamir Malik MD, 10 mL at 11/13/20 0901  •  sodium chloride 0.9 % flush 10 mL, 10 mL, Intravenous, Q12H, aJmir Malik MD, 10 mL at 11/13/20 0902  •  sodium chloride 0.9 % flush 10 mL, 10 mL, Intravenous, Q12H, Jamir Malik MD, 10 mL at 11/13/20 0902  •  sodium chloride 0.9 % flush 20 mL, 20 mL, Intravenous, King GREENE Jacquelene R, MD  •  sodium chloride 0.9 % flush 20 mL, 20 mL, Intravenous, King GREENE Jacquelene R, MD  •  sodium chloride 0.9 % infusion 40 mL, 40 mL, Intravenous, King GREENE Jacquelene R, MD, 250 mL at 11/07/20 0607  •  sodium phosphates 21 mmol in sodium chloride 0.9 % 250 mL IVPB, 21 mmol, Intravenous, PRN **OR** sodium phosphates 15 mmol in sodium chloride 0.9 % 250 mL IVPB, 15 mmol, Intravenous, PRN **OR** sodium phosphates 9 mmol in sodium chloride 0.9 % 250 mL IVPB, 9 mmol, Intravenous, PRN, Jamir Malik MD, Stopped at 11/09/20 1300  •  torsemide (DEMADEX) tablet 20 mg, 20 mg, Oral, Daily, Dustin Odom MD, 20 mg at 11/13/20 0855    Facility-Administered Medications Ordered in Other Encounters:   •  mupirocin (BACTROBAN) 2 % nasal ointment, , Nasal, BID, David Marion MD      Assessment/Plan     1.  Leukocytosis:  Stable today all cultures have been no growth.  Per GI recommendations we will stop antibiotics and observe.    2.  Acute Alcoholic Hepatitis/Ascites/Coaguloapthy: LFT's trending down.  Continue local drain for ascitic fluid.  On daily torsemide    3.  Acute Metabolic Encephalopathy: Resolved.    4.  Acute Renal Failure: Resolved.  Tolerating diuresis.  No evidence of Hepatorenal syndrome.    5.  Hypothyroidism: Continue current replacement dose.  Needs TFT's repeated in 6-8 weeks.    6.  Macrocytic Anemia: Hgb stable.  Check prn.  Continue folate treatment.    7.  Acute EtOH Withdrawal: Resolved.      8.  Deconditioning: PT has signed off.      Plan for disposition: If leukocytosis stable would get the [place] evaluation tomorrow    Cristobal Moss, DO  11/13/20  16:27 EST

## 2020-11-13 NOTE — PROGRESS NOTES
GI Daily Progress Note    Assessment/Plan:      Acute liver failure    Frequent UTI    Hypertension    Acute respiratory failure with hypoxia (CMS/HCC)    Ascites due to alcoholic hepatitis    Bladder prolapse, female, acquired    Sepsis associated hypotension (CMS/HCC)    Altered mental state    Acute renal failure (CMS/HCC)    Alcohol abuse    Severe malnutrition (CMS/HCC)       LOS: 8 days     Tayler Lugo is a 70 y.o. female who was admitted with Acute liver failure. She reports the symptoms are improving with treatment. More alert and conversant heading for Rehab but PT signed off.    Subjective:    Patient expresses Leaking from Paracentesis site  Patient denies vomiting, diarrhea and bloody stools    Objective:    Vital signs in last 24 hours:  Temp:  [97.4 °F (36.3 °C)-98.1 °F (36.7 °C)] 97.6 °F (36.4 °C)  Heart Rate:  [] 97  Resp:  [16-17] 16  BP: ()/(60-73) 111/73    Intake/Output last 3 shifts:  I/O last 3 completed shifts:  In: 1460 [P.O.:960; IV Piggyback:500]  Out: 750 [Other:750]  Intake/Output this shift:  No intake/output data recorded.    Physical Exam:Abdomen  Sounds Normal Active Bowel Sounds   Distension Soft and Distended   Tenderness Mildly Tender     Imaging Results (Last 72 Hours)     Procedure Component Value Units Date/Time    XR Chest PA & Lateral [[id number]] Collected: 11/10/20 1107     Updated: 11/10/20 1110    Narrative:      PA AND LATERAL CHEST, 11/10/2020 10:57 AM     HISTORY:  Tachypnea, crackles, persistent leukocytosis; K76.7-Hepatorenal  syndrome; A41.9-Sepsis, unspecified organism; R65.20-Severe sepsis  without septic shock; N17.9-Acute kidney failure, unspecified    increasing shortness of air for one day     COMPARISON:  11/05/2020     TECHNIQUE:  PA and lateral upright chest series.     FINDINGS:  There are low lung volumes in the right hemidiaphragm is elevated. There  is minimal basilar atelectasis. Rest the lungs are clear  . Right shoulder prosthesis is  present. The PICC catheter tip in the  right atrium. There    Impression:      Low lung volumes with minimal bibasilar atelectasis.     This report was finalized on 11/10/2020 11:08 AM by Dr. Solitario Jain MD.             WBC   Date Value Ref Range Status   11/12/2020 22.43 (H) 3.40 - 10.80 10*3/mm3 Final     RBC   Date Value Ref Range Status   11/12/2020 2.87 (L) 3.77 - 5.28 10*6/mm3 Final     Hemoglobin   Date Value Ref Range Status   11/12/2020 10.5 (L) 12.0 - 15.9 g/dL Final     Hematocrit   Date Value Ref Range Status   11/12/2020 32.7 (L) 34.0 - 46.6 % Final     MCV   Date Value Ref Range Status   11/12/2020 113.9 (H) 79.0 - 97.0 fL Final     MCH   Date Value Ref Range Status   11/12/2020 36.6 (H) 26.6 - 33.0 pg Final     MCHC   Date Value Ref Range Status   11/12/2020 32.1 31.5 - 35.7 g/dL Final     RDW   Date Value Ref Range Status   11/12/2020 16.7 (H) 12.3 - 15.4 % Final     RDW-SD   Date Value Ref Range Status   11/12/2020 69.9 (H) 37.0 - 54.0 fl Final     MPV   Date Value Ref Range Status   11/12/2020 11.9 6.0 - 12.0 fL Final     Platelets   Date Value Ref Range Status   11/12/2020 131 (L) 140 - 450 10*3/mm3 Final     Neutrophil %   Date Value Ref Range Status   11/12/2020 74.6 42.7 - 76.0 % Final     Lymphocyte %   Date Value Ref Range Status   11/12/2020 8.9 (L) 19.6 - 45.3 % Final     Monocyte %   Date Value Ref Range Status   11/12/2020 13.6 (H) 5.0 - 12.0 % Final     Eosinophil %   Date Value Ref Range Status   11/12/2020 0.5 0.3 - 6.2 % Final     Basophil %   Date Value Ref Range Status   11/12/2020 0.6 0.0 - 1.5 % Final     Immature Grans %   Date Value Ref Range Status   11/12/2020 1.8 (H) 0.0 - 0.5 % Final     Neutrophils, Absolute   Date Value Ref Range Status   11/12/2020 16.71 (H) 1.70 - 7.00 10*3/mm3 Final     Lymphocytes, Absolute   Date Value Ref Range Status   11/12/2020 2.00 0.70 - 3.10 10*3/mm3 Final     Monocytes, Absolute   Date Value Ref Range Status   11/12/2020 3.06 (H) 0.10 - 0.90  10*3/mm3 Final     Eosinophils, Absolute   Date Value Ref Range Status   11/12/2020 0.12 0.00 - 0.40 10*3/mm3 Final     Basophils, Absolute   Date Value Ref Range Status   11/12/2020 0.13 0.00 - 0.20 10*3/mm3 Final     Immature Grans, Absolute   Date Value Ref Range Status   11/12/2020 0.41 (H) 0.00 - 0.05 10*3/mm3 Final     nRBC   Date Value Ref Range Status   11/12/2020 0.2 0.0 - 0.2 /100 WBC Final       Glucose   Date Value Ref Range Status   11/12/2020 116 (H) 65 - 99 mg/dL Final     Sodium   Date Value Ref Range Status   11/12/2020 136 136 - 145 mmol/L Final     Potassium   Date Value Ref Range Status   11/12/2020 4.1 3.5 - 5.2 mmol/L Final     Comment:     Slight hemolysis detected by analyzer. Results may be affected.     CO2   Date Value Ref Range Status   11/12/2020 21.9 (L) 22.0 - 29.0 mmol/L Final     Chloride   Date Value Ref Range Status   11/12/2020 101 98 - 107 mmol/L Final     Anion Gap   Date Value Ref Range Status   11/12/2020 13.1 5.0 - 15.0 mmol/L Final     Creatinine   Date Value Ref Range Status   11/12/2020 0.43 (L) 0.57 - 1.00 mg/dL Final     BUN   Date Value Ref Range Status   11/12/2020 21 8 - 23 mg/dL Final     BUN/Creatinine Ratio   Date Value Ref Range Status   11/12/2020 48.8 (H) 7.0 - 25.0 Final     Calcium   Date Value Ref Range Status   11/12/2020 9.3 8.6 - 10.5 mg/dL Final     eGFR Non  Amer   Date Value Ref Range Status   11/12/2020 144 >60 mL/min/1.73 Final     Alkaline Phosphatase   Date Value Ref Range Status   11/12/2020 178 (H) 39 - 117 U/L Final     Total Protein   Date Value Ref Range Status   11/12/2020 5.0 (L) 6.0 - 8.5 g/dL Final     ALT (SGPT)   Date Value Ref Range Status   11/12/2020 64 (H) 1 - 33 U/L Final     AST (SGOT)   Date Value Ref Range Status   11/12/2020 135 (H) 1 - 32 U/L Final     Total Bilirubin   Date Value Ref Range Status   11/12/2020 7.5 (H) 0.0 - 1.2 mg/dL Final     Albumin   Date Value Ref Range Status   11/12/2020 2.60 (L) 3.50 - 5.20 g/dL  Final     Globulin   Date Value Ref Range Status   11/12/2020 2.4 gm/dL Final     Alcoholic Hepatitis   MONSTER  DTs- mostly resolved  Hypotension-improved    OVerall improved discussed with Pt and Hospitalist plans moving forward would favor stopping Abx despite WBC no clinical sign of infection, and would handle her Leak with an ostomy bag. Will check in on next week.

## 2020-11-13 NOTE — PROGRESS NOTES
Adult Nutrition  Assessment/PES    Patient Name:  Tayler Lugo  YOB: 1950  MRN: [id number]  Admit Date:  11/4/2020    Assessment Date:  11/13/2020    Comments:  Encourage po and oral supplement, pt could meet 82% of needs with just drinking supplement 3 per day alone.      Reason for Assessment     Row Name 11/13/20 1351          Reason for Assessment    Reason For Assessment  follow-up protocol     Diagnosis  liver disease liver failure, malnutrition, ETOH         Nutrition/Diet History     Row Name 11/13/20 1404          Nutrition/Diet History    Typical Food/Fluid Intake  Pt asleep at visit to room         Anthropometrics     Row Name 11/13/20 0919          Anthropometrics    Weight  67.1 kg (148 lb)         Labs/Tests/Procedures/Meds     Row Name 11/13/20 1404          Labs/Procedures/Meds    Lab Results Reviewed  reviewed     Lab Results Comments  K 3.1 L, Mg 1.4 L        Diagnostic Tests/Procedures    Diagnostic Test/Procedure Reviewed  reviewed        Medications    Pertinent Medications Reviewed  reviewed     Pertinent Medications Comments  toresmide, lactulose, risaquad, folic acid             Nutrition Prescription Ordered     Row Name 11/13/20 1405          Nutrition Prescription PO    Current PO Diet  Regular     Supplement  Boost Plus (Ensure Enlive, Ensure Plus)     Supplement Frequency  3 times a day     Common Modifiers  GI Soft/[place]         Evaluation of Received Nutrient/Fluid Intake     Row Name 11/13/20 1400          Fluid Intake Evaluation    Oral Fluid (mL)  443 ave x 3, 34%        PO Evaluation    Number of Meals  5     % PO Intake  30               Problem/Interventions:    Problem 2     Row Name 11/13/20 1403          Nutrition Diagnoses Problem 2    Problem 2  Malnutrition     Etiology (related to)  Medical Diagnosis;Factors Affecting Nutrition     Signs/Symptoms (evidenced by)  Report/Observation             Intervention Goal     Row Name 11/13/20 9175           Intervention Goal    General  Meet nutritional needs for age/condition     PO  Increase intake;PO intake (%)     PO Intake %  50 % or greater     Weight  No significant weight loss         Nutrition Intervention     Row Name 11/13/20 1407          Nutrition Intervention    RD/Tech Action  Encourage intake;Follow Tx progress           Education/Evaluation     Row Name 11/13/20 1407          Education    Education  No discharge needs identified at this time        Monitor/Evaluation    Monitor  Per protocol;I&O;PO intake;Supplement intake;Pertinent labs;Weight           Electronically signed by:  Maricruz Martinez RD  11/13/20 14:08 EST

## 2020-11-13 NOTE — PLAN OF CARE
Goal Outcome Evaluation:  Plan of Care Reviewed With: patient  Progress: improving  Outcome Summary: Up to chair part of the afternoon.  K+ and Mg+ protocol followed and both replaced.  Drain continues to ABD and is draining yellow fluid.  ABD remains firm and tender.  Call light within reach.

## 2020-11-13 NOTE — NURSING NOTE
Continued Stay Note  DORIS Cobos     Patient Name: Tayler Lugo  MRN: [id number]  Today's Date: 11/13/2020    Admit Date: 11/4/2020    Discharge Plan     Row Name 11/13/20 1031       Plan    Plan  ROSAS STR    Plan Comments  Follow up call to [facility] - she states she can accept patient today if she remains on IV abx. Message to update Dr Moss.Jacey RN updated. CM will follow up with patient.    Row Name 11/13/20 9102       Plan    Plan Comments  LVM for [facility] r/t referral made yesterday. Await update on ability to accept.        Discharge Codes    No documentation.             Michael Peraza RN

## 2020-11-14 ENCOUNTER — APPOINTMENT (OUTPATIENT)
Dept: GENERAL RADIOLOGY | Facility: HOSPITAL | Age: 70
End: 2020-11-14

## 2020-11-14 LAB
ANION GAP SERPL CALCULATED.3IONS-SCNC: 8.3 MMOL/L (ref 5–15)
BASOPHILS # BLD AUTO: 0.13 10*3/MM3 (ref 0–0.2)
BASOPHILS NFR BLD AUTO: 0.6 % (ref 0–1.5)
BILIRUB UR QL STRIP: ABNORMAL
BUN SERPL-MCNC: 24 MG/DL (ref 8–23)
BUN/CREAT SERPL: 38.1 (ref 7–25)
CALCIUM SPEC-SCNC: 9 MG/DL (ref 8.6–10.5)
CHLORIDE SERPL-SCNC: 102 MMOL/L (ref 98–107)
CLARITY UR: CLEAR
CO2 SERPL-SCNC: 24.7 MMOL/L (ref 22–29)
COLOR UR: ABNORMAL
CREAT SERPL-MCNC: 0.63 MG/DL (ref 0.57–1)
D-LACTATE SERPL-SCNC: 2.1 MMOL/L (ref 0.5–2)
DEPRECATED RDW RBC AUTO: 70.1 FL (ref 37–54)
EOSINOPHIL # BLD AUTO: 0.08 10*3/MM3 (ref 0–0.4)
EOSINOPHIL NFR BLD AUTO: 0.4 % (ref 0.3–6.2)
ERYTHROCYTE [DISTWIDTH] IN BLOOD BY AUTOMATED COUNT: 16.4 % (ref 12.3–15.4)
GFR SERPL CREATININE-BSD FRML MDRD: 93 ML/MIN/1.73
GLUCOSE SERPL-MCNC: 106 MG/DL (ref 65–99)
GLUCOSE UR STRIP-MCNC: NEGATIVE MG/DL
HCT VFR BLD AUTO: 32.9 % (ref 34–46.6)
HGB BLD-MCNC: 10.2 G/DL (ref 12–15.9)
HGB UR QL STRIP.AUTO: NEGATIVE
IMM GRANULOCYTES # BLD AUTO: 0.31 10*3/MM3 (ref 0–0.05)
IMM GRANULOCYTES NFR BLD AUTO: 1.4 % (ref 0–0.5)
KETONES UR QL STRIP: NEGATIVE
LEUKOCYTE ESTERASE UR QL STRIP.AUTO: NEGATIVE
LYMPHOCYTES # BLD AUTO: 1.8 10*3/MM3 (ref 0.7–3.1)
LYMPHOCYTES NFR BLD AUTO: 7.9 % (ref 19.6–45.3)
MAGNESIUM SERPL-MCNC: 1.7 MG/DL (ref 1.6–2.4)
MCH RBC QN AUTO: 36 PG (ref 26.6–33)
MCHC RBC AUTO-ENTMCNC: 31 G/DL (ref 31.5–35.7)
MCV RBC AUTO: 116.3 FL (ref 79–97)
MONOCYTES # BLD AUTO: 2.73 10*3/MM3 (ref 0.1–0.9)
MONOCYTES NFR BLD AUTO: 12 % (ref 5–12)
NEUTROPHILS NFR BLD AUTO: 17.61 10*3/MM3 (ref 1.7–7)
NEUTROPHILS NFR BLD AUTO: 77.7 % (ref 42.7–76)
NITRITE UR QL STRIP: NEGATIVE
NRBC BLD AUTO-RTO: 0 /100 WBC (ref 0–0.2)
PH UR STRIP.AUTO: <=5 [PH] (ref 4.5–8)
PLATELET # BLD AUTO: 211 10*3/MM3 (ref 140–450)
PMV BLD AUTO: 12.3 FL (ref 6–12)
POTASSIUM SERPL-SCNC: 3.8 MMOL/L (ref 3.5–5.2)
PROCALCITONIN SERPL-MCNC: 0.72 NG/ML (ref 0–0.25)
PROT UR QL STRIP: NEGATIVE
RBC # BLD AUTO: 2.83 10*6/MM3 (ref 3.77–5.28)
SODIUM SERPL-SCNC: 135 MMOL/L (ref 136–145)
SP GR UR STRIP: 1.02 (ref 1–1.03)
UROBILINOGEN UR QL STRIP: ABNORMAL
WBC # BLD AUTO: 22.66 10*3/MM3 (ref 3.4–10.8)

## 2020-11-14 PROCEDURE — 80048 BASIC METABOLIC PNL TOTAL CA: CPT | Performed by: INTERNAL MEDICINE

## 2020-11-14 PROCEDURE — 83605 ASSAY OF LACTIC ACID: CPT | Performed by: INTERNAL MEDICINE

## 2020-11-14 PROCEDURE — 85025 COMPLETE CBC W/AUTO DIFF WBC: CPT | Performed by: INTERNAL MEDICINE

## 2020-11-14 PROCEDURE — 71046 X-RAY EXAM CHEST 2 VIEWS: CPT

## 2020-11-14 PROCEDURE — 84145 PROCALCITONIN (PCT): CPT | Performed by: INTERNAL MEDICINE

## 2020-11-14 PROCEDURE — 81003 URINALYSIS AUTO W/O SCOPE: CPT | Performed by: INTERNAL MEDICINE

## 2020-11-14 PROCEDURE — 25010000002 MAGNESIUM SULFATE 2 GM/50ML SOLUTION: Performed by: INTERNAL MEDICINE

## 2020-11-14 PROCEDURE — 99232 SBSQ HOSP IP/OBS MODERATE 35: CPT | Performed by: INTERNAL MEDICINE

## 2020-11-14 PROCEDURE — 83735 ASSAY OF MAGNESIUM: CPT | Performed by: INTERNAL MEDICINE

## 2020-11-14 RX ADMIN — SODIUM CHLORIDE, PRESERVATIVE FREE 10 ML: 5 INJECTION INTRAVENOUS at 08:38

## 2020-11-14 RX ADMIN — RIFAXIMIN 600 MG: 200 TABLET ORAL at 22:03

## 2020-11-14 RX ADMIN — MAGNESIUM SULFATE HEPTAHYDRATE 2 G: 40 INJECTION, SOLUTION INTRAVENOUS at 08:36

## 2020-11-14 RX ADMIN — SODIUM CHLORIDE, PRESERVATIVE FREE 10 ML: 5 INJECTION INTRAVENOUS at 22:20

## 2020-11-14 RX ADMIN — RIFAXIMIN 600 MG: 200 TABLET ORAL at 08:37

## 2020-11-14 RX ADMIN — MIDODRINE HYDROCHLORIDE 2.5 MG: 5 TABLET ORAL at 12:46

## 2020-11-14 RX ADMIN — LACTULOSE 20 G: 20 SOLUTION ORAL at 12:45

## 2020-11-14 RX ADMIN — TORSEMIDE 20 MG: 20 TABLET ORAL at 08:38

## 2020-11-14 RX ADMIN — FOLIC ACID 1 MG: 1 TABLET ORAL at 08:37

## 2020-11-14 RX ADMIN — MIDODRINE HYDROCHLORIDE 2.5 MG: 5 TABLET ORAL at 08:37

## 2020-11-14 RX ADMIN — MIDODRINE HYDROCHLORIDE 2.5 MG: 5 TABLET ORAL at 18:54

## 2020-11-14 RX ADMIN — LEVOTHYROXINE SODIUM 50 MCG: 50 TABLET ORAL at 06:54

## 2020-11-14 RX ADMIN — ACETAMINOPHEN 500 MG: 500 TABLET, FILM COATED ORAL at 22:19

## 2020-11-14 RX ADMIN — Medication 1 CAPSULE: at 08:38

## 2020-11-14 RX ADMIN — SODIUM CHLORIDE, PRESERVATIVE FREE 10 ML: 5 INJECTION INTRAVENOUS at 08:39

## 2020-11-14 RX ADMIN — LACTULOSE 20 G: 20 SOLUTION ORAL at 22:19

## 2020-11-14 NOTE — PLAN OF CARE
Goal Outcome Evaluation:  Plan of Care Reviewed With: patient  Progress: no change  Outcome Summary: VSS. Pulled tube out of abdomen accidently. Cathed for UA had 825 ml in bladder. wbc remain elevated

## 2020-11-14 NOTE — PROGRESS NOTES
"    [facility] HOSPITALIST TEAM   PROGRESS NOTE      Patient Care Team:  Tayler Trujillo MD as PCP - General    Patient seen at bedside    Hospitalist Team      Patient Care Team:  Provider, No Known as PCP - General          Subjective      Presenting History and Chief Complaints:      Chief Complaint:      Diarrhea and increased abdominal girth    Admission History:    Ms. Tayler Merino is a 70 year old  female with hx of HTN, Depression, DJD, hx of frequent UTIs, alcohol dependence and fatty liver, who was admitted on 2020 with hx of intermittent diarrhea since 2020. She drinks 4/5 vodkas every night and has done so since her   3 years ago. CT scan abdomen was consistent with ac hepatitis and ascites    Interval History and ROS:     Patient States she is feeling better but knows she is not out of the woods yet  Patient Complaints: as above  Patient Denies:  Any sx of fever or cough. She says she has hx of UTIs but denies any dysuria t this time  History taken from: Patient      Objective    Vital Signs  Temp:  [97 °F (36.1 °C)-97.4 °F (36.3 °C)] 97.4 °F (36.3 °C)  Heart Rate:  [] 87  Resp:  [18] 18  BP: (102-155)/(60-70) 105/67    Flowsheet Rows      First Filed Value   Admission Height  157.5 cm (62\") Documented at 2020 1107   Admission Weight  59 kg (130 lb) Documented at 2020 1107              PHYSICAL EXAMINATION:    Physical Exam   VS: As above    Gen: pt alert, comfortably lying in bed, having no pain or difficulty breathing   HEENT: Normocephalic. PERRL, scleral icterus.    CV: RRR, S1+ and S2+. No murmur, rubs or gallops appreciated.   Pul: Clear to auscultation, no rales or rhonchi or rubs.    Abdm: bowel sounds present, abdomen soft, tender, distended drainage from recent paracentesis site    Extr: no edema, cyanosis or clubbing    MSK: Muscle tone and muscle strength are unremarkable    Neuro: pt is alert and responsive. No focal neurologic " deficits    Skin: Warm and dry. Not diaphoretic         Results Review:     I reviewed the patient's new clinical results.    Lab Results (last 24 hours)     Procedure Component Value Units Date/Time    Magnesium [[id number]]  (Normal) Collected: 11/14/20 0414    Specimen: Blood Updated: 11/14/20 0728     Magnesium 1.7 mg/dL     Basic Metabolic Panel [[id number]]  (Abnormal) Collected: 11/14/20 0414    Specimen: Blood Updated: 11/14/20 0524     Glucose 106 mg/dL      BUN 24 mg/dL      Creatinine 0.63 mg/dL      Sodium 135 mmol/L      Potassium 3.8 mmol/L      Chloride 102 mmol/L      CO2 24.7 mmol/L      Calcium 9.0 mg/dL      eGFR Non African Amer 93 mL/min/1.73      BUN/Creatinine Ratio 38.1     Anion Gap 8.3 mmol/L     Narrative:      GFR Normal >60  Chronic Kidney Disease <60  Kidney Failure <15      CBC & Differential [[id number]]  (Abnormal) Collected: 11/14/20 0414    Specimen: Blood Updated: 11/14/20 0449    Narrative:      The following orders were created for panel order CBC & Differential.  Procedure                               Abnormality         Status                     ---------                               -----------         ------                     CBC Auto Differential[[id number]]        Abnormal            Final result                 Please view results for these tests on the individual orders.    CBC Auto Differential [[id number]]  (Abnormal) Collected: 11/14/20 0414    Specimen: Blood Updated: 11/14/20 0449     WBC 22.66 10*3/mm3      RBC 2.83 10*6/mm3      Hemoglobin 10.2 g/dL      Hematocrit 32.9 %      .3 fL      MCH 36.0 pg      MCHC 31.0 g/dL      RDW 16.4 %      RDW-SD 70.1 fl      MPV 12.3 fL      Platelets 211 10*3/mm3      Neutrophil % 77.7 %      Lymphocyte % 7.9 %      Monocyte % 12.0 %      Eosinophil % 0.4 %      Basophil % 0.6 %      Immature Grans % 1.4 %      Neutrophils, Absolute 17.61 10*3/mm3      Lymphocytes, Absolute 1.80 10*3/mm3      Monocytes, Absolute 2.73  10*3/mm3      Eosinophils, Absolute 0.08 10*3/mm3      Basophils, Absolute 0.13 10*3/mm3      Immature Grans, Absolute 0.31 10*3/mm3      nRBC 0.0 /100 WBC     Potassium [[id number]]  (Normal) Collected: 11/13/20 2159    Specimen: Blood Updated: 11/13/20 2347     Potassium 4.3 mmol/L     Blood Culture - Blood, Arm, Left [[id number]] Collected: 11/12/20 1045    Specimen: Blood from Arm, Left Updated: 11/13/20 1100     Blood Culture No growth at 24 hours    Blood Culture - Blood, Hand, Left [[id number]] Collected: 11/12/20 1056    Specimen: Blood from Hand, Left Updated: 11/13/20 1100     Blood Culture No growth at 24 hours          Imaging Results (Last 24 Hours)     ** No results found for the last 24 hours. **          Xray reviewed personally by physician.      ECG reviewed personally by physician  ECG/EMG Results (most recent)     None          Medication Review:   I have reviewed the patient's current medication list    Current Facility-Administered Medications:   •  acetaminophen (TYLENOL) tablet 500 mg, 500 mg, Oral, Q6H PRN, Jamir Malik MD, 500 mg at 11/13/20 2329  •  folic acid (FOLVITE) tablet 1 mg, 1 mg, Oral, Daily, Lacey Burton APRN, 1 mg at 11/14/20 0837  •  lactobacillus acidophilus (RISAQUAD) capsule 1 capsule, 1 capsule, Oral, Daily, Jonathan Carpenter MD, 1 capsule at 11/14/20 0838  •  lactulose (CHRONULAC) 10 GM/15ML solution 20 g, 20 g, Oral, BID, MaximinoTerrell MD, 20 g at 11/13/20 2316  •  levothyroxine (SYNTHROID, LEVOTHROID) tablet 50 mcg, 50 mcg, Oral, Q AM, Jamir Malik MD, 50 mcg at 11/14/20 0654  •  magnesium sulfate 4 gram infusion - Mg less than or equal to 1mg/dL, 4 g, Intravenous, PRN **OR** magnesium sulfate 3 gram infusion (1gm x 3) - Mg 1.1 - 1.5 mg/dL, 1 g, Intravenous, PRN, Last Rate: 100 mL/hr at 11/13/20 1204, 1 g at 11/13/20 1204 **OR** Magnesium Sulfate 2 gram infusion- Mg 1.6 - 1.9 mg/dL, 2 g, Intravenous, PRN, Jamir Malik MD, Last  Rate: 25 mL/hr at 11/14/20 0836, 2 g at 11/14/20 0836  •  midodrine (PROAMATINE) tablet 2.5 mg, 2.5 mg, Oral, TID AC, Jamir Malik MD, 2.5 mg at 11/14/20 0837  •  potassium & sodium phosphates (PHOS-NAK) 280-160-250 MG packet - for Phosphorus 1.3-2.5 mg/dL, 1 packet, Oral, BID PRN, Jamir Malik MD, 1 packet at 11/10/20 1703  •  potassium chloride (K-DUR,KLOR-CON) CR tablet 40 mEq, 40 mEq, Oral, PRN, 40 mEq at 11/13/20 1724 **OR** potassium chloride (KLOR-CON) packet 40 mEq, 40 mEq, Oral, PRN, 40 mEq at 11/07/20 1403 **OR** potassium chloride 10 mEq in 100 mL IVPB, 10 mEq, Intravenous, Q1H PRN, Jamir Malik MD, Last Rate: 100 mL/hr at 11/08/20 0622, 10 mEq at 11/08/20 0622  •  potassium phosphate 21 mmol in sodium chloride 0.9 % 250 mL infusion, 21 mmol, Intravenous, PRN **OR** potassium phosphate 15 mmol in sodium chloride 0.9 % 100 mL infusion, 15 mmol, Intravenous, PRN, 15 mmol at 11/07/20 0944 **OR** potassium phosphate 9 mmol in sodium chloride 0.9 % 100 mL infusion, 9 mmol, Intravenous, PRN, Jamir Malik MD  •  rifAXIMin (XIFAXAN) tablet 600 mg, 600 mg, Oral, Q12H, MaximinoTerrell MD, 600 mg at 11/14/20 0837  •  [COMPLETED] Insert peripheral IV, , , Once **AND** sodium chloride 0.9 % flush 10 mL, 10 mL, Intravenous, PRN, Jamir Mailk MD  •  sodium chloride 0.9 % flush 10 mL, 10 mL, Intravenous, Q12H, Jamir Malik MD, 10 mL at 11/14/20 0839  •  sodium chloride 0.9 % flush 10 mL, 10 mL, Intravenous, Q12H, Jamir Malik MD, 10 mL at 11/14/20 0838  •  sodium chloride 0.9 % flush 10 mL, 10 mL, Intravenous, Q12H, Jamir Malik MD, 10 mL at 11/14/20 0838  •  sodium chloride 0.9 % flush 20 mL, 20 mL, Intravenous, PRKing PARISI Jacquelene R, MD  •  sodium chloride 0.9 % flush 20 mL, 20 mL, Intravenous, King GREENE Jacquelene R, MD  •  sodium chloride 0.9 % infusion 40 mL, 40 mL, Intravenous, King GREENE Jacquelene R, MD, 250 mL at  11/07/20 0607  •  sodium phosphates 21 mmol in sodium chloride 0.9 % 250 mL IVPB, 21 mmol, Intravenous, PRN **OR** sodium phosphates 15 mmol in sodium chloride 0.9 % 250 mL IVPB, 15 mmol, Intravenous, PRN **OR** sodium phosphates 9 mmol in sodium chloride 0.9 % 250 mL IVPB, 9 mmol, Intravenous, PRN, Jamir Malik MD, Stopped at 11/09/20 1300  •  torsemide (DEMADEX) tablet 20 mg, 20 mg, Oral, Daily, Dustin Odom MD, 20 mg at 11/14/20 0838    Facility-Administered Medications Ordered in Other Encounters:   •  mupirocin (BACTROBAN) 2 % nasal ointment, , Nasal, BID, David Marion MD      Assessment/Plan           PLAN:    -Labs and diagnostic tests reviewed: YES    -Diagnostic tests reviewed: YES    -Consultations: Nephrology, Pulmonary and Gastroenterology    -Any new recommendations: As per consultants    -New Labs ordered: CBC, CMP, UA, Procalcitonin    -New diagnostic tests ordered: CXR    -Any changes in medications:  MEDICATION CHANGES:   New Medications added: As per order sheet   Medication Dose changed:   Medications deleted:    -Placement issues: N/A    -Patient is clinically and hemodynamically stable    -To continue current management and supportive care    -Will follow patient closely    -Nothing new to add for right now    -Discharge planning issues: Patient should be able to go to [place] once ready for tranfer for alcohol rehab      DIAGNOSES:      PRIMARY DIAGNOSES:        Marked Leukocytosis: Stable today all cultures have been no growth.  Per GI recommendations we will stop antibiotics and observe. WBC has gone up to 22.66 (from 21.69) with 77.7 N. Procal was 0.45 2 days ago. Will order Procal and Lactate. Will order U and CXR     Acute Alcoholic Hepatitis/Ascites/Coaguloapthy: LFT's trending down.  Continue local drain for ascitic fluid.  On daily torsemide     Acute Metabolic Encephalopathy: Resolved.     Acute Renal Failure: Resolved.  Tolerating diuresis.  No evidence of  Hepatorenal syndrome.     Hypothyroidism: Continue current replacement dose.  Needs TFT's repeated in 6-8 weeks.     Macrocytic Anemia: Hgb stable.  Check prn.  Continue folate treatment.     Acute EtOH Withdrawal: Resolved.  Once stable, will request consult from [facility] for possible transfer to alcohol rehab. Patient is requesting rehab     Deconditioning: PT has signed off.      Anthropometric Analysis: BMI:  27.07 (Overweight)    CODE Status: FULL CODE    Tobacco use: N/A    Alcohol intake: Ethanol dependence    Illegal Drug use: N/A    DVT Prophylaxis: Inj Enoxaparin and SCDs          ?     SECONDARY DIAGNOSES:  ?     As above      SURGICAL DIAGNOSES:      As per Problem List      TODAY'S DISCHARGE:        N/A          Plan for disposition: Patient should be able to go to [place] [facility] once ready for transfer    Ken Pereyra MD  11/14/20  10:07 EST            Ken Pereyra M.D.; MS; FACP; MPH; ZENOBIA  Internal Medicine/ Hospitalist          Time:  30  minutes

## 2020-11-14 NOTE — NURSING NOTE
"Continued Stay Note  DORIS Cobos     Patient Name: Tayler Lugo  MRN: [id number]  Today's Date: 11/14/2020    Admit Date: 11/4/2020    Discharge Plan     Row Name 11/14/20 7022       Plan    Plan  to be determined    Patient/Family in Agreement with Plan  yes    Plan Comments  Spoke with patient and her sister at bedside. Patient is alert and oriented and talking about different options of care at dc. She asked about [facility], informed that was a consideration when she was on IV abx, but she is now taking oral medication. Will follow to see if she will require PT for strengthening before dc home. She is also agreeable to dc home to her daughters home,  \"but her kids are now being home schooled and I may be better off to return to my house with a paid care giver.\" She states she has good support from her 2 daughters and her sister and she is open to any options. Discussion of [facility] prior to dc. She is still agreeable to the eval as she wants to address her drinking and remain sober. Explained that she must be medically ready for dc before [facility] will evaluate patient needs. Her sister is involved in conversation and appears to be very supportive. CM has provided information about HH/Rehab & private pay agencies to the family - patient is aware of those resources. CM will continue to follow.        Discharge Codes    No documentation.             Michael Peraza RN    "

## 2020-11-14 NOTE — PLAN OF CARE
Goal Outcome Evaluation:  Plan of Care Reviewed With: patient  Progress: improving  Outcome Summary: continue to monitor mag/pot/phos protocol. patient c/o pain with decrease in pain after prn tylenol. continue to monitor patient's drain.

## 2020-11-15 ENCOUNTER — APPOINTMENT (OUTPATIENT)
Dept: CT IMAGING | Facility: HOSPITAL | Age: 70
End: 2020-11-15

## 2020-11-15 LAB
ALBUMIN SERPL-MCNC: 2.8 G/DL (ref 3.5–5.2)
ALBUMIN/GLOB SERPL: 1 G/DL
ALP SERPL-CCNC: 176 U/L (ref 39–117)
ALT SERPL W P-5'-P-CCNC: 61 U/L (ref 1–33)
ANION GAP SERPL CALCULATED.3IONS-SCNC: 12.3 MMOL/L (ref 5–15)
AST SERPL-CCNC: 158 U/L (ref 1–32)
BASOPHILS # BLD AUTO: 0.07 10*3/MM3 (ref 0–0.2)
BASOPHILS NFR BLD AUTO: 0.3 % (ref 0–1.5)
BILIRUB SERPL-MCNC: 8.8 MG/DL (ref 0–1.2)
BUN SERPL-MCNC: 27 MG/DL (ref 8–23)
BUN/CREAT SERPL: 43.5 (ref 7–25)
CALCIUM SPEC-SCNC: 8.9 MG/DL (ref 8.6–10.5)
CHLORIDE SERPL-SCNC: 100 MMOL/L (ref 98–107)
CO2 SERPL-SCNC: 24.7 MMOL/L (ref 22–29)
CREAT SERPL-MCNC: 0.62 MG/DL (ref 0.57–1)
D-LACTATE SERPL-SCNC: 1.8 MMOL/L (ref 0.5–2)
DEPRECATED RDW RBC AUTO: 69.3 FL (ref 37–54)
EOSINOPHIL # BLD AUTO: 0.1 10*3/MM3 (ref 0–0.4)
EOSINOPHIL NFR BLD AUTO: 0.4 % (ref 0.3–6.2)
ERYTHROCYTE [DISTWIDTH] IN BLOOD BY AUTOMATED COUNT: 16.1 % (ref 12.3–15.4)
GFR SERPL CREATININE-BSD FRML MDRD: 95 ML/MIN/1.73
GLOBULIN UR ELPH-MCNC: 2.7 GM/DL
GLUCOSE SERPL-MCNC: 101 MG/DL (ref 65–99)
HCT VFR BLD AUTO: 34 % (ref 34–46.6)
HGB BLD-MCNC: 10.7 G/DL (ref 12–15.9)
IMM GRANULOCYTES # BLD AUTO: 0.14 10*3/MM3 (ref 0–0.05)
IMM GRANULOCYTES NFR BLD AUTO: 0.6 % (ref 0–0.5)
INR PPP: 1.41 (ref 0.9–1.1)
LYMPHOCYTES # BLD AUTO: 2.43 10*3/MM3 (ref 0.7–3.1)
LYMPHOCYTES NFR BLD AUTO: 10.7 % (ref 19.6–45.3)
MAGNESIUM SERPL-MCNC: 1.6 MG/DL (ref 1.6–2.4)
MCH RBC QN AUTO: 36.5 PG (ref 26.6–33)
MCHC RBC AUTO-ENTMCNC: 31.5 G/DL (ref 31.5–35.7)
MCV RBC AUTO: 116 FL (ref 79–97)
MONOCYTES # BLD AUTO: 2.3 10*3/MM3 (ref 0.1–0.9)
MONOCYTES NFR BLD AUTO: 10.1 % (ref 5–12)
NEUTROPHILS NFR BLD AUTO: 17.77 10*3/MM3 (ref 1.7–7)
NEUTROPHILS NFR BLD AUTO: 77.9 % (ref 42.7–76)
PLATELET # BLD AUTO: 248 10*3/MM3 (ref 140–450)
PMV BLD AUTO: 11.9 FL (ref 6–12)
POTASSIUM SERPL-SCNC: 3.1 MMOL/L (ref 3.5–5.2)
PROT SERPL-MCNC: 5.5 G/DL (ref 6–8.5)
PROTHROMBIN TIME: 16.8 SECONDS (ref 12.1–15)
RBC # BLD AUTO: 2.93 10*6/MM3 (ref 3.77–5.28)
SODIUM SERPL-SCNC: 137 MMOL/L (ref 136–145)
WBC # BLD AUTO: 22.81 10*3/MM3 (ref 3.4–10.8)

## 2020-11-15 PROCEDURE — 25010000002 MAGNESIUM SULFATE IN D5W 1G/100ML (PREMIX) 1-5 GM/100ML-% SOLUTION: Performed by: INTERNAL MEDICINE

## 2020-11-15 PROCEDURE — 25010000002 MAGNESIUM SULFATE 2 GM/50ML SOLUTION: Performed by: INTERNAL MEDICINE

## 2020-11-15 PROCEDURE — 87040 BLOOD CULTURE FOR BACTERIA: CPT | Performed by: INTERNAL MEDICINE

## 2020-11-15 PROCEDURE — 83605 ASSAY OF LACTIC ACID: CPT | Performed by: INTERNAL MEDICINE

## 2020-11-15 PROCEDURE — 74176 CT ABD & PELVIS W/O CONTRAST: CPT

## 2020-11-15 PROCEDURE — 85610 PROTHROMBIN TIME: CPT | Performed by: HOSPITALIST

## 2020-11-15 PROCEDURE — 85025 COMPLETE CBC W/AUTO DIFF WBC: CPT | Performed by: INTERNAL MEDICINE

## 2020-11-15 PROCEDURE — 83735 ASSAY OF MAGNESIUM: CPT | Performed by: INTERNAL MEDICINE

## 2020-11-15 PROCEDURE — 80053 COMPREHEN METABOLIC PANEL: CPT | Performed by: INTERNAL MEDICINE

## 2020-11-15 PROCEDURE — 25010000002 PIPERACILLIN SOD-TAZOBACTAM PER 1 G: Performed by: INTERNAL MEDICINE

## 2020-11-15 PROCEDURE — 99232 SBSQ HOSP IP/OBS MODERATE 35: CPT | Performed by: INTERNAL MEDICINE

## 2020-11-15 RX ORDER — POTASSIUM CHLORIDE 20 MEQ/1
40 TABLET, EXTENDED RELEASE ORAL ONCE
Status: COMPLETED | OUTPATIENT
Start: 2020-11-15 | End: 2020-11-15

## 2020-11-15 RX ADMIN — LACTULOSE 20 G: 20 SOLUTION ORAL at 09:53

## 2020-11-15 RX ADMIN — LEVOTHYROXINE SODIUM 50 MCG: 50 TABLET ORAL at 06:42

## 2020-11-15 RX ADMIN — RIFAXIMIN 600 MG: 200 TABLET ORAL at 20:55

## 2020-11-15 RX ADMIN — MIDODRINE HYDROCHLORIDE 2.5 MG: 5 TABLET ORAL at 09:52

## 2020-11-15 RX ADMIN — MIDODRINE HYDROCHLORIDE 2.5 MG: 5 TABLET ORAL at 20:55

## 2020-11-15 RX ADMIN — SODIUM CHLORIDE, PRESERVATIVE FREE 10 ML: 5 INJECTION INTRAVENOUS at 20:57

## 2020-11-15 RX ADMIN — MAGNESIUM SULFATE HEPTAHYDRATE 2 G: 40 INJECTION, SOLUTION INTRAVENOUS at 09:56

## 2020-11-15 RX ADMIN — SODIUM CHLORIDE, PRESERVATIVE FREE 10 ML: 5 INJECTION INTRAVENOUS at 11:53

## 2020-11-15 RX ADMIN — SODIUM CHLORIDE 3.38 G: 900 INJECTION, SOLUTION INTRAVENOUS at 11:11

## 2020-11-15 RX ADMIN — POTASSIUM CHLORIDE 40 MEQ: 1500 TABLET, EXTENDED RELEASE ORAL at 09:51

## 2020-11-15 RX ADMIN — MAGNESIUM SULFATE HEPTAHYDRATE 1 G: 1 INJECTION, SOLUTION INTRAVENOUS at 09:53

## 2020-11-15 RX ADMIN — TORSEMIDE 20 MG: 20 TABLET ORAL at 09:51

## 2020-11-15 RX ADMIN — FOLIC ACID 1 MG: 1 TABLET ORAL at 09:53

## 2020-11-15 RX ADMIN — POTASSIUM CHLORIDE 40 MEQ: 1500 TABLET, EXTENDED RELEASE ORAL at 16:04

## 2020-11-15 RX ADMIN — MAGNESIUM SULFATE HEPTAHYDRATE 1 G: 1 INJECTION, SOLUTION INTRAVENOUS at 11:11

## 2020-11-15 RX ADMIN — Medication 1 CAPSULE: at 09:52

## 2020-11-15 RX ADMIN — MAGNESIUM SULFATE HEPTAHYDRATE 1 G: 1 INJECTION, SOLUTION INTRAVENOUS at 12:09

## 2020-11-15 RX ADMIN — MIDODRINE HYDROCHLORIDE 2.5 MG: 5 TABLET ORAL at 11:54

## 2020-11-15 RX ADMIN — POTASSIUM CHLORIDE 40 MEQ: 1500 TABLET, EXTENDED RELEASE ORAL at 20:55

## 2020-11-15 RX ADMIN — SODIUM CHLORIDE, PRESERVATIVE FREE 10 ML: 5 INJECTION INTRAVENOUS at 20:56

## 2020-11-15 RX ADMIN — LACTULOSE 20 G: 20 SOLUTION ORAL at 20:55

## 2020-11-15 RX ADMIN — RIFAXIMIN 600 MG: 200 TABLET ORAL at 09:52

## 2020-11-15 NOTE — NURSING NOTE
Spoke with dr. Wilkinson regarding patient's urinary retention. I also made her aware that patient has been bladder scanned and had an I & O cath done. New orders include to continue to monitor, bladder scan and encourage patient to get up and use the bathroom. If there is no result obtain order for herrera.

## 2020-11-15 NOTE — PLAN OF CARE
Goal Outcome Evaluation:  Plan of Care Reviewed With: patient  Progress: improving  Outcome Summary: patient having c/o pain in abdomen; given prn tylenol with decrease in pain level. patient's drain came out on dayshift; ostomy bag placed over area. continue to monitor mag, pot, and phos. patient having urinary retention. continue to monitor with bladder scans and I & O cath. when needed.

## 2020-11-15 NOTE — NURSING NOTE
Spoke with Dr. Pereyra regarding patient's urinary retention and increase in abdominal pain. Dr. Pereyra also aware that ostomy bag was applied to the area where the drain came out. Dr. Pereyra made aware that patient has been bladder scanned twice and had an I & O cath. New orders include to order a CT A/P.

## 2020-11-15 NOTE — PROGRESS NOTES
"    [facility] HOSPITALIST TEAM   PROGRESS NOTE      Patient Care Team:  Tayler Trujillo MD as PCP - General    Patient seen at bedside    Hospitalist Team      Patient Care Team:  Provider, No Known as PCP - General          Subjective      Presenting History and Chief Complaints:      Chief Complaint:      Diarrhea and increased abdominal girth    Admission History:    Ms. Tayler Merino is a 70 year old  female with hx of HTN, Depression, DJD, hx of frequent UTIs, alcohol dependence and fatty liver, who was admitted on 2020 with hx of intermittent diarrhea since 2020. She drinks 4/5 vodkas every night and has done so since her   3 years ago. CT scan abdomen was consistent with ac hepatitis and ascites    Interval History and ROS:     Patient States No diarrhea but her abdomen is more swollen and bigger. CT scan of abdomen will be  repeated  Patient Complaints: As above  Patient Denies:  Any sx of fever, chest pain, dyspnea, nausea/ vomiting  History taken from: Patient      Objective    Vital Signs  Temp:  [96.5 °F (35.8 °C)-98.1 °F (36.7 °C)] 98.1 °F (36.7 °C)  Heart Rate:  [68-96] 68  Resp:  [18-20] 18  BP: ()/(52-61) 90/55    Flowsheet Rows      First Filed Value   Admission Height  157.5 cm (62\") Documented at 2020 1107   Admission Weight  59 kg (130 lb) Documented at 2020 1107              PHYSICAL EXAMINATION:    Vitals: As above  General: Well appearing elderly female. NAD   HEENT: Normocephalic, PERRL. [place] sclera. No rhinitis. Moist mucous membranes of oral cavity   Neck: Supple. No masses. No thyromegaly. No bruits.   Lymph nodes: No palpable lymphadenopathy   Cardio: RRR. S1, S2 normal without murmur/gallop/rub. No S3, S4.   Pulmonary: CTAB. No wheezes/rales/crackles.   Skin: no rash or lesions   Abdomen: Soft, non-tender, distended. No masses. Ascites with recent paracentesis. No rebound/guarding. No hepatosplenomegaly appreciable. +BS "   Extremities: No cyanosis, clubbing, or edema. No rash or lesions. + pedal pulses   MSK: Unremarkable. Muscle strength and muscle tone are WNL.    Psychiatry: Alert and oriented X3. Responds appropriately to questions.   Neuro: CN II-XII grossly intact. No decrease in strength. No decrease in sensation.         Results Review:     I reviewed the patient's new clinical results.    Lab Results (last 24 hours)     Procedure Component Value Units Date/Time    Protime-INR [[id number]]  (Abnormal) Collected: 11/15/20 0605    Specimen: Blood Updated: 11/15/20 0700     Protime 16.8 Seconds      INR 1.41    Narrative:      Therapeutic Ranges for INR: 2.0-3.0 (PT 20-30)                              2.5-3.5 (PT 25-34)    Comprehensive Metabolic Panel [[id number]]  (Abnormal) Collected: 11/15/20 0605    Specimen: Blood Updated: 11/15/20 0654     Glucose 101 mg/dL      BUN 27 mg/dL      Creatinine 0.62 mg/dL      Sodium 137 mmol/L      Potassium 3.1 mmol/L      Chloride 100 mmol/L      CO2 24.7 mmol/L      Calcium 8.9 mg/dL      Total Protein 5.5 g/dL      Albumin 2.80 g/dL      ALT (SGPT) 61 U/L      AST (SGOT) 158 U/L      Alkaline Phosphatase 176 U/L      Total Bilirubin 8.8 mg/dL      eGFR Non African Amer 95 mL/min/1.73      Globulin 2.7 gm/dL      A/G Ratio 1.0 g/dL      BUN/Creatinine Ratio 43.5     Anion Gap 12.3 mmol/L     Narrative:      GFR Normal >60  Chronic Kidney Disease <60  Kidney Failure <15      Magnesium [[id number]]  (Normal) Collected: 11/15/20 0605    Specimen: Blood Updated: 11/15/20 0653     Magnesium 1.6 mg/dL     Lactic Acid, Plasma [[id number]]  (Normal) Collected: 11/15/20 0605    Specimen: Blood Updated: 11/15/20 0642     Lactate 1.8 mmol/L     CBC & Differential [[id number]]  (Abnormal) Collected: 11/15/20 0605    Specimen: Blood Updated: 11/15/20 0635    Narrative:      The following orders were created for panel order CBC & Differential.  Procedure                               Abnormality          Status                     ---------                               -----------         ------                     CBC Auto Differential[[id number]]        Abnormal            Final result                 Please view results for these tests on the individual orders.    CBC Auto Differential [[id number]]  (Abnormal) Collected: 11/15/20 0605    Specimen: Blood Updated: 11/15/20 0635     WBC 22.81 10*3/mm3      RBC 2.93 10*6/mm3      Hemoglobin 10.7 g/dL      Hematocrit 34.0 %      .0 fL      MCH 36.5 pg      MCHC 31.5 g/dL      RDW 16.1 %      RDW-SD 69.3 fl      MPV 11.9 fL      Platelets 248 10*3/mm3      Neutrophil % 77.9 %      Lymphocyte % 10.7 %      Monocyte % 10.1 %      Eosinophil % 0.4 %      Basophil % 0.3 %      Immature Grans % 0.6 %      Neutrophils, Absolute 17.77 10*3/mm3      Lymphocytes, Absolute 2.43 10*3/mm3      Monocytes, Absolute 2.30 10*3/mm3      Eosinophils, Absolute 0.10 10*3/mm3      Basophils, Absolute 0.07 10*3/mm3      Immature Grans, Absolute 0.14 10*3/mm3     Urinalysis With Culture If Indicated - Urine, Catheter In/Out [[id number]]  (Abnormal) Collected: 11/14/20 1609    Specimen: Urine, Catheter In/Out Updated: 11/14/20 1628     Color, UA Toshia     Appearance, UA Clear     pH, UA <=5.0     Specific Gravity, UA 1.020     Glucose, UA Negative     Ketones, UA Negative     Bilirubin, UA Small (1+)     Blood, UA Negative     Protein, UA Negative     Leuk Esterase, UA Negative     Nitrite, UA Negative     Urobilinogen, UA 0.2 E.U./dL    Narrative:      Urine microscopic not indicated.    Procalcitonin [[id number]]  (Abnormal) Collected: 11/14/20 1050    Specimen: Blood Updated: 11/14/20 1127     Procalcitonin 0.72 ng/mL     Narrative:      Results may be falsely decreased if patient taking Biotin.     Lactic Acid, Plasma [[id number]]  (Abnormal) Collected: 11/14/20 1050    Specimen: Blood Updated: 11/14/20 1119     Lactate 2.1 mmol/L     Blood Culture - Blood, Arm, Left [[id number]]  Collected: 11/12/20 1045    Specimen: Blood from Arm, Left Updated: 11/14/20 1100     Blood Culture No growth at 2 days    Blood Culture - Blood, Hand, Left [[id number]] Collected: 11/12/20 1056    Specimen: Blood from Hand, Left Updated: 11/14/20 1100     Blood Culture No growth at 2 days          Imaging Results (Last 24 Hours)     Procedure Component Value Units Date/Time    XR Chest PA & Lateral [[id number]] Collected: 11/14/20 1220     Updated: 11/14/20 1223    Narrative:      CHEST X-RAY, 11/14/2020      HISTORY:    70-year-old female hospital inpatient with sepsis. Severe. Evaluate for pneumonia.      TECHNIQUE:  Upright two view chest x-ray.    COMPARISON:  *  Chest x-ray, 11/10/2020.    FINDINGS:  Chronically low lung volumes with platelike scarring or linear atelectasis in the posterior lung bases, unchanged. No convincing acute pulmonary infiltrate. No visible pleural effusion. Heart size and pulmonary vascularity are normal. PICC in good  position.      Impression:      No definite active disease. No change since 11/10/2020.    Signer Name: Ricky Smith MD   Signed: 11/14/2020 12:20 PM   [facility]    [facility] by physician.      ECG reviewed personally by physician  ECG/EMG Results (most recent)     None          Medication Review:   I have reviewed the patient's current medication list    Current Facility-Administered Medications:   •  acetaminophen (TYLENOL) tablet 500 mg, 500 mg, Oral, Q6H PRN, Jamir Malik MD, 500 mg at 11/14/20 2219  •  folic acid (FOLVITE) tablet 1 mg, 1 mg, Oral, Daily, Lacey Burton APRN, 1 mg at 11/14/20 0837  •  lactobacillus acidophilus (RISAQUAD) capsule 1 capsule, 1 capsule, Oral, Daily, Jonathan Carpenter MD, 1 capsule at 11/14/20 0838  •  lactulose (CHRONULAC) 10 GM/15ML solution 20 g, 20 g, Oral, BID, Maximino, Terrell Denis MD, 20 g at 11/14/20 2219  •  levothyroxine  (SYNTHROID, LEVOTHROID) tablet 50 mcg, 50 mcg, Oral, Q AM, Jamir Malik MD, 50 mcg at 11/15/20 0642  •  magnesium sulfate 4 gram infusion - Mg less than or equal to 1mg/dL, 4 g, Intravenous, PRN **OR** magnesium sulfate 3 gram infusion (1gm x 3) - Mg 1.1 - 1.5 mg/dL, 1 g, Intravenous, PRN, Last Rate: 100 mL/hr at 11/13/20 1204, 1 g at 11/13/20 1204 **OR** Magnesium Sulfate 2 gram infusion- Mg 1.6 - 1.9 mg/dL, 2 g, Intravenous, PRN, Jamir Malik MD, Last Rate: 25 mL/hr at 11/14/20 0836, 2 g at 11/14/20 0836  •  midodrine (PROAMATINE) tablet 2.5 mg, 2.5 mg, Oral, TID AC, Jamir Malik MD, 2.5 mg at 11/14/20 1854  •  potassium & sodium phosphates (PHOS-NAK) 280-160-250 MG packet - for Phosphorus 1.3-2.5 mg/dL, 1 packet, Oral, BID PRN, Jamir Malik MD, 1 packet at 11/10/20 1703  •  potassium chloride (K-DUR,KLOR-CON) CR tablet 40 mEq, 40 mEq, Oral, PRN, 40 mEq at 11/13/20 1724 **OR** potassium chloride (KLOR-CON) packet 40 mEq, 40 mEq, Oral, PRN, 40 mEq at 11/07/20 1403 **OR** potassium chloride 10 mEq in 100 mL IVPB, 10 mEq, Intravenous, Q1H PRN, Jamir Malik MD, Last Rate: 100 mL/hr at 11/08/20 0622, 10 mEq at 11/08/20 0622  •  potassium phosphate 21 mmol in sodium chloride 0.9 % 250 mL infusion, 21 mmol, Intravenous, PRN **OR** potassium phosphate 15 mmol in sodium chloride 0.9 % 100 mL infusion, 15 mmol, Intravenous, PRN, 15 mmol at 11/07/20 0944 **OR** potassium phosphate 9 mmol in sodium chloride 0.9 % 100 mL infusion, 9 mmol, Intravenous, PRN, Jamir Malik MD  •  rifAXIMin (XIFAXAN) tablet 600 mg, 600 mg, Oral, Q12H, Terrell Stanton MD, 600 mg at 11/14/20 2203  •  [COMPLETED] Insert peripheral IV, , , Once **AND** sodium chloride 0.9 % flush 10 mL, 10 mL, Intravenous, PRN, Jamir Malik MD  •  sodium chloride 0.9 % flush 10 mL, 10 mL, Intravenous, Q12H, Jamir Malik MD, 10 mL at 11/14/20 2220  •  sodium chloride 0.9 % flush 10 mL,  10 mL, Intravenous, Q12H, Jamir Malik MD, 10 mL at 11/14/20 2220  •  sodium chloride 0.9 % flush 10 mL, 10 mL, Intravenous, Q12H, Jamir Malik MD, 10 mL at 11/14/20 2220  •  sodium chloride 0.9 % flush 20 mL, 20 mL, Intravenous, PRN, Jamir Malik MD  •  sodium chloride 0.9 % flush 20 mL, 20 mL, Intravenous, PRN, Jamir Malik MD  •  sodium chloride 0.9 % infusion 40 mL, 40 mL, Intravenous, PRN, Jamir Malik MD, 250 mL at 11/07/20 0607  •  sodium phosphates 21 mmol in sodium chloride 0.9 % 250 mL IVPB, 21 mmol, Intravenous, PRN **OR** sodium phosphates 15 mmol in sodium chloride 0.9 % 250 mL IVPB, 15 mmol, Intravenous, PRN **OR** sodium phosphates 9 mmol in sodium chloride 0.9 % 250 mL IVPB, 9 mmol, Intravenous, PRN, Jamir Malik MD, Stopped at 11/09/20 1300  •  torsemide (DEMADEX) tablet 20 mg, 20 mg, Oral, Daily, Dustin Odom MD, 20 mg at 11/14/20 0838    Facility-Administered Medications Ordered in Other Encounters:   •  mupirocin (BACTROBAN) 2 % nasal ointment, , Nasal, BID, David Marion MD      Assessment/Plan           PLAN:    -Labs and diagnostic tests reviewed: YES    -Diagnostic tests reviewed: YES    -Consultations: Pulmonary and Gastroenterology    -Any new recommendations: AS per specialists    -New Labs ordered: CBC, CMP, Lactate and Procalcitonin    -New diagnostic tests ordered: CT scan abdomen    -Any changes in medications:  MEDICATION CHANGES:   New Medications added: As per order sheet   Medication Dose changed:   Medications deleted:    -Placement issues: N/A    -Patient is clinically and hemodynamically stable    -To continue current management and supportive care    -Will follow patient closely    -Nothing new to add for right now    -Discharge planning issues: Patient should be able to go to [place] once accepted and once ready for discharge      DIAGNOSES:      PRIMARY DIAGNOSES:          Marked Leukocytosis: Stable today  all cultures have been no growth.  Per GI recommendations we will stop antibiotics and observe. WBC has gone up to 22.81 (from 21.69) with 77.9 N. Procal was 0.72 up from 0.45 2 days ago. Lactate 1.8. U/A unremarkable and CXR without any acute infiltrates. I suspect intraabdominal etiology. ALT/AST 61/158. Total Bili 8.8. CT scan and US abdomen do not show gallstones. I will start patient on Inj Zosyn. I will request ID consult    Hypokalemia: Serum K 3.1. Will replare     Acute Alcoholic Hepatitis/Ascites/Coaguloapthy: LFT's trending down.  Continue local drain for ascitic fluid.  On daily torsemide     Acute Metabolic Encephalopathy: Resolved.     Acute Renal Failure: Resolved.  Tolerating diuresis.  No evidence of Hepatorenal syndrome.     Hypothyroidism: Continue current replacement dose.  Needs TFT's repeated in 6-8 weeks.     Macrocytic Anemia: Hgb stable.  Check prn.  Continue folate treatment.     Acute EtOH Withdrawal: Resolved.  Once stable, will request consult from [place] for possible transfer to alcohol rehab. Patient is requesting rehab     Deconditioning: PT has signed off.       Anthropometric Analysis: BMI:  27.07 (Overweight)     CODE Status: FULL CODE     Tobacco use: N/A     Alcohol intake: Ethanol dependence     Illegal Drug use: N/A     DVT Prophylaxis: Inj Enoxaparin and SCDs          ?     SECONDARY DIAGNOSES:  ?     As above      SURGICAL DIAGNOSES:      As per Problem List      TODAY'S DISCHARGE:        N/A          Plan for disposition: Patient should be able to go back to [place] for alcohol rehab once ready for discharge    Ken Pereyra MD  11/15/20  08:23 EST            Ken Pereyra M.D.; MS; FACP; MPH; ZENOBIA  Internal Medicine/ Hospitalist          Time: 45 minutes

## 2020-11-15 NOTE — CONSULTS
LOS: 11 days   Patient Care Team:  Tayler Trujillo MD as PCP - General        Subjective       Attending MD : Jonathan Carpenter MD    Patient Complaints: diarrhea         History of Present Illness  :     Patient Denies:  NV    PMH :   Acute liver failure    Frequent UTI    Hypertension    Acute respiratory failure with hypoxia (CMS/HCC)    Ascites due to alcoholic hepatitis    Bladder prolapse, female, acquired    Sepsis associated hypotension (CMS/HCC)    Altered mental state    Acute renal failure (CMS/HCC)    Alcohol abuse    Severe malnutrition (CMS/HCC)      Review of Systems:    diarrhea    Objective     Vital Signs  Temp:  [96.5 °F (35.8 °C)-98.6 °F (37 °C)] 98.6 °F (37 °C)  Heart Rate:  [68-96] 96  Resp:  [16-20] 16  BP: ()/(52-65) 102/65    Physical Exam:     General Appearance:    Alert, cooperative, in no acute distress   Head:    Normocephalic, without obvious abnormality, atraumatic   Eyes:            Lids and lashes normal, conjunctivae and sclerae normal, no   icterus, no pallor, corneas clear, PERRLA   Ears:    Ears appear intact with no abnormalities noted   Throat:   No oral lesions, no thrush, oral mucosa moist   Neck:   No adenopathy, supple, trachea midline, no thyromegaly, no     carotid bruit, no JVD   Back:     No kyphosis present, no scoliosis present, no skin lesions,       erythema or scars, no tenderness to percussion or                   palpation,   range of motion normal   Lungs:     Clear to auscultation,respirations regular, even and                   unlabored    Heart:    Regular rhythm and normal rate, normal S1 and S2, no            murmur, no gallop, no rub, no click   Breast Exam:    Deferred   Abdomen:     Normal bowel sounds, no masses, no organomegaly, soft        non-tender, non-distended, no guarding, no rebound                 tenderness   Genitalia:    Deferred   Extremities:   Moves all extremities well, no edema, no cyanosis, no              redness   Pulses:    Pulses palpable and equal bilaterally   Skin:   No bleeding, bruising or rash   Lymph nodes:   No palpable adenopathy   Neurologic:   Cranial nerves 2 - 12 grossly intact, sensation intact, DTR        present and equal bilaterally          Results Review:    Lab Results (last 72 hours)     Procedure Component Value Units Date/Time    Blood Culture - Blood, Arm, Left [[id number]] Collected: 11/12/20 1045    Specimen: Blood from Arm, Left Updated: 11/15/20 1100     Blood Culture No growth at 3 days    Blood Culture - Blood, Hand, Left [[id number]] Collected: 11/12/20 1056    Specimen: Blood from Hand, Left Updated: 11/15/20 1100     Blood Culture No growth at 3 days    Blood Culture - Blood, Arm, Left [[id number]] Collected: 11/15/20 0929    Specimen: Blood from Arm, Left Updated: 11/15/20 0938    Protime-INR [[id number]]  (Abnormal) Collected: 11/15/20 0605    Specimen: Blood Updated: 11/15/20 0700     Protime 16.8 Seconds      INR 1.41    Narrative:      Therapeutic Ranges for INR: 2.0-3.0 (PT 20-30)                              2.5-3.5 (PT 25-34)    Comprehensive Metabolic Panel [[id number]]  (Abnormal) Collected: 11/15/20 0605    Specimen: Blood Updated: 11/15/20 0654     Glucose 101 mg/dL      BUN 27 mg/dL      Creatinine 0.62 mg/dL      Sodium 137 mmol/L      Potassium 3.1 mmol/L      Chloride 100 mmol/L      CO2 24.7 mmol/L      Calcium 8.9 mg/dL      Total Protein 5.5 g/dL      Albumin 2.80 g/dL      ALT (SGPT) 61 U/L      AST (SGOT) 158 U/L      Alkaline Phosphatase 176 U/L      Total Bilirubin 8.8 mg/dL      eGFR Non African Amer 95 mL/min/1.73      Globulin 2.7 gm/dL      A/G Ratio 1.0 g/dL      BUN/Creatinine Ratio 43.5     Anion Gap 12.3 mmol/L     Narrative:      GFR Normal >60  Chronic Kidney Disease <60  Kidney Failure <15      Magnesium [[id number]]  (Normal) Collected: 11/15/20 0605    Specimen: Blood Updated: 11/15/20 0653     Magnesium 1.6 mg/dL     Lactic Acid, Plasma [[id number]]  (Normal) Collected:  11/15/20 0605    Specimen: Blood Updated: 11/15/20 0642     Lactate 1.8 mmol/L     CBC & Differential [[id number]]  (Abnormal) Collected: 11/15/20 0605    Specimen: Blood Updated: 11/15/20 0635    Narrative:      The following orders were created for panel order CBC & Differential.  Procedure                               Abnormality         Status                     ---------                               -----------         ------                     CBC Auto Differential[[id number]]        Abnormal            Final result                 Please view results for these tests on the individual orders.    CBC Auto Differential [[id number]]  (Abnormal) Collected: 11/15/20 0605    Specimen: Blood Updated: 11/15/20 0635     WBC 22.81 10*3/mm3      RBC 2.93 10*6/mm3      Hemoglobin 10.7 g/dL      Hematocrit 34.0 %      .0 fL      MCH 36.5 pg      MCHC 31.5 g/dL      RDW 16.1 %      RDW-SD 69.3 fl      MPV 11.9 fL      Platelets 248 10*3/mm3      Neutrophil % 77.9 %      Lymphocyte % 10.7 %      Monocyte % 10.1 %      Eosinophil % 0.4 %      Basophil % 0.3 %      Immature Grans % 0.6 %      Neutrophils, Absolute 17.77 10*3/mm3      Lymphocytes, Absolute 2.43 10*3/mm3      Monocytes, Absolute 2.30 10*3/mm3      Eosinophils, Absolute 0.10 10*3/mm3      Basophils, Absolute 0.07 10*3/mm3      Immature Grans, Absolute 0.14 10*3/mm3     Urinalysis With Culture If Indicated - Urine, Catheter In/Out [[id number]]  (Abnormal) Collected: 11/14/20 1609    Specimen: Urine, Catheter In/Out Updated: 11/14/20 1628     Color, UA Toshia     Appearance, UA Clear     pH, UA <=5.0     Specific Gravity, UA 1.020     Glucose, UA Negative     Ketones, UA Negative     Bilirubin, UA Small (1+)     Blood, UA Negative     Protein, UA Negative     Leuk Esterase, UA Negative     Nitrite, UA Negative     Urobilinogen, UA 0.2 E.U./dL    Narrative:      Urine microscopic not indicated.    Procalcitonin [[id number]]  (Abnormal) Collected: 11/14/20 1050     Specimen: Blood Updated: 11/14/20 1127     Procalcitonin 0.72 ng/mL     Narrative:      Results may be falsely decreased if patient taking Biotin.     Lactic Acid, Plasma [[id number]]  (Abnormal) Collected: 11/14/20 1050    Specimen: Blood Updated: 11/14/20 1119     Lactate 2.1 mmol/L     Magnesium [[id number]]  (Normal) Collected: 11/14/20 0414    Specimen: Blood Updated: 11/14/20 0728     Magnesium 1.7 mg/dL     Basic Metabolic Panel [[id number]]  (Abnormal) Collected: 11/14/20 0414    Specimen: Blood Updated: 11/14/20 0524     Glucose 106 mg/dL      BUN 24 mg/dL      Creatinine 0.63 mg/dL      Sodium 135 mmol/L      Potassium 3.8 mmol/L      Chloride 102 mmol/L      CO2 24.7 mmol/L      Calcium 9.0 mg/dL      eGFR Non African Amer 93 mL/min/1.73      BUN/Creatinine Ratio 38.1     Anion Gap 8.3 mmol/L     Narrative:      GFR Normal >60  Chronic Kidney Disease <60  Kidney Failure <15      CBC & Differential [[id number]]  (Abnormal) Collected: 11/14/20 0414    Specimen: Blood Updated: 11/14/20 0449    Narrative:      The following orders were created for panel order CBC & Differential.  Procedure                               Abnormality         Status                     ---------                               -----------         ------                     CBC Auto Differential[[id number]]        Abnormal            Final result                 Please view results for these tests on the individual orders.    CBC Auto Differential [[id number]]  (Abnormal) Collected: 11/14/20 0414    Specimen: Blood Updated: 11/14/20 0449     WBC 22.66 10*3/mm3      RBC 2.83 10*6/mm3      Hemoglobin 10.2 g/dL      Hematocrit 32.9 %      .3 fL      MCH 36.0 pg      MCHC 31.0 g/dL      RDW 16.4 %      RDW-SD 70.1 fl      MPV 12.3 fL      Platelets 211 10*3/mm3      Neutrophil % 77.7 %      Lymphocyte % 7.9 %      Monocyte % 12.0 %      Eosinophil % 0.4 %      Basophil % 0.6 %      Immature Grans % 1.4 %      Neutrophils, Absolute  17.61 10*3/mm3      Lymphocytes, Absolute 1.80 10*3/mm3      Monocytes, Absolute 2.73 10*3/mm3      Eosinophils, Absolute 0.08 10*3/mm3      Basophils, Absolute 0.13 10*3/mm3      Immature Grans, Absolute 0.31 10*3/mm3      nRBC 0.0 /100 WBC     Potassium [[id number]]  (Normal) Collected: 11/13/20 2159    Specimen: Blood Updated: 11/13/20 2347     Potassium 4.3 mmol/L     Scan Slide [[id number]] Collected: 11/13/20 0733    Specimen: Blood Updated: 11/13/20 0921     Anisocytosis Slight/1+     Macrocytes Mod/2+     WBC Morphology Normal     Large Platelets Slight/1+    Renal Function Panel [[id number]]  (Abnormal) Collected: 11/13/20 0733    Specimen: Blood Updated: 11/13/20 0826     Glucose 98 mg/dL      BUN 22 mg/dL      Creatinine 0.49 mg/dL      Sodium 136 mmol/L      Potassium 3.1 mmol/L      Chloride 99 mmol/L      CO2 24.5 mmol/L      Calcium 9.2 mg/dL      Albumin 2.50 g/dL      Phosphorus 4.5 mg/dL      Anion Gap 12.5 mmol/L      BUN/Creatinine Ratio 44.9     eGFR Non African Amer 124 mL/min/1.73     Narrative:      GFR Normal >60  Chronic Kidney Disease <60  Kidney Failure <15      Magnesium [[id number]]  (Abnormal) Collected: 11/13/20 0733    Specimen: Blood Updated: 11/13/20 0825     Magnesium 1.4 mg/dL     Protime-INR [[id number]]  (Abnormal) Collected: 11/13/20 0733    Specimen: Blood Updated: 11/13/20 0813     Protime 16.9 Seconds      INR 1.42    Narrative:      Therapeutic Ranges for INR: 2.0-3.0 (PT 20-30)                              2.5-3.5 (PT 25-34)    CBC & Differential [[id number]]  (Abnormal) Collected: 11/13/20 0733    Specimen: Blood Updated: 11/13/20 0804    Narrative:      The following orders were created for panel order CBC & Differential.  Procedure                               Abnormality         Status                     ---------                               -----------         ------                     CBC Auto Differential[[id number]]        Abnormal            Final result                  Please view results for these tests on the individual orders.    CBC Auto Differential [[id number]]  (Abnormal) Collected: 11/13/20 0733    Specimen: Blood Updated: 11/13/20 0804     WBC 21.69 10*3/mm3      RBC 2.95 10*6/mm3      Hemoglobin 10.7 g/dL      Hematocrit 33.8 %      .6 fL      MCH 36.3 pg      MCHC 31.7 g/dL      RDW 16.5 %      RDW-SD 69.7 fl      MPV 12.4 fL      Platelets 190 10*3/mm3      Neutrophil % 76.5 %      Lymphocyte % 8.5 %      Monocyte % 12.4 %      Eosinophil % 0.4 %      Basophil % 0.5 %      Immature Grans % 1.7 %      Neutrophils, Absolute 16.61 10*3/mm3      Lymphocytes, Absolute 1.84 10*3/mm3      Monocytes, Absolute 2.68 10*3/mm3      Eosinophils, Absolute 0.08 10*3/mm3      Basophils, Absolute 0.11 10*3/mm3      Immature Grans, Absolute 0.37 10*3/mm3      nRBC 0.1 /100 WBC               Imaging Results (Last 72 Hours)     Procedure Component Value Units Date/Time    CT Abdomen Pelvis Without Contrast [[id number]] Collected: 11/15/20 0927     Updated: 11/15/20 0929    Narrative:      CT ABDOMEN AND PELVIS, NONCONTRAST, 11/15/2020    HISTORY:  70-year-old female hospital inpatient admitted to the hospital with sepsis, marked leukocytosis. Her problem list has included acute alcoholic hepatitis, metabolic encephalopathy, acute renal failure, alcohol withdrawal; much of which has resolved or is  improving. Marked leukocytosis persists. Today, she is complaining of abdomen pain, bloating and constipation.    TECHNIQUE:  CT imaging of the abdomen and pelvis without oral or IV contrast. Radiation dose reduction techniques included automated exposure control. Radiation audit for CT and nuclear cardiology exams in the last 12 months: 0.    COMPARISON:  *  CT abdomen/pelvis, 11/4/2020.    ABDOMEN FINDINGS:  The images again demonstrate marked hepatomegaly and diffuse heterogeneous hepatic steatosis, although the degree of fat infiltration has improved since the recent previous  study. Moderately large volume ascites, unchanged. Soft tissue edema throughout  the abdominal mesentery and body wall. There is no splenomegaly.    No gallbladder distention or bile duct dilatation. Pancreas is unremarkable. Kidneys are negative with no nephrolithiasis or evidence of urinary obstruction. Normal caliber abdominal aorta.    Small bowel and colon are normal in caliber no evidence of obstruction or significant adynamic ileus. The stomach is nondistended, there is no distal esophageal dilatation.    PELVIS FINDINGS:  The urinary bladder is distended and shows diffuse wall thickening which could indicate cystitis.    Uterus, adnexal regions and rectum are within normal limits. No inguinal hernia.    Severe chronic grade 3 anterolisthesis at L5-S1 status post posterior fusion at L4-5 and L5-S1 with posterior instrumentation.    Lung base images show no active disease. Elevated right hemidiaphragm with compressive right lung base atelectasis. No pleural effusion.      Impression:      1.  Marked hepatomegaly and diffuse, heterogeneous hepatic steatosis. Fatty infiltration of the liver has improved since 11/4/2020.  2.  Moderately large volume ascites, unchanged. No splenomegaly. Mesenteric and body wall edema.  3.  No bowel dilatation to suggest obstruction or other additional etiology for abdominal distention.  4.  Distended urinary bladder with diffuse wall thickening. Correlate for cystitis.    Signer Name: Ricky Smith MD   Signed: 11/15/2020 9:27 AM   Workstation Name: [email or web address]    [facility]    XR Chest PA & Lateral [[id number]] Collected: 11/14/20 1220     Updated: 11/14/20 1223    Narrative:      CHEST X-RAY, 11/14/2020      HISTORY:    70-year-old female hospital inpatient with sepsis. Severe. Evaluate for pneumonia.      TECHNIQUE:  Upright two view chest x-ray.    COMPARISON:  *  Chest x-ray, 11/10/2020.    FINDINGS:  Chronically low lung volumes with platelike  "scarring or linear atelectasis in the posterior lung bases, unchanged. No convincing acute pulmonary infiltrate. No visible pleural effusion. Heart size and pulmonary vascularity are normal. PICC in good  position.      Impression:      No definite active disease. No change since 11/10/2020.    Signer Name: Ricky Smith MD   Signed: 11/14/2020 12:20 PM   Workstation Name: [email or web address]    [facility]            Medication Review:      Clinic-Administered Medications       Dose Frequency Start End    mupirocin (BACTROBAN) 2 % nasal ointment  2 Times Daily 6/6/2019     Admin Instructions:     Route: Nasal      Hospital Medications (active)       Dose Frequency Start End    acetaminophen (TYLENOL) tablet 500 mg 500 mg Every 6 Hours PRN 11/8/2020     Admin Instructions: Do not exceed 2g per day<BR>Do not exceed 4 grams of acetaminophen in a 24 hr period.<BR><BR>If given for pain, use the following pain scale: <BR>Mild Pain = Pain Score of 1-3, CPOT 1-2<BR>Moderate Pain = Pain Score of 4-6, CPOT 3-4<BR>Severe Pain = Pain Score of 7-10, CPOT 5-8    Route: Oral    folic acid (FOLVITE) tablet 1 mg 1 mg Daily 11/10/2020     Route: Oral    lactobacillus acidophilus (RISAQUAD) capsule 1 capsule 1 capsule Daily 11/12/2020     Route: Oral    lactulose (CHRONULAC) 10 GM/15ML solution 20 g 20 g 2 Times Daily 11/12/2020     Admin Instructions: May be mixed with fruit juice, water, or milk.    Route: Oral    levothyroxine (SYNTHROID, LEVOTHROID) tablet 50 mcg 50 mcg Every Early Morning 11/9/2020     Admin Instructions: Take on empty stomach.    Route: Oral    Magnesium Sulfate 2 gram infusion- Mg 1.6 - 1.9 mg/dL 2 g As Needed 11/7/2020     Admin Instructions: Recheck Mg level in the AM.    Route: Intravenous    Linked Group 1: \"Or\" Linked Group Details        magnesium sulfate 3 gram infusion (1gm x 3) - Mg 1.1 - 1.5 mg/dL 1 g As Needed 11/7/2020     Admin Instructions: Infuse 1 gram over 1 hour for 3 " "doses (total Mg dose of 3 grams). Recheck Mg level in the AM.    Route: Intravenous    Linked Group 1: \"Or\" Linked Group Details        magnesium sulfate 4 gram infusion - Mg less than or equal to 1mg/dL 4 g As Needed 11/7/2020     Admin Instructions: Recheck Mg level in the AM.    Route: Intravenous    Linked Group 1: \"Or\" Linked Group Details        midodrine (PROAMATINE) tablet 2.5 mg 2.5 mg 3 Times Daily Before Meals 11/6/2020     Route: Oral    piperacillin-tazobactam (ZOSYN) 3.375 g/100 mL 0.9% NS IVPB (mbp) 3.375 g Every 8 Hours 11/15/2020 11/22/2020    Admin Instructions: Break seal and mix to activate vial before use    Route: Intravenous    potassium & sodium phosphates (PHOS-NAK) 280-160-250 MG packet - for Phosphorus 1.3-2.5 mg/dL 1 packet 2 Times Daily PRN 11/7/2020     Admin Instructions: Phosphorus Replacement:<BR><BR>If Phos <1.3 mg/dL: Notify Physician to use IV replacement<BR>If Phos 1.3-2.5 mg/dL: Give Neutraphos 1 packet PO/NG 2 times daily x 2 doses. Recheck Phosphorus level 6 hours after replacement complete.    Route: Oral    potassium chloride (K-DUR,KLOR-CON) CR tablet 40 mEq 40 mEq As Needed 11/7/2020     Admin Instructions: Potassium 3.1 or Less Give KCl 40 mEq q4h x3 Doses<BR>Potassium 3.2 - 3.6 Give KCl 40 mEq q4h x2 Doses<BR><BR>Check Potassium 4 Hours After Last Dose Given<BR>Check Magnesium if Potassium Level Remains Low After Replacement<BR>DO NOT GIVE if CrCl is Less Than 30 mL/minute or Urine Output Less Than 30 mL/hr    Route: Oral    Linked Group 2: \"Or\" Linked Group Details        potassium chloride (KLOR-CON) packet 40 mEq 40 mEq As Needed 11/7/2020     Admin Instructions: Potassium 3.1 or Less Give KCl 40 mEq q4h x3 Doses<BR>Potassium 3.2 - 3.6 Give KCl 40 mEq q4h x2 Doses<BR><BR>Check Potassium 4 Hours After Last Dose Given<BR>Check Magnesium if Potassium Level Remains Low After Replacement<BR>DO NOT GIVE if CrCl is Less Than 30 mL/minute or Urine Output Less Than 30 mL/hr    " "Route: Oral    Linked Group 2: \"Or\" Linked Group Details        potassium chloride 10 mEq in 100 mL IVPB 10 mEq Every 1 Hour PRN 11/7/2020     Admin Instructions: Peripheral IV<BR>Potassium 3.1 or Less Give KCl 10 mEq/100 mL NS IV q1h x6 Doses<BR>Potassium 3.2 - 3.6 Give KCl 10 mEq/100 mL NS q1h x4 Doses<BR><BR>Check Potassium 4 Hours After Last Dose Given<BR>Check Magnesium if Potassium Remains Low After Replacement<BR>DO NOT GIVE if CrCl is Less Than 30 mL/minute or Urine Output Less Than 30 mL/hr. <BR><BR>Rates Greater Than 10 mEq/hr Require ECG Monitoring.<BR>OUTPATIENT/NON-MONITORED UNITS: Potassium Chloride standard bolus infusion rate is a maximum of 10 mEq/hr on unmonitored patients<BR><BR>MONITORED UNITS: Potassium Chloride standard bolus infusion rate is a maximum of 20 mEq/hr on ECG monitored patients ONLY<BR>    Route: Intravenous    Linked Group 2: \"Or\" Linked Group Details        potassium phosphate 15 mmol in sodium chloride 0.9 % 100 mL infusion 15 mmol As Needed 11/7/2020     Admin Instructions: Recheck Phosphorus level 6 hours after replacement complete.<BR>Refrigerate.  <BR><BR>Doses listed as mmol of phosphate. <BR>4.4 mEq of Potassium = 3 mmol of Phosphate    Route: Intravenous    Linked Group 3: \"Or\" Linked Group Details        potassium phosphate 21 mmol in sodium chloride 0.9 % 250 mL infusion 21 mmol As Needed 11/7/2020     Admin Instructions: Recheck Phosphorus level 6 hours after replacement complete.<BR>Refrigerate.  <BR><BR>Doses listed as mmol of phosphate. <BR>4.4 mEq of Potassium = 3 mmol of Phosphate    Route: Intravenous    Linked Group 3: \"Or\" Linked Group Details        potassium phosphate 9 mmol in sodium chloride 0.9 % 100 mL infusion 9 mmol As Needed 11/7/2020     Admin Instructions: Recheck Phosphorus level 6 hours after replacement complete.<BR>Refrigerate.  <BR><BR>Doses listed as mmol of phosphate. <BR>4.4 mEq of Potassium = 3 mmol of Phosphate    Route: Intravenous    " "Linked Group 3: \"Or\" Linked Group Details        rifAXIMin (XIFAXAN) tablet 600 mg 600 mg Every 12 Hours Scheduled 11/11/2020 12/11/2020    Admin Instructions: Caution: Look alike/sound alike drug alert    Route: Oral    sodium chloride 0.9 % flush 10 mL 10 mL As Needed 11/4/2020     Route: Intravenous    Linked Group 4: \"And\" Linked Group Details        sodium chloride 0.9 % flush 10 mL 10 mL Every 12 Hours Scheduled 11/5/2020     Admin Instructions: Lumen #1    Route: Intravenous    sodium chloride 0.9 % flush 10 mL 10 mL Every 12 Hours Scheduled 11/5/2020     Admin Instructions: Lumen #2    Route: Intravenous    sodium chloride 0.9 % flush 10 mL 10 mL Every 12 Hours Scheduled 11/5/2020     Admin Instructions: Lumen #3    Route: Intravenous    sodium chloride 0.9 % flush 20 mL 20 mL As Needed 11/5/2020     Route: Intravenous    sodium chloride 0.9 % flush 20 mL 20 mL As Needed 11/5/2020     Route: Intravenous    sodium chloride 0.9 % infusion 40 mL 40 mL As Needed 11/4/2020     Admin Instructions: Following administration of an IV intermittent medication, flush line with 40mL NS at 100mL/hr from a 250mL bag.    Route: Intravenous    sodium phosphates 15 mmol in sodium chloride 0.9 % 250 mL IVPB 15 mmol As Needed 11/6/2020     Admin Instructions: Recheck Phosphorus level 6 hours after replacement complete.    Route: Intravenous    Linked Group 5: \"Or\" Linked Group Details        sodium phosphates 21 mmol in sodium chloride 0.9 % 250 mL IVPB 21 mmol As Needed 11/6/2020     Admin Instructions: Recheck Phosphorus level 6 hours after replacement complete.    Route: Intravenous    Linked Group 5: \"Or\" Linked Group Details        sodium phosphates 9 mmol in sodium chloride 0.9 % 250 mL IVPB 9 mmol As Needed 11/6/2020     Admin Instructions: Recheck Phosphorus level 6 hours after replacement complete.    Route: Intravenous    Linked Group 5: \"Or\" Linked Group Details        torsemide (DEMADEX) tablet 20 mg 20 mg Daily " 11/10/2020     Route: Oral          Assessment/Plan         Acute liver failure    Frequent UTI    Hypertension    Acute respiratory failure with hypoxia (CMS/HCC)    Ascites due to alcoholic hepatitis    Bladder prolapse, female, acquired    Sepsis associated hypotension (CMS/HCC)    Altered mental state    Acute renal failure (CMS/HCC)    Alcohol abuse    Severe malnutrition (CMS/HCC)      Had paracentesis  -  High WBC   Most  Likely reactive     diarrhea on lactulose     Plan :    DC zosyn  May need to get stooll for CD tox  Will follow  Thank you    Kendell Young MD  11/15/20  13:21 EST

## 2020-11-16 LAB
ALBUMIN SERPL-MCNC: 2.7 G/DL (ref 3.5–5.2)
ALBUMIN/GLOB SERPL: 1.1 G/DL
ALP SERPL-CCNC: 155 U/L (ref 39–117)
ALT SERPL W P-5'-P-CCNC: 55 U/L (ref 1–33)
ANION GAP SERPL CALCULATED.3IONS-SCNC: 9.7 MMOL/L (ref 5–15)
AST SERPL-CCNC: 148 U/L (ref 1–32)
BASOPHILS # BLD AUTO: 0.09 10*3/MM3 (ref 0–0.2)
BASOPHILS NFR BLD AUTO: 0.4 % (ref 0–1.5)
BILIRUB SERPL-MCNC: 6.9 MG/DL (ref 0–1.2)
BUN SERPL-MCNC: 28 MG/DL (ref 8–23)
BUN/CREAT SERPL: 56 (ref 7–25)
CALCIUM SPEC-SCNC: 8.4 MG/DL (ref 8.6–10.5)
CHLORIDE SERPL-SCNC: 102 MMOL/L (ref 98–107)
CO2 SERPL-SCNC: 23.3 MMOL/L (ref 22–29)
CREAT SERPL-MCNC: 0.5 MG/DL (ref 0.57–1)
D-LACTATE SERPL-SCNC: 1.8 MMOL/L (ref 0.5–2)
DEPRECATED RDW RBC AUTO: 67.6 FL (ref 37–54)
EOSINOPHIL # BLD AUTO: 0.05 10*3/MM3 (ref 0–0.4)
EOSINOPHIL NFR BLD AUTO: 0.2 % (ref 0.3–6.2)
ERYTHROCYTE [DISTWIDTH] IN BLOOD BY AUTOMATED COUNT: 15.9 % (ref 12.3–15.4)
GFR SERPL CREATININE-BSD FRML MDRD: 121 ML/MIN/1.73
GLOBULIN UR ELPH-MCNC: 2.4 GM/DL
GLUCOSE SERPL-MCNC: 113 MG/DL (ref 65–99)
HCT VFR BLD AUTO: 31.9 % (ref 34–46.6)
HGB BLD-MCNC: 10.2 G/DL (ref 12–15.9)
IMM GRANULOCYTES # BLD AUTO: 0.16 10*3/MM3 (ref 0–0.05)
IMM GRANULOCYTES NFR BLD AUTO: 0.7 % (ref 0–0.5)
LYMPHOCYTES # BLD AUTO: 2.23 10*3/MM3 (ref 0.7–3.1)
LYMPHOCYTES NFR BLD AUTO: 10.3 % (ref 19.6–45.3)
MAGNESIUM SERPL-MCNC: 1.6 MG/DL (ref 1.6–2.4)
MCH RBC QN AUTO: 36.7 PG (ref 26.6–33)
MCHC RBC AUTO-ENTMCNC: 32 G/DL (ref 31.5–35.7)
MCV RBC AUTO: 114.7 FL (ref 79–97)
MONOCYTES # BLD AUTO: 2.13 10*3/MM3 (ref 0.1–0.9)
MONOCYTES NFR BLD AUTO: 9.8 % (ref 5–12)
NEUTROPHILS NFR BLD AUTO: 16.99 10*3/MM3 (ref 1.7–7)
NEUTROPHILS NFR BLD AUTO: 78.6 % (ref 42.7–76)
NRBC BLD AUTO-RTO: 0 /100 WBC (ref 0–0.2)
PLATELET # BLD AUTO: 242 10*3/MM3 (ref 140–450)
PMV BLD AUTO: 12.2 FL (ref 6–12)
POTASSIUM SERPL-SCNC: 3.9 MMOL/L (ref 3.5–5.2)
PROCALCITONIN SERPL-MCNC: 0.68 NG/ML (ref 0–0.25)
PROT SERPL-MCNC: 5.1 G/DL (ref 6–8.5)
RBC # BLD AUTO: 2.78 10*6/MM3 (ref 3.77–5.28)
SODIUM SERPL-SCNC: 135 MMOL/L (ref 136–145)
WBC # BLD AUTO: 21.65 10*3/MM3 (ref 3.4–10.8)

## 2020-11-16 PROCEDURE — 83735 ASSAY OF MAGNESIUM: CPT | Performed by: INTERNAL MEDICINE

## 2020-11-16 PROCEDURE — 94799 UNLISTED PULMONARY SVC/PX: CPT

## 2020-11-16 PROCEDURE — 83605 ASSAY OF LACTIC ACID: CPT | Performed by: INTERNAL MEDICINE

## 2020-11-16 PROCEDURE — 85025 COMPLETE CBC W/AUTO DIFF WBC: CPT | Performed by: INTERNAL MEDICINE

## 2020-11-16 PROCEDURE — 84145 PROCALCITONIN (PCT): CPT | Performed by: INTERNAL MEDICINE

## 2020-11-16 PROCEDURE — 25010000002 MAGNESIUM SULFATE 2 GM/50ML SOLUTION: Performed by: INTERNAL MEDICINE

## 2020-11-16 PROCEDURE — 80053 COMPREHEN METABOLIC PANEL: CPT | Performed by: INTERNAL MEDICINE

## 2020-11-16 PROCEDURE — 99232 SBSQ HOSP IP/OBS MODERATE 35: CPT | Performed by: NURSE PRACTITIONER

## 2020-11-16 RX ADMIN — LEVOTHYROXINE SODIUM 50 MCG: 50 TABLET ORAL at 04:27

## 2020-11-16 RX ADMIN — MAGNESIUM SULFATE HEPTAHYDRATE 2 G: 40 INJECTION, SOLUTION INTRAVENOUS at 09:07

## 2020-11-16 RX ADMIN — LACTULOSE 20 G: 20 SOLUTION ORAL at 20:22

## 2020-11-16 RX ADMIN — RIFAXIMIN 600 MG: 200 TABLET ORAL at 20:22

## 2020-11-16 RX ADMIN — MIDODRINE HYDROCHLORIDE 2.5 MG: 5 TABLET ORAL at 09:06

## 2020-11-16 RX ADMIN — TORSEMIDE 20 MG: 20 TABLET ORAL at 09:06

## 2020-11-16 RX ADMIN — MIDODRINE HYDROCHLORIDE 2.5 MG: 5 TABLET ORAL at 18:02

## 2020-11-16 RX ADMIN — ACETAMINOPHEN 500 MG: 500 TABLET, FILM COATED ORAL at 12:03

## 2020-11-16 RX ADMIN — FOLIC ACID 1 MG: 1 TABLET ORAL at 09:06

## 2020-11-16 RX ADMIN — SODIUM CHLORIDE, PRESERVATIVE FREE 10 ML: 5 INJECTION INTRAVENOUS at 09:07

## 2020-11-16 RX ADMIN — SODIUM CHLORIDE, PRESERVATIVE FREE 10 ML: 5 INJECTION INTRAVENOUS at 09:08

## 2020-11-16 RX ADMIN — SODIUM CHLORIDE, PRESERVATIVE FREE 10 ML: 5 INJECTION INTRAVENOUS at 20:23

## 2020-11-16 RX ADMIN — Medication 1 CAPSULE: at 09:06

## 2020-11-16 RX ADMIN — MIDODRINE HYDROCHLORIDE 2.5 MG: 5 TABLET ORAL at 12:03

## 2020-11-16 RX ADMIN — ACETAMINOPHEN 500 MG: 500 TABLET, FILM COATED ORAL at 04:26

## 2020-11-16 RX ADMIN — RIFAXIMIN 600 MG: 200 TABLET ORAL at 09:06

## 2020-11-16 NOTE — NURSING NOTE
Continued Stay Note  DORIS Cobos     Patient Name: Tayler Lugo  MRN: [id number]  Today's Date: 11/16/2020    Admit Date: 11/4/2020    Discharge Plan     Row Name 11/16/20 1559       Plan    Plan  plan home with [facility]    Patient/Family in Agreement with Plan  yes    Plan Comments  Per Masha/angie Shafer - outpatient therapy recommended. Patient to arrange appointments once she is dc'd home. Information sheet from [facility] given to patient and copy placed in hard chart at [facility] station. Spoke with Sheron/SAMIR, they will follow at [place]. Spoke with patients daughter who states patient will dc to her own home [address]. She requests the following DME: rw, bsc, hospital bed & shwr chair. Informed the hospital bed and shwr chair will not be covered by insurnace. [employer] has used equipment for sale or new equipment to rent/purchase, [employer] also has new equipment to rent/purchase. BSC /RW delivered to patient room and [place] paperwork completed. Anticipate dc home tomorrow. CM will coninue to follow.        Discharge Codes    No documentation.             Michael Peraza RN

## 2020-11-16 NOTE — PLAN OF CARE
Goal Outcome Evaluation:  Plan of Care Reviewed With: patient  Progress: improving  Outcome Summary: patient complain of generalised body pain, pain med given, abdomen still distended, ostomy bag for drain,  herrera draining e  yellow urine, herrera care done, picc line dressing changed, patient on isolation for contact spore , cont to monitor

## 2020-11-16 NOTE — PROGRESS NOTES
LOS: 12 days   Patient Care Team:  Tayler Trujillo MD as PCP - General        Subjective     Interval History:     Patient Complaints:   Patient Denies:  better       Review of Systems:    better    Objective     Vital Signs  Temp:  [97.4 °F (36.3 °C)-98.6 °F (37 °C)] 97.4 °F (36.3 °C)  Heart Rate:  [85-88] 86  Resp:  [16-18] 16  BP: ()/(55-70) 110/70    Physical Exam:     General Appearance:    Alert, cooperative, in no acute distress   Head:    Normocephalic, without obvious abnormality, atraumatic   Eyes:            Lids and lashes normal, conjunctivae and sclerae normal, no   icterus, no pallor, corneas clear, PERRLA   Ears:    Ears appear intact with no abnormalities noted   Throat:   No oral lesions, no thrush, oral mucosa moist   Neck:   No adenopathy, supple, trachea midline, no thyromegaly, no     carotid bruit, no JVD   Back:     No kyphosis present, no scoliosis present, no skin lesions,       erythema or scars, no tenderness to percussion or                   palpation,   range of motion normal   Lungs:     Clear to auscultation,respirations regular, even and                   unlabored    Heart:    Regular rhythm and normal rate, normal S1 and S2, no            murmur, no gallop, no rub, no click   Breast Exam:    Deferred   Abdomen:     Normal bowel sounds, no masses, no organomegaly, soft        non-tender, non-distended, no guarding, no rebound                 tenderness   Genitalia:    Deferred   Extremities:   Moves all extremities well, no edema, no cyanosis, no              redness   Pulses:   Pulses palpable and equal bilaterally   Skin:   No bleeding, bruising or rash   Lymph nodes:   No palpable adenopathy   Neurologic:   Cranial nerves 2 - 12 grossly intact, sensation intact, DTR        present and equal bilaterally          Results Review:      Lab Results (last 72 hours)     Procedure Component Value Units Date/Time    Blood Culture - Blood, Arm, Left [[id number]] Collected:  11/12/20 1045    Specimen: Blood from Arm, Left Updated: 11/16/20 1100     Blood Culture No growth at 4 days    Blood Culture - Blood, Hand, Left [[id number]] Collected: 11/12/20 1056    Specimen: Blood from Hand, Left Updated: 11/16/20 1100     Blood Culture No growth at 4 days    Blood Culture - Blood, Arm, Left [[id number]] Collected: 11/15/20 0929    Specimen: Blood from Arm, Left Updated: 11/16/20 0946     Blood Culture No growth at 24 hours    Comprehensive Metabolic Panel [[id number]]  (Abnormal) Collected: 11/16/20 0422    Specimen: Blood Updated: 11/16/20 0501     Glucose 113 mg/dL      BUN 28 mg/dL      Creatinine 0.50 mg/dL      Sodium 135 mmol/L      Potassium 3.9 mmol/L      Chloride 102 mmol/L      CO2 23.3 mmol/L      Calcium 8.4 mg/dL      Total Protein 5.1 g/dL      Albumin 2.70 g/dL      ALT (SGPT) 55 U/L      AST (SGOT) 148 U/L      Alkaline Phosphatase 155 U/L      Total Bilirubin 6.9 mg/dL      eGFR Non African Amer 121 mL/min/1.73      Globulin 2.4 gm/dL      A/G Ratio 1.1 g/dL      BUN/Creatinine Ratio 56.0     Anion Gap 9.7 mmol/L     Narrative:      GFR Normal >60  Chronic Kidney Disease <60  Kidney Failure <15      Magnesium [[id number]]  (Normal) Collected: 11/16/20 0422    Specimen: Blood Updated: 11/16/20 0452     Magnesium 1.6 mg/dL     Procalcitonin [[id number]]  (Abnormal) Collected: 11/16/20 0422    Specimen: Blood Updated: 11/16/20 0452     Procalcitonin 0.68 ng/mL     Narrative:      Results may be falsely decreased if patient taking Biotin.     Lactic Acid, Plasma [[id number]]  (Normal) Collected: 11/16/20 0422    Specimen: Blood Updated: 11/16/20 0438     Lactate 1.8 mmol/L     CBC & Differential [[id number]]  (Abnormal) Collected: 11/16/20 0422    Specimen: Blood Updated: 11/16/20 0430    Narrative:      The following orders were created for panel order CBC & Differential.  Procedure                               Abnormality         Status                     ---------                                -----------         ------                     CBC Auto Differential[[id number]]        Abnormal            Final result                 Please view results for these tests on the individual orders.    CBC Auto Differential [[id number]]  (Abnormal) Collected: 11/16/20 0422    Specimen: Blood Updated: 11/16/20 0430     WBC 21.65 10*3/mm3      RBC 2.78 10*6/mm3      Hemoglobin 10.2 g/dL      Hematocrit 31.9 %      .7 fL      MCH 36.7 pg      MCHC 32.0 g/dL      RDW 15.9 %      RDW-SD 67.6 fl      MPV 12.2 fL      Platelets 242 10*3/mm3      Neutrophil % 78.6 %      Lymphocyte % 10.3 %      Monocyte % 9.8 %      Eosinophil % 0.2 %      Basophil % 0.4 %      Immature Grans % 0.7 %      Neutrophils, Absolute 16.99 10*3/mm3      Lymphocytes, Absolute 2.23 10*3/mm3      Monocytes, Absolute 2.13 10*3/mm3      Eosinophils, Absolute 0.05 10*3/mm3      Basophils, Absolute 0.09 10*3/mm3      Immature Grans, Absolute 0.16 10*3/mm3      nRBC 0.0 /100 WBC     Protime-INR [[id number]]  (Abnormal) Collected: 11/15/20 0605    Specimen: Blood Updated: 11/15/20 0700     Protime 16.8 Seconds      INR 1.41    Narrative:      Therapeutic Ranges for INR: 2.0-3.0 (PT 20-30)                              2.5-3.5 (PT 25-34)    Comprehensive Metabolic Panel [[id number]]  (Abnormal) Collected: 11/15/20 0605    Specimen: Blood Updated: 11/15/20 0654     Glucose 101 mg/dL      BUN 27 mg/dL      Creatinine 0.62 mg/dL      Sodium 137 mmol/L      Potassium 3.1 mmol/L      Chloride 100 mmol/L      CO2 24.7 mmol/L      Calcium 8.9 mg/dL      Total Protein 5.5 g/dL      Albumin 2.80 g/dL      ALT (SGPT) 61 U/L      AST (SGOT) 158 U/L      Alkaline Phosphatase 176 U/L      Total Bilirubin 8.8 mg/dL      eGFR Non African Amer 95 mL/min/1.73      Globulin 2.7 gm/dL      A/G Ratio 1.0 g/dL      BUN/Creatinine Ratio 43.5     Anion Gap 12.3 mmol/L     Narrative:      GFR Normal >60  Chronic Kidney Disease <60  Kidney Failure <15      Magnesium  [[id number]]  (Normal) Collected: 11/15/20 0605    Specimen: Blood Updated: 11/15/20 0653     Magnesium 1.6 mg/dL     Lactic Acid, Plasma [[id number]]  (Normal) Collected: 11/15/20 0605    Specimen: Blood Updated: 11/15/20 0642     Lactate 1.8 mmol/L     CBC & Differential [[id number]]  (Abnormal) Collected: 11/15/20 0605    Specimen: Blood Updated: 11/15/20 0635    Narrative:      The following orders were created for panel order CBC & Differential.  Procedure                               Abnormality         Status                     ---------                               -----------         ------                     CBC Auto Differential[[id number]]        Abnormal            Final result                 Please view results for these tests on the individual orders.    CBC Auto Differential [[id number]]  (Abnormal) Collected: 11/15/20 0605    Specimen: Blood Updated: 11/15/20 0635     WBC 22.81 10*3/mm3      RBC 2.93 10*6/mm3      Hemoglobin 10.7 g/dL      Hematocrit 34.0 %      .0 fL      MCH 36.5 pg      MCHC 31.5 g/dL      RDW 16.1 %      RDW-SD 69.3 fl      MPV 11.9 fL      Platelets 248 10*3/mm3      Neutrophil % 77.9 %      Lymphocyte % 10.7 %      Monocyte % 10.1 %      Eosinophil % 0.4 %      Basophil % 0.3 %      Immature Grans % 0.6 %      Neutrophils, Absolute 17.77 10*3/mm3      Lymphocytes, Absolute 2.43 10*3/mm3      Monocytes, Absolute 2.30 10*3/mm3      Eosinophils, Absolute 0.10 10*3/mm3      Basophils, Absolute 0.07 10*3/mm3      Immature Grans, Absolute 0.14 10*3/mm3     Urinalysis With Culture If Indicated - Urine, Catheter In/Out [[id number]]  (Abnormal) Collected: 11/14/20 1609    Specimen: Urine, Catheter In/Out Updated: 11/14/20 1628     Color, UA Toshia     Appearance, UA Clear     pH, UA <=5.0     Specific Gravity, UA 1.020     Glucose, UA Negative     Ketones, UA Negative     Bilirubin, UA Small (1+)     Blood, UA Negative     Protein, UA Negative     Leuk Esterase, UA Negative      Nitrite, UA Negative     Urobilinogen, UA 0.2 E.U./dL    Narrative:      Urine microscopic not indicated.    Procalcitonin [[id number]]  (Abnormal) Collected: 11/14/20 1050    Specimen: Blood Updated: 11/14/20 1127     Procalcitonin 0.72 ng/mL     Narrative:      Results may be falsely decreased if patient taking Biotin.     Lactic Acid, Plasma [[id number]]  (Abnormal) Collected: 11/14/20 1050    Specimen: Blood Updated: 11/14/20 1119     Lactate 2.1 mmol/L     Magnesium [[id number]]  (Normal) Collected: 11/14/20 0414    Specimen: Blood Updated: 11/14/20 0728     Magnesium 1.7 mg/dL     Basic Metabolic Panel [[id number]]  (Abnormal) Collected: 11/14/20 0414    Specimen: Blood Updated: 11/14/20 0524     Glucose 106 mg/dL      BUN 24 mg/dL      Creatinine 0.63 mg/dL      Sodium 135 mmol/L      Potassium 3.8 mmol/L      Chloride 102 mmol/L      CO2 24.7 mmol/L      Calcium 9.0 mg/dL      eGFR Non African Amer 93 mL/min/1.73      BUN/Creatinine Ratio 38.1     Anion Gap 8.3 mmol/L     Narrative:      GFR Normal >60  Chronic Kidney Disease <60  Kidney Failure <15      CBC & Differential [[id number]]  (Abnormal) Collected: 11/14/20 0414    Specimen: Blood Updated: 11/14/20 0449    Narrative:      The following orders were created for panel order CBC & Differential.  Procedure                               Abnormality         Status                     ---------                               -----------         ------                     CBC Auto Differential[[id number]]        Abnormal            Final result                 Please view results for these tests on the individual orders.    CBC Auto Differential [[id number]]  (Abnormal) Collected: 11/14/20 0414    Specimen: Blood Updated: 11/14/20 0449     WBC 22.66 10*3/mm3      RBC 2.83 10*6/mm3      Hemoglobin 10.2 g/dL      Hematocrit 32.9 %      .3 fL      MCH 36.0 pg      MCHC 31.0 g/dL      RDW 16.4 %      RDW-SD 70.1 fl      MPV 12.3 fL      Platelets 211 10*3/mm3       Neutrophil % 77.7 %      Lymphocyte % 7.9 %      Monocyte % 12.0 %      Eosinophil % 0.4 %      Basophil % 0.6 %      Immature Grans % 1.4 %      Neutrophils, Absolute 17.61 10*3/mm3      Lymphocytes, Absolute 1.80 10*3/mm3      Monocytes, Absolute 2.73 10*3/mm3      Eosinophils, Absolute 0.08 10*3/mm3      Basophils, Absolute 0.13 10*3/mm3      Immature Grans, Absolute 0.31 10*3/mm3      nRBC 0.0 /100 WBC     Potassium [[id number]]  (Normal) Collected: 11/13/20 2159    Specimen: Blood Updated: 11/13/20 2347     Potassium 4.3 mmol/L           Imaging Results (Last 72 Hours)     Procedure Component Value Units Date/Time    CT Abdomen Pelvis Without Contrast [[id number]] Collected: 11/15/20 0927     Updated: 11/15/20 0929    Narrative:      CT ABDOMEN AND PELVIS, NONCONTRAST, 11/15/2020    HISTORY:  70-year-old female hospital inpatient admitted to the hospital with sepsis, marked leukocytosis. Her problem list has included acute alcoholic hepatitis, metabolic encephalopathy, acute renal failure, alcohol withdrawal; much of which has resolved or is  improving. Marked leukocytosis persists. Today, she is complaining of abdomen pain, bloating and constipation.    TECHNIQUE:  CT imaging of the abdomen and pelvis without oral or IV contrast. Radiation dose reduction techniques included automated exposure control. Radiation audit for CT and nuclear cardiology exams in the last 12 months: 0.    COMPARISON:  *  CT abdomen/pelvis, 11/4/2020.    ABDOMEN FINDINGS:  The images again demonstrate marked hepatomegaly and diffuse heterogeneous hepatic steatosis, although the degree of fat infiltration has improved since the recent previous study. Moderately large volume ascites, unchanged. Soft tissue edema throughout  the abdominal mesentery and body wall. There is no splenomegaly.    No gallbladder distention or bile duct dilatation. Pancreas is unremarkable. Kidneys are negative with no nephrolithiasis or evidence of urinary  obstruction. Normal caliber abdominal aorta.    Small bowel and colon are normal in caliber no evidence of obstruction or significant adynamic ileus. The stomach is nondistended, there is no distal esophageal dilatation.    PELVIS FINDINGS:  The urinary bladder is distended and shows diffuse wall thickening which could indicate cystitis.    Uterus, adnexal regions and rectum are within normal limits. No inguinal hernia.    Severe chronic grade 3 anterolisthesis at L5-S1 status post posterior fusion at L4-5 and L5-S1 with posterior instrumentation.    Lung base images show no active disease. Elevated right hemidiaphragm with compressive right lung base atelectasis. No pleural effusion.      Impression:      1.  Marked hepatomegaly and diffuse, heterogeneous hepatic steatosis. Fatty infiltration of the liver has improved since 11/4/2020.  2.  Moderately large volume ascites, unchanged. No splenomegaly. Mesenteric and body wall edema.  3.  No bowel dilatation to suggest obstruction or other additional etiology for abdominal distention.  4.  Distended urinary bladder with diffuse wall thickening. Correlate for cystitis.    Signer Name: Ricky Smith MD   Signed: 11/15/2020 9:27 AM   Workstation Name: [email or web address]    [facility]    XR Chest PA & Lateral [[id number]] Collected: 11/14/20 1220     Updated: 11/14/20 1223    Narrative:      CHEST X-RAY, 11/14/2020      HISTORY:    70-year-old female hospital inpatient with sepsis. Severe. Evaluate for pneumonia.      TECHNIQUE:  Upright two view chest x-ray.    COMPARISON:  *  Chest x-ray, 11/10/2020.    FINDINGS:  Chronically low lung volumes with platelike scarring or linear atelectasis in the posterior lung bases, unchanged. No convincing acute pulmonary infiltrate. No visible pleural effusion. Heart size and pulmonary vascularity are normal. PICC in good  position.      Impression:      No definite active disease. No change since  "11/10/2020.    Signer Name: Ricky Smith MD   Signed: 11/14/2020 12:20 PM   Workstation Name: [id number]    [facility]            Medication Review:     Clinic-Administered Medications       Dose Frequency Start End    mupirocin (BACTROBAN) 2 % nasal ointment  2 Times Daily 6/6/2019     Admin Instructions:     Route: Nasal      Hospital Medications (active)       Dose Frequency Start End    acetaminophen (TYLENOL) tablet 500 mg 500 mg Every 6 Hours PRN 11/8/2020     Admin Instructions: Do not exceed 2g per day<BR>Do not exceed 4 grams of acetaminophen in a 24 hr period.<BR><BR>If given for pain, use the following pain scale: <BR>Mild Pain = Pain Score of 1-3, CPOT 1-2<BR>Moderate Pain = Pain Score of 4-6, CPOT 3-4<BR>Severe Pain = Pain Score of 7-10, CPOT 5-8    Route: Oral    folic acid (FOLVITE) tablet 1 mg 1 mg Daily 11/10/2020     Route: Oral    lactobacillus acidophilus (RISAQUAD) capsule 1 capsule 1 capsule Daily 11/12/2020     Route: Oral    lactulose (CHRONULAC) 10 GM/15ML solution 20 g 20 g 2 Times Daily 11/12/2020     Admin Instructions: May be mixed with fruit juice, water, or milk.    Route: Oral    levothyroxine (SYNTHROID, LEVOTHROID) tablet 50 mcg 50 mcg Every Early Morning 11/9/2020     Admin Instructions: Take on empty stomach.    Route: Oral    Magnesium Sulfate 2 gram infusion- Mg 1.6 - 1.9 mg/dL 2 g As Needed 11/7/2020     Admin Instructions: Recheck Mg level in the AM.    Route: Intravenous    Linked Group 1: \"Or\" Linked Group Details        magnesium sulfate 3 gram infusion (1gm x 3) - Mg 1.1 - 1.5 mg/dL 1 g As Needed 11/7/2020     Admin Instructions: Infuse 1 gram over 1 hour for 3 doses (total Mg dose of 3 grams). Recheck Mg level in the AM.    Route: Intravenous    Linked Group 1: \"Or\" Linked Group Details        magnesium sulfate 4 gram infusion - Mg less than or equal to 1mg/dL 4 g As Needed 11/7/2020     Admin Instructions: Recheck Mg level in the AM.    " "Route: Intravenous    Linked Group 1: \"Or\" Linked Group Details        midodrine (PROAMATINE) tablet 2.5 mg 2.5 mg 3 Times Daily Before Meals 11/6/2020     Route: Oral    potassium & sodium phosphates (PHOS-NAK) 280-160-250 MG packet - for Phosphorus 1.3-2.5 mg/dL 1 packet 2 Times Daily PRN 11/7/2020     Admin Instructions: Phosphorus Replacement:<BR><BR>If Phos <1.3 mg/dL: Notify Physician to use IV replacement<BR>If Phos 1.3-2.5 mg/dL: Give Neutraphos 1 packet PO/NG 2 times daily x 2 doses. Recheck Phosphorus level 6 hours after replacement complete.    Route: Oral    potassium chloride (K-DUR,KLOR-CON) CR tablet 40 mEq 40 mEq As Needed 11/7/2020     Admin Instructions: Potassium 3.1 or Less Give KCl 40 mEq q4h x3 Doses<BR>Potassium 3.2 - 3.6 Give KCl 40 mEq q4h x2 Doses<BR><BR>Check Potassium 4 Hours After Last Dose Given<BR>Check Magnesium if Potassium Level Remains Low After Replacement<BR>DO NOT GIVE if CrCl is Less Than 30 mL/minute or Urine Output Less Than 30 mL/hr    Route: Oral    Linked Group 2: \"Or\" Linked Group Details        potassium chloride (KLOR-CON) packet 40 mEq 40 mEq As Needed 11/7/2020     Admin Instructions: Potassium 3.1 or Less Give KCl 40 mEq q4h x3 Doses<BR>Potassium 3.2 - 3.6 Give KCl 40 mEq q4h x2 Doses<BR><BR>Check Potassium 4 Hours After Last Dose Given<BR>Check Magnesium if Potassium Level Remains Low After Replacement<BR>DO NOT GIVE if CrCl is Less Than 30 mL/minute or Urine Output Less Than 30 mL/hr    Route: Oral    Linked Group 2: \"Or\" Linked Group Details        potassium chloride 10 mEq in 100 mL IVPB 10 mEq Every 1 Hour PRN 11/7/2020     Admin Instructions: Peripheral IV<BR>Potassium 3.1 or Less Give KCl 10 mEq/100 mL NS IV q1h x6 Doses<BR>Potassium 3.2 - 3.6 Give KCl 10 mEq/100 mL NS q1h x4 Doses<BR><BR>Check Potassium 4 Hours After Last Dose Given<BR>Check Magnesium if Potassium Remains Low After Replacement<BR>DO NOT GIVE if CrCl is Less Than 30 mL/minute or Urine Output " "Less Than 30 mL/hr. <BR><BR>Rates Greater Than 10 mEq/hr Require ECG Monitoring.<BR>OUTPATIENT/NON-MONITORED UNITS: Potassium Chloride standard bolus infusion rate is a maximum of 10 mEq/hr on unmonitored patients<BR><BR>MONITORED UNITS: Potassium Chloride standard bolus infusion rate is a maximum of 20 mEq/hr on ECG monitored patients ONLY<BR>    Route: Intravenous    Linked Group 2: \"Or\" Linked Group Details        potassium phosphate 15 mmol in sodium chloride 0.9 % 100 mL infusion 15 mmol As Needed 11/7/2020     Admin Instructions: Recheck Phosphorus level 6 hours after replacement complete.<BR>Refrigerate.  <BR><BR>Doses listed as mmol of phosphate. <BR>4.4 mEq of Potassium = 3 mmol of Phosphate    Route: Intravenous    Linked Group 3: \"Or\" Linked Group Details        potassium phosphate 21 mmol in sodium chloride 0.9 % 250 mL infusion 21 mmol As Needed 11/7/2020     Admin Instructions: Recheck Phosphorus level 6 hours after replacement complete.<BR>Refrigerate.  <BR><BR>Doses listed as mmol of phosphate. <BR>4.4 mEq of Potassium = 3 mmol of Phosphate    Route: Intravenous    Linked Group 3: \"Or\" Linked Group Details        potassium phosphate 9 mmol in sodium chloride 0.9 % 100 mL infusion 9 mmol As Needed 11/7/2020     Admin Instructions: Recheck Phosphorus level 6 hours after replacement complete.<BR>Refrigerate.  <BR><BR>Doses listed as mmol of phosphate. <BR>4.4 mEq of Potassium = 3 mmol of Phosphate    Route: Intravenous    Linked Group 3: \"Or\" Linked Group Details        rifAXIMin (XIFAXAN) tablet 600 mg 600 mg Every 12 Hours Scheduled 11/11/2020 12/11/2020    Admin Instructions: Caution: Look alike/sound alike drug alert    Route: Oral    sodium chloride 0.9 % flush 10 mL 10 mL As Needed 11/4/2020     Route: Intravenous    Linked Group 4: \"And\" Linked Group Details        sodium chloride 0.9 % flush 10 mL 10 mL Every 12 Hours Scheduled 11/5/2020     Admin Instructions: Lumen #1    Route: Intravenous    " "sodium chloride 0.9 % flush 10 mL 10 mL Every 12 Hours Scheduled 11/5/2020     Admin Instructions: Lumen #2    Route: Intravenous    sodium chloride 0.9 % flush 10 mL 10 mL Every 12 Hours Scheduled 11/5/2020     Admin Instructions: Lumen #3    Route: Intravenous    sodium chloride 0.9 % flush 20 mL 20 mL As Needed 11/5/2020     Route: Intravenous    sodium chloride 0.9 % flush 20 mL 20 mL As Needed 11/5/2020     Route: Intravenous    sodium chloride 0.9 % infusion 40 mL 40 mL As Needed 11/4/2020     Admin Instructions: Following administration of an IV intermittent medication, flush line with 40mL NS at 100mL/hr from a 250mL bag.    Route: Intravenous    sodium phosphates 15 mmol in sodium chloride 0.9 % 250 mL IVPB 15 mmol As Needed 11/6/2020     Admin Instructions: Recheck Phosphorus level 6 hours after replacement complete.    Route: Intravenous    Linked Group 5: \"Or\" Linked Group Details        sodium phosphates 21 mmol in sodium chloride 0.9 % 250 mL IVPB 21 mmol As Needed 11/6/2020     Admin Instructions: Recheck Phosphorus level 6 hours after replacement complete.    Route: Intravenous    Linked Group 5: \"Or\" Linked Group Details        sodium phosphates 9 mmol in sodium chloride 0.9 % 250 mL IVPB 9 mmol As Needed 11/6/2020     Admin Instructions: Recheck Phosphorus level 6 hours after replacement complete.    Route: Intravenous    Linked Group 5: \"Or\" Linked Group Details        torsemide (DEMADEX) tablet 20 mg 20 mg Daily 11/10/2020     Route: Oral        folic acid, 1 mg, Oral, Daily  lactobacillus acidophilus, 1 capsule, Oral, Daily  lactulose, 20 g, Oral, BID  levothyroxine, 50 mcg, Oral, Q AM  midodrine, 2.5 mg, Oral, TID AC  rifAXIMin, 600 mg, Oral, Q12H  sodium chloride, 10 mL, Intravenous, Q12H  sodium chloride, 10 mL, Intravenous, Q12H  sodium chloride, 10 mL, Intravenous, Q12H  torsemide, 20 mg, Oral, Daily        Assessment/Plan         Acute liver failure    Frequent UTI    Hypertension    Acute " respiratory failure with hypoxia (CMS/HCC)    Ascites due to alcoholic hepatitis    Bladder prolapse, female, acquired    Sepsis associated hypotension (CMS/HCC)    Altered mental state    Acute renal failure (CMS/HCC)    Alcohol abuse    Severe malnutrition (CMS/HCC)    Had paracentesis  -  High WBC   Most  Likely reactive     diarrhea on lactulose     Plan :    Off AB  observe   OK to DC to rehab   Will follow  Thank you                Kendell Young MD  11/16/20  14:06 EST

## 2020-11-16 NOTE — PLAN OF CARE
Goal Outcome Evaluation:  Plan of Care Reviewed With: patient  Progress: improving  Outcome Summary: vss. pt complain of generalized body aches, pain med administered. abdomen distended. javier forte'd. patient voiding small amounts. picc line in place. [facility] tele conference this afternoon. no other complaints at this time.

## 2020-11-16 NOTE — PROGRESS NOTES
"SERVICE: [facility] HOSPITALIST    CONSULTANTS: nephrology/GI    CHIEF COMPLAINT: f/u alcohol withdrawal/alcoholic hepatitis    SUBJECTIVE: The patient continues to state she is motivated to do alcohol rehab. She notes h/o urinary retention PTA, was not self-cathing as instructed, unsure if she will be able to do this going forward, wants herrera out. She is eating/drinking well. She denies f/c/cough/soa/chest pain/n/v/d/abdominal pain or other new concerns.    OBJECTIVE:    /70 (BP Location: Left arm, Patient Position: Sitting)   Pulse 86   Temp 97.4 °F (36.3 °C) (Oral)   Resp 16   Ht 157.5 cm (62\")   Wt 67.1 kg (148 lb)   LMP  (LMP Unknown)   SpO2 96%   BMI 27.07 kg/m²     MEDS/LABS REVIEWED AND ORDERED    folic acid, 1 mg, Oral, Daily  lactobacillus acidophilus, 1 capsule, Oral, Daily  lactulose, 20 g, Oral, BID  levothyroxine, 50 mcg, Oral, Q AM  midodrine, 2.5 mg, Oral, TID AC  rifAXIMin, 600 mg, Oral, Q12H  sodium chloride, 10 mL, Intravenous, Q12H  sodium chloride, 10 mL, Intravenous, Q12H  sodium chloride, 10 mL, Intravenous, Q12H  torsemide, 20 mg, Oral, Daily      Physical Exam  Vitals signs reviewed.   Constitutional:       General: She is not in acute distress.     Appearance: Normal appearance. She is not ill-appearing.   HENT:      Head: Normocephalic and atraumatic.      Mouth/Throat:      Mouth: Mucous membranes are moist.   Eyes:      Extraocular Movements: Extraocular movements intact.      Pupils: Pupils are equal, round, and reactive to light.   Cardiovascular:      Rate and Rhythm: Normal rate and regular rhythm.   Pulmonary:      Effort: Pulmonary effort is normal. No respiratory distress.      Breath sounds: Normal breath sounds. No wheezing or rales.   Abdominal:      General: Abdomen is flat. Bowel sounds are normal. There is no distension.      Tenderness: There is no abdominal tenderness. There is no guarding.   Musculoskeletal:         General: No swelling. "   Skin:     General: Skin is warm and dry.      Capillary Refill: Capillary refill takes less than 2 seconds.      Coloration: Skin is jaundiced.   Neurological:      General: No focal deficit present.      Mental Status: She is alert and oriented to person, place, and time.      Comments: Fine hand/head tremors noted   Psychiatric:         Mood and Affect: Mood normal.         Behavior: Behavior normal.       LAB/DIAGNOSTICS:    Lab Results (last 24 hours)     Procedure Component Value Units Date/Time    Blood Culture - Blood, Arm, Left [[id number]] Collected: 11/12/20 1045    Specimen: Blood from Arm, Left Updated: 11/16/20 1100     Blood Culture No growth at 4 days    Blood Culture - Blood, Hand, Left [[id number]] Collected: 11/12/20 1056    Specimen: Blood from Hand, Left Updated: 11/16/20 1100     Blood Culture No growth at 4 days    Blood Culture - Blood, Arm, Left [[id number]] Collected: 11/15/20 0929    Specimen: Blood from Arm, Left Updated: 11/16/20 0946     Blood Culture No growth at 24 hours    Comprehensive Metabolic Panel [[id number]]  (Abnormal) Collected: 11/16/20 0422    Specimen: Blood Updated: 11/16/20 0501     Glucose 113 mg/dL      BUN 28 mg/dL      Creatinine 0.50 mg/dL      Sodium 135 mmol/L      Potassium 3.9 mmol/L      Chloride 102 mmol/L      CO2 23.3 mmol/L      Calcium 8.4 mg/dL      Total Protein 5.1 g/dL      Albumin 2.70 g/dL      ALT (SGPT) 55 U/L      AST (SGOT) 148 U/L      Alkaline Phosphatase 155 U/L      Total Bilirubin 6.9 mg/dL      eGFR Non African Amer 121 mL/min/1.73      Globulin 2.4 gm/dL      A/G Ratio 1.1 g/dL      BUN/Creatinine Ratio 56.0     Anion Gap 9.7 mmol/L     Narrative:      GFR Normal >60  Chronic Kidney Disease <60  Kidney Failure <15      Magnesium [[id number]]  (Normal) Collected: 11/16/20 0422    Specimen: Blood Updated: 11/16/20 0452     Magnesium 1.6 mg/dL     Procalcitonin [[id number]]  (Abnormal) Collected: 11/16/20 0422    Specimen: Blood Updated: 11/16/20  0452     Procalcitonin 0.68 ng/mL     Narrative:      Results may be falsely decreased if patient taking Biotin.     Lactic Acid, Plasma [[id number]]  (Normal) Collected: 11/16/20 0422    Specimen: Blood Updated: 11/16/20 0438     Lactate 1.8 mmol/L     CBC & Differential [[id number]]  (Abnormal) Collected: 11/16/20 0422    Specimen: Blood Updated: 11/16/20 0430    Narrative:      The following orders were created for panel order CBC & Differential.  Procedure                               Abnormality         Status                     ---------                               -----------         ------                     CBC Auto Differential[[id number]]        Abnormal            Final result                 Please view results for these tests on the individual orders.    CBC Auto Differential [[id number]]  (Abnormal) Collected: 11/16/20 0422    Specimen: Blood Updated: 11/16/20 0430     WBC 21.65 10*3/mm3      RBC 2.78 10*6/mm3      Hemoglobin 10.2 g/dL      Hematocrit 31.9 %      .7 fL      MCH 36.7 pg      MCHC 32.0 g/dL      RDW 15.9 %      RDW-SD 67.6 fl      MPV 12.2 fL      Platelets 242 10*3/mm3      Neutrophil % 78.6 %      Lymphocyte % 10.3 %      Monocyte % 9.8 %      Eosinophil % 0.2 %      Basophil % 0.4 %      Immature Grans % 0.7 %      Neutrophils, Absolute 16.99 10*3/mm3      Lymphocytes, Absolute 2.23 10*3/mm3      Monocytes, Absolute 2.13 10*3/mm3      Eosinophils, Absolute 0.05 10*3/mm3      Basophils, Absolute 0.09 10*3/mm3      Immature Grans, Absolute 0.16 10*3/mm3      nRBC 0.0 /100 WBC         No orders to display     Results for orders placed in visit on 03/10/15   SCANNED - ECHOCARDIOGRAM     Ct Abdomen Pelvis Without Contrast    Result Date: 11/15/2020  1.  Marked hepatomegaly and diffuse, heterogeneous hepatic steatosis. Fatty infiltration of the liver has improved since 11/4/2020. 2.  Moderately large volume ascites, unchanged. No splenomegaly. Mesenteric and body wall edema. 3.  No  bowel dilatation to suggest obstruction or other additional etiology for abdominal distention. 4.  Distended urinary bladder with diffuse wall thickening. Correlate for cystitis. Signer Name: Ricky Smith MD  Signed: 11/15/2020 9:27 AM  Workstation Name: [email or web address]  [facility]    ASSESSMENT/PLAN:  Acute alcoholic hepatitis with coagulopathy, ascites:   Acute metabolic encephalopathy 2/2 above:  Presumed sepsis, ruled out UTI, r/o SBP with septic shock:   Transaminitis/elevated alk phos/hyperbilirubinemia:   Persistent leukocytosis: GI following  Dramatic improvement daily, currently very stable  Received Rocephin, meld scores remain high in the 20s  Continue lactulose     Acute metabolic encephalopathy 2/2 acute alcohol withdrawal: Resolved  CIWA scores have remained low, no need for medications  Very stable clinically  Talking to [place] today  Monitor    Acute kidney injury: Resolved  Hyponatremia:  Electrolyte imbalance:   Volume overload: Nephrology followed  Doing well on torsemide 20 mg daily, started this admission  Creatinine stable at 0.50  Monitor    Chronic urinary retention:  Discontinue Shipman today, patient is supposed to self cath at home  Check bladder scans and monitor     Macrocytic anemia:  Coagulopathy:  Folate deficiency:  Elevated ferritin:  Folic acid 1 mg daily added this admission  Add folic acid 1 mg daily  Hemoglobin stable and rising at 10.2  Last INR 1.41 on 11/15/2020  Monitor      Suboptimal intake 2/2 diagnoses: Much improved on regular diet with boost supplements, dietitian monitoring     New diagnosis hypothyroidism: Levothyroxine 50 mcg daily added this admission  Recheck TFTs in 6 to 8 weeks by PCP     Deconditioning: PT/OT working with patient, doing well    Persistent leukocytosis: ID following  Reportedly rule out C. difficile colitis stool sample ordered however staff notes unable to perform due to semisolid stools and patient is already on  "lactulose  Non-ill appearance  Monitor  PLAN FOR DISPOSITION: hopefully to inpatient psych for alcohol rehab    TIKA Arzate  Hospitalist, [facility]  11/16/20  07:57 EST    \"Dictated utilizing Dragon dictation\"    "

## 2020-11-16 NOTE — NURSING NOTE
Continued Stay Note  DORIS Cobos     Patient Name: Tayler Lugo  MRN: [id number]  Today's Date: 11/16/2020    Admit Date: 11/4/2020    Discharge Plan     Row Name 11/16/20 0936       Plan    Plan  to be determined    Plan Comments  Noted referral to set up [place] Eval. Spoke with Odilia/Soni - [facility] arranged for 11am today. Requested clinicals faxed. CM will continue to follow.        Discharge Codes    No documentation.             Michael Peraza RN

## 2020-11-16 NOTE — NURSING NOTE
Continued Stay Note  DORIS Cobos     Patient Name: Tayler Lugo  MRN: [id number]  Today's Date: 11/16/2020    Admit Date: 11/4/2020    Discharge Plan     Row Name 11/16/20 1035       Plan    Plan  to be determined, pending recommendations by [facility]    Plan Comments  Spoke with patient at bedside, updated that [facility] will do a tele-eval today at 11am. She is agreeable to her daughter, Jacqui, being present during eval. Spoke with Jacqui via phone and she will plan to be here to participate in the  tele-eval at 11am.  Authorization form and covid screen provided by [facility] completed and faxed to [facility]. Pending recommendations by [facility], decision will be made about returning home with HH. CM will continue to follow.    Row Name 11/16/20 0936       Plan    Plan  to be determined    Plan Comments  Noted referral to set up [place] Eval. Spoke with Odilia/Soni - Tele Eval arranged for 11am today. Requested clinicals faxed. CM will continue to follow.        Discharge Codes    No documentation.             Michael Peraza RN

## 2020-11-17 VITALS
DIASTOLIC BLOOD PRESSURE: 76 MMHG | HEART RATE: 91 BPM | HEIGHT: 62 IN | SYSTOLIC BLOOD PRESSURE: 102 MMHG | OXYGEN SATURATION: 96 % | RESPIRATION RATE: 20 BRPM | BODY MASS INDEX: 26.28 KG/M2 | WEIGHT: 142.8 LBS | TEMPERATURE: 98.6 F

## 2020-11-17 LAB
BACTERIA SPEC AEROBE CULT: NORMAL
BACTERIA SPEC AEROBE CULT: NORMAL
INR PPP: 1.46 (ref 0.9–1.1)
MAGNESIUM SERPL-MCNC: 1.6 MG/DL (ref 1.6–2.4)
PROTHROMBIN TIME: 17.2 SECONDS (ref 12.1–15)

## 2020-11-17 PROCEDURE — 85610 PROTHROMBIN TIME: CPT | Performed by: HOSPITALIST

## 2020-11-17 PROCEDURE — 83735 ASSAY OF MAGNESIUM: CPT | Performed by: HOSPITALIST

## 2020-11-17 PROCEDURE — 99239 HOSP IP/OBS DSCHRG MGMT >30: CPT | Performed by: NURSE PRACTITIONER

## 2020-11-17 RX ORDER — LEVOTHYROXINE SODIUM 0.05 MG/1
50 TABLET ORAL DAILY
Qty: 30 TABLET | Refills: 1 | Status: SHIPPED | OUTPATIENT
Start: 2020-11-17

## 2020-11-17 RX ORDER — MIDODRINE HYDROCHLORIDE 2.5 MG/1
2.5 TABLET ORAL
Qty: 90 TABLET | Refills: 1 | Status: SHIPPED | OUTPATIENT
Start: 2020-11-17 | End: 2021-04-08

## 2020-11-17 RX ORDER — TORSEMIDE 20 MG/1
20 TABLET ORAL DAILY
Qty: 30 TABLET | Refills: 1 | Status: SHIPPED | OUTPATIENT
Start: 2020-11-17 | End: 2021-04-10

## 2020-11-17 RX ORDER — L.ACID,PARA/B.BIFIDUM/S.THERM 8B CELL
1 CAPSULE ORAL DAILY
Qty: 30 CAPSULE | Refills: 1 | Status: SHIPPED | OUTPATIENT
Start: 2020-11-17 | End: 2021-03-22 | Stop reason: SDUPTHER

## 2020-11-17 RX ORDER — LACTULOSE 10 G/15ML
20 SOLUTION ORAL 2 TIMES DAILY
Qty: 1892 ML | Refills: 1 | Status: SHIPPED | OUTPATIENT
Start: 2020-11-17 | End: 2021-05-19

## 2020-11-17 RX ORDER — FOLIC ACID 1 MG/1
1 TABLET ORAL DAILY
Qty: 30 TABLET | Refills: 1 | Status: SHIPPED | OUTPATIENT
Start: 2020-11-17

## 2020-11-17 RX ADMIN — RIFAXIMIN 600 MG: 200 TABLET ORAL at 08:04

## 2020-11-17 RX ADMIN — SODIUM CHLORIDE, PRESERVATIVE FREE 10 ML: 5 INJECTION INTRAVENOUS at 08:07

## 2020-11-17 RX ADMIN — SODIUM CHLORIDE, PRESERVATIVE FREE 10 ML: 5 INJECTION INTRAVENOUS at 08:08

## 2020-11-17 RX ADMIN — LACTULOSE 20 G: 20 SOLUTION ORAL at 08:04

## 2020-11-17 RX ADMIN — Medication 1 CAPSULE: at 08:04

## 2020-11-17 RX ADMIN — TORSEMIDE 20 MG: 20 TABLET ORAL at 08:04

## 2020-11-17 RX ADMIN — MIDODRINE HYDROCHLORIDE 2.5 MG: 5 TABLET ORAL at 06:30

## 2020-11-17 RX ADMIN — LEVOTHYROXINE SODIUM 50 MCG: 50 TABLET ORAL at 06:29

## 2020-11-17 RX ADMIN — FOLIC ACID 1 MG: 1 TABLET ORAL at 08:04

## 2020-11-17 NOTE — DISCHARGE INSTR - LAB
I called Urology and left a message to call patient for hospital follow up.     Dr. Stanton on December 21, 2020 at 1:45p.m.

## 2020-11-17 NOTE — DISCHARGE INSTR - APPOINTMENTS
Follow up with Dr. Trujillo on November 23rd at 1:30.    Follow up with Arina as scheduled.    Follow up with Dr. Odom nephrology on November 20th at 2 p.m.-[address]

## 2020-11-17 NOTE — DISCHARGE SUMMARY
"Tayler Lugo  1950  [id number]    Hospitalists Discharge Summary    Date of Admission: 11/4/2020  Date of Discharge:  11/17/2020    History of Present Illness from hospitals on admit:   \"Ms. Lugo is a pleasant, 71 y/o  female who presents w/ a 1 week hx/o of diarrhea.  So is a difficult historian, but she states she has had intermittent diarrheal episodes since June.  They are non-bloody, but sometimes she'll have yellow mucous.  Initially, she denies associated abdominal pain, but has noticed intermittent generalized pain over the last week.  She doesn't seem to think it gets better w/ movement of her bowels.  She was also treated for \"bronchitis\", but the initial diarrhea before that.  She does not know what drug she was prescribed.  She denies nausea and vomiting.  She describes frequent chills, but no fever.  She denies chest pain and dyspnea.  She does note episodes of thrush when she takes an antibiotic on her tongue.  Slight sore throat.  She denies runny nose and cough.  She has also noticed her pants fitting tighter over about the last month.  She denies lower extremity edema.  She did have a slip and fall a few days ago which was not preceded by near syncope or head strike.  She reports she [place] [address] [place] [address]  She drinks 4-5 Vodkas a night, and has done so since her  passed away 3 years ago.  She prefers to drink in the evening.  She has never gone through withdrawals.     She moved here from [place] to be closer to family.  Her daughter brought her to the ER today because of the diarrhea and edema.\"  Primary Discharge diagnoses/Hospital Course Summary:   Ms Lugo was initially managed in ICU due to high CIWA scores and hypotension requiring high doses of Ativan. She had a diagnostic and therapeutic paracentesis early on with  2050 ml removed. Repeat abdominal ultrasounds did not show enough fluid for repeat. She eventually stabilized and was " transitioned to oral medications.  She is highly motivated to quit alcohol and met with Arina yesterday with decision to follow outpatient.  Her white blood cell count persisted and ID followed throughout with transition on and off of antibiotics without change.  The patient should be followed up closely by primary care. She has a known h/o urinary retention, UTI was ruled out. Below are exact diagnoses managed.     Acute alcoholic hepatitis with coagulopathy, ascites:   Acute metabolic encephalopathy 2/2 above:  Presumed sepsis, ruled out UTI, r/o SBP with septic shock:  resolved  Transaminitis/elevated alk phos/hyperbilirubinemia:   Persistent leukocytosis: GI/ID followed  Initially on rocephin, continues lactulose  MELD scores remain 20s, not a transplant candidate at this time  Xifaxan/torsemide continued  F/U Dr. Stanton 4 weeks  F/U Tayler Trujillo MD 1 week     Acute metabolic encephalopathy 2/2 acute alcohol withdrawal: Resolved  2/2 above  CIWA scores have remained low, no further need for medications    Acute kidney injury: Resolved  Hyponatremia:  Electrolyte imbalance:   Volume overload: Nephrology followed  Doing well on torsemide 20 mg daily, started this admission per nephrology  Creatinine stable at 0.50  F/U nephrology 2 weeks    Secondary Discharge Diagnoses:    Chronic urinary retention:  Shipman on and off throughout, discontinued yesterday  Previously instructed to self cath at home, resume  F/U Urology 2 weeks     Macrocytic anemia:  Coagulopathy:  Folate deficiency:  Elevated ferritin:  Folic acid 1 mg daily added this admission  Added folic acid 1 mg daily this admission  Hemoglobin stable and rising at 10.2  Last INR 1.41 on 11/15/2020  F/U as above     Suboptimal intake 2/2 diagnoses:   Initially low intake, currently   Dietician followed throughout      New diagnosis hypothyroidism: Levothyroxine 50 mcg daily added this admission  Recheck TFTs in 6 to 8 weeks by  PCP     Deconditioning: PT/OT working with patient, doing well     Persistent leukocytosis: ID followed  Infections were ruled out by ID, antibiotics were stopped  F/U Tayler Trujillo MD 1 week    PCP  Patient Care Team:  Tayler Trujillo MD as PCP - General    Consults:   Consults     Date and Time Order Name Status Description    11/15/2020 0850 Inpatient Infectious Diseases Consult Completed     11/5/2020 0757 Inpatient Pulmonology Consult Completed     11/4/2020 7729 Inpatient Nephrology Consult Completed     11/4/2020 1545 Inpatient Gastroenterology Consult Completed         Operations and Procedures Performed:     Ct Abdomen Pelvis Without Contrast    Result Date: 11/15/2020  Narrative: CT ABDOMEN AND PELVIS, NONCONTRAST, 11/15/2020 HISTORY: 70-year-old female hospital inpatient admitted to the hospital with sepsis, marked leukocytosis. Her problem list has included acute alcoholic hepatitis, metabolic encephalopathy, acute renal failure, alcohol withdrawal; much of which has resolved or is improving. Marked leukocytosis persists. Today, she is complaining of abdomen pain, bloating and constipation. TECHNIQUE: CT imaging of the abdomen and pelvis without oral or IV contrast. Radiation dose reduction techniques included automated exposure control. Radiation audit for CT and nuclear cardiology exams in the last 12 months: 0. COMPARISON: *  CT abdomen/pelvis, 11/4/2020. ABDOMEN FINDINGS: The images again demonstrate marked hepatomegaly and diffuse heterogeneous hepatic steatosis, although the degree of fat infiltration has improved since the recent previous study. Moderately large volume ascites, unchanged. Soft tissue edema throughout the abdominal mesentery and body wall. There is no splenomegaly. No gallbladder distention or bile duct dilatation. Pancreas is unremarkable. Kidneys are negative with no nephrolithiasis or evidence of urinary obstruction. Normal caliber abdominal aorta. Small bowel and colon are  normal in caliber no evidence of obstruction or significant adynamic ileus. The stomach is nondistended, there is no distal esophageal dilatation. PELVIS FINDINGS: The urinary bladder is distended and shows diffuse wall thickening which could indicate cystitis. Uterus, adnexal regions and rectum are within normal limits. No inguinal hernia. Severe chronic grade 3 anterolisthesis at L5-S1 status post posterior fusion at L4-5 and L5-S1 with posterior instrumentation. Lung base images show no active disease. Elevated right hemidiaphragm with compressive right lung base atelectasis. No pleural effusion.     Impression: 1.  Marked hepatomegaly and diffuse, heterogeneous hepatic steatosis. Fatty infiltration of the liver has improved since 11/4/2020. 2.  Moderately large volume ascites, unchanged. No splenomegaly. Mesenteric and body wall edema. 3.  No bowel dilatation to suggest obstruction or other additional etiology for abdominal distention. 4.  Distended urinary bladder with diffuse wall thickening. Correlate for cystitis. Signer Name: Ricky Smith MD  Signed: 11/15/2020 9:27 AM  Workstation Name: [email or web address] [facility]  [facility]    Ct Abdomen Pelvis Without Contrast    Result Date: 11/4/2020  Narrative: CT Abdomen Pelvis WO INDICATION: 70-year-old emergency department patient with abdominal pain diffusely and fever for one week. Recent antibiotic therapy for chronic urinary tract infection. TECHNIQUE: CT of the abdomen and pelvis without IV contrast. Coronal and sagittal reconstructions were obtained.  Radiation dose reduction techniques included automated exposure control or exposure modulation based on body size. Count of known CT and cardiac nuc med studies performed in previous 12 months: 0. COMPARISON: Ultrasound liver 8/11/2020, CT abdomen and pelvis 4/24/2019 FINDINGS: Abdomen: The included images through the lung bases demonstrate no acute pulmonary density or pleural effusion. The  distal esophagus appears normal On these noncontrasted images the liver is diffusely abnormal with diffuse low attenuation in a mottled appearance. Liver is enlarged measuring 23.6 cm cephalocaudad previously measuring 17.3 cm on the ultrasound of 8/11/2020 and 18.4 cm on the CT scan of 4/24/2019 suggesting a significant interval increase in size. The spleen is normal in size and density, however there is a new large amount of ascites in the abdomen, paracolic gutters and pelvis. The gallbladder appears dense relative to the low density liver but no stones are seen. There is pericholecystic fluid likely part of the generalized ascites. The pancreas, pancreatic duct, common bile ducts and adrenal glands are normal. The kidneys are unremarkable The stomach, small bowel and colon have a normal noncontrasted appearance. Pelvis: The bladder is distended. The wall appears slightly prominent diffusely but no nodularity is seen. No bladder stone. This is small postmenopausal uterus. No adnexal mass. There is mild diffuse subcutaneous edema over the anterior abdominal wall which could be part of anasarca. Bone window images demonstrate demonstrates stable spinal fusion hardware posteriorly from L4 through S1 with severe anterolisthesis of L5 on S1 unchanged.     Impression: 1. Marked interval increase in size of the liver with diffusely mottled low attenuation as compared to the prior studies of 11/20 and 4/24/2019. Patient has had underlying steatosis. The current changes could represent progression of steatosis or acute superimposed hepatitis. There is new ascites diffusely in the abdomen or pelvis which is moderate to large in amount. No splenomegaly. The portal vein and splenic vein do not appear enlarged. 2. No gallstones seen 3. No definite distention of the stomach, small bowel or colon. 4. The bladder is distended. The wall is mildly thickened but no definite nodularity or bladder stone. Signer Name: Sheron Lundberg,  MD  Signed: 11/4/2020 1:18 PM  Workstation Name: [id number]  [facility]    Xr Chest 1 View    Result Date: 11/5/2020  Narrative: XR CHEST 1 VW-: 11/5/2020 2:52 PM  INDICATION: 70-year-old female presenting for PICC line placement  COMPARISON:  11/04/2020.  FINDINGS: Single Portable AP view(s) of the chest. Status post right shoulder replacement. There is a new right-sided PICC line present. The tip extends to the level of the right atrium. If positioning at the cavoatrial junction is desired it could be retracted about 4 cm. No pneumothorax. Cardiac silhouette borderline in size and stable. Shallow lung expansion with low lung volumes and probable bibasilar atelectasis. Persistent eventration or elevation of the right hemidiaphragm.      Impression:  1. New right-sided PICC line tip at the right atrial level. If positioning at the cavoatrial junction is desired it could be retracted about 4 cm. No pneumothorax. 2. No other significant interval change.  This report was finalized on 11/5/2020 3:07 PM by Dr. Waqar Jerez MD.      Xr Chest 1 View    Result Date: 11/4/2020  Narrative: XR CHEST 1 VW-: 11/4/2020 12:48 PM  INDICATION: Short of air and weakness several days.  COMPARISON:  None available.  FINDINGS: Single Portable AP view(s) of the chest. Status post right shoulder replacement. Cardiac silhouette is within normal limits. Shallow lung expansion and bronchovascular crowding. Probable platelike atelectasis in the left base and atelectatic change in the right base. Mild eventration or elevation of the right hemidiaphragm. No distinct effusion or pneumothorax.      Impression:  1. Shallow lung expansion with bronchovascular crowding and probable bibasilar atelectasis.  This report was finalized on 11/4/2020 12:57 PM by Dr. Waqar Jerez MD.      Us Abdomen Limited    Result Date: 11/9/2020  Narrative: Exam: Limited abdomen ultrasound 11/09/2020  INDICATION: Abdominal distention  evaluate for ascites  FINDINGS: Ultrasound of the 4 quadrants of the abdomen was performed. No ascites is identified.      Impression: No ascites is identified  This report was finalized on 11/9/2020 7:59 AM by Dr. Solitario Jain MD.      Us Abdomen Limited    Addendum Date: 11/5/2020 Addendum:   Addendum: By report, the patient underwent a bedside paracentesis following the abdomen and pelvis CT performed yesterday, which accounts for the lack of ascites amenable to paracentesis today.  This report was finalized on 11/5/2020 2:14 PM by Dr. Waqar Jerez MD.      Result Date: 11/5/2020  Narrative: FOUR-QUADRANT ULTRASOUND TO ASSESS FOR THE PRESENCE OR ABSENCE OF ASCITES FOR PLANNED PARACENTESIS  HISTORY: 70-year-old female with abnormal CT scan performed yesterday. Ascites on prior CT.  TECHNIQUE: Four-quadrant ultrasound was performed. Correlation made with CT performed yesterday.  FINDINGS: There is no ascites amenable to ultrasound-guided paracentesis on the provided images. The majority of the ascites on the prior CT localizes to the pelvis.      Impression: 1. No ascites amenable to ultrasound-guided paracentesis.  This report was finalized on 11/5/2020 11:28 AM by Dr. Waqar Jerez MD.      Xr Chest Pa & Lateral    Result Date: 11/14/2020  Narrative: CHEST X-RAY, 11/14/2020  HISTORY:  70-year-old female hospital inpatient with sepsis. Severe. Evaluate for pneumonia.  TECHNIQUE: Upright two view chest x-ray. COMPARISON: *  Chest x-ray, 11/10/2020. FINDINGS: Chronically low lung volumes with platelike scarring or linear atelectasis in the posterior lung bases, unchanged. No convincing acute pulmonary infiltrate. No visible pleural effusion. Heart size and pulmonary vascularity are normal. PICC in good position.     Impression: No definite active disease. No change since 11/10/2020. Signer Name: Ricky Smith MD  Signed: 11/14/2020 12:20 PM  Workstation Name: [id number]  [facility]    Xr Chest Pa & Lateral    Result Date: 11/10/2020  Narrative: PA AND LATERAL CHEST, 11/10/2020 10:57 AM  HISTORY: Tachypnea, crackles, persistent leukocytosis; K76.7-Hepatorenal syndrome; A41.9-Sepsis, unspecified organism; R65.20-Severe sepsis without septic shock; N17.9-Acute kidney failure, unspecified  increasing shortness of air for one day  COMPARISON: 11/05/2020  TECHNIQUE: PA and lateral upright chest series.  FINDINGS: There are low lung volumes in the right hemidiaphragm is elevated. There is minimal basilar atelectasis. Rest the lungs are clear . Right shoulder prosthesis is present. The PICC catheter tip in the right atrium. There    Impression: Low lung volumes with minimal bibasilar atelectasis.  This report was finalized on 11/10/2020 11:08 AM by Dr. Solitario Jain MD.      Allergies:  has No Known Allergies.    Luke  Reviewed by me    Discharge Medications:     Discharge Medications      New Medications      Instructions Start Date   folic acid 1 MG tablet  Commonly known as: FOLVITE   1 mg, Oral, Daily      lactobacillus acidophilus capsule capsule   1 capsule, Oral, Daily      lactulose 10 GM/15ML solution  Commonly known as: CHRONULAC   20 g, Oral, 2 Times Daily      levothyroxine 50 MCG tablet  Commonly known as: SYNTHROID, LEVOTHROID   50 mcg, Oral, Daily      midodrine 2.5 MG tablet  Commonly known as: PROAMATINE   2.5 mg, Oral, 3 Times Daily Before Meals      riFAXIMin 550 MG tablet  Commonly known as: XIFAXAN   550 mg, Oral, Every 12 Hours Scheduled      torsemide 20 MG tablet  Commonly known as: DEMADEX   20 mg, Oral, Daily         Continue These Medications      Instructions Start Date   ESTRACE VAGINAL 0.1 MG/GM vaginal cream  Generic drug: estradiol   2 g, Vaginal, Daily PRN         Stop These Medications    amLODIPine 10 MG tablet  Commonly known as: NORVASC     enalapril 20 MG tablet  Commonly known as: VASOTEC     Fluzone High-Dose Quadrivalent 0.7 ML suspension prefilled  syringe  Generic drug: Influenza Vac High-Dose Quad     Klor-Con 8 MEQ CR tablet  Generic drug: potassium chloride     metoprolol succinate XL 25 MG 24 hr tablet  Commonly known as: TOPROL-XL     Vitamin D3 1.25 MG (11163 UT) capsule            Last Lab Results:   Lab Results (most recent)     Procedure Component Value Units Date/Time    Protime-INR [[id number]]  (Abnormal) Collected: 11/17/20 0629    Specimen: Blood Updated: 11/17/20 0658     Protime 17.2 Seconds      INR 1.46    Narrative:      Therapeutic Ranges for INR: 2.0-3.0 (PT 20-30)                              2.5-3.5 (PT 25-34)    Magnesium [[id number]]  (Normal) Collected: 11/17/20 0628    Specimen: Blood Updated: 11/17/20 0654     Magnesium 1.6 mg/dL     Blood Culture - Blood, Arm, Left [[id number]] Collected: 11/12/20 1045    Specimen: Blood from Arm, Left Updated: 11/16/20 1100     Blood Culture No growth at 4 days    Blood Culture - Blood, Hand, Left [[id number]] Collected: 11/12/20 1056    Specimen: Blood from Hand, Left Updated: 11/16/20 1100     Blood Culture No growth at 4 days    Comprehensive Metabolic Panel [[id number]]  (Abnormal) Collected: 11/16/20 0422    Specimen: Blood Updated: 11/16/20 0501     Glucose 113 mg/dL      BUN 28 mg/dL      Creatinine 0.50 mg/dL      Sodium 135 mmol/L      Potassium 3.9 mmol/L      Chloride 102 mmol/L      CO2 23.3 mmol/L      Calcium 8.4 mg/dL      Total Protein 5.1 g/dL      Albumin 2.70 g/dL      ALT (SGPT) 55 U/L      AST (SGOT) 148 U/L      Alkaline Phosphatase 155 U/L      Total Bilirubin 6.9 mg/dL      eGFR Non African Amer 121 mL/min/1.73      Globulin 2.4 gm/dL      A/G Ratio 1.1 g/dL      BUN/Creatinine Ratio 56.0     Anion Gap 9.7 mmol/L     Narrative:      GFR Normal >60  Chronic Kidney Disease <60  Kidney Failure <15      Magnesium [[id number]]  (Normal) Collected: 11/16/20 0422    Specimen: Blood Updated: 11/16/20 0452     Magnesium 1.6 mg/dL     Procalcitonin [[id number]]  (Abnormal) Collected:  11/16/20 0422    Specimen: Blood Updated: 11/16/20 0452     Procalcitonin 0.68 ng/mL     Narrative:      Results may be falsely decreased if patient taking Biotin.     Lactic Acid, Plasma [[id number]]  (Normal) Collected: 11/16/20 0422    Specimen: Blood Updated: 11/16/20 0438     Lactate 1.8 mmol/L     CBC & Differential [[id number]]  (Abnormal) Collected: 11/16/20 0422    Specimen: Blood Updated: 11/16/20 0430    Narrative:      The following orders were created for panel order CBC & Differential.  Procedure                               Abnormality         Status                     ---------                               -----------         ------                     CBC Auto Differential[[id number]]        Abnormal            Final result                 Please view results for these tests on the individual orders.    CBC Auto Differential [[id number]]  (Abnormal) Collected: 11/16/20 0422    Specimen: Blood Updated: 11/16/20 0430     WBC 21.65 10*3/mm3      RBC 2.78 10*6/mm3      Hemoglobin 10.2 g/dL      Hematocrit 31.9 %      .7 fL      MCH 36.7 pg      MCHC 32.0 g/dL      RDW 15.9 %      RDW-SD 67.6 fl      MPV 12.2 fL      Platelets 242 10*3/mm3      Neutrophil % 78.6 %      Lymphocyte % 10.3 %      Monocyte % 9.8 %      Eosinophil % 0.2 %      Basophil % 0.4 %      Immature Grans % 0.7 %      Neutrophils, Absolute 16.99 10*3/mm3      Lymphocytes, Absolute 2.23 10*3/mm3      Monocytes, Absolute 2.13 10*3/mm3      Eosinophils, Absolute 0.05 10*3/mm3      Basophils, Absolute 0.09 10*3/mm3      Immature Grans, Absolute 0.16 10*3/mm3      nRBC 0.0 /100 WBC     Protime-INR [[id number]]  (Abnormal) Collected: 11/15/20 0605    Specimen: Blood Updated: 11/15/20 0700     Protime 16.8 Seconds      INR 1.41    Narrative:      Therapeutic Ranges for INR: 2.0-3.0 (PT 20-30)                              2.5-3.5 (PT 25-34)    Comprehensive Metabolic Panel [[id number]]  (Abnormal) Collected: 11/15/20 0605    Specimen:  Blood Updated: 11/15/20 0654     Glucose 101 mg/dL      BUN 27 mg/dL      Creatinine 0.62 mg/dL      Sodium 137 mmol/L      Potassium 3.1 mmol/L      Chloride 100 mmol/L      CO2 24.7 mmol/L      Calcium 8.9 mg/dL      Total Protein 5.5 g/dL      Albumin 2.80 g/dL      ALT (SGPT) 61 U/L      AST (SGOT) 158 U/L      Alkaline Phosphatase 176 U/L      Total Bilirubin 8.8 mg/dL      eGFR Non African Amer 95 mL/min/1.73      Globulin 2.7 gm/dL      A/G Ratio 1.0 g/dL      BUN/Creatinine Ratio 43.5     Anion Gap 12.3 mmol/L     Narrative:      GFR Normal >60  Chronic Kidney Disease <60  Kidney Failure <15      Lactic Acid, Plasma [[id number]]  (Normal) Collected: 11/15/20 0605    Specimen: Blood Updated: 11/15/20 0642     Lactate 1.8 mmol/L     CBC & Differential [[id number]]  (Abnormal) Collected: 11/15/20 0605    Specimen: Blood Updated: 11/15/20 0635    Narrative:      The following orders were created for panel order CBC & Differential.  Procedure                               Abnormality         Status                     ---------                               -----------         ------                     CBC Auto Differential[[id number]]        Abnormal            Final result                 Please view results for these tests on the individual orders.    CBC Auto Differential [[id number]]  (Abnormal) Collected: 11/15/20 0605    Specimen: Blood Updated: 11/15/20 0635     WBC 22.81 10*3/mm3      RBC 2.93 10*6/mm3      Hemoglobin 10.7 g/dL      Hematocrit 34.0 %      .0 fL      MCH 36.5 pg      MCHC 31.5 g/dL      RDW 16.1 %      RDW-SD 69.3 fl      MPV 11.9 fL      Platelets 248 10*3/mm3      Neutrophil % 77.9 %      Lymphocyte % 10.7 %      Monocyte % 10.1 %      Eosinophil % 0.4 %      Basophil % 0.3 %      Immature Grans % 0.6 %      Neutrophils, Absolute 17.77 10*3/mm3      Lymphocytes, Absolute 2.43 10*3/mm3      Monocytes, Absolute 2.30 10*3/mm3      Eosinophils, Absolute 0.10 10*3/mm3      Basophils,  Absolute 0.07 10*3/mm3      Immature Grans, Absolute 0.14 10*3/mm3     Urinalysis With Culture If Indicated - Urine, Catheter In/Out [[id number]]  (Abnormal) Collected: 11/14/20 1609    Specimen: Urine, Catheter In/Out Updated: 11/14/20 1628     Color, UA Toshia     Appearance, UA Clear     pH, UA <=5.0     Specific Gravity, UA 1.020     Glucose, UA Negative     Ketones, UA Negative     Bilirubin, UA Small (1+)     Blood, UA Negative     Protein, UA Negative     Leuk Esterase, UA Negative     Nitrite, UA Negative     Urobilinogen, UA 0.2 E.U./dL    Narrative:      Urine microscopic not indicated.    Procalcitonin [[id number]]  (Abnormal) Collected: 11/14/20 1050    Specimen: Blood Updated: 11/14/20 1127     Procalcitonin 0.72 ng/mL     Narrative:      Results may be falsely decreased if patient taking Biotin.     Basic Metabolic Panel [[id number]]  (Abnormal) Collected: 11/14/20 0414    Specimen: Blood Updated: 11/14/20 0524     Glucose 106 mg/dL      BUN 24 mg/dL      Creatinine 0.63 mg/dL      Sodium 135 mmol/L      Potassium 3.8 mmol/L      Chloride 102 mmol/L      CO2 24.7 mmol/L      Calcium 9.0 mg/dL      eGFR Non African Amer 93 mL/min/1.73      BUN/Creatinine Ratio 38.1     Anion Gap 8.3 mmol/L     Narrative:      GFR Normal >60  Chronic Kidney Disease <60  Kidney Failure <15      Potassium [[id number]]  (Normal) Collected: 11/13/20 2159    Specimen: Blood Updated: 11/13/20 2347     Potassium 4.3 mmol/L     Scan Slide [[id number]] Collected: 11/13/20 0733    Specimen: Blood Updated: 11/13/20 0921     Anisocytosis Slight/1+     Macrocytes Mod/2+     WBC Morphology Normal     Large Platelets Slight/1+    Renal Function Panel [[id number]]  (Abnormal) Collected: 11/13/20 0733    Specimen: Blood Updated: 11/13/20 0826     Glucose 98 mg/dL      BUN 22 mg/dL      Creatinine 0.49 mg/dL      Sodium 136 mmol/L      Potassium 3.1 mmol/L      Chloride 99 mmol/L      CO2 24.5 mmol/L      Calcium 9.2 mg/dL      Albumin 2.50 g/dL       Phosphorus 4.5 mg/dL      Anion Gap 12.5 mmol/L      BUN/Creatinine Ratio 44.9     eGFR Non African Amer 124 mL/min/1.73     Narrative:      GFR Normal >60  Chronic Kidney Disease <60  Kidney Failure <15      Ammonia [[id number]]  (Normal) Collected: 11/11/20 0506    Specimen: Blood Updated: 11/11/20 0548     Ammonia 38 umol/L     Folate [[id number]]  (Normal) Collected: 11/10/20 0505    Specimen: Blood Updated: 11/10/20 1207     Folate 6.39 ng/mL     Narrative:      Results may be falsely increased if patient taking Biotin.      Vitamin B12 [[id number]]  (Abnormal) Collected: 11/10/20 0505    Specimen: Blood Updated: 11/10/20 1125     Vitamin B-12 >2,000 pg/mL     Narrative:      Results may be falsely increased if patient taking Biotin.      Phosphorus [[id number]]  (Normal) Collected: 11/10/20 0505    Specimen: Blood Updated: 11/10/20 0720     Phosphorus 2.8 mg/dL     Urinalysis, Microscopic Only - Urine, Clean Catch [[id number]]  (Abnormal) Collected: 11/09/20 1054    Specimen: Urine, Clean Catch Updated: 11/09/20 1208     RBC, UA 3-5 /HPF      WBC, UA None Seen /HPF      Bacteria, UA None Seen /HPF      Squamous Epithelial Cells, UA 0-2 /HPF      Hyaline Casts, UA None Seen /LPF      Methodology Manual Light Microscopy    Urinalysis With Culture If Indicated - Urine, Clean Catch [[id number]]  (Abnormal) Collected: 11/09/20 1054    Specimen: Urine, Clean Catch Updated: 11/09/20 1114     Color, UA Dark Yellow     Appearance, UA Slightly Cloudy     pH, UA <=5.0     Specific Gravity, UA 1.020     Glucose, UA Negative     Ketones, UA Negative     Bilirubin, UA Moderate (2+)     Blood, UA Trace     Protein, UA Negative     Leuk Esterase, UA Negative     Nitrite, UA Negative     Urobilinogen, UA 0.2 E.U./dL    Anaerobic Culture - Body Fluid, Peritoneum [[id number]] Collected: 11/04/20 1552    Specimen: Body Fluid from Peritoneum Updated: 11/09/20 1003     Anaerobic Culture No anaerobes isolated at 5 days    Ferritin  [[id number]]  (Abnormal) Collected: 11/09/20 0607    Specimen: Blood Updated: 11/09/20 0954     Ferritin 2,997.00 ng/mL     Narrative:      Results may be falsely decreased if patient taking Biotin.      Hemoglobin A1c [[id number]]  (Abnormal) Collected: 11/09/20 0607    Specimen: Blood Updated: 11/09/20 0949     Hemoglobin A1C 4.70 %     Narrative:      Hemoglobin A1C Ranges:    Increased Risk for Diabetes  5.7% to 6.4%  Diabetes                     >= 6.5%  Diabetic Goal                < 7.0%    TSH [[id number]]  (Abnormal) Collected: 11/09/20 0607    Specimen: Blood Updated: 11/09/20 0943     TSH 15.200 uIU/mL     Iron Profile [[id number]]  (Abnormal) Collected: 11/09/20 0607    Specimen: Blood Updated: 11/09/20 0940     Iron 67 mcg/dL      Iron Saturation --     Comment: Unable to calculate        UIBC <16 mcg/dL      TIBC --     Comment: Unable to calculate       Phosphorus [[id number]]  (Abnormal) Collected: 11/09/20 0607    Specimen: Blood Updated: 11/09/20 0711     Phosphorus 2.1 mg/dL     Ammonia [[id number]]  (Abnormal) Collected: 11/09/20 0607    Specimen: Blood Updated: 11/09/20 0705     Ammonia 81 umol/L     Body Fluid Culture - Body Fluid, Peritoneum [[id number]] Collected: 11/04/20 1552    Specimen: Body Fluid from Peritoneum Updated: 11/09/20 0646     Body Fluid Culture No growth at 5 days     Gram Stain Rare (1+) WBCs seen      No organisms seen    Potassium [[id number]]  (Normal) Collected: 11/08/20 1841    Specimen: Blood Updated: 11/08/20 1925     Potassium 3.6 mmol/L     Manual Differential [[id number]]  (Abnormal) Collected: 11/08/20 0516    Specimen: Blood Updated: 11/08/20 0616     Neutrophil % 77.0 %      Lymphocyte % 8.0 %      Monocyte % 11.0 %      Bands %  3.0 %      Atypical Lymphocyte % 1.0 %      Neutrophils Absolute 11.89 10*3/mm3      Lymphocytes Absolute 1.19 10*3/mm3      Monocytes Absolute 1.63 10*3/mm3      nRBC 1.0 /100 WBC      Macrocytes Mod/2+     Polychromasia Slight/1+     WBC  Morphology Normal     Platelet Morphology Normal    Basic Metabolic Panel [[id number]]  (Abnormal) Collected: 11/07/20 1407    Specimen: Blood Updated: 11/07/20 1455     Glucose 157 mg/dL      BUN 25 mg/dL      Creatinine 0.46 mg/dL      Sodium 136 mmol/L      Potassium 4.4 mmol/L      Chloride 104 mmol/L      CO2 19.8 mmol/L      Calcium 7.4 mg/dL      eGFR Non African Amer 134 mL/min/1.73      BUN/Creatinine Ratio 54.3     Anion Gap 12.2 mmol/L     Narrative:      GFR Normal >60  Chronic Kidney Disease <60  Kidney Failure <15      Uric Acid [[id number]]  (Abnormal) Collected: 11/07/20 0446    Specimen: Blood Updated: 11/07/20 0517     Uric Acid 11.1 mg/dL     Cortisol [[id number]] Collected: 11/06/20 0442    Specimen: Blood Updated: 11/06/20 1052     Cortisol 20.43 mcg/dL     Narrative:      Cortisol Reference Ranges:    Cortisol 6AM - 10AM Range: 6.02-18.40 mcg/dl  Cortisol 4PM - 8PM Range: 2.68-10.50 mcg/dl      Results may be falsely increased if patient taking Biotin.      Urine Culture - Urine, Urine, Clean Catch [[id number]]  (Normal) Collected: 11/04/20 1402    Specimen: Urine, Clean Catch Updated: 11/05/20 1723     Urine Culture No growth    Cortisol [[id number]] Collected: 11/05/20 0601    Specimen: Blood Updated: 11/05/20 1654     Cortisol 21.27 mcg/dL     Narrative:      Cortisol Reference Ranges:    Cortisol 6AM - 10AM Range: 6.02-18.40 mcg/dl  Cortisol 4PM - 8PM Range: 2.68-10.50 mcg/dl      Results may be falsely increased if patient taking Biotin.      Creatinine, Urine, Random - Urine, Clean Catch [[id number]] Collected: 11/05/20 0843    Specimen: Urine, Clean Catch Updated: 11/05/20 1643     Creatinine, Urine 237.5 mg/dL     Narrative:      Reference intervals for random urine have not been established.  Clinical usage is dependent upon physician's interpretation in combination with other laboratory tests.       Gastrointestinal Panel, PCR - Stool, Per Rectum [[id number]]  (Normal) Collected: 11/04/20  1858    Specimen: Stool from Per Rectum Updated: 11/05/20 1209     Campylobacter Not Detected     Plesiomonas shigelloides Not Detected     Salmonella Not Detected     Vibrio Not Detected     Vibrio cholerae Not Detected     Yersinia enterocolitica Not Detected     Enteroaggregative E. coli (EAEC) Not Detected     Enteropathogenic E. coli (EPEC) Not Detected     Enterotoxigenic E. coli (ETEC) lt/st Not Detected     Shiga-like toxin-producing E. coli (STEC) stx1/stx2 Not Detected     E. coli O157 Not Detected     Shigella/Enteroinvasive E. coli (EIEC) Not Detected     Cryptosporidium Not Detected     Cyclospora cayetanensis Not Detected     Entamoeba histolytica Not Detected     Giardia lamblia Not Detected     Adenovirus F40/41 Not Detected     Astrovirus Not Detected     Norovirus GI/GII Not Detected     Rotavirus A Not Detected     Sapovirus (I, II, IV or V) Not Detected    Narrative:      If Aeromonas, Staphylococcus aureus or Bacillus cereus are suspected, please order [id number]: Stool Culture, Aeromonas, S aureus, B Cereus.    Osmolality, Urine - Urine, Clean Catch [[id number]] Collected: 11/05/20 0843    Specimen: Urine, Clean Catch Updated: 11/05/20 1127     Osmolality, Urine 375 mOsm/kg     Narrative:      Osmo Normal Reference Ranges:    Random:  mOsm/kg H2O, depending on fluid intake.  Random: >850 mOsm/kg H20, after 12 hour fluid restriction.    24 Hour: 300-900 mOsm/kg H2O.    Sodium, Urine, Random - Urine, Clean Catch [[id number]] Collected: 11/05/20 0843    Specimen: Urine, Clean Catch Updated: 11/05/20 0909     Sodium, Urine 30 mmol/L     Narrative:      Reference intervals for random urine have not been established.  Clinical usage is dependent upon physician's interpretation in combination with other laboratory tests.       Uric Acid [[id number]]  (Abnormal) Collected: 11/05/20 0444    Specimen: Blood Updated: 11/05/20 0549     Uric Acid 12.9 mg/dL     Vitamin B12 & Folate [[id number]]  (Abnormal)  Collected: 11/04/20 1800    Specimen: Blood Updated: 11/04/20 2340     Folate 2.45 ng/mL      Vitamin B-12 1,307 pg/mL     Narrative:      Results may be falsely increased if patient taking Biotin.      Albumin, Fluid - Body Fluid, Peritoneum [[id number]] Collected: 11/04/20 1553    Specimen: Body Fluid from Peritoneum Updated: 11/04/20 2057     Albumin, Fluid 0.90 g/dL     Narrative:      No Reference Ranges Established.    A Serous fluid albumin gradient (serum albumin-fluid) <1.1 g/dL suggests the fluid is an exudate.  Cirrhosis usually results in an ascites fluid albumin gradient >1.1 g/dL.    This test was developed, its performance characteristics determined and judged suitable for clinical purposes by [facility].  It has not been cleared or approved by the FDA.  The laboratory is regulated under CLIA as qualified to perfom high-complexity testing.      T4, Free [[id number]]  (Normal) Collected: 11/04/20 1136    Specimen: Blood Updated: 11/04/20 2053     Free T4 1.03 ng/dL     Narrative:      Results may be falsely increased if patient taking Biotin.      Hepatitis Panel, Acute [[id number]]  (Normal) Collected: 11/04/20 1610    Specimen: Blood Updated: 11/04/20 2050     Hepatitis B Surface Ag Non-Reactive     Hep A IgM Non-Reactive     Hep B C IgM Non-Reactive     Hepatitis C Ab Non-Reactive    Narrative:      Results may be falsely decreased if patient taking Biotin.     Iron Profile [[id number]]  (Abnormal) Collected: 11/04/20 1136    Specimen: Blood Updated: 11/04/20 1947     Iron 104 mcg/dL      Iron Saturation --     Comment: Unable to calculate        UIBC <16 mcg/dL      TIBC --     Comment: Unable to calculate       TSH [[id number]]  (Abnormal) Collected: 11/04/20 1136    Specimen: Blood Updated: 11/04/20 1816     TSH 16.950 uIU/mL     Body Fluid Cell Count With Differential - Body Fluid, Peritoneum [[id number]] Collected: 11/04/20 1553    Specimen: Body Fluid from Peritoneum  Updated: 11/04/20 1710    Narrative:      The following orders were created for panel order Body Fluid Cell Count With Differential - Body Fluid, Peritoneum.  Procedure                               Abnormality         Status                     ---------                               -----------         ------                     Body fluid cell count - ...[[id number]]                      Final result               Body fluid differential ...[[id number]]                      Final result                 Please view results for these tests on the individual orders.    Body fluid differential - Body Fluid, Peritoneum [[id number]] Collected: 11/04/20 1553    Specimen: Body Fluid from Peritoneum Updated: 11/04/20 1710     Neutrophils, Fluid 19 %      Lymphocytes, Fluid 38 %      Monocytes, Fluid 37 %      Eosinophils, Fluid 1 %      Basophils, Fluid 0 %      Mononuclear, Fluid 1 %      Plasma Cells, Fluid 0 %      Blasts, Fluid 0 %      Unclassified Cells, Fluid 0 %      Mesothelial Cells, Fluid 0 %      Macrophage, Fluid 4 %     CK [[id number]]  (Abnormal) Collected: 11/04/20 1136    Specimen: Blood Updated: 11/04/20 1705     Creatine Kinase 195 U/L     Body fluid cell count - Body Fluid, Peritoneum [[id number]] Collected: 11/04/20 1553    Specimen: Body Fluid from Peritoneum Updated: 11/04/20 1631     Color, Fluid Yellow     Appearance, Fluid Clear     WBC, Fluid 105 /mm3      RBC, Fluid 0 /mm3     Lactic Acid, Reflex [[id number]]  (Abnormal) Collected: 11/04/20 1550    Specimen: Blood Updated: 11/04/20 1623     Lactate 4.1 mmol/L     CBC (No Diff) [[id number]]  (Abnormal) Collected: 11/04/20 1550    Specimen: Blood Updated: 11/04/20 1620     WBC 17.47 10*3/mm3      RBC 3.04 10*6/mm3      Hemoglobin 11.0 g/dL      Hematocrit 34.0 %      .8 fL      MCH 36.2 pg      MCHC 32.4 g/dL      RDW 14.4 %      RDW-SD 59.8 fl      MPV 11.0 fL      Platelets 340 10*3/mm3     Lactic Acid, Reflex Timer (This will reflex a repeat  order 3-3:15 hours after ordered.) [[id number]] Collected: 11/04/20 1136    Specimen: Blood Updated: 11/04/20 1515     Hold Tube Hold for add-ons.     Comment: Auto resulted.       Urinalysis, Microscopic Only - Urine, Clean Catch [[id number]]  (Abnormal) Collected: 11/04/20 1402    Specimen: Urine, Clean Catch Updated: 11/04/20 1435     RBC, UA 0-2 /HPF      WBC, UA 21-30 /HPF      Bacteria, UA 2+ /HPF      Squamous Epithelial Cells, UA 3-6 /HPF      Hyaline Casts, UA 3-6 /LPF      Amorphous Crystals, UA Small/1+ /HPF      Methodology Manual Light Microscopy    COVID-19,[facility] IN-HOUSE,Nasal Swab No Transport Media 3-4 HR TAT - Swab, Nasal Cavity [[id number]]  (Normal) Collected: 11/04/20 1136    Specimen: Swab from Nasal Cavity Updated: 11/04/20 1214     COVID19 Not Detected    Narrative:      Fact sheet for providers: https://www.fda.gov/media/724207/download     Fact sheet for patients: https://www.fda.gov/media/871635/download    Scan Slide [[id number]] Collected: 11/04/20 1136    Specimen: Blood Updated: 11/04/20 1211     Macrocytes Mod/2+     WBC Morphology Normal     Platelet Estimate Adequate    Lipase [[id number]]  (Abnormal) Collected: 11/04/20 1136    Specimen: Blood Updated: 11/04/20 1209     Lipase 364 U/L     aPTT [[id number]]  (Normal) Collected: 11/04/20 1136    Specimen: Blood Updated: 11/04/20 1157     PTT 32.6 seconds     Narrative:      PTT = The equivalent PTT values for the therapeutic range of heparin levels at 0.1 to 0.7 U/ml are 53 to 110 seconds.          Imaging Results (Most Recent)     Procedure Component Value Units Date/Time    CT Abdomen Pelvis Without Contrast [[id number]] Collected: 11/15/20 0927     Updated: 11/15/20 0929    Narrative:      CT ABDOMEN AND PELVIS, NONCONTRAST, 11/15/2020    HISTORY:  70-year-old female hospital inpatient admitted to the hospital with sepsis, marked leukocytosis. Her problem list has included acute alcoholic hepatitis, metabolic encephalopathy, acute  renal failure, alcohol withdrawal; much of which has resolved or is  improving. Marked leukocytosis persists. Today, she is complaining of abdomen pain, bloating and constipation.    TECHNIQUE:  CT imaging of the abdomen and pelvis without oral or IV contrast. Radiation dose reduction techniques included automated exposure control. Radiation audit for CT and nuclear cardiology exams in the last 12 months: 0.    COMPARISON:  *  CT abdomen/pelvis, 11/4/2020.    ABDOMEN FINDINGS:  The images again demonstrate marked hepatomegaly and diffuse heterogeneous hepatic steatosis, although the degree of fat infiltration has improved since the recent previous study. Moderately large volume ascites, unchanged. Soft tissue edema throughout  the abdominal mesentery and body wall. There is no splenomegaly.    No gallbladder distention or bile duct dilatation. Pancreas is unremarkable. Kidneys are negative with no nephrolithiasis or evidence of urinary obstruction. Normal caliber abdominal aorta.    Small bowel and colon are normal in caliber no evidence of obstruction or significant adynamic ileus. The stomach is nondistended, there is no distal esophageal dilatation.    PELVIS FINDINGS:  The urinary bladder is distended and shows diffuse wall thickening which could indicate cystitis.    Uterus, adnexal regions and rectum are within normal limits. No inguinal hernia.    Severe chronic grade 3 anterolisthesis at L5-S1 status post posterior fusion at L4-5 and L5-S1 with posterior instrumentation.    Lung base images show no active disease. Elevated right hemidiaphragm with compressive right lung base atelectasis. No pleural effusion.      Impression:      1.  Marked hepatomegaly and diffuse, heterogeneous hepatic steatosis. Fatty infiltration of the liver has improved since 11/4/2020.  2.  Moderately large volume ascites, unchanged. No splenomegaly. Mesenteric and body wall edema.  3.  No bowel dilatation to suggest obstruction or  other additional etiology for abdominal distention.  4.  Distended urinary bladder with diffuse wall thickening. Correlate for cystitis.    Signer Name: Ricky Smith MD   Signed: 11/15/2020 9:27 AM   Workstation Name: [facility]    Radiology [facility]    XR Chest PA & Lateral [[id number]] Collected: 11/14/20 1220     Updated: 11/14/20 1223    Narrative:      CHEST X-RAY, 11/14/2020      HISTORY:    70-year-old female hospital inpatient with sepsis. Severe. Evaluate for pneumonia.      TECHNIQUE:  Upright two view chest x-ray.    COMPARISON:  *  Chest x-ray, 11/10/2020.    FINDINGS:  Chronically low lung volumes with platelike scarring or linear atelectasis in the posterior lung bases, unchanged. No convincing acute pulmonary infiltrate. No visible pleural effusion. Heart size and pulmonary vascularity are normal. PICC in good  position.      Impression:      No definite active disease. No change since 11/10/2020.    Signer Name: Ricky Smith MD   Signed: 11/14/2020 12:20 PM   Workstation Name: [facility]    Radiology [facility]    XR Chest PA & Lateral [[id number]] Collected: 11/10/20 1107     Updated: 11/10/20 1110    Narrative:      PA AND LATERAL CHEST, 11/10/2020 10:57 AM     HISTORY:  Tachypnea, crackles, persistent leukocytosis; K76.7-Hepatorenal  syndrome; A41.9-Sepsis, unspecified organism; R65.20-Severe sepsis  without septic shock; N17.9-Acute kidney failure, unspecified    increasing shortness of air for one day     COMPARISON:  11/05/2020     TECHNIQUE:  PA and lateral upright chest series.     FINDINGS:  There are low lung volumes in the right hemidiaphragm is elevated. There  is minimal basilar atelectasis. Rest the lungs are clear  . Right shoulder prosthesis is present. The PICC catheter tip in the  right atrium. There    Impression:      Low lung volumes with minimal bibasilar atelectasis.     This report was finalized on 11/10/2020 11:08 AM by   Solitario Jain MD.       US Abdomen Limited [[id number]] Collected: 11/09/20 0758     Updated: 11/09/20 0801    Narrative:      Exam: Limited abdomen ultrasound 11/09/2020     INDICATION: Abdominal distention evaluate for ascites     FINDINGS: Ultrasound of the 4 quadrants of the abdomen was performed. No  ascites is identified.       Impression:      No ascites is identified     This report was finalized on 11/9/2020 7:59 AM by Dr. Solitario Jain MD.       XR Chest 1 View [[id number]] Collected: 11/05/20 1505     Updated: 11/05/20 1509    Narrative:      XR CHEST 1 VW-: 11/5/2020 2:52 PM     INDICATION:   70-year-old female presenting for PICC line placement      COMPARISON:    11/04/2020.     FINDINGS:  Single Portable AP view(s) of the chest. Status post right shoulder  replacement. There is a new right-sided PICC line present. The tip  extends to the level of the right atrium. If positioning at the  cavoatrial junction is desired it could be retracted about 4 cm. No  pneumothorax. Cardiac silhouette borderline in size and stable. Shallow  lung expansion with low lung volumes and probable bibasilar atelectasis.  Persistent eventration or elevation of the right hemidiaphragm.       Impression:         1. New right-sided PICC line tip at the right atrial level. If  positioning at the cavoatrial junction is desired it could be retracted  about 4 cm. No pneumothorax.  2. No other significant interval change.     This report was finalized on 11/5/2020 3:07 PM by Dr. Waqar Jerez MD.       US Abdomen Limited [[id number]] Collected: 11/05/20 1125     Updated: 11/05/20 1416    Addenda:        Addendum: By report, the patient underwent a bedside paracentesis  following the abdomen and pelvis CT performed yesterday, which accounts  for the lack of ascites amenable to paracentesis today.     This report was finalized on 11/5/2020 2:14 PM by Dr. Waqar Jerez MD.     Signed: 11/05/20 1414 by Waqar Jerez MD    Narrative:       FOUR-QUADRANT ULTRASOUND TO ASSESS FOR THE PRESENCE OR ABSENCE OF  ASCITES FOR PLANNED PARACENTESIS     HISTORY:  70-year-old female with abnormal CT scan performed yesterday. Ascites on  prior CT.     TECHNIQUE:  Four-quadrant ultrasound was performed. Correlation made with CT  performed yesterday.     FINDINGS:  There is no ascites amenable to ultrasound-guided paracentesis on the  provided images. The majority of the ascites on the prior CT localizes  to the pelvis.       Impression:      1. No ascites amenable to ultrasound-guided paracentesis.     This report was finalized on 11/5/2020 11:28 AM by Dr. Waqar Jerez MD.       CT Abdomen Pelvis Without Contrast [[id number]] Collected: 11/04/20 1318     Updated: 11/04/20 1320    Narrative:      CT Abdomen Pelvis WO    INDICATION:   70-year-old emergency department patient with abdominal pain diffusely and fever for one week. Recent antibiotic therapy for chronic urinary tract infection.    TECHNIQUE:   CT of the abdomen and pelvis without IV contrast. Coronal and sagittal reconstructions were obtained.  Radiation dose reduction techniques included automated exposure control or exposure modulation based on body size. Count of known CT and cardiac nuc  med studies performed in previous 12 months: 0.     COMPARISON:   Ultrasound liver 8/11/2020, CT abdomen and pelvis 4/24/2019    FINDINGS:  Abdomen: The included images through the lung bases demonstrate no acute pulmonary density or pleural effusion. The distal esophagus appears normal  On these noncontrasted images the liver is diffusely abnormal with diffuse low attenuation in a mottled appearance. Liver is enlarged measuring 23.6 cm cephalocaudad previously measuring 17.3 cm on the ultrasound of 8/11/2020 and 18.4 cm on the CT scan  of 4/24/2019 suggesting a significant interval increase in size. The spleen is normal in size and density, however there is a new large amount of ascites in the abdomen, paracolic  gutters and pelvis. The gallbladder appears dense relative to the low  density liver but no stones are seen. There is pericholecystic fluid likely part of the generalized ascites. The pancreas, pancreatic duct, common bile ducts and adrenal glands are normal. The kidneys are unremarkable    The stomach, small bowel and colon have a normal noncontrasted appearance.    Pelvis: The bladder is distended. The wall appears slightly prominent diffusely but no nodularity is seen. No bladder stone. This is small postmenopausal uterus. No adnexal mass.  There is mild diffuse subcutaneous edema over the anterior abdominal wall which could be part of anasarca.    Bone window images demonstrate demonstrates stable spinal fusion hardware posteriorly from L4 through S1 with severe anterolisthesis of L5 on S1 unchanged.      Impression:        1. Marked interval increase in size of the liver with diffusely mottled low attenuation as compared to the prior studies of 11/20 and 4/24/2019. Patient has had underlying steatosis. The current changes could represent progression of steatosis or acute  superimposed hepatitis. There is new ascites diffusely in the abdomen or pelvis which is moderate to large in amount. No splenomegaly. The portal vein and splenic vein do not appear enlarged.  2. No gallstones seen  3. No definite distention of the stomach, small bowel or colon.  4. The bladder is distended. The wall is mildly thickened but no definite nodularity or bladder stone.          Signer Name: Sheron Lundberg MD   Signed: 11/4/2020 1:18 PM   Workstation Name: [id number]    [facility]    XR Chest 1 View [[id number]] Collected: 11/04/20 1256     Updated: 11/04/20 1259    Narrative:      XR CHEST 1 VW-: 11/4/2020 12:48 PM     INDICATION:   Short of air and weakness several days.      COMPARISON:    None available.     FINDINGS:  Single Portable AP view(s) of the chest. Status post right shoulder  replacement.  Cardiac silhouette is within normal limits. Shallow lung  expansion and bronchovascular crowding. Probable platelike atelectasis  in the left base and atelectatic change in the right base. Mild  eventration or elevation of the right hemidiaphragm. No distinct  effusion or pneumothorax.       Impression:         1. Shallow lung expansion with bronchovascular crowding and probable  bibasilar atelectasis.     This report was finalized on 11/4/2020 12:57 PM by Dr. Waqar Jerez MD.           PROCEDURES: Diagnostic and therapeutic paracentesis    Condition on Discharge: Stable    Physical Exam at Discharge  Vital Signs  Temp:  [97.5 °F (36.4 °C)-98.6 °F (37 °C)] 98.6 °F (37 °C)  Heart Rate:  [84-91] 91  Resp:  [16-20] 20  BP: (101-110)/(66-76) 102/76   Body mass index is 26.12 kg/m².    Physical Exam  Vitals signs reviewed.   HENT:      Head: Normocephalic and atraumatic.   Eyes:      General: Scleral icterus present.      Pupils: Pupils are equal, round, and reactive to light.   Cardiovascular:      Rate and Rhythm: Normal rate and regular rhythm.   Pulmonary:      Effort: Pulmonary effort is normal. No respiratory distress.      Breath sounds: Normal breath sounds. No wheezing or rales.   Abdominal:      General: Bowel sounds are normal. There is distension.      Tenderness: There is no abdominal tenderness. There is no guarding.   Musculoskeletal:      Comments: Trace bilateral pitting ankle edema   Skin:     General: Skin is warm and dry.      Capillary Refill: Capillary refill takes less than 2 seconds.      Coloration: Skin is jaundiced.   Neurological:      General: No focal deficit present.      Mental Status: She is alert and oriented to person, place, and time.   Psychiatric:         Mood and Affect: Mood normal.         Behavior: Behavior normal.       Discharge Disposition  Home    Visiting Nurse:    Yes     Home PT/OT:  Yes     Home Safety Evaluation:  Yes     DME  Rolling walker, bedside  commode    Discharge Diet:      Dietary Orders (From admission, onward)     Start     Ordered    11/09/20 0936  Diet Regular  Diet Effective Now     Comments: GI soft   Question:  Diet Texture / Consistency  Answer:  Regular    11/09/20 0939    11/07/20 1800  Dietary Nutrition Supplement: Boost Plus (Ensure Enlive, Ensure Plus)  Daily With Breakfast, Lunch & Dinner     Question:  Select Supplement:  Answer:  Boost Plus (Ensure Enlive, Ensure Plus)    11/07/20 1752                Activity at Discharge:  As tolerated    Pre-discharge education  Wound Care, medications, follow-up    Follow-up Appointments  No future appointments.  Additional Instructions for the Follow-ups that You Need to Schedule     Discharge Follow-up with PCP   As directed       Currently Documented PCP:    Tayler Trujillo MD    PCP Phone Number:    [phone]     Follow Up Details: 1 week         Discharge Follow-up with Specialty: Srikanth   As directed      Specialty: Srikanth    Follow Up Details: as scheduled         Discharge Follow-up with Specialty: Urology; 2 Weeks   As directed      Specialty: Urology    Follow Up: 2 Weeks         Discharge Follow-up with Specialty: nephrology; 2 Weeks   As directed      Specialty: nephrology    Follow Up: 2 Weeks         Discharge Follow-up with Specified Provider: Dr. Stanton; 1 Month   As directed      To: Dr. Stanton    Follow Up: 1 Month               Test Results Pending at Discharge: To be followed up by PCP  Pending Labs     Order Current Status    Blood Culture - Blood, Arm, Left Preliminary result    Blood Culture - Blood, Arm, Left Preliminary result    Blood Culture - Blood, Hand, Left Preliminary result           Lacey Burton, APRN  11/17/20  09:23 EST    Time: Discharge Over 30 min (if over 30 minutes give explanation as to why it took greater than 30 minutes)  Secondary to:   Coordination of care/follow up  Medication reconciliation  D/W patient, case management and  family

## 2020-11-17 NOTE — PLAN OF CARE
Goal Outcome Evaluation:  Plan of Care Reviewed With: patient  Progress: improving  Outcome Summary: patient resting at this time, all due med given , picc line HCG bath given, patient denies any pain nor discomfort, vital signs stable, patient voided without problem cont to monitor.

## 2020-11-18 ENCOUNTER — HOSPITAL ENCOUNTER (EMERGENCY)
Facility: HOSPITAL | Age: 70
Discharge: HOME | End: 2020-11-18
Attending: EMERGENCY MEDICINE | Admitting: EMERGENCY MEDICINE

## 2020-11-18 ENCOUNTER — READMISSION MANAGEMENT (OUTPATIENT)
Dept: CALL CENTER | Facility: HOSPITAL | Age: 70
End: 2020-11-18

## 2020-11-18 ENCOUNTER — NURSE TRIAGE (OUTPATIENT)
Dept: CALL CENTER | Facility: HOSPITAL | Age: 70
End: 2020-11-18

## 2020-11-18 VITALS
RESPIRATION RATE: 14 BRPM | SYSTOLIC BLOOD PRESSURE: 110 MMHG | HEART RATE: 104 BPM | WEIGHT: 140 LBS | HEIGHT: 62 IN | DIASTOLIC BLOOD PRESSURE: 69 MMHG | TEMPERATURE: 97.6 F | BODY MASS INDEX: 25.76 KG/M2 | OXYGEN SATURATION: 98 %

## 2020-11-18 DIAGNOSIS — R18.8 OTHER ASCITES: Primary | ICD-10-CM

## 2020-11-18 LAB
ALBUMIN SERPL-MCNC: 2.8 G/DL (ref 3.5–5.2)
ALBUMIN/GLOB SERPL: 0.9 G/DL
ALP SERPL-CCNC: 191 U/L (ref 39–117)
ALT SERPL W P-5'-P-CCNC: 69 U/L (ref 1–33)
ANION GAP SERPL CALCULATED.3IONS-SCNC: 14.5 MMOL/L (ref 5–15)
APTT PPP: 36.5 SECONDS (ref 24.3–38.1)
AST SERPL-CCNC: 211 U/L (ref 1–32)
BASOPHILS # BLD AUTO: 0.08 10*3/MM3 (ref 0–0.2)
BASOPHILS NFR BLD AUTO: 0.4 % (ref 0–1.5)
BILIRUB SERPL-MCNC: 7.5 MG/DL (ref 0–1.2)
BUN SERPL-MCNC: 40 MG/DL (ref 8–23)
BUN/CREAT SERPL: 34.8 (ref 7–25)
CALCIUM SPEC-SCNC: 8.6 MG/DL (ref 8.6–10.5)
CHLORIDE SERPL-SCNC: 96 MMOL/L (ref 98–107)
CO2 SERPL-SCNC: 22.5 MMOL/L (ref 22–29)
CREAT SERPL-MCNC: 1.15 MG/DL (ref 0.57–1)
DEPRECATED RDW RBC AUTO: 64.5 FL (ref 37–54)
EOSINOPHIL # BLD AUTO: 0.06 10*3/MM3 (ref 0–0.4)
EOSINOPHIL NFR BLD AUTO: 0.3 % (ref 0.3–6.2)
ERYTHROCYTE [DISTWIDTH] IN BLOOD BY AUTOMATED COUNT: 15.4 % (ref 12.3–15.4)
ETHANOL BLD-MCNC: <10 MG/DL (ref 0–10)
ETHANOL UR QL: <0.01 %
GFR SERPL CREATININE-BSD FRML MDRD: 46 ML/MIN/1.73
GLOBULIN UR ELPH-MCNC: 3.1 GM/DL
GLUCOSE SERPL-MCNC: 108 MG/DL (ref 65–99)
HCT VFR BLD AUTO: 35.9 % (ref 34–46.6)
HGB BLD-MCNC: 11.5 G/DL (ref 12–15.9)
IMM GRANULOCYTES # BLD AUTO: 0.17 10*3/MM3 (ref 0–0.05)
IMM GRANULOCYTES NFR BLD AUTO: 0.8 % (ref 0–0.5)
INR PPP: 1.4 (ref 0.9–1.1)
LIPASE SERPL-CCNC: 170 U/L (ref 13–60)
LYMPHOCYTES # BLD AUTO: 2.41 10*3/MM3 (ref 0.7–3.1)
LYMPHOCYTES NFR BLD AUTO: 10.7 % (ref 19.6–45.3)
MCH RBC QN AUTO: 36.6 PG (ref 26.6–33)
MCHC RBC AUTO-ENTMCNC: 32 G/DL (ref 31.5–35.7)
MCV RBC AUTO: 114.3 FL (ref 79–97)
MONOCYTES # BLD AUTO: 2.45 10*3/MM3 (ref 0.1–0.9)
MONOCYTES NFR BLD AUTO: 10.9 % (ref 5–12)
NEUTROPHILS NFR BLD AUTO: 17.34 10*3/MM3 (ref 1.7–7)
NEUTROPHILS NFR BLD AUTO: 76.9 % (ref 42.7–76)
NRBC BLD AUTO-RTO: 0 /100 WBC (ref 0–0.2)
PLATELET # BLD AUTO: 295 10*3/MM3 (ref 140–450)
PMV BLD AUTO: 12.2 FL (ref 6–12)
POTASSIUM SERPL-SCNC: 3.2 MMOL/L (ref 3.5–5.2)
PROT SERPL-MCNC: 5.9 G/DL (ref 6–8.5)
PROTHROMBIN TIME: 16.7 SECONDS (ref 12.1–15)
RBC # BLD AUTO: 3.14 10*6/MM3 (ref 3.77–5.28)
SODIUM SERPL-SCNC: 133 MMOL/L (ref 136–145)
WBC # BLD AUTO: 22.51 10*3/MM3 (ref 3.4–10.8)

## 2020-11-18 PROCEDURE — 99283 EMERGENCY DEPT VISIT LOW MDM: CPT | Performed by: EMERGENCY MEDICINE

## 2020-11-18 PROCEDURE — 80053 COMPREHEN METABOLIC PANEL: CPT | Performed by: EMERGENCY MEDICINE

## 2020-11-18 PROCEDURE — 99283 EMERGENCY DEPT VISIT LOW MDM: CPT

## 2020-11-18 PROCEDURE — 85025 COMPLETE CBC W/AUTO DIFF WBC: CPT | Performed by: EMERGENCY MEDICINE

## 2020-11-18 PROCEDURE — 80307 DRUG TEST PRSMV CHEM ANLYZR: CPT | Performed by: EMERGENCY MEDICINE

## 2020-11-18 PROCEDURE — 85610 PROTHROMBIN TIME: CPT | Performed by: EMERGENCY MEDICINE

## 2020-11-18 PROCEDURE — 85730 THROMBOPLASTIN TIME PARTIAL: CPT | Performed by: EMERGENCY MEDICINE

## 2020-11-18 PROCEDURE — 83690 ASSAY OF LIPASE: CPT | Performed by: EMERGENCY MEDICINE

## 2020-11-18 NOTE — TELEPHONE ENCOUNTER
"Reviewed guideline with caller, advised Ms. Lugo be evaluated in ED caller agrees to follow care advice.     Reason for Disposition  • [1] SEVERE pain (e.g., excruciating) AND [2] present > 1 hour    Additional Information  • Negative: Severe difficulty breathing (e.g., struggling for each breath, speaks in single words)  • Negative: Shock suspected (e.g., cold/pale/clammy skin, too weak to stand, low BP, rapid pulse)  • Negative: Difficult to awaken or acting confused (e.g., disoriented, slurred speech)  • Negative: Passed out (i.e., lost consciousness, collapsed and was not responding)  • Negative: Visible sweat on face or sweat dripping down face  • Negative: Sounds like a life-threatening emergency to the triager  • Negative: Followed an abdomen (stomach) injury  • Negative: Chest pain    Answer Assessment - Initial Assessment Questions  1. LOCATION: \"Where does it hurt?\"       Upper abdomen all over   2. RADIATION: \"Does the pain shoot anywhere else?\" (e.g., chest, back)      No radiation   3. ONSET: \"When did the pain begin?\" (e.g., minutes, hours or days ago)       Today   4. SUDDEN: \"Gradual or sudden onset?\"      gradual  5. PATTERN \"Does the pain come and go, or is it constant?\"     - If constant: \"Is it getting better, staying the same, or worsening?\"       (Note: Constant means the pain never goes away completely; most serious pain is constant and it progresses)      - If intermittent: \"How long does it last?\" \"Do you have pain now?\"      (Note: Intermittent means the pain goes away completely between bouts)      Constant   6. SEVERITY: \"How bad is the pain?\"  (e.g., Scale 1-10; mild, moderate, or severe)     - MILD (1-3): doesn't interfere with normal activities, abdomen soft and not tender to touch      - MODERATE (4-7): interferes with normal activities or awakens from sleep, tender to touch      - SEVERE (8-10): excruciating pain, doubled over, unable to do any normal activities        8/10  7. " "RECURRENT SYMPTOM: \"Have you ever had this type of abdominal pain before?\" If so, ask: \"When was the last time?\" and \"What happened that time?\"       Yes, admitted and pericentesis  8. AGGRAVATING FACTORS: \"Does anything seem to cause this pain?\" (e.g., foods, stress, alcohol)      no  9. CARDIAC SYMPTOMS: \"Do you have any of the following symptoms: chest pain, difficulty breathing, sweating, nausea?\"      no  10. OTHER SYMPTOMS: \"Do you have any other symptoms?\" (e.g., fever, vomiting, diarrhea)        no  11. PREGNANCY: \"Is there any chance you are pregnant?\" \"When was your last menstrual period?\"        no    Protocols used: ABDOMINAL PAIN - UPPER-ADULT-AH      "

## 2020-11-18 NOTE — NURSING NOTE
Case Management Discharge Note      Final Note: dc home with [facility]    Provided Post Acute Provider List?: Yes  Post Acute Provider List: Home Health, Inpatient Rehab  Delivered To: Support Person  Method of Delivery: In person    Selected Continued Care - Discharged on 11/17/2020 Admission date: 11/4/2020 - Discharge disposition: Home-Health Care Svc    Destination Coordination complete    Service Provider Selected Services Address Phone Fax Patient Preferred    [facility]  Skilled Nursing [address] [address] [phone] --          Durable Medical Equipment Coordination complete    Service Provider Selected Services Address Phone Fax Patient Preferred    [facility]  Durable Medical Equipment [address], [place] [address] [phone] --          Dialysis/Infusion    No services have been selected for the patient.              Home Medical Care Coordination complete    Service Provider Selected Services Address Phone Fax Patient Preferred    [facility] [place]  Home Health Services [address] [address] [phone] --          Therapy    No services have been selected for the patient.              Community Resources    No services have been selected for the patient.                       Final Discharge Disposition Code: 06 - home with home health care

## 2020-11-18 NOTE — OUTREACH NOTE
Prep Survey      Responses   [facility] facility patient discharged from?  [place]   Is LACE score < 7 ?  No   Eligibility  Readm Mgmt   Discharge diagnosis  CIWA,  paracentesis,  acute alcoholic hepatitis with coagulopaths, ascites,  acute metabolic encephalopathy,  sepsis/UTI   Does the patient have one of the following disease processes/diagnoses(primary or secondary)?  Sepsis   Does the patient have Home health ordered?  Yes   What is the Home health agency?   [facility]   Is there a DME ordered?  Yes   What DME was ordered?  commode chair,  walker   Comments regarding appointments  listed   Medication alerts for this patient  see AVS for changes   Prep survey completed?  Yes          Amanda Hargrove RN

## 2020-11-19 NOTE — ED PROVIDER NOTES
Subjective   70-year-old female just discharged from this facility for alcoholic hepatitis presents complaining of abdominal pain today.  Patient denies nausea or vomiting.  Reports she has had a few small-volume loose stools today.  Has been eating and drinking normally.  No fevers or chills.  Patient reports that she did not get her medications filled at time of discharge and just finally got them filled this evening, but has only taken some of them.  Patient states she is here primarily at the urging of her daughter who did not want her to wait to see if things got worse.  Patient laying flat in bed, resting comfortably when I enter the room for exam.          Review of Systems   Constitutional: Negative.    HENT: Negative.    Eyes:        Jaundiced   Respiratory: Negative.    Cardiovascular: Negative.    Gastrointestinal: Positive for abdominal distention and abdominal pain. Negative for blood in stool, nausea and vomiting.   Genitourinary: Negative.    Musculoskeletal: Negative.    Skin:        Jaundiced   Neurological: Negative.    Psychiatric/Behavioral: Negative.        Past Medical History:   Diagnosis Date   • Elevated liver enzymes    • Fatty liver    • Frequent UTI    • History of depression    • Hypertension    • Lumbar stenosis    • OA (osteoarthritis)    • Post-menopausal    • Spondylarthritis     LOW BACK   • Stress incontinence    • Urinary retention        No Known Allergies    Past Surgical History:   Procedure Laterality Date   • ABDOMINOPLASTY     • BREAST SURGERY     • CATARACT EXTRACTION Bilateral    •  SECTION      X3   • COLONOSCOPY      2006   • COLONOSCOPY N/A 2016    Procedure: COLONOSCOPY;  Surgeon: Ho Arenas MD;  Location: [facility] ENDOSCOPY;  Service:    • LASIK     • LUMBAR DISCECTOMY FUSION INSTRUMENTATION N/A 10/1/2018    Procedure: L4-5, L5-S1  laminectomy and fusion with instrumentation;  Surgeon: Juno Kim MD;  Location: [facility] MAIN OR;  Service:  Orthopedic Spine   • REDUCTION MAMMAPLASTY     • TONSILLECTOMY     • TOTAL SHOULDER ARTHROPLASTY W/ DISTAL CLAVICLE EXCISION Right 6/13/2019    Procedure: TOTAL SHOULDER REVERSE ARTHROPLASTY;  Surgeon: David Marion MD;  Location: [facility] OR;  Service: Orthopedics       Family History   Problem Relation Age of Onset   • Colon polyps Father    • Breast cancer Maternal Aunt    • Malig Hyperthermia Neg Hx        Social History     Socioeconomic History   • Marital status:      Spouse name: Not on file   • Number of children: Not on file   • Years of education: Not on file   • Highest education level: Not on file   Tobacco Use   • Smoking status: Never Smoker   • Smokeless tobacco: Never Used   Substance and Sexual Activity   • Alcohol use: Not Currently     Comment: no ETOH since Nov 5   • Drug use: No   • Sexual activity: Defer           Objective   Physical Exam  Constitutional:       General: She is not in acute distress.     Comments: Jaundice, but appears comfortable   HENT:      Head: Normocephalic and atraumatic.      Mouth/Throat:      Mouth: Mucous membranes are moist.      Pharynx: Oropharynx is clear.   Eyes:      General: Scleral icterus present.      Extraocular Movements: Extraocular movements intact.      Pupils: Pupils are equal, round, and reactive to light.   Cardiovascular:      Rate and Rhythm: Normal rate and regular rhythm.   Pulmonary:      Effort: Pulmonary effort is normal. No respiratory distress.      Breath sounds: Normal breath sounds.   Abdominal:      Comments: Distended, fluid wave present, but nontender, no rebound or guarding, no CVA percussion tenderness   Skin:     General: Skin is warm and dry.      Capillary Refill: Capillary refill takes less than 2 seconds.      Coloration: Skin is jaundiced.   Neurological:      General: No focal deficit present.      Mental Status: She is alert and oriented to person, place, and time.   Psychiatric:         Mood and Affect: Mood  normal.         Behavior: Behavior normal.         Procedures           ED Course  ED Course as of Nov 18 2012 Wed Nov 18, 2020 2010 Patient jaundiced, does have clear ascites, and lower extremity edema.  Based on chart review, this is about what I would expect patient to look like.  Does not appear to be in any distress.  Vitals not concerning.  Abdomen is soft here, do not have concern for SBP based on exam.  Patient does have elevated white blood cell count but this has been persistent throughout hospitalization.  Patient has had slight increase in creatinine since time of discharge, more in line with how she initially presented.  Discussed this with hospitalist who knows patient well, he reports that does not sound as if patient requires admission, needs to take medications as prescribed, keep follow-up appoint with GI on Friday.  I do not feel that patient is likely to benefit from hospitalization tonight either, patient is comfortable with discharge after this reassurance.  Patient states she did get her medications filled and can begin taking them as prescribed this evening.  Patient also somewhat tearful when discussing her alcohol abuse with me but is scheduled to start outpatient treatment in the next few days for this as well.    [TD]      ED Course User Index  [TD] Luca Russo MD                                           [facility]    Final diagnoses:   Other ascites            Luca Russo MD  11/18/20 2012

## 2020-11-19 NOTE — ED NOTES
Discharge instructions reviewed in great detail with patient. Informed her to take medications that she was discharged with and to follow-up in 2 days. All questions addressed and answered. Pt taken to sisters car via wheelchair.      Shayy Ruiz RN  11/18/20 2017

## 2020-11-20 LAB — BACTERIA SPEC AEROBE CULT: NORMAL

## 2020-11-30 ENCOUNTER — HOSPITAL ENCOUNTER (EMERGENCY)
Facility: HOSPITAL | Age: 70
Discharge: HOME | End: 2020-11-30
Attending: EMERGENCY MEDICINE | Admitting: EMERGENCY MEDICINE

## 2020-11-30 VITALS
DIASTOLIC BLOOD PRESSURE: 76 MMHG | RESPIRATION RATE: 18 BRPM | WEIGHT: 140 LBS | HEART RATE: 104 BPM | HEIGHT: 62 IN | OXYGEN SATURATION: 98 % | TEMPERATURE: 97.7 F | BODY MASS INDEX: 25.76 KG/M2 | SYSTOLIC BLOOD PRESSURE: 109 MMHG

## 2020-11-30 DIAGNOSIS — K70.11 ALCOHOLIC HEPATITIS WITH ASCITES: ICD-10-CM

## 2020-11-30 DIAGNOSIS — B36.9 FUNGAL DERMATITIS: ICD-10-CM

## 2020-11-30 DIAGNOSIS — R60.0 PEDAL EDEMA: Primary | ICD-10-CM

## 2020-11-30 LAB
ALBUMIN SERPL-MCNC: 3.1 G/DL (ref 3.5–5.2)
ALBUMIN/GLOB SERPL: 1 G/DL
ALP SERPL-CCNC: 198 U/L (ref 39–117)
ALT SERPL W P-5'-P-CCNC: 77 U/L (ref 1–33)
ANION GAP SERPL CALCULATED.3IONS-SCNC: 12.4 MMOL/L (ref 5–15)
APTT PPP: 37.3 SECONDS (ref 24.3–38.1)
AST SERPL-CCNC: 176 U/L (ref 1–32)
BASOPHILS # BLD AUTO: 0.03 10*3/MM3 (ref 0–0.2)
BASOPHILS NFR BLD AUTO: 0.2 % (ref 0–1.5)
BILIRUB SERPL-MCNC: 4.1 MG/DL (ref 0–1.2)
BUN SERPL-MCNC: 48 MG/DL (ref 8–23)
BUN/CREAT SERPL: 55.8 (ref 7–25)
CALCIUM SPEC-SCNC: 8.8 MG/DL (ref 8.6–10.5)
CHLORIDE SERPL-SCNC: 96 MMOL/L (ref 98–107)
CO2 SERPL-SCNC: 23.6 MMOL/L (ref 22–29)
CREAT SERPL-MCNC: 0.86 MG/DL (ref 0.57–1)
DEPRECATED RDW RBC AUTO: 58.3 FL (ref 37–54)
EOSINOPHIL # BLD AUTO: 0.11 10*3/MM3 (ref 0–0.4)
EOSINOPHIL NFR BLD AUTO: 0.6 % (ref 0.3–6.2)
ERYTHROCYTE [DISTWIDTH] IN BLOOD BY AUTOMATED COUNT: 13.4 % (ref 12.3–15.4)
GFR SERPL CREATININE-BSD FRML MDRD: 65 ML/MIN/1.73
GLOBULIN UR ELPH-MCNC: 3.1 GM/DL
GLUCOSE SERPL-MCNC: 115 MG/DL (ref 65–99)
HCT VFR BLD AUTO: 39.7 % (ref 34–46.6)
HGB BLD-MCNC: 12.3 G/DL (ref 12–15.9)
IMM GRANULOCYTES # BLD AUTO: 0.06 10*3/MM3 (ref 0–0.05)
IMM GRANULOCYTES NFR BLD AUTO: 0.3 % (ref 0–0.5)
INR PPP: 1.27 (ref 0.9–1.1)
LARGE PLATELETS: NORMAL
LYMPHOCYTES # BLD AUTO: 2.01 10*3/MM3 (ref 0.7–3.1)
LYMPHOCYTES NFR BLD AUTO: 10.1 % (ref 19.6–45.3)
MACROCYTES BLD QL SMEAR: NORMAL
MCH RBC QN AUTO: 35.4 PG (ref 26.6–33)
MCHC RBC AUTO-ENTMCNC: 31 G/DL (ref 31.5–35.7)
MCV RBC AUTO: 114.4 FL (ref 79–97)
MONOCYTES # BLD AUTO: 1.7 10*3/MM3 (ref 0.1–0.9)
MONOCYTES NFR BLD AUTO: 8.6 % (ref 5–12)
NEUTROPHILS NFR BLD AUTO: 15.93 10*3/MM3 (ref 1.7–7)
NEUTROPHILS NFR BLD AUTO: 80.2 % (ref 42.7–76)
PLATELET # BLD AUTO: 324 10*3/MM3 (ref 140–450)
PMV BLD AUTO: 11.4 FL (ref 6–12)
POTASSIUM SERPL-SCNC: 3.5 MMOL/L (ref 3.5–5.2)
PROT SERPL-MCNC: 6.2 G/DL (ref 6–8.5)
PROTHROMBIN TIME: 15.5 SECONDS (ref 12.1–15)
RBC # BLD AUTO: 3.47 10*6/MM3 (ref 3.77–5.28)
SODIUM SERPL-SCNC: 132 MMOL/L (ref 136–145)
WBC # BLD AUTO: 19.84 10*3/MM3 (ref 3.4–10.8)
WBC MORPH BLD: NORMAL

## 2020-11-30 PROCEDURE — 99283 EMERGENCY DEPT VISIT LOW MDM: CPT

## 2020-11-30 PROCEDURE — 85007 BL SMEAR W/DIFF WBC COUNT: CPT | Performed by: PHYSICIAN ASSISTANT

## 2020-11-30 PROCEDURE — 99283 EMERGENCY DEPT VISIT LOW MDM: CPT | Performed by: PHYSICIAN ASSISTANT

## 2020-11-30 PROCEDURE — 85025 COMPLETE CBC W/AUTO DIFF WBC: CPT | Performed by: PHYSICIAN ASSISTANT

## 2020-11-30 PROCEDURE — 85730 THROMBOPLASTIN TIME PARTIAL: CPT | Performed by: PHYSICIAN ASSISTANT

## 2020-11-30 PROCEDURE — 80053 COMPREHEN METABOLIC PANEL: CPT | Performed by: PHYSICIAN ASSISTANT

## 2020-11-30 PROCEDURE — 96374 THER/PROPH/DIAG INJ IV PUSH: CPT

## 2020-11-30 PROCEDURE — 25010000002 ONDANSETRON PER 1 MG: Performed by: PHYSICIAN ASSISTANT

## 2020-11-30 PROCEDURE — 85610 PROTHROMBIN TIME: CPT | Performed by: PHYSICIAN ASSISTANT

## 2020-11-30 RX ORDER — ONDANSETRON 2 MG/ML
4 INJECTION INTRAMUSCULAR; INTRAVENOUS ONCE
Status: COMPLETED | OUTPATIENT
Start: 2020-11-30 | End: 2020-11-30

## 2020-11-30 RX ORDER — POTASSIUM CHLORIDE 750 MG/1
20 TABLET, FILM COATED, EXTENDED RELEASE ORAL DAILY
COMMUNITY
End: 2020-12-09

## 2020-11-30 RX ADMIN — ONDANSETRON HYDROCHLORIDE 4 MG: 2 INJECTION, SOLUTION INTRAMUSCULAR; INTRAVENOUS at 12:20

## 2020-12-02 ENCOUNTER — LAB REQUISITION (OUTPATIENT)
Dept: LAB | Facility: HOSPITAL | Age: 70
End: 2020-12-02

## 2020-12-02 ENCOUNTER — TRANSCRIBE ORDERS (OUTPATIENT)
Dept: ADMINISTRATIVE | Facility: HOSPITAL | Age: 70
End: 2020-12-02

## 2020-12-02 DIAGNOSIS — K70.11 ALCOHOLIC HEPATITIS WITH ASCITES: ICD-10-CM

## 2020-12-02 DIAGNOSIS — R09.89 ABSENT PULSE IN LOWER EXTREMITY: Primary | ICD-10-CM

## 2020-12-02 LAB
ANION GAP SERPL CALCULATED.3IONS-SCNC: 18.2 MMOL/L (ref 5–15)
BUN SERPL-MCNC: 57 MG/DL (ref 8–23)
BUN/CREAT SERPL: 45.2 (ref 7–25)
CALCIUM SPEC-SCNC: 8.8 MG/DL (ref 8.6–10.5)
CHLORIDE SERPL-SCNC: 94 MMOL/L (ref 98–107)
CO2 SERPL-SCNC: 21.8 MMOL/L (ref 22–29)
CREAT SERPL-MCNC: 1.26 MG/DL (ref 0.57–1)
GFR SERPL CREATININE-BSD FRML MDRD: 42 ML/MIN/1.73
GLUCOSE SERPL-MCNC: 68 MG/DL (ref 65–99)
POTASSIUM SERPL-SCNC: 3.6 MMOL/L (ref 3.5–5.2)
SODIUM SERPL-SCNC: 134 MMOL/L (ref 136–145)

## 2020-12-02 PROCEDURE — 80048 BASIC METABOLIC PNL TOTAL CA: CPT | Performed by: INTERNAL MEDICINE

## 2020-12-09 ENCOUNTER — APPOINTMENT (OUTPATIENT)
Dept: GENERAL RADIOLOGY | Facility: HOSPITAL | Age: 70
End: 2020-12-09

## 2020-12-09 ENCOUNTER — HOSPITAL ENCOUNTER (INPATIENT)
Facility: HOSPITAL | Age: 70
LOS: 1 days | Discharge: ANOTHER HOSPITAL | End: 2020-12-11
Attending: EMERGENCY MEDICINE | Admitting: HOSPITALIST

## 2020-12-09 DIAGNOSIS — K92.2 GASTROINTESTINAL HEMORRHAGE, UNSPECIFIED GASTROINTESTINAL HEMORRHAGE TYPE: Primary | ICD-10-CM

## 2020-12-09 DIAGNOSIS — K70.31 ALCOHOLIC CIRRHOSIS OF LIVER WITH ASCITES (HCC): ICD-10-CM

## 2020-12-09 DIAGNOSIS — K29.21 GASTROINTESTINAL HEMORRHAGE ASSOCIATED WITH ALCOHOLIC GASTRITIS: ICD-10-CM

## 2020-12-09 LAB
ABO GROUP BLD: NORMAL
ABO GROUP BLD: NORMAL
ALBUMIN SERPL-MCNC: 2.5 G/DL (ref 3.5–5.2)
ALBUMIN/GLOB SERPL: 1 G/DL
ALP SERPL-CCNC: 131 U/L (ref 39–117)
ALT SERPL W P-5'-P-CCNC: 48 U/L (ref 1–33)
AMMONIA BLD-SCNC: 93 UMOL/L (ref 11–51)
ANION GAP SERPL CALCULATED.3IONS-SCNC: 12.4 MMOL/L (ref 5–15)
APTT PPP: 35.4 SECONDS (ref 24.3–38.1)
AST SERPL-CCNC: 93 U/L (ref 1–32)
BASOPHILS # BLD AUTO: 0.07 10*3/MM3 (ref 0–0.2)
BASOPHILS NFR BLD AUTO: 0.3 % (ref 0–1.5)
BILIRUB SERPL-MCNC: 3.6 MG/DL (ref 0–1.2)
BILIRUB UR QL STRIP: NEGATIVE
BLD GP AB SCN SERPL QL: NEGATIVE
BUN SERPL-MCNC: 54 MG/DL (ref 8–23)
BUN/CREAT SERPL: 57.4 (ref 7–25)
CALCIUM SPEC-SCNC: 8.4 MG/DL (ref 8.6–10.5)
CHLORIDE SERPL-SCNC: 95 MMOL/L (ref 98–107)
CHLORIDE UR-SCNC: 30 MMOL/L
CLARITY UR: CLEAR
CO2 SERPL-SCNC: 21.6 MMOL/L (ref 22–29)
COLOR UR: YELLOW
CREAT SERPL-MCNC: 0.94 MG/DL (ref 0.57–1)
D-LACTATE SERPL-SCNC: 4.4 MMOL/L (ref 0.5–2)
D-LACTATE SERPL-SCNC: 4.4 MMOL/L (ref 0.5–2)
DEPRECATED RDW RBC AUTO: 51.4 FL (ref 37–54)
EOSINOPHIL # BLD AUTO: 0.02 10*3/MM3 (ref 0–0.4)
EOSINOPHIL NFR BLD AUTO: 0.1 % (ref 0.3–6.2)
ERYTHROCYTE [DISTWIDTH] IN BLOOD BY AUTOMATED COUNT: 12.6 % (ref 12.3–15.4)
ETHANOL BLD-MCNC: <10 MG/DL (ref 0–10)
ETHANOL UR QL: <0.01 %
GFR SERPL CREATININE-BSD FRML MDRD: 59 ML/MIN/1.73
GLOBULIN UR ELPH-MCNC: 2.5 GM/DL
GLUCOSE SERPL-MCNC: 144 MG/DL (ref 65–99)
GLUCOSE UR STRIP-MCNC: NEGATIVE MG/DL
HBA1C MFR BLD: 5 % (ref 4.8–5.6)
HCT VFR BLD AUTO: 27.8 % (ref 34–46.6)
HEMOCCULT STL QL: POSITIVE
HGB BLD-MCNC: 9 G/DL (ref 12–15.9)
HGB UR QL STRIP.AUTO: NEGATIVE
IMM GRANULOCYTES # BLD AUTO: 0.19 10*3/MM3 (ref 0–0.05)
IMM GRANULOCYTES NFR BLD AUTO: 0.8 % (ref 0–0.5)
INR PPP: 1.44 (ref 0.9–1.1)
KETONES UR QL STRIP: NEGATIVE
LACTATE HOLD SPECIMEN: NORMAL
LEUKOCYTE ESTERASE UR QL STRIP.AUTO: NEGATIVE
LIPASE SERPL-CCNC: 81 U/L (ref 13–60)
LYMPHOCYTES # BLD AUTO: 2.7 10*3/MM3 (ref 0.7–3.1)
LYMPHOCYTES NFR BLD AUTO: 11.5 % (ref 19.6–45.3)
MACROCYTES BLD QL SMEAR: NORMAL
MCH RBC QN AUTO: 36.1 PG (ref 26.6–33)
MCHC RBC AUTO-ENTMCNC: 32.4 G/DL (ref 31.5–35.7)
MCV RBC AUTO: 111.6 FL (ref 79–97)
MONOCYTES # BLD AUTO: 2.29 10*3/MM3 (ref 0.1–0.9)
MONOCYTES NFR BLD AUTO: 9.7 % (ref 5–12)
NEUTROPHILS NFR BLD AUTO: 18.26 10*3/MM3 (ref 1.7–7)
NEUTROPHILS NFR BLD AUTO: 77.6 % (ref 42.7–76)
NITRITE UR QL STRIP: NEGATIVE
NRBC BLD AUTO-RTO: 0 /100 WBC (ref 0–0.2)
NT-PROBNP SERPL-MCNC: 580.3 PG/ML (ref 0–900)
PH UR STRIP.AUTO: 5.5 [PH] (ref 4.5–8)
PLAT MORPH BLD: NORMAL
PLATELET # BLD AUTO: 326 10*3/MM3 (ref 140–450)
PMV BLD AUTO: 11.9 FL (ref 6–12)
POTASSIUM SERPL-SCNC: 5 MMOL/L (ref 3.5–5.2)
POTASSIUM UR-SCNC: 37.6 MMOL/L
PROCALCITONIN SERPL-MCNC: 0.78 NG/ML (ref 0–0.25)
PROT SERPL-MCNC: 5 G/DL (ref 6–8.5)
PROT UR QL STRIP: NEGATIVE
PROTHROMBIN TIME: 17 SECONDS (ref 12.1–15)
QT INTERVAL: 323 MS
RBC # BLD AUTO: 2.49 10*6/MM3 (ref 3.77–5.28)
RH BLD: POSITIVE
RH BLD: POSITIVE
SARS-COV-2 RNA PNL SPEC NAA+PROBE: NOT DETECTED
SODIUM SERPL-SCNC: 129 MMOL/L (ref 136–145)
SODIUM UR-SCNC: <20 MMOL/L
SP GR UR STRIP: 1.01 (ref 1–1.03)
T&S EXPIRATION DATE: NORMAL
T4 FREE SERPL-MCNC: 1.6 NG/DL (ref 0.93–1.7)
TSH SERPL DL<=0.05 MIU/L-ACNC: 7.96 UIU/ML (ref 0.27–4.2)
UROBILINOGEN UR QL STRIP: NORMAL
WBC # BLD AUTO: 23.53 10*3/MM3 (ref 3.4–10.8)
WBC MORPH BLD: NORMAL

## 2020-12-09 PROCEDURE — 86923 COMPATIBILITY TEST ELECTRIC: CPT

## 2020-12-09 PROCEDURE — 83605 ASSAY OF LACTIC ACID: CPT | Performed by: EMERGENCY MEDICINE

## 2020-12-09 PROCEDURE — 74018 RADEX ABDOMEN 1 VIEW: CPT

## 2020-12-09 PROCEDURE — 99223 1ST HOSP IP/OBS HIGH 75: CPT | Performed by: NURSE PRACTITIONER

## 2020-12-09 PROCEDURE — 94799 UNLISTED PULMONARY SVC/PX: CPT

## 2020-12-09 PROCEDURE — 80053 COMPREHEN METABOLIC PANEL: CPT | Performed by: EMERGENCY MEDICINE

## 2020-12-09 PROCEDURE — 83880 ASSAY OF NATRIURETIC PEPTIDE: CPT | Performed by: EMERGENCY MEDICINE

## 2020-12-09 PROCEDURE — G0378 HOSPITAL OBSERVATION PER HR: HCPCS

## 2020-12-09 PROCEDURE — 83036 HEMOGLOBIN GLYCOSYLATED A1C: CPT | Performed by: NURSE PRACTITIONER

## 2020-12-09 PROCEDURE — 84133 ASSAY OF URINE POTASSIUM: CPT | Performed by: NURSE PRACTITIONER

## 2020-12-09 PROCEDURE — 86901 BLOOD TYPING SEROLOGIC RH(D): CPT

## 2020-12-09 PROCEDURE — 86901 BLOOD TYPING SEROLOGIC RH(D): CPT | Performed by: EMERGENCY MEDICINE

## 2020-12-09 PROCEDURE — 99283 EMERGENCY DEPT VISIT LOW MDM: CPT | Performed by: EMERGENCY MEDICINE

## 2020-12-09 PROCEDURE — 80307 DRUG TEST PRSMV CHEM ANLYZR: CPT | Performed by: NURSE PRACTITIONER

## 2020-12-09 PROCEDURE — 82436 ASSAY OF URINE CHLORIDE: CPT | Performed by: NURSE PRACTITIONER

## 2020-12-09 PROCEDURE — 93005 ELECTROCARDIOGRAM TRACING: CPT | Performed by: EMERGENCY MEDICINE

## 2020-12-09 PROCEDURE — 85007 BL SMEAR W/DIFF WBC COUNT: CPT | Performed by: EMERGENCY MEDICINE

## 2020-12-09 PROCEDURE — 86900 BLOOD TYPING SEROLOGIC ABO: CPT

## 2020-12-09 PROCEDURE — 71046 X-RAY EXAM CHEST 2 VIEWS: CPT

## 2020-12-09 PROCEDURE — 82140 ASSAY OF AMMONIA: CPT | Performed by: EMERGENCY MEDICINE

## 2020-12-09 PROCEDURE — 85730 THROMBOPLASTIN TIME PARTIAL: CPT | Performed by: EMERGENCY MEDICINE

## 2020-12-09 PROCEDURE — 99285 EMERGENCY DEPT VISIT HI MDM: CPT

## 2020-12-09 PROCEDURE — 85610 PROTHROMBIN TIME: CPT | Performed by: EMERGENCY MEDICINE

## 2020-12-09 PROCEDURE — 93010 ELECTROCARDIOGRAM REPORT: CPT | Performed by: INTERNAL MEDICINE

## 2020-12-09 PROCEDURE — 86900 BLOOD TYPING SEROLOGIC ABO: CPT | Performed by: EMERGENCY MEDICINE

## 2020-12-09 PROCEDURE — 82272 OCCULT BLD FECES 1-3 TESTS: CPT | Performed by: NURSE PRACTITIONER

## 2020-12-09 PROCEDURE — 84439 ASSAY OF FREE THYROXINE: CPT | Performed by: NURSE PRACTITIONER

## 2020-12-09 PROCEDURE — 87635 SARS-COV-2 COVID-19 AMP PRB: CPT | Performed by: EMERGENCY MEDICINE

## 2020-12-09 PROCEDURE — 94762 N-INVAS EAR/PLS OXIMTRY CONT: CPT

## 2020-12-09 PROCEDURE — 84300 ASSAY OF URINE SODIUM: CPT | Performed by: NURSE PRACTITIONER

## 2020-12-09 PROCEDURE — 83690 ASSAY OF LIPASE: CPT | Performed by: EMERGENCY MEDICINE

## 2020-12-09 PROCEDURE — 86850 RBC ANTIBODY SCREEN: CPT | Performed by: EMERGENCY MEDICINE

## 2020-12-09 PROCEDURE — 25010000002 CEFTRIAXONE SODIUM-DEXTROSE 2-2.22 GM-%(50ML) RECONSTITUTED SOLUTION: Performed by: NURSE PRACTITIONER

## 2020-12-09 PROCEDURE — 84443 ASSAY THYROID STIM HORMONE: CPT | Performed by: NURSE PRACTITIONER

## 2020-12-09 PROCEDURE — 84145 PROCALCITONIN (PCT): CPT | Performed by: EMERGENCY MEDICINE

## 2020-12-09 PROCEDURE — 85025 COMPLETE CBC W/AUTO DIFF WBC: CPT | Performed by: EMERGENCY MEDICINE

## 2020-12-09 PROCEDURE — 81003 URINALYSIS AUTO W/O SCOPE: CPT | Performed by: NURSE PRACTITIONER

## 2020-12-09 RX ORDER — LEVOTHYROXINE SODIUM 0.05 MG/1
50 TABLET ORAL
Status: DISCONTINUED | OUTPATIENT
Start: 2020-12-10 | End: 2020-12-11 | Stop reason: HOSPADM

## 2020-12-09 RX ORDER — SODIUM CHLORIDE 0.9 % (FLUSH) 0.9 %
10 SYRINGE (ML) INJECTION EVERY 12 HOURS SCHEDULED
Status: DISCONTINUED | OUTPATIENT
Start: 2020-12-09 | End: 2020-12-11 | Stop reason: HOSPADM

## 2020-12-09 RX ORDER — SODIUM CHLORIDE 9 MG/ML
75 INJECTION, SOLUTION INTRAVENOUS CONTINUOUS
Status: DISCONTINUED | OUTPATIENT
Start: 2020-12-09 | End: 2020-12-09

## 2020-12-09 RX ORDER — CHOLECALCIFEROL (VITAMIN D3) 125 MCG
5 CAPSULE ORAL NIGHTLY PRN
Status: DISCONTINUED | OUTPATIENT
Start: 2020-12-09 | End: 2020-12-11 | Stop reason: HOSPADM

## 2020-12-09 RX ORDER — BUMETANIDE 0.25 MG/ML
INJECTION INTRAMUSCULAR; INTRAVENOUS
Status: COMPLETED
Start: 2020-12-09 | End: 2020-12-09

## 2020-12-09 RX ORDER — CEFTRIAXONE 2 G/50ML
INJECTION, SOLUTION INTRAVENOUS
Status: DISPENSED
Start: 2020-12-09 | End: 2020-12-10

## 2020-12-09 RX ORDER — L.ACID,PARA/B.BIFIDUM/S.THERM 8B CELL
1 CAPSULE ORAL DAILY
Status: DISCONTINUED | OUTPATIENT
Start: 2020-12-10 | End: 2020-12-11 | Stop reason: HOSPADM

## 2020-12-09 RX ORDER — TRAMADOL HYDROCHLORIDE 50 MG/1
50 TABLET ORAL EVERY 6 HOURS PRN
Status: DISCONTINUED | OUTPATIENT
Start: 2020-12-09 | End: 2020-12-11 | Stop reason: HOSPADM

## 2020-12-09 RX ORDER — LACTULOSE 10 G/15ML
20 SOLUTION ORAL 2 TIMES DAILY
Status: DISCONTINUED | OUTPATIENT
Start: 2020-12-09 | End: 2020-12-11 | Stop reason: HOSPADM

## 2020-12-09 RX ORDER — MIDODRINE HYDROCHLORIDE 5 MG/1
2.5 TABLET ORAL
Status: DISCONTINUED | OUTPATIENT
Start: 2020-12-10 | End: 2020-12-11 | Stop reason: HOSPADM

## 2020-12-09 RX ORDER — BISACODYL 5 MG/1
5 TABLET, DELAYED RELEASE ORAL DAILY PRN
Status: DISCONTINUED | OUTPATIENT
Start: 2020-12-09 | End: 2020-12-11 | Stop reason: HOSPADM

## 2020-12-09 RX ORDER — FOLIC ACID 1 MG/1
1 TABLET ORAL DAILY
Status: DISCONTINUED | OUTPATIENT
Start: 2020-12-10 | End: 2020-12-11 | Stop reason: HOSPADM

## 2020-12-09 RX ORDER — SODIUM CHLORIDE 0.9 % (FLUSH) 0.9 %
10 SYRINGE (ML) INJECTION AS NEEDED
Status: DISCONTINUED | OUTPATIENT
Start: 2020-12-09 | End: 2020-12-11 | Stop reason: HOSPADM

## 2020-12-09 RX ORDER — ONDANSETRON 2 MG/ML
4 INJECTION INTRAMUSCULAR; INTRAVENOUS EVERY 6 HOURS PRN
Status: DISCONTINUED | OUTPATIENT
Start: 2020-12-09 | End: 2020-12-11 | Stop reason: HOSPADM

## 2020-12-09 RX ORDER — ONDANSETRON 4 MG/1
4 TABLET, FILM COATED ORAL EVERY 6 HOURS PRN
Status: DISCONTINUED | OUTPATIENT
Start: 2020-12-09 | End: 2020-12-11 | Stop reason: HOSPADM

## 2020-12-09 RX ORDER — CEFTRIAXONE 2 G/50ML
2 INJECTION, SOLUTION INTRAVENOUS EVERY 24 HOURS
Status: DISCONTINUED | OUTPATIENT
Start: 2020-12-09 | End: 2020-12-11 | Stop reason: HOSPADM

## 2020-12-09 RX ORDER — SODIUM CHLORIDE 9 MG/ML
40 INJECTION, SOLUTION INTRAVENOUS AS NEEDED
Status: DISCONTINUED | OUTPATIENT
Start: 2020-12-09 | End: 2020-12-11 | Stop reason: HOSPADM

## 2020-12-09 RX ORDER — BUMETANIDE 0.25 MG/ML
1 INJECTION INTRAMUSCULAR; INTRAVENOUS EVERY 12 HOURS
Status: DISCONTINUED | OUTPATIENT
Start: 2020-12-09 | End: 2020-12-11 | Stop reason: HOSPADM

## 2020-12-09 RX ORDER — BISACODYL 10 MG
10 SUPPOSITORY, RECTAL RECTAL DAILY PRN
Status: DISCONTINUED | OUTPATIENT
Start: 2020-12-09 | End: 2020-12-11 | Stop reason: HOSPADM

## 2020-12-09 RX ADMIN — SODIUM CHLORIDE 75 ML/HR: 9 INJECTION, SOLUTION INTRAVENOUS at 17:27

## 2020-12-09 RX ADMIN — BUMETANIDE 1 MG: 0.25 INJECTION INTRAMUSCULAR; INTRAVENOUS at 21:18

## 2020-12-09 RX ADMIN — LACTULOSE 20 G: 20 SOLUTION ORAL at 22:29

## 2020-12-09 RX ADMIN — CEFTRIAXONE 2 G: 2 INJECTION, SOLUTION INTRAVENOUS at 21:27

## 2020-12-09 RX ADMIN — SODIUM CHLORIDE, PRESERVATIVE FREE 10 ML: 5 INJECTION INTRAVENOUS at 21:23

## 2020-12-09 RX ADMIN — RIFAXIMIN 600 MG: 200 TABLET ORAL at 22:29

## 2020-12-09 NOTE — ED NOTES
Per patient request, daughter Jacqui called and updated on admission status.      Mary Kate Pemberton RN  12/09/20 7536

## 2020-12-09 NOTE — ED NOTES
Patient to ED via EMS for hypotension. Sent by Home Health RN for systolic blood pressure in the 80's. Patient alert & oriented. Hx cirrhosis. Patient reports feeling bloated. Abdomen distended.      Mary Kate Pemberton, RN  12/09/20 8498

## 2020-12-09 NOTE — ED PROVIDER NOTES
" EMERGENCY DEPARTMENT ENCOUNTER      Room Number: 2/02      HPI:    Chief complaint: Patient referred to the emergency department by home health due to systolic blood pressures in the 80s.  Patient is complaining of general malaise, continued edema and abdominal pressure with increased abdominal girth.    Location: As noted above.    Quality/Severity: Moderate    Timing/Duration: Patient always has pedal edema, oozing from the lower extremities and a distended abdomen.    Modifying Factors: None    Associated Symptoms: Poor appetite    Narrative: Pt is a 70 y.o. female who presents as noted above.  Patient does have a known history of alcoholic cirrhosis of the liver and was admitted to our facility with acute hepatitis for nearly the entire month of November.  Patient currently seems mostly focused on her abdominal pressure and states that she thinks that her ascites \"needs to be drained again\".  Apparently home health was concerned about the patient's tachycardia and hypotension noted at home.  Patient denies fever, cough or known exposures to COVID-19.        PMD: Tayler Trujillo MD    REVIEW OF SYSTEMS  Review of Systems   Constitutional: Positive for fatigue. Negative for activity change, appetite change and fever.   HENT: Negative for congestion.    Respiratory: Positive for shortness of breath (Due to abdominal pressure). Negative for cough and wheezing.    Cardiovascular: Positive for leg swelling (Marked). Negative for chest pain and palpitations.   Gastrointestinal: Positive for abdominal distention, abdominal pain and diarrhea (Patient reports from lactulose). Negative for nausea and vomiting.   Endocrine: Negative for polydipsia.   Genitourinary: Negative for difficulty urinating, dysuria, flank pain, frequency and urgency.   Musculoskeletal: Negative for back pain.   Skin: Positive for wound (Weeping sores on both lower extremities). Negative for rash.   Neurological: Positive for weakness " (Generalized). Negative for dizziness and headaches.   Hematological: Bruises/bleeds easily.   Psychiatric/Behavioral: Negative for confusion.   All other systems reviewed and are negative.      PAST MEDICAL HISTORY  Active Ambulatory Problems     Diagnosis Date Noted   • Spinal stenosis of lumbar region with neurogenic claudication 2018   • Lumbar spinal stenosis 10/01/2018   • Frequent UTI 10/01/2018   • Hypertension 10/01/2018   • Stress incontinence 10/01/2018   • Itching due to drug 10/01/2018   • Acute respiratory failure with hypoxia (CMS/HCC) 10/04/2018   • Tear of right rotator cuff 2019   • Acute pain of right shoulder 2019   • Hepatorenal syndrome (CMS/HCC) 2020   • Ascites due to alcoholic hepatitis 2020   • Bladder prolapse, female, acquired 2020   • Sepsis associated hypotension (CMS/HCC) 2020   • Acute liver failure 2020   • Altered mental state 2020   • Acute renal failure (CMS/HCC) 2020   • Alcohol abuse 2020   • Severe malnutrition (CMS/HCC) 2020     Resolved Ambulatory Problems     Diagnosis Date Noted   • No Resolved Ambulatory Problems     Past Medical History:   Diagnosis Date   • Elevated liver enzymes    • Fatty liver    • History of depression    • Lumbar stenosis    • OA (osteoarthritis)    • Post-menopausal    • Spondylarthritis    • Urinary retention        PAST SURGICAL HISTORY  Past Surgical History:   Procedure Laterality Date   • ABDOMINOPLASTY     • BREAST SURGERY     • CATARACT EXTRACTION Bilateral    •  SECTION      X3   • COLONOSCOPY      2006   • COLONOSCOPY N/A 2016    Procedure: COLONOSCOPY;  Surgeon: Ho Arenas MD;  Location: [facility] ENDOSCOPY;  Service:    • LASIK     • LUMBAR DISCECTOMY FUSION INSTRUMENTATION N/A 10/1/2018    Procedure: L4-5, L5-S1  laminectomy and fusion with instrumentation;  Surgeon: Juno Kim MD;  Location: [facility] MAIN OR;  Service: Orthopedic Spine   •  REDUCTION MAMMAPLASTY     • TONSILLECTOMY     • TOTAL SHOULDER ARTHROPLASTY W/ DISTAL CLAVICLE EXCISION Right 6/13/2019    Procedure: TOTAL SHOULDER REVERSE ARTHROPLASTY;  Surgeon: David Marion MD;  Location: [facility] OR;  Service: Orthopedics       FAMILY HISTORY  Family History   Problem Relation Age of Onset   • Colon polyps Father    • Breast cancer Maternal Aunt    • Malig Hyperthermia Neg Hx        SOCIAL HISTORY  Social History     Socioeconomic History   • Marital status:      Spouse name: Not on file   • Number of children: Not on file   • Years of education: Not on file   • Highest education level: Not on file   Tobacco Use   • Smoking status: Never Smoker   • Smokeless tobacco: Never Used   Substance and Sexual Activity   • Alcohol use: Not Currently     Comment: no ETOH since Nov 5   • Drug use: No   • Sexual activity: Defer       ALLERGIES  Patient has no known allergies.    PHYSICAL EXAM  ED Triage Vitals [12/09/20 1440]   Temp Heart Rate Resp BP SpO2   97 °F (36.1 °C) 111 20 101/62 99 %      Temp src Heart Rate Source Patient Position BP Location FiO2 (%)   Temporal Monitor Lying Right arm --       Physical Exam   Constitutional: She is oriented to person, place, and time.   The patient is a chronically ill-appearing, 70-year-old, female that is noted to be tearful.   HENT:   Head: Normocephalic and atraumatic.   Eyes: No scleral icterus.   Minimal infraorbital edema.   Neck: Normal range of motion. Neck supple.   Cardiovascular: Regular rhythm and normal heart sounds.   Tachycardia   Pulmonary/Chest: Effort normal and breath sounds normal. No respiratory distress.   Minimal bibasilar Rales.   Abdominal: She exhibits distension (Marked with increased tympany along with a fluid wave.). There is abdominal tenderness (Diffuse). There is no rebound.   Musculoskeletal: Normal range of motion.   Neurological: She is alert and oriented to person, place, and time.   Skin: Skin is warm and dry.    Psychiatric: Affect and judgment normal.   Nursing note and vitals reviewed.      LAB RESULTS  Results for orders placed or performed during the hospital encounter of 12/09/20   Comprehensive Metabolic Panel    Specimen: Blood   Result Value Ref Range    Glucose 144 (H) 65 - 99 mg/dL    BUN 54 (H) 8 - 23 mg/dL    Creatinine 0.94 0.57 - 1.00 mg/dL    Sodium 129 (L) 136 - 145 mmol/L    Potassium 5.0 3.5 - 5.2 mmol/L    Chloride 95 (L) 98 - 107 mmol/L    CO2 21.6 (L) 22.0 - 29.0 mmol/L    Calcium 8.4 (L) 8.6 - 10.5 mg/dL    Total Protein 5.0 (L) 6.0 - 8.5 g/dL    Albumin 2.50 (L) 3.50 - 5.20 g/dL    ALT (SGPT) 48 (H) 1 - 33 U/L    AST (SGOT) 93 (H) 1 - 32 U/L    Alkaline Phosphatase 131 (H) 39 - 117 U/L    Total Bilirubin 3.6 (H) 0.0 - 1.2 mg/dL    eGFR Non African Amer 59 (L) >60 mL/min/1.73    Globulin 2.5 gm/dL    A/G Ratio 1.0 g/dL    BUN/Creatinine Ratio 57.4 (H) 7.0 - 25.0    Anion Gap 12.4 5.0 - 15.0 mmol/L   aPTT    Specimen: Blood   Result Value Ref Range    PTT 35.4 24.3 - 38.1 seconds   Protime-INR    Specimen: Blood   Result Value Ref Range    Protime 17.0 (H) 12.1 - 15.0 Seconds    INR 1.44 (H) 0.90 - 1.10   Lipase    Specimen: Blood   Result Value Ref Range    Lipase 81 (H) 13 - 60 U/L   Ammonia    Specimen: Blood   Result Value Ref Range    Ammonia 93 (H) 11 - 51 umol/L   CBC Auto Differential    Specimen: Blood   Result Value Ref Range    WBC 23.53 (H) 3.40 - 10.80 10*3/mm3    RBC 2.49 (L) 3.77 - 5.28 10*6/mm3    Hemoglobin 9.0 (L) 12.0 - 15.9 g/dL    Hematocrit 27.8 (L) 34.0 - 46.6 %    .6 (H) 79.0 - 97.0 fL    MCH 36.1 (H) 26.6 - 33.0 pg    MCHC 32.4 31.5 - 35.7 g/dL    RDW 12.6 12.3 - 15.4 %    RDW-SD 51.4 37.0 - 54.0 fl    MPV 11.9 6.0 - 12.0 fL    Platelets 326 140 - 450 10*3/mm3    Neutrophil % 77.6 (H) 42.7 - 76.0 %    Lymphocyte % 11.5 (L) 19.6 - 45.3 %    Monocyte % 9.7 5.0 - 12.0 %    Eosinophil % 0.1 (L) 0.3 - 6.2 %    Basophil % 0.3 0.0 - 1.5 %    Immature Grans % 0.8 (H) 0.0 - 0.5  %    Neutrophils, Absolute 18.26 (H) 1.70 - 7.00 10*3/mm3    Lymphocytes, Absolute 2.70 0.70 - 3.10 10*3/mm3    Monocytes, Absolute 2.29 (H) 0.10 - 0.90 10*3/mm3    Eosinophils, Absolute 0.02 0.00 - 0.40 10*3/mm3    Basophils, Absolute 0.07 0.00 - 0.20 10*3/mm3    Immature Grans, Absolute 0.19 (H) 0.00 - 0.05 10*3/mm3    nRBC 0.0 0.0 - 0.2 /100 WBC   Scan Slide    Specimen: Blood   Result Value Ref Range    Macrocytes Mod/2+ None Seen    WBC Morphology Normal Normal    Platelet Morphology Normal Normal   BNP    Specimen: Blood   Result Value Ref Range    proBNP 580.3 0.0 - 900.0 pg/mL   ECG 12 Lead   Result Value Ref Range    QT Interval 323 ms         I ordered the above labs and reviewed the results    RADIOLOGY  Ct Abdomen Pelvis Without Contrast    Result Date: 11/15/2020  Narrative: CT ABDOMEN AND PELVIS, NONCONTRAST, 11/15/2020 HISTORY: 70-year-old female hospital inpatient admitted to the hospital with sepsis, marked leukocytosis. Her problem list has included acute alcoholic hepatitis, metabolic encephalopathy, acute renal failure, alcohol withdrawal; much of which has resolved or is improving. Marked leukocytosis persists. Today, she is complaining of abdomen pain, bloating and constipation. TECHNIQUE: CT imaging of the abdomen and pelvis without oral or IV contrast. Radiation dose reduction techniques included automated exposure control. Radiation audit for CT and nuclear cardiology exams in the last 12 months: 0. COMPARISON: *  CT abdomen/pelvis, 11/4/2020. ABDOMEN FINDINGS: The images again demonstrate marked hepatomegaly and diffuse heterogeneous hepatic steatosis, although the degree of fat infiltration has improved since the recent previous study. Moderately large volume ascites, unchanged. Soft tissue edema throughout the abdominal mesentery and body wall. There is no splenomegaly. No gallbladder distention or bile duct dilatation. Pancreas is unremarkable. Kidneys are negative with no  nephrolithiasis or evidence of urinary obstruction. Normal caliber abdominal aorta. Small bowel and colon are normal in caliber no evidence of obstruction or significant adynamic ileus. The stomach is nondistended, there is no distal esophageal dilatation. PELVIS FINDINGS: The urinary bladder is distended and shows diffuse wall thickening which could indicate cystitis. Uterus, adnexal regions and rectum are within normal limits. No inguinal hernia. Severe chronic grade 3 anterolisthesis at L5-S1 status post posterior fusion at L4-5 and L5-S1 with posterior instrumentation. Lung base images show no active disease. Elevated right hemidiaphragm with compressive right lung base atelectasis. No pleural effusion.     Impression: 1.  Marked hepatomegaly and diffuse, heterogeneous hepatic steatosis. Fatty infiltration of the liver has improved since 11/4/2020. 2.  Moderately large volume ascites, unchanged. No splenomegaly. Mesenteric and body wall edema. 3.  No bowel dilatation to suggest obstruction or other additional etiology for abdominal distention. 4.  Distended urinary bladder with diffuse wall thickening. Correlate for cystitis. Signer Name: Ricky Smith MD  Signed: 11/15/2020 9:27 AM  Workstation Name: [id number]  [facility]    Xr Chest 2 View    Result Date: 12/9/2020  Narrative: CR Chest 2 Vws INDICATION:  Low blood pressure today COMPARISON:  11/14/2020 FINDINGS: PA and lateral views of the chest.  The heart size is normal. Mediastinal structures are midline. Redemonstrated is the elevated right hemidiaphragm. No acute infiltrates. No pleural fluid. No pneumothorax.     Impression: No clearly acute cardiopulmonary findings. No significant interval change from 11/14/2020. Signer Name: Ced Tran MD  Signed: 12/9/2020 4:56 PM  Workstation Name: [id number]  [facility]    Xr Chest Pa & Lateral    Result Date: 11/14/2020  Narrative: CHEST X-RAY, 11/14/2020   HISTORY:  70-year-old female hospital inpatient with sepsis. Severe. Evaluate for pneumonia.  TECHNIQUE: Upright two view chest x-ray. COMPARISON: *  Chest x-ray, 11/10/2020. FINDINGS: Chronically low lung volumes with platelike scarring or linear atelectasis in the posterior lung bases, unchanged. No convincing acute pulmonary infiltrate. No visible pleural effusion. Heart size and pulmonary vascularity are normal. PICC in good position.     Impression: No definite active disease. No change since 11/10/2020. Signer Name: Ricky Smith MD  Signed: 11/14/2020 12:20 PM  Workstation Name: [id number]  [facility]    Xr Chest Pa & Lateral    Result Date: 11/10/2020  Narrative: PA AND LATERAL CHEST, 11/10/2020 10:57 AM  HISTORY: Tachypnea, crackles, persistent leukocytosis; K76.7-Hepatorenal syndrome; A41.9-Sepsis, unspecified organism; R65.20-Severe sepsis without septic shock; N17.9-Acute kidney failure, unspecified  increasing shortness of air for one day  COMPARISON: 11/05/2020  TECHNIQUE: PA and lateral upright chest series.  FINDINGS: There are low lung volumes in the right hemidiaphragm is elevated. There is minimal basilar atelectasis. Rest the lungs are clear . Right shoulder prosthesis is present. The PICC catheter tip in the right atrium. There    Impression: Low lung volumes with minimal bibasilar atelectasis.  This report was finalized on 11/10/2020 11:08 AM by Dr. Solitario Jain MD.        I ordered the above radiologic testing and reviewed the results    PROCEDURES  Procedures      PROGRESS AND CONSULTS  ED Course as of Dec 09 1659   Wed Dec 09, 2020   1531 EKG tracing was contemporaneously reviewed at 1515 hrs. and showed a sinus tachycardia with a rate of 112 bpm.  Insignificant Q waves noted in the inferior leads.  No ectopy.  Otherwise normal ECG.    [ML]   1639 It was noted that the patient has experienced a significant drop in her hemoglobin since her discharge and digital  rectal exam was performed.  The stool was a medium brown color and there did appear to be small flecks of blood interspersed.  The specimen tested strongly heme positive.    [ML]   8798 Case and findings discussed with the on-call hospitalist, Dr. Hilton, who felt in light of the patient's gastrointestinal bleeding and the pre-existing coagulopathy the patient could benefit from further intervention.  The patient is agreeable to admission.    [ML]      ED Course User Index  [ML] Keith Anaya MD           MEDICAL DECISION MAKING  Results were reviewed/discussed with the patient and they were also made aware of online access. Pt also made aware that some labs, such as cultures, will not be resulted during ER visit and follow up with PMD is necessary.     MDM       DIAGNOSIS  Final diagnoses:   Gastrointestinal hemorrhage, unspecified gastrointestinal hemorrhage type   Alcoholic cirrhosis of liver with ascites (CMS/HCC)       Latest Documented Vital Signs:  As of 16:59 EST  BP- 100/90 HR- 112 Temp- 97 °F (36.1 °C) (Temporal) O2 sat- 99%    DISPOSITION  Admitted to [facility] telemetry on observation basis       Medication List      No changes were made to your prescriptions during this visit.                Keith Anaya MD  12/09/20 1700

## 2020-12-09 NOTE — ED NOTES
Rectal exam performed by Dr Anaya & chaperoned by ED tech. Patient tolerated well. Hemoccult positive.      Mary Kate Pemberton RN  12/09/20 [id number]       Mary Kate Pemberton RN  12/09/20 [id number]

## 2020-12-10 ENCOUNTER — APPOINTMENT (OUTPATIENT)
Dept: ULTRASOUND IMAGING | Facility: HOSPITAL | Age: 70
End: 2020-12-10

## 2020-12-10 ENCOUNTER — HOSPITAL ENCOUNTER (OUTPATIENT)
Dept: ULTRASOUND IMAGING | Facility: HOSPITAL | Age: 70
End: 2020-12-10

## 2020-12-10 LAB
ALBUMIN FLD-MCNC: 0.4 G/DL
ALBUMIN SERPL-MCNC: 2.4 G/DL (ref 3.5–5.2)
ALBUMIN/GLOB SERPL: 0.9 G/DL
ALP SERPL-CCNC: 144 U/L (ref 39–117)
ALT SERPL W P-5'-P-CCNC: 49 U/L (ref 1–33)
AMYLASE FLD-CCNC: 15 U/L
ANION GAP SERPL CALCULATED.3IONS-SCNC: 14.9 MMOL/L (ref 5–15)
APPEARANCE FLD: ABNORMAL
AST SERPL-CCNC: 99 U/L (ref 1–32)
BASOPHILS # BLD AUTO: 0.08 10*3/MM3 (ref 0–0.2)
BASOPHILS NFR BLD AUTO: 0.3 % (ref 0–1.5)
BASOPHILS NFR FLD: 0 %
BILIRUB SERPL-MCNC: 2.9 MG/DL (ref 0–1.2)
BLASTS NFR FLD: 0 %
BUN SERPL-MCNC: 58 MG/DL (ref 8–23)
BUN/CREAT SERPL: 68.2 (ref 7–25)
CALCIUM SPEC-SCNC: 8.3 MG/DL (ref 8.6–10.5)
CHLORIDE SERPL-SCNC: 98 MMOL/L (ref 98–107)
CO2 SERPL-SCNC: 21.1 MMOL/L (ref 22–29)
COLOR FLD: YELLOW
CREAT SERPL-MCNC: 0.85 MG/DL (ref 0.57–1)
CRP SERPL-MCNC: 3.61 MG/DL (ref 0–0.5)
D-LACTATE SERPL-SCNC: 4 MMOL/L (ref 0.5–2)
DEPRECATED RDW RBC AUTO: 53.1 FL (ref 37–54)
EOSINOPHIL # BLD AUTO: 0.06 10*3/MM3 (ref 0–0.4)
EOSINOPHIL NFR BLD AUTO: 0.2 % (ref 0.3–6.2)
EOSINOPHIL NFR FLD MANUAL: 0 %
ERYTHROCYTE [DISTWIDTH] IN BLOOD BY AUTOMATED COUNT: 12.7 % (ref 12.3–15.4)
ERYTHROCYTE [SEDIMENTATION RATE] IN BLOOD: 52 MM/HR (ref 0–20)
GFR SERPL CREATININE-BSD FRML MDRD: 66 ML/MIN/1.73
GLOBULIN UR ELPH-MCNC: 2.7 GM/DL
GLUCOSE FLD-MCNC: 113 MG/DL
GLUCOSE SERPL-MCNC: 113 MG/DL (ref 65–99)
HCT VFR BLD AUTO: 21.2 % (ref 34–46.6)
HCT VFR BLD AUTO: 24.9 % (ref 34–46.6)
HGB BLD-MCNC: 6.9 G/DL (ref 12–15.9)
HGB BLD-MCNC: 7.8 G/DL (ref 12–15.9)
IMM GRANULOCYTES # BLD AUTO: 0.21 10*3/MM3 (ref 0–0.05)
IMM GRANULOCYTES NFR BLD AUTO: 0.8 % (ref 0–0.5)
INR PPP: 1.4 (ref 0.9–1.1)
LDH FLD-CCNC: 73 U/L
LYMPHOCYTES # BLD AUTO: 2.55 10*3/MM3 (ref 0.7–3.1)
LYMPHOCYTES NFR BLD AUTO: 9.7 % (ref 19.6–45.3)
LYMPHOCYTES NFR FLD MANUAL: 88 %
MACROPHAGE FLUID: 0 %
MCH RBC QN AUTO: 35.5 PG (ref 26.6–33)
MCHC RBC AUTO-ENTMCNC: 31.3 G/DL (ref 31.5–35.7)
MCV RBC AUTO: 113.2 FL (ref 79–97)
MESOTHL CELL NFR FLD MANUAL: 0 %
MONOCYTES # BLD AUTO: 2.65 10*3/MM3 (ref 0.1–0.9)
MONOCYTES NFR BLD AUTO: 10.1 % (ref 5–12)
MONOCYTES NFR FLD: 4 %
MONOS+MACROS NFR FLD: 0 %
NEUTROPHILS NFR BLD AUTO: 20.76 10*3/MM3 (ref 1.7–7)
NEUTROPHILS NFR BLD AUTO: 78.9 % (ref 42.7–76)
NEUTROPHILS NFR FLD MANUAL: 8 %
NRBC BLD AUTO-RTO: 0 /100 WBC (ref 0–0.2)
PLASMA CELLS NFR FLD: 0 %
PLATELET # BLD AUTO: 331 10*3/MM3 (ref 140–450)
PMV BLD AUTO: 11.8 FL (ref 6–12)
POTASSIUM SERPL-SCNC: 4 MMOL/L (ref 3.5–5.2)
PROCALCITONIN SERPL-MCNC: 0.87 NG/ML (ref 0–0.25)
PROT FLD-MCNC: <1 G/DL
PROT SERPL-MCNC: 5.1 G/DL (ref 6–8.5)
PROTHROMBIN TIME: 16.7 SECONDS (ref 12.1–15)
RBC # BLD AUTO: 2.2 10*6/MM3 (ref 3.77–5.28)
RBC # FLD AUTO: 0 /MM3
SODIUM SERPL-SCNC: 134 MMOL/L (ref 136–145)
UNCLASSIFIED CELLS, FLUID: 0 %
WBC # BLD AUTO: 26.31 10*3/MM3 (ref 3.4–10.8)
WBC # FLD AUTO: 72 /MM3

## 2020-12-10 PROCEDURE — 76705 ECHO EXAM OF ABDOMEN: CPT

## 2020-12-10 PROCEDURE — 25010000002 OCTREOTIDE PER 25 MCG: Performed by: INTERNAL MEDICINE

## 2020-12-10 PROCEDURE — 87205 SMEAR GRAM STAIN: CPT | Performed by: NURSE PRACTITIONER

## 2020-12-10 PROCEDURE — 82247 BILIRUBIN TOTAL: CPT | Performed by: NURSE PRACTITIONER

## 2020-12-10 PROCEDURE — 36430 TRANSFUSION BLD/BLD COMPNT: CPT

## 2020-12-10 PROCEDURE — 82042 OTHER SOURCE ALBUMIN QUAN EA: CPT | Performed by: NURSE PRACTITIONER

## 2020-12-10 PROCEDURE — 84157 ASSAY OF PROTEIN OTHER: CPT | Performed by: NURSE PRACTITIONER

## 2020-12-10 PROCEDURE — 82945 GLUCOSE OTHER FLUID: CPT | Performed by: NURSE PRACTITIONER

## 2020-12-10 PROCEDURE — 82150 ASSAY OF AMYLASE: CPT | Performed by: NURSE PRACTITIONER

## 2020-12-10 PROCEDURE — 85018 HEMOGLOBIN: CPT | Performed by: HOSPITALIST

## 2020-12-10 PROCEDURE — P9016 RBC LEUKOCYTES REDUCED: HCPCS

## 2020-12-10 PROCEDURE — 99291 CRITICAL CARE FIRST HOUR: CPT | Performed by: HOSPITALIST

## 2020-12-10 PROCEDURE — 84145 PROCALCITONIN (PCT): CPT | Performed by: NURSE PRACTITIONER

## 2020-12-10 PROCEDURE — 86900 BLOOD TYPING SEROLOGIC ABO: CPT

## 2020-12-10 PROCEDURE — G0378 HOSPITAL OBSERVATION PER HR: HCPCS

## 2020-12-10 PROCEDURE — 25010000003 LIDOCAINE 1 % SOLUTION: Performed by: HOSPITALIST

## 2020-12-10 PROCEDURE — 86140 C-REACTIVE PROTEIN: CPT | Performed by: NURSE PRACTITIONER

## 2020-12-10 PROCEDURE — 76942 ECHO GUIDE FOR BIOPSY: CPT

## 2020-12-10 PROCEDURE — 83615 LACTATE (LD) (LDH) ENZYME: CPT | Performed by: NURSE PRACTITIONER

## 2020-12-10 PROCEDURE — 87070 CULTURE OTHR SPECIMN AEROBIC: CPT | Performed by: NURSE PRACTITIONER

## 2020-12-10 PROCEDURE — 87075 CULTR BACTERIA EXCEPT BLOOD: CPT | Performed by: NURSE PRACTITIONER

## 2020-12-10 PROCEDURE — 85025 COMPLETE CBC W/AUTO DIFF WBC: CPT | Performed by: NURSE PRACTITIONER

## 2020-12-10 PROCEDURE — 87147 CULTURE TYPE IMMUNOLOGIC: CPT | Performed by: NURSE PRACTITIONER

## 2020-12-10 PROCEDURE — 85014 HEMATOCRIT: CPT | Performed by: HOSPITALIST

## 2020-12-10 PROCEDURE — 83605 ASSAY OF LACTIC ACID: CPT | Performed by: NURSE PRACTITIONER

## 2020-12-10 PROCEDURE — 85610 PROTHROMBIN TIME: CPT | Performed by: NURSE PRACTITIONER

## 2020-12-10 PROCEDURE — 80053 COMPREHEN METABOLIC PANEL: CPT | Performed by: NURSE PRACTITIONER

## 2020-12-10 PROCEDURE — 0W9G3ZZ DRAINAGE OF PERITONEAL CAVITY, PERCUTANEOUS APPROACH: ICD-10-PCS | Performed by: RADIOLOGY

## 2020-12-10 PROCEDURE — 89051 BODY FLUID CELL COUNT: CPT | Performed by: NURSE PRACTITIONER

## 2020-12-10 PROCEDURE — 25010000002 CEFTRIAXONE SODIUM-DEXTROSE 2-2.22 GM-%(50ML) RECONSTITUTED SOLUTION: Performed by: NURSE PRACTITIONER

## 2020-12-10 PROCEDURE — 99222 1ST HOSP IP/OBS MODERATE 55: CPT | Performed by: INTERNAL MEDICINE

## 2020-12-10 PROCEDURE — 85652 RBC SED RATE AUTOMATED: CPT | Performed by: NURSE PRACTITIONER

## 2020-12-10 PROCEDURE — 99232 SBSQ HOSP IP/OBS MODERATE 35: CPT | Performed by: NURSE PRACTITIONER

## 2020-12-10 PROCEDURE — 25010000002 LORAZEPAM PER 2 MG: Performed by: HOSPITALIST

## 2020-12-10 RX ORDER — OCTREOTIDE ACETATE 500 UG/ML
INJECTION, SOLUTION INTRAVENOUS; SUBCUTANEOUS
Status: DISPENSED
Start: 2020-12-10 | End: 2020-12-11

## 2020-12-10 RX ORDER — LORAZEPAM 2 MG/ML
0.5 INJECTION INTRAMUSCULAR ONCE
Status: COMPLETED | OUTPATIENT
Start: 2020-12-10 | End: 2020-12-10

## 2020-12-10 RX ORDER — OCTREOTIDE ACETATE 50 UG/ML
50 INJECTION, SOLUTION INTRAVENOUS; SUBCUTANEOUS ONCE
Status: COMPLETED | OUTPATIENT
Start: 2020-12-10 | End: 2020-12-10

## 2020-12-10 RX ORDER — LIDOCAINE HYDROCHLORIDE 10 MG/ML
5 INJECTION, SOLUTION INFILTRATION; PERINEURAL ONCE
Status: COMPLETED | OUTPATIENT
Start: 2020-12-10 | End: 2020-12-10

## 2020-12-10 RX ORDER — BACLOFEN 10 MG/1
5 TABLET ORAL ONCE
Status: COMPLETED | OUTPATIENT
Start: 2020-12-10 | End: 2020-12-10

## 2020-12-10 RX ORDER — SODIUM CHLORIDE 9 MG/ML
INJECTION, SOLUTION INTRAVENOUS
Status: DISPENSED
Start: 2020-12-10 | End: 2020-12-11

## 2020-12-10 RX ADMIN — OCTREOTIDE ACETATE 50 MCG: 50 INJECTION, SOLUTION INTRAVENOUS; SUBCUTANEOUS at 22:07

## 2020-12-10 RX ADMIN — CEFTRIAXONE 2 G: 2 INJECTION, SOLUTION INTRAVENOUS at 21:50

## 2020-12-10 RX ADMIN — MIDODRINE HYDROCHLORIDE 2.5 MG: 5 TABLET ORAL at 11:34

## 2020-12-10 RX ADMIN — FOLIC ACID 1 MG: 1 TABLET ORAL at 08:19

## 2020-12-10 RX ADMIN — SODIUM CHLORIDE 40 ML: 9 INJECTION, SOLUTION INTRAVENOUS at 21:55

## 2020-12-10 RX ADMIN — LORAZEPAM 0.5 MG: 2 INJECTION INTRAMUSCULAR; INTRAVENOUS at 15:17

## 2020-12-10 RX ADMIN — RIFAXIMIN 600 MG: 200 TABLET ORAL at 21:48

## 2020-12-10 RX ADMIN — RIFAXIMIN 600 MG: 200 TABLET ORAL at 08:19

## 2020-12-10 RX ADMIN — SODIUM CHLORIDE 40 ML: 9 INJECTION, SOLUTION INTRAVENOUS at 15:21

## 2020-12-10 RX ADMIN — LACTULOSE 20 G: 20 SOLUTION ORAL at 22:06

## 2020-12-10 RX ADMIN — SODIUM CHLORIDE, PRESERVATIVE FREE 10 ML: 5 INJECTION INTRAVENOUS at 22:00

## 2020-12-10 RX ADMIN — Medication 1 CAPSULE: at 08:19

## 2020-12-10 RX ADMIN — MIDODRINE HYDROCHLORIDE 2.5 MG: 5 TABLET ORAL at 08:19

## 2020-12-10 RX ADMIN — MIDODRINE HYDROCHLORIDE 2.5 MG: 5 TABLET ORAL at 21:46

## 2020-12-10 RX ADMIN — SODIUM CHLORIDE, PRESERVATIVE FREE 10 ML: 5 INJECTION INTRAVENOUS at 08:19

## 2020-12-10 RX ADMIN — BACLOFEN 5 MG: 10 TABLET ORAL at 16:12

## 2020-12-10 RX ADMIN — LIDOCAINE HYDROCHLORIDE 5 ML: 10 INJECTION, SOLUTION INFILTRATION; PERINEURAL at 10:26

## 2020-12-10 RX ADMIN — LACTULOSE 20 G: 20 SOLUTION ORAL at 08:19

## 2020-12-10 RX ADMIN — MELATONIN TAB 5 MG 5 MG: 5 TAB at 22:46

## 2020-12-10 RX ADMIN — BUMETANIDE 1 MG: 0.25 INJECTION INTRAMUSCULAR; INTRAVENOUS at 21:48

## 2020-12-10 RX ADMIN — LEVOTHYROXINE SODIUM 50 MCG: 50 TABLET ORAL at 05:44

## 2020-12-10 RX ADMIN — OCTREOTIDE ACETATE 25 MCG/HR: 500 INJECTION, SOLUTION INTRAVENOUS; SUBCUTANEOUS at 22:30

## 2020-12-10 RX ADMIN — TRAMADOL HYDROCHLORIDE 50 MG: 50 TABLET, COATED ORAL at 22:30

## 2020-12-10 NOTE — PLAN OF CARE
Goal Outcome Evaluation:  Plan of Care Reviewed With: patient  Progress: no change  Outcome Summary: a&o x3. multiple dark stools. 1 of 2 unit of prbc's infusing. bp's in mid 80's/50's. herrera to bedside drainage. pt being transferred to icu when bed available. paracentesis with 3.6L removed. no other complaints at this time.

## 2020-12-10 NOTE — SIGNIFICANT NOTE
12/09/20 2216   Provider Notification   Reason for Communication Critical lab value  (lactic, hemmoocult)   Provider Name Lacey COLON   Notification Route Other (Comment)  (secure chat)   Response No new orders

## 2020-12-10 NOTE — CONSULTS
LOS: 0 days   Patient Care Team:  Tayler Trujillo MD as PCP - General        Subjective       Attending MD : Halima Hilton DO    Patient Complaints: waek  abd pain         History of Present Illness  :     Patient Denies:  NV    PMH :   Gastrointestinal hemorrhage      Review of Systems:    maria t    Objective     Vital Signs  Temp:  [97 °F (36.1 °C)-97.7 °F (36.5 °C)] 97.7 °F (36.5 °C)  Heart Rate:  [105-117] 116  Resp:  [20-21] 20  BP: ()/(53-97) 102/54    Physical Exam:     General Appearance:    Alert, cooperative, in no acute distress   Head:    Normocephalic, without obvious abnormality, atraumatic   Eyes:            Lids and lashes normal, conjunctivae and sclerae normal, no   icterus, no pallor, corneas clear, PERRLA   Ears:    Ears appear intact with no abnormalities noted   Throat:   No oral lesions, no thrush, oral mucosa moist   Neck:   No adenopathy, supple, trachea midline, no thyromegaly, no     carotid bruit, no JVD   Back:     No kyphosis present, no scoliosis present, no skin lesions,       erythema or scars, no tenderness to percussion or                   palpation,   range of motion normal   Lungs:     Clear to auscultation,respirations regular, even and                   unlabored    Heart:    Regular rhythm and normal rate, normal S1 and S2, no            murmur, no gallop, no rub, no click   Breast Exam:    Deferred   Abdomen:     Normal bowel sounds, no masses, no organomegaly, soft        non-tender, non-distended, no guarding, no rebound                 tenderness   Genitalia:    Deferred   Extremities:   Moves all extremities well, no edema, no cyanosis, no              redness   Pulses:   Pulses palpable and equal bilaterally   Skin:   No bleeding, bruising or rash   Lymph nodes:   No palpable adenopathy   Neurologic:   Cranial nerves 2 - 12 grossly intact, sensation intact, DTR        present and equal bilaterally          Results Review:    Lab Results (last 72 hours)      Procedure Component Value Units Date/Time    Body Fluid Culture - Body Fluid, Peritoneum [[id number]] Collected: 12/10/20 1042    Specimen: Body Fluid from Peritoneum Updated: 12/10/20 1116    Anaerobic Culture - Body Fluid, Peritoneum [[id number]] Collected: 12/10/20 1042    Specimen: Body Fluid from Peritoneum Updated: 12/10/20 1116    Albumin, Fluid - Body Fluid, Peritoneum [[id number]] Collected: 12/10/20 1042    Specimen: Body Fluid from Peritoneum Updated: 12/10/20 1116    Amylase, Body Fluid - Body Fluid, Peritoneum [[id number]] Collected: 12/10/20 1042    Specimen: Body Fluid from Peritoneum Updated: 12/10/20 1116    Glucose, Body Fluid - Body Fluid, Peritoneum [[id number]] Collected: 12/10/20 1042    Specimen: Body Fluid from Peritoneum Updated: 12/10/20 1116    Lactate Dehydrogenase, Body Fluid - Body Fluid, Peritoneum [[id number]] Collected: 12/10/20 1042    Specimen: Body Fluid from Peritoneum Updated: 12/10/20 1116    Protein, Body Fluid - Body Fluid, Peritoneum [[id number]] Collected: 12/10/20 1042    Specimen: Body Fluid from Peritoneum Updated: 12/10/20 1116    Bilirubin, Body Fluid - Body Fluid, Peritoneum [[id number]] Collected: 12/10/20 1042    Specimen: Body Fluid from Peritoneum Updated: 12/10/20 1116    Body Fluid Cell Count With Differential - Body Fluid, Peritoneum [[id number]] Collected: 12/10/20 1042    Specimen: Body Fluid from Peritoneum Updated: 12/10/20 1116    Narrative:      The following orders were created for panel order Body Fluid Cell Count With Differential - Body Fluid, Peritoneum.  Procedure                               Abnormality         Status                     ---------                               -----------         ------                     Body fluid cell count - ...[[id number]]                      In process                   Please view results for these tests on the individual orders.    Body fluid cell count - Body Fluid, Peritoneum [[id number]] Collected: 12/10/20  1042    Specimen: Body Fluid from Peritoneum Updated: 12/10/20 1116    Sedimentation Rate [[id number]]  (Abnormal) Collected: 12/10/20 0355    Specimen: Blood Updated: 12/10/20 0856     Sed Rate 52 mm/hr     C-reactive Protein [[id number]] Collected: 12/10/20 0729    Specimen: Blood Updated: 12/10/20 0729    CBC & Differential [[id number]]  (Abnormal) Collected: 12/10/20 0355    Specimen: Blood Updated: 12/10/20 0540    Narrative:      The following orders were created for panel order CBC & Differential.  Procedure                               Abnormality         Status                     ---------                               -----------         ------                     Scan Slide[[id number]]                                                                  CBC Auto Differential[[id number]]        Abnormal            Final result                 Please view results for these tests on the individual orders.    CBC Auto Differential [[id number]]  (Abnormal) Collected: 12/10/20 0355    Specimen: Blood Updated: 12/10/20 0540     WBC 26.31 10*3/mm3      RBC 2.20 10*6/mm3      Hemoglobin 7.8 g/dL      Hematocrit 24.9 %      .2 fL      MCH 35.5 pg      MCHC 31.3 g/dL      RDW 12.7 %      RDW-SD 53.1 fl      MPV 11.8 fL      Platelets 331 10*3/mm3      Neutrophil % 78.9 %      Lymphocyte % 9.7 %      Monocyte % 10.1 %      Eosinophil % 0.2 %      Basophil % 0.3 %      Immature Grans % 0.8 %      Neutrophils, Absolute 20.76 10*3/mm3      Lymphocytes, Absolute 2.55 10*3/mm3      Monocytes, Absolute 2.65 10*3/mm3      Eosinophils, Absolute 0.06 10*3/mm3      Basophils, Absolute 0.08 10*3/mm3      Immature Grans, Absolute 0.21 10*3/mm3      nRBC 0.0 /100 WBC     Protime-INR [[id number]]  (Abnormal) Collected: 12/10/20 0355    Specimen: Blood Updated: 12/10/20 0526     Protime 16.7 Seconds      INR 1.40    Narrative:      Therapeutic Ranges for INR: 2.0-3.0 (PT 20-30)                              2.5-3.5 (PT 25-34)     Lactic Acid, Plasma [[id number]]  (Abnormal) Collected: 12/10/20 0355    Specimen: Blood Updated: 12/10/20 0507     Lactate 4.0 mmol/L     Comprehensive Metabolic Panel [[id number]]  (Abnormal) Collected: 12/10/20 0355    Specimen: Blood Updated: 12/10/20 0448     Glucose 113 mg/dL      BUN 58 mg/dL      Creatinine 0.85 mg/dL      Sodium 134 mmol/L      Potassium 4.0 mmol/L      Chloride 98 mmol/L      CO2 21.1 mmol/L      Calcium 8.3 mg/dL      Total Protein 5.1 g/dL      Albumin 2.40 g/dL      ALT (SGPT) 49 U/L      AST (SGOT) 99 U/L      Alkaline Phosphatase 144 U/L      Total Bilirubin 2.9 mg/dL      eGFR Non African Amer 66 mL/min/1.73      Globulin 2.7 gm/dL      A/G Ratio 0.9 g/dL      BUN/Creatinine Ratio 68.2     Anion Gap 14.9 mmol/L     Narrative:      GFR Normal >60  Chronic Kidney Disease <60  Kidney Failure <15      Procalcitonin [[id number]]  (Abnormal) Collected: 12/10/20 0355    Specimen: Blood Updated: 12/10/20 0443     Procalcitonin 0.87 ng/mL     Narrative:      Results may be falsely decreased if patient taking Biotin.     Urine Electrolytes - Urine, Catheter [[id number]] Collected: 12/09/20 2257    Specimen: Urine, Catheter Updated: 12/09/20 2359     Potassium, Urine 37.6 mmol/L      Sodium, Urine <20 mmol/L      Chloride, Urine 30 mmol/L     Narrative:      Reference intervals for random urine have not been established.  Clinical usage is dependent upon physician's interpretation in combination with other laboratory tests.       Urinalysis With Culture If Indicated - Urine, Catheter In/Out [[id number]]  (Normal) Collected: 12/09/20 2257    Specimen: Urine, Catheter In/Out Updated: 12/09/20 2347     Color, UA Yellow     Appearance, UA Clear     pH, UA 5.5     Specific Gravity, UA 1.010     Glucose, UA Negative     Ketones, UA Negative     Bilirubin, UA Negative     Blood, UA Negative     Protein, UA Negative     Leuk Esterase, UA Negative     Nitrite, UA Negative     Urobilinogen, UA 0.2 E.U./dL     Narrative:      Urine microscopic not indicated.    Occult Blood X 1, Stool - Stool, Per Rectum [[id number]]  (Abnormal) Collected: 12/09/20 2120    Specimen: Stool from Per Rectum Updated: 12/09/20 2137     Fecal Occult Blood Positive    Lactic Acid, Reflex [[id number]]  (Abnormal) Collected: 12/09/20 2114    Specimen: Blood Updated: 12/09/20 2130     Lactate 4.4 mmol/L     T4, Free [[id number]]  (Normal) Collected: 12/09/20 1535    Specimen: Blood Updated: 12/09/20 2112     Free T4 1.60 ng/dL     Narrative:      Results may be falsely increased if patient taking Biotin.      Lactic Acid, Reflex Timer (This will reflex a repeat order 3-3:15 hours after ordered.) [[id number]] Collected: 12/09/20 1724    Specimen: Blood Updated: 12/09/20 2100     Hold Tube Hold for add-ons.     Comment: Auto resulted.       TSH [[id number]]  (Abnormal) Collected: 12/09/20 1535    Specimen: Blood Updated: 12/09/20 1951     TSH 7.960 uIU/mL     Hemoglobin A1c [[id number]]  (Normal) Collected: 12/09/20 1535    Specimen: Blood Updated: 12/09/20 1947     Hemoglobin A1C 5.00 %     Narrative:      Hemoglobin A1C Ranges:    Increased Risk for Diabetes  5.7% to 6.4%  Diabetes                     >= 6.5%  Diabetic Goal                < 7.0%    Ethanol [[id number]] Collected: 12/09/20 1535    Specimen: Blood Updated: 12/09/20 1936     Ethanol <10 mg/dL      Ethanol % <0.010 %     COVID-19,[facility]-HOUSE,Nasal Swab No Transport Media 3-4 HR TAT - Swab, Nasal Cavity [[id number]]  (Normal) Collected: 12/09/20 1727    Specimen: Swab from Nasal Cavity Updated: 12/09/20 1832     COVID19 Not Detected    Narrative:      Fact sheet for providers: https://www.fda.gov/media/518465/download     Fact sheet for patients: https://www.fda.gov/media/989697/download    Test performed by PCR.    Procalcitonin [[id number]]  (Abnormal) Collected: 12/09/20 1535    Specimen: Blood Updated: 12/09/20 1749     Procalcitonin 0.78 ng/mL     Narrative:      Results may be  falsely decreased if patient taking Biotin.     Lactic Acid, Plasma [[id number]]  (Abnormal) Collected: 12/09/20 1724    Specimen: Blood Updated: 12/09/20 1746     Lactate 4.4 mmol/L     BNP [[id number]]  (Normal) Collected: 12/09/20 1535    Specimen: Blood Updated: 12/09/20 1625     proBNP 580.3 pg/mL     Narrative:      Among patients with dyspnea, NT-proBNP is highly sensitive for the detection of acute congestive heart failure. In addition NT-proBNP of <300 pg/ml effectively rules out acute congestive heart failure with 99% negative predictive value.    Results may be falsely decreased if patient taking Biotin.      Ammonia [[id number]]  (Abnormal) Collected: 12/09/20 1535    Specimen: Blood Updated: 12/09/20 1604     Ammonia 93 umol/L     Comprehensive Metabolic Panel [[id number]]  (Abnormal) Collected: 12/09/20 1535    Specimen: Blood Updated: 12/09/20 1603     Glucose 144 mg/dL      BUN 54 mg/dL      Creatinine 0.94 mg/dL      Sodium 129 mmol/L      Potassium 5.0 mmol/L      Comment: Slight hemolysis detected by analyzer. Results may be affected.        Chloride 95 mmol/L      CO2 21.6 mmol/L      Calcium 8.4 mg/dL      Total Protein 5.0 g/dL      Albumin 2.50 g/dL      ALT (SGPT) 48 U/L      AST (SGOT) 93 U/L      Alkaline Phosphatase 131 U/L      Total Bilirubin 3.6 mg/dL      eGFR Non African Amer 59 mL/min/1.73      Globulin 2.5 gm/dL      A/G Ratio 1.0 g/dL      BUN/Creatinine Ratio 57.4     Anion Gap 12.4 mmol/L     Narrative:      GFR Normal >60  Chronic Kidney Disease <60  Kidney Failure <15      Lipase [[id number]]  (Abnormal) Collected: 12/09/20 1535    Specimen: Blood Updated: 12/09/20 1602     Lipase 81 U/L     aPTT [[id number]]  (Normal) Collected: 12/09/20 1535    Specimen: Blood Updated: 12/09/20 1556     PTT 35.4 seconds     Narrative:      PTT = The equivalent PTT values for the therapeutic range of heparin levels at 0.1 to 0.7 U/ml are 53 to 110 seconds.      Protime-INR [[id number]]  (Abnormal)  Collected: 12/09/20 1535    Specimen: Blood Updated: 12/09/20 1556     Protime 17.0 Seconds      INR 1.44    Narrative:      Therapeutic Ranges for INR: 2.0-3.0 (PT 20-30)                              2.5-3.5 (PT 25-34)    CBC & Differential [[id number]]  (Abnormal) Collected: 12/09/20 1535    Specimen: Blood Updated: 12/09/20 1553    Narrative:      The following orders were created for panel order CBC & Differential.  Procedure                               Abnormality         Status                     ---------                               -----------         ------                     Scan Slide[[id number]]                                       Final result               CBC Auto Differential[[id number]]        Abnormal            Final result                 Please view results for these tests on the individual orders.    Scan Slide [[id number]] Collected: 12/09/20 1535    Specimen: Blood Updated: 12/09/20 1553     Macrocytes Mod/2+     WBC Morphology Normal     Platelet Morphology Normal    CBC Auto Differential [[id number]]  (Abnormal) Collected: 12/09/20 1535    Specimen: Blood Updated: 12/09/20 1546     WBC 23.53 10*3/mm3      RBC 2.49 10*6/mm3      Hemoglobin 9.0 g/dL      Hematocrit 27.8 %      .6 fL      MCH 36.1 pg      MCHC 32.4 g/dL      RDW 12.6 %      RDW-SD 51.4 fl      MPV 11.9 fL      Platelets 326 10*3/mm3      Neutrophil % 77.6 %      Lymphocyte % 11.5 %      Monocyte % 9.7 %      Eosinophil % 0.1 %      Basophil % 0.3 %      Immature Grans % 0.8 %      Neutrophils, Absolute 18.26 10*3/mm3      Lymphocytes, Absolute 2.70 10*3/mm3      Monocytes, Absolute 2.29 10*3/mm3      Eosinophils, Absolute 0.02 10*3/mm3      Basophils, Absolute 0.07 10*3/mm3      Immature Grans, Absolute 0.19 10*3/mm3      nRBC 0.0 /100 WBC               Imaging Results (Last 72 Hours)     Procedure Component Value Units Date/Time    US Paracentesis [[id number]] Collected: 12/10/20 1053    Specimen: Body Fluid Updated:  12/10/20 1056    Narrative:      ULTRASOUND-GUIDED PARACENTESIS, 12/10/2020     HISTORY:   70-year-old female with ascites. Ultrasound-guided paracentesis  requested.     CONSENT:   The procedure, risks and alternatives were discussed in detail with the  patient, questions were entertained, and written informed consent was  obtained. Timeout was observed in the procedure room for patient  identification and procedure site verification, and the procedure site  was marked by me. Permanent images were recorded.     PROCEDURE:   Using sterile technique, 1% lidocaine local anesthetic and real-time  ultrasound guidance, a 5 Tristanian paracentesis catheter was placed into  the largest identified portion of ascites fluid in the right lower  quadrant, and 3.6 L of milky yellow ascites fluid was removed without  apparent complication.       Impression:      Technically successful ultrasound-guided paracentesis with removal of  3.6 L ascites fluid.     This report was finalized on 12/10/2020 10:54 AM by Dr. Waqar Jerez MD.       US Abdomen Limited [[id number]] Collected: 12/10/20 0815     Updated: 12/10/20 0819    Narrative:      ULTRASOUND ABDOMEN, LIMITED, 12/10/2020     HISTORY:  70-year-old female with abdominal distention and possible ascites.     TECHNIQUE:  Grayscale ultrasound imaging of the abdomen was performed to assess for  ascites volume.     FINDINGS:     Four-quadrant imaging of the abdomen was performed and demonstrates a  mild volume of ascites preferentially located in the right hemiabdomen.  This is new compared to the prior ultrasound.       Impression:         1. Mild volume of ascites.     This report was finalized on 12/10/2020 8:17 AM by Dr. Waqar Jerez MD.       XR Abdomen KUB [[id number]] Collected: 12/09/20 2204     Updated: 12/09/20 2206    Narrative:      CR Abdomen 1 Vw    INDICATION:   Abdominal pain and distention today    COMPARISON:   CT abdomen pelvis 11/15/2020    FINDINGS:  AP radiographs  of the abdomen. There are postoperative changes in the lower lumbar spine. There is some gas in small and large intestine but there is no evidence of obstruction.          Impression:      No evidence of bowel obstruction    Signer Name: Solitario Jain MD   Signed: 12/9/2020 10:04 PM   Workstation Name: [id number]    [facility]    XR Chest 2 View [[id number]] Collected: 12/09/20 1656     Updated: 12/09/20 1658    Narrative:      CR Chest 2 Vws    INDICATION:    Low blood pressure today    COMPARISON:    11/14/2020   FINDINGS:   PA and lateral views of the chest.  The heart size is normal. Mediastinal structures are midline. Redemonstrated is the elevated right hemidiaphragm. No acute infiltrates. No pleural fluid. No pneumothorax.      Impression:      No clearly acute cardiopulmonary findings. No significant interval change from 11/14/2020.    Signer Name: Ced Tran MD   Signed: 12/9/2020 4:56 PM   Workstation Name: [id number]    [facility]            Medication Review:      Clinic-Administered Medications       Dose Frequency Start End    mupirocin (BACTROBAN) 2 % nasal ointment  2 Times Daily 6/6/2019     Admin Instructions:     Route: Nasal      Hospital Medications (active)       Dose Frequency Start End    bisacodyl (DULCOLAX) EC tablet 5 mg 5 mg Daily PRN 12/9/2020     Admin Instructions: Swallow whole. Do not crush, split, or chew tablet.    Route: Oral    bisacodyl (DULCOLAX) suppository 10 mg 10 mg Daily PRN 12/9/2020     Route: Rectal    bumetanide (BUMEX) injection 1 mg 1 mg Every 12 Hours 12/9/2020     Admin Instructions: Give slow IV push over 1-2 minutes.    Route: Intravenous    cefTRIAXone (ROCEPHIN) IVPB 2 g/50ml dextrose (premix) 2 g Every 24 Hours 12/9/2020 12/16/2020    Admin Instructions: Caution: Look alike/sound alike drug alert    Route: Intravenous    folic acid (FOLVITE) tablet 1 mg 1 mg Daily 12/10/2020     Route: Oral    lactobacillus  "acidophilus (RISAQUAD) capsule 1 capsule 1 capsule Daily 12/10/2020     Route: Oral    lactulose (CHRONULAC) 10 GM/15ML solution 20 g 20 g 2 Times Daily 12/9/2020     Admin Instructions: May be mixed with fruit juice, water, or milk.    Route: Oral    levothyroxine (SYNTHROID, LEVOTHROID) tablet 50 mcg 50 mcg Every Early Morning 12/10/2020     Admin Instructions: Take on empty stomach.    Route: Oral    magnesium hydroxide (MILK OF MAGNESIA) suspension 2400 mg/10mL 10 mL 10 mL Daily PRN 12/9/2020     Route: Oral    melatonin tablet 5 mg 5 mg Nightly PRN 12/9/2020     Route: Oral    midodrine (PROAMATINE) tablet 2.5 mg 2.5 mg 3 Times Daily Before Meals 12/10/2020     Route: Oral    ondansetron (ZOFRAN) injection 4 mg 4 mg Every 6 Hours PRN 12/9/2020     Admin Instructions: If BOTH ondansetron (ZOFRAN) and promethazine (PHENERGAN) are ordered use ondansetron first and THEN promethazine IF ondansetron is ineffective.    Route: Intravenous    Linked Group 1: \"Or\" Linked Group Details        ondansetron (ZOFRAN) tablet 4 mg 4 mg Every 6 Hours PRN 12/9/2020     Admin Instructions: If BOTH ondansetron (ZOFRAN) and promethazine (PHENERGAN) are ordered use ondansetron first and THEN promethazine IF ondansetron is ineffective.    Route: Oral    Linked Group 1: \"Or\" Linked Group Details        rifAXIMin (XIFAXAN) tablet 600 mg 600 mg Every 12 Hours Scheduled 12/9/2020 12/17/2020    Admin Instructions: Caution: Look alike/sound alike drug alert    Route: Oral    sodium chloride 0.9 % flush 10 mL 10 mL As Needed 12/9/2020     Route: Intravenous    Linked Group 2: \"And\" Linked Group Details        sodium chloride 0.9 % flush 10 mL 10 mL As Needed 12/9/2020     Route: Intravenous    sodium chloride 0.9 % flush 10 mL 10 mL Every 12 Hours Scheduled 12/9/2020     Route: Intravenous    sodium chloride 0.9 % infusion 40 mL 40 mL As Needed 12/9/2020     Admin Instructions: Following administration of an IV intermittent medication, flush " line with 40mL NS at 100mL/hr from a 250mL bag.    Route: Intravenous    traMADol (ULTRAM) tablet 50 mg 50 mg Every 6 Hours PRN 12/9/2020 12/16/2020    Admin Instructions: BR><BR>Caution: Look alike/sound alike drug alert<BR><BR>If given for pain, use the following pain scale:<BR>Mild Pain = Pain Score of 1-3, CPOT 1-2<BR>Moderate Pain = Pain Score of 4-6, CPOT 3-4<BR>Severe Pain = Pain Score of 7-10, CPOT 5-8    Route: Oral          Assessment/Plan         Gastrointestinal hemorrhage        High WBC  R/o SBP    Plan :    IV rocephin  PF  WBC amd C&S  Will follow  Thank you    Kendell Young MD  12/10/20  11:32 EST

## 2020-12-10 NOTE — H&P
"[facility] HOSPITALIST     Tayler Trujillo MD    CHIEF COMPLAINT: Hypotension, abdominal distention, lower extremity edema    HISTORY OF PRESENT ILLNESS:  The patient is a 70-year-old female, well-known to hospitalist service with a known history of chronic liver disease, hypertension, chronic urinary retention, macrocytic anemia, coagulopathy, folate deficiency, hypothyroidism, persistent leukocytosis, deconditioning who presented to the emergency department secondary to hypotension, increased abdominal swelling/tenderness and lower extremity edema after home health evaluation today.  The patient reports that she believes she has been compliant with medication regimen and discharged on after admission from 11/4/2020 to 11/17/2020 due to acute liver failure and acute alcohol withdrawal.  She adamantly denies any alcohol use since discharge.  She notes increasing abdominal girth and lower extremity edema with weeping and oozing of fluid that \"saturates her bed every morning\".  Despite this she believes she has dropped weight.  She is reports struggling with oral intake but is eating and drinking at home.  She reports that otherwise her health is overall doing well however notes that patient has been to ER x3 since discharge and has failed to follow-up with GI.  Labs and diagnostics done in ER overall consistent with prior. Staff note possible black/bloody stools, patient reports occasional blood noted on tissue with wiping.    On exam patient's complaints are abdominal distention, tenderness and lower extremity edema, urinary frequency (on torsemide), frequent stools (on lactulose).  Covid testing was negative.    She denies f/c/headache/rhinorrhea/nasal congestion/lightheadedness/syncopal sensation/cough/soa/n/v/d/chest pain/change in bowel habits/no weight change/bloody emesis/change in medications or any other new concerns.    Past Medical History:   Diagnosis Date   • Elevated liver enzymes    • " Fatty liver    • Frequent UTI    • History of depression    • Hypertension    • Lumbar stenosis    • OA (osteoarthritis)    • Post-menopausal    • Spondylarthritis     LOW BACK   • Stress incontinence    • Urinary retention      Past Surgical History:   Procedure Laterality Date   • ABDOMINOPLASTY     • BREAST SURGERY     • CATARACT EXTRACTION Bilateral    •  SECTION      X3   • COLONOSCOPY      2006   • COLONOSCOPY N/A 2016    Procedure: COLONOSCOPY;  Surgeon: Ho Arenas MD;  Location: [facility] ENDOSCOPY;  Service:    • LASIK     • LUMBAR DISCECTOMY FUSION INSTRUMENTATION N/A 10/1/2018    Procedure: L4-5, L5-S1  laminectomy and fusion with instrumentation;  Surgeon: Juno Kim MD;  Location: [facility] MAIN OR;  Service: Orthopedic Spine   • REDUCTION MAMMAPLASTY     • TONSILLECTOMY     • TOTAL SHOULDER ARTHROPLASTY W/ DISTAL CLAVICLE EXCISION Right 2019    Procedure: TOTAL SHOULDER REVERSE ARTHROPLASTY;  Surgeon: David Marion MD;  Location: [facility] MAIN OR;  Service: Orthopedics     Family History   Problem Relation Age of Onset   • Colon polyps Father    • Breast cancer Maternal Aunt    • Malig Hyperthermia Neg Hx      Social History     Tobacco Use   • Smoking status: Never Smoker   • Smokeless tobacco: Never Used   Substance Use Topics   • Alcohol use: Not Currently     Comment: no ETOH since    • Drug use: No     Medications Prior to Admission   Medication Sig Dispense Refill Last Dose   • folic acid (FOLVITE) 1 MG tablet Take 1 tablet by mouth Daily. 30 tablet 1 2020 at Unknown time   • lactobacillus acidophilus (RISAQUAD) capsule capsule Take 1 capsule by mouth Daily. 30 capsule 1 2020 at Unknown time   • lactulose (CHRONULAC) 10 GM/15ML solution Take 30 mL by mouth 2 (Two) Times a Day. 1892 mL 1 2020 at Unknown time   • levothyroxine (SYNTHROID, LEVOTHROID) 50 MCG tablet Take 1 tablet by mouth Daily. 30 tablet 1 2020 at Unknown time   • midodrine  "(PROAMATINE) 2.5 MG tablet Take 1 tablet by mouth 3 (Three) Times a Day Before Meals. 90 tablet 1 12/9/2020 at Unknown time   • rifAXIMin (XIFAXAN) 550 MG tablet Take 1 tablet by mouth Every 12 (Twelve) Hours for 30 days. Indications: Impaired Brain Function due to Liver Disease 60 tablet 1 12/9/2020 at Unknown time   • torsemide (DEMADEX) 20 MG tablet Take 1 tablet by mouth Daily. 30 tablet 1 12/9/2020 at Unknown time   • ESTRACE VAGINAL 0.1 MG/GM vaginal cream Insert 2 g into the vagina Daily As Needed.   Unknown at Unknown time     Allergies:  Patient has no known allergies.      There is no immunization history on file for this patient.    REVIEW OF SYSTEMS:  Please see the above history of present illness for pertinent positives and negatives.  The remainder of the patient's systems have been reviewed and are negative.     Vital Signs  Temp:  [97 °F (36.1 °C)-97.6 °F (36.4 °C)] 97.6 °F (36.4 °C)  Heart Rate:  [105-115] 110  Resp:  [20-21] 20  BP: ()/(55-97) 107/55   Body mass index is 26.76 kg/m².    Flowsheet Rows      First Filed Value   Admission Height  157.5 cm (62\") Documented at 12/09/2020 1440   Admission Weight  67.4 kg (148 lb 9.6 oz) Documented at 12/09/2020 1440             Physical Exam  Vitals signs reviewed.   Constitutional:       Comments: Chronically ill appearance, appears older than stated age   HENT:      Head: Normocephalic and atraumatic.      Mouth/Throat:      Mouth: Mucous membranes are moist.   Eyes:      General: Scleral icterus present.   Cardiovascular:      Rate and Rhythm: Normal rate and regular rhythm.   Pulmonary:      Effort: Pulmonary effort is normal.      Comments: Breath sounds decreased bases bilaterally  Abdominal:      General: There is distension.      Tenderness: There is abdominal tenderness. There is no guarding.      Comments: Fluid wave+   Musculoskeletal:      Comments: 3+ bilateral lower extremity pitting edema from toes to abdomen   Skin:     General: " Skin is warm and dry.      Capillary Refill: Capillary refill takes less than 2 seconds.      Coloration: Skin is jaundiced and pale.   Neurological:      General: No focal deficit present.      Mental Status: She is oriented to person, place, and time.   Psychiatric:         Mood and Affect: Mood normal.         Behavior: Behavior normal.       Results Review:    I reviewed the patient's new clinical results.  Lab Results (most recent)     Procedure Component Value Units Date/Time    COVID-19,[facility] IN-HOUSE,Nasal Swab No Transport Media 3-4 HR TAT - Swab, Nasal Cavity [[id number]]  (Normal) Collected: 12/09/20 1727    Specimen: Swab from Nasal Cavity Updated: 12/09/20 1832     COVID19 Not Detected    Narrative:      Fact sheet for providers: https://www.fda.gov/media/415619/download     Fact sheet for patients: https://www.fda.gov/media/350606/download    Test performed by PCR.    Procalcitonin [[id number]]  (Abnormal) Collected: 12/09/20 1535    Specimen: Blood Updated: 12/09/20 1749     Procalcitonin 0.78 ng/mL     Narrative:      Results may be falsely decreased if patient taking Biotin.     Lactic Acid, Plasma [[id number]]  (Abnormal) Collected: 12/09/20 1724    Specimen: Blood Updated: 12/09/20 1746     Lactate 4.4 mmol/L     Lactic Acid, Reflex Timer (This will reflex a repeat order 3-3:15 hours after ordered.) [[id number]] Collected: 12/09/20 1724    Specimen: Blood Updated: 12/09/20 1746    BNP [[id number]]  (Normal) Collected: 12/09/20 1535    Specimen: Blood Updated: 12/09/20 1625     proBNP 580.3 pg/mL     Narrative:      Among patients with dyspnea, NT-proBNP is highly sensitive for the detection of acute congestive heart failure. In addition NT-proBNP of <300 pg/ml effectively rules out acute congestive heart failure with 99% negative predictive value.    Results may be falsely decreased if patient taking Biotin.      Ammonia [[id number]]  (Abnormal) Collected: 12/09/20 1535    Specimen: Blood Updated:  12/09/20 1604     Ammonia 93 umol/L     Comprehensive Metabolic Panel [[id number]]  (Abnormal) Collected: 12/09/20 1535    Specimen: Blood Updated: 12/09/20 1603     Glucose 144 mg/dL      BUN 54 mg/dL      Creatinine 0.94 mg/dL      Sodium 129 mmol/L      Potassium 5.0 mmol/L      Comment: Slight hemolysis detected by analyzer. Results may be affected.        Chloride 95 mmol/L      CO2 21.6 mmol/L      Calcium 8.4 mg/dL      Total Protein 5.0 g/dL      Albumin 2.50 g/dL      ALT (SGPT) 48 U/L      AST (SGOT) 93 U/L      Alkaline Phosphatase 131 U/L      Total Bilirubin 3.6 mg/dL      eGFR Non African Amer 59 mL/min/1.73      Globulin 2.5 gm/dL      A/G Ratio 1.0 g/dL      BUN/Creatinine Ratio 57.4     Anion Gap 12.4 mmol/L     Narrative:      GFR Normal >60  Chronic Kidney Disease <60  Kidney Failure <15      Lipase [[id number]]  (Abnormal) Collected: 12/09/20 1535    Specimen: Blood Updated: 12/09/20 1602     Lipase 81 U/L     aPTT [[id number]]  (Normal) Collected: 12/09/20 1535    Specimen: Blood Updated: 12/09/20 1556     PTT 35.4 seconds     Narrative:      PTT = The equivalent PTT values for the therapeutic range of heparin levels at 0.1 to 0.7 U/ml are 53 to 110 seconds.      Protime-INR [[id number]]  (Abnormal) Collected: 12/09/20 1535    Specimen: Blood Updated: 12/09/20 1556     Protime 17.0 Seconds      INR 1.44    Narrative:      Therapeutic Ranges for INR: 2.0-3.0 (PT 20-30)                              2.5-3.5 (PT 25-34)    CBC & Differential [[id number]]  (Abnormal) Collected: 12/09/20 1535    Specimen: Blood Updated: 12/09/20 1553    Narrative:      The following orders were created for panel order CBC & Differential.  Procedure                               Abnormality         Status                     ---------                               -----------         ------                     Scan Slide[[id number]]                                       Final result               CBC Auto  Differential[[id number]]        Abnormal            Final result                 Please view results for these tests on the individual orders.    Scan Slide [[id number]] Collected: 12/09/20 1535    Specimen: Blood Updated: 12/09/20 1553     Macrocytes Mod/2+     WBC Morphology Normal     Platelet Morphology Normal    CBC Auto Differential [[id number]]  (Abnormal) Collected: 12/09/20 1535    Specimen: Blood Updated: 12/09/20 1546     WBC 23.53 10*3/mm3      RBC 2.49 10*6/mm3      Hemoglobin 9.0 g/dL      Hematocrit 27.8 %      .6 fL      MCH 36.1 pg      MCHC 32.4 g/dL      RDW 12.6 %      RDW-SD 51.4 fl      MPV 11.9 fL      Platelets 326 10*3/mm3      Neutrophil % 77.6 %      Lymphocyte % 11.5 %      Monocyte % 9.7 %      Eosinophil % 0.1 %      Basophil % 0.3 %      Immature Grans % 0.8 %      Neutrophils, Absolute 18.26 10*3/mm3      Lymphocytes, Absolute 2.70 10*3/mm3      Monocytes, Absolute 2.29 10*3/mm3      Eosinophils, Absolute 0.02 10*3/mm3      Basophils, Absolute 0.07 10*3/mm3      Immature Grans, Absolute 0.19 10*3/mm3      nRBC 0.0 /100 WBC           Imaging Results (Most Recent)     Procedure Component Value Units Date/Time    XR Abdomen KUB [[id number]] Resulted: 12/09/20 2138     Updated: 12/09/20 2138    XR Chest 2 View [[id number]] Collected: 12/09/20 1656     Updated: 12/09/20 1658    Narrative:      CR Chest 2 Vws    INDICATION:    Low blood pressure today    COMPARISON:    11/14/2020   FINDINGS:   PA and lateral views of the chest.  The heart size is normal. Mediastinal structures are midline. Redemonstrated is the elevated right hemidiaphragm. No acute infiltrates. No pleural fluid. No pneumothorax.      Impression:      No clearly acute cardiopulmonary findings. No significant interval change from 11/14/2020.    Signer Name: Ced Tran MD   Signed: 12/9/2020 4:56 PM   Workstation Name: [email or web address]    [facility]        reviewed    ECG/EMG Results (most recent)      Procedure Component Value Units Date/Time    ECG 12 Lead [[id number]] Collected: 12/09/20 1510     Updated: 12/09/20 1815     QT Interval 323 ms     Narrative:      HEART RATE= 112  bpm  RR Interval= 536  ms  [place] Interval= 128  ms  P Horizontal Axis= -16  deg  P Front Axis= 39  deg  QRSD Interval= 68  ms  QT Interval= 323  ms  QRS Axis= 26  deg  T Wave Axis= 25  deg  - OTHERWISE NORMAL ECG -  Sinus tachycardia  Rate faster vs previous  Electronically Signed By: Carlos Singh ([facility]) 09-Dec-2020 17:54:20  Date and Time of Study: 2020-12-09 15:10:41        reviewed    Assessment/Plan   Anasarca/ascites/chronic liver failure/chronic alcoholic hepatitis/coagulopathy, R/O SBP:   R/O LGIB: GI consulted  Clearly overloaded on exam, add Bumex 1 mg every 12 hours, monitor strict I and O, daily weight  Check urine electrolytes, specifically urine sodium  Add home lactulose, Xifaxan  Check hemoccult  Daily INR  Add Rocephin empirically pending paracentesis in a.m. after ultrasound abdomen to determine fluid available    Chronic urinary retention:  Bladder scan, cath as needed  Check cath UA, C&S    Chronic macrocytic anemia:  Chronic folate deficiency:  Chronically elevated ferritin:  Chronic coagulopathy:  Continue home folic acid and MVI  Daily INR, trend labs    Hypothyroidism:  Continue levothyroxine 50 mcg daily, recommend recheck TFTs when not ill    Persistent leukocytosis:  WBC C slightly higher than last check, procalcitonin pending, await fluid studies from paracentesis, continue Rocephin for now    I discussed the patients findings and my recommendations with patient and staff.     Lacey Burton, APRN  12/09/20  21:45 EST

## 2020-12-10 NOTE — PLAN OF CARE
Goal Outcome Evaluation:  Plan of Care Reviewed With: patient  Progress: no change  Outcome Summary: Admit to Med surg from ED. [place] to room and call light. All care explained. BLE wrapped w ace wraps. Pt states she sees home health and that the HH RN wrapped today. Ms Lugo said they were wet and started to removed ace wrap, encourage to stop but patient refused and unwrapped her legs. gauze under kerlex noted to be wet.

## 2020-12-10 NOTE — PROGRESS NOTES
Called by staff for dark stools and a softening BP.  Ms. Lugo has been complaining of leg burning and cramps this afternoon.  Her MAP have been appropriate.  On exam, conjunctival pallor is noted.  Hgb on a downward trend so PRBC's ordered.  Discussed w/ staff, patient, and sister at bedside.  Plan on moving to ICU to observe.  Await GI evaluation.  Fluid studies pending.  She denies continued EtOH abuse to me after discharge from our team previously.    Critical care time 15:50 to 16:05.

## 2020-12-10 NOTE — SIGNIFICANT NOTE
12/10/20 0512   Provider Notification   Reason for Communication Critical lab value   Provider Name Lacey Micheal   Notification Route Other (Comment)   Response No new orders

## 2020-12-10 NOTE — PROGRESS NOTES
"SERVICE: [facility] HOSPITALIST    CONSULTANTS: GI    CHIEF COMPLAINT: f/u LGIB, overload    SUBJECTIVE: Staff notes excellent urine output and very frequent stools throughout the night.  Stools continued to be bloody appearance.  Patient required Shipman catheter due to urinary retention and IV diuretic.  No other new concerns overnight    OBJECTIVE:    /54   Pulse 116   Temp 97.7 °F (36.5 °C) (Oral)   Resp 20   Ht 157.5 cm (62\")   Wt 66.4 kg (146 lb 4.8 oz)   LMP  (LMP Unknown)   SpO2 99%   BMI 26.76 kg/m²     MEDS/LABS REVIEWED AND ORDERED    bumetanide, 1 mg, Intravenous, Q12H  cefTRIAXone, 2 g, Intravenous, Q24H  cefTRIAXone Sodium-Dextrose, , ,   folic acid, 1 mg, Oral, Daily  lactobacillus acidophilus, 1 capsule, Oral, Daily  lactulose, 20 g, Oral, BID  levothyroxine, 50 mcg, Oral, Q AM  midodrine, 2.5 mg, Oral, TID AC  riFAXIMin, 600 mg, Oral, Q12H  sodium chloride, 10 mL, Intravenous, Q12H      Physical Exam  Vitals signs reviewed.   Constitutional:       Comments: Appears older than stated age, sleepy but awakens easily   HENT:      Head: Normocephalic and atraumatic.   Eyes:      General: Scleral icterus present.      Pupils: Pupils are equal, round, and reactive to light.   Cardiovascular:      Rate and Rhythm: Regular rhythm. Tachycardia present.   Pulmonary:      Effort: Pulmonary effort is normal.      Comments: Diminished bilateral bases  Abdominal:      General: Bowel sounds are normal. There is distension.      Palpations: There is no mass.      Tenderness: There is abdominal tenderness. There is no guarding.      Comments: Softer than previous but still distended   Musculoskeletal:      Comments: R 3+>L2+ LE pitting edema   Skin:     General: Skin is warm and dry.      Capillary Refill: Capillary refill takes less than 2 seconds.      Coloration: Skin is jaundiced and pale.      Findings: Lesion present.      Comments: Posterior right calf with red ulcerations, " weeping serous fluid   Neurological:      General: No focal deficit present.      Mental Status: She is oriented to person, place, and time.       LAB/DIAGNOSTICS:    Lab Results (last 24 hours)     Procedure Component Value Units Date/Time    CBC & Differential [[id number]]  (Abnormal) Collected: 12/10/20 0355    Specimen: Blood Updated: 12/10/20 0540    Narrative:      The following orders were created for panel order CBC & Differential.  Procedure                               Abnormality         Status                     ---------                               -----------         ------                     Scan Slide[[id number]]                                                                  CBC Auto Differential[[id number]]        Abnormal            Final result                 Please view results for these tests on the individual orders.    CBC Auto Differential [[id number]]  (Abnormal) Collected: 12/10/20 0355    Specimen: Blood Updated: 12/10/20 0540     WBC 26.31 10*3/mm3      RBC 2.20 10*6/mm3      Hemoglobin 7.8 g/dL      Hematocrit 24.9 %      .2 fL      MCH 35.5 pg      MCHC 31.3 g/dL      RDW 12.7 %      RDW-SD 53.1 fl      MPV 11.8 fL      Platelets 331 10*3/mm3      Neutrophil % 78.9 %      Lymphocyte % 9.7 %      Monocyte % 10.1 %      Eosinophil % 0.2 %      Basophil % 0.3 %      Immature Grans % 0.8 %      Neutrophils, Absolute 20.76 10*3/mm3      Lymphocytes, Absolute 2.55 10*3/mm3      Monocytes, Absolute 2.65 10*3/mm3      Eosinophils, Absolute 0.06 10*3/mm3      Basophils, Absolute 0.08 10*3/mm3      Immature Grans, Absolute 0.21 10*3/mm3      nRBC 0.0 /100 WBC     Protime-INR [[id number]]  (Abnormal) Collected: 12/10/20 0355    Specimen: Blood Updated: 12/10/20 0526     Protime 16.7 Seconds      INR 1.40    Narrative:      Therapeutic Ranges for INR: 2.0-3.0 (PT 20-30)                              2.5-3.5 (PT 25-34)    Lactic Acid, Plasma [[id number]]  (Abnormal) Collected: 12/10/20 0355     Specimen: Blood Updated: 12/10/20 0507     Lactate 4.0 mmol/L     Comprehensive Metabolic Panel [[id number]]  (Abnormal) Collected: 12/10/20 0355    Specimen: Blood Updated: 12/10/20 0448     Glucose 113 mg/dL      BUN 58 mg/dL      Creatinine 0.85 mg/dL      Sodium 134 mmol/L      Potassium 4.0 mmol/L      Chloride 98 mmol/L      CO2 21.1 mmol/L      Calcium 8.3 mg/dL      Total Protein 5.1 g/dL      Albumin 2.40 g/dL      ALT (SGPT) 49 U/L      AST (SGOT) 99 U/L      Alkaline Phosphatase 144 U/L      Total Bilirubin 2.9 mg/dL      eGFR Non African Amer 66 mL/min/1.73      Globulin 2.7 gm/dL      A/G Ratio 0.9 g/dL      BUN/Creatinine Ratio 68.2     Anion Gap 14.9 mmol/L     Narrative:      GFR Normal >60  Chronic Kidney Disease <60  Kidney Failure <15      Procalcitonin [[id number]]  (Abnormal) Collected: 12/10/20 0355    Specimen: Blood Updated: 12/10/20 0443     Procalcitonin 0.87 ng/mL     Narrative:      Results may be falsely decreased if patient taking Biotin.     Urine Electrolytes - Urine, Catheter [[id number]] Collected: 12/09/20 2257    Specimen: Urine, Catheter Updated: 12/09/20 2359     Potassium, Urine 37.6 mmol/L      Sodium, Urine <20 mmol/L      Chloride, Urine 30 mmol/L     Narrative:      Reference intervals for random urine have not been established.  Clinical usage is dependent upon physician's interpretation in combination with other laboratory tests.       Urinalysis With Culture If Indicated - Urine, Catheter In/Out [[id number]]  (Normal) Collected: 12/09/20 2257    Specimen: Urine, Catheter In/Out Updated: 12/09/20 2347     Color, UA Yellow     Appearance, UA Clear     pH, UA 5.5     Specific Gravity, UA 1.010     Glucose, UA Negative     Ketones, UA Negative     Bilirubin, UA Negative     Blood, UA Negative     Protein, UA Negative     Leuk Esterase, UA Negative     Nitrite, UA Negative     Urobilinogen, UA 0.2 E.U./dL    Narrative:      Urine microscopic not indicated.    Occult Blood X 1,  Stool - Stool, Per Rectum [[id number]]  (Abnormal) Collected: 12/09/20 2120    Specimen: Stool from Per Rectum Updated: 12/09/20 2137     Fecal Occult Blood Positive    Lactic Acid, Reflex [[id number]]  (Abnormal) Collected: 12/09/20 2114    Specimen: Blood Updated: 12/09/20 2130     Lactate 4.4 mmol/L     T4, Free [[id number]]  (Normal) Collected: 12/09/20 1535    Specimen: Blood Updated: 12/09/20 2112     Free T4 1.60 ng/dL     Narrative:      Results may be falsely increased if patient taking Biotin.      Lactic Acid, Reflex Timer (This will reflex a repeat order 3-3:15 hours after ordered.) [[id number]] Collected: 12/09/20 1724    Specimen: Blood Updated: 12/09/20 2100     Hold Tube Hold for add-ons.     Comment: Auto resulted.       TSH [[id number]]  (Abnormal) Collected: 12/09/20 1535    Specimen: Blood Updated: 12/09/20 1951     TSH 7.960 uIU/mL     Hemoglobin A1c [[id number]]  (Normal) Collected: 12/09/20 1535    Specimen: Blood Updated: 12/09/20 1947     Hemoglobin A1C 5.00 %     Narrative:      Hemoglobin A1C Ranges:    Increased Risk for Diabetes  5.7% to 6.4%  Diabetes                     >= 6.5%  Diabetic Goal                < 7.0%    Ethanol [[id number]] Collected: 12/09/20 1535    Specimen: Blood Updated: 12/09/20 1936     Ethanol <10 mg/dL      Ethanol % <0.010 %     COVID-19,[facility] IN-HOUSE,Nasal Swab No Transport Media 3-4 HR TAT - Swab, Nasal Cavity [[id number]]  (Normal) Collected: 12/09/20 1727    Specimen: Swab from Nasal Cavity Updated: 12/09/20 1832     COVID19 Not Detected    Narrative:      Fact sheet for providers: https://www.fda.gov/media/799098/download     Fact sheet for patients: https://www.fda.gov/media/370402/download    Test performed by PCR.    Procalcitonin [[id number]]  (Abnormal) Collected: 12/09/20 1535    Specimen: Blood Updated: 12/09/20 1749     Procalcitonin 0.78 ng/mL     Narrative:      Results may be falsely decreased if patient taking Biotin.     Lactic Acid, Plasma  [[id number]]  (Abnormal) Collected: 12/09/20 1724    Specimen: Blood Updated: 12/09/20 1746     Lactate 4.4 mmol/L     BNP [[id number]]  (Normal) Collected: 12/09/20 1535    Specimen: Blood Updated: 12/09/20 1625     proBNP 580.3 pg/mL     Narrative:      Among patients with dyspnea, NT-proBNP is highly sensitive for the detection of acute congestive heart failure. In addition NT-proBNP of <300 pg/ml effectively rules out acute congestive heart failure with 99% negative predictive value.    Results may be falsely decreased if patient taking Biotin.      Ammonia [[id number]]  (Abnormal) Collected: 12/09/20 1535    Specimen: Blood Updated: 12/09/20 1604     Ammonia 93 umol/L     Comprehensive Metabolic Panel [[id number]]  (Abnormal) Collected: 12/09/20 1535    Specimen: Blood Updated: 12/09/20 1603     Glucose 144 mg/dL      BUN 54 mg/dL      Creatinine 0.94 mg/dL      Sodium 129 mmol/L      Potassium 5.0 mmol/L      Comment: Slight hemolysis detected by analyzer. Results may be affected.        Chloride 95 mmol/L      CO2 21.6 mmol/L      Calcium 8.4 mg/dL      Total Protein 5.0 g/dL      Albumin 2.50 g/dL      ALT (SGPT) 48 U/L      AST (SGOT) 93 U/L      Alkaline Phosphatase 131 U/L      Total Bilirubin 3.6 mg/dL      eGFR Non African Amer 59 mL/min/1.73      Globulin 2.5 gm/dL      A/G Ratio 1.0 g/dL      BUN/Creatinine Ratio 57.4     Anion Gap 12.4 mmol/L     Narrative:      GFR Normal >60  Chronic Kidney Disease <60  Kidney Failure <15      Lipase [[id number]]  (Abnormal) Collected: 12/09/20 1535    Specimen: Blood Updated: 12/09/20 1602     Lipase 81 U/L     aPTT [[id number]]  (Normal) Collected: 12/09/20 1535    Specimen: Blood Updated: 12/09/20 1556     PTT 35.4 seconds     Narrative:      PTT = The equivalent PTT values for the therapeutic range of heparin levels at 0.1 to 0.7 U/ml are 53 to 110 seconds.      Protime-INR [[id number]]  (Abnormal) Collected: 12/09/20 1535    Specimen: Blood Updated: 12/09/20 1556      Protime 17.0 Seconds      INR 1.44    Narrative:      Therapeutic Ranges for INR: 2.0-3.0 (PT 20-30)                              2.5-3.5 (PT 25-34)    CBC & Differential [[id number]]  (Abnormal) Collected: 12/09/20 1535    Specimen: Blood Updated: 12/09/20 1553    Narrative:      The following orders were created for panel order CBC & Differential.  Procedure                               Abnormality         Status                     ---------                               -----------         ------                     Scan Slide[[id number]]                                       Final result               CBC Auto Differential[[id number]]        Abnormal            Final result                 Please view results for these tests on the individual orders.    Scan Slide [[id number]] Collected: 12/09/20 1535    Specimen: Blood Updated: 12/09/20 1553     Macrocytes Mod/2+     WBC Morphology Normal     Platelet Morphology Normal    CBC Auto Differential [[id number]]  (Abnormal) Collected: 12/09/20 1535    Specimen: Blood Updated: 12/09/20 1546     WBC 23.53 10*3/mm3      RBC 2.49 10*6/mm3      Hemoglobin 9.0 g/dL      Hematocrit 27.8 %      .6 fL      MCH 36.1 pg      MCHC 32.4 g/dL      RDW 12.6 %      RDW-SD 51.4 fl      MPV 11.9 fL      Platelets 326 10*3/mm3      Neutrophil % 77.6 %      Lymphocyte % 11.5 %      Monocyte % 9.7 %      Eosinophil % 0.1 %      Basophil % 0.3 %      Immature Grans % 0.8 %      Neutrophils, Absolute 18.26 10*3/mm3      Lymphocytes, Absolute 2.70 10*3/mm3      Monocytes, Absolute 2.29 10*3/mm3      Eosinophils, Absolute 0.02 10*3/mm3      Basophils, Absolute 0.07 10*3/mm3      Immature Grans, Absolute 0.19 10*3/mm3      nRBC 0.0 /100 WBC         ECG 12 Lead   Final Result   HEART RATE= 112  bpm   RR Interval= 536  ms   ID Interval= 128  ms   P Horizontal Axis= -16  deg   P Front Axis= 39  deg   QRSD Interval= 68  ms   QT Interval= 323  ms   QRS Axis= 26  deg   T Wave Axis= 25  deg   -  OTHERWISE NORMAL ECG -   Sinus tachycardia   Rate faster vs previous   Electronically Signed By: Carlos Singh ([facility]) 09-Dec-2020 17:54:20   Date and Time of Study: 2020-12-09 15:10:41        Results for orders placed in visit on 03/10/15   SCANNED - ECHOCARDIOGRAM     Xr Chest 2 View    Result Date: 12/9/2020  No clearly acute cardiopulmonary findings. No significant interval change from 11/14/2020. Signer Name: Ced Tran MD  Signed: 12/9/2020 4:56 PM  Workstation Name: [id number]  [facility]    Xr Abdomen Kub    Result Date: 12/9/2020  No evidence of bowel obstruction Signer Name: Solitario Jain MD  Signed: 12/9/2020 10:04 PM  Workstation Name: [id number]  [facility]    ASSESSMENT/PLAN:  R/O SBP, r/o LGIB:  Chronic liver failure/hyperbilirubinemia/ascites/anasarca/chronic coagulopathy 2/2 chronic alcoholic hepatitis/liver failure:   Lactic acidosis 2/2 liver failure: Consult ID   Persistent leukocytosis: POA, GI consulted  Continue bumex, strict I&O, daily weight, lactulose, xifaxan  Hemoccult + stool  MELD score 11 (improved since last admit), sober since d/c last admit  Trend urine sodium  Check sed rate/crp  Continue rocephin empirically for SBP pending paracentesis with fluid studies/cultures, await further ID input   US abdomen pending this am to determine ability to do paracentesis  Monitor      Chronic urinary retention:  Now has herrera catheter, continue while on IV diuretic   Monitor strict I&O     Chronic macrocytic anemia:  Chronic folate deficiency:  Chronically elevated ferritin:  Chronic coagulopathy:  Continue home folic acid and MVI  Daily INR, trend labs     Hypothyroidism:  Continue levothyroxine 50 mcg daily, TSH elevated, recommend recheck TFTs when not ill     Persistent leukocytosis:  WBCC   WBC C slightly higher than last check, procalcitonin pending, await fluid studies from paracentesis, continue Rocephin for now    Weeping sores bilateral  "LEs, R>L: POA  Consult wound RN    PLAN FOR DISPOSITION: TIKA Concepcion  Hospitalist, [facility]  12/10/20  04:45 EST    \"Dictated utilizing Dragon dictation\"    "

## 2020-12-10 NOTE — PLAN OF CARE
Goal Outcome Evaluation:  Plan of Care Reviewed With: patient  Progress: no change  Outcome Summary: VSS, pt mulitple bloody stools throughout the night, had to st. cath pvr was elevated, labs pending

## 2020-12-10 NOTE — SIGNIFICANT NOTE
12/10/20 1458   Provider Notification   Reason for Communication Critical lab value  (hgb 6.9)   Provider Name Dr. Carpenter   Notification Route In person, on unit   Response See orders

## 2020-12-10 NOTE — PLAN OF CARE
Discharge Planning Assessment   Jennifer Lunsford     Patient Name: Tayler Lugo  MRN: [id number]  Today's Date: 12/10/2020    Admit Date: 12/9/2020    Discharge Needs Assessment       Row Name 12/10/20 1119       Living Environment    Lives With  alone    Current Living Arrangements  home/apartment/condo first floor apartment with no steps to gain entry    Duration at Residence  2 months    Potentially Unsafe Housing Conditions  -- no    Primary Care Provided by  self    Provides Primary Care For  no one    Caregiving Concerns  none voiced    Family Caregiver if Needed  child(eleuterio), adult;sibling(s)    Family Caregiver Names  Jonnathan Manjarrez sister and Jacqui Zee daughter    Quality of Family Relationships  unable to assess    Able to Return to Prior Arrangements  yes    Living Arrangement Comments  Pt states she lives alone in a first floor apartment with no steps to gain entry       Resource/Environmental Concerns    Resource/Environmental Concerns  none    Transportation Concerns  -- none       Transition Planning    Patient/Family Anticipates Transition to  home    Patient/Family Anticipated Services at Transition  ;home health care    Transportation Anticipated  family or friend will provide pt states either her sister or daughter can provide ride home at discharge       Discharge Needs Assessment    Readmission Within the Last 30 Days  no previous admission in last 30 days    Current Outpatient/Agency/Support Group  homecare agency current with [facility]     Equipment Currently Used at Home  walker, rolling    Concerns to be Addressed  medication    Concerns Comments  none voiced    Anticipated Changes Related to Illness  none    Equipment Needed After Discharge  none    Outpatient/Agency/Support Group Needs  homecare agency    Discharge Facility/Level of Care Needs  home with home health    Provided Post Acute Provider List?  Refused    Refused Provider List Comment  Offered community resources but  "pt declines the need for them at this time.    Patient's Choice of Community Agency(s)  current with [facility]    Current Discharge Risk  -- none    Discharge Coordination/Progress  Pt states she plans on returning home at discharge and has family that can help if needed, no needs voiced by pt at this time.          Discharge Plan       Row Name 12/10/20 1125       Plan    Plan  Home with [facility]     Patient/Family in Agreement with Plan  yes    Plan Comments  Into room and introduced self and role of CM. Discussed discharge disposition with patient at bedside. Patient seems put out to answer questions and turns her back while answering. Patient confirms that the info on her face sheet is correct and that she see's Dr. Tayler Trujillo as PCP. She states that she uses [facility] pharmacy in [place] and has no problem picking up her mediations or paying for them. CM asked if patient had gotten her Xifaxan that was prescribed at last admission and she states \"well I don't know\". CM did call [facility] in [place] and spoke with the pharmacist and he states that she did not get it filled. He states it needs a prior auth or is would cost $3000.00. [facility] is to send over the info to obtain the prior auth so CM can get this started and MD is aware. Patient states she does have a living will but is unsure if it is on file here and CM encouraged her to bring in a copy and she verbalized understanding. Patient states she lives alone in a first floor apartment with no steps to gain entry but there are steps within the home to get to the bedrooms. Patient states she usually does not have any problem maneuvering the steps or within the home. She states that she is independent with her ADL's and drives but one of her family members will be able to provide ride home at discharge. She also states that the only equipment she uses is a rolling walker and does not anticipate needing any at discharge. Patient states she is current " with [facility] and CM notified Sheron with [facility] of patient's admission. Offered community resources but patient declines the need for them at this time. Patient states she plans on returning home at discharge with family to help as needed and home health to follow, no needs voiced by patient at this time. Patient had no other questions or concerns regarding discharge plans. Name and number placed on white board in room. CM will continue to follow for needs.          Continued Care and Services - Admitted Since 12/9/2020    Coordination has not been started for this encounter.       Selected Continued Care - Prior Encounters Includes selections from prior encounters from 9/10/2020 to 12/10/2020      Discharged on 11/17/2020 Admission date: 11/4/2020 - Discharge disposition: Home-Health Care c      Destination       Service Provider Selected Services Address Phone Fax Patient Preferred    [facility]  Skilled Nursing [address] [facility] [address] [phone] --              Durable Medical Equipment       Service Provider Selected Services Address Phone Fax Patient Preferred    [facility]  Durable Medical Equipment [address], [facility] [id number] [phone] --              Home Medical Care       Service Provider Selected Services Address Phone Fax Patient Preferred    [facility]  [facility] [address] [id number] [phone] --                            Demographic Summary       Row Name 12/10/20 1118       General Information    Admission Type  observation    Arrived From  home    Required Notices Provided  Observation Status Notice    Referral Source  admission list    Reason for Consult  discharge planning    Preferred Language  English     Used During This Interaction  no       Contact Information     Permission Granted to Share Info With            Functional Status    No documentation.       Psychosocial    No documentation.       Abuse/Neglect    No documentation.       Legal    No documentation.       Substance Abuse    No documentation.       Patient Forms    No documentation.           Lauren Christianson RN  Goal Outcome Evaluation:  Plan of Care Reviewed With: patient  Progress: no change

## 2020-12-10 NOTE — SIGNIFICANT NOTE
12/09/20 1930   Sepsis Screening Options   Which Sepsis Screen to be Used? Adult Sepsis Screen   Adult Sepsis Screening Tool   Previous adult screen positive in last 48 hrs? no   1. Criteria Present HR > 90 bpm;WBC > 99148 or < 4000   Are 2 or > of the above criteria present? yes   2. Known or Suspected Infection Present abdominal pain/distention/diarrhea   Are any known or suspected infections above present? yes   3. Signs of Possible Organ Dysfunction Present lactate > 2 mmol/l (if known)   Are any signs of possible organ dysfunction above present? yes   Positive Screen for Severe Sepsis meets criteria in all 3 sections &;provider notified immediately  (Lacey COLON)

## 2020-12-11 ENCOUNTER — ANESTHESIA EVENT (OUTPATIENT)
Dept: PERIOP | Facility: HOSPITAL | Age: 70
End: 2020-12-11

## 2020-12-11 ENCOUNTER — ANESTHESIA (OUTPATIENT)
Dept: PERIOP | Facility: HOSPITAL | Age: 70
End: 2020-12-11

## 2020-12-11 VITALS
BODY MASS INDEX: 24.38 KG/M2 | TEMPERATURE: 98.1 F | DIASTOLIC BLOOD PRESSURE: 59 MMHG | HEIGHT: 62 IN | WEIGHT: 132.5 LBS | HEART RATE: 92 BPM | SYSTOLIC BLOOD PRESSURE: 85 MMHG | RESPIRATION RATE: 18 BRPM | OXYGEN SATURATION: 96 %

## 2020-12-11 PROBLEM — I86.4 GASTRIC VARICES: Status: ACTIVE | Noted: 2020-12-11

## 2020-12-11 PROBLEM — K70.31 ALCOHOLIC CIRRHOSIS OF LIVER WITH ASCITES (HCC): Status: ACTIVE | Noted: 2020-12-11

## 2020-12-11 PROBLEM — K29.21 GASTROINTESTINAL HEMORRHAGE ASSOCIATED WITH ALCOHOLIC GASTRITIS: Status: ACTIVE | Noted: 2020-12-09

## 2020-12-11 LAB
ALBUMIN SERPL-MCNC: 2.5 G/DL (ref 3.5–5.2)
ALBUMIN/GLOB SERPL: 1 G/DL
ALP SERPL-CCNC: 136 U/L (ref 39–117)
ALT SERPL W P-5'-P-CCNC: 50 U/L (ref 1–33)
ANION GAP SERPL CALCULATED.3IONS-SCNC: 11.4 MMOL/L (ref 5–15)
AST SERPL-CCNC: 120 U/L (ref 1–32)
BASOPHILS # BLD AUTO: 0.11 10*3/MM3 (ref 0–0.2)
BASOPHILS NFR BLD AUTO: 0.5 % (ref 0–1.5)
BH BB BLOOD EXPIRATION DATE: NORMAL
BH BB BLOOD EXPIRATION DATE: NORMAL
BH BB BLOOD TYPE BARCODE: 1700
BH BB BLOOD TYPE BARCODE: 7300
BH BB DISPENSE STATUS: NORMAL
BH BB DISPENSE STATUS: NORMAL
BH BB PRODUCT CODE: NORMAL
BH BB PRODUCT CODE: NORMAL
BH BB UNIT NUMBER: NORMAL
BH BB UNIT NUMBER: NORMAL
BILIRUB SERPL-MCNC: 5.2 MG/DL (ref 0–1.2)
BUN SERPL-MCNC: 60 MG/DL (ref 8–23)
BUN/CREAT SERPL: 95.2 (ref 7–25)
CALCIUM SPEC-SCNC: 8.5 MG/DL (ref 8.6–10.5)
CHLORIDE SERPL-SCNC: 103 MMOL/L (ref 98–107)
CO2 SERPL-SCNC: 23.6 MMOL/L (ref 22–29)
CREAT SERPL-MCNC: 0.63 MG/DL (ref 0.57–1)
CROSSMATCH INTERPRETATION: NORMAL
CROSSMATCH INTERPRETATION: NORMAL
DEPRECATED RDW RBC AUTO: 69.2 FL (ref 37–54)
EOSINOPHIL # BLD AUTO: 0.3 10*3/MM3 (ref 0–0.4)
EOSINOPHIL NFR BLD AUTO: 1.3 % (ref 0.3–6.2)
ERYTHROCYTE [DISTWIDTH] IN BLOOD BY AUTOMATED COUNT: 19.2 % (ref 12.3–15.4)
GFR SERPL CREATININE-BSD FRML MDRD: 93 ML/MIN/1.73
GLOBULIN UR ELPH-MCNC: 2.5 GM/DL
GLUCOSE SERPL-MCNC: 129 MG/DL (ref 65–99)
HCT VFR BLD AUTO: 33.1 % (ref 34–46.6)
HCT VFR BLD AUTO: 34 % (ref 34–46.6)
HGB BLD-MCNC: 10.9 G/DL (ref 12–15.9)
HGB BLD-MCNC: 11 G/DL (ref 12–15.9)
IMM GRANULOCYTES # BLD AUTO: 0.15 10*3/MM3 (ref 0–0.05)
IMM GRANULOCYTES NFR BLD AUTO: 0.6 % (ref 0–0.5)
LYMPHOCYTES # BLD AUTO: 2.7 10*3/MM3 (ref 0.7–3.1)
LYMPHOCYTES NFR BLD AUTO: 11.3 % (ref 19.6–45.3)
MAGNESIUM SERPL-MCNC: 1.4 MG/DL (ref 1.6–2.4)
MCH RBC QN AUTO: 33.3 PG (ref 26.6–33)
MCHC RBC AUTO-ENTMCNC: 33.2 G/DL (ref 31.5–35.7)
MCV RBC AUTO: 100.3 FL (ref 79–97)
MONOCYTES # BLD AUTO: 2.27 10*3/MM3 (ref 0.1–0.9)
MONOCYTES NFR BLD AUTO: 9.5 % (ref 5–12)
NEUTROPHILS NFR BLD AUTO: 18.3 10*3/MM3 (ref 1.7–7)
NEUTROPHILS NFR BLD AUTO: 76.8 % (ref 42.7–76)
NRBC BLD AUTO-RTO: 0.1 /100 WBC (ref 0–0.2)
PHOSPHATE SERPL-MCNC: 3.4 MG/DL (ref 2.5–4.5)
PLATELET # BLD AUTO: 286 10*3/MM3 (ref 140–450)
PMV BLD AUTO: 11.3 FL (ref 6–12)
POTASSIUM SERPL-SCNC: 3.7 MMOL/L (ref 3.5–5.2)
PROT SERPL-MCNC: 5 G/DL (ref 6–8.5)
RBC # BLD AUTO: 3.3 10*6/MM3 (ref 3.77–5.28)
REF LAB TEST METHOD: NORMAL
SODIUM SERPL-SCNC: 138 MMOL/L (ref 136–145)
UNIT  ABO: NORMAL
UNIT  ABO: NORMAL
UNIT  RH: NORMAL
UNIT  RH: NORMAL
WBC # BLD AUTO: 23.83 10*3/MM3 (ref 3.4–10.8)

## 2020-12-11 PROCEDURE — 83735 ASSAY OF MAGNESIUM: CPT | Performed by: INTERNAL MEDICINE

## 2020-12-11 PROCEDURE — 25010000002 PROPOFOL 10 MG/ML EMULSION: Performed by: NURSE ANESTHETIST, CERTIFIED REGISTERED

## 2020-12-11 PROCEDURE — 85025 COMPLETE CBC W/AUTO DIFF WBC: CPT | Performed by: INTERNAL MEDICINE

## 2020-12-11 PROCEDURE — 85014 HEMATOCRIT: CPT | Performed by: NURSE PRACTITIONER

## 2020-12-11 PROCEDURE — 43235 EGD DIAGNOSTIC BRUSH WASH: CPT | Performed by: INTERNAL MEDICINE

## 2020-12-11 PROCEDURE — 25010000003 LIDOCAINE 1 % SOLUTION: Performed by: NURSE ANESTHETIST, CERTIFIED REGISTERED

## 2020-12-11 PROCEDURE — 85018 HEMOGLOBIN: CPT | Performed by: NURSE PRACTITIONER

## 2020-12-11 PROCEDURE — 84100 ASSAY OF PHOSPHORUS: CPT | Performed by: INTERNAL MEDICINE

## 2020-12-11 PROCEDURE — 80053 COMPREHEN METABOLIC PANEL: CPT | Performed by: NURSE PRACTITIONER

## 2020-12-11 PROCEDURE — 99239 HOSP IP/OBS DSCHRG MGMT >30: CPT | Performed by: INTERNAL MEDICINE

## 2020-12-11 PROCEDURE — 25010000002 MAGNESIUM SULFATE IN D5W 1G/100ML (PREMIX) 1-5 GM/100ML-% SOLUTION: Performed by: INTERNAL MEDICINE

## 2020-12-11 PROCEDURE — 0DJ08ZZ INSPECTION OF UPPER INTESTINAL TRACT, VIA NATURAL OR ARTIFICIAL OPENING ENDOSCOPIC: ICD-10-PCS | Performed by: INTERNAL MEDICINE

## 2020-12-11 PROCEDURE — 94799 UNLISTED PULMONARY SVC/PX: CPT

## 2020-12-11 RX ORDER — SODIUM CHLORIDE 9 MG/ML
40 INJECTION, SOLUTION INTRAVENOUS AS NEEDED
Status: CANCELLED | OUTPATIENT
Start: 2020-12-11

## 2020-12-11 RX ORDER — FOLIC ACID 1 MG/1
1 TABLET ORAL DAILY
Status: CANCELLED | OUTPATIENT
Start: 2020-12-12

## 2020-12-11 RX ORDER — MAGNESIUM SULFATE HEPTAHYDRATE 40 MG/ML
4 INJECTION, SOLUTION INTRAVENOUS AS NEEDED
Status: DISCONTINUED | OUTPATIENT
Start: 2020-12-11 | End: 2020-12-11 | Stop reason: HOSPADM

## 2020-12-11 RX ORDER — CEFTRIAXONE 2 G/50ML
2 INJECTION, SOLUTION INTRAVENOUS EVERY 24 HOURS
Status: CANCELLED | OUTPATIENT
Start: 2020-12-11 | End: 2020-12-16

## 2020-12-11 RX ORDER — SODIUM CHLORIDE, SODIUM LACTATE, POTASSIUM CHLORIDE, CALCIUM CHLORIDE 600; 310; 30; 20 MG/100ML; MG/100ML; MG/100ML; MG/100ML
9 INJECTION, SOLUTION INTRAVENOUS CONTINUOUS
Status: DISCONTINUED | OUTPATIENT
Start: 2020-12-11 | End: 2020-12-11 | Stop reason: HOSPADM

## 2020-12-11 RX ORDER — LACTULOSE 10 G/15ML
20 SOLUTION ORAL 2 TIMES DAILY
Status: CANCELLED | OUTPATIENT
Start: 2020-12-11

## 2020-12-11 RX ORDER — L.ACID,PARA/B.BIFIDUM/S.THERM 8B CELL
1 CAPSULE ORAL DAILY
Status: CANCELLED | OUTPATIENT
Start: 2020-12-12

## 2020-12-11 RX ORDER — ONDANSETRON 2 MG/ML
4 INJECTION INTRAMUSCULAR; INTRAVENOUS EVERY 6 HOURS PRN
Status: CANCELLED | OUTPATIENT
Start: 2020-12-11

## 2020-12-11 RX ORDER — SODIUM CHLORIDE 0.9 % (FLUSH) 0.9 %
10 SYRINGE (ML) INJECTION AS NEEDED
Status: CANCELLED | OUTPATIENT
Start: 2020-12-11

## 2020-12-11 RX ORDER — BUMETANIDE 0.25 MG/ML
1 INJECTION INTRAMUSCULAR; INTRAVENOUS EVERY 12 HOURS
Status: CANCELLED | OUTPATIENT
Start: 2020-12-11

## 2020-12-11 RX ORDER — ONDANSETRON 4 MG/1
4 TABLET, FILM COATED ORAL EVERY 6 HOURS PRN
Status: CANCELLED | OUTPATIENT
Start: 2020-12-11

## 2020-12-11 RX ORDER — POTASSIUM CHLORIDE 20 MEQ/1
40 TABLET, EXTENDED RELEASE ORAL AS NEEDED
Status: DISCONTINUED | OUTPATIENT
Start: 2020-12-11 | End: 2020-12-11 | Stop reason: HOSPADM

## 2020-12-11 RX ORDER — LEVOTHYROXINE SODIUM 0.05 MG/1
50 TABLET ORAL
Status: CANCELLED | OUTPATIENT
Start: 2020-12-12

## 2020-12-11 RX ORDER — CHOLECALCIFEROL (VITAMIN D3) 125 MCG
5 CAPSULE ORAL NIGHTLY PRN
Status: CANCELLED | OUTPATIENT
Start: 2020-12-11

## 2020-12-11 RX ORDER — SODIUM CHLORIDE, SODIUM LACTATE, POTASSIUM CHLORIDE, CALCIUM CHLORIDE 600; 310; 30; 20 MG/100ML; MG/100ML; MG/100ML; MG/100ML
9 INJECTION, SOLUTION INTRAVENOUS CONTINUOUS
Status: CANCELLED | OUTPATIENT
Start: 2020-12-11

## 2020-12-11 RX ORDER — POTASSIUM CHLORIDE 29.8 MG/ML
20 INJECTION INTRAVENOUS
Status: CANCELLED | OUTPATIENT
Start: 2020-12-11

## 2020-12-11 RX ORDER — POTASSIUM CHLORIDE 1.5 G/1.77G
40 POWDER, FOR SOLUTION ORAL AS NEEDED
Status: DISCONTINUED | OUTPATIENT
Start: 2020-12-11 | End: 2020-12-11 | Stop reason: HOSPADM

## 2020-12-11 RX ORDER — POTASSIUM CHLORIDE 29.8 MG/ML
20 INJECTION INTRAVENOUS
Status: DISCONTINUED | OUTPATIENT
Start: 2020-12-11 | End: 2020-12-11 | Stop reason: HOSPADM

## 2020-12-11 RX ORDER — BISACODYL 5 MG/1
5 TABLET, DELAYED RELEASE ORAL DAILY PRN
Status: CANCELLED | OUTPATIENT
Start: 2020-12-11

## 2020-12-11 RX ORDER — MAGNESIUM SULFATE HEPTAHYDRATE 40 MG/ML
4 INJECTION, SOLUTION INTRAVENOUS AS NEEDED
Status: CANCELLED | OUTPATIENT
Start: 2020-12-11

## 2020-12-11 RX ORDER — SODIUM CHLORIDE 0.9 % (FLUSH) 0.9 %
10 SYRINGE (ML) INJECTION EVERY 12 HOURS SCHEDULED
Status: CANCELLED | OUTPATIENT
Start: 2020-12-11

## 2020-12-11 RX ORDER — MAGNESIUM SULFATE 1 G/100ML
1 INJECTION INTRAVENOUS AS NEEDED
Status: DISCONTINUED | OUTPATIENT
Start: 2020-12-11 | End: 2020-12-11 | Stop reason: HOSPADM

## 2020-12-11 RX ORDER — POTASSIUM CHLORIDE 20 MEQ/1
40 TABLET, EXTENDED RELEASE ORAL AS NEEDED
Status: CANCELLED | OUTPATIENT
Start: 2020-12-11

## 2020-12-11 RX ORDER — MAGNESIUM SULFATE 1 G/100ML
1 INJECTION INTRAVENOUS AS NEEDED
Status: CANCELLED | OUTPATIENT
Start: 2020-12-11

## 2020-12-11 RX ORDER — MAGNESIUM SULFATE HEPTAHYDRATE 40 MG/ML
2 INJECTION, SOLUTION INTRAVENOUS AS NEEDED
Status: CANCELLED | OUTPATIENT
Start: 2020-12-11

## 2020-12-11 RX ORDER — POTASSIUM CHLORIDE 1.5 G/1.77G
40 POWDER, FOR SOLUTION ORAL AS NEEDED
Status: CANCELLED | OUTPATIENT
Start: 2020-12-11

## 2020-12-11 RX ORDER — LIDOCAINE HYDROCHLORIDE 10 MG/ML
INJECTION, SOLUTION INFILTRATION; PERINEURAL AS NEEDED
Status: DISCONTINUED | OUTPATIENT
Start: 2020-12-11 | End: 2020-12-11 | Stop reason: SURG

## 2020-12-11 RX ORDER — MIDODRINE HYDROCHLORIDE 5 MG/1
2.5 TABLET ORAL
Status: CANCELLED | OUTPATIENT
Start: 2020-12-11

## 2020-12-11 RX ORDER — TRAMADOL HYDROCHLORIDE 50 MG/1
50 TABLET ORAL EVERY 6 HOURS PRN
Status: CANCELLED | OUTPATIENT
Start: 2020-12-11 | End: 2020-12-16

## 2020-12-11 RX ORDER — PROPOFOL 10 MG/ML
VIAL (ML) INTRAVENOUS CONTINUOUS PRN
Status: DISCONTINUED | OUTPATIENT
Start: 2020-12-11 | End: 2020-12-11 | Stop reason: SURG

## 2020-12-11 RX ORDER — PROPOFOL 10 MG/ML
VIAL (ML) INTRAVENOUS AS NEEDED
Status: DISCONTINUED | OUTPATIENT
Start: 2020-12-11 | End: 2020-12-11 | Stop reason: SURG

## 2020-12-11 RX ORDER — MAGNESIUM SULFATE HEPTAHYDRATE 40 MG/ML
2 INJECTION, SOLUTION INTRAVENOUS AS NEEDED
Status: DISCONTINUED | OUTPATIENT
Start: 2020-12-11 | End: 2020-12-11 | Stop reason: HOSPADM

## 2020-12-11 RX ORDER — BISACODYL 10 MG
10 SUPPOSITORY, RECTAL RECTAL DAILY PRN
Status: CANCELLED | OUTPATIENT
Start: 2020-12-11

## 2020-12-11 RX ADMIN — SODIUM CHLORIDE, POTASSIUM CHLORIDE, SODIUM LACTATE AND CALCIUM CHLORIDE: 600; 310; 30; 20 INJECTION, SOLUTION INTRAVENOUS at 09:23

## 2020-12-11 RX ADMIN — MAGNESIUM SULFATE HEPTAHYDRATE 1 G: 1 INJECTION, SOLUTION INTRAVENOUS at 13:25

## 2020-12-11 RX ADMIN — MAGNESIUM SULFATE HEPTAHYDRATE 1 G: 1 INJECTION, SOLUTION INTRAVENOUS at 14:58

## 2020-12-11 RX ADMIN — LEVOTHYROXINE SODIUM 50 MCG: 50 TABLET ORAL at 06:48

## 2020-12-11 RX ADMIN — PROPOFOL 50 MG: 10 INJECTION, EMULSION INTRAVENOUS at 09:28

## 2020-12-11 RX ADMIN — PROPOFOL 50 MCG/KG/MIN: 10 INJECTION, EMULSION INTRAVENOUS at 09:28

## 2020-12-11 RX ADMIN — LIDOCAINE HYDROCHLORIDE 50 MG: 10 INJECTION, SOLUTION INFILTRATION; PERINEURAL at 09:26

## 2020-12-11 RX ADMIN — PROPOFOL 50 MG: 10 INJECTION, EMULSION INTRAVENOUS at 09:33

## 2020-12-11 RX ADMIN — RIFAXIMIN 600 MG: 200 TABLET ORAL at 11:34

## 2020-12-11 RX ADMIN — MAGNESIUM SULFATE HEPTAHYDRATE 1 G: 1 INJECTION, SOLUTION INTRAVENOUS at 12:13

## 2020-12-11 RX ADMIN — MIDODRINE HYDROCHLORIDE 2.5 MG: 5 TABLET ORAL at 06:48

## 2020-12-11 NOTE — PLAN OF CARE
Goal Outcome Evaluation:  Plan of Care Reviewed With: patient  Progress: improving  Outcome Summary: Hgb better after 2uPRBC. SBP 90-100s. Endoscopy today, consent in chart

## 2020-12-11 NOTE — BRIEF OP NOTE
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 (Active)   Daily Indications Acute Urinary Retention 12/11/20 0800   Site Assessment Clean;Skin intact 12/10/20 0812   Collection Container Standard drainage bag 12/11/20 0858   Securement Method Securing device 12/11/20 0800   Catheter care complete Yes 12/11/20 0800   Output (mL) 225 mL 12/11/20 0400       [REMOVED] Colostomy RLQ (Removed)   Stomal Appliance Dry;Intact 11/16/20 0850       [REMOVED] Urethral Catheter (Removed)   Daily Indications Hourly Output in Critical Unstable Patient requiring Frequent Intervention (hemodynamics, titration or life supportive therapy) 11/08/20 0735   Site Assessment Skin intact;Clean 11/07/20 1945   Collection Container Standard drainage bag 11/08/20 0735   Securement Method Securing device 11/08/20 0735   Catheter care complete Yes 11/08/20 0735   Irrigation Ease no resistance met 11/05/20 0800   Output (mL) 300 mL 11/08/20 1300       [REMOVED] Urethral Catheter Non-latex 18 Fr. (Removed)   Daily Indications Acute Urinary Retention 11/16/20 0850   Site Assessment Clean;Skin intact 11/16/20 0850   Collection Container Standard drainage bag  11/16/20 0850   Securement Method Securing device 11/16/20 0850   Catheter care complete Yes 11/16/20 0900   Irrigation/Instillation Indication patency maintenance 11/16/20 0850   Irrigation Ease no resistance met 11/16/20 0850   Output (mL) 100 mL 11/16/20 1230       Findings: Duodenal Bulb Erosion  Gastric Blood Clot  Gastric varix w ulcer/Clot  Reflux Esopahgitis    Complications: None          Terrell Stanton MD     Date: 12/11/2020  Time: 09:53 EST

## 2020-12-11 NOTE — OP NOTE
ESOPHAGOGASTRODUODENOSCOPY  Procedure Report    Patient Name:  Tayler Lugo  YOB: 1950    Date of Surgery:  12/11/2020     Indications:  Gastrointestinal hemorrhage associated with alcoholic gastritis [K29.21]      Pre-op Diagnosis:   Gastrointestinal hemorrhage associated with alcoholic gastritis [K29.21]    Post-Op Diagnosis Codes:     * Gastrointestinal hemorrhage associated with alcoholic gastritis [K29.21]     * Bleeding gastric varices [I86.4]     * Gastric ulcer [531]     * Duodenal erosion [K26.9]     * Reflux esophagitis [K21.00]         Procedure/CPT® Codes:      Procedure(s):  ESOPHAGOGASTRODUODENOSCOPY    Staff:  Surgeon(s):  Terrell Stanton MD         Anesthesia: Monitored Anesthesia Care    Estimated Blood Loss: none    Specimens: None    Implants:    Nothing was implanted during the procedure      Description of Procedure: After having signed informed consent she was brought to the endoscopy suite and placed in the left lateral decubitus position, given her IV sedation and bite block was placed between her incisors. The scope was introduced in the oropharynx and advanced under direct visualization into the esophagus, through the esophagus, to the GE junction. The distal esophagus was subjectively spastic but there was no evidence of esophageal varices. The scope was advanced into the stomach where there was a large retained blood clot present. The scope could be advanced over this to the antrum. Within the antrum there was a single punctate gastric ulcer that was clean based. No bleeding, no clot, no visible lesion. The scope was able to be advanced through the pylorus and duodenal bulb which had a single superficial erosion. No evidence of bleeding although the secretions were all bloody as they drained from the clot into the stomach. There did not appear to be any active bleeding in the duodenum. The scope was withdrawn back in the antrum and was brought back to the GE  junction and then retroflexed, with full distention of the stomach there was a single gastric varix identified that had an ulcerated area with a small clot adherent. There was no active bleeding from the distal varix, however, multiple attempts to put the ulcerated varix in a position that could be banded were unsuccessful. The scope was deretroflexed, the scope was decompressed, the scope was withdrawn to the distal esophagus which was consistent with reflux esophagitis but, again, no esophageal varices, no Bonnie-Adames tear. The scope was withdrawn through the remainder of the normal appearing esophagus and taken from the patient. She tolerated the procedure well.           Findings:  Duodenal Bulb Erosion  Gastric Blood Clot  Gastric varix w ulcer/Clot  Reflux Esopahgitis       Complications: None    Recommendations: Resume diet continue Octreotide , consider TIPSS      Terrell Stanton MD     Date: 12/11/2020  Time: 09:54 EST

## 2020-12-11 NOTE — ANESTHESIA PREPROCEDURE EVALUATION
Anesthesia Evaluation     Patient summary reviewed and Nursing notes reviewed   no history of anesthetic complications:  NPO Solid Status: > 8 hours  NPO Liquid Status: > 8 hours           Airway   Mallampati: II  TM distance: >3 FB  Neck ROM: full  No difficulty expected  Dental      Pulmonary - negative pulmonary ROS    breath sounds clear to auscultation  Cardiovascular   Exercise tolerance: good (4-7 METS)    ECG reviewed  Rhythm: regular  Rate: normal    (+) hypertension,     ROS comment: Narrative & Impression    HEART RATE= 112  bpm  RR Interval= 536  ms  ME Interval= 128  ms  P Horizontal Axis= -16  deg  P Front Axis= 39  deg  QRSD Interval= 68  ms  QT Interval= 323  ms  QRS Axis= 26  deg  T Wave Axis= 25  deg  - OTHERWISE NORMAL ECG -  Sinus tachycardia  Rate faster vs previous  Electronically Signed By: Carlos Singh ([facility]) 09-Dec-2020 17:54:20  Date and Time of Study: 2020-12-09 15:10:41        Neuro/Psych  (+) psychiatric history (alcohol abuse ) Depression, poor historian ( Altered mental state).,     GI/Hepatic/Renal/Endo    (+)  GI bleeding , liver disease ( Ascites due to alcoholic hepatitis) history of elevated LFT, renal disease ( Hepatorenal syndrome (CMS/HCC)) ARF, thyroid problem hypothyroidism    Musculoskeletal     (+) back pain ( Spondylarthritis LOW BACK ),       ROS comment: Right shoulder surgery 2019  Abdominal    Substance History   (+) alcohol use (alcohol abuse, quit Nov 2nd per pt, states is seeking help ),      OB/GYN negative ob/gyn ROS         Other   arthritis,      ROS/Med Hx Other: Recently lost 10#  Severe malnutrition (CMS/HCC)  Gastrointestinal hemorrhage  Gastrointestinal hemorrhage associated with alcoholic gastritis                      Anesthesia Plan    ASA 3     MAC   (Right ISB Exparel)    Anesthetic plan, all risks, benefits, and alternatives have been provided, discussed and informed consent has been obtained with: patient.  Use of blood products discussed with  patient  Consented to blood products.

## 2020-12-11 NOTE — NURSING NOTE
Continued Stay Note   Jennifer Lunsford     Patient Name: Tayler Lugo  MRN: [id number]  Today's Date: 12/11/2020    Admit Date: 12/9/2020    Discharge Plan     Row Name 12/11/20 1326       Plan    Plan  Patient is current with [facility]    Plan Comments  spoke with patient at bedside. IMM explained and signed, copy declined. Patient states she will be transferred to [facility]. Per chart review, noted dc order. CM will continue to follow.        Discharge Codes    No documentation.       Expected Discharge Date and Time     Expected Discharge Date Expected Discharge Time    Dec 11, 2020             Michael Peraza RN

## 2020-12-11 NOTE — ANESTHESIA POSTPROCEDURE EVALUATION
Patient: Tayler Lugo    Procedure Summary     Date: 12/11/20 Room / Location: [facility] ENDOSCOPY 1 /  [facility]    Anesthesia Start: 0923 Anesthesia Stop: 0943    Procedure: ESOPHAGOGASTRODUODENOSCOPY (N/A Esophagus) Diagnosis:       Gastrointestinal hemorrhage associated with alcoholic gastritis      Bleeding gastric varices      Gastric ulcer      Duodenal erosion      Reflux esophagitis      (Gastrointestinal hemorrhage associated with alcoholic gastritis [K29.21])    Surgeon: Terrell Stanton MD Provider: Dwight Browning CRNA    Anesthesia Type: MAC ASA Status: 3          Anesthesia Type: MAC    Vitals  Vitals Value Taken Time   BP 97/52 12/11/20 0950   Temp 98.2 °F (36.8 °C) 12/11/20 0947   Pulse 95 12/11/20 0950   Resp 18 12/11/20 0950   SpO2 96 % 12/11/20 0950           Post Anesthesia Care and Evaluation    Patient location during evaluation: PHASE II  Patient participation: complete - patient participated  Level of consciousness: awake  Pain management: adequate  Airway patency: patent  Anesthetic complications: No anesthetic complications  PONV Status: none  Cardiovascular status: acceptable  Respiratory status: acceptable  Hydration status: acceptable

## 2020-12-11 NOTE — DISCHARGE SUMMARY
"  Tayler Lugo  1950  [id number]      Hospitalists Discharge Summary    Date of Admission: 12/9/2020  Date of Discharge:  12/11/2020    Primary Discharge Diagnoses: Acute Blood Loss on chronic macrocytic anemia due to Bleeding Gastric Varies from Portal HTN due to Etoh Cirrhosis    Secondary Discharge Diagnoses:   Alcoholic Gastritis  Gastric Ulcer  Duodenal Erosion  Reflux esophagitis  Alcoholic cirrhosis with recurrent ascites  Chronic leukocytosis of uncertain etiology  Melena  Mild coagulopathy  Acute on Chronic Urinary Retention    PCP  Patient Care Team:  Tayler Trujillo MD as PCP - General    Consults:   Consults     Date and Time Order Name Status Description    12/10/2020 0643 Inpatient Infectious Diseases Consult Completed     12/9/2020 1939 Inpatient Gastroenterology Consult Completed     11/15/2020 0850 Inpatient Infectious Diseases Consult Completed     11/5/2020 0757 Inpatient Pulmonology Consult Completed     11/4/2020 1819 Inpatient Nephrology Consult Completed     11/4/2020 1545 Inpatient Gastroenterology Consult Completed           CC:  Hypotension, abdominal distention, lower extremity edema    History of Present Illness:  As per H&P: \"The patient is a 70-year-old female, well-known to hospitalist service with a known history of chronic liver disease, hypertension, chronic urinary retention, macrocytic anemia, coagulopathy, folate deficiency, hypothyroidism, persistent leukocytosis, deconditioning who presented to the emergency department secondary to hypotension, increased abdominal swelling/tenderness and lower extremity edema after home health evaluation today.  The patient reports that she believes she has been compliant with medication regimen and discharged on after admission from 11/4/2020 to 11/17/2020 due to acute liver failure and acute alcohol withdrawal.  She adamantly denies any alcohol use since discharge.  She notes increasing abdominal girth and lower extremity edema with " "weeping and oozing of fluid that \"saturates her bed every morning\".  Despite this she believes she has dropped weight.  She is reports struggling with oral intake but is eating and drinking at home.  She reports that otherwise her health is overall doing well however notes that patient has been to ER x3 since discharge and has failed to follow-up with GI.  Labs and diagnostics done in ER overall consistent with prior. Staff note possible black/bloody stools, patient reports occasional blood noted on tissue with wiping.     On exam patient's complaints are abdominal distention, tenderness and lower extremity edema, urinary frequency (on torsemide), frequent stools (on lactulose).  Covid testing was negative.\"    Hospital Course    For her recurrent ascites, underwent IR paracentesis by ultrasound on 12/10 removing 3.6 L of milky yellow fluid, cell count and differential was 72 white blood cells/mm^3 w/ 8% neutrophils.  Was given a dose of Rocephin in the ER empirically due to concern for SBP, when ruled out based on fluid studies, has not been stopped yet due to continuing work-up, though her elevated white cell count has been chronically elevated, since she was first admitted for her initial diagnosis of liver failure in November.  Labs from August 11 of this year indicated normal white count.    Patient presented with melanotic stools, that was Hemoccult positive.  Hemoglobin on 11/30 outpatient lab check was 12.3, on 12/9 patient's hemoglobin was 9.0 when admitted.  Hemoglobin yesterday afternoon on repeat H&H was 6.9, she was transfused 2 units of packed red blood cells and started on octreotide drip, hemoglobin recheck this morning was 10.9.    GI took her down for an EGD this a.m., and found a bleeding small gastric varices, in addition to some peptic ulcer disease.  Recommended patient be transferred to [facility] for TIPS.  Discussed with patient and daughter at the bedside who are in agreement.  "     Physical Exam at Discharge  Vital Signs  Temp:  [97.5 °F (36.4 °C)-98.2 °F (36.8 °C)] 98.2 °F (36.8 °C)  Heart Rate:  [] 93  Resp:  [16-20] 18  BP: ()/(52-88) 99/69    Physical Exam:  Physical Exam  Vitals signs and nursing note reviewed.   Constitutional:       General: She is not in acute distress.     Appearance: She is obese. She is ill-appearing. She is not toxic-appearing.      Comments: Lying in bed, not in any acute distress, but ill-appearing   HENT:      Mouth/Throat:      Mouth: Mucous membranes are moist.   Eyes:      Extraocular Movements: Extraocular movements intact.      Pupils: Pupils are equal, round, and reactive to light.   Cardiovascular:      Rate and Rhythm: Normal rate and regular rhythm.   Pulmonary:      Effort: Pulmonary effort is normal. No respiratory distress.      Breath sounds: No wheezing, rhonchi or rales.   Abdominal:      General: There is distension.      Palpations: Abdomen is soft.      Tenderness: There is no abdominal tenderness. There is no guarding or rebound.      Comments: Belly still with some distention, but soft with give   Musculoskeletal:      Right lower leg: No edema.      Left lower leg: No edema.   Skin:     General: Skin is warm and dry.      Coloration: Skin is not pale.   Neurological:      General: No focal deficit present.      Mental Status: Mental status is at baseline.      Cranial Nerves: No cranial nerve deficit.      Motor: No weakness.   Psychiatric:      Comments: For mood, constricted affect         Operations and Procedures Performed:  Procedure(s):  ESOPHAGOGASTRODUODENOSCOPY      Allergies:  has No Known Allergies.      Discharge Medications:     Discharge Medications      ASK your doctor about these medications      Instructions Start Date   ESTRACE VAGINAL 0.1 MG/GM vaginal cream  Generic drug: estradiol   2 g, Vaginal, Daily PRN      folic acid 1 MG tablet  Commonly known as: FOLVITE   1 mg, Oral, Daily      lactobacillus  acidophilus capsule capsule   1 capsule, Oral, Daily      lactulose 10 GM/15ML solution  Commonly known as: CHRONULAC   20 g, Oral, 2 Times Daily      levothyroxine 50 MCG tablet  Commonly known as: SYNTHROID, LEVOTHROID   50 mcg, Oral, Daily      midodrine 2.5 MG tablet  Commonly known as: PROAMATINE   2.5 mg, Oral, 3 Times Daily Before Meals      riFAXIMin 550 MG tablet  Commonly known as: XIFAXAN   550 mg, Oral, Every 12 Hours Scheduled      torsemide 20 MG tablet  Commonly known as: DEMADEX   20 mg, Oral, Daily             Last Lab Results:   Lab Results (last 72 hours)     Procedure Component Value Units Date/Time    Protime-INR [[id number]] Updated: 12/11/20 0801    Specimen: Blood     Comprehensive Metabolic Panel [[id number]]  (Abnormal) Collected: 12/11/20 0656    Specimen: Blood Updated: 12/11/20 0732     Glucose 129 mg/dL      BUN 60 mg/dL      Creatinine 0.63 mg/dL      Sodium 138 mmol/L      Potassium 3.7 mmol/L      Chloride 103 mmol/L      CO2 23.6 mmol/L      Calcium 8.5 mg/dL      Total Protein 5.0 g/dL      Albumin 2.50 g/dL      ALT (SGPT) 50 U/L      AST (SGOT) 120 U/L      Alkaline Phosphatase 136 U/L      Total Bilirubin 5.2 mg/dL      eGFR Non African Amer 93 mL/min/1.73      Globulin 2.5 gm/dL      A/G Ratio 1.0 g/dL      BUN/Creatinine Ratio 95.2     Anion Gap 11.4 mmol/L     Narrative:      GFR Normal >60  Chronic Kidney Disease <60  Kidney Failure <15      Magnesium [[id number]]  (Abnormal) Collected: 12/11/20 0656    Specimen: Blood Updated: 12/11/20 0721     Magnesium 1.4 mg/dL     Phosphorus [[id number]]  (Normal) Collected: 12/11/20 0656    Specimen: Blood Updated: 12/11/20 0721     Phosphorus 3.4 mg/dL     Body Fluid Culture - Body Fluid, Peritoneum [[id number]] Collected: 12/10/20 1042    Specimen: Body Fluid from Peritoneum Updated: 12/11/20 0709     Body Fluid Culture Culture in progress     Gram Stain Rare (1+) WBCs seen      No organisms seen    CBC & Differential [[id number]]   (Abnormal) Collected: 12/11/20 0656    Specimen: Blood Updated: 12/11/20 0706    Narrative:      The following orders were created for panel order CBC & Differential.  Procedure                               Abnormality         Status                     ---------                               -----------         ------                     CBC Auto Differential[[id number]]        Abnormal            Final result                 Please view results for these tests on the individual orders.    CBC Auto Differential [[id number]]  (Abnormal) Collected: 12/11/20 0656    Specimen: Blood Updated: 12/11/20 0706     WBC 23.83 10*3/mm3      RBC 3.30 10*6/mm3      Hemoglobin 11.0 g/dL      Hematocrit 33.1 %      .3 fL      MCH 33.3 pg      MCHC 33.2 g/dL      RDW 19.2 %      RDW-SD 69.2 fl      MPV 11.3 fL      Platelets 286 10*3/mm3      Neutrophil % 76.8 %      Lymphocyte % 11.3 %      Monocyte % 9.5 %      Eosinophil % 1.3 %      Basophil % 0.5 %      Immature Grans % 0.6 %      Neutrophils, Absolute 18.30 10*3/mm3      Lymphocytes, Absolute 2.70 10*3/mm3      Monocytes, Absolute 2.27 10*3/mm3      Eosinophils, Absolute 0.30 10*3/mm3      Basophils, Absolute 0.11 10*3/mm3      Immature Grans, Absolute 0.15 10*3/mm3      nRBC 0.1 /100 WBC     Bilirubin, Body Fluid - Body Fluid, Peritoneum [[id number]] Collected: 12/10/20 1042    Specimen: Body Fluid from Peritoneum Updated: 12/11/20 0616     Reference Lab Report See Attached Report    Narrative:      See attached report from [facility]       Hemoglobin & Hematocrit, Blood [[id number]]  (Abnormal) Collected: 12/11/20 0036    Specimen: Blood Updated: 12/11/20 0134     Hemoglobin 10.9 g/dL      Hematocrit 34.0 %     Amylase, Body Fluid - Body Fluid, Peritoneum [[id number]] Collected: 12/10/20 1042    Specimen: Body Fluid from Peritoneum Updated: 12/10/20 1640     Amylase, Fluid 15 U/L     Narrative:      No Reference Ranges Established.    This test was developed, it  performance characteristics determined and judged suitable for clinical purposes by [facility].  It has not been cleared or approved by the FDA.  The laboratory is regulated under CLIA as qualified to perform high-complexity testing.     Lactate Dehydrogenase, Body Fluid - Body Fluid, Peritoneum [[id number]] Collected: 12/10/20 1042    Specimen: Body Fluid from Peritoneum Updated: 12/10/20 1640     Lactate Dehydrogenase (LD), Fluid 73 U/L     Narrative:      No Reference Ranges Established.    Serous fluid LDH greater than 60 percent of the serum LDH or serous fluid LDH two-thirds of the upper limit of normal for serum LDH suggests the fluid is an exudate.     1. Pleural TP/Serum TP >0.5  2. Pleural LD/Serum LD >0.6  3. Pleural LD >2/3 of the upper limit of normal for serum LDH    This test was developed, it performance characteristics determined and judged suitable for clinical purposes by [facility].  It has not been cleared or approved by the FDA.  The laboratory is regulated under CLIA as qualified to perform high-complexity testing.     Protein, Body Fluid - Body Fluid, Peritoneum [[id number]] Collected: 12/10/20 1042    Specimen: Body Fluid from Peritoneum Updated: 12/10/20 1640     Protein, Total, Fluid <1.0 g/dL     Narrative:      No Reference Ranges Established.    A serous fluid total fluid (TP) greater than 50 percent of the serum TP suggests the fluid is an exudate.      1. Pleural TP/Serum TP >0.5  2. Pleural LD/Serum LD >0.6  3. Pleural LD >2/3 of the upper limit of normal for serum LDH    This test was developed, it performance characteristics determined and judged suitable for clinical purposes by [facility].  It has not been cleared or approved by the FDA.  The laboratory is regulated under CLIA as qualified to perform high-complexity testing.     Albumin, Fluid - Body Fluid, Peritoneum [[id number]] Collected: 12/10/20 1042     Specimen: Body Fluid from Peritoneum Updated: 12/10/20 1640     Albumin, Fluid 0.40 g/dL     Narrative:      No Reference Ranges Established.    A Serous fluid albumin gradient (serum albumin-fluid) <1.1 g/dL suggests the fluid is an exudate.  Cirrhosis usually results in an ascites fluid albumin gradient >1.1 g/dL.    This test was developed, its performance characteristics determined and judged suitable for clinical purposes by [facility].  It has not been cleared or approved by the FDA.  The laboratory is regulated under CLIA as qualified to perfom high-complexity testing.      Glucose, Body Fluid - Body Fluid, Peritoneum [[id number]] Collected: 12/10/20 1042    Specimen: Body Fluid from Peritoneum Updated: 12/10/20 1640     Glucose, Fluid 113 mg/dL     Narrative:      No Reference Ranges Established.    Serous fluid glucose less than 60 mg/dL or less than 30 mg/dL below serum glucose suggests an infectious or malignant exudate.     This test was developed, it performance characteristics determined and judged suitable for clinical purposes by [facility].  It has not been cleared or approved by the FDA.  The laboratory is regulated under CLIA as qualified to perform high-complexity testing.     Hemoglobin & Hematocrit, Blood [[id number]]  (Abnormal) Collected: 12/10/20 1439    Specimen: Blood Updated: 12/10/20 1453     Hemoglobin 6.9 g/dL      Hematocrit 21.2 %     Body Fluid Cell Count With Differential - Body Fluid, Peritoneum [[id number]]  (Abnormal) Collected: 12/10/20 1042    Specimen: Body Fluid from Peritoneum Updated: 12/10/20 1210    Narrative:      The following orders were created for panel order Body Fluid Cell Count With Differential - Body Fluid, Peritoneum.  Procedure                               Abnormality         Status                     ---------                               -----------         ------                     Body fluid cell  count - ...[[id number]]  Abnormal            Final result               Body fluid differential ...[[id number]]                      Final result                 Please view results for these tests on the individual orders.    Body fluid differential - Body Fluid, Peritoneum [[id number]] Collected: 12/10/20 1042    Specimen: Body Fluid from Peritoneum Updated: 12/10/20 1210     Neutrophils, Fluid 8 %      Lymphocytes, Fluid 88 %      Monocytes, Fluid 4 %      Eosinophils, Fluid 0 %      Basophils, Fluid 0 %      Mononuclear, Fluid 0 %      Plasma Cells, Fluid 0 %      Blasts, Fluid 0 %      Unclassified Cells, Fluid 0 %      Mesothelial Cells, Fluid 0 %      Macrophage, Fluid 0 %     C-reactive Protein [[id number]]  (Abnormal) Collected: 12/10/20 0729    Specimen: Blood Updated: 12/10/20 1140     C-Reactive Protein 3.61 mg/dL     Body fluid cell count - Body Fluid, Peritoneum [[id number]]  (Abnormal) Collected: 12/10/20 1042    Specimen: Body Fluid from Peritoneum Updated: 12/10/20 1138     Color, Fluid Yellow     Appearance, Fluid Slightly Cloudy     WBC, Fluid 72 /mm3      RBC, Fluid 0 /mm3     Anaerobic Culture - Body Fluid, Peritoneum [[id number]] Collected: 12/10/20 1042    Specimen: Body Fluid from Peritoneum Updated: 12/10/20 1116    Sedimentation Rate [[id number]]  (Abnormal) Collected: 12/10/20 0355    Specimen: Blood Updated: 12/10/20 0856     Sed Rate 52 mm/hr     CBC & Differential [[id number]]  (Abnormal) Collected: 12/10/20 0355    Specimen: Blood Updated: 12/10/20 0540    Narrative:      The following orders were created for panel order CBC & Differential.  Procedure                               Abnormality         Status                     ---------                               -----------         ------                     Scan Slide[[id number]]                                                                  CBC Auto Differential[[id number]]        Abnormal            Final result                 Please  view results for these tests on the individual orders.    CBC Auto Differential [[id number]]  (Abnormal) Collected: 12/10/20 0355    Specimen: Blood Updated: 12/10/20 0540     WBC 26.31 10*3/mm3      RBC 2.20 10*6/mm3      Hemoglobin 7.8 g/dL      Hematocrit 24.9 %      .2 fL      MCH 35.5 pg      MCHC 31.3 g/dL      RDW 12.7 %      RDW-SD 53.1 fl      MPV 11.8 fL      Platelets 331 10*3/mm3      Neutrophil % 78.9 %      Lymphocyte % 9.7 %      Monocyte % 10.1 %      Eosinophil % 0.2 %      Basophil % 0.3 %      Immature Grans % 0.8 %      Neutrophils, Absolute 20.76 10*3/mm3      Lymphocytes, Absolute 2.55 10*3/mm3      Monocytes, Absolute 2.65 10*3/mm3      Eosinophils, Absolute 0.06 10*3/mm3      Basophils, Absolute 0.08 10*3/mm3      Immature Grans, Absolute 0.21 10*3/mm3      nRBC 0.0 /100 WBC     Protime-INR [[id number]]  (Abnormal) Collected: 12/10/20 0355    Specimen: Blood Updated: 12/10/20 0526     Protime 16.7 Seconds      INR 1.40    Narrative:      Therapeutic Ranges for INR: 2.0-3.0 (PT 20-30)                              2.5-3.5 (PT 25-34)    Lactic Acid, Plasma [[id number]]  (Abnormal) Collected: 12/10/20 0355    Specimen: Blood Updated: 12/10/20 0507     Lactate 4.0 mmol/L     Comprehensive Metabolic Panel [[id number]]  (Abnormal) Collected: 12/10/20 0355    Specimen: Blood Updated: 12/10/20 0448     Glucose 113 mg/dL      BUN 58 mg/dL      Creatinine 0.85 mg/dL      Sodium 134 mmol/L      Potassium 4.0 mmol/L      Chloride 98 mmol/L      CO2 21.1 mmol/L      Calcium 8.3 mg/dL      Total Protein 5.1 g/dL      Albumin 2.40 g/dL      ALT (SGPT) 49 U/L      AST (SGOT) 99 U/L      Alkaline Phosphatase 144 U/L      Total Bilirubin 2.9 mg/dL      eGFR Non African Amer 66 mL/min/1.73      Globulin 2.7 gm/dL      A/G Ratio 0.9 g/dL      BUN/Creatinine Ratio 68.2     Anion Gap 14.9 mmol/L     Narrative:      GFR Normal >60  Chronic Kidney Disease <60  Kidney Failure <15      Procalcitonin [[id number]]   (Abnormal) Collected: 12/10/20 0355    Specimen: Blood Updated: 12/10/20 0443     Procalcitonin 0.87 ng/mL     Narrative:      Results may be falsely decreased if patient taking Biotin.     Urine Electrolytes - Urine, Catheter [[id number]] Collected: 12/09/20 2257    Specimen: Urine, Catheter Updated: 12/09/20 2359     Potassium, Urine 37.6 mmol/L      Sodium, Urine <20 mmol/L      Chloride, Urine 30 mmol/L     Narrative:      Reference intervals for random urine have not been established.  Clinical usage is dependent upon physician's interpretation in combination with other laboratory tests.       Urinalysis With Culture If Indicated - Urine, Catheter In/Out [[id number]]  (Normal) Collected: 12/09/20 2257    Specimen: Urine, Catheter In/Out Updated: 12/09/20 2347     Color, UA Yellow     Appearance, UA Clear     pH, UA 5.5     Specific Gravity, UA 1.010     Glucose, UA Negative     Ketones, UA Negative     Bilirubin, UA Negative     Blood, UA Negative     Protein, UA Negative     Leuk Esterase, UA Negative     Nitrite, UA Negative     Urobilinogen, UA 0.2 E.U./dL    Narrative:      Urine microscopic not indicated.    Occult Blood X 1, Stool - Stool, Per Rectum [[id number]]  (Abnormal) Collected: 12/09/20 2120    Specimen: Stool from Per Rectum Updated: 12/09/20 2137     Fecal Occult Blood Positive    Lactic Acid, Reflex [[id number]]  (Abnormal) Collected: 12/09/20 2114    Specimen: Blood Updated: 12/09/20 2130     Lactate 4.4 mmol/L     T4, Free [[id number]]  (Normal) Collected: 12/09/20 1535    Specimen: Blood Updated: 12/09/20 2112     Free T4 1.60 ng/dL     Narrative:      Results may be falsely increased if patient taking Biotin.      Lactic Acid, Reflex Timer (This will reflex a repeat order 3-3:15 hours after ordered.) [[id number]] Collected: 12/09/20 1724    Specimen: Blood Updated: 12/09/20 2100     Hold Tube Hold for add-ons.     Comment: Auto resulted.       TSH [[id number]]  (Abnormal) Collected: 12/09/20 1535     Specimen: Blood Updated: 12/09/20 1951     TSH 7.960 uIU/mL     Hemoglobin A1c [[id number]]  (Normal) Collected: 12/09/20 1535    Specimen: Blood Updated: 12/09/20 1947     Hemoglobin A1C 5.00 %     Narrative:      Hemoglobin A1C Ranges:    Increased Risk for Diabetes  5.7% to 6.4%  Diabetes                     >= 6.5%  Diabetic Goal                < 7.0%    Ethanol [[id number]] Collected: 12/09/20 1535    Specimen: Blood Updated: 12/09/20 1936     Ethanol <10 mg/dL      Ethanol % <0.010 %     COVID-19,[facility] IN-HOUSE,Nasal Swab No Transport Media 3-4 HR TAT - Swab, Nasal Cavity [[id number]]  (Normal) Collected: 12/09/20 1727    Specimen: Swab from Nasal Cavity Updated: 12/09/20 1832     COVID19 Not Detected    Narrative:      Fact sheet for providers: https://www.fda.gov/media/250861/download     Fact sheet for patients: https://www.fda.gov/media/342985/download    Test performed by PCR.    Procalcitonin [[id number]]  (Abnormal) Collected: 12/09/20 1535    Specimen: Blood Updated: 12/09/20 1749     Procalcitonin 0.78 ng/mL     Narrative:      Results may be falsely decreased if patient taking Biotin.     Lactic Acid, Plasma [[id number]]  (Abnormal) Collected: 12/09/20 1724    Specimen: Blood Updated: 12/09/20 1746     Lactate 4.4 mmol/L     BNP [[id number]]  (Normal) Collected: 12/09/20 1535    Specimen: Blood Updated: 12/09/20 1625     proBNP 580.3 pg/mL     Narrative:      Among patients with dyspnea, NT-proBNP is highly sensitive for the detection of acute congestive heart failure. In addition NT-proBNP of <300 pg/ml effectively rules out acute congestive heart failure with 99% negative predictive value.    Results may be falsely decreased if patient taking Biotin.      Ammonia [[id number]]  (Abnormal) Collected: 12/09/20 1535    Specimen: Blood Updated: 12/09/20 1604     Ammonia 93 umol/L     Comprehensive Metabolic Panel [[id number]]  (Abnormal) Collected: 12/09/20 1535    Specimen: Blood Updated: 12/09/20 1603      Glucose 144 mg/dL      BUN 54 mg/dL      Creatinine 0.94 mg/dL      Sodium 129 mmol/L      Potassium 5.0 mmol/L      Comment: Slight hemolysis detected by analyzer. Results may be affected.        Chloride 95 mmol/L      CO2 21.6 mmol/L      Calcium 8.4 mg/dL      Total Protein 5.0 g/dL      Albumin 2.50 g/dL      ALT (SGPT) 48 U/L      AST (SGOT) 93 U/L      Alkaline Phosphatase 131 U/L      Total Bilirubin 3.6 mg/dL      eGFR Non African Amer 59 mL/min/1.73      Globulin 2.5 gm/dL      A/G Ratio 1.0 g/dL      BUN/Creatinine Ratio 57.4     Anion Gap 12.4 mmol/L     Narrative:      GFR Normal >60  Chronic Kidney Disease <60  Kidney Failure <15      Lipase [[id number]]  (Abnormal) Collected: 12/09/20 1535    Specimen: Blood Updated: 12/09/20 1602     Lipase 81 U/L     aPTT [[id number]]  (Normal) Collected: 12/09/20 1535    Specimen: Blood Updated: 12/09/20 1556     PTT 35.4 seconds     Narrative:      PTT = The equivalent PTT values for the therapeutic range of heparin levels at 0.1 to 0.7 U/ml are 53 to 110 seconds.      Protime-INR [[id number]]  (Abnormal) Collected: 12/09/20 1535    Specimen: Blood Updated: 12/09/20 1556     Protime 17.0 Seconds      INR 1.44    Narrative:      Therapeutic Ranges for INR: 2.0-3.0 (PT 20-30)                              2.5-3.5 (PT 25-34)    CBC & Differential [[id number]]  (Abnormal) Collected: 12/09/20 1535    Specimen: Blood Updated: 12/09/20 1553    Narrative:      The following orders were created for panel order CBC & Differential.  Procedure                               Abnormality         Status                     ---------                               -----------         ------                     Scan Slide[[id number]]                                       Final result               CBC Auto Differential[[id number]]        Abnormal            Final result                 Please view results for these tests on the individual orders.    Scan Slide [[id number]] Collected:  12/09/20 1535    Specimen: Blood Updated: 12/09/20 1553     Macrocytes Mod/2+     WBC Morphology Normal     Platelet Morphology Normal    CBC Auto Differential [[id number]]  (Abnormal) Collected: 12/09/20 1535    Specimen: Blood Updated: 12/09/20 1546     WBC 23.53 10*3/mm3      RBC 2.49 10*6/mm3      Hemoglobin 9.0 g/dL      Hematocrit 27.8 %      .6 fL      MCH 36.1 pg      MCHC 32.4 g/dL      RDW 12.6 %      RDW-SD 51.4 fl      MPV 11.9 fL      Platelets 326 10*3/mm3      Neutrophil % 77.6 %      Lymphocyte % 11.5 %      Monocyte % 9.7 %      Eosinophil % 0.1 %      Basophil % 0.3 %      Immature Grans % 0.8 %      Neutrophils, Absolute 18.26 10*3/mm3      Lymphocytes, Absolute 2.70 10*3/mm3      Monocytes, Absolute 2.29 10*3/mm3      Eosinophils, Absolute 0.02 10*3/mm3      Basophils, Absolute 0.07 10*3/mm3      Immature Grans, Absolute 0.19 10*3/mm3      nRBC 0.0 /100 WBC         Ct Abdomen Pelvis Without Contrast    Result Date: 11/15/2020  Narrative: CT ABDOMEN AND PELVIS, NONCONTRAST, 11/15/2020 HISTORY: 70-year-old female hospital inpatient admitted to the hospital with sepsis, marked leukocytosis. Her problem list has included acute alcoholic hepatitis, metabolic encephalopathy, acute renal failure, alcohol withdrawal; much of which has resolved or is improving. Marked leukocytosis persists. Today, she is complaining of abdomen pain, bloating and constipation. TECHNIQUE: CT imaging of the abdomen and pelvis without oral or IV contrast. Radiation dose reduction techniques included automated exposure control. Radiation audit for CT and nuclear cardiology exams in the last 12 months: 0. COMPARISON: *  CT abdomen/pelvis, 11/4/2020. ABDOMEN FINDINGS: The images again demonstrate marked hepatomegaly and diffuse heterogeneous hepatic steatosis, although the degree of fat infiltration has improved since the recent previous study. Moderately large volume ascites, unchanged. Soft tissue edema throughout the  abdominal mesentery and body wall. There is no splenomegaly. No gallbladder distention or bile duct dilatation. Pancreas is unremarkable. Kidneys are negative with no nephrolithiasis or evidence of urinary obstruction. Normal caliber abdominal aorta. Small bowel and colon are normal in caliber no evidence of obstruction or significant adynamic ileus. The stomach is nondistended, there is no distal esophageal dilatation. PELVIS FINDINGS: The urinary bladder is distended and shows diffuse wall thickening which could indicate cystitis. Uterus, adnexal regions and rectum are within normal limits. No inguinal hernia. Severe chronic grade 3 anterolisthesis at L5-S1 status post posterior fusion at L4-5 and L5-S1 with posterior instrumentation. Lung base images show no active disease. Elevated right hemidiaphragm with compressive right lung base atelectasis. No pleural effusion.     Impression: 1.  Marked hepatomegaly and diffuse, heterogeneous hepatic steatosis. Fatty infiltration of the liver has improved since 11/4/2020. 2.  Moderately large volume ascites, unchanged. No splenomegaly. Mesenteric and body wall edema. 3.  No bowel dilatation to suggest obstruction or other additional etiology for abdominal distention. 4.  Distended urinary bladder with diffuse wall thickening. Correlate for cystitis. Signer Name: Ricky Smith MD  Signed: 11/15/2020 9:27 AM  Workstation Name: [facility]-  [facility]    Xr Chest 2 View    Result Date: 12/9/2020  Narrative: CR Chest 2 Vws INDICATION:  Low blood pressure today COMPARISON:  11/14/2020 FINDINGS: PA and lateral views of the chest.  The heart size is normal. Mediastinal structures are midline. Redemonstrated is the elevated right hemidiaphragm. No acute infiltrates. No pleural fluid. No pneumothorax.     Impression: No clearly acute cardiopulmonary findings. No significant interval change from 11/14/2020. Signer Name: Ced Tran MD  Signed:  12/9/2020 4:56 PM  Workstation Name: [id number]  [facility]    Us Abdomen Limited    Result Date: 12/10/2020  Narrative: ULTRASOUND ABDOMEN, LIMITED, 12/10/2020  HISTORY: 70-year-old female with abdominal distention and possible ascites.  TECHNIQUE: Grayscale ultrasound imaging of the abdomen was performed to assess for ascites volume.  FINDINGS:  Four-quadrant imaging of the abdomen was performed and demonstrates a mild volume of ascites preferentially located in the right hemiabdomen. This is new compared to the prior ultrasound.      Impression:  1. Mild volume of ascites.  This report was finalized on 12/10/2020 8:17 AM by Dr. Waqar Jerez MD.      Us Paracentesis    Result Date: 12/10/2020  Narrative: ULTRASOUND-GUIDED PARACENTESIS, 12/10/2020  HISTORY: 70-year-old female with ascites. Ultrasound-guided paracentesis requested.  CONSENT: The procedure, risks and alternatives were discussed in detail with the patient, questions were entertained, and written informed consent was obtained. Timeout was observed in the procedure room for patient identification and procedure site verification, and the procedure site was marked by me. Permanent images were recorded.  PROCEDURE: Using sterile technique, 1% lidocaine local anesthetic and real-time ultrasound guidance, a 5 Telugu paracentesis catheter was placed into the largest identified portion of ascites fluid in the right lower quadrant, and 3.6 L of milky yellow ascites fluid was removed without apparent complication.      Impression: Technically successful ultrasound-guided paracentesis with removal of 3.6 L ascites fluid.  This report was finalized on 12/10/2020 10:54 AM by Dr. Waqar Jerez MD.      Xr Abdomen Kub    Result Date: 12/9/2020  Narrative: CR Abdomen 1 Vw INDICATION: Abdominal pain and distention today COMPARISON: CT abdomen pelvis 11/15/2020 FINDINGS: AP radiographs of the abdomen. There are postoperative changes in the lower  lumbar spine. There is some gas in small and large intestine but there is no evidence of obstruction.     Impression: No evidence of bowel obstruction Signer Name: Solitario Jain MD  Signed: 12/9/2020 10:04 PM  Workstation Name: [email or web address]  [facility]    Xr Chest Pa & Lateral    Result Date: 11/14/2020  Narrative: CHEST X-RAY, 11/14/2020  HISTORY:  70-year-old female hospital inpatient with sepsis. Severe. Evaluate for pneumonia.  TECHNIQUE: Upright two view chest x-ray. COMPARISON: *  Chest x-ray, 11/10/2020. FINDINGS: Chronically low lung volumes with platelike scarring or linear atelectasis in the posterior lung bases, unchanged. No convincing acute pulmonary infiltrate. No visible pleural effusion. Heart size and pulmonary vascularity are normal. PICC in good position.     Impression: No definite active disease. No change since 11/10/2020. Signer Name: Ricky Smith MD  Signed: 11/14/2020 12:20 PM  [facility]: [email or web address]  [facility]      Condition on Discharge: Fair, currently ICU level at this facility as there is no stepdown, being transferred to stepdown at [facility] Disposition  [facility] stepdown unit      Discharge Diet:      Dietary Orders (From admission, onward)     Start     Ordered    12/11/20 1214  Diet Regular; Low Sodium; 2,000 mg Na  Diet Effective Now     Question Answer Comment   Diet Texture / Consistency Regular    Common Modifiers Low Sodium    Low Sodium Restriction: 2,000 mg Na        12/11/20 1213                Activity at Discharge:  activity as tolerated      Pre-discharge education      Follow-up Appointments  Future Appointments   Date Time Provider Department Center   12/23/2020  2:00 PM Louise Palomino, APRN [facility] None         Test Results Pending at Discharge  Pending Labs     Order Current Status    Anaerobic Culture - Body Fluid, Peritoneum In process    Body Fluid Culture - Body Fluid, Peritoneum  Preliminary result           Jamir Malik MD  12/11/20  13:00 EST    Time: Discharge > 30 min (if over 30 minutes give explanation as to why it took greater than 30 minutes)

## 2020-12-11 NOTE — NURSING NOTE
CWON consult received. I had recently seen Lorraine in our ED last week and made recommendations for her BLE wounds and edema. She was to continue this at home with HH helping to manage wound care and compression wraps. I was asked to see her again now that she is admitted. Today her legs are 100% better than when I saw her in the ED. There is mild swelling around the ankles and skin wrinkling noted. Skin is no longer weepy and there are small, scattered, reddened areas to the tops of her feel and the back of her right leg. I feel this is most likely a fungal skin infection related to the skin being exposed to moisture when her legs were weepy and kept wrapped. She also complains of itching. Will recommend miconazole cream. With the significant improvement in edema and no open, weeping wounds, will not continue compression at this time. Patient is thrilled and agreeable to plan. Thank you for consult and please call with any questions.

## 2020-12-11 NOTE — CONSULTS
"Adult Nutrition  Assessment/PES    Patient Name:  Tayler Lugo  YOB: 1950  MRN: [id number]  Admit Date:  12/9/2020    Assessment Date:  12/11/2020    Comments:  Pt meets criteria for Severe Malnutrition due to very poor intake since last admission in Nov '20, signs of muscle/fat wasting (see below).  Recommend:  Boost 2-3 times daily .  Pt reports drinking Pedialyte at home and advised pt very high in sodium and may make fluid retention much worse.    Reason for Assessment     Row Name 12/11/20 150          Reason for Assessment    Reason For Assessment  nurse/nurse practitioner consult     Diagnosis  -- gastrointestinal hemorrhage associated with alcoholic gastritis, history of severe malnutrition, alcoholic cirrhosis with ascites, CKD, urinary retention     Identified At Risk by Screening Criteria  MST SCORE 2+         Nutrition/Diet History     Row Name 12/11/20 1528          Nutrition/Diet History    Typical Food/Fluid Intake  eating half as much as usual or less since last admission in Nov '20, feels bloated, drinks Pedialyte at home for electrolytes, drinks Boost supplements at times         Anthropometrics     Row Name 12/11/20 1530          Anthropometrics    Height  -- 157.5 cm (62\")     Current Weight Method  -- 132.5# 12.11.20        Admit Weight    Admit Weight  -- 148.6#        Usual Body Weight (UBW)    Weight Loss  -- status post paracentesis with removal of 3.6 liters of ascites/fluid/also receiving diuretics         Labs/Tests/Procedures/Meds     Row Name 12/11/20 1532          Labs/Procedures/Meds    Lab Results Reviewed  reviewed     Lab Results Comments  BUN 58, Cr 0.85, kpkf4w=7.0        Medications    Pertinent Medications Reviewed  reviewed     Pertinent Medications Comments  Bumex, folate, lactulose, risaquad           Estimated/Assessed Needs     Row Name 12/11/20 1533 12/11/20 1530       Calculation Measurements    Height  --  -- 157.5 cm (62\")       Estimated/Assessed " Needs    Additional Documentation  Calorie Requirements (Group);Protein Requirements (Group);Purling-St. Jeor Equation (Group);Fluid Requirements (Group)  --       Calorie Requirements    Estimated Calorie Need Method  Purling-St Jeor 1.4 activity factor  --    Estimated Calorie Requirement Comment  1,492 kcals  --       Protein Requirements    Est Protein Requirement Amount (gms/kg)  0.8 gm protein -1.0= 50-60 grams protein (monitor renal function)  --       Fluid Requirements    Estimated Fluid Requirement Method  RDA Method 1,492 (may need to adjust due to ascites)  --        Nutrition Prescription Ordered     Row Name 12/11/20 1537          Nutrition Prescription PO    Common Modifiers  Low Sodium         Evaluation of Received Nutrient/Fluid Intake     Row Name 12/11/20 1537          Fluid Intake Evaluation    Oral Fluid (mL)  -- insufficient data        PO Evaluation    Number of Days PO Intake Evaluated  Insufficient Data     Number of Meals  1     % PO Intake  0%           Malnutrition Severity Assessment     Row Name 12/11/20 1539          Malnutrition Severity Assessment    Malnutrition Type  Chronic Disease - Related Malnutrition        Insufficient Energy Intake     Insufficient Energy Intake   <50% of est. energy requirement for >or equal to 1 month        Muscle Loss    [place] Region  Moderate - slight depression     Acromion Bone Region  Severe - squared shoulders, bones, and acromion process protrusion prominent     Dorsal Hand Region  Moderate - slight depression     Patellar Region  Moderate - patella more prominent, less muscle definition around patella     Posterior Calf Region  Severe - thin with very little definition/firmness        Fat Loss    Subcutaneous Fat Loss Findings  Severe     Upper Arm Region  Severe - mostly skin, very little space between folds, fingers touch        Criteria Met (Must meet criteria for severity in at least 2 of these categories: M Wasting, Fat Loss, Fluid,  Secondary Signs, Wt. Status, Intake)    Patient meets criteria for   Severe Malnutrition           Problem/Interventions:  Problem 1     Row Name 12/11/20 1543          Nutrition Diagnoses Problem 1    Problem 1  Predicted Suboptimal Intake     Etiology (related to)  Factors Affecting Nutrition     Signs/Symptoms (evidenced by)  Report/Observation         Problem 2     Row Name 12/11/20 1543          Nutrition Diagnoses Problem 2    Problem 2  Malnutrition     Etiology (related to) Factors affecting Nutrition     Signs/Symptoms (evidenced by)  Potential Information Deficit             Intervention Goal     Row Name 12/11/20 1543          Intervention Goal    General  Provide information regarding MNT for treatment/condition     PO  Establish PO;PO intake (%)     PO Intake %  50 % or greater of meals         Nutrition Intervention     Row Name 12/11/20 1544          Nutrition Intervention    RD/Tech Action  Interview for preference;Encourage intake;Follow Tx progress           Education/Evaluation     Row Name 12/11/20 1548          Education    Education  Provided education regarding     Provided education regarding  -- discussed limiting salt/sodium, may want to avoid Pedialyte as very high in sodium, adivised may do better with 5-6 small meals/continue Boost at home        Monitor/Evaluation    Monitor  -- pt demonstrated good understanding by ability to state that pedialyte is source of sodium, recommendation for smaller/frequent meals     Education Follow-up  -- provided with handouts:  Simple Rules to Reduce Salt, Seasoning Your Food, RD contact information           Electronically signed by:  Magdalene Reyes RD  12/11/20 15:51 EST

## 2020-12-12 LAB
BACTERIA FLD CULT: ABNORMAL
GRAM STN SPEC: ABNORMAL
GRAM STN SPEC: ABNORMAL

## 2020-12-12 NOTE — NURSING NOTE
Case Management Discharge Note      Final Note: Transfer to [facility]    Provided Post Acute Provider List?: Refused  Refused Provider List Comment: Offered community resources but pt declines the need for them at this time.    Selected Continued Care - Discharged on 12/11/2020 Admission date: 12/9/2020 - Discharge disposition: Short Term Hospital (DC - External)    Destination    No services have been selected for the patient.              Durable Medical Equipment    No services have been selected for the patient.              Dialysis/Infusion    No services have been selected for the patient.              Home Medical Care Coordination complete    Service Provider Selected Services Address Phone Fax Patient Preferred    [facility] [address] [facility] [address] [address] [phone] --          Therapy    No services have been selected for the patient.              Community Resources    No services have been selected for the patient.                Selected Continued Care - Prior Encounters Includes selections from prior encounters from 9/10/2020 to 12/11/2020    Discharged on 11/17/2020 Admission date: 11/4/2020 - Discharge disposition: Home-Health Care Svc    Destination     Service Provider Selected Services Address Phone Fax Patient Preferred    [facility]  Skilled Nursing [address] [facility] [address] [phone] --          Durable Medical Equipment     Service Provider Selected Services Address Phone Fax Patient Preferred    [facility]  Durable Medical Equipment [address], [facility] [address] [phone] --          Home Medical Care     Service Provider Selected Services Address Phone Fax Patient Preferred    [facility] [address] [address]  [phone] --                         Final Discharge Disposition Code: 02 - short [facility]

## 2020-12-15 LAB — BACTERIA SPEC ANAEROBE CULT: NORMAL

## 2020-12-30 ENCOUNTER — OFFICE VISIT (OUTPATIENT)
Dept: GASTROENTEROLOGY | Facility: CLINIC | Age: 70
End: 2020-12-30

## 2020-12-30 ENCOUNTER — LAB (OUTPATIENT)
Dept: LAB | Facility: HOSPITAL | Age: 70
End: 2020-12-30

## 2020-12-30 VITALS — HEIGHT: 62 IN | TEMPERATURE: 98.2 F | WEIGHT: 150 LBS | BODY MASS INDEX: 27.6 KG/M2

## 2020-12-30 DIAGNOSIS — K70.30 ALCOHOLIC CIRRHOSIS, UNSPECIFIED WHETHER ASCITES PRESENT (HCC): ICD-10-CM

## 2020-12-30 DIAGNOSIS — K29.21 GASTROINTESTINAL HEMORRHAGE ASSOCIATED WITH ALCOHOLIC GASTRITIS: ICD-10-CM

## 2020-12-30 DIAGNOSIS — I86.4 GASTRIC VARICES: ICD-10-CM

## 2020-12-30 DIAGNOSIS — D50.0 IRON DEFICIENCY ANEMIA DUE TO CHRONIC BLOOD LOSS: ICD-10-CM

## 2020-12-30 DIAGNOSIS — K70.30 ALCOHOLIC CIRRHOSIS, UNSPECIFIED WHETHER ASCITES PRESENT (HCC): Primary | ICD-10-CM

## 2020-12-30 LAB
BASOPHILS # BLD AUTO: 0.08 10*3/MM3 (ref 0–0.2)
BASOPHILS NFR BLD AUTO: 0.7 % (ref 0–1.5)
DEPRECATED RDW RBC AUTO: 50.3 FL (ref 37–54)
EOSINOPHIL # BLD AUTO: 0.16 10*3/MM3 (ref 0–0.4)
EOSINOPHIL NFR BLD AUTO: 1.4 % (ref 0.3–6.2)
ERYTHROCYTE [DISTWIDTH] IN BLOOD BY AUTOMATED COUNT: 14.4 % (ref 12.3–15.4)
HCT VFR BLD AUTO: 34.2 % (ref 34–46.6)
HGB BLD-MCNC: 11.5 G/DL (ref 12–15.9)
IMM GRANULOCYTES # BLD AUTO: 0.05 10*3/MM3 (ref 0–0.05)
IMM GRANULOCYTES NFR BLD AUTO: 0.4 % (ref 0–0.5)
LYMPHOCYTES # BLD AUTO: 1.95 10*3/MM3 (ref 0.7–3.1)
LYMPHOCYTES NFR BLD AUTO: 17.2 % (ref 19.6–45.3)
MCH RBC QN AUTO: 32.9 PG (ref 26.6–33)
MCHC RBC AUTO-ENTMCNC: 33.6 G/DL (ref 31.5–35.7)
MCV RBC AUTO: 97.7 FL (ref 79–97)
MONOCYTES # BLD AUTO: 1.49 10*3/MM3 (ref 0.1–0.9)
MONOCYTES NFR BLD AUTO: 13.1 % (ref 5–12)
NEUTROPHILS NFR BLD AUTO: 67.2 % (ref 42.7–76)
NEUTROPHILS NFR BLD AUTO: 7.64 10*3/MM3 (ref 1.7–7)
NRBC BLD AUTO-RTO: 0 /100 WBC (ref 0–0.2)
PLATELET # BLD AUTO: 199 10*3/MM3 (ref 140–450)
PMV BLD AUTO: 11.1 FL (ref 6–12)
RBC # BLD AUTO: 3.5 10*6/MM3 (ref 3.77–5.28)
WBC # BLD AUTO: 11.37 10*3/MM3 (ref 3.4–10.8)

## 2020-12-30 PROCEDURE — 99214 OFFICE O/P EST MOD 30 MIN: CPT | Performed by: NURSE PRACTITIONER

## 2020-12-30 PROCEDURE — 85025 COMPLETE CBC W/AUTO DIFF WBC: CPT

## 2020-12-30 PROCEDURE — 36415 COLL VENOUS BLD VENIPUNCTURE: CPT

## 2020-12-30 RX ORDER — ALBUMIN (HUMAN) 12.5 G/50ML
25 SOLUTION INTRAVENOUS ONCE
Status: CANCELLED | OUTPATIENT
Start: 2020-12-30 | End: 2020-12-30

## 2020-12-30 NOTE — PATIENT INSTRUCTIONS
Will plan on u/s paracentesis around January 13th and will schedule next paracentesis according to how much fluid is taken off then.  Discussed continuing with the lactulose 30mg twice a day to obtain 4-5 bowel movements a day. Continue on the xifaxin twice a day.

## 2020-12-30 NOTE — PROGRESS NOTES
PATIENT INFORMATION  Tayler Lugo       - 1950    CHIEF COMPLAINT  Chief Complaint   Patient presents with   • Follow-up     1 mo hospital follow up on Ascites       HISTORY OF PRESENT ILLNESS  20 hosp: Primary Discharge Diagnoses: Acute Blood Loss on chronic macrocytic anemia due to Bleeding Gastric Varies from Portal HTN due to Etoh Cirrhosis     Secondary Discharge Diagnoses:   Alcoholic Gastritis  Gastric Ulcer  Duodenal Erosion  Reflux esophagitis  Alcoholic cirrhosis with recurrent ascites  Chronic leukocytosis of uncertain etiology  Melena  Mild coagulopathy  Acute on Chronic Urinary Retention  Recommended patient be transferred to [facility] for TIPS-Transjugular intrahepatic portosystemic shunt (TIPS) on about  at [facility].      gi hemorrhage etoh gastritis, peptic ulcer, antral gastric ulcer, bleeding gastric varices, diodenal erosion, reflux esophagitis  EGD OPNOTE: multiple attempts to put the ulcerated varix in a position that could be banded were unsuccessful. The scope was deretroflexed, the scope was decompressed, the scope was withdrawn to the distal esophagus which was consistent with reflux esophagitis but, again, no esophageal varices, no Bonnie-Adames tear.     Today:   she is alert with clear thinking and have 4-5bms a day on her lactulose.  Her daughter reports that she is feeling better since the TIPS procedure.  She does not remember having the shunt but her daughter reports that improved spirits and mental clarity and mobility, decreased lower extremity swelling, her legs that were doing the oozing from her peripheral edema has stopped and home health told her she can stop wrapping her legs.  Daughter states slower build up of fluid since shunt.     Peripheral edema is still 4+ in bilat LE's and abd edema is 2+ on palpation.  U/s paracentesis on 20: IMPRESSION:  Technically successful ultrasound-guided paracentesis with removal of  3.6 L  ascites fluid.      REVIEWED PERTINENT RESULTS/ LABS  Lab Results   Component Value Date    CASEREPORT  10/08/2020     Surgical Pathology Report                         Case: [id number]                                  Authorizing Provider:  Tayler Trujillo MD         Collected:           10/08/2020 03:03 PM          Ordering Location:     [facility]  Received:            10/08/2020 03:48 PM                                 [place]                                                                           Pathologist:           Juno Walter MD                                                         Specimen:    Breast, Right, Right breast mass  1:00 4cm- not for calcifications- removed @15:03,                 placed in formalin @15:03                                                                  FINALDX  10/08/2020     1. Skin and Breast Tissue, Right Breast Mass at 1 o'clock 4 cm from Nipple, Core Biopsy:   A. Benign skin with dermal fibrosis (scar).   B. Focal dense stromal fibrosis/sclerotic fibroadenomatous hyperplasia with focal organizing fat necrosis and        foreign body giant cell reaction.  C. Microcalcification noted within benign breast tissue.  D. No in situ nor infiltrating carcinoma identified by routine staining (see comment).    jat/pkm        Lab Results   Component Value Date    HGB 11.0 (L) 12/11/2020    .3 (H) 12/11/2020     12/11/2020    ALT 50 (H) 12/11/2020     (H) 12/11/2020    HGBA1C 5.00 12/09/2020    INR 1.40 (H) 12/10/2020    FERRITIN 2,997.00 (H) 11/09/2020    IRON 67 11/09/2020    TIBC  11/09/2020      Comment:      Unable to calculate      Xr Chest 2 View    Result Date: 12/9/2020  Narrative: CR Chest 2 Vws INDICATION:  Low blood pressure today COMPARISON:  11/14/2020 FINDINGS: PA and lateral views of the chest.  The heart size is normal. Mediastinal structures are midline. Redemonstrated is the elevated right hemidiaphragm. No acute infiltrates.  No pleural fluid. No pneumothorax.     Impression: No clearly acute cardiopulmonary findings. No significant interval change from 11/14/2020. Signer Name: Ced Tran MD  Signed: 12/9/2020 4:56 PM  Workstation Name: [id number]  [facility]    Us Abdomen Limited    Result Date: 12/10/2020  Narrative: ULTRASOUND ABDOMEN, LIMITED, 12/10/2020  HISTORY: 70-year-old female with abdominal distention and possible ascites.  TECHNIQUE: Grayscale ultrasound imaging of the abdomen was performed to assess for ascites volume.  FINDINGS:  Four-quadrant imaging of the abdomen was performed and demonstrates a mild volume of ascites preferentially located in the right hemiabdomen. This is new compared to the prior ultrasound.      Impression:  1. Mild volume of ascites.  This report was finalized on 12/10/2020 8:17 AM by Dr. Waqar Jerez MD.      Us Paracentesis    Result Date: 12/10/2020  Narrative: ULTRASOUND-GUIDED PARACENTESIS, 12/10/2020  HISTORY: 70-year-old female with ascites. Ultrasound-guided paracentesis requested.  CONSENT: The procedure, risks and alternatives were discussed in detail with the patient, questions were entertained, and written informed consent was obtained. Timeout was observed in the procedure room for patient identification and procedure site verification, and the procedure site was marked by me. Permanent images were recorded.  PROCEDURE: Using sterile technique, 1% lidocaine local anesthetic and real-time ultrasound guidance, a 5 Malian paracentesis catheter was placed into the largest identified portion of ascites fluid in the right lower quadrant, and 3.6 L of milky yellow ascites fluid was removed without apparent complication.      Impression: Technically successful ultrasound-guided paracentesis with removal of 3.6 L ascites fluid.  This report was finalized on 12/10/2020 10:54 AM by Dr. Waqar Jerez MD.      Xr Abdomen Kub    Result Date: 12/9/2020  Narrative: CR Abdomen  1 Vw INDICATION: Abdominal pain and distention today COMPARISON: CT abdomen pelvis 11/15/2020 FINDINGS: AP radiographs of the abdomen. There are postoperative changes in the lower lumbar spine. There is some gas in small and large intestine but there is no evidence of obstruction.     Impression: No evidence of bowel obstruction Signer Name: Solitario Jain MD  Signed: 12/9/2020 10:04 PM  Workstation Name: [facility]  [facility]      REVIEW OF SYSTEMS  Review of Systems   Gastrointestinal: Positive for abdominal pain.   All other systems reviewed and are negative.        ACTIVE PROBLEMS  Patient Active Problem List    Diagnosis   • Gastric varices [I86.4]   • Alcoholic cirrhosis of liver with ascites (CMS/HCC) [K70.31]   • Gastrointestinal hemorrhage [K92.2]   • Gastrointestinal hemorrhage associated with alcoholic gastritis [K29.21]   • Severe malnutrition (CMS/HCC) [E43]   • Bladder prolapse, female, acquired [N81.10]   • Sepsis associated hypotension (CMS/HCC) [A41.9, I95.9]   • Acute liver failure [K72.00]   • Altered mental state [R41.82]   • Acute renal failure (CMS/HCC) [N17.9]   • Alcohol abuse [F10.10]   • Hepatorenal syndrome (CMS/HCC) [K76.7]   • Ascites due to alcoholic hepatitis [K70.11]   • Acute pain of right shoulder [M25.511]   • Tear of right rotator cuff [M75.101]   • Acute respiratory failure with hypoxia (CMS/HCC) [J96.01]   • Lumbar spinal stenosis [M48.061]   • Frequent UTI [N39.0]   • Hypertension [I10]   • Stress incontinence [N39.3]   • Itching due to drug [L29.8, T50.905A]   • Spinal stenosis of lumbar region with neurogenic claudication [M48.062]         PAST MEDICAL HISTORY  Past Medical History:   Diagnosis Date   • Elevated liver enzymes    • Fatty liver    • Frequent UTI    • History of depression    • Hypertension    • Lumbar stenosis    • OA (osteoarthritis)    • Post-menopausal    • Spondylarthritis     LOW BACK   • Stress incontinence    • Urinary retention           SURGICAL HISTORY  Past Surgical History:   Procedure Laterality Date   • ABDOMINOPLASTY     • BREAST SURGERY     • CATARACT EXTRACTION Bilateral    •  SECTION      X3   • COLONOSCOPY      2006   • COLONOSCOPY N/A 2016    Procedure: COLONOSCOPY;  Surgeon: Ho Arenas MD;  Location: [facility] ENDOSCOPY;  Service:    • ENDOSCOPY N/A 2020    Procedure: ESOPHAGOGASTRODUODENOSCOPY;  Surgeon: Terrell Stanton MD;  Location: [facility] OR;  Service: Gastroenterology;  Laterality: N/A;  gastric ulcer  duodenal erosion  gastric varices   • LASIK     • LUMBAR DISCECTOMY FUSION INSTRUMENTATION N/A 10/1/2018    Procedure: L4-5, L5-S1  laminectomy and fusion with instrumentation;  Surgeon: Juno Kim MD;  Location: [facility] MAIN OR;  Service: Orthopedic Spine   • REDUCTION MAMMAPLASTY     • TONSILLECTOMY     • TOTAL SHOULDER ARTHROPLASTY W/ DISTAL CLAVICLE EXCISION Right 2019    Procedure: TOTAL SHOULDER REVERSE ARTHROPLASTY;  Surgeon: David Marion MD;  Location: [facility] MAIN OR;  Service: Orthopedics         FAMILY HISTORY  Family History   Problem Relation Age of Onset   • Colon polyps Father    • Breast cancer Maternal Aunt    • Malig Hyperthermia Neg Hx          SOCIAL HISTORY  Social History     Occupational History   • Not on file   Tobacco Use   • Smoking status: Never Smoker   • Smokeless tobacco: Never Used   Substance and Sexual Activity   • Alcohol use: Not Currently     Comment: no ETOH since    • Drug use: No   • Sexual activity: Defer         CURRENT MEDICATIONS    Current Outpatient Medications:   •  RIFAXIMIN PO, 550 mg., Disp: , Rfl:   •  ESTRACE VAGINAL 0.1 MG/GM vaginal cream, Insert 2 g into the vagina Daily As Needed., Disp: , Rfl:   •  folic acid (FOLVITE) 1 MG tablet, Take 1 tablet by mouth Daily., Disp: 30 tablet, Rfl: 1  •  lactobacillus acidophilus (RISAQUAD) capsule capsule, Take 1 capsule by mouth Daily., Disp: 30 capsule, Rfl: 1  •  lactulose  "(CHRONULAC) 10 GM/15ML solution, Take 30 mL by mouth 2 (Two) Times a Day., Disp: 1892 mL, Rfl: 1  •  levothyroxine (SYNTHROID, LEVOTHROID) 50 MCG tablet, Take 1 tablet by mouth Daily., Disp: 30 tablet, Rfl: 1  •  midodrine (PROAMATINE) 2.5 MG tablet, Take 1 tablet by mouth 3 (Three) Times a Day Before Meals., Disp: 90 tablet, Rfl: 1  •  torsemide (DEMADEX) 20 MG tablet, Take 1 tablet by mouth Daily., Disp: 30 tablet, Rfl: 1  No current facility-administered medications for this visit.     Facility-Administered Medications Ordered in Other Visits:   •  mupirocin (BACTROBAN) 2 % nasal ointment, , Nasal, BID, David Marion MD    ALLERGIES  Patient has no known allergies.    VITALS  Vitals:    12/30/20 1339   Temp: 98.2 °F (36.8 °C)   TempSrc: Temporal   Weight: 68 kg (150 lb)   Height: 157.5 cm (62.01\")       PHYSICAL EXAM  Debilities/Disabilities Identified: None  Emotional Behavior: Appropriate  Wt Readings from Last 3 Encounters:   12/30/20 68 kg (150 lb)   12/11/20 60.1 kg (132 lb 8 oz)   11/30/20 63.5 kg (140 lb)     Ht Readings from Last 1 Encounters:   12/30/20 157.5 cm (62.01\")     Body mass index is 27.43 kg/m².  Physical Exam  Vitals signs and nursing note reviewed.   Constitutional:       Appearance: She is well-developed.   HENT:      Head: Normocephalic and atraumatic.   Eyes:      Conjunctiva/sclera: Conjunctivae normal.      Pupils: Pupils are equal, round, and reactive to light.   Neck:      Musculoskeletal: Normal range of motion and neck supple.   Cardiovascular:      Rate and Rhythm: Normal rate and regular rhythm.   Pulmonary:      Effort: Pulmonary effort is normal.      Breath sounds: Normal breath sounds.   Abdominal:      General: Bowel sounds are normal. There is no distension.      Palpations: Abdomen is soft. There is shifting dullness, fluid wave and hepatomegaly.      Comments: Liver palpates 18cm,  With fluid shift.  Paracentesis 3.4L on 12/9/20   Musculoskeletal: Normal range of " motion.   Lymphadenopathy:      Cervical: No cervical adenopathy.   Skin:     General: Skin is warm and dry.             Comments: Peripheral edema is still 4+ in bilat LE's and abd edema is 2+ on palpation.    Feet are no longer cyanotic since edema is improved so do not see need for doppler that was ordered prior to hospitalization,   Neurological:      Mental Status: She is alert and oriented to person, place, and time.   Psychiatric:         Behavior: Behavior normal.         CLINICAL DATA REVIEWED   reviewed previous lab results and integrated with today's visit, reviewed notes from other physicians and/or last GI encounter, reviewed previous endoscopy results and available photos, reviewed surgical pathology results from previous biopsies    ASSESSMENT  Diagnoses and all orders for this visit:    Alcoholic cirrhosis, unspecified whether ascites present (CMS/HCC)  -     CBC & Differential; Future  -     Comprehensive Metabolic Panel; Future  -     Ammonia; Future  -     US Paracentesis; Future  -     Albumin, Fluid - Body Fluid, Peritoneum; Standing  -     Protein, Body Fluid - Body Fluid, Peritoneum; Standing  -     albumin human 25 % IV SOLN 25 g  -     CBC & Differential; Future  -     Ammonia; Future  -     Comprehensive Metabolic Panel; Future    Iron deficiency anemia due to chronic blood loss  -     CBC & Differential; Future  -     Comprehensive Metabolic Panel; Future  -     Ammonia; Future  -     US Paracentesis; Future  -     Albumin, Fluid - Body Fluid, Peritoneum; Standing  -     Protein, Body Fluid - Body Fluid, Peritoneum; Standing  -     albumin human 25 % IV SOLN 25 g  -     CBC & Differential; Future  -     Ammonia; Future  -     Comprehensive Metabolic Panel; Future    Gastrointestinal hemorrhage associated with alcoholic gastritis  -     CBC & Differential; Future  -     Comprehensive Metabolic Panel; Future  -     Ammonia; Future  -     US Paracentesis; Future  -     Albumin, Fluid - Body  Fluid, Peritoneum; Standing  -     Protein, Body Fluid - Body Fluid, Peritoneum; Standing  -     albumin human 25 % IV SOLN 25 g  -     CBC & Differential; Future  -     Ammonia; Future  -     Comprehensive Metabolic Panel; Future    Gastric varices  -     CBC & Differential; Future  -     Comprehensive Metabolic Panel; Future  -     Ammonia; Future  -     US Paracentesis; Future  -     Albumin, Fluid - Body Fluid, Peritoneum; Standing  -     Protein, Body Fluid - Body Fluid, Peritoneum; Standing  -     albumin human 25 % IV SOLN 25 g  -     CBC & Differential; Future  -     Ammonia; Future  -     Comprehensive Metabolic Panel; Future    Other orders  -     RIFAXIMIN PO; 550 mg.          PLAN  No follow-ups on file.     Will plan on u/s paracentesis around January 13th and will schedule next paracentesis according to how much fluid is taken off then.  Discussed continuing with the lactulose 30mg twice a day to obtain 4-5 bowel movements a day.       I have discussed the above plan with the patient.  They verbalize understanding and are in agreement with the plan.  They have been advised to contact the office for any questions, concerns, or changes related to their health.

## 2021-01-14 ENCOUNTER — HOSPITAL ENCOUNTER (OUTPATIENT)
Dept: INFUSION THERAPY | Facility: HOSPITAL | Age: 71
Discharge: HOME | End: 2021-01-14

## 2021-01-14 ENCOUNTER — HOSPITAL ENCOUNTER (OUTPATIENT)
Dept: ULTRASOUND IMAGING | Facility: HOSPITAL | Age: 71
Discharge: HOME | End: 2021-01-14

## 2021-01-14 VITALS
SYSTOLIC BLOOD PRESSURE: 142 MMHG | HEART RATE: 102 BPM | RESPIRATION RATE: 16 BRPM | OXYGEN SATURATION: 95 % | DIASTOLIC BLOOD PRESSURE: 73 MMHG

## 2021-01-14 VITALS
BODY MASS INDEX: 27.23 KG/M2 | WEIGHT: 148 LBS | OXYGEN SATURATION: 96 % | DIASTOLIC BLOOD PRESSURE: 71 MMHG | RESPIRATION RATE: 18 BRPM | TEMPERATURE: 98.3 F | HEIGHT: 62 IN | SYSTOLIC BLOOD PRESSURE: 137 MMHG | HEART RATE: 90 BPM

## 2021-01-14 DIAGNOSIS — K70.30 ALCOHOLIC CIRRHOSIS, UNSPECIFIED WHETHER ASCITES PRESENT (HCC): ICD-10-CM

## 2021-01-14 DIAGNOSIS — I86.4 GASTRIC VARICES: ICD-10-CM

## 2021-01-14 DIAGNOSIS — D50.0 IRON DEFICIENCY ANEMIA DUE TO CHRONIC BLOOD LOSS: ICD-10-CM

## 2021-01-14 DIAGNOSIS — K29.21 GASTROINTESTINAL HEMORRHAGE ASSOCIATED WITH ALCOHOLIC GASTRITIS: ICD-10-CM

## 2021-01-14 LAB
ALBUMIN FLD-MCNC: 0.9 G/DL
ALBUMIN SERPL-MCNC: 2.4 G/DL (ref 3.5–5.2)
ALBUMIN/GLOB SERPL: 0.8 G/DL
ALP SERPL-CCNC: 164 U/L (ref 39–117)
ALT SERPL W P-5'-P-CCNC: 16 U/L (ref 1–33)
AMMONIA BLD-SCNC: 21 UMOL/L (ref 11–51)
ANION GAP SERPL CALCULATED.3IONS-SCNC: 8.9 MMOL/L (ref 5–15)
APTT PPP: 36 SECONDS (ref 24.3–38.1)
AST SERPL-CCNC: 51 U/L (ref 1–32)
BASOPHILS # BLD AUTO: 0.05 10*3/MM3 (ref 0–0.2)
BASOPHILS NFR BLD AUTO: 0.7 % (ref 0–1.5)
BILIRUB SERPL-MCNC: 2.4 MG/DL (ref 0–1.2)
BUN SERPL-MCNC: 9 MG/DL (ref 8–23)
BUN/CREAT SERPL: 21.4 (ref 7–25)
CALCIUM SPEC-SCNC: 8.3 MG/DL (ref 8.6–10.5)
CHLORIDE SERPL-SCNC: 104 MMOL/L (ref 98–107)
CO2 SERPL-SCNC: 23.1 MMOL/L (ref 22–29)
CREAT SERPL-MCNC: 0.42 MG/DL (ref 0.57–1)
DEPRECATED RDW RBC AUTO: 59.1 FL (ref 37–54)
EOSINOPHIL # BLD AUTO: 0.11 10*3/MM3 (ref 0–0.4)
EOSINOPHIL NFR BLD AUTO: 1.5 % (ref 0.3–6.2)
ERYTHROCYTE [DISTWIDTH] IN BLOOD BY AUTOMATED COUNT: 15.7 % (ref 12.3–15.4)
GFR SERPL CREATININE-BSD FRML MDRD: 148 ML/MIN/1.73
GLOBULIN UR ELPH-MCNC: 3.2 GM/DL
GLUCOSE SERPL-MCNC: 106 MG/DL (ref 65–99)
HCT VFR BLD AUTO: 36.1 % (ref 34–46.6)
HGB BLD-MCNC: 11.5 G/DL (ref 12–15.9)
IMM GRANULOCYTES # BLD AUTO: 0.01 10*3/MM3 (ref 0–0.05)
IMM GRANULOCYTES NFR BLD AUTO: 0.1 % (ref 0–0.5)
INR PPP: 1.47 (ref 0.9–1.1)
LYMPHOCYTES # BLD AUTO: 1.76 10*3/MM3 (ref 0.7–3.1)
LYMPHOCYTES NFR BLD AUTO: 24.8 % (ref 19.6–45.3)
MCH RBC QN AUTO: 32.3 PG (ref 26.6–33)
MCHC RBC AUTO-ENTMCNC: 31.9 G/DL (ref 31.5–35.7)
MCV RBC AUTO: 101.4 FL (ref 79–97)
MONOCYTES # BLD AUTO: 0.9 10*3/MM3 (ref 0.1–0.9)
MONOCYTES NFR BLD AUTO: 12.7 % (ref 5–12)
NEUTROPHILS NFR BLD AUTO: 4.28 10*3/MM3 (ref 1.7–7)
NEUTROPHILS NFR BLD AUTO: 60.2 % (ref 42.7–76)
PLATELET # BLD AUTO: 179 10*3/MM3 (ref 140–450)
PMV BLD AUTO: 10.2 FL (ref 6–12)
POTASSIUM SERPL-SCNC: 3.3 MMOL/L (ref 3.5–5.2)
PROT FLD-MCNC: 1.3 G/DL
PROT SERPL-MCNC: 5.6 G/DL (ref 6–8.5)
PROTHROMBIN TIME: 17.3 SECONDS (ref 12.1–15)
RBC # BLD AUTO: 3.56 10*6/MM3 (ref 3.77–5.28)
SODIUM SERPL-SCNC: 136 MMOL/L (ref 136–145)
WBC # BLD AUTO: 7.11 10*3/MM3 (ref 3.4–10.8)

## 2021-01-14 PROCEDURE — 80053 COMPREHEN METABOLIC PANEL: CPT | Performed by: NURSE PRACTITIONER

## 2021-01-14 PROCEDURE — 76942 ECHO GUIDE FOR BIOPSY: CPT

## 2021-01-14 PROCEDURE — 96366 THER/PROPH/DIAG IV INF ADDON: CPT

## 2021-01-14 PROCEDURE — 84157 ASSAY OF PROTEIN OTHER: CPT | Performed by: NURSE PRACTITIONER

## 2021-01-14 PROCEDURE — 85610 PROTHROMBIN TIME: CPT | Performed by: RADIOLOGY

## 2021-01-14 PROCEDURE — 25010000003 LIDOCAINE 1 % SOLUTION: Performed by: NURSE PRACTITIONER

## 2021-01-14 PROCEDURE — P9047 ALBUMIN (HUMAN), 25%, 50ML: HCPCS | Performed by: NURSE PRACTITIONER

## 2021-01-14 PROCEDURE — 82042 OTHER SOURCE ALBUMIN QUAN EA: CPT | Performed by: NURSE PRACTITIONER

## 2021-01-14 PROCEDURE — 25010000002 ALBUMIN HUMAN 25% PER 50 ML: Performed by: NURSE PRACTITIONER

## 2021-01-14 PROCEDURE — 85730 THROMBOPLASTIN TIME PARTIAL: CPT | Performed by: RADIOLOGY

## 2021-01-14 PROCEDURE — 85025 COMPLETE CBC W/AUTO DIFF WBC: CPT | Performed by: NURSE PRACTITIONER

## 2021-01-14 PROCEDURE — 82140 ASSAY OF AMMONIA: CPT | Performed by: NURSE PRACTITIONER

## 2021-01-14 PROCEDURE — 96365 THER/PROPH/DIAG IV INF INIT: CPT

## 2021-01-14 RX ORDER — ALBUMIN (HUMAN) 12.5 G/50ML
25 SOLUTION INTRAVENOUS ONCE
Status: COMPLETED | OUTPATIENT
Start: 2021-01-14 | End: 2021-01-14

## 2021-01-14 RX ORDER — LIDOCAINE HYDROCHLORIDE 10 MG/ML
5 INJECTION, SOLUTION INFILTRATION; PERINEURAL ONCE
Status: COMPLETED | OUTPATIENT
Start: 2021-01-14 | End: 2021-01-14

## 2021-01-14 RX ADMIN — LIDOCAINE HYDROCHLORIDE 5 ML: 10 INJECTION, SOLUTION INFILTRATION; PERINEURAL at 10:35

## 2021-01-14 RX ADMIN — ALBUMIN HUMAN 25 G: 0.25 SOLUTION INTRAVENOUS at 09:30

## 2021-01-14 NOTE — NURSING NOTE
0920 TO [facility] VIA W/C, FROM RADIOLOGY FOR PARACENTESIS TODAY. PATIENT WENT TO RADIOLOGY TO BE SCANNED PRIOR TO COMING TO [facility], TO MAKE SURE THERE IS ENOUGH FLUID TO DO THE PROCEDURE. LABS DRAWN, IV STARTED, & MEDICAL HISTORY REVIEWED. 1315 TOLERATED PROCEDURE WITHOUT PROBLEMS, RECEIVED ALBUMIN TODAY. ORTHOSTATIC B/P'S GOOD, BANDAID D/I WHERE PARA WAS COMPLETED.  AVS PRINTED & REVIEWED, DISCHARGED VIA W/C WITHOUT C/O @ 1315.

## 2021-01-15 DIAGNOSIS — K70.11 ASCITES DUE TO ALCOHOLIC HEPATITIS: ICD-10-CM

## 2021-01-15 DIAGNOSIS — K70.30 ALCOHOLIC CIRRHOSIS, UNSPECIFIED WHETHER ASCITES PRESENT (HCC): Primary | ICD-10-CM

## 2021-01-15 RX ORDER — ALBUMIN (HUMAN) 12.5 G/50ML
25 SOLUTION INTRAVENOUS ONCE
Status: CANCELLED | OUTPATIENT
Start: 2021-01-15 | End: 2021-01-15

## 2021-01-27 ENCOUNTER — HOSPITAL ENCOUNTER (OUTPATIENT)
Dept: ULTRASOUND IMAGING | Facility: HOSPITAL | Age: 71
Discharge: HOME | End: 2021-01-27
Admitting: NURSE PRACTITIONER

## 2021-01-27 ENCOUNTER — OFFICE VISIT (OUTPATIENT)
Dept: GASTROENTEROLOGY | Facility: CLINIC | Age: 71
End: 2021-01-27

## 2021-01-27 VITALS
SYSTOLIC BLOOD PRESSURE: 126 MMHG | RESPIRATION RATE: 16 BRPM | WEIGHT: 135.8 LBS | HEIGHT: 63 IN | HEART RATE: 88 BPM | OXYGEN SATURATION: 94 % | TEMPERATURE: 97.8 F | DIASTOLIC BLOOD PRESSURE: 70 MMHG | BODY MASS INDEX: 24.06 KG/M2

## 2021-01-27 VITALS — HEIGHT: 63 IN | BODY MASS INDEX: 24.06 KG/M2 | TEMPERATURE: 97.9 F | WEIGHT: 135.8 LBS

## 2021-01-27 DIAGNOSIS — D50.0 IRON DEFICIENCY ANEMIA DUE TO CHRONIC BLOOD LOSS: ICD-10-CM

## 2021-01-27 DIAGNOSIS — K29.21 GASTROINTESTINAL HEMORRHAGE ASSOCIATED WITH ALCOHOLIC GASTRITIS: ICD-10-CM

## 2021-01-27 DIAGNOSIS — K70.11 ASCITES DUE TO ALCOHOLIC HEPATITIS: ICD-10-CM

## 2021-01-27 DIAGNOSIS — K70.30 ALCOHOLIC CIRRHOSIS, UNSPECIFIED WHETHER ASCITES PRESENT (HCC): ICD-10-CM

## 2021-01-27 DIAGNOSIS — I86.4 GASTRIC VARICES: ICD-10-CM

## 2021-01-27 DIAGNOSIS — K70.30 ALCOHOLIC CIRRHOSIS, UNSPECIFIED WHETHER ASCITES PRESENT (HCC): Primary | ICD-10-CM

## 2021-01-27 LAB
ALBUMIN FLD-MCNC: 0.8 G/DL
INR PPP: 1.65 (ref 0.9–1.1)
PROT FLD-MCNC: 1.7 G/DL
PROTHROMBIN TIME: 18.9 SECONDS (ref 12.1–15)

## 2021-01-27 PROCEDURE — 76942 ECHO GUIDE FOR BIOPSY: CPT

## 2021-01-27 PROCEDURE — 36415 COLL VENOUS BLD VENIPUNCTURE: CPT

## 2021-01-27 PROCEDURE — 84157 ASSAY OF PROTEIN OTHER: CPT | Performed by: NURSE PRACTITIONER

## 2021-01-27 PROCEDURE — 25010000002 ALBUMIN HUMAN 25% PER 50 ML: Performed by: NURSE PRACTITIONER

## 2021-01-27 PROCEDURE — 82042 OTHER SOURCE ALBUMIN QUAN EA: CPT | Performed by: NURSE PRACTITIONER

## 2021-01-27 PROCEDURE — 85610 PROTHROMBIN TIME: CPT | Performed by: NURSE PRACTITIONER

## 2021-01-27 PROCEDURE — P9047 ALBUMIN (HUMAN), 25%, 50ML: HCPCS | Performed by: NURSE PRACTITIONER

## 2021-01-27 PROCEDURE — 99214 OFFICE O/P EST MOD 30 MIN: CPT | Performed by: NURSE PRACTITIONER

## 2021-01-27 PROCEDURE — 96365 THER/PROPH/DIAG IV INF INIT: CPT

## 2021-01-27 PROCEDURE — 25010000003 LIDOCAINE 1 % SOLUTION: Performed by: NURSE PRACTITIONER

## 2021-01-27 RX ORDER — LIDOCAINE HYDROCHLORIDE 10 MG/ML
5 INJECTION, SOLUTION INFILTRATION; PERINEURAL ONCE
Status: COMPLETED | OUTPATIENT
Start: 2021-01-27 | End: 2021-01-27

## 2021-01-27 RX ORDER — ALBUMIN (HUMAN) 12.5 G/50ML
25 SOLUTION INTRAVENOUS ONCE
Status: CANCELLED | OUTPATIENT
Start: 2021-01-27 | End: 2021-01-27

## 2021-01-27 RX ORDER — ALBUMIN (HUMAN) 12.5 G/50ML
25 SOLUTION INTRAVENOUS ONCE
Status: COMPLETED | OUTPATIENT
Start: 2021-01-27 | End: 2021-01-27

## 2021-01-27 RX ADMIN — ALBUMIN HUMAN 25 G: 0.25 SOLUTION INTRAVENOUS at 10:14

## 2021-01-27 RX ADMIN — LIDOCAINE HYDROCHLORIDE 5 ML: 10 INJECTION, SOLUTION INFILTRATION; PERINEURAL at 10:10

## 2021-01-27 NOTE — NURSING NOTE
NURSING PROGRESS NOTE    1030 pt returned from radiology , Albumin infusion running. Tolerated procedure without incident. Lani Bush RN

## 2021-01-27 NOTE — NURSING NOTE
NURSING PROGRESS NOTE      0800 Pt to [facility] per  scheduled appointment. Taken to x-ray  Scanned. Has fluid to obtain. Vss, labs drawn per md order. Tolerated well. Iv placed.  Pt ID verified by (2) identifiers. Allergies, medications and medical history reviewed and verified.Awaiting procedure. Lani Bush RN

## 2021-01-27 NOTE — PATIENT INSTRUCTIONS
Paracentesis  Paracentesis is a procedure to remove excess fluid from the abdomen. When a person collects too much fluid in the abdomen, it is called ascites. Ascites can result from certain conditions, such as chronic scarring of the liver (cirrhosis), cancer, heart failure, or infection.  In paracentesis, the fluid is removed using a needle that is inserted through the skin and tissue into the abdomen. This procedure may be done to:  · Find the cause of the ascites.  · Relieve symptoms that are caused by the ascites, such as pain or shortness of breath.  Tell a health care provider about:  · Any allergies you have.  · All medicines you are taking, including vitamins, herbs, eye drops, creams, and over-the-counter medicines.  · Any problems you or family members have had with anesthetic medicines.  · Any blood disorders you have.  · Any surgeries you have had.  · Any medical conditions you have.  · Whether you are pregnant or may be pregnant.  What are the risks?  Generally, this is a safe procedure. However, problems may occur, including:  · Infection.  · Bleeding.  · Injury to an abdominal organ, such as the bowel (large intestine), liver, spleen, or bladder.  · Low blood pressure (hypotension).  · Mental status changes in people who have liver disease. These changes would be caused by shifts in the balance of fluids and minerals (electrolytes) in the body.  What happens before the procedure?  · Ask your health care provider about:  ? Changing or stopping your regular medicines. This is especially important if you are taking diabetes medicines or blood thinners.  ? Taking medicines such as aspirin and ibuprofen. These medicines can thin your blood. Do not take these medicines unless your health care provider tells you to take them.  ? Taking over-the-counter medicines, vitamins, herbs, and supplements.  · Ask your health care provider what steps will be taken to help prevent infection. These may include washing  skin with a germ-killing soap.  · A blood sample may be taken to determine your blood clotting time.  · You may have imaging tests.  · You will be asked to urinate.  What happens during the procedure?    · You may be asked to lie on your back with your head raised (elevated).  · You will be given a medicine to numb the area (local anesthetic) where the needle will be inserted.  · Your abdominal skin will be punctured with a needle.  · A drainage tube will be connected to the needle. Fluid will drain through the tube into a container.  · After enough fluid has been removed, the needle will be removed.  · A sample of the fluid will be sent for examination and testing.  · A bandage (dressing) will be placed over the puncture site.  The procedure may vary among health care providers and hospitals.  What happens after the procedure?  It is up to you to get the results of your procedure. Ask your health care provider, or the department that is doing the procedure, when your results will be ready.  Summary  · Paracentesis is a procedure to remove excess fluid from the abdomen using a needle.  · This procedure may be done to find the cause of ascites or to help relieve your symptoms.  · Your skin will be numbed with medicine (local anesthetic) before the needle is inserted.  · A sample of the removed fluid will be sent for examination and testing.  This information is not intended to replace advice given to you by your health care provider. Make sure you discuss any questions you have with your health care provider.  Document Revised: 11/19/2019 Document Reviewed: 10/08/2019  [employer] Patient Education © 2020 Elsevier Inc.    Paracentesis, Care After  This sheet gives you information about how to care for yourself after your procedure. Your health care provider may also give you more specific instructions. If you have problems or questions, contact your health care provider.  What can I expect after the procedure?  After  the procedure, it is common to have a small amount of clear fluid coming from the puncture site.  Follow these instructions at home:  Puncture site care    · Follow instructions from your health care provider about how to take care of your puncture site. Make sure you:  ? Wash your hands with soap and water before and after you change your bandage (dressing). If soap and water are not available, use hand .  ? Change your dressing as told by your health care provider.  · Check your puncture area every day for signs of infection. Check for:  ? Redness, swelling, or pain.  ? More fluid or blood.  ? Warmth.  ? Pus or a bad smell.  General instructions  · Return to your normal activities as told by your health care provider. Ask your health care provider what activities are safe for you.  · Take over-the-counter and prescription medicines only as told by your health care provider.  · Do not take baths, swim, or use a hot tub until your health care provider approves. Ask your health care provider if you may take showers. You may only be allowed to take sponge baths.  · Keep all follow-up visits as told by your health care provider. This is important.  Contact a health care provider if:  · You have redness, swelling, or pain at your puncture site.  · You have more fluid or blood coming from your puncture site.  · Your puncture site feels warm to the touch.  · You have pus or a bad smell coming from your puncture site.  · You have a fever.  Get help right away if:  · You have chest pain or shortness of breath.  · You develop increasing pain, discomfort, or swelling in your abdomen.  · You feel dizzy or light-headed or you faint.  Summary  · After the procedure, it is common to have a small amount of clear fluid coming from the puncture site.  · Follow instructions from your health care provider about how to take care of your puncture site.  · Check your puncture area every day signs of infection.  · Keep all  follow-up visits as told by your health care provider.  This information is not intended to replace advice given to you by your health care provider. Make sure you discuss any questions you have with your health care provider.  Document Revised: 06/30/2020 Document Reviewed: 10/08/2019    Elsevier Patient Education © 2020 [employer]  Albumin infusion    What is this medicine?  ALBUMIN (al BYOO min) is used to treat or prevent shock following serious injury, bleeding, surgery, or burns by increasing the volume of blood plasma. This medicine can also replace low blood protein.  This medicine may be used for other purposes; ask your health care provider or pharmacist if you have questions.  COMMON BRAND NAME(S): Albuked, Albumarc, Albuminar, Albuminex, AlbuRx, Albutein, Buminate, Flexbumin, Kedbumin, Macrotec, Plasbumin  What should I tell my health care provider before I take this medicine?  They need to know if you have any of the following conditions:  · anemia  · heart disease  · kidney disease  · an unusual or allergic reaction to albumin, other medicines, foods, dyes, or preservatives  · pregnant or trying to get pregnant  · breast-feeding  How should I use this medicine?  This medicine is for infusion into a vein. It is given by a health-care professional in a hospital or clinic.  Talk to your pediatrician regarding the use of this medicine in children. While this drug may be prescribed for selected conditions, precautions do apply.  Overdosage: If you think you have taken too much of this medicine contact a poison control center or emergency room at once.  NOTE: This medicine is only for you. Do not share this medicine with others.  What if I miss a dose?  This does not apply.  What may interact with this medicine?  Interactions are not expected.  This list may not describe all possible interactions. Give your health care provider a list of all the medicines, herbs, non-prescription drugs, or dietary  supplements you use. Also tell them if you smoke, drink alcohol, or use illegal drugs. Some items may interact with your medicine.  What should I watch for while using this medicine?  Your condition will be closely monitored while you receive this medicine.  Some products are derived from human plasma, and there is a small risk that these products may contain certain types of virus or bacteria. All products are processed to kill most viruses and bacteria. If you have questions concerning the risk of infections, discuss them with your doctor or health care professional.  What side effects may I notice from receiving this medicine?  Side effects that you should report to your doctor or health care professional as soon as possible:  · allergic reactions like skin rash, itching or hives, swelling of the face, lips, or tongue  · breathing problems  · changes in heartbeat  · fever, chills  · pain, redness or swelling at the injection site  · signs of viral infection including fever, drowsiness, chills, runny nose followed in about 2 weeks by a rash and joint pain  · tightness in the chest  Side effects that usually do not require medical attention (report to your doctor or health care professional if they continue or are bothersome):  · increased salivation  · nausea, vomiting  This list may not describe all possible side effects. Call your doctor for medical advice about side effects. You may report side effects to FDA at 1-368-GFX-7939.  Where should I keep my medicine?  This does not apply. You will not be given this medicine to store at home.  NOTE: This sheet is a summary. It may not cover all possible information. If you have questions about this medicine, talk to your doctor, pharmacist, or health care provider.  © 2020 Elsevier/Gold Standard (2009-03-12 10:18:55)

## 2021-01-27 NOTE — PROGRESS NOTES
PATIENT INFORMATION  Tayler Lugo       - 1950    CHIEF COMPLAINT  Chief Complaint   Patient presents with   • Follow-up     1 mo follow up on Cirrhosis; GABRIELLA       HISTORY OF PRESENT ILLNESS  20 hosp: Primary Discharge Diagnoses: Acute Blood Loss on chronic macrocytic anemia due to Bleeding Gastric Varies from Portal HTN due to Etoh Cirrhosis, 20 EGD: gastric ulcer and varices with bleeding unable to band,    12/15/20 TIPS procedure  u/s paracentesis q3mo, last was on     20 with 3.4L   21 2.6L     [facility] for TIPS-Transjugular intrahepatic portosystemic shunt (TIPS)    TODAY:  Seen today after her paracentesis and 2L taken off.  Is having 2-3 bms a day consistently. Is taking lactulose 30mg bid.  Is finishing paperwork for xifaxan tx today as well  And discussed with patient and daughter about getting on this to decrease her ammonia levels.  She feels less bloated post TIPS.      Swelling and peripheral edema is down and she can see her knees now and is able to walk without her walker at home and is alert and more able to remember and confusion is much improved at this visit and her daughter reports that this has improved significantly as well.   Is currently reading a 500 page book!!    Notices when she is in her recliner the abd pressure but was not SOB prior to her paracentesis today but had a wet cough that is gone now.      Discussed scheduling her paracentesis q 2 weeks at this point.  Can push out further if she is doing well.     She sees Dr. Wright for prolapsed bladder with urinary retention and has done therapy for pelvic floor muscles in [place].        REVIEWED PERTINENT RESULTS/ LABS  Lab Results   Component Value Date    CASEREPORT  10/08/2020     Surgical Pathology Report                         Case: [id number]                                  Authorizing Provider:  Tayler Trujillo MD         Collected:           10/08/2020 03:03 PM          Ordering  Location:     [facility]  Received:            10/08/2020 03:48 PM                                 [place]                                                                           Pathologist:           Juno Walter MD                                                         Specimen:    Breast, Right, Right breast mass  1:00 4cm- not for calcifications- removed @15:03,                 placed in formalin @15:03                                                                  FINALDX  10/08/2020     1. Skin and Breast Tissue, Right Breast Mass at 1 o'clock 4 cm from Nipple, Core Biopsy:   A. Benign skin with dermal fibrosis (scar).   B. Focal dense stromal fibrosis/sclerotic fibroadenomatous hyperplasia with focal organizing fat necrosis and        foreign body giant cell reaction.  C. Microcalcification noted within benign breast tissue.  D. No in situ nor infiltrating carcinoma identified by routine staining (see comment).    jat/pkm        Lab Results   Component Value Date    HGB 11.5 (L) 01/14/2021    .4 (H) 01/14/2021     01/14/2021    ALT 16 01/14/2021    AST 51 (H) 01/14/2021    HGBA1C 5.00 12/09/2020    INR 1.65 (H) 01/27/2021    FERRITIN 2,997.00 (H) 11/09/2020    IRON 67 11/09/2020    TIBC  11/09/2020      Comment:      Unable to calculate      Us Paracentesis    Result Date: 1/27/2021  Narrative: US PARACENTESIS-: 1/27/2021 9:46 AM  IR PROCEDURE:   Ultrasound-Guided Therapeutic Paracentesis.  INDICATION: 70-year-old female with hepatic cirrhosis.  Abdominal distention from ascites.  CONSENT:   The procedure, including risks (especially bleeding, infection and damage to bowel potentially requiring hospitalization and/or surgery), benefits and alternatives were discussed in detail with the patient, and all questions answered. The patient acknowledged understanding of the procedure and risks and agreed to proceed. Written informed consent then obtained.  STERILE TECHNIQUE: The  following sterile barrier precautions were used during the procedure, including but not limited to:  hand hygiene; use of 2% chlorhexidine aseptic; sterile gloves, sterile drape/towels, sterile ultrasound probe cover and sterile ultrasound gel.  PROCEDURE: The abdomen was ultrasonographically surveyed.  An appropriate pocket of fluid was identified in the right lower quadrant. A timeout was performed to confirm the patient's identity, the type of procedure to be performed and the site of entry. The skin was prepped and draped in the usual sterile fashion.  Following the administration of 1% lidocaine buffered with sodium bicarbonate in the subcutaneous tissues and using ultrasound guidance, the peritoneal cavity was accessed with a 5-Kinyarwanda paracentesis needle/catheter system.  The  trocar was removed.  A total of 2000 ml of clear yellow-colored fluid was removed from the peritoneal cavity.  The catheter was removed and a sterile dressing was applied.  The procedure was well tolerated. Fluid was sent to laboratory as requested by the ordering physician.      Impression: Technically successful Ultrasound-Guided Diagnostic Therapeutic Paracentesis without immediate complication with 2000 mL of clear yellow-colored fluid removed.    This report was finalized on 1/27/2021 12:51 PM by Loree Fonseca NP.      Us Paracentesis    Result Date: 1/14/2021  Narrative: ULTRASOUND-GUIDED PARACENTESIS, 01/14/2021  HISTORY: Cirrhosis with ascites.  CONSENT: The procedure, risks and alternatives were discussed in detail with the patient, questions were entertained, and written informed consent was obtained. PT PT and platelet were obtained and reviewed and felt to be adequate for this examination. Timeout was observed in the procedure room for patient identification and procedure site verification, and the procedure site was marked by me. Permanent images were recorded.  PROCEDURE: Using sterile technique, 1% lidocaine local  anesthetic and real-time ultrasound guidance, a 5 Kyrgyz paracentesis catheter was placed into the largest identified portion of ascites fluid right lower abdomen, and 2600 cc of straw-colored ascites fluid was removed without apparent complication.      Impression: Technically successful ultrasound-guided paracentesis with removal of 2600 cc ascites fluid.  some of this fluid was sent for requested laboratory studies  This report was finalized on 1/14/2021 11:14 AM by Dr. Solitario Jain MD.        REVIEW OF SYSTEMS  Review of Systems      ACTIVE PROBLEMS  Patient Active Problem List    Diagnosis   • Iron deficiency anemia due to chronic blood loss [D50.0]   • Gastric varices [I86.4]   • Alcoholic cirrhosis (CMS/HCC) [K70.30]   • Gastrointestinal hemorrhage [K92.2]   • Gastrointestinal hemorrhage associated with alcoholic gastritis [K29.21]   • Severe malnutrition (CMS/HCC) [E43]   • Bladder prolapse, female, acquired [N81.10]   • Sepsis associated hypotension (CMS/HCC) [A41.9, I95.9]   • Acute liver failure [K72.00]   • Altered mental state [R41.82]   • Acute renal failure (CMS/HCC) [N17.9]   • Alcohol abuse [F10.10]   • Hepatorenal syndrome (CMS/HCC) [K76.7]   • Ascites due to alcoholic hepatitis [K70.11]   • Acute pain of right shoulder [M25.511]   • Tear of right rotator cuff [M75.101]   • Acute respiratory failure with hypoxia (CMS/HCC) [J96.01]   • Lumbar spinal stenosis [M48.061]   • Frequent UTI [N39.0]   • Hypertension [I10]   • Stress incontinence [N39.3]   • Itching due to drug [L29.8, T50.905A]   • Spinal stenosis of lumbar region with neurogenic claudication [M48.062]         PAST MEDICAL HISTORY  Past Medical History:   Diagnosis Date   • Elevated liver enzymes    • Fatty liver    • Frequent UTI    • History of depression    • Hypertension    • Lumbar stenosis    • OA (osteoarthritis)    • Post-menopausal    • Spondylarthritis     LOW BACK   • Stress incontinence    • Urinary retention          SURGICAL  HISTORY  Past Surgical History:   Procedure Laterality Date   • ABDOMINOPLASTY     • BREAST SURGERY     • CATARACT EXTRACTION Bilateral    •  SECTION      X3   • COLONOSCOPY      2006   • COLONOSCOPY N/A 2016    Procedure: COLONOSCOPY;  Surgeon: Ho Arenas MD;  Location: [facility] ENDOSCOPY;  Service:    • ENDOSCOPY N/A 2020    Procedure: ESOPHAGOGASTRODUODENOSCOPY;  Surgeon: Terrell Stanton MD;  Location: [facility] OR;  Service: Gastroenterology;  Laterality: N/A;  gastric ulcer  duodenal erosion  gastric varices   • LASIK     • LUMBAR DISCECTOMY FUSION INSTRUMENTATION N/A 10/1/2018    Procedure: L4-5, L5-S1  laminectomy and fusion with instrumentation;  Surgeon: Juno Kim MD;  Location: [facility] MAIN OR;  Service: Orthopedic Spine   • REDUCTION MAMMAPLASTY     • TIPS PROCEDURE     • TONSILLECTOMY     • TOTAL SHOULDER ARTHROPLASTY W/ DISTAL CLAVICLE EXCISION Right 2019    Procedure: TOTAL SHOULDER REVERSE ARTHROPLASTY;  Surgeon: David Marion MD;  Location: [facility] OR;  Service: Orthopedics         FAMILY HISTORY  Family History   Problem Relation Age of Onset   • Colon polyps Father    • Breast cancer Maternal Aunt    • Malig Hyperthermia Neg Hx          SOCIAL HISTORY  Social History     Occupational History   • Not on file   Tobacco Use   • Smoking status: Never Smoker   • Smokeless tobacco: Never Used   Substance and Sexual Activity   • Alcohol use: Not Currently     Comment: no ETOH since    • Drug use: No   • Sexual activity: Defer         CURRENT MEDICATIONS    Current Outpatient Medications:   •  folic acid (FOLVITE) 1 MG tablet, Take 1 tablet by mouth Daily., Disp: 30 tablet, Rfl: 1  •  lactobacillus acidophilus (RISAQUAD) capsule capsule, Take 1 capsule by mouth Daily., Disp: 30 capsule, Rfl: 1  •  lactulose (CHRONULAC) 10 GM/15ML solution, Take 30 mL by mouth 2 (Two) Times a Day., Disp: 1892 mL, Rfl: 1  •  levothyroxine (SYNTHROID, LEVOTHROID) 50  "MCG tablet, Take 1 tablet by mouth Daily., Disp: 30 tablet, Rfl: 1  •  midodrine (PROAMATINE) 2.5 MG tablet, Take 1 tablet by mouth 3 (Three) Times a Day Before Meals., Disp: 90 tablet, Rfl: 1  •  RIFAXIMIN PO, 550 mg., Disp: , Rfl:   •  torsemide (DEMADEX) 20 MG tablet, Take 1 tablet by mouth Daily., Disp: 30 tablet, Rfl: 1  No current facility-administered medications for this visit.     Facility-Administered Medications Ordered in Other Visits:   •  mupirocin (BACTROBAN) 2 % nasal ointment, , Nasal, BID, David Marion MD  •  sodium bicarbonate injection 0.5 mEq, 1 mL, Subcutaneous, Once, Louise Palomino APRN    ALLERGIES  Patient has no known allergies.    VITALS  Vitals:    01/27/21 1213   Temp: 97.9 °F (36.6 °C)   TempSrc: Temporal   Weight: 61.6 kg (135 lb 12.9 oz)   Height: 160 cm (62.99\")       PHYSICAL EXAM  Debilities/Disabilities Identified: None  Emotional Behavior: Appropriate  Wt Readings from Last 3 Encounters:   01/27/21 61.6 kg (135 lb 12.9 oz)   01/27/21 61.6 kg (135 lb 12.8 oz)   01/14/21 67.1 kg (148 lb)     Ht Readings from Last 1 Encounters:   01/27/21 160 cm (62.99\")     Body mass index is 24.06 kg/m².  Physical Exam  Vitals signs and nursing note reviewed.   Constitutional:       Appearance: She is well-developed.   HENT:      Head: Normocephalic and atraumatic.   Eyes:      Conjunctiva/sclera: Conjunctivae normal.      Pupils: Pupils are equal, round, and reactive to light.   Neck:      Musculoskeletal: Normal range of motion and neck supple.   Cardiovascular:      Rate and Rhythm: Normal rate and regular rhythm.   Pulmonary:      Effort: Pulmonary effort is normal.      Breath sounds: Normal breath sounds.   Abdominal:      General: Bowel sounds are normal. There is no distension.      Palpations: Abdomen is soft.      Tenderness: There is abdominal tenderness in the right upper quadrant.      Comments: Peripheral edema is 3+ in bilat LE's and abd edema is 2+ on palpation.    Feet are " no longer cyanotic since edema is improved   Liver palpates 18cm,  With no fluid shift. Jaundice, and pos scleral icterus,  no spider angiomas   Musculoskeletal: Normal range of motion.   Lymphadenopathy:      Cervical: No cervical adenopathy.   Skin:     General: Skin is warm and dry.   Neurological:      Mental Status: She is alert and oriented to person, place, and time.   Psychiatric:         Behavior: Behavior normal.         CLINICAL DATA REVIEWED   reviewed previous lab results and integrated with today's visit, reviewed notes from other physicians and/or last GI encounter, reviewed previous endoscopy results and available photos, reviewed surgical pathology results from previous biopsies    ASSESSMENT  Diagnoses and all orders for this visit:    Alcoholic cirrhosis, unspecified whether ascites present (CMS/HCC)    Ascites due to alcoholic hepatitis  -     US Paracentesis; Standing  -     Protein, Body Fluid - Body Fluid, Peritoneum; Standing  -     Albumin, Fluid - Body Fluid, Peritoneum; Standing  -     albumin human 25 % IV SOLN 25 g    Gastrointestinal hemorrhage associated with alcoholic gastritis    Gastric varices    Iron deficiency anemia due to chronic blood loss          PLAN  Return in about 6 weeks (around 3/10/2021).     Labs improved with hgb 11.5, alt normal ast improved at 61.    Will order every 2 week paracentesis with albumin.  Complete xifaxan paperwork and continue with the lactulose 30mg twice a day.     Would like to do some more pelvic floor therapy and PT will call PCP about this.     I have discussed the above plan with the patient.  They verbalize understanding and are in agreement with the plan.  They have been advised to contact the office for any questions, concerns, or changes related to their health.

## 2021-01-27 NOTE — NURSING NOTE
NURSING PROGRESS NOTE       Patient received  AVS, Pt verbalized understanding.  Discharged to home @ 1145. Condition Satisfactory .  Lani Bush RN

## 2021-02-16 NOTE — PATIENT INSTRUCTIONS
Paracentesis, Care After  This sheet gives you information about how to care for yourself after your procedure. Your health care provider may also give you more specific instructions. If you have problems or questions, contact your health care provider.  What can I expect after the procedure?  After the procedure, it is common to have a small amount of clear fluid coming from the puncture site.  Follow these instructions at home:  Puncture site care    · Follow instructions from your health care provider about how to take care of your puncture site. Make sure you:  ? Wash your hands with soap and water before and after you change your bandage (dressing). If soap and water are not available, use hand .  ? Change your dressing as told by your health care provider.  · Check your puncture area every day for signs of infection. Check for:  ? Redness, swelling, or pain.  ? More fluid or blood.  ? Warmth.  ? Pus or a bad smell.  General instructions  · Return to your normal activities as told by your health care provider. Ask your health care provider what activities are safe for you.  · Take over-the-counter and prescription medicines only as told by your health care provider.  · Do not take baths, swim, or use a hot tub until your health care provider approves. Ask your health care provider if you may take showers. You may only be allowed to take sponge baths.  · Keep all follow-up visits as told by your health care provider. This is important.  Contact a health care provider if:  · You have redness, swelling, or pain at your puncture site.  · You have more fluid or blood coming from your puncture site.  · Your puncture site feels warm to the touch.  · You have pus or a bad smell coming from your puncture site.  · You have a fever.  Get help right away if:  · You have chest pain or shortness of breath.  · You develop increasing pain, discomfort, or swelling in your abdomen.  · You feel dizzy or light-headed or  "you faint.  Summary  · After the procedure, it is common to have a small amount of clear fluid coming from the puncture site.  · Follow instructions from your health care provider about how to take care of your puncture site.  · Check your puncture area every day signs of infection.  · Keep all follow-up visits as told by your health care provider.  This information is not intended to replace advice given to you by your health care provider. Make sure you discuss any questions you have with your health care provider.  Document Revised: 06/30/2020 Document Reviewed: 10/08/2019  [employer]vier Patient Education © 2020 [employer]      We know you have a Choice in healthcare and appreciate you using [facility].  Our purpose is to provide you \"Excellent Care\".  We hope that you will always choose us in the future and continue to recommend us to your family and friends.      "

## 2021-02-17 ENCOUNTER — HOSPITAL ENCOUNTER (OUTPATIENT)
Dept: ULTRASOUND IMAGING | Facility: HOSPITAL | Age: 71
Discharge: HOME | End: 2021-02-17

## 2021-02-17 DIAGNOSIS — K70.30 ALCOHOLIC CIRRHOSIS, UNSPECIFIED WHETHER ASCITES PRESENT (HCC): Primary | ICD-10-CM

## 2021-02-24 ENCOUNTER — HOSPITAL ENCOUNTER (OUTPATIENT)
Dept: ULTRASOUND IMAGING | Facility: HOSPITAL | Age: 71
Discharge: HOME | End: 2021-02-24
Admitting: NURSE PRACTITIONER

## 2021-02-24 DIAGNOSIS — K70.11 ASCITES DUE TO ALCOHOLIC HEPATITIS: Primary | ICD-10-CM

## 2021-02-24 PROCEDURE — 76705 ECHO EXAM OF ABDOMEN: CPT

## 2021-02-24 NOTE — POST-PROCEDURE NOTE
Ultrasound imaging prior to procedure demonstrate only trace ascites. No procedure was performed today.

## 2021-03-09 ENCOUNTER — TELEPHONE (OUTPATIENT)
Dept: GASTROENTEROLOGY | Facility: CLINIC | Age: 71
End: 2021-03-09

## 2021-03-09 NOTE — TELEPHONE ENCOUNTER
Call from patient.  She states called to schedule pancreatitis, she was told it was closed since no fluid was found last time.  She needs new order.  She going to schedule for 03/24/2021, which will 5-6 weeks out from her last pancreatitis.  Please advise.  THANK YOU!!

## 2021-03-11 DIAGNOSIS — K70.11 ASCITES DUE TO ALCOHOLIC HEPATITIS: Primary | ICD-10-CM

## 2021-03-11 RX ORDER — ALBUMIN (HUMAN) 12.5 G/50ML
25 SOLUTION INTRAVENOUS ONCE
Status: CANCELLED | OUTPATIENT
Start: 2021-03-11

## 2021-03-11 NOTE — TELEPHONE ENCOUNTER
I have placed new standing orders for her for u/s paracentesis for every 6 weeks since her ascites seems to have slowed down.  Please let me know if we need to change this.  Thanks HEAVENLY

## 2021-03-21 ENCOUNTER — APPOINTMENT (OUTPATIENT)
Dept: CT IMAGING | Facility: HOSPITAL | Age: 71
End: 2021-03-21

## 2021-03-21 ENCOUNTER — HOSPITAL ENCOUNTER (EMERGENCY)
Facility: HOSPITAL | Age: 71
Discharge: HOME | End: 2021-03-21
Attending: EMERGENCY MEDICINE | Admitting: EMERGENCY MEDICINE

## 2021-03-21 VITALS
WEIGHT: 106 LBS | RESPIRATION RATE: 19 BRPM | OXYGEN SATURATION: 98 % | TEMPERATURE: 97.9 F | SYSTOLIC BLOOD PRESSURE: 129 MMHG | HEART RATE: 89 BPM | DIASTOLIC BLOOD PRESSURE: 69 MMHG | HEIGHT: 62 IN | BODY MASS INDEX: 19.51 KG/M2

## 2021-03-21 DIAGNOSIS — R33.9 URINARY RETENTION WITH INCOMPLETE BLADDER EMPTYING: ICD-10-CM

## 2021-03-21 DIAGNOSIS — A04.72 CLOSTRIDIUM DIFFICILE COLITIS: Primary | ICD-10-CM

## 2021-03-21 DIAGNOSIS — E83.42 HYPOMAGNESEMIA: ICD-10-CM

## 2021-03-21 DIAGNOSIS — E87.6 HYPOKALEMIA DUE TO EXCESSIVE GASTROINTESTINAL LOSS OF POTASSIUM: ICD-10-CM

## 2021-03-21 LAB
ADV 40+41 DNA STL QL NAA+NON-PROBE: NOT DETECTED
ALBUMIN SERPL-MCNC: 3.5 G/DL (ref 3.5–5.2)
ALBUMIN/GLOB SERPL: 1.1 G/DL
ALP SERPL-CCNC: 111 U/L (ref 39–117)
ALT SERPL W P-5'-P-CCNC: 24 U/L (ref 1–33)
ANION GAP SERPL CALCULATED.3IONS-SCNC: 9 MMOL/L (ref 5–15)
AST SERPL-CCNC: 43 U/L (ref 1–32)
ASTRO TYP 1-8 RNA STL QL NAA+NON-PROBE: NOT DETECTED
BASOPHILS # BLD AUTO: 0.06 10*3/MM3 (ref 0–0.2)
BASOPHILS NFR BLD AUTO: 0.5 % (ref 0–1.5)
BILIRUB SERPL-MCNC: 2.9 MG/DL (ref 0–1.2)
BILIRUB UR QL STRIP: NEGATIVE
BUN SERPL-MCNC: 7 MG/DL (ref 8–23)
BUN/CREAT SERPL: 12.3 (ref 7–25)
C CAYETANENSIS DNA STL QL NAA+NON-PROBE: NOT DETECTED
C COLI+JEJ+UPSA DNA STL QL NAA+NON-PROBE: NOT DETECTED
C DIFF GDH STL QL: POSITIVE
CALCIUM SPEC-SCNC: 9.6 MG/DL (ref 8.6–10.5)
CHLORIDE SERPL-SCNC: 95 MMOL/L (ref 98–107)
CLARITY UR: CLEAR
CO2 SERPL-SCNC: 29 MMOL/L (ref 22–29)
COLOR UR: YELLOW
CREAT SERPL-MCNC: 0.57 MG/DL (ref 0.57–1)
CRYPTOSP DNA STL QL NAA+NON-PROBE: NOT DETECTED
DEPRECATED RDW RBC AUTO: 49.9 FL (ref 37–54)
EAEC PAA PLAS AGGR+AATA ST NAA+NON-PRB: NOT DETECTED
EAEC PAA PLAS AGGR+AATA ST NAA+NON-PRB: NOT DETECTED
EC STX1+STX2 GENES STL QL NAA+NON-PROBE: NOT DETECTED
EOSINOPHIL # BLD AUTO: 0.09 10*3/MM3 (ref 0–0.4)
EOSINOPHIL NFR BLD AUTO: 0.7 % (ref 0.3–6.2)
EPEC EAE GENE STL QL NAA+NON-PROBE: NOT DETECTED
ERYTHROCYTE [DISTWIDTH] IN BLOOD BY AUTOMATED COUNT: 14 % (ref 12.3–15.4)
ETEC LTA+ST1A+ST1B TOX ST NAA+NON-PROBE: NOT DETECTED
G LAMBLIA DNA STL QL NAA+NON-PROBE: NOT DETECTED
GFR SERPL CREATININE-BSD FRML MDRD: 104 ML/MIN/1.73
GLOBULIN UR ELPH-MCNC: 3.2 GM/DL
GLUCOSE SERPL-MCNC: 140 MG/DL (ref 65–99)
GLUCOSE UR STRIP-MCNC: NEGATIVE MG/DL
HCT VFR BLD AUTO: 39.1 % (ref 34–46.6)
HGB BLD-MCNC: 13 G/DL (ref 12–15.9)
HGB UR QL STRIP.AUTO: NEGATIVE
IMM GRANULOCYTES # BLD AUTO: 0.04 10*3/MM3 (ref 0–0.05)
IMM GRANULOCYTES NFR BLD AUTO: 0.3 % (ref 0–0.5)
KETONES UR QL STRIP: NEGATIVE
LEUKOCYTE ESTERASE UR QL STRIP.AUTO: NEGATIVE
LYMPHOCYTES # BLD AUTO: 1.78 10*3/MM3 (ref 0.7–3.1)
LYMPHOCYTES NFR BLD AUTO: 14.5 % (ref 19.6–45.3)
MAGNESIUM SERPL-MCNC: 1.5 MG/DL (ref 1.6–2.4)
MCH RBC QN AUTO: 31.9 PG (ref 26.6–33)
MCHC RBC AUTO-ENTMCNC: 33.2 G/DL (ref 31.5–35.7)
MCV RBC AUTO: 95.8 FL (ref 79–97)
MONOCYTES # BLD AUTO: 1.19 10*3/MM3 (ref 0.1–0.9)
MONOCYTES NFR BLD AUTO: 9.7 % (ref 5–12)
NEUTROPHILS NFR BLD AUTO: 74.3 % (ref 42.7–76)
NEUTROPHILS NFR BLD AUTO: 9.14 10*3/MM3 (ref 1.7–7)
NITRITE UR QL STRIP: NEGATIVE
NOROVIRUS GI+II RNA STL QL NAA+NON-PROBE: NOT DETECTED
NRBC BLD AUTO-RTO: 0 /100 WBC (ref 0–0.2)
P SHIGELLOIDES DNA STL QL NAA+NON-PROBE: NOT DETECTED
PH UR STRIP.AUTO: 7 [PH] (ref 4.5–8)
PLATELET # BLD AUTO: 214 10*3/MM3 (ref 140–450)
PMV BLD AUTO: 10.6 FL (ref 6–12)
POTASSIUM SERPL-SCNC: 2.9 MMOL/L (ref 3.5–5.2)
PROT SERPL-MCNC: 6.7 G/DL (ref 6–8.5)
PROT UR QL STRIP: NEGATIVE
RBC # BLD AUTO: 4.08 10*6/MM3 (ref 3.77–5.28)
RVA RNA STL QL NAA+NON-PROBE: NOT DETECTED
S ENT+BONG DNA STL QL NAA+NON-PROBE: NOT DETECTED
SAPO I+II+IV+V RNA STL QL NAA+NON-PROBE: NOT DETECTED
SHIGELLA SP+EIEC IPAH ST NAA+NON-PROBE: NOT DETECTED
SODIUM SERPL-SCNC: 133 MMOL/L (ref 136–145)
SP GR UR STRIP: 1.01 (ref 1–1.03)
UROBILINOGEN UR QL STRIP: NORMAL
V CHOL+PARA+VUL DNA STL QL NAA+NON-PROBE: NOT DETECTED
V CHOLERAE DNA STL QL NAA+NON-PROBE: NOT DETECTED
WBC # BLD AUTO: 12.3 10*3/MM3 (ref 3.4–10.8)
Y ENTEROCOL DNA STL QL NAA+NON-PROBE: NOT DETECTED

## 2021-03-21 PROCEDURE — 99283 EMERGENCY DEPT VISIT LOW MDM: CPT

## 2021-03-21 PROCEDURE — 99284 EMERGENCY DEPT VISIT MOD MDM: CPT | Performed by: PHYSICIAN ASSISTANT

## 2021-03-21 PROCEDURE — 81003 URINALYSIS AUTO W/O SCOPE: CPT | Performed by: PHYSICIAN ASSISTANT

## 2021-03-21 PROCEDURE — 85025 COMPLETE CBC W/AUTO DIFF WBC: CPT | Performed by: PHYSICIAN ASSISTANT

## 2021-03-21 PROCEDURE — 87449 NOS EACH ORGANISM AG IA: CPT | Performed by: PHYSICIAN ASSISTANT

## 2021-03-21 PROCEDURE — 74176 CT ABD & PELVIS W/O CONTRAST: CPT

## 2021-03-21 PROCEDURE — 0097U HC BIOFIRE FILMARRAY GI PANEL: CPT | Performed by: PHYSICIAN ASSISTANT

## 2021-03-21 PROCEDURE — 80053 COMPREHEN METABOLIC PANEL: CPT | Performed by: PHYSICIAN ASSISTANT

## 2021-03-21 PROCEDURE — 87324 CLOSTRIDIUM AG IA: CPT | Performed by: PHYSICIAN ASSISTANT

## 2021-03-21 PROCEDURE — 83735 ASSAY OF MAGNESIUM: CPT | Performed by: PHYSICIAN ASSISTANT

## 2021-03-21 RX ORDER — SODIUM CHLORIDE 0.9 % (FLUSH) 0.9 %
10 SYRINGE (ML) INJECTION AS NEEDED
Status: DISCONTINUED | OUTPATIENT
Start: 2021-03-21 | End: 2021-03-21 | Stop reason: HOSPADM

## 2021-03-21 RX ORDER — VANCOMYCIN HYDROCHLORIDE 125 MG/1
125 CAPSULE ORAL EVERY 6 HOURS
Qty: 40 CAPSULE | Refills: 0 | Status: SHIPPED | OUTPATIENT
Start: 2021-03-21 | End: 2021-03-31

## 2021-03-21 RX ORDER — MAGNESIUM OXIDE 400 MG/1
400 TABLET ORAL 2 TIMES DAILY
Qty: 14 TABLET | Refills: 0 | Status: SHIPPED | OUTPATIENT
Start: 2021-03-21 | End: 2021-03-28

## 2021-03-21 RX ORDER — POTASSIUM CHLORIDE 20 MEQ/1
20 TABLET, EXTENDED RELEASE ORAL 2 TIMES DAILY
Qty: 10 TABLET | Refills: 0 | Status: SHIPPED | OUTPATIENT
Start: 2021-03-21 | End: 2021-03-26

## 2021-03-21 RX ORDER — POTASSIUM CHLORIDE 20 MEQ/1
40 TABLET, EXTENDED RELEASE ORAL ONCE
Status: COMPLETED | OUTPATIENT
Start: 2021-03-21 | End: 2021-03-21

## 2021-03-21 RX ORDER — ONDANSETRON 4 MG/1
4 TABLET, ORALLY DISINTEGRATING ORAL EVERY 6 HOURS PRN
Qty: 20 TABLET | Refills: 0 | Status: SHIPPED | OUTPATIENT
Start: 2021-03-21 | End: 2021-07-06

## 2021-03-21 RX ADMIN — POTASSIUM CHLORIDE 40 MEQ: 1500 TABLET, EXTENDED RELEASE ORAL at 13:37

## 2021-03-21 RX ADMIN — SODIUM CHLORIDE, POTASSIUM CHLORIDE, SODIUM LACTATE AND CALCIUM CHLORIDE 500 ML: 600; 310; 30; 20 INJECTION, SOLUTION INTRAVENOUS at 12:15

## 2021-03-21 RX ADMIN — Medication 400 MG: at 13:37

## 2021-03-21 NOTE — ED PROVIDER NOTES
EMERGENCY DEPARTMENT ENCOUNTER      Room Number: 09/09    History is provided by the patient, no translation services needed    HPI:    Chief complaint: Diarrhea, abdominal pain, fatigue, generalized weakness    Location: Generalized abdominal pain    Quality/Severity: Crampy, 5/10    Timing/Duration: Diarrhea and abdominal pain for 1 week.    Modifying Factors: Patient states she has been taking an unknown prescription medication that is supposed to help with diarrhea but she is not getting much relief.  Patient states diarrhea started after she finished nitrofurantoin for UTI.    Associated Symptoms: Positive for diarrhea, abdominal pain, fatigue.  Denies any chest pain, cough, shortness of breath, fever, vomiting, lightheadedness or syncope.  Denies any current dysuria, urgency or frequency to urinate.    Narrative: Pt is a 70 y.o. female who presents complaining of diarrhea x1 week.  Patient has a PMH significant for alcoholic cirrhosis with recurrent ascites, as well as esophageal varices.  She was hospitalized here in December for bleeding esophageal varices and ascites, ultimately transferred to [facility] for TIPS procedure.  She has been doing much better since the TIPS procedure, ascites has seemed to resolve, denies any further GI bleeding, lower extremity edema and renal failure have also seemed to resolve.  She states she was treated by her PCP for UTI a couple of weeks ago, she states she took nitrofurantoin for 1 week, and shortly thereafter began having abdominal cramping and diarrhea.  She states before the diarrhea started she was impacted but was able to pass a large amount of hard stool and has had diarrhea ever since.  She states she is now having liquid stools and mucus.  Denies any black or bloody stools.  She reports she feels very dehydrated.      PMD: Tayler Trujillo MD    REVIEW OF SYSTEMS  Review of Systems   Constitutional: Positive for appetite change and fatigue. Negative  for chills and fever.   Respiratory: Negative for cough and shortness of breath.    Cardiovascular: Negative for chest pain and palpitations.   Gastrointestinal: Positive for abdominal pain, diarrhea and nausea. Negative for vomiting.   Genitourinary: Negative for difficulty urinating and dysuria.   Musculoskeletal: Negative for arthralgias and myalgias.   Skin: Negative for pallor and rash.   Neurological: Positive for headaches. Negative for dizziness and syncope.   Psychiatric/Behavioral: Negative for confusion. The patient is not nervous/anxious.          PAST MEDICAL HISTORY  Active Ambulatory Problems     Diagnosis Date Noted   • Spinal stenosis of lumbar region with neurogenic claudication 09/04/2018   • Lumbar spinal stenosis 10/01/2018   • Frequent UTI 10/01/2018   • Hypertension 10/01/2018   • Stress incontinence 10/01/2018   • Itching due to drug 10/01/2018   • Acute respiratory failure with hypoxia (CMS/HCC) 10/04/2018   • Tear of right rotator cuff 05/14/2019   • Acute pain of right shoulder 06/13/2019   • Hepatorenal syndrome (CMS/HCC) 11/04/2020   • Ascites due to alcoholic hepatitis 11/04/2020   • Bladder prolapse, female, acquired 11/05/2020   • Sepsis associated hypotension (CMS/HCC) 11/05/2020   • Acute liver failure 11/05/2020   • Altered mental state 11/05/2020   • Acute renal failure (CMS/HCC) 11/05/2020   • Alcohol abuse 11/05/2020   • Severe malnutrition (CMS/HCC) 11/11/2020   • Gastrointestinal hemorrhage 12/09/2020   • Gastrointestinal hemorrhage associated with alcoholic gastritis 12/09/2020   • Gastric varices 12/11/2020   • Alcoholic cirrhosis (CMS/HCC) 12/11/2020   • Iron deficiency anemia due to chronic blood loss 01/27/2021     Resolved Ambulatory Problems     Diagnosis Date Noted   • No Resolved Ambulatory Problems     Past Medical History:   Diagnosis Date   • Elevated liver enzymes    • Fatty liver    • History of depression    • Lumbar stenosis    • OA (osteoarthritis)    •  Post-menopausal    • Spondylarthritis    • Urinary retention        PAST SURGICAL HISTORY  Past Surgical History:   Procedure Laterality Date   • ABDOMINOPLASTY     • BREAST SURGERY     • CATARACT EXTRACTION Bilateral    •  SECTION      X3   • COLONOSCOPY      2006   • COLONOSCOPY N/A 2016    Procedure: COLONOSCOPY;  Surgeon: Ho Arenas MD;  Location[facility];  Service:    • ENDOSCOPY N/A 2020    Procedure: ESOPHAGOGASTRODUODENOSCOPY;  Surgeon: Terrell Stanton MD;  Location: [facility] OR;  Service: Gastroenterology;  Laterality: N/A;  gastric ulcer  duodenal erosion  gastric varices   • LASIK     • LUMBAR DISCECTOMY FUSION INSTRUMENTATION N/A 10/1/2018    Procedure: L4-5, L5-S1  laminectomy and fusion with instrumentation;  Surgeon: Juno Kim MD;  Location: [facility] OR;  Service: Orthopedic Spine   • REDUCTION MAMMAPLASTY     • TIPS PROCEDURE     • TONSILLECTOMY     • TOTAL SHOULDER ARTHROPLASTY W/ DISTAL CLAVICLE EXCISION Right 2019    Procedure: TOTAL SHOULDER REVERSE ARTHROPLASTY;  Surgeon: David Marion MD;  Location: [facility] OR;  Service: Orthopedics       FAMILY HISTORY  Family History   Problem Relation Age of Onset   • Colon polyps Father    • Breast cancer Maternal Aunt    • Malig Hyperthermia Neg Hx        SOCIAL HISTORY  Social History     Socioeconomic History   • Marital status:      Spouse name: Not on file   • Number of children: Not on file   • Years of education: Not on file   • Highest education level: Not on file   Tobacco Use   • Smoking status: Never Smoker   • Smokeless tobacco: Never Used   Vaping Use   • Vaping Use: Never used   Substance and Sexual Activity   • Alcohol use: Not Currently     Comment: no ETOH since    • Drug use: No   • Sexual activity: Defer       ALLERGIES  Patient has no known allergies.      Current Facility-Administered Medications:   •  magnesium oxide (MAG-OX) tablet 400 mg, 400 mg, Oral,  Daily, Tameka Cook PA-C, 400 mg at 03/21/21 1337  •  [COMPLETED] Insert peripheral IV, , , Once **AND** sodium chloride 0.9 % flush 10 mL, 10 mL, Intravenous, PRN, Tameka Cook PA-C    Current Outpatient Medications:   •  folic acid (FOLVITE) 1 MG tablet, Take 1 tablet by mouth Daily., Disp: 30 tablet, Rfl: 1  •  lactobacillus acidophilus (RISAQUAD) capsule capsule, Take 1 capsule by mouth Daily., Disp: 30 capsule, Rfl: 1  •  lactulose (CHRONULAC) 10 GM/15ML solution, Take 30 mL by mouth 2 (Two) Times a Day., Disp: 1892 mL, Rfl: 1  •  levothyroxine (SYNTHROID, LEVOTHROID) 50 MCG tablet, Take 1 tablet by mouth Daily., Disp: 30 tablet, Rfl: 1  •  magnesium oxide (MAG-OX) 400 MG tablet, Take 1 tablet by mouth 2 (Two) Times a Day for 7 days., Disp: 14 tablet, Rfl: 0  •  midodrine (PROAMATINE) 2.5 MG tablet, Take 1 tablet by mouth 3 (Three) Times a Day Before Meals., Disp: 90 tablet, Rfl: 1  •  ondansetron ODT (ZOFRAN-ODT) 4 MG disintegrating tablet, Place 1 tablet under the tongue Every 6 (Six) Hours As Needed for Nausea., Disp: 20 tablet, Rfl: 0  •  potassium chloride (K-DUR,KLOR-CON) 20 MEQ CR tablet, Take 1 tablet by mouth 2 (Two) Times a Day for 5 days., Disp: 10 tablet, Rfl: 0  •  torsemide (DEMADEX) 20 MG tablet, Take 1 tablet by mouth Daily., Disp: 30 tablet, Rfl: 1  •  vancomycin (VANCOCIN) 125 MG capsule, Take 1 capsule by mouth Every 6 (Six) Hours for 10 days., Disp: 40 capsule, Rfl: 0    Facility-Administered Medications Ordered in Other Encounters:   •  mupirocin (BACTROBAN) 2 % nasal ointment, , Nasal, BID, David Marion MD    PHYSICAL EXAM  ED Triage Vitals [03/21/21 1128]   Temp Heart Rate Resp BP SpO2   97.9 °F (36.6 °C) 93 18 133/79 100 %      Temp src Heart Rate Source Patient Position BP Location FiO2 (%)   Oral Monitor Sitting Right arm --       Physical Exam  Vitals and nursing note reviewed.   Constitutional:       General: She is not in acute distress.     Appearance: She is  normal weight. She is ill-appearing.   HENT:      Head: Normocephalic and atraumatic.      Mouth/Throat:      Mouth: Mucous membranes are dry.   Eyes:      General: Scleral icterus (Mild) present.      Conjunctiva/sclera: Conjunctivae normal.      Pupils: Pupils are equal, round, and reactive to light.   Cardiovascular:      Rate and Rhythm: Normal rate and regular rhythm.      Heart sounds: Murmur heard.     Pulmonary:      Effort: Pulmonary effort is normal.      Breath sounds: Normal breath sounds.   Abdominal:      General: Bowel sounds are normal.      Palpations: Abdomen is soft.      Tenderness: There is generalized abdominal tenderness. There is no guarding.   Musculoskeletal:         General: Normal range of motion.      Cervical back: Normal range of motion and neck supple.   Skin:     General: Skin is warm and dry.      Capillary Refill: Capillary refill takes less than 2 seconds.   Neurological:      Mental Status: She is alert and oriented to person, place, and time.   Psychiatric:         Mood and Affect: Mood and affect normal.         Behavior: Behavior is cooperative.         Cognition and Memory: Memory normal.         Judgment: Judgment normal.           LAB RESULTS  Lab Results (last 24 hours)     Procedure Component Value Units Date/Time    CBC & Differential [[id number]]  (Abnormal) Collected: 03/21/21 1210    Specimen: Blood Updated: 03/21/21 1225    Narrative:      The following orders were created for panel order CBC & Differential.  Procedure                               Abnormality         Status                     ---------                               -----------         ------                     CBC Auto Differential[[id number]]        Abnormal            Final result                 Please view results for these tests on the individual orders.    Comprehensive Metabolic Panel [[id number]]  (Abnormal) Collected: 03/21/21 1210    Specimen: Blood Updated: 03/21/21 1244     Glucose 140  mg/dL      BUN 7 mg/dL      Creatinine 0.57 mg/dL      Sodium 133 mmol/L      Potassium 2.9 mmol/L      Chloride 95 mmol/L      CO2 29.0 mmol/L      Calcium 9.6 mg/dL      Total Protein 6.7 g/dL      Albumin 3.50 g/dL      ALT (SGPT) 24 U/L      AST (SGOT) 43 U/L      Alkaline Phosphatase 111 U/L      Total Bilirubin 2.9 mg/dL      eGFR Non African Amer 104 mL/min/1.73      Globulin 3.2 gm/dL      A/G Ratio 1.1 g/dL      BUN/Creatinine Ratio 12.3     Anion Gap 9.0 mmol/L     Narrative:      GFR Normal >60  Chronic Kidney Disease <60  Kidney Failure <15      CBC Auto Differential [[id number]]  (Abnormal) Collected: 03/21/21 1210    Specimen: Blood Updated: 03/21/21 1225     WBC 12.30 10*3/mm3      RBC 4.08 10*6/mm3      Hemoglobin 13.0 g/dL      Hematocrit 39.1 %      MCV 95.8 fL      MCH 31.9 pg      MCHC 33.2 g/dL      RDW 14.0 %      RDW-SD 49.9 fl      MPV 10.6 fL      Platelets 214 10*3/mm3      Neutrophil % 74.3 %      Lymphocyte % 14.5 %      Monocyte % 9.7 %      Eosinophil % 0.7 %      Basophil % 0.5 %      Immature Grans % 0.3 %      Neutrophils, Absolute 9.14 10*3/mm3      Lymphocytes, Absolute 1.78 10*3/mm3      Monocytes, Absolute 1.19 10*3/mm3      Eosinophils, Absolute 0.09 10*3/mm3      Basophils, Absolute 0.06 10*3/mm3      Immature Grans, Absolute 0.04 10*3/mm3      nRBC 0.0 /100 WBC     Magnesium [[id number]]  (Abnormal) Collected: 03/21/21 1210    Specimen: Blood Updated: 03/21/21 1306     Magnesium 1.5 mg/dL     Clostridium Difficile Toxin - Stool, Per Rectum [[id number]]  (Abnormal) Collected: 03/21/21 1214    Specimen: Stool from Per Rectum Updated: 03/21/21 1259    Narrative:      The following orders were created for panel order Clostridium Difficile Toxin - Stool, Per Rectum.  Procedure                               Abnormality         Status                     ---------                               -----------         ------                     Clostridium Difficile EI...[[id number]]   Abnormal            Final result                 Please view results for these tests on the individual orders.    Gastrointestinal Panel, PCR - Stool, Per Rectum [[id number]] Collected: 03/21/21 1214    Specimen: Stool from Per Rectum Updated: 03/21/21 1222    Clostridium Difficile EIA - Stool, Per Rectum [[id number]]  (Abnormal) Collected: 03/21/21 1214    Specimen: Stool from Per Rectum Updated: 03/21/21 1259     C Diff GDH / Toxin Positive    Urinalysis With Microscopic If Indicated (No Culture) - Urine, Clean Catch [[id number]]  (Normal) Collected: 03/21/21 1336    Specimen: Urine, Clean Catch Updated: 03/21/21 1348     Color, UA Yellow     Appearance, UA Clear     pH, UA 7.0     Specific Gravity, UA 1.015     Glucose, UA Negative     Ketones, UA Negative     Bilirubin, UA Negative     Blood, UA Negative     Protein, UA Negative     Leuk Esterase, UA Negative     Nitrite, UA Negative     Urobilinogen, UA 0.2 E.U./dL    Narrative:      Urine microscopic not indicated.            I ordered the above labs and reviewed the results    RADIOLOGY  CT Abdomen Pelvis Without Contrast    Result Date: 3/21/2021  INDICATION: Known cirrhosis with generalized abdominal pain and diarrhea for 2 weeks. Post Tips. TECHNIQUE: CT of the abdomen and pelvis without contrast. Coronal and sagittal reconstructions were obtained.  Radiation dose reduction techniques included automated exposure control or exposure modulation based on body size. Radiation audit for number of CT and nuclear cardiology exams performed in the last year: 2.  COMPARISON: Abdomen pelvis CT scan from 11/15/2020. FINDINGS: Lung bases: Mild dependent atelectasis right lung base. Abdomen: Lack of IV contrast media limits assessment for pathology other than urinary tract calculus disease. The patient has undergone an interval tips. Noncontrast CT scan does not evaluate patency. The spleen measures about 13.4 cm AP dimension and similar to prior. The liver is mildly  heterogeneous with some marginal nodularity consistent with cirrhosis. There is essentially interval resolution of the fatty infiltration noted previously. There is decrease and abdominal ascites since prior with only trace ascites present. There is a new gallstone in the gallbladder about 1.5 cm in diameter. The gallbladder is mildly distended with mild gallbladder wall thickening. Wall thickening and be due to liver disease but please exclude concern for acute cholecystitis given the stone. The bilateral noncontrast kidneys are unremarkable. Noncontrast adrenal glands and pancreas are grossly unremarkable. Pelvis: The urinary bladder is distended with some trabeculation and wall thickening. This is increased from previous. Please correlate for any clinical concern for urinary retention. Uterus and adnexal structures are unremarkable for age group. There are air-fluid levels throughout the colon including the left colon consistent with diarrhea history. There is no evidence for pneumatosis. There is likely only mild nondependent colonic wall thickening. Also multiple air-fluid levels within prominent small bowel loops. There is no transition point. There is no suggestion of obstruction. The appendix is not well seen. There is no free intraperitoneal air. There are atherosclerotic vascular calcifications of the abdominal aorta and branch vessels with out evidence for aneurysm. There are postoperative changes to the lumbar spine with chronic grade 3 anterolisthesis of L5 on S1     1. The patient has had interval placement of a TIPS. There is no only trace ascites, this is considerably improved from previous. Borderline cardiomegaly is unchanged. 2. There is a new large gallstone in the gallbladder with some gallbladder wall thickening and mild gallbladder distention. These findings are nonspecific in a patient with known cirrhosis. Please correlate for any clinical concern for acute cholecystitis. 3. Morphologic  liver findings again consistent with cirrhosis. There appears to be interval resolution of fatty infiltration from prior. 4. Air-fluid levels within small bowel and colon including left colon consistent with diarrhea. No obstruction is appreciated. Likely only mild colonic wall thickening. No free air. Appendix not seen. 5. Distended bladder with wall thickening and trabeculation. This is increased from previous. Please correlate for clinical evidence of neurogenic bladder. 6. Chronic grade 3 anterolisthesis L5 on S1 with postoperative fusion changes Signer Name: Bernarda Wallace MD  Signed: 3/21/2021 1:03 PM  Workstation Name: [email or web address]  [facility]      I ordered the above radiologic testing and reviewed the results    PROCEDURES  Procedures      PROGRESS AND CONSULTS  ED Course as of Mar 21 1439   Sun Mar 21, 2021   1424 I discussed all lab and imaging findings with patient.  She did have some mild electrolyte abnormalities with potassium of 2.9 and magnesium of 1.5.  We have started supplementation here in the ED.  Patient also received a 500 mL bolus of LR, she states she is feeling a little bit better and her headache has subsided.  I did offer to discuss her case with the hospitalist for some observation this evening given her electrolyte abnormalities, however patient states she would be much more comfortable at home.  Her CT was performed without contrast today since she has a recent history of acute renal failure, it does show some mild colonic wall thickening and evidence of diarrhea however no bowel obstruction or severe disease process.  I discussed with patient that we will treat her with vancomycin orally for 10 days, will also give Zofran for nausea, as well as potassium and magnesium supplementation.  I recommended she stay hydrated, she states she drinks plenty of water, I have recommended that she also drink some Pedialyte to help balance electrolyte loss.  She also has chronic  urinary retention, and has been told that she should self cath however she has been very unsuccessful with this in the past. She states she is able to void a substantial amount, however she does usually have some residual urine. She expresses that she would like to see a female urologist.  I have given [facility] the [facility] for [facility] so that she may inquire about scheduling an appointment with a new provider. I have given patient strict return to ER warnings should she deteriorate in any way, and have encouraged her to follow-up with her gastroenterologist or PCP.  Patient verbalizes understanding and is agreeable with this plan.    [KS]      ED Course User Index  [KS] Tameka Cook, ARIA           MEDICAL DECISION MAKING    MDM        My differential diagnosis for abdominal pain includes but is not limited to:  Gastritis, gastroenteritis, peptic ulcer disease, GERD, esophageal perforation, acute appendicitis, mesenteric adenitis, Meckel’s diverticulum, epiploic appendagitis, diverticulitis, colon cancer, ulcerative colitis, Crohn’s disease, intussusception, small bowel obstruction, adhesions, ischemic bowel, perforated viscus, ileus, obstipation, biliary colic, cholecystitis, cholelithiasis, Camilo-Kike Parker, hepatitis, pancreatitis, common bile duct obstruction, cholangitis, bile leak, splenic trauma, splenic rupture, splenic infarction, splenic abscess, abdominal abscess, ascites, spontaneous bacterial peritonitis, hernia, UTI, cystitis,ureterolithiasis, urinary obstruction, ovarian cyst, torsion, pregnancy, ectopic pregnancy, PID, pelvic abscess, mittelschmerz, endometriosis, AAA, myocardial infarction, pneumonia, cancer, porphyria, DKA, medications, sickle cell, viral syndrome, zoster      DIAGNOSIS  Final diagnoses:   Clostridium difficile colitis   Hypokalemia due to excessive gastrointestinal loss of potassium   Hypomagnesemia   Urinary retention with incomplete bladder emptying       Latest  Documented Vital Signs:  As of 14:39 EDT  BP- 129/69 HR- 89 Temp- 97.9 °F (36.6 °C) (Oral) O2 sat- 98%    DISPOSITION  Patient discharged home in care of family member.    Discussed pertinent labs and imaging findings with the patient/family.  Patient/Family voiced understanding of need to follow-up for recheck, further testing as needed.  Return to the emergency Department warnings were given.         Medication List      New Prescriptions    magnesium oxide 400 MG tablet  Commonly known as: MAG-OX  Take 1 tablet by mouth 2 (Two) Times a Day for 7 days.     ondansetron ODT 4 MG disintegrating tablet  Commonly known as: ZOFRAN-ODT  Place 1 tablet under the tongue Every 6 (Six) Hours As Needed for Nausea.     potassium chloride 20 MEQ CR tablet  Commonly known as: K-DUR,KLOR-CON  Take 1 tablet by mouth 2 (Two) Times a Day for 5 days.     vancomycin 125 MG capsule  Commonly known as: VANCOCIN  Take 1 capsule by mouth Every 6 (Six) Hours for 10 days.        Stop    RIFAXIMIN PO           Where to Get Your Medications      These medications were sent to [id number] - [place] [address] AT [facility] [address] - [phone]  - [phone] [address] [address]    Phone: [phone]   · magnesium oxide 400 MG tablet  · ondansetron ODT 4 MG disintegrating tablet  · potassium chloride 20 MEQ CR tablet  · vancomycin 125 MG capsule             Follow-up Information     Tayler Trujillo MD. Call in 1 day.    Specialty: Internal Medicine  Why: As needed  Contact information:  [address]  [facility] [id number] [phone]             Louise Palomino, APRN. Call in 1 day.    Specialties: Nurse Practitioner, Gastroenterology  Why: To schedule follow-up appointment  Contact information:  [address]  [facility] [address] [phone]             Call  [facility].    Why: As needed  Contact information:  [address]  [place] [id number]  [phone]                   Dictated utilizing Dragon dictation     Tameka Cook PA-C  03/21/21 2023

## 2021-03-21 NOTE — DISCHARGE INSTRUCTIONS
Return to the emergency department with worsening symptoms, uncontrolled pain, inability to tolerate oral liquids, fever greater than 102°F not controlled by Tylenol or as needed with emergent concerns.

## 2021-03-22 ENCOUNTER — TELEPHONE (OUTPATIENT)
Dept: SURGERY | Facility: CLINIC | Age: 71
End: 2021-03-22

## 2021-03-22 RX ORDER — L.ACID,PARA/B.BIFIDUM/S.THERM 8B CELL
1 CAPSULE ORAL DAILY
Qty: 180 CAPSULE | Refills: 3 | Status: SHIPPED | OUTPATIENT
Start: 2021-03-22 | End: 2022-08-31

## 2021-03-22 NOTE — TELEPHONE ENCOUNTER
She states the pharmacy did not give her the vancomycin yesterday.  She will send her daughter to pick this up today.  She is having burning when she urinates.  Discussed that this will treat both cdiff and uti and will also take a daily probiotic as well.  She will let us know if she has trouble getting the vanc and we can send it in again for her.

## 2021-03-22 NOTE — TELEPHONE ENCOUNTER
She was told to call Louise this morning to let her know she has tested positive for C-diff.   She thinks she needs to talk with you about it.

## 2021-04-10 ENCOUNTER — HOSPITAL ENCOUNTER (EMERGENCY)
Facility: HOSPITAL | Age: 71
Discharge: HOME | End: 2021-04-10
Attending: EMERGENCY MEDICINE | Admitting: EMERGENCY MEDICINE

## 2021-04-10 VITALS
HEART RATE: 98 BPM | OXYGEN SATURATION: 99 % | DIASTOLIC BLOOD PRESSURE: 67 MMHG | SYSTOLIC BLOOD PRESSURE: 143 MMHG | TEMPERATURE: 98.5 F | WEIGHT: 100 LBS | RESPIRATION RATE: 16 BRPM | HEIGHT: 62 IN | BODY MASS INDEX: 18.4 KG/M2

## 2021-04-10 DIAGNOSIS — E87.6 HYPOKALEMIA: ICD-10-CM

## 2021-04-10 DIAGNOSIS — A04.71 RECURRENT CLOSTRIDIUM DIFFICILE DIARRHEA: Primary | ICD-10-CM

## 2021-04-10 DIAGNOSIS — Z87.898 HISTORY OF URINARY RETENTION: ICD-10-CM

## 2021-04-10 DIAGNOSIS — N39.0 FREQUENT UTI: ICD-10-CM

## 2021-04-10 LAB
ALBUMIN SERPL-MCNC: 3.1 G/DL (ref 3.5–5.2)
ALBUMIN/GLOB SERPL: 1.1 G/DL
ALP SERPL-CCNC: 113 U/L (ref 39–117)
ALT SERPL W P-5'-P-CCNC: 28 U/L (ref 1–33)
ANION GAP SERPL CALCULATED.3IONS-SCNC: 8.8 MMOL/L (ref 5–15)
AST SERPL-CCNC: 58 U/L (ref 1–32)
BASOPHILS # BLD AUTO: 0.04 10*3/MM3 (ref 0–0.2)
BASOPHILS NFR BLD AUTO: 0.4 % (ref 0–1.5)
BILIRUB SERPL-MCNC: 2 MG/DL (ref 0–1.2)
BUN SERPL-MCNC: 9 MG/DL (ref 8–23)
BUN/CREAT SERPL: 14.1 (ref 7–25)
C DIFF GDH STL QL: POSITIVE
CALCIUM SPEC-SCNC: 10.1 MG/DL (ref 8.6–10.5)
CHLORIDE SERPL-SCNC: 94 MMOL/L (ref 98–107)
CO2 SERPL-SCNC: 31.2 MMOL/L (ref 22–29)
CREAT SERPL-MCNC: 0.64 MG/DL (ref 0.57–1)
DEPRECATED RDW RBC AUTO: 47.4 FL (ref 37–54)
EOSINOPHIL # BLD AUTO: 0.11 10*3/MM3 (ref 0–0.4)
EOSINOPHIL NFR BLD AUTO: 1.1 % (ref 0.3–6.2)
ERYTHROCYTE [DISTWIDTH] IN BLOOD BY AUTOMATED COUNT: 13.5 % (ref 12.3–15.4)
GFR SERPL CREATININE-BSD FRML MDRD: 91 ML/MIN/1.73
GLOBULIN UR ELPH-MCNC: 2.8 GM/DL
GLUCOSE SERPL-MCNC: 142 MG/DL (ref 65–99)
HCT VFR BLD AUTO: 33 % (ref 34–46.6)
HGB BLD-MCNC: 11.4 G/DL (ref 12–15.9)
IMM GRANULOCYTES # BLD AUTO: 0.04 10*3/MM3 (ref 0–0.05)
IMM GRANULOCYTES NFR BLD AUTO: 0.4 % (ref 0–0.5)
LYMPHOCYTES # BLD AUTO: 1.59 10*3/MM3 (ref 0.7–3.1)
LYMPHOCYTES NFR BLD AUTO: 15.3 % (ref 19.6–45.3)
MAGNESIUM SERPL-MCNC: 1.6 MG/DL (ref 1.6–2.4)
MCH RBC QN AUTO: 32.9 PG (ref 26.6–33)
MCHC RBC AUTO-ENTMCNC: 34.5 G/DL (ref 31.5–35.7)
MCV RBC AUTO: 95.4 FL (ref 79–97)
MONOCYTES # BLD AUTO: 1.2 10*3/MM3 (ref 0.1–0.9)
MONOCYTES NFR BLD AUTO: 11.6 % (ref 5–12)
NEUTROPHILS NFR BLD AUTO: 7.4 10*3/MM3 (ref 1.7–7)
NEUTROPHILS NFR BLD AUTO: 71.2 % (ref 42.7–76)
NRBC BLD AUTO-RTO: 0 /100 WBC (ref 0–0.2)
PLATELET # BLD AUTO: 150 10*3/MM3 (ref 140–450)
PMV BLD AUTO: 10.8 FL (ref 6–12)
POTASSIUM SERPL-SCNC: 3.2 MMOL/L (ref 3.5–5.2)
PROT SERPL-MCNC: 5.9 G/DL (ref 6–8.5)
RBC # BLD AUTO: 3.46 10*6/MM3 (ref 3.77–5.28)
SODIUM SERPL-SCNC: 134 MMOL/L (ref 136–145)
WBC # BLD AUTO: 10.38 10*3/MM3 (ref 3.4–10.8)

## 2021-04-10 PROCEDURE — 25010000002 ONDANSETRON PER 1 MG: Performed by: PHYSICIAN ASSISTANT

## 2021-04-10 PROCEDURE — 85025 COMPLETE CBC W/AUTO DIFF WBC: CPT | Performed by: PHYSICIAN ASSISTANT

## 2021-04-10 PROCEDURE — 99283 EMERGENCY DEPT VISIT LOW MDM: CPT

## 2021-04-10 PROCEDURE — 99284 EMERGENCY DEPT VISIT MOD MDM: CPT | Performed by: PHYSICIAN ASSISTANT

## 2021-04-10 PROCEDURE — 87449 NOS EACH ORGANISM AG IA: CPT | Performed by: PHYSICIAN ASSISTANT

## 2021-04-10 PROCEDURE — 87324 CLOSTRIDIUM AG IA: CPT | Performed by: PHYSICIAN ASSISTANT

## 2021-04-10 PROCEDURE — 80053 COMPREHEN METABOLIC PANEL: CPT | Performed by: PHYSICIAN ASSISTANT

## 2021-04-10 PROCEDURE — 83735 ASSAY OF MAGNESIUM: CPT | Performed by: PHYSICIAN ASSISTANT

## 2021-04-10 PROCEDURE — 96374 THER/PROPH/DIAG INJ IV PUSH: CPT

## 2021-04-10 RX ORDER — VANCOMYCIN HYDROCHLORIDE 125 MG/1
125 CAPSULE ORAL ONCE
Status: COMPLETED | OUTPATIENT
Start: 2021-04-10 | End: 2021-04-10

## 2021-04-10 RX ORDER — ONDANSETRON 2 MG/ML
4 INJECTION INTRAMUSCULAR; INTRAVENOUS ONCE
Status: COMPLETED | OUTPATIENT
Start: 2021-04-10 | End: 2021-04-10

## 2021-04-10 RX ORDER — POTASSIUM CHLORIDE 750 MG/1
10 TABLET, FILM COATED, EXTENDED RELEASE ORAL 2 TIMES DAILY
Qty: 10 TABLET | Refills: 0 | Status: SHIPPED | OUTPATIENT
Start: 2021-04-10 | End: 2021-04-15

## 2021-04-10 RX ORDER — POTASSIUM CHLORIDE 20 MEQ/1
40 TABLET, EXTENDED RELEASE ORAL ONCE
Status: COMPLETED | OUTPATIENT
Start: 2021-04-10 | End: 2021-04-10

## 2021-04-10 RX ORDER — VANCOMYCIN HYDROCHLORIDE 125 MG/1
CAPSULE ORAL
Qty: 84 CAPSULE | Refills: 0 | Status: SHIPPED | OUTPATIENT
Start: 2021-04-10 | End: 2021-05-19

## 2021-04-10 RX ORDER — SODIUM CHLORIDE 0.9 % (FLUSH) 0.9 %
10 SYRINGE (ML) INJECTION AS NEEDED
Status: DISCONTINUED | OUTPATIENT
Start: 2021-04-10 | End: 2021-04-10 | Stop reason: HOSPADM

## 2021-04-10 RX ADMIN — POTASSIUM CHLORIDE 40 MEQ: 1500 TABLET, EXTENDED RELEASE ORAL at 20:18

## 2021-04-10 RX ADMIN — ONDANSETRON 4 MG: 2 INJECTION INTRAMUSCULAR; INTRAVENOUS at 19:48

## 2021-04-10 RX ADMIN — VANCOMYCIN HYDROCHLORIDE 125 MG: 125 CAPSULE ORAL at 21:44

## 2021-04-10 RX ADMIN — SODIUM CHLORIDE 500 ML: 9 INJECTION, SOLUTION INTRAVENOUS at 19:48

## 2021-04-10 NOTE — ED NOTES
"Pt reports having multiple UTI's and being placed on different antibiotics, which caused her to get C-diff. Pt reports that she had recovered from C-Diff, but got another UTI and had to take more antibiotics. Now is having diarrhea that appears like \"mustard seed\".      Doreen Saavedra, RN  04/10/21 4311    "

## 2021-04-10 NOTE — ED PROVIDER NOTES
EMERGENCY DEPARTMENT ENCOUNTER      Room Number: 10/10    History is provided by the patient, no translation services needed    HPI:    Chief complaint: Diarrhea    Location: Abdominal cramping    Quality/Severity: Mild to moderate cramping.    Timing/Duration: Patient started Macrobid 2 days ago for UTI, diarrhea started yesterday.    Modifying Factors: Patient states she took a Zofran this morning for nausea which helped her keep down Pedialyte today.  Has also been eating yogurt and taking probiotics.    Associated Symptoms: Positive for diarrhea, abdominal cramping, nausea.  Patient denies any vomiting, fevers, black or bloody stools, syncope, chest pain, shortness of breath.    Narrative: Pt is a 70 y.o. female who presents complaining of diarrhea x2 days.  Patient has a recent history of C. difficile infection that was diagnosed 3 weeks ago.  At that time she also had some hypokalemia and hypomagnesemia.  Patient finished 10-day course of p.o. vancomycin 10 days ago and was feeling quite well.  She started developing some dysuria 3 days ago and presented to urgent care 2 days ago where she was diagnosed with a UTI and started on Bactrim.  She states her diarrhea started yesterday, and is quite similar to her last C. difficile infection, she reports yellowish seedy diarrhea.  Denies any significant abdominal pain but does have some abdominal cramping associated with the bouts of diarrhea.  She states she called her gastroenterology nurse practitioner in hopes that she could call in p.o. vancomycin for her however she was told she did get a call back on Monday and did not want to wait over the weekend. Patient also has a PMH significant for alcoholic cirrhosis with recurrent ascites, as well as esophageal varices.  She was hospitalized here in December for bleeding esophageal varices and ascites, ultimately transferred to [facility] for TIPS procedure.  She has been doing much better since the TIPS  procedure, ascites has seemed to resolve, denies any further GI bleeding, lower extremity edema and renal failure have also seemed to resolve.  She also has a longstanding history of chronic urinary retention and it has been recommended by urology that she self cath to help prevent retention and further UTIs however she states she tried that in the past and had more UTIs when she was self cathing.       PMD: Louise Palomino, APRN    REVIEW OF SYSTEMS  Review of Systems   Constitutional: Positive for appetite change and chills. Negative for fever.   Respiratory: Negative for cough and shortness of breath.    Cardiovascular: Negative for chest pain and palpitations.   Gastrointestinal: Positive for abdominal pain, diarrhea and nausea. Negative for abdominal distention and vomiting.   Genitourinary: Positive for difficulty urinating.   Musculoskeletal: Negative for arthralgias and myalgias.   Skin: Negative for rash and wound.   Neurological: Negative for dizziness and syncope.   Psychiatric/Behavioral: Negative for confusion. The patient is not nervous/anxious.          PAST MEDICAL HISTORY  Active Ambulatory Problems     Diagnosis Date Noted   • Spinal stenosis of lumbar region with neurogenic claudication 09/04/2018   • Lumbar spinal stenosis 10/01/2018   • Frequent UTI 10/01/2018   • Hypertension 10/01/2018   • Stress incontinence 10/01/2018   • Itching due to drug 10/01/2018   • Acute respiratory failure with hypoxia (CMS/HCC) 10/04/2018   • Tear of right rotator cuff 05/14/2019   • Acute pain of right shoulder 06/13/2019   • Hepatorenal syndrome (CMS/HCC) 11/04/2020   • Ascites due to alcoholic hepatitis 11/04/2020   • Bladder prolapse, female, acquired 11/05/2020   • Sepsis associated hypotension (CMS/HCC) 11/05/2020   • Acute liver failure 11/05/2020   • Altered mental state 11/05/2020   • Acute renal failure (CMS/HCC) 11/05/2020   • Alcohol abuse 11/05/2020   • Severe malnutrition (CMS/HCC) 11/11/2020   •  Gastrointestinal hemorrhage 2020   • Gastrointestinal hemorrhage associated with alcoholic gastritis 2020   • Gastric varices 2020   • Alcoholic cirrhosis (CMS/HCC) 2020   • Iron deficiency anemia due to chronic blood loss 2021     Resolved Ambulatory Problems     Diagnosis Date Noted   • No Resolved Ambulatory Problems     Past Medical History:   Diagnosis Date   • Elevated liver enzymes    • Fatty liver    • History of depression    • Lumbar stenosis    • OA (osteoarthritis)    • Post-menopausal    • Spondylarthritis    • Urinary retention        PAST SURGICAL HISTORY  Past Surgical History:   Procedure Laterality Date   • ABDOMINOPLASTY     • BREAST SURGERY     • CATARACT EXTRACTION Bilateral    •  SECTION      X3   • COLONOSCOPY      2006   • COLONOSCOPY N/A 2016    Procedure: COLONOSCOPY;  Surgeon: Ho Arenas MD;  Location: [facility] ENDOSCOPY;  Service:    • ENDOSCOPY N/A 2020    Procedure: ESOPHAGOGASTRODUODENOSCOPY;  Surgeon: Terrell Stanton MD;  Location: [facility] OR;  Service: Gastroenterology;  Laterality: N/A;  gastric ulcer  duodenal erosion  gastric varices   • LASIK     • LUMBAR DISCECTOMY FUSION INSTRUMENTATION N/A 10/1/2018    Procedure: L4-5, L5-S1  laminectomy and fusion with instrumentation;  Surgeon: Juno Kim MD;  Location: [facility] OR;  Service: Orthopedic Spine   • REDUCTION MAMMAPLASTY     • TIPS PROCEDURE     • TONSILLECTOMY     • TOTAL SHOULDER ARTHROPLASTY W/ DISTAL CLAVICLE EXCISION Right 2019    Procedure: TOTAL SHOULDER REVERSE ARTHROPLASTY;  Surgeon: David Marion MD;  Location: [facility] OR;  Service: Orthopedics       FAMILY HISTORY  Family History   Problem Relation Age of Onset   • Colon polyps Father    • Breast cancer Maternal Aunt    • Malig Hyperthermia Neg Hx        SOCIAL HISTORY  Social History     Socioeconomic History   • Marital status:      Spouse name: Not on file   • Number of  children: Not on file   • Years of education: Not on file   • Highest education level: Not on file   Tobacco Use   • Smoking status: Never Smoker   • Smokeless tobacco: Never Used   Vaping Use   • Vaping Use: Never used   Substance and Sexual Activity   • Alcohol use: Not Currently     Comment: no ETOH since Nov 5   • Drug use: No   • Sexual activity: Defer       ALLERGIES  Patient has no known allergies.      Current Facility-Administered Medications:   •  [COMPLETED] Insert peripheral IV, , , Once **AND** sodium chloride 0.9 % flush 10 mL, 10 mL, Intravenous, PRN, Tameka Cook PA-C    Current Outpatient Medications:   •  Calcium 600+D3 600-400 MG-UNIT tablet, Take 1 tablet by mouth 2 (Two) Times a Day., Disp: , Rfl:   •  folic acid (FOLVITE) 1 MG tablet, Take 1 tablet by mouth Daily., Disp: 30 tablet, Rfl: 1  •  lactobacillus acidophilus (RISAQUAD) capsule capsule, Take 1 capsule by mouth Daily., Disp: 180 capsule, Rfl: 3  •  lactulose (CHRONULAC) 10 GM/15ML solution, Take 30 mL by mouth 2 (Two) Times a Day., Disp: 1892 mL, Rfl: 1  •  levothyroxine (SYNTHROID, LEVOTHROID) 50 MCG tablet, Take 1 tablet by mouth Daily., Disp: 30 tablet, Rfl: 1  •  ondansetron ODT (ZOFRAN-ODT) 4 MG disintegrating tablet, Place 1 tablet under the tongue Every 6 (Six) Hours As Needed for Nausea., Disp: 20 tablet, Rfl: 0  •  sulfamethoxazole-trimethoprim (BACTRIM DS,SEPTRA DS) 800-160 MG per tablet, Take 1 tablet by mouth 2 (Two) Times a Day for 10 days., Disp: 20 tablet, Rfl: 0  •  zinc gluconate 50 MG tablet, Take 50 mg by mouth Daily., Disp: , Rfl:   •  potassium chloride 10 MEQ CR tablet, Take 1 tablet by mouth 2 (Two) Times a Day for 5 days., Disp: 10 tablet, Rfl: 0  •  vancomycin (Vancocin HCl) 125 MG capsule, Take 1 cap by mouth every 6 hrs for 14 days, then 1 capsule every 12 hrs for 7 days, then 1 capsule daily for 7 days, then 1 capsule every 72 hrs for 21 days., Disp: 84 capsule, Rfl: 0    Facility-Administered  Medications Ordered in Other Encounters:   •  mupirocin (BACTROBAN) 2 % nasal ointment, , Nasal, BID, David Marion MD    PHYSICAL EXAM  ED Triage Vitals [04/10/21 1838]   Temp Heart Rate Resp BP SpO2   98.5 °F (36.9 °C) (!) 126 19 146/79 95 %      Temp src Heart Rate Source Patient Position BP Location FiO2 (%)   Oral Monitor Sitting Left arm --       Physical Exam  Vitals and nursing note reviewed.   Constitutional:       General: She is not in acute distress.     Appearance: She is underweight. She is ill-appearing. She is not toxic-appearing.   HENT:      Head: Normocephalic and atraumatic.   Eyes:      Conjunctiva/sclera: Conjunctivae normal.      Pupils: Pupils are equal, round, and reactive to light.   Cardiovascular:      Rate and Rhythm: Normal rate and regular rhythm.   Pulmonary:      Effort: Pulmonary effort is normal.      Breath sounds: Normal breath sounds.   Abdominal:      General: Bowel sounds are increased. There is no distension.      Palpations: Abdomen is soft.      Tenderness: There is no abdominal tenderness. There is no guarding.      Comments: Patient's bladder feels distended   Musculoskeletal:         General: Normal range of motion.      Cervical back: Normal range of motion and neck supple.   Skin:     General: Skin is warm and dry.   Neurological:      Mental Status: She is alert and oriented to person, place, and time.   Psychiatric:         Mood and Affect: Affect normal. Mood is anxious.         Behavior: Behavior is cooperative.         Cognition and Memory: Memory normal.         Judgment: Judgment normal.           LAB RESULTS  Lab Results (last 24 hours)     Procedure Component Value Units Date/Time    CBC & Differential [[id number]]  (Abnormal) Collected: 04/10/21 1936    Specimen: Blood Updated: 04/10/21 1943    Narrative:      The following orders were created for panel order CBC & Differential.  Procedure                               Abnormality         Status                      ---------                               -----------         ------                     CBC Auto Differential[[id number]]        Abnormal            Final result                 Please view results for these tests on the individual orders.    Comprehensive Metabolic Panel [[id number]]  (Abnormal) Collected: 04/10/21 1936    Specimen: Blood Updated: 04/10/21 2003     Glucose 142 mg/dL      BUN 9 mg/dL      Creatinine 0.64 mg/dL      Sodium 134 mmol/L      Potassium 3.2 mmol/L      Chloride 94 mmol/L      CO2 31.2 mmol/L      Calcium 10.1 mg/dL      Total Protein 5.9 g/dL      Albumin 3.10 g/dL      ALT (SGPT) 28 U/L      AST (SGOT) 58 U/L      Alkaline Phosphatase 113 U/L      Total Bilirubin 2.0 mg/dL      eGFR Non African Amer 91 mL/min/1.73      Globulin 2.8 gm/dL      A/G Ratio 1.1 g/dL      BUN/Creatinine Ratio 14.1     Anion Gap 8.8 mmol/L     Narrative:      GFR Normal >60  Chronic Kidney Disease <60  Kidney Failure <15      Magnesium [[id number]]  (Normal) Collected: 04/10/21 1936    Specimen: Blood Updated: 04/10/21 2003     Magnesium 1.6 mg/dL     CBC Auto Differential [[id number]]  (Abnormal) Collected: 04/10/21 1936    Specimen: Blood Updated: 04/10/21 1943     WBC 10.38 10*3/mm3      RBC 3.46 10*6/mm3      Hemoglobin 11.4 g/dL      Hematocrit 33.0 %      MCV 95.4 fL      MCH 32.9 pg      MCHC 34.5 g/dL      RDW 13.5 %      RDW-SD 47.4 fl      MPV 10.8 fL      Platelets 150 10*3/mm3      Neutrophil % 71.2 %      Lymphocyte % 15.3 %      Monocyte % 11.6 %      Eosinophil % 1.1 %      Basophil % 0.4 %      Immature Grans % 0.4 %      Neutrophils, Absolute 7.40 10*3/mm3      Lymphocytes, Absolute 1.59 10*3/mm3      Monocytes, Absolute 1.20 10*3/mm3      Eosinophils, Absolute 0.11 10*3/mm3      Basophils, Absolute 0.04 10*3/mm3      Immature Grans, Absolute 0.04 10*3/mm3      nRBC 0.0 /100 WBC     Clostridium Difficile Toxin - Stool, Per Rectum [[id number]]  (Abnormal) Collected: 04/10/21 2014    Specimen:  Stool from Per Rectum Updated: 04/10/21 2109    Narrative:      The following orders were created for panel order Clostridium Difficile Toxin - Stool, Per Rectum.  Procedure                               Abnormality         Status                     ---------                               -----------         ------                     Clostridium Difficile EI...[[id number]]  Abnormal            Final result                 Please view results for these tests on the individual orders.    Clostridium Difficile EIA - Stool, Per Rectum [[id number]]  (Abnormal) Collected: 04/10/21 2014    Specimen: Stool from Per Rectum Updated: 04/10/21 2109     C Diff GDH / Toxin Positive            I ordered the above labs and reviewed the results    RADIOLOGY  No Radiology Exams Resulted Within Past 24 Hours    I ordered the above radiologic testing and reviewed the results    PROCEDURES  Procedures      PROGRESS AND CONSULTS  ED Course as of Apr 10 2152   Sat Apr 10, 2021   2006 Patient has been reassessed since receiving Zofran and fluids.  She states she is feeling exponentially better after some fluids and Zofran.  She stated earlier that she wanted to go home and then bring her C. difficile sample in tomorrow, however she then had a bowel movement so her C. difficile test is currently in progress.  Currently awaiting results.  Patient was given 40 mEq of potassium here.  Her other labs are within normal limits and similar to previous lab studies.    [KS]   8109 I have discussed positive C. difficile results with patient.  I did mention that she could just deal obese shedding spores from her previous infection and this can happen for up to 30 days after C. difficile, however she states she is very certain that she is having a recurrent bout of C. difficile because her symptoms are identical.  We will go ahead and treat her with a vancomycin taper.  Potassium was slightly low today at 3.2, magnesium in normal range.  We have also  prescribed her potassium for a few days as well. She has Zofran at home for her nausea. I have instructed her to follow-up with her gastroenterology nurse practitioner for further guidance.  Discussed return to ER warnings.  Patient verbalizes understanding and wishes to go home at this time.    [KS]      ED Course User Index  [KS] Tameka Cook PA-C           MEDICAL DECISION MAKING    MDM       DIAGNOSIS  Final diagnoses:   Recurrent Clostridium difficile diarrhea   Hypokalemia   Frequent UTI   History of urinary retention       Latest Documented Vital Signs:  As of 21:52 EDT  BP- 143/67 HR- 98 Temp- 98.5 °F (36.9 °C) (Oral) O2 sat- 99%    DISPOSITION  Patient discharged home in care of family member.    Discussed pertinent findings with the patient/family.  Patient/Family voiced understanding of need to follow-up for recheck and further testing as needed.  Return to the Emergency Department warnings were given.         Medication List      New Prescriptions    potassium chloride 10 MEQ CR tablet  Take 1 tablet by mouth 2 (Two) Times a Day for 5 days.     vancomycin 125 MG capsule  Commonly known as: Vancocin HCl  Take 1 cap by mouth every 6 hrs for 14 days, then 1 capsule every 12 hrs for 7 days, then 1 capsule daily for 7 days, then 1 capsule every 72 hrs for 21 days.           Where to Get Your Medications      These medications were sent to [id number] [place] [address] AT [facility] [address] - [phone]  - [phone] [place] [address], [place] [address]    Phone: [phone]   · potassium chloride 10 MEQ CR tablet  · vancomycin 125 MG capsule             Follow-up Information     Louise Palomino, APRN. Call in 2 days.    Specialties: Nurse Practitioner, Gastroenterology  Why: To schedule follow-up appointment  Contact information:  [address]  [address]  [place] [address] [phone]                     Dictated utilizing Dragon  dictation     Tameka Cook PA-C  04/10/21 1614

## 2021-04-21 ENCOUNTER — OFFICE VISIT (OUTPATIENT)
Dept: ORTHOPEDIC SURGERY | Facility: CLINIC | Age: 71
End: 2021-04-21

## 2021-04-21 VITALS — TEMPERATURE: 96.2 F | HEIGHT: 62 IN | BODY MASS INDEX: 19.14 KG/M2 | WEIGHT: 104 LBS

## 2021-04-21 DIAGNOSIS — G89.29 CHRONIC RIGHT SHOULDER PAIN: ICD-10-CM

## 2021-04-21 DIAGNOSIS — M25.511 CHRONIC RIGHT SHOULDER PAIN: ICD-10-CM

## 2021-04-21 DIAGNOSIS — Z96.611 S/P REVERSE TOTAL SHOULDER ARTHROPLASTY, RIGHT: Primary | ICD-10-CM

## 2021-04-21 PROCEDURE — 73030 X-RAY EXAM OF SHOULDER: CPT | Performed by: ORTHOPAEDIC SURGERY

## 2021-04-21 PROCEDURE — 99214 OFFICE O/P EST MOD 30 MIN: CPT | Performed by: ORTHOPAEDIC SURGERY

## 2021-04-21 NOTE — PROGRESS NOTES
"  Patient: Tayler Lugo    YOB: 1950    Medical Record Number: [id number]    Chief Complaints: Right shoulder pain    History of Present Illness:     70 y.o. female patient who presents for her right shoulder.  She is about 2 years out from a reverse.  She says that she has been doing great.  The shoulder was fantastic until around November.  She tells me that she ended up getting admitted to [facility] for a liver problem.  She ended up undergoing a TIPS procedure per her report.  She tells me that she was in the hospital for some time after this procedure and they were constantly rolling her onto the right shoulder.  She tells me that this constant rolling aggravated the shoulder.  Ever since she got home from the hospital in December, she says the shoulder has bothered her.  She reports that she has noticed increased weakness, particularly with external rotation.  She says that she could do this easily before she went into the hospital and has really struggled with that since coming out of the hospital.  She reports a chronic dull achy pain in the shoulder which is mild.  She also reports that she has had a couple of incidents where the shoulder felt like it \"popped out\".  She tells me that these have happened when she was carrying something heavy at her side.  She tells me that she noticed some deformity of the shoulder but then she was able to manipulate her arm and it quickly popped back into place.  Again, all of this is new since November.    Allergies: No Known Allergies    Home Medications:    Current Outpatient Medications:   •  Calcium 600+D3 600-400 MG-UNIT tablet, Take 1 tablet by mouth 2 (Two) Times a Day., Disp: , Rfl:   •  folic acid (FOLVITE) 1 MG tablet, Take 1 tablet by mouth Daily., Disp: 30 tablet, Rfl: 1  •  lactobacillus acidophilus (RISAQUAD) capsule capsule, Take 1 capsule by mouth Daily., Disp: 180 capsule, Rfl: 3  •  lactulose (CHRONULAC) 10 GM/15ML " solution, Take 30 mL by mouth 2 (Two) Times a Day., Disp: 1892 mL, Rfl: 1  •  levothyroxine (SYNTHROID, LEVOTHROID) 50 MCG tablet, Take 1 tablet by mouth Daily., Disp: 30 tablet, Rfl: 1  •  MAGnesium-Oxide 400 (241.3 Mg) MG tablet tablet, Take 400 mg by mouth 2 (Two) Times a Day. for 7 days, Disp: , Rfl:   •  vancomycin (Vancocin HCl) 125 MG capsule, Take 1 cap by mouth every 6 hrs for 14 days, then 1 capsule every 12 hrs for 7 days, then 1 capsule daily for 7 days, then 1 capsule every 72 hrs for 21 days., Disp: 84 capsule, Rfl: 0  •  zinc gluconate 50 MG tablet, Take 50 mg by mouth Daily., Disp: , Rfl:   •  ondansetron ODT (ZOFRAN-ODT) 4 MG disintegrating tablet, Place 1 tablet under the tongue Every 6 (Six) Hours As Needed for Nausea., Disp: 20 tablet, Rfl: 0  No current facility-administered medications for this visit.    Facility-Administered Medications Ordered in Other Visits:   •  mupirocin (BACTROBAN) 2 % nasal ointment, , Nasal, BID, David [facility] MD    Past Medical History:   Diagnosis Date   • Elevated liver enzymes    • Fatty liver    • Frequent UTI    • History of depression    • Hypertension    • Lumbar stenosis    • OA (osteoarthritis)    • Post-menopausal    • Spondylarthritis     LOW BACK   • Stress incontinence    • Urinary retention        Past Surgical History:   Procedure Laterality Date   • ABDOMINOPLASTY     • BREAST SURGERY     • CATARACT EXTRACTION Bilateral    •  SECTION      X3   • COLONOSCOPY         • COLONOSCOPY N/A 2016    Procedure: COLONOSCOPY;  Surgeon: oH Arenas MD;  Location: [facility] ENDOSCOPY;  Service:    • ENDOSCOPY N/A 2020    Procedure: ESOPHAGOGASTRODUODENOSCOPY;  Surgeon: Terrell Stanton MD;  Location: [facility] OR;  Service: Gastroenterology;  Laterality: N/A;  gastric ulcer  duodenal erosion  gastric varices   • LASIK     • LUMBAR DISCECTOMY FUSION INSTRUMENTATION N/A 10/1/2018    Procedure: L4-5, L5-S1  laminectomy and fusion  "with instrumentation;  Surgeon: Juno Kim MD;  Location: [facility] OR;  Service: Orthopedic Spine   • REDUCTION MAMMAPLASTY     • TIPS PROCEDURE     • TONSILLECTOMY     • TOTAL SHOULDER ARTHROPLASTY W/ DISTAL CLAVICLE EXCISION Right 6/13/2019    Procedure: TOTAL SHOULDER REVERSE ARTHROPLASTY;  Surgeon: David [facility] MD;  Location: [facility];  Service: Orthopedics       Social History     Occupational History   • Not on file   Tobacco Use   • Smoking status: Never Smoker   • Smokeless tobacco: Never Used   Vaping Use   • Vaping Use: Never used   Substance and Sexual Activity   • Alcohol use: Not Currently     Comment: no ETOH since Nov 5   • Drug use: No   • Sexual activity: Defer      Social History     Social History Narrative   • Not on file       Family History   Problem Relation Age of Onset   • Colon polyps Father    • Breast cancer Maternal Aunt    • Malig Hyperthermia Neg Hx        Review of Systems:      Constitutional: Denies fever, shaking or chills   Eyes: Denies change in visual acuity   HEENT: Denies nasal congestion or sore throat   Respiratory: Denies cough or shortness of breath   Cardiovascular: Denies chest pain or edema  Endocrine: Denies tremors, palpitations, intolerance of heat or cold, polyuria, polydipsia.  GI: Denies abdominal pain, nausea, vomiting, bloody stools or diarrhea  : Denies frequency, urgency, incontinence, retention, or nocturia.  Musculoskeletal: Denies numbness, tingling or loss of motor function except as above  Integument: Denies rash, lesion or ulceration   Neurologic: Denies headache or focal weakness, deficits  Heme: Denies spontaneous or excessive bleeding, epistaxis, hematuria, melena, fatigue, enlarged or tender lymph nodes.      All other pertinent positives and negatives as noted above in HPI.    Physical Exam: 70 y.o. female  Vitals:    04/21/21 1117   Temp: 96.2 °F (35.7 °C)   Weight: 47.2 kg (104 lb)   Height: 157.5 cm (62\")     General:  " Patient is awake and alert.  Appears in no acute distress or discomfort.    Psych:  Affect and demeanor are appropriate.    Eyes:  Conjunctiva and sclera appear grossly normal.  Eyes track well and EOM seem to be intact.    Ears:  No gross abnormalities.  Hearing adequate for the exam.    Cardiovascular:  Regular rate and rhythm.    Lungs:  Good chest expansion.  Breathing unlabored.    Extremities: Her right shoulder is examined.  Skin is benign.  Her incision is healed.  Contour of her shoulder looks normal.  There is no erythema or swelling.  She is tender over the acromion and scapular spine but this is relatively mild.  There is no palpable defect or step-off.  When palpating the posterior aspect of her shoulder, I can feel some crepitus back there particularly with external rotation.  She has excellent passive motion.  When I raise the arm for her, she can maintain it.  She has an external rotation lag sign and a positive Hornblower's.  In comparing to her preoperative notes, she had an external rotation lag but a negative Hornblower's.  She is very weak with abduction and elevation.  She can get her arm up but it is uncomfortable for her.  I do not appreciate any instability on exam.  She has intact motor and sensory function in her lower arm and hand.  Brisk capillary refill.    Imaging: AP, scapular Y and axillary views of the right shoulder are ordered and reviewed.  These are compared to her previous x-rays.  It looks like there is some stress shielding of the proximal humerus with some bony resorption in that area compared to previous x-rays.  It may just be that the films are a little underpenetrated but the bone of the scapula looks relatively normal.  She does have moderate acromioclavicular arthritis.  I do not see any obvious scapula or acromial fracture.  There is a chronic calcification in the subacromial space.  This appears to be new relative to her previous films but her film in December 2019  did show a calcification up closer to the acromion.    Assessment/Plan: 1.  Suspected tear of posterior cuff status post right reverse total shoulder arthroplasty 2.  Possible new right shoulder instability    It sounds like she has a couple of issues.  She has diminished external rotation function and a positive Hornblower's which is new.  She also has some crepitus in the subacromial space which I think is new as well.  Somehow she may have torn her posterior cuff which could account for her increased dysfunction recently.    She is a little tender over the scapula but I do not think she has a scapular fracture.  I certainly do not see an obvious fracture on the x-rays.    She gives a good history for instability but I told her that my only skepticism is that she was able to apparently get the shoulder to go back in on its own.  I told her that would be unusual in my experience although not impossible.  I am not sure what to make of the instability like symptoms and I think a better course of action right now is to manage things conservatively.  I recommend we try getting her into physical therapy and would like to check her back in another 6 weeks to see how she is doing.  She is in agreement with that plan.  I did tell her that if she has any new instability events, I would like to see her right away if possible.  I told her to call me immediately and I will try to work her in that day, if possible.    David [facility] MD    04/21/2021

## 2021-04-26 ENCOUNTER — HOSPITAL ENCOUNTER (OUTPATIENT)
Dept: PHYSICAL THERAPY | Facility: HOSPITAL | Age: 71
Setting detail: THERAPIES SERIES
Discharge: HOME | End: 2021-04-26

## 2021-04-26 DIAGNOSIS — Z96.611 S/P REVERSE TOTAL SHOULDER ARTHROPLASTY, RIGHT: Primary | ICD-10-CM

## 2021-04-26 PROCEDURE — 97161 PT EVAL LOW COMPLEX 20 MIN: CPT

## 2021-04-26 PROCEDURE — 97110 THERAPEUTIC EXERCISES: CPT

## 2021-04-26 NOTE — THERAPY EVALUATION
Outpatient Physical Therapy Ortho Initial Evaluation   [place]     Patient Name: Tayler Lugo  : 1950  MRN: [id number]  Today's Date: 2021      Visit Date: 2021    Patient Active Problem List   Diagnosis   • Spinal stenosis of lumbar region with neurogenic claudication   • Lumbar spinal stenosis   • Frequent UTI   • Hypertension   • Stress incontinence   • Itching due to drug   • Acute respiratory failure with hypoxia (CMS/HCC)   • Tear of right rotator cuff   • Acute pain of right shoulder   • Hepatorenal syndrome (CMS/HCC)   • Ascites due to alcoholic hepatitis   • Bladder prolapse, female, acquired   • Sepsis associated hypotension (CMS/HCC)   • Acute liver failure   • Altered mental state   • Acute renal failure (CMS/HCC)   • Alcohol abuse   • Severe malnutrition (CMS/HCC)   • Gastrointestinal hemorrhage   • Gastrointestinal hemorrhage associated with alcoholic gastritis   • Gastric varices   • Alcoholic cirrhosis (CMS/HCC)   • Iron deficiency anemia due to chronic blood loss        Past Medical History:   Diagnosis Date   • Elevated liver enzymes    • Fatty liver    • Frequent UTI    • History of depression    • Hypertension    • Lumbar stenosis    • OA (osteoarthritis)    • Post-menopausal    • Spondylarthritis     LOW BACK   • Stress incontinence    • Urinary retention         Past Surgical History:   Procedure Laterality Date   • ABDOMINOPLASTY     • BREAST SURGERY     • CATARACT EXTRACTION Bilateral    •  SECTION      X3   • COLONOSCOPY      2006   • COLONOSCOPY N/A 2016    Procedure: COLONOSCOPY;  Surgeon: Ho Arenas MD;  Location: [facility] ENDOSCOPY;  Service:    • ENDOSCOPY N/A 2020    Procedure: ESOPHAGOGASTRODUODENOSCOPY;  Surgeon: Terrell Stanton MD;  Location: [facility] OR;  Service: Gastroenterology;  Laterality: N/A;  gastric ulcer  duodenal erosion  gastric varices   • LASIK     • LUMBAR DISCECTOMY FUSION INSTRUMENTATION N/A 10/1/2018  "   Procedure: L4-5, L5-S1  laminectomy and fusion with instrumentation;  Surgeon: Juno Kim MD;  Location: [facility];  Service: Orthopedic Spine   • REDUCTION MAMMAPLASTY     • TIPS PROCEDURE     • TONSILLECTOMY     • TOTAL SHOULDER ARTHROPLASTY W/ DISTAL CLAVICLE EXCISION Right 6/13/2019    Procedure: TOTAL SHOULDER REVERSE ARTHROPLASTY;  Surgeon: David [facility] MD;  Location: [facility];  Service: Orthopedics       Visit Dx:     ICD-10-CM ICD-9-CM   1. S/P reverse total shoulder arthroplasty, right  Z96.611 V43.61         Patient History     Row Name 04/26/21 1100             History    Chief Complaint  Difficulty with daily activities;Muscle tenderness;Muscle weakness;Pain  -AS      Type of Pain  Shoulder pain Right  -AS      Brief Description of Current Complaint  Tayler Lugo presents to outpatient PT for her right shoulder.  She is about 2 years out from a reverse.  She says that she has been doing great.  The shoulder was fantastic until around November.  She tells me that she ended up getting admitted to [facility] for a liver problem.  She ended up undergoing a TIPS procedure per her report.  She tells me that she was in the hospital for some time after this procedure and they were constantly rolling her onto the right shoulder.  She tells me that this constant rolling aggravated the shoulder.  Ever since she got home from the hospital in December, she says the shoulder has bothered her.  She reports that she has noticed increased weakness, particularly with external rotation.  She says that she could do this easily before she went into the hospital and has really struggled with that since coming out of the hospital.  She reports a chronic dull achy pain in the shoulder which is mild.  She also reports that she has had a couple of incidents where the shoulder felt like it \"popped out\".  She tells me that these have happened when she was carrying something heavy at her side. "  She tells me that she noticed some deformity of the shoulder but then she was able to manipulate her arm and it quickly popped back into place.  MD suspects a tear of her posterior cuff and would like to treat conservatively at this time.  -AS      Patient/Caregiver Goals  Relieve pain;Improve mobility;Improve strength  -AS      Patient's Rating of General Health  Good  -AS      Hand Dominance  right-handed  -AS      Occupation/sports/leisure activities  Golf, swimming, tennis  -AS      Patient seeing anyone else for problem(s)?  Dr. Marion  -AS      How has patient tried to help current problem?  rest, heat, some exercises  -AS         Pain     Pain Location  Shoulder  -AS      Pain at Present  2  -AS      Pain at Best  0  -AS      Pain at Worst  4  -AS      Pain Frequency  Intermittent  -AS      Pain Description  Aching  -AS      What Performance Factors Make the Current Problem(s) WORSE?  use of RUE  -AS      What Performance Factors Make the Current Problem(s) BETTER?  rest  -AS         Daily Activities    Primary Language  English  -AS      Are you able to read  Yes  -AS      Are you able to write  Yes  -AS      How does patient learn best?  Listening;Reading;Demonstration  -AS      Teaching needs identified  Home Exercise Program;Management of Condition  -AS      Patient is concerned about/has problems with  Difficulty with self care (i.e. bathing, dressing, toileting:;Performing home management (household chores, shopping, care of dependents);Performing job responsibilities/community activities (work, school,;Performing sports, recreation, and play activities;Reaching over head;Repetitive movements of the hand, arm, shoulder  -AS      Does patient have problems with the following?  None  -AS      Barriers to learning  None  -AS      Pt Participated in POC and Goals  Yes  -AS         Safety    Are you being hurt, hit, or frightened by anyone at home or in your life?  No  -AS      Are you being neglected by a  caregiver  No  -AS        User Key  (r) = Recorded By, (t) = Taken By, (c) = Cosigned By    Initials Name Provider Type    AS Stu Greenfield, PT Physical Therapist          PT Ortho     Row Name 04/26/21 1100       Precautions and Contraindications    Precautions/Limitations  no known precautions/limitations  -AS       Subjective Pain    Able to rate subjective pain?  yes  -AS    Pre-Treatment Pain Level  2  -AS    Post-Treatment Pain Level  1  -AS       Posture/Observations    Posture- WNL  Posture is WNL  -AS       Right Upper Ext    Rt Shoulder Abduction AROM  121  -AS    Rt Shoulder Abduction PROM  150  -AS    Rt Shoulder Flexion AROM  137  -AS    Rt Shoulder Flexion PROM  155  -AS    Rt Shoulder External Rotation AROM  90  -AS    Rt Shoulder Internal Rotation AROM  90  -AS       MMT Right Upper Ext    Rt Shoulder Flexion MMT, Gross Movement  (4-/5) good minus  -AS    Rt Shoulder ABduction MMT, Gross Movement  (4-/5) good minus  -AS    Rt Shoulder Internal Rotation MMT, Gross Movement  (4-/5) good minus  -AS    Rt Shoulder External Rotation MMT, Gross Movement  (4-/5) good minus  -AS       Sensation    Sensation WNL?  WNL  -AS    Light Touch  No apparent deficits  -AS      User Key  (r) = Recorded By, (t) = Taken By, (c) = Cosigned By    Initials Name Provider Type    AS Stu Greenfield, PT Physical Therapist                      Therapy Education  Given: HEP, Symptoms/condition management, Pain management  Program: New  How Provided: Verbal, Demonstration, Written  Provided to: Patient  Level of Understanding: Teach back education performed, Verbalized, Demonstrated     PT OP Goals     Row Name 04/26/21 1100          PT Short Term Goals    STG Date to Achieve  05/17/21  -AS     STG 1  Patient to demonstrate compliance with her initial HEP for flexibility, ROM, and strengthening.  -AS     STG 2  Patient to report right shoulder pain on VAS of 2-3/10 at worst with activity.  -AS     STG 3  Patient to  demonstrate improved right shoulder flexon and abduction PROM to within 10 degrees of contralateral shoulder.  -AS     STG 4  Patient to demonstrate improved RUE strength to 4/5 in all planes.  -AS        Long Term Goals    LTG Date to Achieve  06/07/21  -AS     LTG 1  Patient to demonstrate compliance with her advanced HEP for flexibility, ROM, and strengthening.  -AS     LTG 2  Patient to report right shoulder pain on VAS of 0-1/10 at worst with activity.  -AS     LTG 3  Patient to demonstrate improved right shoulder flexon and abduction AROM to 150 degrees.  -AS     LTG 4  Patient to demonstrate improved RUE strength to 4+/5 in all planes.  -AS     LTG 5  Patient to report improved function and decreased pain on Quick DASH by >10-15 points.  -AS        Time Calculation    PT Goal Re-Cert Due Date  05/24/21  -AS       User Key  (r) = Recorded By, (t) = Taken By, (c) = Cosigned By    Initials Name Provider Type    AS Stu Greenfield, PT Physical Therapist          PT Assessment/Plan     Row Name 04/26/21 1100          PT Assessment    Functional Limitations  Limitation in home management;Limitations in community activities;Performance in leisure activities;Performance in self-care ADL;Performance in sport activities;Performance in work activities  -AS     Impairments  Muscle strength;Pain;Range of motion  -AS     Assessment Comments  Patient presents to outpatient PT s/p left reverse TSA performed about 2 years ago by Dr. [place]. Patient has limited right shoulder AROM, limited RUE strength, and increased pain with activity. Patient also reports some instability in her right shoulder at times. Patient has limited function at this time secondary to the above.   -AS     Please refer to paper survey for additional self-reported information  Yes  -AS     Rehab Potential  Good  -AS     Patient/caregiver participated in establishment of treatment plan and goals  Yes  -AS     Patient would benefit from skilled  therapy intervention  Yes  -AS        PT Plan    PT Frequency  1x/week;2x/week  -AS     Predicted Duration of Therapy Intervention (PT)  4-6 weeks  -AS     Planned CPT's?  PT RE-EVAL: 75642;PT THER PROC EA 15 MIN: 76779;PT THER ACT EA 15 MIN: 35490;PT MANUAL THERAPY EA 15 MIN: 51264;PT NEUROMUSC RE-EDUCATION EA 15 MIN: 99910  -AS       User Key  (r) = Recorded By, (t) = Taken By, (c) = Cosigned By    Initials Name Provider Type    AS Stu Greenfield, PT Physical Therapist          Modalities     Row Name 04/26/21 1100             Moist Heat    MH Applied  Yes  -AS      Location  Right Shoulder - pt supine with legs on roll  -AS      PT Moist Heat Minutes  10  -AS      MH Prior to Rx  Yes  -AS        User Key  (r) = Recorded By, (t) = Taken By, (c) = Cosigned By    Initials Name Provider Type    AS Stu Greenfield, PT Physical Therapist        OP Exercises     Row Name 04/26/21 1154 04/26/21 1100          Subjective Pain    Able to rate subjective pain?  --  yes  -AS     Pre-Treatment Pain Level  --  2  -AS     Post-Treatment Pain Level  --  1  -AS        Total Minutes    53353 - PT Therapeutic Exercise Minutes  39  -AS  --        Exercise 1    Exercise Name 1  --  Serratus Punches  -AS     Reps 1  --  25  -AS        Exercise 2    Exercise Name 2  --  S/L ER  -AS     Reps 2  --  25  -AS        Exercise 3    Exercise Name 3  --  Empty/Full Can  -AS        Exercise 4    Exercise Name 4  --  Rows  -AS     Reps 4  --  25  -AS     Time 4  --  Blue  -AS        Exercise 5    Exercise Name 5  --  Extensions  -AS     Reps 5  --  25  -AS     Time 5  --  Blue  -AS        Exercise 6    Exercise Name 6  --  IR  -AS     Reps 6  --  25  -AS     Time 6  --  Blue  -AS        Exercise 7    Exercise Name 7  --  ER  -AS     Reps 7  --  25  -AS     Time 7  --  Blue  -AS        Exercise 8    Exercise Name 8  --  Manual Flex & ABD PROM  -AS     Reps 8  --  10  -AS     Time 8  --  10 sec hold each  -AS        Exercise 9     Exercise Name 9  --  Prone I, Y, T  -AS        Exercise 10    Exercise Name 10  --  Pulleys - Flex & ABD  -AS     Time 10  --  3 min  -AS       User Key  (r) = Recorded By, (t) = Taken By, (c) = Cosigned By    Initials Name Provider Type    AS Stu Greenfield, PT Physical Therapist                        Outcome Measure Options: Quick DASH  Quick DASH  Open a tight or new jar.: Severe Difficulty  Do heavy household chores (e.g., wash walls, wash floors): Moderate Difficulty  Carry a shopping bag or briefcase: Moderate Difficulty  Wash your back: Moderate Difficulty  Use a knife to cut food: Moderate Difficulty  Recreational activities in which you take some force or impact through your arm, should or hand (e.g. golf, hammering, tennis, etc.): Moderate Difficulty  During the past week, to what extent has your arm, shoulder, or hand problem interfered with your normal social activites with family, friends, neighbors or groups?: Moderately  During the past week, were you limited in your work or other regular daily activities as a result of your arm, shoulder or hand problem?: Moderately Limited  Arm, Shoulder, or hand pain: Moderate  Tingling (pins and needles) in your arm, shoulder, or hand: Moderate  During the past week, how much difficulty have you had sleeping because of the pain in your arm, shoulder or hand?: Moderate Difficiculty  Number of Questions Answered: 11  Quick DASH Score: 52.27         Time Calculation:     Start Time: 1050  Stop Time: 1154  Time Calculation (min): 64 min  Time Calculation- PT  Start Time: 1050  Stop Time: 1154  Time Calculation (min): 64 min  PT Goal Re-Cert Due Date: 05/24/21  Timed Charges  34755 - PT Therapeutic Exercise Minutes: 39  Untimed Charges  PT Eval/Re-eval Minutes: 15  PT Moist Heat Minutes: 10  Total Minutes  Timed Charges Total Minutes: 39  Untimed Charges Total Minutes: 25   Total Minutes: 64     Therapy Charges for Today     Code Description Service Date  Service Provider Modifiers Qty    08962428422 HC PT EVAL LOW COMPLEXITY 1 4/26/2021 Stu Greenfield, PT GP 1    [id number] HC PT THER PROC EA 15 MIN 4/26/2021 Stu Greenfield, PT GP 3          PT G-Codes  Outcome Measure Options: Quick DASH  Quick DASH Score: 52.27         Stu Greenfield, PT  4/26/2021

## 2021-04-29 ENCOUNTER — APPOINTMENT (OUTPATIENT)
Dept: PHYSICAL THERAPY | Facility: HOSPITAL | Age: 71
End: 2021-04-29

## 2021-05-05 ENCOUNTER — HOSPITAL ENCOUNTER (OUTPATIENT)
Dept: PHYSICAL THERAPY | Facility: HOSPITAL | Age: 71
Setting detail: THERAPIES SERIES
Discharge: HOME | End: 2021-05-05

## 2021-05-05 DIAGNOSIS — Z96.611 S/P REVERSE TOTAL SHOULDER ARTHROPLASTY, RIGHT: Primary | ICD-10-CM

## 2021-05-05 PROCEDURE — 97110 THERAPEUTIC EXERCISES: CPT

## 2021-05-05 NOTE — THERAPY TREATMENT NOTE
Outpatient Physical Therapy Ortho Treatment Note   Jennifer Lunsford     Patient Name: Tayler Lugo  : 1950  MRN: [id number]  Today's Date: 2021      Visit Date: 2021    Visit Dx:    ICD-10-CM ICD-9-CM   1. S/P reverse total shoulder arthroplasty, right  Z96.611 V43.61       Patient Active Problem List   Diagnosis   • Spinal stenosis of lumbar region with neurogenic claudication   • Lumbar spinal stenosis   • Frequent UTI   • Hypertension   • Stress incontinence   • Itching due to drug   • Acute respiratory failure with hypoxia (CMS/HCC)   • Tear of right rotator cuff   • Acute pain of right shoulder   • Hepatorenal syndrome (CMS/HCC)   • Ascites due to alcoholic hepatitis   • Bladder prolapse, female, acquired   • Sepsis associated hypotension (CMS/HCC)   • Acute liver failure   • Altered mental state   • Acute renal failure (CMS/HCC)   • Alcohol abuse   • Severe malnutrition (CMS/HCC)   • Gastrointestinal hemorrhage   • Gastrointestinal hemorrhage associated with alcoholic gastritis   • Gastric varices   • Alcoholic cirrhosis (CMS/HCC)   • Iron deficiency anemia due to chronic blood loss        Past Medical History:   Diagnosis Date   • Elevated liver enzymes    • Fatty liver    • Frequent UTI    • History of depression    • Hypertension    • Lumbar stenosis    • OA (osteoarthritis)    • Post-menopausal    • Spondylarthritis     LOW BACK   • Stress incontinence    • Urinary retention         Past Surgical History:   Procedure Laterality Date   • ABDOMINOPLASTY     • BREAST SURGERY     • CATARACT EXTRACTION Bilateral    •  SECTION      X3   • COLONOSCOPY         • COLONOSCOPY N/A 2016    Procedure: COLONOSCOPY;  Surgeon: Ho Arenas MD;  Location: [facility] ENDOSCOPY;  Service:    • ENDOSCOPY N/A 2020    Procedure: ESOPHAGOGASTRODUODENOSCOPY;  Surgeon: Terrell Stanton MD;  Location: [facility] OR;  Service: Gastroenterology;  Laterality: N/A;  gastric  ulcer  duodenal erosion  gastric varices   • LASIK     • LUMBAR DISCECTOMY FUSION INSTRUMENTATION N/A 10/1/2018    Procedure: L4-5, L5-S1  laminectomy and fusion with instrumentation;  Surgeon: Juno Kim MD;  Location: [facility];  Service: Orthopedic Spine   • REDUCTION MAMMAPLASTY     • TIPS PROCEDURE     • TONSILLECTOMY     • TOTAL SHOULDER ARTHROPLASTY W/ DISTAL CLAVICLE EXCISION Right 6/13/2019    Procedure: TOTAL SHOULDER REVERSE ARTHROPLASTY;  Surgeon: David Marion MD;  Location: [facility];  Service: Orthopedics                       PT Assessment/Plan     Row Name 05/05/21 1100          PT Assessment    Assessment Comments  Progressed patient with strengthening exercises today. Patient with improved right shoulder PROM today. Patient tolerated her treatment session well.  -AS        PT Plan    PT Plan Comments  Continue with current treatment plan.  -AS       User Key  (r) = Recorded By, (t) = Taken By, (c) = Cosigned By    Initials Name Provider Type    AS Stu Greenfield, PT Physical Therapist          Modalities     Row Name 05/05/21 1100             Moist Heat    MH Applied  Yes  -AS      Location  Right Shoulder - pt supine with legs on roll  -AS      PT Moist Heat Minutes  10  -AS      MH Prior to Rx  Yes  -AS         Functional Testing    Outcome Measure Options  Quick DASH  -AS        User Key  (r) = Recorded By, (t) = Taken By, (c) = Cosigned By    Initials Name Provider Type    AS Stu Greenfield, PT Physical Therapist        OP Exercises     Row Name 05/05/21 1157 05/05/21 1100          Subjective Comments    Subjective Comments  --  Patient states her shoulder is doing well.   -AS        Total Minutes    08960 - PT Therapeutic Exercise Minutes  40  -AS  --        Exercise 1    Exercise Name 1  --  Serratus Punches  -AS     Reps 1  --  25  -AS        Exercise 2    Exercise Name 2  --  S/L ER  -AS     Reps 2  --  25  -AS        Exercise 3    Exercise Name 3  --   Empty/Full Can  -AS     Reps 3  --  25 each  -AS        Exercise 4    Exercise Name 4  --  Rows  -AS     Reps 4  --  25  -AS     Time 4  --  Blue  -AS        Exercise 5    Exercise Name 5  --  Extensions  -AS     Reps 5  --  25  -AS     Time 5  --  Blue  -AS        Exercise 6    Exercise Name 6  --  IR  -AS     Reps 6  --  25  -AS     Time 6  --  Blue  -AS        Exercise 7    Exercise Name 7  --  ER  -AS     Reps 7  --  25  -AS     Time 7  --  Blue  -AS        Exercise 8    Exercise Name 8  --  Manual Flex & ABD PROM  -AS     Reps 8  --  10  -AS     Time 8  --  10 sec hold each  -AS        Exercise 9    Exercise Name 9  --  Prone I, Y, T  -AS        Exercise 10    Exercise Name 10  --  Pulleys - Flex & ABD  -AS     Time 10  --  3 min  -AS       User Key  (r) = Recorded By, (t) = Taken By, (c) = Cosigned By    Initials Name Provider Type    AS Stu Greenfield, PT Physical Therapist                                Outcome Measure Options: Quick DASH         Time Calculation:   Start Time: 1100  Stop Time: 1150  Time Calculation (min): 50 min  Time Calculation- PT  Start Time: 1100  Stop Time: 1150  Time Calculation (min): 50 min  Timed Charges  62707 - PT Therapeutic Exercise Minutes: 40  Untimed Charges  PT Moist Heat Minutes: 10  Total Minutes  Timed Charges Total Minutes: 40  Untimed Charges Total Minutes: 10   Total Minutes: 50  Therapy Charges for Today     Code Description Service Date Service Provider Modifiers Qty    55437553725 HC PT THER PROC EA 15 MIN 5/5/2021 Stu Greenfield, PT GP 3          PT G-Codes  Outcome Measure Options: Sheyla Greenfield, PT  5/5/2021

## 2021-05-11 ENCOUNTER — HOSPITAL ENCOUNTER (OUTPATIENT)
Dept: PHYSICAL THERAPY | Facility: HOSPITAL | Age: 71
Setting detail: THERAPIES SERIES
Discharge: HOME | End: 2021-05-11

## 2021-05-11 DIAGNOSIS — Z96.611 S/P REVERSE TOTAL SHOULDER ARTHROPLASTY, RIGHT: Primary | ICD-10-CM

## 2021-05-11 PROCEDURE — 97110 THERAPEUTIC EXERCISES: CPT

## 2021-05-11 NOTE — THERAPY TREATMENT NOTE
Outpatient Physical Therapy Ortho Treatment Note   Jennifer Lunsford     Patient Name: Tayler Lugo  : 1950  MRN: [id number]  Today's Date: 2021      Visit Date: 2021    Visit Dx:    ICD-10-CM ICD-9-CM   1. S/P reverse total shoulder arthroplasty, right  Z96.611 V43.61       Patient Active Problem List   Diagnosis   • Spinal stenosis of lumbar region with neurogenic claudication   • Lumbar spinal stenosis   • Frequent UTI   • Hypertension   • Stress incontinence   • Itching due to drug   • Acute respiratory failure with hypoxia (CMS/HCC)   • Tear of right rotator cuff   • Acute pain of right shoulder   • Hepatorenal syndrome (CMS/HCC)   • Ascites due to alcoholic hepatitis   • Bladder prolapse, female, acquired   • Sepsis associated hypotension (CMS/HCC)   • Acute liver failure   • Altered mental state   • Acute renal failure (CMS/HCC)   • Alcohol abuse   • Severe malnutrition (CMS/HCC)   • Gastrointestinal hemorrhage   • Gastrointestinal hemorrhage associated with alcoholic gastritis   • Gastric varices   • Alcoholic cirrhosis (CMS/HCC)   • Iron deficiency anemia due to chronic blood loss        Past Medical History:   Diagnosis Date   • Elevated liver enzymes    • Fatty liver    • Frequent UTI    • History of depression    • Hypertension    • Lumbar stenosis    • OA (osteoarthritis)    • Post-menopausal    • Spondylarthritis     LOW BACK   • Stress incontinence    • Urinary retention         Past Surgical History:   Procedure Laterality Date   • ABDOMINOPLASTY     • BREAST SURGERY     • CATARACT EXTRACTION Bilateral    •  SECTION      X3   • COLONOSCOPY         • COLONOSCOPY N/A 2016    Procedure: COLONOSCOPY;  Surgeon: Ho Arenas MD;  Location: [facility] ENDOSCOPY;  Service:    • ENDOSCOPY N/A 2020    Procedure: ESOPHAGOGASTRODUODENOSCOPY;  Surgeon: Terrell Stanton MD;  Location: [facility] OR;  Service: Gastroenterology;  Laterality: N/A;  gastric  ulcer  duodenal erosion  gastric varices   • LASIK     • LUMBAR DISCECTOMY FUSION INSTRUMENTATION N/A 10/1/2018    Procedure: L4-5, L5-S1  laminectomy and fusion with instrumentation;  Surgeon: Juno Kim MD;  Location: [facility];  Service: Orthopedic Spine   • REDUCTION MAMMAPLASTY     • TIPS PROCEDURE     • TONSILLECTOMY     • TOTAL SHOULDER ARTHROPLASTY W/ DISTAL CLAVICLE EXCISION Right 6/13/2019    Procedure: TOTAL SHOULDER REVERSE ARTHROPLASTY;  Surgeon: David Marion MD;  Location: [facility];  Service: Orthopedics                       PT Assessment/Plan     Row Name 05/11/21 1000          PT Assessment    Assessment Comments  Continued to progress patient with strengthening exercises today.  -AS        PT Plan    PT Plan Comments  Continue with current treatment plan.  -AS       User Key  (r) = Recorded By, (t) = Taken By, (c) = Cosigned By    Initials Name Provider Type    AS Stu Greenfield, PT Physical Therapist          Modalities     Row Name 05/11/21 1000             Moist Heat    MH Applied  Yes  -AS      Location  Right Shoulder - pt supine with legs on roll  -AS      PT Moist Heat Minutes  10  -AS      MH Prior to Rx  Yes  -AS         Functional Testing    Outcome Measure Options  Quick DASH  -AS        User Key  (r) = Recorded By, (t) = Taken By, (c) = Cosigned By    Initials Name Provider Type    AS Stu Greenfield, PT Physical Therapist        OP Exercises     Row Name 05/11/21 1121 05/11/21 1000          Subjective Comments    Subjective Comments  --  Patient states she is feeling a little better but her right shoulder continues to be painful.  -AS        Total Minutes    04861 - PT Therapeutic Exercise Minutes  30  -AS  --        Exercise 1    Exercise Name 1  --  Serratus Punches  -AS     Reps 1  --  30  -AS        Exercise 2    Exercise Name 2  --  S/L ER  -AS     Reps 2  --  30  -AS        Exercise 3    Exercise Name 3  --  Empty/Full Can  -AS     Reps 3  --   30 each  -AS        Exercise 4    Exercise Name 4  --  Rows  -AS     Reps 4  --  30  -AS     Time 4  --  Blue  -AS        Exercise 5    Exercise Name 5  --  Extensions  -AS     Reps 5  --  30  -AS     Time 5  --  Blue  -AS        Exercise 6    Exercise Name 6  --  IR  -AS     Reps 6  --  30  -AS     Time 6  --  Blue  -AS        Exercise 7    Exercise Name 7  --  ER  -AS     Reps 7  --  30  -AS     Time 7  --  Blue  -AS        Exercise 8    Exercise Name 8  --  Manual Flex & ABD PROM  -AS     Reps 8  --  10  -AS     Time 8  --  10 sec hold each  -AS        Exercise 9    Exercise Name 9  --  Prone I, Y, T  -AS        Exercise 10    Exercise Name 10  --  Pulleys - Flex & ABD  -AS     Time 10  --  3 min  -AS       User Key  (r) = Recorded By, (t) = Taken By, (c) = Cosigned By    Initials Name Provider Type    AS Stu Greenfield, PT Physical Therapist                                Outcome Measure Options: Quick DASH         Time Calculation:   Start Time: 1040  Stop Time: 1121  Time Calculation (min): 41 min  Timed Charges  29790 - PT Therapeutic Exercise Minutes: 30  Untimed Charges  PT Moist Heat Minutes: 10  Total Minutes  Timed Charges Total Minutes: 30  Untimed Charges Total Minutes: 10   Total Minutes: 40  Therapy Charges for Today     Code Description Service Date Service Provider Modifiers Qty    [id number]  PT THER PROC EA 15 MIN 5/11/2021 Stu Greenfield, PT GP 2          PT G-Codes  Outcome Measure Options: Sheyla Greenfield, PT  5/11/2021

## 2021-05-18 ENCOUNTER — OFFICE VISIT (OUTPATIENT)
Dept: ORTHOPEDIC SURGERY | Facility: CLINIC | Age: 71
End: 2021-05-18

## 2021-05-18 VITALS — BODY MASS INDEX: 18.58 KG/M2 | HEIGHT: 62 IN | TEMPERATURE: 97.3 F | WEIGHT: 101 LBS

## 2021-05-18 DIAGNOSIS — S76.309A HAMSTRING INJURY, UNSPECIFIED LATERALITY, INITIAL ENCOUNTER: ICD-10-CM

## 2021-05-18 DIAGNOSIS — M54.50 LUMBAR PAIN: Primary | ICD-10-CM

## 2021-05-18 DIAGNOSIS — M70.70 ISCHIAL BURSITIS, UNSPECIFIED LATERALITY: ICD-10-CM

## 2021-05-18 PROCEDURE — 99213 OFFICE O/P EST LOW 20 MIN: CPT | Performed by: ORTHOPAEDIC SURGERY

## 2021-05-18 PROCEDURE — 72100 X-RAY EXAM L-S SPINE 2/3 VWS: CPT | Performed by: ORTHOPAEDIC SURGERY

## 2021-05-19 NOTE — PROGRESS NOTES
I have not seen her in a couple of years now since we did a lumbar fusion from which she did very well.  I believe it was L4-S1 laminectomy and fusion.  She has had extensive weight loss as result of liver disease and her main pain complaint is bilateral ischial and hamstring area pain.  She points exactly to these areas as a source of pain.  She has had no specific treatment.  BMI 18.5.  On exam strength is equal bilaterally except for weakness in the right EHL and its unchanged since her surgery.  Otherwise strong and intact sensation.  She does have a foot drop on the right.  Plain film x-rays showing L4-S1 solid fusion for spondylolisthesis which is unchanged from films 2 years ago.  No new findings.  I recommended Motrin instead of Tylenol, and weight gain as she is currently attempting should result in improvement of the bursitis along with some physical therapy which I have prescribed.  Follow-up as needed.

## 2021-05-27 ENCOUNTER — HOSPITAL ENCOUNTER (OUTPATIENT)
Dept: PHYSICAL THERAPY | Facility: HOSPITAL | Age: 71
Setting detail: THERAPIES SERIES
Discharge: HOME | End: 2021-05-27

## 2021-05-27 DIAGNOSIS — Z96.611 S/P REVERSE TOTAL SHOULDER ARTHROPLASTY, RIGHT: Primary | ICD-10-CM

## 2021-05-27 PROCEDURE — 97110 THERAPEUTIC EXERCISES: CPT

## 2021-05-27 PROCEDURE — 97161 PT EVAL LOW COMPLEX 20 MIN: CPT

## 2021-05-27 PROCEDURE — 97140 MANUAL THERAPY 1/> REGIONS: CPT

## 2021-05-27 NOTE — THERAPY EVALUATION
Outpatient Physical Therapy Ortho Initial Evaluation   Jennifer Lunsford     Patient Name: Tayler Lugo  : 1950  MRN: [id number]  Today's Date: 2021      Visit Date: 2021    Patient Active Problem List   Diagnosis   • Spinal stenosis of lumbar region with neurogenic claudication   • Lumbar spinal stenosis   • Frequent UTI   • Hypertension   • Stress incontinence   • Itching due to drug   • Acute respiratory failure with hypoxia (CMS/HCC)   • Tear of right rotator cuff   • Acute pain of right shoulder   • Hepatorenal syndrome (CMS/HCC)   • Ascites due to alcoholic hepatitis   • Bladder prolapse, female, acquired   • Sepsis associated hypotension (CMS/HCC)   • Acute liver failure   • Altered mental state   • Acute renal failure (CMS/HCC)   • Alcohol abuse   • Severe malnutrition (CMS/HCC)   • Gastrointestinal hemorrhage   • Gastrointestinal hemorrhage associated with alcoholic gastritis   • Gastric varices   • Alcoholic cirrhosis (CMS/HCC)   • Iron deficiency anemia due to chronic blood loss        Past Medical History:   Diagnosis Date   • Elevated liver enzymes    • Fatty liver    • Frequent UTI    • History of depression    • Hypertension    • Lumbar stenosis    • OA (osteoarthritis)    • Post-menopausal    • Spondylarthritis     LOW BACK   • Stress incontinence    • Urinary retention         Past Surgical History:   Procedure Laterality Date   • ABDOMINOPLASTY     • BREAST SURGERY     • CATARACT EXTRACTION Bilateral    •  SECTION      X3   • COLONOSCOPY      2006   • COLONOSCOPY N/A 2016    Procedure: COLONOSCOPY;  Surgeon: Ho Arenas MD;  Location: [facility] ENDOSCOPY;  Service:    • ENDOSCOPY N/A 2020    Procedure: ESOPHAGOGASTRODUODENOSCOPY;  Surgeon: Terrell Stanton MD;  Location: [facility] OR;  Service: Gastroenterology;  Laterality: N/A;  gastric ulcer  duodenal erosion  gastric varices   • LASIK     • LUMBAR DISCECTOMY FUSION INSTRUMENTATION N/A 10/1/2018  "   Procedure: L4-5, L5-S1  laminectomy and fusion with instrumentation;  Surgeon: Juno Kim MD;  Location: [facility] OR;  Service: Orthopedic Spine   • REDUCTION MAMMAPLASTY     • TIPS PROCEDURE     • TONSILLECTOMY     • TOTAL SHOULDER ARTHROPLASTY W/ DISTAL CLAVICLE EXCISION Right 6/13/2019    Procedure: TOTAL SHOULDER REVERSE ARTHROPLASTY;  Surgeon: David Marion MD;  Location: [facility] OR;  Service: Orthopedics       Visit Dx:     ICD-10-CM ICD-9-CM   1. Hamstring injury, unspecified laterality, initial encounter  S76.309A 959.6         Patient History     Row Name 05/27/21 1100             History    Chief Complaint  Muscle tenderness;Pain  -AS      Type of Pain  Lower Extremity / Leg  -AS      Brief Description of Current Complaint  Patient states she has lost a lot of weight and she is starting to develope pain in her buttock area when sitting. Her MD feels it is due to weightloss and pain is located at her bilateral ischial tuberosity. Patient also reports \"weak\" right ankle.  -AS      Patient/Caregiver Goals  Improve strength;Improve mobility  -AS      Patient's Rating of General Health  Good  -AS      Hand Dominance  right-handed  -AS      Occupation/sports/leisure activities  Golf, swimming, tennis  -AS      Patient seeing anyone else for problem(s)?  Dr. Kim  -AS         Pain     Pain Location  Leg  -AS      Pain at Present  3  -AS      Pain at Best  2  -AS      Pain at Worst  5  -AS      Pain Frequency  Intermittent  -AS      Pain Description  Aching  -AS      What Performance Factors Make the Current Problem(s) WORSE?  sitting  -AS         Daily Activities    Primary Language  English  -AS      Are you able to read  Yes  -AS      Are you able to write  Yes  -AS      How does patient learn best?  Listening;Reading;Demonstration  -AS      Teaching needs identified  Home Exercise Program;Management of Condition  -AS      Patient is concerned about/has problems with  Flexibility;Sitting  " "-AS      Does patient have problems with the following?  None  -AS      Barriers to learning  None  -AS      Pt Participated in POC and Goals  Yes  -AS         Safety    Are you being hurt, hit, or frightened by anyone at home or in your life?  No  -AS      Are you being neglected by a caregiver  No  -AS        User Key  (r) = Recorded By, (t) = Taken By, (c) = Cosigned By    Initials Name Provider Type    AS Stu Greenfield, PT Physical Therapist                          Therapy Education  Given: HEP, Symptoms/condition management  Program: New  How Provided: Verbal, Demonstration, Written  Provided to: Patient  Level of Understanding: Teach back education performed, Verbalized, Demonstrated         PT Assessment/Plan     Row Name 05/27/21 1100          PT Assessment    Functional Limitations  Limitation in home management;Performance in work activities  -AS     Impairments  Pain  -AS     Assessment Comments  Patient presents to outpatient PT with complaints of bilateral pain at her ischial tuberosities, especially when sitting. Instructed and educated patient on sitting on padded seats or using a pillow to sit on. Patient reports she has already atrted this and can \"tell a big difference\".   -AS     Please refer to paper survey for additional self-reported information  Yes  -AS     Rehab Potential  Good  -AS     Patient/caregiver participated in establishment of treatment plan and goals  Yes  -AS     Patient would benefit from skilled therapy intervention  Yes  -AS        PT Plan    PT Frequency  One time visit  -AS     PT Plan Comments  Patient to be seen for HEP oonly today.  -AS       User Key  (r) = Recorded By, (t) = Taken By, (c) = Cosigned By    Initials Name Provider Type    AS Stu Greenfield, PT Physical Therapist            OP Exercises     Row Name 05/27/21 1139 05/27/21 1100          Total Minutes    43621 - PT Therapeutic Exercise Minutes  15  -AS  --        Exercise 1    Exercise Name 1  " --  Hamstring Stretch  -AS     Reps 1  --  15  -AS     Time 1  --  10 sec hold each  -AS        Exercise 2    Exercise Name 2  --  Piriformis Stretch  -AS     Reps 2  --  15  -AS     Time 2  --  10 sec hold each  -AS        Exercise 3    Exercise Name 3  --  3-Way Ankle  -AS     Reps 3  --  25 each  -AS     Time 3  --  Blue  -AS        Exercise 4    Exercise Name 4  --  Ankle DF AROM - No Band   -AS     Reps 4  --  25  -AS       User Key  (r) = Recorded By, (t) = Taken By, (c) = Cosigned By    Initials Name Provider Type    AS Stu Greenfield, PT Physical Therapist                                  Time Calculation:     Start Time: 1050  Stop Time: 1130  Time Calculation (min): 40 min  Timed Charges  06685 - PT Therapeutic Exercise Minutes: 15  Untimed Charges  PT Eval/Re-eval Minutes: 15  Total Minutes  Timed Charges Total Minutes: 15  Untimed Charges Total Minutes: 15   Total Minutes: 30     Therapy Charges for Today     Code Description Service Date Service Provider Modifiers Qty    81706369105  PT THER PROC EA 15 MIN 5/27/2021 Stu Greenfield, PT GP 1    [id number]  PT EVAL LOW COMPLEXITY 1 5/27/2021 Stu Greenfield, PT GP 1                    Stu Greenfield, PT  5/27/2021

## 2021-05-27 NOTE — THERAPY TREATMENT NOTE
Outpatient Physical Therapy Ortho Treatment Note   Jennifer Lunsford     Patient Name: Tayler Lugo  : 1950  MRN: [id number]  Today's Date: 2021      Visit Date: 2021    Visit Dx:    ICD-10-CM ICD-9-CM   1. S/P reverse total shoulder arthroplasty, right  Z96.611 V43.61       Patient Active Problem List   Diagnosis   • Spinal stenosis of lumbar region with neurogenic claudication   • Lumbar spinal stenosis   • Frequent UTI   • Hypertension   • Stress incontinence   • Itching due to drug   • Acute respiratory failure with hypoxia (CMS/HCC)   • Tear of right rotator cuff   • Acute pain of right shoulder   • Hepatorenal syndrome (CMS/HCC)   • Ascites due to alcoholic hepatitis   • Bladder prolapse, female, acquired   • Sepsis associated hypotension (CMS/HCC)   • Acute liver failure   • Altered mental state   • Acute renal failure (CMS/HCC)   • Alcohol abuse   • Severe malnutrition (CMS/HCC)   • Gastrointestinal hemorrhage   • Gastrointestinal hemorrhage associated with alcoholic gastritis   • Gastric varices   • Alcoholic cirrhosis (CMS/HCC)   • Iron deficiency anemia due to chronic blood loss        Past Medical History:   Diagnosis Date   • Elevated liver enzymes    • Fatty liver    • Frequent UTI    • History of depression    • Hypertension    • Lumbar stenosis    • OA (osteoarthritis)    • Post-menopausal    • Spondylarthritis     LOW BACK   • Stress incontinence    • Urinary retention         Past Surgical History:   Procedure Laterality Date   • ABDOMINOPLASTY     • BREAST SURGERY     • CATARACT EXTRACTION Bilateral    •  SECTION      X3   • COLONOSCOPY         • COLONOSCOPY N/A 2016    Procedure: COLONOSCOPY;  Surgeon: Ho Arenas MD;  Location: [facility] ENDOSCOPY;  Service:    • ENDOSCOPY N/A 2020    Procedure: ESOPHAGOGASTRODUODENOSCOPY;  Surgeon: Terrell Stanton MD;  Location: [facility] OR;  Service: Gastroenterology;  Laterality: N/A;  gastric  "ulcer  duodenal erosion  gastric varices   • LASIK     • LUMBAR DISCECTOMY FUSION INSTRUMENTATION N/A 10/1/2018    Procedure: L4-5, L5-S1  laminectomy and fusion with instrumentation;  Surgeon: Juno Kim MD;  Location: [facility];  Service: Orthopedic Spine   • REDUCTION MAMMAPLASTY     • TIPS PROCEDURE     • TONSILLECTOMY     • TOTAL SHOULDER ARTHROPLASTY W/ DISTAL CLAVICLE EXCISION Right 6/13/2019    Procedure: TOTAL SHOULDER REVERSE ARTHROPLASTY;  Surgeon: David Marion MD;  Location: [facility];  Service: Orthopedics                       PT Assessment/Plan     Row Name 05/27/21 1141 05/27/21 1100       PT Assessment    Functional Limitations  --  Limitation in home management;Performance in work activities  -AS    Impairments  --  Pain  -AS    Assessment Comments  Patient's right shoulder ROM, strength, and function continue to improve. Manual PROM only performed today due to evaluation for leg and ankle pain/weakness.  -AS  Patient presents to outpatient PT with complaints of bilateral pain at her ischial tuberosities, especially when sitting. Instructed and educated patient on sitting on padded seats or using a pillow to sit on. Patient reports she has already atrted this and can \"tell a big difference\".   -AS    Please refer to paper survey for additional self-reported information  --  Yes  -AS    Rehab Potential  --  Good  -AS    Patient/caregiver participated in establishment of treatment plan and goals  --  Yes  -AS    Patient would benefit from skilled therapy intervention  --  Yes  -AS       PT Plan    PT Frequency  --  One time visit  -AS    PT Plan Comments  Continue with current treatment plan.  -AS  --  -AS      User Key  (r) = Recorded By, (t) = Taken By, (c) = Cosigned By    Initials Name Provider Type    AS Stu Greenfield, PT Physical Therapist          Modalities     Row Name 05/27/21 1141             Moist Heat    MH Applied  Yes  -AS      Location  Right Shoulder - " pt supine with legs on roll  -AS      PT Moist Heat Minutes  10  -AS      MH Prior to Rx  Yes  -AS         Functional Testing    Outcome Measure Options  Quick DASH  -AS        User Key  (r) = Recorded By, (t) = Taken By, (c) = Cosigned By    Initials Name Provider Type    AS Stu Greenfield, PT Physical Therapist        OP Exercises     Row Name 05/27/21 1156 05/27/21 1141 05/27/21 1139       Subjective Comments    Subjective Comments  --  Patient states her right shoulder is sore today due to sweeping off her driveway this weekend.  -AS  --       Total Minutes    15012 - PT Therapeutic Exercise Minutes  --  --  15  -AS    49480 - PT Manual Therapy Minutes  15  -AS  --  --    Row Name 05/27/21 1100             Exercise 1    Exercise Name 1  Hamstring Stretch  -AS      Reps 1  15  -AS      Time 1  10 sec hold each  -AS         Exercise 2    Exercise Name 2  Piriformis Stretch  -AS      Reps 2  15  -AS      Time 2  10 sec hold each  -AS         Exercise 3    Exercise Name 3  3-Way Ankle  -AS      Reps 3  25 each  -AS      Time 3  Blue  -AS         Exercise 4    Exercise Name 4  Ankle DF AROM - No Band   -AS      Reps 4  25  -AS        User Key  (r) = Recorded By, (t) = Taken By, (c) = Cosigned By    Initials Name Provider Type    AS tSu Greenfield, PT Physical Therapist                      Manual Rx (last 36 hours)      Manual Treatments     Row Name 05/27/21 1156 05/27/21 1100          Total Minutes    03703 - PT Manual Therapy Minutes  15  -AS  --        Manual Rx 1    Manual Rx 1 Location  --  Right Shoulder  -AS     Manual Rx 1 Type  --  PROM  -AS     Manual Rx 1 Duration  --  15 min  -AS       User Key  (r) = Recorded By, (t) = Taken By, (c) = Cosigned By    Initials Name Provider Type    AS Stu Greenfield, PT Physical Therapist              Therapy Education  Given: HEP, Symptoms/condition management  Program: New  How Provided: Verbal, Demonstration, Written  Provided to: Patient  Level of  Understanding: Teach back education performed, Verbalized, Demonstrated    Outcome Measure Options: Quick DASH         Time Calculation:   Start Time: 1131  Stop Time: 1156  Time Calculation (min): 25 min  Timed Charges  21107 - PT Therapeutic Exercise Minutes: 15  29944 - PT Manual Therapy Minutes: 15  Untimed Charges  PT Eval/Re-eval Minutes: 15  PT Moist Heat Minutes: 10  Total Minutes  Timed Charges Total Minutes: 15  Untimed Charges Total Minutes: 10   Total Minutes: 25  Therapy Charges for Today     Code Description Service Date Service Provider Modifiers Qty    03087148444 HC PT MANUAL THERAPY EA 15 MIN 5/27/2021 Stu Greenfield, PT GP 1          PT G-Codes  Outcome Measure Options: Sheyla Greenfield, PT  5/27/2021

## 2021-06-02 ENCOUNTER — OFFICE VISIT (OUTPATIENT)
Dept: ORTHOPEDIC SURGERY | Facility: CLINIC | Age: 71
End: 2021-06-02

## 2021-06-02 VITALS — BODY MASS INDEX: 19.51 KG/M2 | WEIGHT: 106 LBS | TEMPERATURE: 97.1 F | HEIGHT: 62 IN

## 2021-06-02 DIAGNOSIS — Z96.611 S/P REVERSE TOTAL SHOULDER ARTHROPLASTY, RIGHT: Primary | ICD-10-CM

## 2021-06-02 PROCEDURE — 99212 OFFICE O/P EST SF 10 MIN: CPT | Performed by: ORTHOPAEDIC SURGERY

## 2021-06-02 RX ORDER — POTASSIUM CHLORIDE 20 MEQ/1
20 TABLET, EXTENDED RELEASE ORAL 2 TIMES DAILY
COMMUNITY
Start: 2021-06-01

## 2021-06-02 NOTE — PROGRESS NOTES
CC: Follow-up regarding right shoulder pain    Ms. Lugo also for the right shoulder.  She says it is much better.  Therapy seems to be helping.  She has not had any recurrent popping.  Pain is minimal at this point.    Active motion is near full.  She still has a positive Hornblower's.  I could not reproduce any apprehension or instability on exam.  She is weak with abduction and elevation but she can fully raise her arm.    Assessment: Posterior rotator cuff tear status post reverse arthroplasty    Plan: The Hornblower's is new.  She and I both agree on this.  It is unusual but I think she tore her posterior cuff which accounts for her recent increased pain and dysfunction.  I cannot reproduce any instability.  I am not convinced that her shoulder did in fact come out.  I told her to call me if the symptoms recur but otherwise she should continue her therapy and she can follow-up with me as needed.    David Marion MD

## 2021-06-03 ENCOUNTER — HOSPITAL ENCOUNTER (OUTPATIENT)
Dept: PHYSICAL THERAPY | Facility: HOSPITAL | Age: 71
Setting detail: THERAPIES SERIES
Discharge: HOME | End: 2021-06-03

## 2021-06-03 DIAGNOSIS — Z96.611 S/P REVERSE TOTAL SHOULDER ARTHROPLASTY, RIGHT: Primary | ICD-10-CM

## 2021-06-03 PROCEDURE — 97110 THERAPEUTIC EXERCISES: CPT

## 2021-06-03 NOTE — THERAPY TREATMENT NOTE
Outpatient Physical Therapy Ortho Treatment Note   Jennifer Lunsford     Patient Name: Tayler Lugo  : 1950  MRN: [id number]  Today's Date: 6/3/2021      Visit Date: 2021    Visit Dx:    ICD-10-CM ICD-9-CM   1. S/P reverse total shoulder arthroplasty, right  Z96.611 V43.61       Patient Active Problem List   Diagnosis   • Spinal stenosis of lumbar region with neurogenic claudication   • Lumbar spinal stenosis   • Frequent UTI   • Hypertension   • Stress incontinence   • Itching due to drug   • Acute respiratory failure with hypoxia (CMS/HCC)   • Tear of right rotator cuff   • Acute pain of right shoulder   • Hepatorenal syndrome (CMS/HCC)   • Ascites due to alcoholic hepatitis   • Bladder prolapse, female, acquired   • Sepsis associated hypotension (CMS/HCC)   • Acute liver failure   • Altered mental state   • Acute renal failure (CMS/HCC)   • Alcohol abuse   • Severe malnutrition (CMS/HCC)   • Gastrointestinal hemorrhage   • Gastrointestinal hemorrhage associated with alcoholic gastritis   • Gastric varices   • Alcoholic cirrhosis (CMS/HCC)   • Iron deficiency anemia due to chronic blood loss        Past Medical History:   Diagnosis Date   • Elevated liver enzymes    • Fatty liver    • Frequent UTI    • History of depression    • Hypertension    • Lumbar stenosis    • OA (osteoarthritis)    • Post-menopausal    • Spondylarthritis     LOW BACK   • Stress incontinence    • Urinary retention         Past Surgical History:   Procedure Laterality Date   • ABDOMINOPLASTY     • BREAST SURGERY     • CATARACT EXTRACTION Bilateral    •  SECTION      X3   • COLONOSCOPY         • COLONOSCOPY N/A 2016    Procedure: COLONOSCOPY;  Surgeon: Ho Arenas MD;  Location: [facility] ENDOSCOPY;  Service:    • ENDOSCOPY N/A 2020    Procedure: ESOPHAGOGASTRODUODENOSCOPY;  Surgeon: Terrell Stanton MD;  Location: [facility] OR;  Service: Gastroenterology;  Laterality: N/A;  gastric  ulcer  duodenal erosion  gastric varices   • LASIK     • LUMBAR DISCECTOMY FUSION INSTRUMENTATION N/A 10/1/2018    Procedure: L4-5, L5-S1  laminectomy and fusion with instrumentation;  Surgeon: Juno Kim MD;  Location: [facility];  Service: Orthopedic Spine   • REDUCTION MAMMAPLASTY     • TIPS PROCEDURE     • TONSILLECTOMY     • TOTAL SHOULDER ARTHROPLASTY W/ DISTAL CLAVICLE EXCISION Right 6/13/2019    Procedure: TOTAL SHOULDER REVERSE ARTHROPLASTY;  Surgeon: David Marion MD;  Location: [facility];  Service: Orthopedics                       PT Assessment/Plan     Row Name 06/03/21 1000          PT Assessment    Assessment Comments  Patient's right shoulder continues to do well with PT with improved ROM, strength, and function with reports of decreased pain and discomfort.  -AS        PT Plan    PT Plan Comments  Continue with current treatment plan.  -AS       User Key  (r) = Recorded By, (t) = Taken By, (c) = Cosigned By    Initials Name Provider Type    AS Stu Greenfield, PT Physical Therapist          Modalities     Row Name 06/03/21 1000             Moist Heat    MH Applied  Yes  -AS      Location  Right Shoulder - pt supine with legs on roll  -AS      PT Moist Heat Minutes  10  -AS      MH Prior to Rx  Yes  -AS         Functional Testing    Outcome Measure Options  Quick DASH  -AS        User Key  (r) = Recorded By, (t) = Taken By, (c) = Cosigned By    Initials Name Provider Type    AS Stu Greenfield, PT Physical Therapist        OP Exercises     Row Name 06/03/21 1134 06/03/21 1000          Subjective Comments    Subjective Comments  --  Patient states her legs feel so much better. She states she was seen by her shoulder MD and he has released her. She states he told her to continue with PT for as long as she felt she needed.  -AS        Total Minutes    55991 - PT Therapeutic Exercise Minutes  40  -AS  --        Exercise 1    Exercise Name 1  --  Serratus Punches  -AS      Reps 1  --  40  -AS        Exercise 2    Exercise Name 2  --  S/L ER  -AS     Reps 2  --  40  -AS        Exercise 3    Exercise Name 3  --  Empty/Full Can  -AS     Reps 3  --  40 each  -AS        Exercise 4    Exercise Name 4  --  Rows  -AS     Reps 4  --  40  -AS     Time 4  --  Blue  -AS        Exercise 5    Exercise Name 5  --  Extensions  -AS     Reps 5  --  40  -AS     Time 5  --  Blue  -AS        Exercise 6    Exercise Name 6  --  IR  -AS     Reps 6  --  40  -AS     Time 6  --  Blue  -AS        Exercise 7    Exercise Name 7  --  ER  -AS     Reps 7  --  40  -AS     Time 7  --  Blue  -AS        Exercise 8    Exercise Name 8  --  Manual Flex & ABD PROM  -AS     Reps 8  --  10  -AS     Time 8  --  10 sec hold each  -AS        Exercise 9    Exercise Name 9  --  Prone I, Y, T  -AS        Exercise 10    Exercise Name 10  --  Pulleys - Flex & ABD  -AS     Time 10  --  3 min  -AS       User Key  (r) = Recorded By, (t) = Taken By, (c) = Cosigned By    Initials Name Provider Type    AS Stu Greenfield, PT Physical Therapist                      Manual Rx (last 36 hours)      Manual Treatments     Row Name 06/03/21 0900             Manual Rx 1    Manual Rx 1 Location  --  -AS      Manual Rx 1 Type  --  -AS      Manual Rx 1 Duration  --  -AS        User Key  (r) = Recorded By, (t) = Taken By, (c) = Cosigned By    Initials Name Provider Type    AS Stu Greenfield, PT Physical Therapist                   Outcome Measure Options: Quick DASH         Time Calculation:   Start Time: 1034  Stop Time: 1127  Time Calculation (min): 53 min  Timed Charges  84317 - PT Therapeutic Exercise Minutes: 40  Untimed Charges  PT Moist Heat Minutes: 10  Total Minutes  Timed Charges Total Minutes: 40  Untimed Charges Total Minutes: 10   Total Minutes: 50  Therapy Charges for Today     Code Description Service Date Service Provider Modifiers Qty    65450615318  PT THER PROC EA 15 MIN 6/3/2021 Stu Greenfield, PT GP 2           PT G-Codes  Outcome Measure Options: Sheyla Greenfield, PT  6/3/2021

## 2021-06-09 ENCOUNTER — HOSPITAL ENCOUNTER (OUTPATIENT)
Dept: PHYSICAL THERAPY | Facility: HOSPITAL | Age: 71
Setting detail: THERAPIES SERIES
Discharge: HOME | End: 2021-06-09

## 2021-06-09 DIAGNOSIS — Z96.611 S/P REVERSE TOTAL SHOULDER ARTHROPLASTY, RIGHT: Primary | ICD-10-CM

## 2021-06-09 PROCEDURE — 97110 THERAPEUTIC EXERCISES: CPT

## 2021-06-09 NOTE — THERAPY TREATMENT NOTE
Outpatient Physical Therapy Ortho Treatment Note   Jennifer Lunsford     Patient Name: Tayler Lugo  : 1950  MRN: [id number]  Today's Date: 2021      Visit Date: 2021    Visit Dx:    ICD-10-CM ICD-9-CM   1. S/P reverse total shoulder arthroplasty, right  Z96.611 V43.61       Patient Active Problem List   Diagnosis   • Spinal stenosis of lumbar region with neurogenic claudication   • Lumbar spinal stenosis   • Frequent UTI   • Hypertension   • Stress incontinence   • Itching due to drug   • Acute respiratory failure with hypoxia (CMS/HCC)   • Tear of right rotator cuff   • Acute pain of right shoulder   • Hepatorenal syndrome (CMS/HCC)   • Ascites due to alcoholic hepatitis   • Bladder prolapse, female, acquired   • Sepsis associated hypotension (CMS/HCC)   • Acute liver failure   • Altered mental state   • Acute renal failure (CMS/HCC)   • Alcohol abuse   • Severe malnutrition (CMS/HCC)   • Gastrointestinal hemorrhage   • Gastrointestinal hemorrhage associated with alcoholic gastritis   • Gastric varices   • Alcoholic cirrhosis (CMS/HCC)   • Iron deficiency anemia due to chronic blood loss        Past Medical History:   Diagnosis Date   • Elevated liver enzymes    • Fatty liver    • Frequent UTI    • History of depression    • Hypertension    • Lumbar stenosis    • OA (osteoarthritis)    • Post-menopausal    • Spondylarthritis     LOW BACK   • Stress incontinence    • Urinary retention         Past Surgical History:   Procedure Laterality Date   • ABDOMINOPLASTY     • BREAST SURGERY     • CATARACT EXTRACTION Bilateral    •  SECTION      X3   • COLONOSCOPY         • COLONOSCOPY N/A 2016    Procedure: COLONOSCOPY;  Surgeon: Ho Arenas MD;  Location: [facility] ENDOSCOPY;  Service:    • ENDOSCOPY N/A 2020    Procedure: ESOPHAGOGASTRODUODENOSCOPY;  Surgeon: Terrell Stanton MD;  Location: [facility] OR;  Service: Gastroenterology;  Laterality: N/A;  gastric  ulcer  duodenal erosion  gastric varices   • LASIK     • LUMBAR DISCECTOMY FUSION INSTRUMENTATION N/A 10/1/2018    Procedure: L4-5, L5-S1  laminectomy and fusion with instrumentation;  Surgeon: Juno Kim MD;  Location: [facility];  Service: Orthopedic Spine   • REDUCTION MAMMAPLASTY     • TIPS PROCEDURE     • TONSILLECTOMY     • TOTAL SHOULDER ARTHROPLASTY W/ DISTAL CLAVICLE EXCISION Right 6/13/2019    Procedure: TOTAL SHOULDER REVERSE ARTHROPLASTY;  Surgeon: David Marion MD;  Location: [facility];  Service: Orthopedics                       PT Assessment/Plan     Row Name 06/09/21 1100          PT Assessment    Assessment Comments  Patient continues to make good progress with PT. Patient tolerated progressions made in her strengthening exercises today.  -AS        PT Plan    PT Plan Comments  Continue with current treatment plan.  -AS       User Key  (r) = Recorded By, (t) = Taken By, (c) = Cosigned By    Initials Name Provider Type    AS Stu Greenfield, PT Physical Therapist          Modalities     Row Name 06/09/21 1100             Moist Heat    MH Applied  Yes  -AS      Location  Right Shoulder - pt supine with legs on roll  -AS      PT Moist Heat Minutes  10  -AS      MH Prior to Rx  Yes  -AS         Functional Testing    Outcome Measure Options  Quick DASH  -AS        User Key  (r) = Recorded By, (t) = Taken By, (c) = Cosigned By    Initials Name Provider Type    AS Stu Greenfield, PT Physical Therapist        OP Exercises     Row Name 06/09/21 1142 06/09/21 1100          Subjective Comments    Subjective Comments  --  Patient states she is doing well today. She states she feels most of her pain and discomfort is a result of how she sleeps.   -AS        Total Minutes    14421 - PT Therapeutic Exercise Minutes  35  -AS  --        Exercise 1    Exercise Name 1  --  Serratus Punches  -AS     Reps 1  --  25  -AS     Time 1  --  1#  -AS        Exercise 2    Exercise Name 2  --   S/L ER  -AS     Reps 2  --  25  -AS     Time 2  --  1#  -AS        Exercise 3    Exercise Name 3  --  Empty/Full Can  -AS     Reps 3  --  25 each  -AS     Time 3  --  1#  -AS        Exercise 4    Exercise Name 4  --  Rows  -AS     Reps 4  --  25  -AS     Time 4  --  Black  -AS        Exercise 5    Exercise Name 5  --  Extensions  -AS     Reps 5  --  25  -AS     Time 5  --  Black  -AS        Exercise 6    Exercise Name 6  --  IR  -AS     Reps 6  --  25  -AS     Time 6  --  Black  -AS        Exercise 7    Exercise Name 7  --  ER  -AS     Reps 7  --  25  -AS     Time 7  --  Black  -AS        Exercise 8    Exercise Name 8  --  Manual Flex & ABD PROM  -AS     Reps 8  --  10  -AS     Time 8  --  10 sec hold each  -AS        Exercise 9    Exercise Name 9  --  Prone I, Y, T  -AS        Exercise 10    Exercise Name 10  --  Pulleys - Flex & ABD  -AS     Time 10  --  3 min  -AS       User Key  (r) = Recorded By, (t) = Taken By, (c) = Cosigned By    Initials Name Provider Type    AS Stu Greenfield, PT Physical Therapist                      Manual Rx (last 36 hours)      Manual Treatments     Row Name 06/09/21 1100             Manual Rx 1    Manual Rx 1 Location  --  -AS      Manual Rx 1 Type  --  -AS      Manual Rx 1 Duration  --  -AS        User Key  (r) = Recorded By, (t) = Taken By, (c) = Cosigned By    Initials Name Provider Type    AS Stu Greenfield, PT Physical Therapist                   Outcome Measure Options: Quick DASH         Time Calculation:   Start Time: 1105  Stop Time: 1142  Time Calculation (min): 37 min  Timed Charges  89933 - PT Therapeutic Exercise Minutes: 35  Untimed Charges  PT Moist Heat Minutes: 10  Total Minutes  Timed Charges Total Minutes: 35  Untimed Charges Total Minutes: 10   Total Minutes: 35  Therapy Charges for Today     Code Description Service Date Service Provider Modifiers Qty    [id number]  PT THER PROC EA 15 MIN 6/9/2021 Stu Greenfield, PT GP 2          PT  G-Codes  Outcome Measure Options: Sheyla Greenfield, PT  6/9/2021

## 2021-06-16 ENCOUNTER — HOSPITAL ENCOUNTER (OUTPATIENT)
Dept: PHYSICAL THERAPY | Facility: HOSPITAL | Age: 71
Setting detail: THERAPIES SERIES
Discharge: HOME | End: 2021-06-16

## 2021-06-16 DIAGNOSIS — Z96.611 S/P REVERSE TOTAL SHOULDER ARTHROPLASTY, RIGHT: Primary | ICD-10-CM

## 2021-06-16 PROCEDURE — 97110 THERAPEUTIC EXERCISES: CPT

## 2021-06-16 NOTE — THERAPY TREATMENT NOTE
Outpatient Physical Therapy Ortho Treatment Note   Jennifer Lunsford     Patient Name: Tayler Lugo  : 1950  MRN: [id number]  Today's Date: 2021      Visit Date: 2021    Visit Dx:    ICD-10-CM ICD-9-CM   1. S/P reverse total shoulder arthroplasty, right  Z96.611 V43.61       Patient Active Problem List   Diagnosis   • Spinal stenosis of lumbar region with neurogenic claudication   • Lumbar spinal stenosis   • Frequent UTI   • Hypertension   • Stress incontinence   • Itching due to drug   • Acute respiratory failure with hypoxia (CMS/HCC)   • Tear of right rotator cuff   • Acute pain of right shoulder   • Hepatorenal syndrome (CMS/HCC)   • Ascites due to alcoholic hepatitis   • Bladder prolapse, female, acquired   • Sepsis associated hypotension (CMS/HCC)   • Acute liver failure   • Altered mental state   • Acute renal failure (CMS/HCC)   • Alcohol abuse   • Severe malnutrition (CMS/HCC)   • Gastrointestinal hemorrhage   • Gastrointestinal hemorrhage associated with alcoholic gastritis   • Gastric varices   • Alcoholic cirrhosis (CMS/HCC)   • Iron deficiency anemia due to chronic blood loss        Past Medical History:   Diagnosis Date   • Elevated liver enzymes    • Fatty liver    • Frequent UTI    • History of depression    • Hypertension    • Lumbar stenosis    • OA (osteoarthritis)    • Post-menopausal    • Spondylarthritis     LOW BACK   • Stress incontinence    • Urinary retention         Past Surgical History:   Procedure Laterality Date   • ABDOMINOPLASTY     • BREAST SURGERY     • CATARACT EXTRACTION Bilateral    •  SECTION      X3   • COLONOSCOPY         • COLONOSCOPY N/A 2016    Procedure: COLONOSCOPY;  Surgeon: Ho Arenas MD;  Location: [facility] ENDOSCOPY;  Service:    • ENDOSCOPY N/A 2020    Procedure: ESOPHAGOGASTRODUODENOSCOPY;  Surgeon: Terrell Stanton MD;  Location: [facility] OR;  Service: Gastroenterology;  Laterality: N/A;  gastric  ulcer  duodenal erosion  gastric varices   • LASIK     • LUMBAR DISCECTOMY FUSION INSTRUMENTATION N/A 10/1/2018    Procedure: L4-5, L5-S1  laminectomy and fusion with instrumentation;  Surgeon: Juno Kim MD;  Location: [facility]  Service: Orthopedic Spine   • REDUCTION MAMMAPLASTY     • TIPS PROCEDURE     • TONSILLECTOMY     • TOTAL SHOULDER ARTHROPLASTY W/ DISTAL CLAVICLE EXCISION Right 6/13/2019    Procedure: TOTAL SHOULDER REVERSE ARTHROPLASTY;  Surgeon: David Marion MD;  Location: [facility]  Service: Orthopedics                       PT Assessment/Plan     Row Name 06/16/21 1100          PT Assessment    Assessment Comments  Patient continues to make good progress with PT. Patient tolerated progressions made in her strengthening exercises today.  -AS        PT Plan    PT Plan Comments  Continue with current treatment plan.  -AS       User Key  (r) = Recorded By, (t) = Taken By, (c) = Cosigned By    Initials Name Provider Type    AS Stu Greenfield, PT Physical Therapist          Modalities     Row Name 06/16/21 1100             Moist Heat    MH Applied  Yes  -AS      Location  Right Shoulder - pt supine with legs on roll  -AS      PT Moist Heat Minutes  10  -AS      MH Prior to Rx  Yes  -AS         Functional Testing    Outcome Measure Options  Quick DASH  -AS        User Key  (r) = Recorded By, (t) = Taken By, (c) = Cosigned By    Initials Name Provider Type    AS Stu Greenfield, PT Physical Therapist        OP Exercises     Row Name 06/16/21 1241 06/16/21 1100          Subjective Comments    Subjective Comments  --  Patient states she continues to do well.   -AS        Total Minutes    80396 - PT Therapeutic Exercise Minutes  30  -AS  --        Exercise 1    Exercise Name 1  --  Serratus Punches  -AS     Reps 1  --  30  -AS     Time 1  --  1#  -AS        Exercise 2    Exercise Name 2  --  S/L ER  -AS     Reps 2  --  30  -AS     Time 2  --  1#  -AS        Exercise 3     Exercise Name 3  --  Empty/Full Can  -AS     Reps 3  --  30 each  -AS     Time 3  --  1#  -AS        Exercise 4    Exercise Name 4  --  Rows  -AS     Reps 4  --  30  -AS     Time 4  --  Black  -AS        Exercise 5    Exercise Name 5  --  Extensions  -AS     Reps 5  --  30  -AS     Time 5  --  Black  -AS        Exercise 6    Exercise Name 6  --  IR  -AS     Reps 6  --  30  -AS     Time 6  --  Black  -AS        Exercise 7    Exercise Name 7  --  ER  -AS     Reps 7  --  30  -AS     Time 7  --  Black  -AS        Exercise 8    Exercise Name 8  --  Manual Flex & ABD PROM  -AS     Reps 8  --  --  -AS     Time 8  --  --  -AS        Exercise 9    Exercise Name 9  --  Prone I, Y, T  -AS        Exercise 10    Exercise Name 10  --  Pulleys - Flex & ABD  -AS     Time 10  --  3 min  -AS       User Key  (r) = Recorded By, (t) = Taken By, (c) = Cosigned By    Initials Name Provider Type    AS Stu Greenfield, PT Physical Therapist                                Outcome Measure Options: Quick DASH         Time Calculation:   Start Time: 1154  Stop Time: 1236  Time Calculation (min): 42 min  Timed Charges  58146 - PT Therapeutic Exercise Minutes: 30  Untimed Charges  PT Moist Heat Minutes: 10  Total Minutes  Timed Charges Total Minutes: 30  Untimed Charges Total Minutes: 10   Total Minutes: 40  Therapy Charges for Today     Code Description Service Date Service Provider Modifiers Qty    71382963597 HC PT THER PROC EA 15 MIN 6/16/2021 Stu Greenfield, PT GP 2          PT G-Codes  Outcome Measure Options: Sheyla Greenfield, PT  6/16/2021

## 2021-06-24 ENCOUNTER — HOSPITAL ENCOUNTER (OUTPATIENT)
Dept: PHYSICAL THERAPY | Facility: HOSPITAL | Age: 71
Setting detail: THERAPIES SERIES
Discharge: HOME | End: 2021-06-24

## 2021-06-24 DIAGNOSIS — Z96.611 S/P REVERSE TOTAL SHOULDER ARTHROPLASTY, RIGHT: Primary | ICD-10-CM

## 2021-06-24 PROCEDURE — 97110 THERAPEUTIC EXERCISES: CPT

## 2021-06-24 NOTE — THERAPY TREATMENT NOTE
Outpatient Physical Therapy Ortho Treatment Note   Jennifer Lunsford     Patient Name: Tayler Lugo  : 1950  MRN: [id number]  Today's Date: 2021      Visit Date: 2021    Visit Dx:    ICD-10-CM ICD-9-CM   1. S/P reverse total shoulder arthroplasty, right  Z96.611 V43.61       Patient Active Problem List   Diagnosis   • Spinal stenosis of lumbar region with neurogenic claudication   • Lumbar spinal stenosis   • Frequent UTI   • Hypertension   • Stress incontinence   • Itching due to drug   • Acute respiratory failure with hypoxia (CMS/HCC)   • Tear of right rotator cuff   • Acute pain of right shoulder   • Hepatorenal syndrome (CMS/HCC)   • Ascites due to alcoholic hepatitis   • Bladder prolapse, female, acquired   • Sepsis associated hypotension (CMS/HCC)   • Acute liver failure   • Altered mental state   • Acute renal failure (CMS/HCC)   • Alcohol abuse   • Severe malnutrition (CMS/HCC)   • Gastrointestinal hemorrhage   • Gastrointestinal hemorrhage associated with alcoholic gastritis   • Gastric varices   • Alcoholic cirrhosis (CMS/HCC)   • Iron deficiency anemia due to chronic blood loss        Past Medical History:   Diagnosis Date   • Elevated liver enzymes    • Fatty liver    • Frequent UTI    • History of depression    • Hypertension    • Lumbar stenosis    • OA (osteoarthritis)    • Post-menopausal    • Spondylarthritis     LOW BACK   • Stress incontinence    • Urinary retention         Past Surgical History:   Procedure Laterality Date   • ABDOMINOPLASTY     • BREAST SURGERY     • CATARACT EXTRACTION Bilateral    •  SECTION      X3   • COLONOSCOPY         • COLONOSCOPY N/A 2016    Procedure: COLONOSCOPY;  Surgeon: Ho Arenas MD;  Location: [facility] ENDOSCOPY;  Service:    • ENDOSCOPY N/A 2020    Procedure: ESOPHAGOGASTRODUODENOSCOPY;  Surgeon: Terrell Stanton MD;  Location: [facility] OR;  Service: Gastroenterology;  Laterality: N/A;  gastric  "ulcer  duodenal erosion  gastric varices   • LASIK     • LUMBAR DISCECTOMY FUSION INSTRUMENTATION N/A 10/1/2018    Procedure: L4-5, L5-S1  laminectomy and fusion with instrumentation;  Surgeon: Juno Kim MD;  Location: [facility];  Service: Orthopedic Spine   • REDUCTION MAMMAPLASTY     • TIPS PROCEDURE     • TONSILLECTOMY     • TOTAL SHOULDER ARTHROPLASTY W/ DISTAL CLAVICLE EXCISION Right 6/13/2019    Procedure: TOTAL SHOULDER REVERSE ARTHROPLASTY;  Surgeon: David Marion MD;  Location: [facility];  Service: Orthopedics                       PT Assessment/Plan     Row Name 06/24/21 1200          PT Assessment    Assessment Comments  Patient continues to make good progress with PT. Patient tolerated progressions made in her strengthening exercises today.  -AS        PT Plan    PT Plan Comments  Continue with current treatment plan.  -AS       User Key  (r) = Recorded By, (t) = Taken By, (c) = Cosigned By    Initials Name Provider Type    AS Stu Greenfield, PT Physical Therapist          Modalities     Row Name 06/24/21 1200             Moist Heat    MH Applied  Yes  -AS      Location  Right Shoulder - pt supine with legs on roll  -AS      PT Moist Heat Minutes  10  -AS      MH Prior to Rx  Yes  -AS         Functional Testing    Outcome Measure Options  Quick DASH  -AS        User Key  (r) = Recorded By, (t) = Taken By, (c) = Cosigned By    Initials Name Provider Type    AS Stu Greenfield, PT Physical Therapist        OP Exercises     Row Name 06/24/21 1304 06/24/21 1200          Subjective Comments    Subjective Comments  --  Patient states she is \"doing well\" today.  -AS        Total Minutes    52818 - PT Therapeutic Exercise Minutes  30  -AS  --        Exercise 1    Exercise Name 1  --  Serratus Punches  -AS     Reps 1  --  40  -AS     Time 1  --  1#  -AS        Exercise 2    Exercise Name 2  --  S/L ER  -AS     Reps 2  --  40  -AS     Time 2  --  1#  -AS        Exercise 3    " Exercise Name 3  --  Empty/Full Can  -AS     Reps 3  --  40 each  -AS     Time 3  --  1#  -AS        Exercise 4    Exercise Name 4  --  Rows  -AS     Reps 4  --  40  -AS     Time 4  --  Black  -AS        Exercise 5    Exercise Name 5  --  Extensions  -AS     Reps 5  --  40  -AS     Time 5  --  Black  -AS        Exercise 6    Exercise Name 6  --  IR  -AS     Reps 6  --  40  -AS     Time 6  --  Black  -AS        Exercise 7    Exercise Name 7  --  ER  -AS     Reps 7  --  40  -AS     Time 7  --  Black  -AS        Exercise 8    Exercise Name 8  --  Manual Flex & ABD PROM  -AS        Exercise 9    Exercise Name 9  --  Prone I, Y, T  -AS        Exercise 10    Exercise Name 10  --  Pulleys - Flex & ABD  -AS     Time 10  --  3 min  -AS       User Key  (r) = Recorded By, (t) = Taken By, (c) = Cosigned By    Initials Name Provider Type    AS Stu Greenfield, PT Physical Therapist                                Outcome Measure Options: Quick DASH         Time Calculation:   Start Time: 1209  Stop Time: 1250  Time Calculation (min): 41 min  Timed Charges  01519 - PT Therapeutic Exercise Minutes: 30  Untimed Charges  PT Moist Heat Minutes: 10  Total Minutes  Timed Charges Total Minutes: 30  Untimed Charges Total Minutes: 10   Total Minutes: 40  Therapy Charges for Today     Code Description Service Date Service Provider Modifiers Qty    99125677340  PT THER PROC EA 15 MIN 6/24/2021 Stu Greenfield, PT GP 2          PT G-Codes  Outcome Measure Options: Sheyla Greenfield, PT  6/24/2021

## 2021-07-01 ENCOUNTER — HOSPITAL ENCOUNTER (EMERGENCY)
Facility: HOSPITAL | Age: 71
Discharge: HOME | End: 2021-07-01
Attending: EMERGENCY MEDICINE | Admitting: EMERGENCY MEDICINE

## 2021-07-01 ENCOUNTER — APPOINTMENT (OUTPATIENT)
Dept: PHYSICAL THERAPY | Facility: HOSPITAL | Age: 71
End: 2021-07-01

## 2021-07-01 VITALS
RESPIRATION RATE: 18 BRPM | HEART RATE: 88 BPM | OXYGEN SATURATION: 96 % | TEMPERATURE: 97.7 F | DIASTOLIC BLOOD PRESSURE: 73 MMHG | BODY MASS INDEX: 15.12 KG/M2 | WEIGHT: 96.31 LBS | SYSTOLIC BLOOD PRESSURE: 156 MMHG | HEIGHT: 67 IN

## 2021-07-01 DIAGNOSIS — K43.9 ABDOMINAL WALL HERNIA: Primary | ICD-10-CM

## 2021-07-01 PROCEDURE — 99282 EMERGENCY DEPT VISIT SF MDM: CPT

## 2021-07-01 PROCEDURE — 99283 EMERGENCY DEPT VISIT LOW MDM: CPT | Performed by: PHYSICIAN ASSISTANT

## 2021-07-01 NOTE — ED PROVIDER NOTES
" EMERGENCY DEPARTMENT ENCOUNTER      Room Number: 11/11    History is provided by the patient, no translation services needed    HPI:    Chief complaint: \"Bulge\" in abdomen    Location: Lower abdomen    Quality/Severity: 0/10    Timing/Duration: Patient just noticed it today    Modifying Factors: Patient states she strained very hard to give a urine sample at the urologist yesterday, noticed the bulge in her abdomen today.    Associated Symptoms: Positive for bulge in the lower abdomen, denies any nausea, vomiting, diarrhea, constipation.    Narrative: Pt is a 70 y.o. female who presents complaining of bulge in lower abdomen noticed this morning. Patient states she has a history of cirrhosis, gastritis with GI hemorrhage, hepatorenal syndrome, and chronic urinary retention. She is doing very well from a GI standpoint since she had a TIPS procedure back in December. She states she was at the urologist yesterday, she has been self cathing lately, but does void on her own sometimes. She states she was straining very forcefully to give a urine sample. This morning she states she felt a small bulge in her lower abdomen. She denies any pain with this, or any vomiting, diarrhea, or constipation.      PMD: Tayler Trujillo MD    REVIEW OF SYSTEMS  Review of Systems   Constitutional: Negative for chills and fever.   Respiratory: Negative for cough and shortness of breath.    Cardiovascular: Negative for chest pain and palpitations.   Gastrointestinal: Negative for abdominal pain, nausea and vomiting.        Positive for hernia   Musculoskeletal: Negative for arthralgias and myalgias.   Skin: Negative for rash and wound.   Neurological: Negative for dizziness and syncope.   Psychiatric/Behavioral: Negative for confusion. The patient is not nervous/anxious.          PAST MEDICAL HISTORY  Active Ambulatory Problems     Diagnosis Date Noted   • Spinal stenosis of lumbar region with neurogenic claudication 09/04/2018   • Lumbar " spinal stenosis 10/01/2018   • Frequent UTI 10/01/2018   • Hypertension 10/01/2018   • Stress incontinence 10/01/2018   • Itching due to drug 10/01/2018   • Acute respiratory failure with hypoxia (CMS/HCC) 10/04/2018   • Tear of right rotator cuff 2019   • Acute pain of right shoulder 2019   • Hepatorenal syndrome (CMS/HCC) 2020   • Ascites due to alcoholic hepatitis 2020   • Bladder prolapse, female, acquired 2020   • Sepsis associated hypotension (CMS/HCC) 2020   • Acute liver failure 2020   • Altered mental state 2020   • Acute renal failure (CMS/HCC) 2020   • Alcohol abuse 2020   • Severe malnutrition (CMS/HCC) 2020   • Gastrointestinal hemorrhage 2020   • Gastrointestinal hemorrhage associated with alcoholic gastritis 2020   • Gastric varices 2020   • Alcoholic cirrhosis (CMS/HCC) 2020   • Iron deficiency anemia due to chronic blood loss 2021     Resolved Ambulatory Problems     Diagnosis Date Noted   • No Resolved Ambulatory Problems     Past Medical History:   Diagnosis Date   • Disease of thyroid gland    • Elevated liver enzymes    • Fatty liver    • History of depression    • Lumbar stenosis    • Post-menopausal    • Urinary retention        PAST SURGICAL HISTORY  Past Surgical History:   Procedure Laterality Date   • ABDOMINOPLASTY     • BREAST SURGERY     • CATARACT EXTRACTION Bilateral    •  SECTION      X3   • COLONOSCOPY         • COLONOSCOPY N/A 2016    Procedure: COLONOSCOPY;  Surgeon: Ho Arenas MD;  Location: [facility] ENDOSCOPY;  Service:    • ENDOSCOPY N/A 2020    Procedure: ESOPHAGOGASTRODUODENOSCOPY;  Surgeon: Terrell Stanton MD;  Location: [facility] OR;  Service: Gastroenterology;  Laterality: N/A;  gastric ulcer  duodenal erosion  gastric varices   • LASIK     • LUMBAR DISCECTOMY FUSION INSTRUMENTATION N/A 10/1/2018    Procedure: L4-5, L5-S1  laminectomy and  fusion with instrumentation;  Surgeon: Juno Kim MD;  Location: [facility] OR;  Service: Orthopedic Spine   • REDUCTION MAMMAPLASTY     • TIPS PROCEDURE     • TONSILLECTOMY     • TOTAL SHOULDER ARTHROPLASTY W/ DISTAL CLAVICLE EXCISION Right 6/13/2019    Procedure: TOTAL SHOULDER REVERSE ARTHROPLASTY;  Surgeon: David Marion MD;  Location: [facility] OR;  Service: Orthopedics       FAMILY HISTORY  Family History   Problem Relation Age of Onset   • Colon polyps Father    • Breast cancer Maternal Aunt    • Malig Hyperthermia Neg Hx        SOCIAL HISTORY  Social History     Socioeconomic History   • Marital status:      Spouse name: Not on file   • Number of children: Not on file   • Years of education: Not on file   • Highest education level: Not on file   Tobacco Use   • Smoking status: Never Smoker   • Smokeless tobacco: Never Used   Vaping Use   • Vaping Use: Never used   Substance and Sexual Activity   • Alcohol use: Not Currently     Comment: no ETOH since Nov 5   • Drug use: No   • Sexual activity: Defer       ALLERGIES  Patient has no known allergies.    No current facility-administered medications for this encounter.    Current Outpatient Medications:   •  B Complex-Folic Acid (B Complex Formula 1) tablet, Take 1 tablet by mouth Daily., Disp: , Rfl:   •  Calcium 600+D3 600-400 MG-UNIT tablet, Take 1 tablet by mouth 2 (Two) Times a Day., Disp: , Rfl:   •  folic acid (FOLVITE) 1 MG tablet, Take 1 tablet by mouth Daily., Disp: 30 tablet, Rfl: 1  •  lactobacillus acidophilus (RISAQUAD) capsule capsule, Take 1 capsule by mouth Daily., Disp: 180 capsule, Rfl: 3  •  levothyroxine (SYNTHROID, LEVOTHROID) 50 MCG tablet, Take 1 tablet by mouth Daily., Disp: 30 tablet, Rfl: 1  •  MAGnesium-Oxide 400 (241.3 Mg) MG tablet tablet, Take 400 mg by mouth 2 (Two) Times a Day. for 7 days, Disp: , Rfl:   •  melatonin 1 MG tablet, Take  by mouth., Disp: , Rfl:   •  thiamine (VITAMIN B-1) 100 MG tablet  tablet,  Take 100 mg by mouth Daily., Disp: , Rfl:   •  torsemide (DEMADEX) 20 MG tablet, Take 20 mg by mouth., Disp: , Rfl:   •  zinc gluconate 50 MG tablet, Take 50 mg by mouth Daily., Disp: , Rfl:   •  ondansetron ODT (ZOFRAN-ODT) 4 MG disintegrating tablet, Place 1 tablet under the tongue Every 6 (Six) Hours As Needed for Nausea., Disp: 20 tablet, Rfl: 0  •  potassium chloride (K-DUR,KLOR-CON) 20 MEQ CR tablet, , Disp: , Rfl:   •  Probiotic Product (Acidophilus/Goat Milk) capsule, Take 1 tablet by mouth Daily., Disp: , Rfl:     Facility-Administered Medications Ordered in Other Encounters:   •  mupirocin (BACTROBAN) 2 % nasal ointment, , Nasal, BID, David Marion MD    PHYSICAL EXAM  ED Triage Vitals [07/01/21 1102]   Temp Heart Rate Resp BP SpO2   97.7 °F (36.5 °C) 88 18 156/73 96 %      Temp src Heart Rate Source Patient Position BP Location FiO2 (%)   Oral Monitor Lying Right arm --       Physical Exam  Vitals and nursing note reviewed.   Constitutional:       General: She is not in acute distress.     Appearance: She is underweight. She is not toxic-appearing.   HENT:      Head: Normocephalic and atraumatic.   Eyes:      Conjunctiva/sclera: Conjunctivae normal.      Pupils: Pupils are equal, round, and reactive to light.   Cardiovascular:      Rate and Rhythm: Normal rate and regular rhythm.      Pulses: Normal pulses.      Heart sounds: Normal heart sounds.   Pulmonary:      Effort: Pulmonary effort is normal.      Breath sounds: Normal breath sounds.   Abdominal:      General: Bowel sounds are normal. There is no distension.      Palpations: Abdomen is soft.      Tenderness: There is no abdominal tenderness. There is no guarding.      Hernia: A hernia (Small hernia and lower anterior abdomen, approximately 3 cm x 1.5 cm) is present.   Musculoskeletal:         General: Normal range of motion.      Cervical back: Normal range of motion and neck supple.      Right lower leg: No edema.      Left lower leg: No  edema.   Skin:     General: Skin is warm and dry.      Capillary Refill: Capillary refill takes less than 2 seconds.   Neurological:      Mental Status: She is alert and oriented to person, place, and time.   Psychiatric:         Mood and Affect: Mood and affect normal.         Behavior: Behavior normal.         Thought Content: Thought content normal.         Cognition and Memory: Memory normal.         Judgment: Judgment normal.           LAB RESULTS  Lab Results (last 24 hours)     ** No results found for the last 24 hours. **            I ordered the above labs and reviewed the results    RADIOLOGY  No Radiology Exams Resulted Within Past 24 Hours    I ordered the above radiologic testing and reviewed the results    PROCEDURES  Procedures      PROGRESS AND CONSULTS  ED Course as of Jul 01 1144   Thu Jul 01, 2021   1137 Discussed with patient that this does appear to be a small hernia, I offered to perform CT or ultrasound for further visualization however patient respectfully declined. She states she will follow-up with her doctors at [facility]. I think this is reasonable since patient does not have any symptoms with this hernia. Discussed return to ER warnings, and need for follow-up. Patient verbalizes understanding and is agreeable with discharge at this time.    [KS]      ED Course User Index  [KS] Tameka Cook PA-C           MEDICAL DECISION MAKING    MDM       DIAGNOSIS  Final diagnoses:   Abdominal wall hernia       Latest Documented Vital Signs:  As of 11:44 EDT  BP- 156/73 HR- 88 Temp- 97.7 °F (36.5 °C) (Oral) O2 sat- 96%    DISPOSITION  Patient discharged home.    Discussed pertinent findings with the patient/family.  Patient/Family voiced understanding of need to follow-up for recheck and further testing as needed.  Return to the Emergency Department warnings were given.         Medication List      No changes were made to your prescriptions during this visit.             Follow-up Information      Tayler Trujillo MD. Call in 1 day.    Specialty: Internal Medicine  Why: To schedule follow-up appointment  Contact information:  [address] [place] [address]  [phone]                     Dictated utilizing Dragon dictation     Tameka Cook PA-C  07/01/21 1145

## 2021-07-06 ENCOUNTER — HOSPITAL ENCOUNTER (EMERGENCY)
Facility: HOSPITAL | Age: 71
Discharge: HOME | End: 2021-07-06
Attending: EMERGENCY MEDICINE | Admitting: EMERGENCY MEDICINE

## 2021-07-06 VITALS
HEART RATE: 72 BPM | BODY MASS INDEX: 19.95 KG/M2 | WEIGHT: 108.4 LBS | SYSTOLIC BLOOD PRESSURE: 144 MMHG | OXYGEN SATURATION: 96 % | TEMPERATURE: 98.1 F | DIASTOLIC BLOOD PRESSURE: 73 MMHG | RESPIRATION RATE: 16 BRPM | HEIGHT: 62 IN

## 2021-07-06 DIAGNOSIS — N39.0 URINARY TRACT INFECTION WITHOUT HEMATURIA, SITE UNSPECIFIED: Primary | ICD-10-CM

## 2021-07-06 LAB
ALBUMIN SERPL-MCNC: 3.6 G/DL (ref 3.5–5.2)
ALBUMIN/GLOB SERPL: 1.6 G/DL
ALP SERPL-CCNC: 90 U/L (ref 39–117)
ALT SERPL W P-5'-P-CCNC: 24 U/L (ref 1–33)
ANION GAP SERPL CALCULATED.3IONS-SCNC: 8.6 MMOL/L (ref 5–15)
AST SERPL-CCNC: 38 U/L (ref 1–32)
BACTERIA UR QL AUTO: ABNORMAL /HPF
BASOPHILS # BLD AUTO: 0.06 10*3/MM3 (ref 0–0.2)
BASOPHILS NFR BLD AUTO: 0.9 % (ref 0–1.5)
BILIRUB SERPL-MCNC: 1.7 MG/DL (ref 0–1.2)
BILIRUB UR QL STRIP: NEGATIVE
BUN SERPL-MCNC: 11 MG/DL (ref 8–23)
BUN/CREAT SERPL: 19.3 (ref 7–25)
CALCIUM SPEC-SCNC: 9.9 MG/DL (ref 8.6–10.5)
CHLORIDE SERPL-SCNC: 106 MMOL/L (ref 98–107)
CLARITY UR: ABNORMAL
CO2 SERPL-SCNC: 26.4 MMOL/L (ref 22–29)
COLOR UR: YELLOW
CREAT SERPL-MCNC: 0.57 MG/DL (ref 0.57–1)
DEPRECATED RDW RBC AUTO: 44 FL (ref 37–54)
EOSINOPHIL # BLD AUTO: 0.09 10*3/MM3 (ref 0–0.4)
EOSINOPHIL NFR BLD AUTO: 1.3 % (ref 0.3–6.2)
ERYTHROCYTE [DISTWIDTH] IN BLOOD BY AUTOMATED COUNT: 12.4 % (ref 12.3–15.4)
GFR SERPL CREATININE-BSD FRML MDRD: 104 ML/MIN/1.73
GLOBULIN UR ELPH-MCNC: 2.2 GM/DL
GLUCOSE SERPL-MCNC: 109 MG/DL (ref 65–99)
GLUCOSE UR STRIP-MCNC: NEGATIVE MG/DL
HCT VFR BLD AUTO: 34.2 % (ref 34–46.6)
HGB BLD-MCNC: 11.7 G/DL (ref 12–15.9)
HGB UR QL STRIP.AUTO: ABNORMAL
HYALINE CASTS UR QL AUTO: ABNORMAL /LPF
IMM GRANULOCYTES # BLD AUTO: 0.02 10*3/MM3 (ref 0–0.05)
IMM GRANULOCYTES NFR BLD AUTO: 0.3 % (ref 0–0.5)
KETONES UR QL STRIP: NEGATIVE
LEUKOCYTE ESTERASE UR QL STRIP.AUTO: ABNORMAL
LYMPHOCYTES # BLD AUTO: 1.6 10*3/MM3 (ref 0.7–3.1)
LYMPHOCYTES NFR BLD AUTO: 23.5 % (ref 19.6–45.3)
MCH RBC QN AUTO: 33.1 PG (ref 26.6–33)
MCHC RBC AUTO-ENTMCNC: 34.2 G/DL (ref 31.5–35.7)
MCV RBC AUTO: 96.6 FL (ref 79–97)
MONOCYTES # BLD AUTO: 0.48 10*3/MM3 (ref 0.1–0.9)
MONOCYTES NFR BLD AUTO: 7 % (ref 5–12)
NEUTROPHILS NFR BLD AUTO: 4.56 10*3/MM3 (ref 1.7–7)
NEUTROPHILS NFR BLD AUTO: 67 % (ref 42.7–76)
NITRITE UR QL STRIP: POSITIVE
NRBC BLD AUTO-RTO: 0 /100 WBC (ref 0–0.2)
PH UR STRIP.AUTO: 8 [PH] (ref 4.5–8)
PLATELET # BLD AUTO: 147 10*3/MM3 (ref 140–450)
PMV BLD AUTO: 10.6 FL (ref 6–12)
POTASSIUM SERPL-SCNC: 3.3 MMOL/L (ref 3.5–5.2)
PROT SERPL-MCNC: 5.8 G/DL (ref 6–8.5)
PROT UR QL STRIP: ABNORMAL
RBC # BLD AUTO: 3.54 10*6/MM3 (ref 3.77–5.28)
RBC # UR: ABNORMAL /HPF
REF LAB TEST METHOD: ABNORMAL
SODIUM SERPL-SCNC: 141 MMOL/L (ref 136–145)
SP GR UR STRIP: 1.01 (ref 1–1.03)
SQUAMOUS #/AREA URNS HPF: ABNORMAL /HPF
UROBILINOGEN UR QL STRIP: ABNORMAL
WBC # BLD AUTO: 6.81 10*3/MM3 (ref 3.4–10.8)
WBC UR QL AUTO: ABNORMAL /HPF

## 2021-07-06 PROCEDURE — P9612 CATHETERIZE FOR URINE SPEC: HCPCS

## 2021-07-06 PROCEDURE — 25010000002 CEFTRIAXONE SODIUM-DEXTROSE 1-3.74 GM-%(50ML) RECONSTITUTED SOLUTION: Performed by: EMERGENCY MEDICINE

## 2021-07-06 PROCEDURE — 80053 COMPREHEN METABOLIC PANEL: CPT | Performed by: EMERGENCY MEDICINE

## 2021-07-06 PROCEDURE — 87086 URINE CULTURE/COLONY COUNT: CPT | Performed by: EMERGENCY MEDICINE

## 2021-07-06 PROCEDURE — 96365 THER/PROPH/DIAG IV INF INIT: CPT

## 2021-07-06 PROCEDURE — 81001 URINALYSIS AUTO W/SCOPE: CPT | Performed by: EMERGENCY MEDICINE

## 2021-07-06 PROCEDURE — 99283 EMERGENCY DEPT VISIT LOW MDM: CPT

## 2021-07-06 PROCEDURE — 99282 EMERGENCY DEPT VISIT SF MDM: CPT | Performed by: EMERGENCY MEDICINE

## 2021-07-06 PROCEDURE — 85025 COMPLETE CBC W/AUTO DIFF WBC: CPT | Performed by: EMERGENCY MEDICINE

## 2021-07-06 RX ORDER — CEFTRIAXONE 1 G/50ML
1 INJECTION, SOLUTION INTRAVENOUS ONCE
Status: COMPLETED | OUTPATIENT
Start: 2021-07-06 | End: 2021-07-06

## 2021-07-06 RX ORDER — SODIUM CHLORIDE 0.9 % (FLUSH) 0.9 %
10 SYRINGE (ML) INJECTION AS NEEDED
Status: DISCONTINUED | OUTPATIENT
Start: 2021-07-06 | End: 2021-07-06 | Stop reason: HOSPADM

## 2021-07-06 RX ORDER — DOXYCYCLINE 100 MG/1
100 CAPSULE ORAL 2 TIMES DAILY
Qty: 14 CAPSULE | Refills: 0 | Status: SHIPPED | OUTPATIENT
Start: 2021-07-06 | End: 2021-07-13

## 2021-07-06 RX ADMIN — CEFTRIAXONE 1 G: 1 INJECTION, SOLUTION INTRAVENOUS at 12:09

## 2021-07-06 NOTE — ED PROVIDER NOTES
Subjective   History of Present Illness  70-year-old female presents to the ER from urgent care for urinary tract infection.  She says that urgent care told her because her urine was so dirty she should come here for labs and urine microscopy.  She has some issues with occasionally needing to be cathed and says that she is learning how to do it herself and is sometimes successful sometimes not so successful but she does not have any trouble with acute urinary retention and she is able to urinate some on her own.  She says she been feeling okay recently aside from feeling like she has a UTI just because she is generally fatigued, but no fevers no nausea no vomiting no abdominal pain.  Review of Systems   Constitutional: Positive for fatigue. Negative for fever.   Cardiovascular: Negative for chest pain and leg swelling.   Gastrointestinal: Negative for abdominal pain and nausea.   Genitourinary: Positive for difficulty urinating. Negative for dysuria.   Neurological: Negative for dizziness and weakness.       Past Medical History:   Diagnosis Date   • Disease of thyroid gland    • Elevated liver enzymes    • Fatty liver    • Frequent UTI    • History of depression    • Hypertension    • Lumbar stenosis    • Post-menopausal    • Stress incontinence    • Urinary retention        No Known Allergies    Past Surgical History:   Procedure Laterality Date   • ABDOMINOPLASTY     • BREAST SURGERY     • CATARACT EXTRACTION Bilateral    •  SECTION      X3   • COLONOSCOPY      2006   • COLONOSCOPY N/A 2016    Procedure: COLONOSCOPY;  Surgeon: Ho Arenas MD;  Location: [facility] ENDOSCOPY;  Service:    • ENDOSCOPY N/A 2020    Procedure: ESOPHAGOGASTRODUODENOSCOPY;  Surgeon: Terrell Stanton MD;  Location: [facility] OR;  Service: Gastroenterology;  Laterality: N/A;  gastric ulcer  duodenal erosion  gastric varices   • LASIK     • LUMBAR DISCECTOMY FUSION INSTRUMENTATION N/A 10/1/2018    Procedure:  L4-5, L5-S1  laminectomy and fusion with instrumentation;  Surgeon: Juno Kim MD;  Location: [facility] OR;  Service: Orthopedic Spine   • REDUCTION MAMMAPLASTY     • TIPS PROCEDURE     • TONSILLECTOMY     • TOTAL SHOULDER ARTHROPLASTY W/ DISTAL CLAVICLE EXCISION Right 6/13/2019    Procedure: TOTAL SHOULDER REVERSE ARTHROPLASTY;  Surgeon: David Marion MD;  Location: [facility];  Service: Orthopedics       Family History   Problem Relation Age of Onset   • Colon polyps Father    • Breast cancer Maternal Aunt    • Malig Hyperthermia Neg Hx        Social History     Socioeconomic History   • Marital status:      Spouse name: Not on file   • Number of children: Not on file   • Years of education: Not on file   • Highest education level: Not on file   Tobacco Use   • Smoking status: Never Smoker   • Smokeless tobacco: Never Used   Vaping Use   • Vaping Use: Never used   Substance and Sexual Activity   • Alcohol use: Not Currently     Comment: no ETOH since Nov 5   • Drug use: No   • Sexual activity: Defer           Objective    ED Triage Vitals [07/06/21 1002]   Temp Heart Rate Resp BP SpO2   98.1 °F (36.7 °C) 72 16 171/79 98 %      Temp src Heart Rate Source Patient Position BP Location FiO2 (%)   Oral Monitor Lying Right arm --       Physical Exam  INITIAL VITAL SIGNS: Reviewed by me.  Pulse ox normal  GENERAL: Alert and interactive. No acute distress.  HEAD: Head is normocephalic.  EYES: EOMI. PERRL. No scleral icterus. No conjunctival injection.  ENT: Moist mucous membranes.   NECK: Supple. Full range of motion.  RESPIRATORY: No tachypnea. Clear breath sounds bilaterally. No wheezing. No rales. No rhonchi.  CV: Regular rate and rhythm. No murmurs. No rubs or gallops.  ABDOMEN: Soft, non-distended, non-tender. No guarding. No rebound.  BACK:  No obvious deformity.  EXTREMITIES: No deformity. No clubbing or cyanosis. No edema.   SKIN: Warm and dry. No diaphoresis. No obvious rashes.    NEUROLOGIC: Alert and oriented. Face is symmetric. Speech is normal.   Results for orders placed or performed during the hospital encounter of 07/06/21   Comprehensive Metabolic Panel    Specimen: Blood   Result Value Ref Range    Glucose 109 (H) 65 - 99 mg/dL    BUN 11 8 - 23 mg/dL    Creatinine 0.57 0.57 - 1.00 mg/dL    Sodium 141 136 - 145 mmol/L    Potassium 3.3 (L) 3.5 - 5.2 mmol/L    Chloride 106 98 - 107 mmol/L    CO2 26.4 22.0 - 29.0 mmol/L    Calcium 9.9 8.6 - 10.5 mg/dL    Total Protein 5.8 (L) 6.0 - 8.5 g/dL    Albumin 3.60 3.50 - 5.20 g/dL    ALT (SGPT) 24 1 - 33 U/L    AST (SGOT) 38 (H) 1 - 32 U/L    Alkaline Phosphatase 90 39 - 117 U/L    Total Bilirubin 1.7 (H) 0.0 - 1.2 mg/dL    eGFR Non African Amer 104 >60 mL/min/1.73    Globulin 2.2 gm/dL    A/G Ratio 1.6 g/dL    BUN/Creatinine Ratio 19.3 7.0 - 25.0    Anion Gap 8.6 5.0 - 15.0 mmol/L   Urinalysis With Microscopic If Indicated (No Culture) - Urine, Catheter    Specimen: Urine, Catheter   Result Value Ref Range    Color, UA Yellow Yellow, Straw    Appearance, UA Cloudy (A) Clear    pH, UA 8.0 4.5 - 8.0    Specific Gravity, UA 1.015 1.003 - 1.030    Glucose, UA Negative Negative    Ketones, UA Negative Negative    Bilirubin, UA Negative Negative    Blood, UA Trace (A) Negative    Protein, UA Trace (A) Negative    Leuk Esterase, UA Large (3+) (A) Negative    Nitrite, UA Positive (A) Negative    Urobilinogen, UA 0.2 E.U./dL 0.2 - 1.0 E.U./dL   CBC Auto Differential    Specimen: Blood   Result Value Ref Range    WBC 6.81 3.40 - 10.80 10*3/mm3    RBC 3.54 (L) 3.77 - 5.28 10*6/mm3    Hemoglobin 11.7 (L) 12.0 - 15.9 g/dL    Hematocrit 34.2 34.0 - 46.6 %    MCV 96.6 79.0 - 97.0 fL    MCH 33.1 (H) 26.6 - 33.0 pg    MCHC 34.2 31.5 - 35.7 g/dL    RDW 12.4 12.3 - 15.4 %    RDW-SD 44.0 37.0 - 54.0 fl    MPV 10.6 6.0 - 12.0 fL    Platelets 147 140 - 450 10*3/mm3    Neutrophil % 67.0 42.7 - 76.0 %    Lymphocyte % 23.5 19.6 - 45.3 %    Monocyte % 7.0 5.0 - 12.0 %     Eosinophil % 1.3 0.3 - 6.2 %    Basophil % 0.9 0.0 - 1.5 %    Immature Grans % 0.3 0.0 - 0.5 %    Neutrophils, Absolute 4.56 1.70 - 7.00 10*3/mm3    Lymphocytes, Absolute 1.60 0.70 - 3.10 10*3/mm3    Monocytes, Absolute 0.48 0.10 - 0.90 10*3/mm3    Eosinophils, Absolute 0.09 0.00 - 0.40 10*3/mm3    Basophils, Absolute 0.06 0.00 - 0.20 10*3/mm3    Immature Grans, Absolute 0.02 0.00 - 0.05 10*3/mm3    nRBC 0.0 0.0 - 0.2 /100 WBC   Urinalysis, Microscopic Only - Urine, Catheter    Specimen: Urine, Catheter   Result Value Ref Range    RBC, UA 3-5 (A) None Seen /HPF    WBC, UA 13-20 (A) None Seen /HPF    Bacteria, UA 4+ (A) None Seen /HPF    Squamous Epithelial Cells, UA None Seen None Seen, 0-2 /HPF    Hyaline Casts, UA None Seen None Seen /LPF    Methodology Manual Light Microscopy      No Radiology Exams Resulted Within Past 24 Hours    Procedures           ED Course  ED Course as of Jul 06 1251   Tue Jul 06, 2021   1247 Patient with intermittent self cathing at home, here with positive urinalysis from urgent care.  Labs show positive nitrite positive leukocyte esterase 13-20 whites and 4+ bacteria.  Potassium little low at 3.3 but she says she has potassium supplementation at home that she has not started taking it.  Looking at her most recent urine cultures one was Klebsiella and the other one was Citrobacter from 2 and 3 months ago respectively.  One was sensitive to cefuroxime but the other one was not however both were sensitive to tetracyclines so we will send her home on doxycycline.  No indication to keep her in the hospital    [RO]      ED Course User Index  [RO] Bishop Silverman MD                                           [facility]    Final diagnoses:   Urinary tract infection without hematuria, site unspecified       ED Disposition  ED Disposition     ED Disposition Condition Comment    Discharge Stable           Tayler Trujillo MD  [address] [id number] [phone]    Call   To  schedule follow-up appointment    Your urologist    Call   To schedule follow-up appointment         Medication List      New Prescriptions    doxycycline 100 MG capsule  Commonly known as: MONODOX  Take 1 capsule by mouth 2 (Two) Times a Day for 7 days.           Where to Get Your Medications      These medications were sent to [id number] - [place], [place] [address] [address] [facility] [address] - [phone]  - [phone] [facility] [address] [place] [address], [place] [address]    Phone: [phone]   · doxycycline 100 MG capsule          Bishop Silverman MD  07/06/21 1792

## 2021-07-06 NOTE — ED NOTES
Pt disclosed that she has been really frustrated with the huge learning curve r/t self-cath.  She said she has tried so hard to make it work, has mirrors and has tried various positions and still have been unsuccessful.  She said she had considered setting up a visiting nurse to  her until she can get the hand of it herself.  Observed while performing in and out cath for specimen collection that pt's urethra appears to be tilted back a little and, while it was easy for RN to access and not resistance was met, it would likely be very difficult for pt to visualize to self cath.  Pt confessed she has other challenges, I.e. some vision problems and shoulder problems and that the whole self-cath process has been extremely frustrating for her.  Informed MD. Arellano, Patt Sullivan, RN  07/06/21 0622

## 2021-07-06 NOTE — DISCHARGE INSTRUCTIONS
Please take medications as instructed.  Please return to the emergency room if you develop severe abdominal pain, uncontrollable vomiting, fevers chills or for any other concerns.

## 2021-07-06 NOTE — ED NOTES
"Pt stated she had a TIPPS procedure in December 2020 at [facility].  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

## 2021-07-07 LAB — BACTERIA SPEC AEROBE CULT: NORMAL

## 2021-07-21 ENCOUNTER — APPOINTMENT (OUTPATIENT)
Dept: CT IMAGING | Facility: HOSPITAL | Age: 71
End: 2021-07-21

## 2021-07-21 ENCOUNTER — HOSPITAL ENCOUNTER (EMERGENCY)
Facility: HOSPITAL | Age: 71
Discharge: HOME | End: 2021-07-21
Attending: EMERGENCY MEDICINE | Admitting: EMERGENCY MEDICINE

## 2021-07-21 VITALS
DIASTOLIC BLOOD PRESSURE: 68 MMHG | WEIGHT: 109.7 LBS | HEIGHT: 62 IN | TEMPERATURE: 98 F | RESPIRATION RATE: 16 BRPM | OXYGEN SATURATION: 98 % | SYSTOLIC BLOOD PRESSURE: 118 MMHG | BODY MASS INDEX: 20.19 KG/M2 | HEART RATE: 65 BPM

## 2021-07-21 DIAGNOSIS — K74.60 HEPATIC CIRRHOSIS, UNSPECIFIED HEPATIC CIRRHOSIS TYPE, UNSPECIFIED WHETHER ASCITES PRESENT (HCC): ICD-10-CM

## 2021-07-21 DIAGNOSIS — R19.7 DIARRHEA, UNSPECIFIED TYPE: Primary | ICD-10-CM

## 2021-07-21 LAB
ALBUMIN SERPL-MCNC: 3.7 G/DL (ref 3.5–5.2)
ALBUMIN/GLOB SERPL: 1.5 G/DL
ALP SERPL-CCNC: 99 U/L (ref 39–117)
ALT SERPL W P-5'-P-CCNC: 23 U/L (ref 1–33)
ANION GAP SERPL CALCULATED.3IONS-SCNC: 9.5 MMOL/L (ref 5–15)
AST SERPL-CCNC: 41 U/L (ref 1–32)
BASOPHILS # BLD AUTO: 0.04 10*3/MM3 (ref 0–0.2)
BASOPHILS NFR BLD AUTO: 0.3 % (ref 0–1.5)
BILIRUB SERPL-MCNC: 1.2 MG/DL (ref 0–1.2)
BILIRUB UR QL STRIP: NEGATIVE
BUN SERPL-MCNC: 9 MG/DL (ref 8–23)
BUN/CREAT SERPL: 13 (ref 7–25)
CALCIUM SPEC-SCNC: 9.8 MG/DL (ref 8.6–10.5)
CHLORIDE SERPL-SCNC: 106 MMOL/L (ref 98–107)
CLARITY UR: ABNORMAL
CO2 SERPL-SCNC: 23.5 MMOL/L (ref 22–29)
COLOR UR: YELLOW
CREAT SERPL-MCNC: 0.69 MG/DL (ref 0.57–1)
DEPRECATED RDW RBC AUTO: 48.3 FL (ref 37–54)
EOSINOPHIL # BLD AUTO: 0.27 10*3/MM3 (ref 0–0.4)
EOSINOPHIL NFR BLD AUTO: 2.2 % (ref 0.3–6.2)
ERYTHROCYTE [DISTWIDTH] IN BLOOD BY AUTOMATED COUNT: 13 % (ref 12.3–15.4)
GFR SERPL CREATININE-BSD FRML MDRD: 84 ML/MIN/1.73
GLOBULIN UR ELPH-MCNC: 2.5 GM/DL
GLUCOSE SERPL-MCNC: 106 MG/DL (ref 65–99)
GLUCOSE UR STRIP-MCNC: NEGATIVE MG/DL
HCT VFR BLD AUTO: 33.9 % (ref 34–46.6)
HGB BLD-MCNC: 10.9 G/DL (ref 12–15.9)
HGB UR QL STRIP.AUTO: NEGATIVE
IMM GRANULOCYTES # BLD AUTO: 0.03 10*3/MM3 (ref 0–0.05)
IMM GRANULOCYTES NFR BLD AUTO: 0.2 % (ref 0–0.5)
KETONES UR QL STRIP: NEGATIVE
LEUKOCYTE ESTERASE UR QL STRIP.AUTO: NEGATIVE
LIPASE SERPL-CCNC: 25 U/L (ref 13–60)
LYMPHOCYTES # BLD AUTO: 2.51 10*3/MM3 (ref 0.7–3.1)
LYMPHOCYTES NFR BLD AUTO: 20.9 % (ref 19.6–45.3)
MCH RBC QN AUTO: 32 PG (ref 26.6–33)
MCHC RBC AUTO-ENTMCNC: 32.2 G/DL (ref 31.5–35.7)
MCV RBC AUTO: 99.4 FL (ref 79–97)
MONOCYTES # BLD AUTO: 1.27 10*3/MM3 (ref 0.1–0.9)
MONOCYTES NFR BLD AUTO: 10.6 % (ref 5–12)
NEUTROPHILS NFR BLD AUTO: 65.8 % (ref 42.7–76)
NEUTROPHILS NFR BLD AUTO: 7.9 10*3/MM3 (ref 1.7–7)
NITRITE UR QL STRIP: NEGATIVE
PH UR STRIP.AUTO: 7 [PH] (ref 4.5–8)
PLATELET # BLD AUTO: 156 10*3/MM3 (ref 140–450)
PMV BLD AUTO: 10.9 FL (ref 6–12)
POTASSIUM SERPL-SCNC: 3.2 MMOL/L (ref 3.5–5.2)
PROT SERPL-MCNC: 6.2 G/DL (ref 6–8.5)
PROT UR QL STRIP: NEGATIVE
RBC # BLD AUTO: 3.41 10*6/MM3 (ref 3.77–5.28)
SODIUM SERPL-SCNC: 139 MMOL/L (ref 136–145)
SP GR UR STRIP: 1.01 (ref 1–1.03)
UROBILINOGEN UR QL STRIP: ABNORMAL
WBC # BLD AUTO: 12.02 10*3/MM3 (ref 3.4–10.8)

## 2021-07-21 PROCEDURE — 81003 URINALYSIS AUTO W/O SCOPE: CPT | Performed by: EMERGENCY MEDICINE

## 2021-07-21 PROCEDURE — 25010000002 ONDANSETRON PER 1 MG: Performed by: EMERGENCY MEDICINE

## 2021-07-21 PROCEDURE — 0 IOPAMIDOL PER 1 ML: Performed by: EMERGENCY MEDICINE

## 2021-07-21 PROCEDURE — 99283 EMERGENCY DEPT VISIT LOW MDM: CPT

## 2021-07-21 PROCEDURE — 85025 COMPLETE CBC W/AUTO DIFF WBC: CPT | Performed by: EMERGENCY MEDICINE

## 2021-07-21 PROCEDURE — 96375 TX/PRO/DX INJ NEW DRUG ADDON: CPT

## 2021-07-21 PROCEDURE — 83690 ASSAY OF LIPASE: CPT | Performed by: EMERGENCY MEDICINE

## 2021-07-21 PROCEDURE — 96374 THER/PROPH/DIAG INJ IV PUSH: CPT

## 2021-07-21 PROCEDURE — 25010000002 HYDROMORPHONE PER 4 MG: Performed by: EMERGENCY MEDICINE

## 2021-07-21 PROCEDURE — 80053 COMPREHEN METABOLIC PANEL: CPT | Performed by: EMERGENCY MEDICINE

## 2021-07-21 PROCEDURE — 99283 EMERGENCY DEPT VISIT LOW MDM: CPT | Performed by: EMERGENCY MEDICINE

## 2021-07-21 PROCEDURE — 74177 CT ABD & PELVIS W/CONTRAST: CPT

## 2021-07-21 RX ORDER — HYDROMORPHONE HCL 110MG/55ML
0.5 PATIENT CONTROLLED ANALGESIA SYRINGE INTRAVENOUS ONCE
Status: COMPLETED | OUTPATIENT
Start: 2021-07-21 | End: 2021-07-21

## 2021-07-21 RX ORDER — SODIUM CHLORIDE 0.9 % (FLUSH) 0.9 %
10 SYRINGE (ML) INJECTION AS NEEDED
Status: DISCONTINUED | OUTPATIENT
Start: 2021-07-21 | End: 2021-07-21 | Stop reason: HOSPADM

## 2021-07-21 RX ORDER — ONDANSETRON 2 MG/ML
4 INJECTION INTRAMUSCULAR; INTRAVENOUS ONCE
Status: COMPLETED | OUTPATIENT
Start: 2021-07-21 | End: 2021-07-21

## 2021-07-21 RX ADMIN — HYDROMORPHONE HYDROCHLORIDE 0.5 MG: 2 INJECTION, SOLUTION INTRAMUSCULAR; INTRAVENOUS; SUBCUTANEOUS at 10:58

## 2021-07-21 RX ADMIN — IOPAMIDOL 100 ML: 755 INJECTION, SOLUTION INTRAVENOUS at 12:28

## 2021-07-21 RX ADMIN — ONDANSETRON 4 MG: 2 INJECTION INTRAMUSCULAR; INTRAVENOUS at 10:57

## 2021-07-21 NOTE — DISCHARGE INSTRUCTIONS
Recommend gentle diet and plenty of fluids as tolerated.  May try over-the-counter Imodium as needed for your diarrhea if it returns.  May return to the emergency room for any worsening pain, fevers, weakness, diarrhea, vomiting or any other concerns.

## 2021-07-21 NOTE — ED NOTES
Pt unable to produce a stool sample after 3 attempts/ dr aguirre notified     Ann Rolon, RN  07/21/21 1300

## 2021-07-21 NOTE — ED PROVIDER NOTES
Subjective   History of Present Illness  History of Present Illness    Chief complaint: Diarrhea, abdominal cramping    Location: Lower abdomen    Quality/Severity: Moderate cramping and frequent stools    Timing/Duration: Began yesterday, persisted today    Modifying Factors: None    Narrative: This patient presents for evaluation of lower abdominal pain with frequent watery stools.  She is concerned because she has had C. difficile twice in the past.  She was seen here about a week ago and diagnosed with a UTI.  She finished a course of antibiotics (doxycycline) the last 1 week.  Then, yesterday she began having some loose stools and lower abdominal cramping.  The diarrhea and cramping has intensified today.  She says it is very similar to her past problems with C. difficile.  She expresses further concern because she has a history of liver disease and had a TIPS surgery performed at [facility] which needs to be revised this Friday and is already scheduled in their facility.  She is concerned that this diarrheal illness could impair the timing of her upcoming surgery.      Associated Symptoms: As above    Review of Systems   Constitutional: Positive for activity change and appetite change. Negative for fever.   HENT: Negative.    Eyes: Negative for pain and visual disturbance.   Respiratory: Negative for cough and shortness of breath.    Cardiovascular: Negative for chest pain.   Gastrointestinal: Positive for abdominal pain and diarrhea. Negative for blood in stool and vomiting.   Skin: Negative for color change and wound.   Neurological: Negative for syncope.   All other systems reviewed and are negative.      Past Medical History:   Diagnosis Date   • Disease of thyroid gland    • Elevated liver enzymes    • Fatty liver    • Frequent UTI    • History of depression    • Hypertension    • Lumbar stenosis    • Post-menopausal    • Stress incontinence    • Urinary retention        No Known Allergies    Past  Surgical History:   Procedure Laterality Date   • ABDOMINOPLASTY     • BREAST SURGERY     • CATARACT EXTRACTION Bilateral    •  SECTION      X3   • COLONOSCOPY      2006   • COLONOSCOPY N/A 2016    Procedure: COLONOSCOPY;  Surgeon: Ho Arenas MD;  Location: [facility] ENDOSCOPY;  Service:    • ENDOSCOPY N/A 2020    Procedure: ESOPHAGOGASTRODUODENOSCOPY;  Surgeon: Terrell Stanton MD;  Location: [facility] OR;  Service: Gastroenterology;  Laterality: N/A;  gastric ulcer  duodenal erosion  gastric varices   • LASIK     • LUMBAR DISCECTOMY FUSION INSTRUMENTATION N/A 10/1/2018    Procedure: L4-5, L5-S1  laminectomy and fusion with instrumentation;  Surgeon: Juno Kim MD;  Location: [facility] OR;  Service: Orthopedic Spine   • REDUCTION MAMMAPLASTY     • TIPS PROCEDURE     • TONSILLECTOMY     • TOTAL SHOULDER ARTHROPLASTY W/ DISTAL CLAVICLE EXCISION Right 2019    Procedure: TOTAL SHOULDER REVERSE ARTHROPLASTY;  Surgeon: David Marion MD;  Location: [facility] OR;  Service: Orthopedics       Family History   Problem Relation Age of Onset   • Colon polyps Father    • Breast cancer Maternal Aunt    • Malig Hyperthermia Neg Hx        Social History     Socioeconomic History   • Marital status:      Spouse name: Not on file   • Number of children: Not on file   • Years of education: Not on file   • Highest education level: Not on file   Tobacco Use   • Smoking status: Never Smoker   • Smokeless tobacco: Never Used   Vaping Use   • Vaping Use: Never used   Substance and Sexual Activity   • Alcohol use: Not Currently     Comment: no ETOH since    • Drug use: No   • Sexual activity: Defer     ED Triage Vitals [21 0955]   Temp Heart Rate Resp BP SpO2   98 °F (36.7 °C) 81 16 140/72 99 %      Temp src Heart Rate Source Patient Position BP Location FiO2 (%)   Oral Monitor Sitting Right arm --     Objective   Physical Exam  Vitals and nursing note reviewed.    Constitutional:       Appearance: She is well-developed. She is not toxic-appearing or diaphoretic.      Comments: Pleasant lady.  Appears upset and uncomfortable.  Holding her lower abdomen with her hands.   HENT:      Head: Normocephalic and atraumatic.   Eyes:      General:         Right eye: No discharge.         Left eye: No discharge.      Pupils: Pupils are equal, round, and reactive to light.   Cardiovascular:      Rate and Rhythm: Normal rate and regular rhythm.      Heart sounds: Normal heart sounds. No murmur heard.     Pulmonary:      Effort: Pulmonary effort is normal. No respiratory distress.      Breath sounds: Normal breath sounds. No stridor. No rhonchi.   Abdominal:      Palpations: Abdomen is soft.      Tenderness: There is abdominal tenderness in the right lower quadrant, suprapubic area and left lower quadrant. There is no guarding or rebound. Negative signs include Rebolledo's sign, Rovsing's sign and McBurney's sign.      Hernia: No hernia is present.   Musculoskeletal:         General: No deformity. Normal range of motion.      Cervical back: Normal range of motion and neck supple.   Skin:     General: Skin is warm and dry.      Findings: No erythema or rash.   Neurological:      Mental Status: She is alert and oriented to person, place, and time.   Psychiatric:         Behavior: Behavior normal.         Thought Content: Thought content normal.         Judgment: Judgment normal.         Results for orders placed or performed during the hospital encounter of 07/21/21   Comprehensive Metabolic Panel    Specimen: Blood   Result Value Ref Range    Glucose 106 (H) 65 - 99 mg/dL    BUN 9 8 - 23 mg/dL    Creatinine 0.69 0.57 - 1.00 mg/dL    Sodium 139 136 - 145 mmol/L    Potassium 3.2 (L) 3.5 - 5.2 mmol/L    Chloride 106 98 - 107 mmol/L    CO2 23.5 22.0 - 29.0 mmol/L    Calcium 9.8 8.6 - 10.5 mg/dL    Total Protein 6.2 6.0 - 8.5 g/dL    Albumin 3.70 3.50 - 5.20 g/dL    ALT (SGPT) 23 1 - 33 U/L    AST  (SGOT) 41 (H) 1 - 32 U/L    Alkaline Phosphatase 99 39 - 117 U/L    Total Bilirubin 1.2 0.0 - 1.2 mg/dL    eGFR Non African Amer 84 >60 mL/min/1.73    Globulin 2.5 gm/dL    A/G Ratio 1.5 g/dL    BUN/Creatinine Ratio 13.0 7.0 - 25.0    Anion Gap 9.5 5.0 - 15.0 mmol/L   Lipase    Specimen: Blood   Result Value Ref Range    Lipase 25 13 - 60 U/L   Urinalysis With Culture If Indicated - Urine, Clean Catch    Specimen: Urine, Clean Catch   Result Value Ref Range    Color, UA Yellow Yellow, Straw    Appearance, UA Slightly Cloudy (A) Clear    pH, UA 7.0 4.5 - 8.0    Specific Gravity, UA 1.015 1.003 - 1.030    Glucose, UA Negative Negative    Ketones, UA Negative Negative    Bilirubin, UA Negative Negative    Blood, UA Negative Negative    Protein, UA Negative Negative    Leuk Esterase, UA Negative Negative    Nitrite, UA Negative Negative    Urobilinogen, UA 0.2 E.U./dL 0.2 - 1.0 E.U./dL   CBC Auto Differential    Specimen: Blood   Result Value Ref Range    WBC 12.02 (H) 3.40 - 10.80 10*3/mm3    RBC 3.41 (L) 3.77 - 5.28 10*6/mm3    Hemoglobin 10.9 (L) 12.0 - 15.9 g/dL    Hematocrit 33.9 (L) 34.0 - 46.6 %    MCV 99.4 (H) 79.0 - 97.0 fL    MCH 32.0 26.6 - 33.0 pg    MCHC 32.2 31.5 - 35.7 g/dL    RDW 13.0 12.3 - 15.4 %    RDW-SD 48.3 37.0 - 54.0 fl    MPV 10.9 6.0 - 12.0 fL    Platelets 156 140 - 450 10*3/mm3    Neutrophil % 65.8 42.7 - 76.0 %    Lymphocyte % 20.9 19.6 - 45.3 %    Monocyte % 10.6 5.0 - 12.0 %    Eosinophil % 2.2 0.3 - 6.2 %    Basophil % 0.3 0.0 - 1.5 %    Immature Grans % 0.2 0.0 - 0.5 %    Neutrophils, Absolute 7.90 (H) 1.70 - 7.00 10*3/mm3    Lymphocytes, Absolute 2.51 0.70 - 3.10 10*3/mm3    Monocytes, Absolute 1.27 (H) 0.10 - 0.90 10*3/mm3    Eosinophils, Absolute 0.27 0.00 - 0.40 10*3/mm3    Basophils, Absolute 0.04 0.00 - 0.20 10*3/mm3    Immature Grans, Absolute 0.03 0.00 - 0.05 10*3/mm3       RADIOLOGY        Study: CT abdomen pelvis    Findings: 1. Cirrhotic morphology of the liver without evidence  of liver enlargement. There is a T IPS present which is opacified and patent. The spleen is mildly prominent in size but unchanged. No residual ascites  2. Gallbladder is distended with small ossifications likely representing stones. Trace fluid between the gallbladder and the liver appears similar to prior study and felt therefore likely benign and not related to acute cholecystitis.  3. No evidence of bowel obstruction  4. 6 mm indeterminate lesion in the posterior mid right kidney. This measures 62 Hounsfield units postcontrast. It is not apparent on the prior noncontrasted CT scans. This is likely a small complex cyst although a small renal cell carcinoma cannot be  excluded. Consider follow-up renal mass protocol CT in 6 months.  5. The bladder is again distended raising concern for possible bladder outlet obstruction versus neurogenic bladder.  6 stable fusion hardware at the lumbosacral junction with severe anterolisthesis of L5 on S1 unchanged.    Interpreted contemporaneously with treatment by Dr. Lundberg, independently viewed by me    Procedures           ED Course  ED Course as of Jul 21 1717   Wed Jul 21, 2021 1715 I reviewed all the labs from today's visit.  Patient's white count is slightly elevated here.  However the remaining labs look to be pretty acceptable with only some mild hypokalemia.  I reviewed the CT report.  It seems that they are mostly chronic features there and some incidental findings that need to be followed up by her PCP and hepatologist.  We waited for several hours here, hoping that the patient could provide for us a stool sample.  She tried several times but never was able to have a bowel movement.  I interpreted this as a good sign, since it seemed that her diarrhea was not that copious at the time she was with us.  I was hoping to run a C. difficile test to see if we can establish that diagnosis today and then possibly transfer to [facility] if need be.  However without  a definitive diagnosis, I am inclined to treat this as a simple brief diarrheal illness which may be infectious or functional.  Again, after waiting many hours here in the department and watching her throughout that time.  There was no worsening of her overall condition.  In fact she seemed to steadily improved.  Therefore she and I agreed on a plan to discharge her home in safe condition right now for continued symptomatic management.  I advised her to return here if she seems to be worsening in her condition with more diarrhea or pain all of a sudden.  She agreed to that plan as outlined.    [COMPA]      ED Course User Index  [COMPA] Waqar Martinez MD                                           MDM  Number of Diagnoses or Management Options     Amount and/or Complexity of Data Reviewed  Clinical lab tests: reviewed and ordered  Tests in the radiology section of CPT®: ordered and reviewed  Decide to obtain previous medical records or to obtain history from someone other than the patient: yes  Review and summarize past medical records: yes  Independent visualization of images, tracings, or specimens: yes    Risk of Complications, Morbidity, and/or Mortality  Presenting problems: moderate  Diagnostic procedures: moderate  Management options: moderate        Final diagnoses:   Diarrhea, unspecified type   Hepatic cirrhosis, unspecified hepatic cirrhosis type, unspecified whether ascites present (CMS/HCC)       ED Disposition  ED Disposition     ED Disposition Condition Comment    Discharge Stable           Tayler Trujillo MD  [facility] [address]  [phone]    Schedule an appointment as soon as possible for a visit in 1 day  As needed         Medication List      No changes were made to your prescriptions during this visit.          Waqar Martinez MD  07/21/21 7888

## 2021-07-28 ENCOUNTER — HOSPITAL ENCOUNTER (EMERGENCY)
Facility: HOSPITAL | Age: 71
Discharge: HOME | End: 2021-07-29
Attending: EMERGENCY MEDICINE | Admitting: EMERGENCY MEDICINE

## 2021-07-28 ENCOUNTER — APPOINTMENT (OUTPATIENT)
Dept: CT IMAGING | Facility: HOSPITAL | Age: 71
End: 2021-07-28

## 2021-07-28 DIAGNOSIS — A04.72 C. DIFFICILE COLITIS: Primary | ICD-10-CM

## 2021-07-28 DIAGNOSIS — E87.6 HYPOKALEMIA: ICD-10-CM

## 2021-07-28 LAB
ALBUMIN SERPL-MCNC: 3.4 G/DL (ref 3.5–5.2)
ALBUMIN/GLOB SERPL: 1.6 G/DL
ALP SERPL-CCNC: 105 U/L (ref 39–117)
ALT SERPL W P-5'-P-CCNC: 23 U/L (ref 1–33)
ANION GAP SERPL CALCULATED.3IONS-SCNC: 7.3 MMOL/L (ref 5–15)
AST SERPL-CCNC: 33 U/L (ref 1–32)
BACTERIA UR QL AUTO: ABNORMAL /HPF
BASOPHILS # BLD AUTO: 0.05 10*3/MM3 (ref 0–0.2)
BASOPHILS NFR BLD AUTO: 0.4 % (ref 0–1.5)
BILIRUB SERPL-MCNC: 0.9 MG/DL (ref 0–1.2)
BILIRUB UR QL STRIP: NEGATIVE
BUN SERPL-MCNC: 11 MG/DL (ref 8–23)
BUN/CREAT SERPL: 16.7 (ref 7–25)
C DIFF GDH STL QL: POSITIVE
CALCIUM SPEC-SCNC: 9.3 MG/DL (ref 8.6–10.5)
CHLORIDE SERPL-SCNC: 109 MMOL/L (ref 98–107)
CLARITY UR: CLEAR
CO2 SERPL-SCNC: 21.7 MMOL/L (ref 22–29)
COLOR UR: YELLOW
CREAT SERPL-MCNC: 0.66 MG/DL (ref 0.57–1)
DEPRECATED RDW RBC AUTO: 47.1 FL (ref 37–54)
EOSINOPHIL # BLD AUTO: 0.23 10*3/MM3 (ref 0–0.4)
EOSINOPHIL NFR BLD AUTO: 1.9 % (ref 0.3–6.2)
ERYTHROCYTE [DISTWIDTH] IN BLOOD BY AUTOMATED COUNT: 13.5 % (ref 12.3–15.4)
GFR SERPL CREATININE-BSD FRML MDRD: 88 ML/MIN/1.73
GLOBULIN UR ELPH-MCNC: 2.1 GM/DL
GLUCOSE SERPL-MCNC: 99 MG/DL (ref 65–99)
GLUCOSE UR STRIP-MCNC: NEGATIVE MG/DL
HCT VFR BLD AUTO: 31.8 % (ref 34–46.6)
HGB BLD-MCNC: 10.8 G/DL (ref 12–15.9)
HGB UR QL STRIP.AUTO: ABNORMAL
HYALINE CASTS UR QL AUTO: ABNORMAL /LPF
IMM GRANULOCYTES # BLD AUTO: 0.05 10*3/MM3 (ref 0–0.05)
IMM GRANULOCYTES NFR BLD AUTO: 0.4 % (ref 0–0.5)
KETONES UR QL STRIP: NEGATIVE
LEUKOCYTE ESTERASE UR QL STRIP.AUTO: ABNORMAL
LYMPHOCYTES # BLD AUTO: 1.98 10*3/MM3 (ref 0.7–3.1)
LYMPHOCYTES NFR BLD AUTO: 16.7 % (ref 19.6–45.3)
MCH RBC QN AUTO: 32.4 PG (ref 26.6–33)
MCHC RBC AUTO-ENTMCNC: 34 G/DL (ref 31.5–35.7)
MCV RBC AUTO: 95.5 FL (ref 79–97)
MONOCYTES # BLD AUTO: 1.24 10*3/MM3 (ref 0.1–0.9)
MONOCYTES NFR BLD AUTO: 10.5 % (ref 5–12)
NEUTROPHILS NFR BLD AUTO: 70.1 % (ref 42.7–76)
NEUTROPHILS NFR BLD AUTO: 8.29 10*3/MM3 (ref 1.7–7)
NITRITE UR QL STRIP: NEGATIVE
NRBC BLD AUTO-RTO: 0 /100 WBC (ref 0–0.2)
PH UR STRIP.AUTO: 7 [PH] (ref 4.5–8)
PLATELET # BLD AUTO: 187 10*3/MM3 (ref 140–450)
PMV BLD AUTO: 11 FL (ref 6–12)
POTASSIUM SERPL-SCNC: 3.4 MMOL/L (ref 3.5–5.2)
PROT SERPL-MCNC: 5.5 G/DL (ref 6–8.5)
PROT UR QL STRIP: ABNORMAL
RBC # BLD AUTO: 3.33 10*6/MM3 (ref 3.77–5.28)
RBC # UR: ABNORMAL /HPF
REF LAB TEST METHOD: ABNORMAL
SODIUM SERPL-SCNC: 138 MMOL/L (ref 136–145)
SP GR UR STRIP: 1.02 (ref 1–1.03)
SQUAMOUS #/AREA URNS HPF: ABNORMAL /HPF
UROBILINOGEN UR QL STRIP: ABNORMAL
WBC # BLD AUTO: 11.84 10*3/MM3 (ref 3.4–10.8)
WBC UR QL AUTO: ABNORMAL /HPF

## 2021-07-28 PROCEDURE — 87449 NOS EACH ORGANISM AG IA: CPT | Performed by: EMERGENCY MEDICINE

## 2021-07-28 PROCEDURE — 80053 COMPREHEN METABOLIC PANEL: CPT | Performed by: EMERGENCY MEDICINE

## 2021-07-28 PROCEDURE — 99284 EMERGENCY DEPT VISIT MOD MDM: CPT

## 2021-07-28 PROCEDURE — 85025 COMPLETE CBC W/AUTO DIFF WBC: CPT | Performed by: EMERGENCY MEDICINE

## 2021-07-28 PROCEDURE — 74177 CT ABD & PELVIS W/CONTRAST: CPT

## 2021-07-28 PROCEDURE — 81001 URINALYSIS AUTO W/SCOPE: CPT | Performed by: EMERGENCY MEDICINE

## 2021-07-28 PROCEDURE — 87324 CLOSTRIDIUM AG IA: CPT | Performed by: EMERGENCY MEDICINE

## 2021-07-28 PROCEDURE — 99284 EMERGENCY DEPT VISIT MOD MDM: CPT | Performed by: EMERGENCY MEDICINE

## 2021-07-28 PROCEDURE — 96360 HYDRATION IV INFUSION INIT: CPT

## 2021-07-28 RX ORDER — SODIUM CHLORIDE 0.9 % (FLUSH) 0.9 %
10 SYRINGE (ML) INJECTION AS NEEDED
Status: DISCONTINUED | OUTPATIENT
Start: 2021-07-28 | End: 2021-07-29 | Stop reason: HOSPADM

## 2021-07-28 RX ORDER — VANCOMYCIN HYDROCHLORIDE 250 MG/1
CAPSULE ORAL
Status: COMPLETED
Start: 2021-07-28 | End: 2021-07-29

## 2021-07-28 RX ORDER — SODIUM CHLORIDE 9 MG/ML
125 INJECTION, SOLUTION INTRAVENOUS CONTINUOUS
Status: DISCONTINUED | OUTPATIENT
Start: 2021-07-28 | End: 2021-07-29 | Stop reason: HOSPADM

## 2021-07-28 RX ADMIN — SODIUM CHLORIDE 500 ML: 9 INJECTION, SOLUTION INTRAVENOUS at 22:12

## 2021-07-28 RX ADMIN — SODIUM CHLORIDE 125 ML/HR: 9 INJECTION, SOLUTION INTRAVENOUS at 23:00

## 2021-07-29 VITALS
TEMPERATURE: 99.2 F | SYSTOLIC BLOOD PRESSURE: 142 MMHG | OXYGEN SATURATION: 96 % | RESPIRATION RATE: 18 BRPM | BODY MASS INDEX: 19.51 KG/M2 | DIASTOLIC BLOOD PRESSURE: 72 MMHG | HEIGHT: 62 IN | WEIGHT: 106 LBS | HEART RATE: 92 BPM

## 2021-07-29 PROCEDURE — 0 IOPAMIDOL PER 1 ML: Performed by: EMERGENCY MEDICINE

## 2021-07-29 PROCEDURE — 96361 HYDRATE IV INFUSION ADD-ON: CPT

## 2021-07-29 RX ORDER — POTASSIUM CHLORIDE 750 MG/1
20 TABLET, FILM COATED, EXTENDED RELEASE ORAL DAILY
Qty: 14 TABLET | Refills: 0 | Status: SHIPPED | OUTPATIENT
Start: 2021-07-29 | End: 2021-08-05

## 2021-07-29 RX ORDER — VANCOMYCIN HYDROCHLORIDE 125 MG/1
125 CAPSULE ORAL 4 TIMES DAILY
Qty: 56 CAPSULE | Refills: 0 | Status: SHIPPED | OUTPATIENT
Start: 2021-07-29 | End: 2021-08-12

## 2021-07-29 RX ORDER — POTASSIUM CHLORIDE 20MEQ/15ML
20 LIQUID (ML) ORAL ONCE
Status: COMPLETED | OUTPATIENT
Start: 2021-07-29 | End: 2021-07-29

## 2021-07-29 RX ORDER — POTASSIUM CHLORIDE 1.5 G/1.77G
POWDER, FOR SOLUTION ORAL
Status: COMPLETED
Start: 2021-07-29 | End: 2021-07-29

## 2021-07-29 RX ADMIN — IOPAMIDOL 100 ML: 755 INJECTION, SOLUTION INTRAVENOUS at 00:39

## 2021-07-29 RX ADMIN — POTASSIUM CHLORIDE 20 MEQ: 1.5 POWDER, FOR SOLUTION ORAL at 01:13

## 2021-07-29 RX ADMIN — VANCOMYCIN HYDROCHLORIDE 250 MG: 250 CAPSULE ORAL at 00:01

## 2021-07-29 RX ADMIN — POTASSIUM CHLORIDE 20 MEQ: 1.5 SOLUTION ORAL at 01:14

## 2021-08-05 ENCOUNTER — TRANSCRIBE ORDERS (OUTPATIENT)
Dept: ADMINISTRATIVE | Facility: HOSPITAL | Age: 71
End: 2021-08-05

## 2021-08-05 DIAGNOSIS — Z12.31 BREAST CANCER SCREENING BY MAMMOGRAM: Primary | ICD-10-CM

## 2021-08-16 ENCOUNTER — LAB (OUTPATIENT)
Dept: LAB | Facility: HOSPITAL | Age: 71
End: 2021-08-16

## 2021-08-16 ENCOUNTER — TRANSCRIBE ORDERS (OUTPATIENT)
Dept: ADMINISTRATIVE | Facility: HOSPITAL | Age: 71
End: 2021-08-16

## 2021-08-16 DIAGNOSIS — E87.1 HYPONATREMIA: Primary | ICD-10-CM

## 2021-08-16 DIAGNOSIS — E87.1 HYPONATREMIA: ICD-10-CM

## 2021-08-16 LAB
ALBUMIN SERPL-MCNC: 3.8 G/DL (ref 3.5–5.2)
ALP SERPL-CCNC: 121 U/L (ref 39–117)
ALT SERPL W P-5'-P-CCNC: 39 U/L (ref 1–33)
ANION GAP SERPL CALCULATED.3IONS-SCNC: 13.3 MMOL/L (ref 5–15)
AST SERPL-CCNC: 69 U/L (ref 1–32)
BILIRUB CONJ SERPL-MCNC: 0.7 MG/DL (ref 0–0.3)
BILIRUB INDIRECT SERPL-MCNC: 1.2 MG/DL
BILIRUB SERPL-MCNC: 1.9 MG/DL (ref 0–1.2)
BUN SERPL-MCNC: 8 MG/DL (ref 8–23)
BUN/CREAT SERPL: 11 (ref 7–25)
CALCIUM SPEC-SCNC: 9.9 MG/DL (ref 8.6–10.5)
CHLORIDE SERPL-SCNC: 105 MMOL/L (ref 98–107)
CO2 SERPL-SCNC: 20.7 MMOL/L (ref 22–29)
CREAT SERPL-MCNC: 0.73 MG/DL (ref 0.57–1)
GFR SERPL CREATININE-BSD FRML MDRD: 78 ML/MIN/1.73
GLUCOSE SERPL-MCNC: 93 MG/DL (ref 65–99)
POTASSIUM SERPL-SCNC: 3.7 MMOL/L (ref 3.5–5.2)
PROT SERPL-MCNC: 6.7 G/DL (ref 6–8.5)
SODIUM SERPL-SCNC: 139 MMOL/L (ref 136–145)

## 2021-08-16 PROCEDURE — 36415 COLL VENOUS BLD VENIPUNCTURE: CPT

## 2021-08-16 PROCEDURE — 80048 BASIC METABOLIC PNL TOTAL CA: CPT

## 2021-08-16 PROCEDURE — 80076 HEPATIC FUNCTION PANEL: CPT

## 2021-08-19 ENCOUNTER — TELEPHONE (OUTPATIENT)
Dept: GASTROENTEROLOGY | Facility: CLINIC | Age: 71
End: 2021-08-19

## 2021-09-23 ENCOUNTER — HOSPITAL ENCOUNTER (OUTPATIENT)
Dept: MAMMOGRAPHY | Facility: HOSPITAL | Age: 71
Discharge: HOME | End: 2021-09-23
Admitting: INTERNAL MEDICINE

## 2021-09-23 DIAGNOSIS — Z12.31 BREAST CANCER SCREENING BY MAMMOGRAM: ICD-10-CM

## 2021-09-23 PROCEDURE — 77063 BREAST TOMOSYNTHESIS BI: CPT

## 2021-09-23 PROCEDURE — 77067 SCR MAMMO BI INCL CAD: CPT

## 2021-12-27 ENCOUNTER — TRANSCRIBE ORDERS (OUTPATIENT)
Dept: ADMINISTRATIVE | Facility: HOSPITAL | Age: 71
End: 2021-12-27

## 2021-12-27 DIAGNOSIS — N28.89 RIGHT RENAL MASS: Primary | ICD-10-CM

## 2022-01-01 ENCOUNTER — TELEPHONE (OUTPATIENT)
Dept: ORTHOPEDIC SURGERY | Facility: CLINIC | Age: 72
End: 2022-01-01

## 2022-01-03 ENCOUNTER — HOSPITAL ENCOUNTER (OUTPATIENT)
Dept: CT IMAGING | Facility: HOSPITAL | Age: 72
Discharge: HOME | End: 2022-01-03
Admitting: INTERNAL MEDICINE

## 2022-01-03 DIAGNOSIS — N28.89 RIGHT RENAL MASS: ICD-10-CM

## 2022-01-03 PROCEDURE — 0 IOPAMIDOL PER 1 ML: Performed by: INTERNAL MEDICINE

## 2022-01-03 PROCEDURE — 74178 CT ABD&PLV WO CNTR FLWD CNTR: CPT

## 2022-01-03 RX ADMIN — IOPAMIDOL 100 ML: 755 INJECTION, SOLUTION INTRAVENOUS at 18:34

## 2022-02-07 ENCOUNTER — OFFICE VISIT (OUTPATIENT)
Dept: ORTHOPEDIC SURGERY | Facility: CLINIC | Age: 72
End: 2022-02-07

## 2022-02-07 VITALS — BODY MASS INDEX: 21.71 KG/M2 | WEIGHT: 118 LBS | TEMPERATURE: 97.1 F | HEIGHT: 62 IN

## 2022-02-07 DIAGNOSIS — R20.2 NUMBNESS AND TINGLING IN LEFT HAND: ICD-10-CM

## 2022-02-07 DIAGNOSIS — G89.29 CHRONIC RIGHT SHOULDER PAIN: ICD-10-CM

## 2022-02-07 DIAGNOSIS — M25.511 CHRONIC RIGHT SHOULDER PAIN: ICD-10-CM

## 2022-02-07 DIAGNOSIS — R20.0 NUMBNESS AND TINGLING IN LEFT HAND: ICD-10-CM

## 2022-02-07 DIAGNOSIS — Z09 SURGERY FOLLOW-UP: Primary | ICD-10-CM

## 2022-02-07 PROCEDURE — 99214 OFFICE O/P EST MOD 30 MIN: CPT | Performed by: ORTHOPAEDIC SURGERY

## 2022-02-07 NOTE — PROGRESS NOTES
"CC:  Right shoulder pain, new complaint of left hand pain and numbness    Ms. Lugo comes in today for couple of issues. The first is her right shoulder. She reports that she feels like the shoulder is \"popping out\". She tells me it feels like it periodically slips out of place or \"slides\" as she describes it. She has not had a sarah dislocation but she feels like it is not stable. She does report some pain and swelling. Current pain is mild and aching.    She mentions a new issue of left hand and wrist pain as well. Her pain is diffuse in the palm and wrist. She also gets a lot of pain at the base of her thumb. She has been seeing a hand surgeon in [place] who fitted her with a brace. The brace does not seem to help. At one point they told her that she may have rheumatoid arthritis. She apparently had some tests for that which were negative. She was given a Medrol dose pack which did not really help. She also had 1 injection for her thumb CMC joint which did help immensely but has now worn off. She has requested that the medical records and report of an MRI of her wrist be referred to me for review. Those are available at this time. I have reviewed the records from the [facility]. She was diagnosed with thumb CMC osteoarthritis and had an injection on December 16. She was also given a thumb spica splint for a diagnosis of diffuse stenosing tenosynovitis. I have reviewed the MRI report. It describes nonspecific edema through the subcutaneous tissues of the hand, particularly the dorsum. There is also severe osteoarthritis of the first CMC joint and scaphotrapezial joint.    Her right shoulder is examined. Her incision is healed. She has a trace effusion. Her shoulder does seem to be loose on exam. I can feel the joint shucking when I pulled longitudinal traction. External rotation seems to exacerbate her symptoms. She can abduct about 80 and elevate about 120. Her deltoid fires and her axillary nerve " sensation is intact.    Left hand is brief examined. Skin is benign. She is tender over the thumb CMC joint and has positive thumb grind test. Good  and pinch strength. Pinch is uncomfortable for her. Positive Phalen's over the carpal tunnel.    No new x-rays were taken today. Previous x-rays of the right shoulder show her implants to be well fixed and well-positioned.    Assessment: 1. Right shoulder instability status post reverse total shoulder arthroplasty 2. Left thumb CMC osteoarthritis  3.  Left carpal tunnel syndrome    Plan: I recommend working up the shoulder further but I suspect she may require a revision to at least exchange for a thicker or perhaps a constrained poly-. We may even change out her glenosphere for more offset. I would want to rule out infection or loosening of any of the components first though. We talked about an aspiration of the shoulder today and then also referring her for a bone scan and CT scan. She would like to pursue those test but she is going to be heading back to [place] at the end of this week. She is going to be in [place] until April. She would prefer to delay the test until she gets back. I assured her that would be fine. She will follow-up with me after she returns and we will look at scheduling those test. I will also likely consider an aspiration of her shoulder at that time.    I would recommend nerve studies to evaluate for carpal tunnel. We can consider ordering those at the time of her next evaluation as well. In the meantime, she can continue to wear the brace at night.    David Marion MD

## 2022-02-08 ENCOUNTER — OFFICE VISIT (OUTPATIENT)
Dept: ORTHOPEDIC SURGERY | Facility: CLINIC | Age: 72
End: 2022-02-08

## 2022-02-08 VITALS — WEIGHT: 115 LBS | BODY MASS INDEX: 21.16 KG/M2 | TEMPERATURE: 97.6 F | HEIGHT: 62 IN

## 2022-02-08 DIAGNOSIS — M54.50 LUMBAR BACK PAIN: Primary | ICD-10-CM

## 2022-02-08 PROCEDURE — 99213 OFFICE O/P EST LOW 20 MIN: CPT | Performed by: ORTHOPAEDIC SURGERY

## 2022-02-08 NOTE — PROGRESS NOTES
She looks much better and states she feels better.  She does not appear cachectic and her BMI has increased from 18.5 to 21.  She has some left buttock pain and occasional low back pain and also some neck pain.  He has good strength in the upper and lower extremities bilaterally and reflexes are intact.  No her right foot drop is completely solved and she has 5/5 strength there as elsewhere.  Reviewed her films from before showing the fusion L4-S1 and looks good above.  I would not change a thing in encouraged her to come back and see me in April when she returns from [place] to see Dr. Marion.

## 2022-02-21 ENCOUNTER — TELEPHONE (OUTPATIENT)
Dept: ORTHOPEDIC SURGERY | Facility: CLINIC | Age: 72
End: 2022-02-21

## 2022-02-21 NOTE — TELEPHONE ENCOUNTER
Caller: SYLVAIN COPE    Relationship to patient: SELF    Best call back number: [phone]    Patient is needing: PATIENT WANTED TO INFORM DR KIMBALL AND DR PAULA THAT SHE SAW HER WRIST DOCTOR IN [place] AND THEY ARE GOING TO BE CONDUCTING NERVE TEST THAT WAS ORDERED UP HERE. HER DOCTOR STATED HE COULD GET HER IN WHILE SHE WAS DOWN THERE AND HE WILL SEND ALL RESULTS TO OFFICE WHEN COMPLETED.    PATIENT WANTED TO MAKE SURE THAT THE APPT WAS CANCELLED FOR EMG-     YOU MAY CALL HER WITH ANY QUESTIONS OR CONCERNS. TY!

## 2022-04-11 ENCOUNTER — OFFICE VISIT (OUTPATIENT)
Dept: ORTHOPEDIC SURGERY | Facility: CLINIC | Age: 72
End: 2022-04-11

## 2022-04-11 VITALS — HEIGHT: 62 IN | BODY MASS INDEX: 20.43 KG/M2 | TEMPERATURE: 97.4 F | WEIGHT: 111 LBS

## 2022-04-11 DIAGNOSIS — G56.03 BILATERAL CARPAL TUNNEL SYNDROME: Primary | ICD-10-CM

## 2022-04-11 DIAGNOSIS — G56.21 CUBITAL TUNNEL SYNDROME, RIGHT: ICD-10-CM

## 2022-04-11 PROCEDURE — 99214 OFFICE O/P EST MOD 30 MIN: CPT | Performed by: ORTHOPAEDIC SURGERY

## 2022-04-11 RX ORDER — CEFAZOLIN SODIUM 2 G/100ML
2 INJECTION, SOLUTION INTRAVENOUS ONCE
Status: CANCELLED | OUTPATIENT
Start: 2022-05-03 | End: 2022-04-11

## 2022-04-11 NOTE — PROGRESS NOTES
CC:  Follow up bilateral hand pain and numbness    Ms. Lugo follows up today for both hands.  She got the EMG/nerve study in [place].  She saw a hand surgeon down there and had a carpal tunnel injection on the left.  She tells me it did help for a period of a couple of weeks but has now worn off.  Both hands are bothering her.  She reports progressive weakness and dysfunction.  She reports constant pain that is worse at night.  She reports constant numbness and tingling in both hands.    Both hands are examined.  No atrophy.  Skin is benign at the elbow and wrist.  She has positive Tinel's and Phalen's over the carpal tunnel bilaterally.  Negative Tinel's over the cubital tunnel bilaterally.  She does have diminished sensation in the tips of her thumb, index and middle fingers bilaterally.  She has some diminished sensation in the tip of her small finger on the right.  Good  and pinch strength bilaterally.    EMG/nerve studies of both upper extremities are reviewed.  She has findings consistent with moderate bilateral carpal tunnel syndrome and moderate right cubital tunnel syndrome.  She also has evidence for bilateral peripheral neuropathy    Assessment: Bilateral carpal tunnel syndrome and right cubital tunnel syndrome with worsening symptomatology    Plan: We discussed her options.  She has tried bracing for both wrist and 1 previous injection for the left carpal tunnel.  She feels like her symptoms are getting worse.  Given the nerve study findings, I think she is potentially at risk for permanent nerve damage if we do not intervene.  I recommend she consider a left carpal tunnel release and a right carpal tunnel release with ipsilateral ulnar nerve transposition.  I do not recommend doing both procedures in the same operative setting.  We talked about right versus left and she would like to go ahead and proceed with the left carpal tunnel release.  We discussed the surgery and all this entails  including all risks, benefits, and alternatives.  The risks discussed included infection, wound healing problems, hematoma, persistent pain/numbness/weakness despite the surgery, iatrogenic damage to the median and/or motor recurrent branch of the median nerve which could result in permanent weakness, numbness and dysfunction.  We also talked about positioning related neuropraxia, RSD, DVT, PE and death as well.  No guarrantees were given regarding the results of the procedure.  She has acknowledged understanding and consents to proceed.     David Marion MD

## 2022-04-12 ENCOUNTER — OFFICE VISIT (OUTPATIENT)
Dept: ORTHOPEDIC SURGERY | Facility: CLINIC | Age: 72
End: 2022-04-12

## 2022-04-12 VITALS — HEIGHT: 62 IN | TEMPERATURE: 97.6 F | WEIGHT: 111.1 LBS | BODY MASS INDEX: 20.44 KG/M2

## 2022-04-12 DIAGNOSIS — M54.50 LUMBAR BACK PAIN: Primary | ICD-10-CM

## 2022-04-12 PROCEDURE — 99213 OFFICE O/P EST LOW 20 MIN: CPT | Performed by: ORTHOPAEDIC SURGERY

## 2022-04-12 NOTE — PROGRESS NOTES
She is complaining of back pain radiating to the left hip.  She just got back from an extended visit in [place].  She is dealing with some carpal tunnel symptoms and planning surgery for that and having some issue with arterial supply to the right arm.  Her weight is remained stable since last fall basically and strength in the legs is good.  Her pain seems to be left lumbosacral in origin and into the buttock area.  For this I recommended physical therapy and I will see her back as needed.

## 2022-04-19 ENCOUNTER — HOSPITAL ENCOUNTER (OUTPATIENT)
Dept: PHYSICAL THERAPY | Facility: HOSPITAL | Age: 72
Setting detail: THERAPIES SERIES
Discharge: HOME | End: 2022-04-19

## 2022-04-19 DIAGNOSIS — M54.50 LUMBAR BACK PAIN: Primary | ICD-10-CM

## 2022-04-19 PROCEDURE — 97161 PT EVAL LOW COMPLEX 20 MIN: CPT

## 2022-04-19 NOTE — THERAPY EVALUATION
Outpatient Physical Therapy Ortho Initial Evaluation   Jennifer Lunsford     Patient Name: Tayler Lugo  : 1950  MRN: [id number]  Today's Date: 2022      Visit Date: 2022    Patient Active Problem List   Diagnosis   • Spinal stenosis of lumbar region with neurogenic claudication   • Lumbar spinal stenosis   • Frequent UTI   • Hypertension   • Stress incontinence   • Itching due to drug   • Acute respiratory failure with hypoxia (HCC)   • Tear of right rotator cuff   • Acute pain of right shoulder   • Hepatorenal syndrome (HCC)   • Ascites due to alcoholic hepatitis   • Bladder prolapse, female, acquired   • Sepsis associated hypotension (HCC)   • Acute liver failure   • Altered mental state   • Acute renal failure (HCC)   • Alcohol abuse   • Severe malnutrition (HCC)   • Gastrointestinal hemorrhage   • Gastrointestinal hemorrhage associated with alcoholic gastritis   • Gastric varices   • Alcoholic cirrhosis (HCC)   • Iron deficiency anemia due to chronic blood loss   • Bilateral carpal tunnel syndrome        Past Medical History:   Diagnosis Date   • CTS (carpal tunnel syndrome)     Bilateral   • Disease of thyroid gland    • Elevated liver enzymes    • Fatty liver    • Frequent UTI    • History of depression    • Hypertension    • Lumbar stenosis    • Post-menopausal    • Stress incontinence    • Urinary retention         Past Surgical History:   Procedure Laterality Date   • ABDOMINOPLASTY     • BREAST BIOPSY Right    • BREAST SURGERY     • CATARACT EXTRACTION Bilateral    •  SECTION      X3   • COLONOSCOPY         • COLONOSCOPY N/A 2016    Procedure: COLONOSCOPY;  Surgeon: Ho Arenas MD;  Location: [facility] ENDOSCOPY;  Service:    • ENDOSCOPY N/A 2020    Procedure: ESOPHAGOGASTRODUODENOSCOPY;  Surgeon: Terrell Stanton MD;  Location: [facility];  Service: Gastroenterology;  Laterality: N/A;  gastric ulcer  duodenal erosion  gastric varices   • LASIK      • LUMBAR DISCECTOMY FUSION INSTRUMENTATION N/A 10/1/2018    Procedure: L4-5, L5-S1  laminectomy and fusion with instrumentation;  Surgeon: Juno Kim MD;  Location: [facility]  Service: Orthopedic Spine   • REDUCTION MAMMAPLASTY     • TIPS PROCEDURE     • TONSILLECTOMY     • TOTAL SHOULDER ARTHROPLASTY W/ DISTAL CLAVICLE EXCISION Right 6/13/2019    Procedure: TOTAL SHOULDER REVERSE ARTHROPLASTY;  Surgeon: David [facility] MD;  Location: [facility]  Service: Orthopedics       Visit Dx:     ICD-10-CM ICD-9-CM   1. Lumbar back pain  M54.50 724.2          Patient History     Row Name 04/19/22 1200             History    Chief Complaint Difficulty Walking;Difficulty with daily activities;Joint stiffness;Muscle tenderness;Muscle weakness;Pain  -AS      Type of Pain Back pain;Hip pain  -AS      Brief Description of Current Complaint Tayler Lugo presents to outpatient PT today with reports of left sided low back pain and left buttock pain. She has been seen by a surgeon who feels she does not need surgery at this time. She has had a prior fusion at L4-S1. Patient also dealing with bilateral carpal tunnel symptoms and planning surgery for that.  -AS      Patient/Caregiver Goals Relieve pain;Return to prior level of function  -AS      Patient's Rating of General Health Good  -AS      Occupation/sports/leisure activities Gardening, Walking  -AS      Patient seeing anyone else for problem(s)? Dr. Kim  -AS      How has patient tried to help current problem? rest, medication  -AS      What clinical tests have you had for this problem? MRI  -AS      Results of Clinical Tests Negative  -AS              Pain     Pain Location Back;Buttocks  -AS      Pain at Present 8  -AS      Pain at Best 0  -AS      Pain at Worst 10  -AS      Pain Frequency Intermittent  -AS      Pain Description Aching  -AS      What Performance Factors Make the Current Problem(s) WORSE? walking  -AS      What Performance Factors Make the  Current Problem(s) BETTER? rest  -AS              Daily Activities    Primary Language English  -AS      Are you able to read Yes  -AS      Are you able to write Yes  -AS      How does patient learn best? Listening;Reading;Demonstration  -AS      Teaching needs identified Home Exercise Program;Management of Condition  -AS      Patient is concerned about/has problems with Difficulty with self care (i.e. bathing, dressing, toileting:;Flexibility;Performing home management (household chores, shopping, care of dependents);Performing job responsibilities/community activities (work, school,;Performing sports, recreation, and play activities;Walking  -AS      Does patient have problems with the following? None  -AS      Barriers to learning None  -AS      Pt Participated in POC and Goals Yes  -AS              Safety    Are you being hurt, hit, or frightened by anyone at home or in your life? No  -AS      Are you being neglected by a caregiver No  -AS            User Key  (r) = Recorded By, (t) = Taken By, (c) = Cosigned By    Initials Name Provider Type    AS Stu Greenfield, PT Physical Therapist                 PT Ortho     Row Name 04/19/22 1200       Precautions and Contraindications    Precautions/Limitations no known precautions/limitations  -AS       Subjective Pain    Able to rate subjective pain? yes  -AS    Pre-Treatment Pain Level 8  -AS    Post-Treatment Pain Level 6  -AS       Posture/Observations    Posture- WNL Posture is WNL  -AS       Lumbar/SI Special Tests    Standing Flexion Test (SI Dysfunction) Bilateral:;Negative  -AS    FAIR Test (Piriformis Syndrome) Bilateral:;Positive  -AS       Lumbosacral Palpation    Piriformis Bilateral:;Tender;Guarded/taut  -AS       Head/Neck/Trunk    Trunk Extension AROM WFL  -AS    Trunk Flexion AROM WFL  -AS    Trunk Lt Lateral Flexion AROM WFL  -AS    Trunk Rt Lateral Flexion AROM WFL  -AS    Trunk Lt Rotation AROM WFL  -AS    Trunk Rt Rotation AROM WFL  -AS        MMT Neck/Trunk    Trunk Flexion MMT, Gross Movement (4-/5) good minus  -AS    Trunk Extension MMT, Gross Movement (4-/5) good minus  -AS       MMT Right Lower Ext    Rt Hip Flexion MMT, Gross Movement (4-/5) good minus  -AS    Rt Hip Extension MMT, Gross Movement (4-/5) good minus  -AS    Rt Hip ABduction MMT, Gross Movement (4-/5) good minus  -AS       MMT Left Lower Ext    Lt Hip Flexion MMT, Gross Movement (4-/5) good minus  -AS    Lt Hip Extension MMT, Gross Movement (4-/5) good minus  -AS    Lt Hip ABduction MMT, Gross Movement (4-/5) good minus  -AS       Sensation    Sensation WNL? WNL  -AS    Light Touch No apparent deficits  -AS       Lower Extremity Flexibility    Hamstrings Bilateral:;Moderately limited  -AS    Hip External Rotators Bilateral:;Moderately limited  -AS          User Key  (r) = Recorded By, (t) = Taken By, (c) = Cosigned By    Initials Name Provider Type    AS Stu Greenfield, PT Physical Therapist                            Therapy Education  Given: HEP, Symptoms/condition management, Pain management  Program: New  How Provided: Verbal, Demonstration, Written  Provided to: Patient  Level of Understanding: Teach back education performed, Verbalized, Demonstrated      PT OP Goals     Row Name 04/19/22 1200          PT Short Term Goals    STG Date to Achieve 05/10/22  -AS     STG 1 Patient to demonstrate compliance with her initial HEP for flexibility, ROM and strengthening.  -AS     STG 2 Patient to report low back and LLE pain on VAS of 4-5/10 at worst with activity.  -AS     STG 3 Patient to demonstrate improved trunk and LLE strength to 4/5 in all planes.  -AS            Long Term Goals    LTG Date to Achieve 05/31/22  -AS     LTG 1 Patient to demonstrate compliance with her advanced HEP for flexibility, ROM and strengthening.  -AS     LTG 2 Patient to report low back and LLE pain on VAS of 1-2/10 at worst with activity.  -AS     LTG 3 Patient to demonstrate improved trunk and LLE  strength to 4+/5 in all planes.  -AS     LTG 4 Patient to report improved function on Back Index by 10-15 points.  -AS            Time Calculation    PT Goal Re-Cert Due Date 05/17/22  -AS           User Key  (r) = Recorded By, (t) = Taken By, (c) = Cosigned By    Initials Name Provider Type    AS Stu Greenfield, PT Physical Therapist                 PT Assessment/Plan     Row Name 04/19/22 1200          PT Assessment    Functional Limitations Limitation in home management;Limitations in community activities;Performance in leisure activities;Performance in work activities;Performance in sport activities;Performance in self-care ADL  -AS     Impairments Impaired flexibility;Muscle strength;Pain;Range of motion  -AS     Assessment Comments Patient presents to outpatient PT with reports of chronic left sided low back pain and left buttock pain. Patient has had an MRI of low back and MD feels it is non-surgical at this time. Patient has trunk ROM that is WFL, limited trunk and hip strength, and has increased symptoms with activities. Patient has limited function at this time secondary to the above.  -AS     Please refer to paper survey for additional self-reported information Yes  -AS     Rehab Potential Good  -AS     Patient/caregiver participated in establishment of treatment plan and goals Yes  -AS     Patient would benefit from skilled therapy intervention Yes  -AS            PT Plan    PT Frequency 1x/week;2x/week  -AS     Predicted Duration of Therapy Intervention (PT) 4-6 weeks  -AS     Planned CPT's? PT RE-EVAL: 95615;PT THER PROC EA 15 MIN: 74422;PT THER ACT EA 15 MIN: 18806;PT MANUAL THERAPY EA 15 MIN: 49098;PT NEUROMUSC RE-EDUCATION EA 15 MIN: 78680  -AS           User Key  (r) = Recorded By, (t) = Taken By, (c) = Cosigned By    Initials Name Provider Type    AS Stu Greenfield, PT Physical Therapist                   OP Exercises     Row Name 04/19/22 1200             Subjective Pain    Able to  rate subjective pain? yes  -AS      Pre-Treatment Pain Level 8  -AS      Post-Treatment Pain Level 6  -AS              Exercise 1    Exercise Name 1 Vasile. HS Stretch  -AS      Reps 1 10  -AS      Time 1 10 sec hold each  -AS              Exercise 2    Exercise Name 2 Vasile. Piriformis Stretch  -AS      Reps 2 10  -AS      Time 2 10 sec hold each  -AS              Exercise 3    Exercise Name 3 PPT  -AS      Reps 3 25  -AS      Time 3 5 sec hold each  -AS              Exercise 4    Exercise Name 4 PPT with Ball Squeeze  -AS              Exercise 5    Exercise Name 5 Supine Clams  -AS      Reps 5 25  -AS      Time 5 Blue  -AS              Exercise 6    Exercise Name 6 Bridge vs Band  -AS      Reps 6 25  -AS      Time 6 Blue  -AS            User Key  (r) = Recorded By, (t) = Taken By, (c) = Cosigned By    Initials Name Provider Type    AS Stu Greenfield, PT Physical Therapist                              Outcome Measure Options: Other Outcome Measure  Other Outcome Measure Tool Used  Other Outcome Measure Tool Comments: Back Index - 21      Time Calculation:     Start Time: 1158  Stop Time: 1300  Time Calculation (min): 62 min     Therapy Charges for Today     Code Description Service Date Service Provider Modifiers Qty    09750083273 HC PT EVAL LOW COMPLEXITY 4 4/19/2022 Stu Greenfield, PT GP 1          PT G-Codes  Outcome Measure Options: Other Outcome Measure         Stu Greenfield, PT  4/19/2022

## 2022-04-22 ENCOUNTER — PRE-ADMISSION TESTING (OUTPATIENT)
Dept: PREADMISSION TESTING | Facility: HOSPITAL | Age: 72
End: 2022-04-22

## 2022-04-22 VITALS
HEIGHT: 62 IN | WEIGHT: 113.9 LBS | TEMPERATURE: 97.8 F | HEART RATE: 76 BPM | OXYGEN SATURATION: 97 % | RESPIRATION RATE: 16 BRPM | SYSTOLIC BLOOD PRESSURE: 143 MMHG | BODY MASS INDEX: 20.96 KG/M2 | DIASTOLIC BLOOD PRESSURE: 76 MMHG

## 2022-04-22 DIAGNOSIS — G56.03 BILATERAL CARPAL TUNNEL SYNDROME: Primary | ICD-10-CM

## 2022-04-22 LAB
ALBUMIN SERPL-MCNC: 3.9 G/DL (ref 3.5–5.2)
ALBUMIN/GLOB SERPL: 1.6 G/DL
ALP SERPL-CCNC: 114 U/L (ref 39–117)
ALT SERPL W P-5'-P-CCNC: 24 U/L (ref 1–33)
ANION GAP SERPL CALCULATED.3IONS-SCNC: 10 MMOL/L (ref 5–15)
AST SERPL-CCNC: 41 U/L (ref 1–32)
BILIRUB SERPL-MCNC: 0.8 MG/DL (ref 0–1.2)
BUN SERPL-MCNC: 9 MG/DL (ref 8–23)
BUN/CREAT SERPL: 15.3 (ref 7–25)
CALCIUM SPEC-SCNC: 10.1 MG/DL (ref 8.6–10.5)
CHLORIDE SERPL-SCNC: 107 MMOL/L (ref 98–107)
CO2 SERPL-SCNC: 26 MMOL/L (ref 22–29)
CREAT SERPL-MCNC: 0.59 MG/DL (ref 0.57–1)
DEPRECATED RDW RBC AUTO: 41.2 FL (ref 37–54)
EGFRCR SERPLBLD CKD-EPI 2021: 95.3 ML/MIN/1.73
ERYTHROCYTE [DISTWIDTH] IN BLOOD BY AUTOMATED COUNT: 12.3 % (ref 12.3–15.4)
GLOBULIN UR ELPH-MCNC: 2.4 GM/DL
GLUCOSE SERPL-MCNC: 93 MG/DL (ref 65–99)
HCT VFR BLD AUTO: 36 % (ref 34–46.6)
HGB BLD-MCNC: 12.4 G/DL (ref 12–15.9)
MCH RBC QN AUTO: 31.4 PG (ref 26.6–33)
MCHC RBC AUTO-ENTMCNC: 34.4 G/DL (ref 31.5–35.7)
MCV RBC AUTO: 91.1 FL (ref 79–97)
PLATELET # BLD AUTO: 200 10*3/MM3 (ref 140–450)
PMV BLD AUTO: 10.8 FL (ref 6–12)
POTASSIUM SERPL-SCNC: 3.6 MMOL/L (ref 3.5–5.2)
PROT SERPL-MCNC: 6.3 G/DL (ref 6–8.5)
QT INTERVAL: 435 MS
RBC # BLD AUTO: 3.95 10*6/MM3 (ref 3.77–5.28)
SODIUM SERPL-SCNC: 143 MMOL/L (ref 136–145)
WBC NRBC COR # BLD: 8.64 10*3/MM3 (ref 3.4–10.8)

## 2022-04-22 PROCEDURE — 85027 COMPLETE CBC AUTOMATED: CPT

## 2022-04-22 PROCEDURE — 36415 COLL VENOUS BLD VENIPUNCTURE: CPT

## 2022-04-22 PROCEDURE — 93005 ELECTROCARDIOGRAM TRACING: CPT

## 2022-04-22 PROCEDURE — 80053 COMPREHEN METABOLIC PANEL: CPT

## 2022-04-22 PROCEDURE — 93010 ELECTROCARDIOGRAM REPORT: CPT | Performed by: INTERNAL MEDICINE

## 2022-04-22 NOTE — DISCHARGE INSTRUCTIONS
Take the following medications the morning of surgery:    NONE      COVID TEST IS SCHEDULED FOR 04/30/2022 @ 0940 VIA [facility] [employer] THROUGH TESTING    ARRIVAL DATE FOR SURGERY IS 05/03/2022; TIME IS TO BE DETERMINED    If you are on prescription narcotic pain medication to control your pain you may also take that medication the morning of surgery.    General Instructions:  Do not eat solid food after midnight the night before surgery.  You may drink clear liquids day of surgery but must stop at least one hour before your hospital arrival time.  It is beneficial for you to have a clear drink that contains carbohydrates the day of surgery.  We suggest a 12 to 20 ounce bottle of Gatorade or Powerade for non-diabetic patients or a 12 to 20 ounce bottle of G2 or Powerade Zero for diabetic patients. (Pediatric patients, are not advised to drink a 12 to 20 ounce carbohydrate drink)    Clear liquids are liquids you can see through.  Nothing red in color.     Plain water                               Sports drinks  Sodas                                   Gelatin (Jell-O)  Fruit juices without pulp such as white grape juice and apple juice  Popsicles that contain no fruit or yogurt  Tea or coffee (no cream or milk added)  Gatorade / Powerade  G2 / Powerade Zero    Patients who avoid smoking, chewing tobacco and alcohol for 4 weeks prior to surgery have a reduced risk of post-operative complications.  Quit smoking as many days before surgery as you can.  Do not smoke, use chewing tobacco or drink alcohol the day of surgery.   If applicable bring your C-PAP/ BI-PAP machine.  Bring any papers given to you in the doctor’s office.  Wear clean comfortable clothes.  Do not wear contact lenses, false eyelashes or make-up.  Bring a case for your glasses.   Bring crutches or walker if applicable.  Remove all piercings.  Leave jewelry and any other valuables at home.  Hair extensions with metal clips must be removed  prior to surgery.  The Pre-Admission Testing nurse will instruct you to bring medications if unable to obtain an accurate list in Pre-Admission Testing.          Preventing a Surgical Site Infection:  For 2 to 3 days before surgery, avoid shaving with a razor because the razor can irritate skin and make it easier to develop an infection.    Any areas of open skin can increase the risk of a post-operative wound infection by allowing bacteria to enter and travel throughout the body.  Notify your surgeon if you have any skin wounds / rashes even if it is not near the expected surgical site.  The area will need assessed to determine if surgery should be delayed until it is healed.  The night prior to surgery shower using a fresh bar of anti-bacterial soap (such as Dial) and clean washcloth.  Sleep in a clean bed with clean clothing.  Do not allow pets to sleep with you.  Shower on the morning of surgery using a fresh bar of anti-bacterial soap (such as Dial) and clean washcloth.  Dry with a clean towel and dress in clean clothing.  Ask your surgeon if you will be receiving antibiotics prior to surgery.  Make sure you, your family, and all healthcare providers clean their hands with soap and water or an alcohol based hand  before caring for you or your wound.    Day of surgery:  Your arrival time is approximately two hours before your scheduled surgery time.  Upon arrival, a Pre-op nurse and Anesthesiologist will review your health history, obtain vital signs, and answer questions you may have.  The only belongings needed at this time will be a list of your home medications and if applicable your C-PAP/BI-PAP machine.  A Pre-op nurse will start an IV and you may receive medication in preparation for surgery, including something to help you relax.     Please be aware that surgery does come with discomfort.  We want to make every effort to control your discomfort so please discuss any uncontrolled symptoms with  your nurse.   Your doctor will most likely have prescribed pain medications.      If you are going home after surgery you will receive individualized written care instructions before being discharged.  A responsible adult must drive you to and from the hospital on the day of your surgery and stay with you for 24 hours.  Discharge prescriptions can be filled by the hospital pharmacy during regular pharmacy hours.  If you are having surgery late in the day/evening your prescription may be e-prescribed to your pharmacy.  Please verify your pharmacy hours or chose a 24 hour pharmacy to avoid not having access to your prescription because your pharmacy has closed for the day.    If you are staying overnight following surgery, you will be transported to your hospital room following the recovery period.  [facility] has all private rooms.    If you have any questions please call Pre-Admission Testing at [phone].  Deductibles and co-payments are collected on the day of service. Please be prepared to pay the required co-pay, deductible or deposit on the day of service as defined by your plan.    Patient Education for Self-Quarantine Process    Following your COVID testing, we strongly recommend that you wear a mask when you are with other people and practice social distancing.   Limit your activities to only required outings.  Wash your hands with soap and water frequently for at least 20 seconds.   Avoid touching your eyes, nose and mouth with unwashed hands.  Do not share anything - utensils, drinking glasses, food from the same bowl.   Sanitize household surfaces daily. Include all high touch areas (door handles, light switches, phones, countertops, etc.)    Call your surgeon immediately if you experience any of the following symptoms:  Sore Throat  Shortness of Breath or difficulty breathing  Cough  Chills  Body soreness or muscle pain  Headache  Fever  New loss of taste or smell  Do not arrive for  your surgery ill.  Your procedure will need to be rescheduled to another time.  You will need to call your physician before the day of surgery to avoid any unnecessary exposure to hospital staff as well as other patients.

## 2022-04-25 ENCOUNTER — TELEPHONE (OUTPATIENT)
Dept: ORTHOPEDIC SURGERY | Facility: CLINIC | Age: 72
End: 2022-04-25

## 2022-04-25 NOTE — TELEPHONE ENCOUNTER
I spoke with patient regarding her EMG and told her that I would discuss with you.  She stated that you have mentioned a specialist to help with her left hand as it has poor blood flow from shoulder to elbow.    I have copied the EMG and put on your desk.

## 2022-04-25 NOTE — TELEPHONE ENCOUNTER
I spoke with her.  All questions answered in detail.  She misunderstood several of the details regarding the proposed procedure for her right side.  All of this was explained to her.  She thanked me for the phone call.  Plan is to proceed with surgery as scheduled.

## 2022-04-27 ENCOUNTER — HOSPITAL ENCOUNTER (OUTPATIENT)
Dept: PHYSICAL THERAPY | Facility: HOSPITAL | Age: 72
Setting detail: THERAPIES SERIES
Discharge: HOME | End: 2022-04-27

## 2022-04-27 DIAGNOSIS — M54.50 LUMBAR BACK PAIN: Primary | ICD-10-CM

## 2022-04-27 PROCEDURE — 97110 THERAPEUTIC EXERCISES: CPT

## 2022-04-27 NOTE — THERAPY TREATMENT NOTE
Outpatient Physical Therapy Ortho Treatment Note   Jennifer Lunsford     Patient Name: aTyler Lugo  : 1950  MRN: [id number]  Today's Date: 2022      Visit Date: 2022    Visit Dx:    ICD-10-CM ICD-9-CM   1. Lumbar back pain  M54.50 724.2       Patient Active Problem List   Diagnosis   • Spinal stenosis of lumbar region with neurogenic claudication   • Lumbar spinal stenosis   • Frequent UTI   • Hypertension   • Stress incontinence   • Itching due to drug   • Acute respiratory failure with hypoxia (HCC)   • Tear of right rotator cuff   • Acute pain of right shoulder   • Hepatorenal syndrome (HCC)   • Ascites due to alcoholic hepatitis   • Bladder prolapse, female, acquired   • Sepsis associated hypotension (HCC)   • Acute liver failure   • Altered mental state   • Acute renal failure (HCC)   • Alcohol abuse   • Severe malnutrition (HCC)   • Gastrointestinal hemorrhage   • Gastrointestinal hemorrhage associated with alcoholic gastritis   • Gastric varices   • Alcoholic cirrhosis (HCC)   • Iron deficiency anemia due to chronic blood loss   • Bilateral carpal tunnel syndrome        Past Medical History:   Diagnosis Date   • CTS (carpal tunnel syndrome)     Bilateral   • Disease of thyroid gland    • Elevated liver enzymes    • Fatty liver    • Fatty liver    • Frequent UTI    • History of depression    • Hypertension    • IBS (irritable bowel syndrome)    • Lumbar stenosis    • Post-menopausal    • Right shoulder pain    • Stress incontinence    • Urinary retention         Past Surgical History:   Procedure Laterality Date   • ABDOMINOPLASTY     • BREAST BIOPSY Right    • BREAST SURGERY     • CATARACT EXTRACTION Bilateral    •  SECTION      X3   • COLONOSCOPY      2006   • COLONOSCOPY N/A 2016    Procedure: COLONOSCOPY;  Surgeon: Ho Arenas MD;  Location: [facility] ENDOSCOPY;  Service:    • ENDOSCOPY N/A 2020    Procedure: ESOPHAGOGASTRODUODENOSCOPY;  Surgeon: Maximino  Terrell Denis MD;  Location: [facility];  Service: Gastroenterology;  Laterality: N/A;  gastric ulcer  duodenal erosion  gastric varices   • LASIK     • LUMBAR DISCECTOMY FUSION INSTRUMENTATION N/A 10/01/2018    Procedure: L4-5, L5-S1  laminectomy and fusion with instrumentation;  Surgeon: Juno Kim MD;  Location: [facility];  Service: Orthopedic Spine   • REDUCTION MAMMAPLASTY     • TIPS PROCEDURE  12/2021   • TONSILLECTOMY     • TOTAL SHOULDER ARTHROPLASTY W/ DISTAL CLAVICLE EXCISION Right 06/13/2019    Procedure: TOTAL SHOULDER REVERSE ARTHROPLASTY;  Surgeon: David Marion MD;  Location: [facility];  Service: Orthopedics                        PT Assessment/Plan     Row Name 04/27/22 1100          PT Assessment    Assessment Comments Progressed patient with trunk and hip strengthening today. Patient tolerated her treatment plan well today. Patient is scheduled to have carpal tunnel surgery on 5-3-22, plan to not see patient next week to allow her to heal from her surgery and then follow up with her the week after.  -AS            PT Plan    PT Plan Comments Continue with current treatment plan.  -AS           User Key  (r) = Recorded By, (t) = Taken By, (c) = Cosigned By    Initials Name Provider Type    AS Stu Greenfield, PT Physical Therapist                   OP Exercises     Row Name 04/27/22 1246 04/27/22 1100          Subjective Comments    Subjective Comments -- Patient states she is a little sore today but states overall she is doing well. She states she is scheduled for her carpal tunnel surgery on 5-3-22.  -AS            Total Minutes    23836 - PT Therapeutic Exercise Minutes 40  -AS --            Exercise 1    Exercise Name 1 -- Vasile. HS Stretch  -AS     Reps 1 -- 10  -AS     Time 1 -- 10 sec hold each  -AS            Exercise 2    Exercise Name 2 -- Vasile. Piriformis Stretch  -AS     Reps 2 -- 10  -AS     Time 2 -- 10 sec hold each  -AS            Exercise 3    Exercise Name  3 -- PPT  -AS     Reps 3 -- 25  -AS     Time 3 -- 5 sec hold each  -AS            Exercise 4    Exercise Name 4 -- PPT with Ball Squeeze  -AS     Reps 4 -- 25  -AS     Time 4 -- 5 sec hold each  -AS            Exercise 5    Exercise Name 5 -- Supine Clams  -AS     Reps 5 -- 25  -AS     Time 5 -- Blue  -AS            Exercise 6    Exercise Name 6 -- Bridge vs Band  -AS     Reps 6 -- 25  -AS     Time 6 -- Blue  -AS            Exercise 7    Exercise Name 7 -- S/L Hip ABD  -AS     Reps 7 -- 25 each  -AS            Exercise 8    Exercise Name 8 -- Prone Hip EXT  -AS     Reps 8 -- 25 each  -AS            Exercise 9    Exercise Name 9 -- Seated Hip IR/ER vs Band  -AS           User Key  (r) = Recorded By, (t) = Taken By, (c) = Cosigned By    Initials Name Provider Type    AS Stu Greenfield, PT Physical Therapist                                                Time Calculation:   Start Time: 1200  Stop Time: 1242  Time Calculation (min): 42 min  Timed Charges  38441 - PT Therapeutic Exercise Minutes: 40  Total Minutes  Timed Charges Total Minutes: 40   Total Minutes: 40  Therapy Charges for Today     Code Description Service Date Service Provider Modifiers Qty    [id number]  PT THER PROC EA 15 MIN 4/27/2022 Stu Greenfield, PT GP 2                    Stu Greenfield, PT  4/27/2022

## 2022-04-30 ENCOUNTER — LAB (OUTPATIENT)
Dept: LAB | Facility: HOSPITAL | Age: 72
End: 2022-04-30

## 2022-04-30 DIAGNOSIS — G56.03 BILATERAL CARPAL TUNNEL SYNDROME: ICD-10-CM

## 2022-04-30 LAB — SARS-COV-2 ORF1AB RESP QL NAA+PROBE: NOT DETECTED

## 2022-04-30 PROCEDURE — U0005 INFEC AGEN DETEC AMPLI PROBE: HCPCS

## 2022-04-30 PROCEDURE — U0004 COV-19 TEST NON-CDC HGH THRU: HCPCS

## 2022-05-03 ENCOUNTER — HOSPITAL ENCOUNTER (OUTPATIENT)
Facility: HOSPITAL | Age: 72
Setting detail: HOSPITAL OUTPATIENT SURGERY
Discharge: HOME | End: 2022-05-03
Attending: ORTHOPAEDIC SURGERY | Admitting: ORTHOPAEDIC SURGERY

## 2022-05-03 ENCOUNTER — ANESTHESIA EVENT (OUTPATIENT)
Dept: PERIOP | Facility: HOSPITAL | Age: 72
End: 2022-05-03

## 2022-05-03 ENCOUNTER — ANESTHESIA (OUTPATIENT)
Dept: PERIOP | Facility: HOSPITAL | Age: 72
End: 2022-05-03

## 2022-05-03 VITALS
HEART RATE: 71 BPM | DIASTOLIC BLOOD PRESSURE: 79 MMHG | TEMPERATURE: 97.5 F | OXYGEN SATURATION: 96 % | RESPIRATION RATE: 18 BRPM | SYSTOLIC BLOOD PRESSURE: 169 MMHG

## 2022-05-03 DIAGNOSIS — G56.03 BILATERAL CARPAL TUNNEL SYNDROME: ICD-10-CM

## 2022-05-03 PROCEDURE — 25010000002 CEFAZOLIN IN DEXTROSE 2-4 GM/100ML-% SOLUTION: Performed by: ORTHOPAEDIC SURGERY

## 2022-05-03 PROCEDURE — 25010000002 FENTANYL CITRATE (PF) 50 MCG/ML SOLUTION: Performed by: NURSE ANESTHETIST, CERTIFIED REGISTERED

## 2022-05-03 PROCEDURE — 64721 CARPAL TUNNEL SURGERY: CPT | Performed by: ORTHOPAEDIC SURGERY

## 2022-05-03 PROCEDURE — 25010000002 ONDANSETRON PER 1 MG: Performed by: NURSE ANESTHETIST, CERTIFIED REGISTERED

## 2022-05-03 PROCEDURE — 25010000002 DEXAMETHASONE PER 1 MG: Performed by: NURSE ANESTHETIST, CERTIFIED REGISTERED

## 2022-05-03 PROCEDURE — 25010000002 PROPOFOL 10 MG/ML EMULSION: Performed by: NURSE ANESTHETIST, CERTIFIED REGISTERED

## 2022-05-03 RX ORDER — EPHEDRINE SULFATE 50 MG/ML
5 INJECTION, SOLUTION INTRAVENOUS ONCE AS NEEDED
Status: DISCONTINUED | OUTPATIENT
Start: 2022-05-03 | End: 2022-05-03 | Stop reason: HOSPADM

## 2022-05-03 RX ORDER — MAGNESIUM HYDROXIDE 1200 MG/15ML
LIQUID ORAL AS NEEDED
Status: DISCONTINUED | OUTPATIENT
Start: 2022-05-03 | End: 2022-05-03 | Stop reason: HOSPADM

## 2022-05-03 RX ORDER — ONDANSETRON 2 MG/ML
4 INJECTION INTRAMUSCULAR; INTRAVENOUS ONCE AS NEEDED
Status: DISCONTINUED | OUTPATIENT
Start: 2022-05-03 | End: 2022-05-03 | Stop reason: HOSPADM

## 2022-05-03 RX ORDER — HYDROCODONE BITARTRATE AND ACETAMINOPHEN 7.5; 325 MG/1; MG/1
1-2 TABLET ORAL EVERY 4 HOURS PRN
Qty: 42 TABLET | Refills: 0 | Status: SHIPPED | OUTPATIENT
Start: 2022-05-03 | End: 2022-08-31

## 2022-05-03 RX ORDER — ONDANSETRON 4 MG/1
4 TABLET, FILM COATED ORAL EVERY 8 HOURS PRN
Qty: 30 TABLET | Refills: 0 | Status: SHIPPED | OUTPATIENT
Start: 2022-05-03 | End: 2022-08-31

## 2022-05-03 RX ORDER — DOCUSATE SODIUM 100 MG/1
100 CAPSULE, LIQUID FILLED ORAL 2 TIMES DAILY
Qty: 60 CAPSULE | Refills: 0 | Status: SHIPPED | OUTPATIENT
Start: 2022-05-03 | End: 2022-08-31

## 2022-05-03 RX ORDER — FENTANYL CITRATE 50 UG/ML
50 INJECTION, SOLUTION INTRAMUSCULAR; INTRAVENOUS
Status: DISCONTINUED | OUTPATIENT
Start: 2022-05-03 | End: 2022-05-03 | Stop reason: HOSPADM

## 2022-05-03 RX ORDER — CEFAZOLIN SODIUM 2 G/100ML
2 INJECTION, SOLUTION INTRAVENOUS ONCE
Status: COMPLETED | OUTPATIENT
Start: 2022-05-03 | End: 2022-05-03

## 2022-05-03 RX ORDER — LIDOCAINE HYDROCHLORIDE 10 MG/ML
0.5 INJECTION, SOLUTION EPIDURAL; INFILTRATION; INTRACAUDAL; PERINEURAL ONCE AS NEEDED
Status: COMPLETED | OUTPATIENT
Start: 2022-05-03 | End: 2022-05-03

## 2022-05-03 RX ORDER — ACETAMINOPHEN 650 MG
TABLET, EXTENDED RELEASE ORAL AS NEEDED
Status: DISCONTINUED | OUTPATIENT
Start: 2022-05-03 | End: 2022-05-03 | Stop reason: HOSPADM

## 2022-05-03 RX ORDER — BUPIVACAINE HYDROCHLORIDE AND EPINEPHRINE 5; 5 MG/ML; UG/ML
INJECTION, SOLUTION EPIDURAL; INTRACAUDAL; PERINEURAL AS NEEDED
Status: DISCONTINUED | OUTPATIENT
Start: 2022-05-03 | End: 2022-05-03 | Stop reason: HOSPADM

## 2022-05-03 RX ORDER — PROPOFOL 10 MG/ML
VIAL (ML) INTRAVENOUS AS NEEDED
Status: DISCONTINUED | OUTPATIENT
Start: 2022-05-03 | End: 2022-05-03 | Stop reason: SURG

## 2022-05-03 RX ORDER — SODIUM CHLORIDE 0.9 % (FLUSH) 0.9 %
10 SYRINGE (ML) INJECTION EVERY 12 HOURS SCHEDULED
Status: DISCONTINUED | OUTPATIENT
Start: 2022-05-03 | End: 2022-05-03 | Stop reason: HOSPADM

## 2022-05-03 RX ORDER — DEXAMETHASONE SODIUM PHOSPHATE 10 MG/ML
INJECTION INTRAMUSCULAR; INTRAVENOUS AS NEEDED
Status: DISCONTINUED | OUTPATIENT
Start: 2022-05-03 | End: 2022-05-03 | Stop reason: SURG

## 2022-05-03 RX ORDER — MIDAZOLAM HYDROCHLORIDE 1 MG/ML
0.5 INJECTION INTRAMUSCULAR; INTRAVENOUS
Status: DISCONTINUED | OUTPATIENT
Start: 2022-05-03 | End: 2022-05-03 | Stop reason: HOSPADM

## 2022-05-03 RX ORDER — SODIUM CHLORIDE 0.9 % (FLUSH) 0.9 %
10 SYRINGE (ML) INJECTION AS NEEDED
Status: DISCONTINUED | OUTPATIENT
Start: 2022-05-03 | End: 2022-05-03 | Stop reason: HOSPADM

## 2022-05-03 RX ORDER — SODIUM CHLORIDE, SODIUM LACTATE, POTASSIUM CHLORIDE, CALCIUM CHLORIDE 600; 310; 30; 20 MG/100ML; MG/100ML; MG/100ML; MG/100ML
9 INJECTION, SOLUTION INTRAVENOUS CONTINUOUS PRN
Status: DISCONTINUED | OUTPATIENT
Start: 2022-05-03 | End: 2022-05-03 | Stop reason: HOSPADM

## 2022-05-03 RX ORDER — FENTANYL CITRATE 50 UG/ML
INJECTION, SOLUTION INTRAMUSCULAR; INTRAVENOUS AS NEEDED
Status: DISCONTINUED | OUTPATIENT
Start: 2022-05-03 | End: 2022-05-03 | Stop reason: SURG

## 2022-05-03 RX ORDER — LIDOCAINE HYDROCHLORIDE 20 MG/ML
INJECTION, SOLUTION INFILTRATION; PERINEURAL AS NEEDED
Status: DISCONTINUED | OUTPATIENT
Start: 2022-05-03 | End: 2022-05-03 | Stop reason: SURG

## 2022-05-03 RX ORDER — HYDROMORPHONE HYDROCHLORIDE 1 MG/ML
0.5 INJECTION, SOLUTION INTRAMUSCULAR; INTRAVENOUS; SUBCUTANEOUS
Status: DISCONTINUED | OUTPATIENT
Start: 2022-05-03 | End: 2022-05-03 | Stop reason: HOSPADM

## 2022-05-03 RX ORDER — ONDANSETRON 2 MG/ML
INJECTION INTRAMUSCULAR; INTRAVENOUS AS NEEDED
Status: DISCONTINUED | OUTPATIENT
Start: 2022-05-03 | End: 2022-05-03 | Stop reason: SURG

## 2022-05-03 RX ORDER — FLUMAZENIL 0.1 MG/ML
0.2 INJECTION INTRAVENOUS AS NEEDED
Status: DISCONTINUED | OUTPATIENT
Start: 2022-05-03 | End: 2022-05-03 | Stop reason: HOSPADM

## 2022-05-03 RX ORDER — ISOPROPYL ALCOHOL 70 ML/100ML
LIQUID TOPICAL AS NEEDED
Status: DISCONTINUED | OUTPATIENT
Start: 2022-05-03 | End: 2022-05-03 | Stop reason: HOSPADM

## 2022-05-03 RX ORDER — HYDROCODONE BITARTRATE AND ACETAMINOPHEN 5; 325 MG/1; MG/1
1 TABLET ORAL ONCE AS NEEDED
Status: COMPLETED | OUTPATIENT
Start: 2022-05-03 | End: 2022-05-03

## 2022-05-03 RX ORDER — HYDROCODONE BITARTRATE AND ACETAMINOPHEN 7.5; 325 MG/1; MG/1
1 TABLET ORAL EVERY 4 HOURS PRN
Status: DISCONTINUED | OUTPATIENT
Start: 2022-05-03 | End: 2022-05-03 | Stop reason: HOSPADM

## 2022-05-03 RX ADMIN — LIDOCAINE HYDROCHLORIDE 60 MG: 20 INJECTION, SOLUTION INFILTRATION; PERINEURAL at 10:16

## 2022-05-03 RX ADMIN — LIDOCAINE HYDROCHLORIDE 0.5 ML: 10 INJECTION, SOLUTION EPIDURAL; INFILTRATION; INTRACAUDAL; PERINEURAL at 09:40

## 2022-05-03 RX ADMIN — HYDROCODONE BITARTRATE AND ACETAMINOPHEN 1 TABLET: 5; 325 TABLET ORAL at 11:08

## 2022-05-03 RX ADMIN — SODIUM CHLORIDE, POTASSIUM CHLORIDE, SODIUM LACTATE AND CALCIUM CHLORIDE 9 ML/HR: 600; 310; 30; 20 INJECTION, SOLUTION INTRAVENOUS at 09:40

## 2022-05-03 RX ADMIN — FENTANYL CITRATE 25 MCG: 50 INJECTION INTRAMUSCULAR; INTRAVENOUS at 10:22

## 2022-05-03 RX ADMIN — ONDANSETRON 4 MG: 2 INJECTION INTRAMUSCULAR; INTRAVENOUS at 10:16

## 2022-05-03 RX ADMIN — PROPOFOL 130 MG: 10 INJECTION, EMULSION INTRAVENOUS at 10:16

## 2022-05-03 RX ADMIN — FENTANYL CITRATE 25 MCG: 50 INJECTION INTRAMUSCULAR; INTRAVENOUS at 10:16

## 2022-05-03 RX ADMIN — CEFAZOLIN SODIUM 2 G: 2 INJECTION, SOLUTION INTRAVENOUS at 10:07

## 2022-05-03 RX ADMIN — DEXAMETHASONE SODIUM PHOSPHATE 6 MG: 10 INJECTION INTRAMUSCULAR; INTRAVENOUS at 10:16

## 2022-05-03 NOTE — OP NOTE
Orthopaedic Operative Note    Facility: [facility]    Patient: Tayler Lugo    Medical Record Number: [id number]    YOB: 1950    Dictating Surgeon: David Marion M.D.    Primary Care Physician: Tayler Trujillo MD    Date of Operation: 05/03/22    PREOPERATIVE DIAGNOSIS:  Left carpal tunnel syndrome    POSTOPERATIVE DIAGNOSIS:  Left carpal tunnel syndrome    PROCEDURE PERFORMED:   Left carpal tunnel release    SURGEON: David Marion MD     ASSISTANT:  none     ANESTHESIA: Regional followed by general     COMPLICATIONS: None.     ESTIMATED BLOOD LOSS: Less than 25 mL.     Brief Operative Indication:  Ms. Lugo had a long history of left carpal tunnel syndrome which had been progressively nonresponsive to conservative treatment.  We talked about surgical and non-surgical treatment options.  The patient was felt to be a candidate for a carpal tunnel release.   I explained that surgical risks include infection, hematoma, persistent pain, loss of motion, iatrogenic nerve and/or blood vessel injury resulting in permanent weakness, numbness or dysfunction (particularly the motor recurrent branch of the median nerve), DVT, PE, positioning related neuropraxia, and anesthesia related complications resulting in death.     Description of Procedure in Detail:  The patient and operative site were identified in the preoperative holding area.  The surgical site was marked.  Preoperative antibiotics were administered.  The patient was then taken to the operating room where a Donis block was administered.  This was followed by the administration of monitored anesthesia care.  The patient was repositioned on the operating table.  The left upper extremity was prepped and draped in the standard sterile fashion.  I cleaned the extremity with an alcohol solution.  The extremity was then prepped with Hibiclens followed by 2 ChloraPreps.  I allowed the ChloraPreps to dry for 3 minutes before the  draping procedure was carried out.    A timeout was taken prior to surgical incision.  I began the procedure by fashioning an approximately 3 cm incision over the volar aspect of the wrist centered over the carpal tunnel and transverse carpal ligament.  Full-thickness skin flaps were developed.  The palmar cutaneous fascia was divided and the transverse carpal ligament exposed.  Under direct visualization, taking care to watch out for any aberrant anatomy of the motor recurrent branch of the median nerve, the midportion of the ligament was sharply divided down to the point I could visualize the nerve.  Retractors were then repositioned distally.  The distal extent of the ligament was then released sharply under direct visualization.  I repositioned the retractors proximally.  Once again, under direct visualization, I released the proximal extent of the ligament.  I did exercise great caution to avoid iatrogenic injury to the motor recurrent branch of the median nerve throughout the case.  No aberrant anatomy was identified.  I confirmed that the ligament was completely released and the nerve completely decompressed.  I then directed my attention to closure.    The wound was copiously irrigated out with sterile saline and closed in a layered fashion using Vicryl for the deep tissues and nylon for the skin.  Sterile dressings were applied to the wounds and the drapes withdrawn.  The patient was awakened and transferred to the recovery room.  She tolerated the procedure well.  There were no complications.    David Marion MD    05/03/22

## 2022-05-03 NOTE — ANESTHESIA POSTPROCEDURE EVALUATION
Patient: Tayler Lugo    Procedure Summary     Date: 05/03/22 Room / [facility]:  [facility]  [facility]    Anesthesia Start: 1011 Anesthesia Stop: 1057    Procedure: CARPAL TUNNEL RELEASE (Left Wrist) Diagnosis:       Bilateral carpal tunnel syndrome      (Bilateral carpal tunnel syndrome [G56.03])    Surgeons: David Marion MD Provider: Ronnell Clancy MD    Anesthesia Type: general ASA Status: 2          Anesthesia Type: general    Vitals  Vitals Value Taken Time   /74 05/03/22 1130   Temp 36.4 °C (97.5 °F) 05/03/22 1130   Pulse 71 05/03/22 1130   Resp 16 05/03/22 1130   SpO2 98 % 05/03/22 1130           Post Anesthesia Care and Evaluation    Patient location during evaluation: bedside  Patient participation: complete - patient participated  Level of consciousness: awake and alert  Pain score: 0  Pain management: adequate  Airway patency: patent  Anesthetic complications: No anesthetic complications    Cardiovascular status: acceptable  Respiratory status: acceptable  Hydration status: acceptable    Comments: /79 (BP Location: Right arm, Patient Position: Sitting)   Pulse 71   Temp 36.4 °C (97.5 °F) (Temporal)   Resp 18   LMP  (LMP Unknown)   SpO2 96%

## 2022-05-03 NOTE — ANESTHESIA PREPROCEDURE EVALUATION
Anesthesia Evaluation     Patient summary reviewed and Nursing notes reviewed   NPO Solid Status: > 8 hours             Airway   Mallampati: II  Dental      Pulmonary - negative pulmonary ROS   Cardiovascular     ECG reviewed  Rhythm: regular  Rate: normal    (+) hypertension,       Neuro/Psych- negative ROS  GI/Hepatic/Renal/Endo    (+)   liver disease, thyroid problem     Musculoskeletal     Abdominal    Substance History - negative use     OB/GYN negative ob/gyn ROS         Other   arthritis,                        Anesthesia Plan    ASA 2     general     intravenous induction     Anesthetic plan, all risks, benefits, and alternatives have been provided, discussed and informed consent has been obtained with: patient.

## 2022-05-03 NOTE — BRIEF OP NOTE
CARPAL TUNNEL RELEASE  Progress Note    Tayler Lugo  5/3/2022    Pre-op Diagnosis:   Bilateral carpal tunnel syndrome [G56.03]       Post-Op Diagnosis Codes:     * Bilateral carpal tunnel syndrome [G56.03]    Procedure/CPT® Codes:        Procedure(s):  Left CARPAL TUNNEL RELEASE    Surgeon(s):  David Marion MD    Anesthesia: Choice    Staff:   Circulator: Jadyn Le RN  Scrub Person: Nayeli Mendoza         Estimated Blood Loss: minimal    Urine Voided: * No values recorded between 5/3/2022 10:11 AM and 5/3/2022 10:49 AM *    Specimens:                None          Drains: * No LDAs found *    Findings: see dictation        Complications: none          David Marion MD     Date: 5/3/2022  Time: 10:49 EDT

## 2022-05-03 NOTE — ANESTHESIA PROCEDURE NOTES
Airway  Urgency: elective    Date/Time: 5/3/2022 10:18 AM  Airway not difficult    General Information and Staff    Patient location during procedure: OR  Anesthesiologist: Ronnell Clancy MD  CRNA/CAA: Radha Roblero CRNA    Indications and Patient Condition  Indications for airway management: airway protection    Preoxygenated: yes  MILS maintained throughout  Mask difficulty assessment: 0 - not attempted    Final Airway Details  Final airway type: supraglottic airway      Successful airway: unique  Size 4    Number of attempts at approach: 2  Assessment: lips, teeth, and gum same as pre-op

## 2022-05-04 ENCOUNTER — TELEPHONE (OUTPATIENT)
Dept: ORTHOPEDIC SURGERY | Facility: CLINIC | Age: 72
End: 2022-05-04

## 2022-05-04 NOTE — TELEPHONE ENCOUNTER
Patient returned Yajaira's call.  She states that she slept well last night.  Bandage was a little tight and loosen a little.  Thumb is a little sore.  She can move her fingers.  Feels good.

## 2022-05-11 ENCOUNTER — HOSPITAL ENCOUNTER (OUTPATIENT)
Dept: PHYSICAL THERAPY | Facility: HOSPITAL | Age: 72
Setting detail: THERAPIES SERIES
Discharge: HOME | End: 2022-05-11

## 2022-05-11 DIAGNOSIS — M54.50 LUMBAR BACK PAIN: Primary | ICD-10-CM

## 2022-05-11 PROCEDURE — 97110 THERAPEUTIC EXERCISES: CPT

## 2022-05-11 NOTE — THERAPY TREATMENT NOTE
Outpatient Physical Therapy Ortho Treatment Note  DORIS Cobos     Patient Name: Tayelr Lugo  : 1950  MRN: [id number]  Today's Date: 2022      Visit Date: 2022    Visit Dx:    ICD-10-CM ICD-9-CM   1. Lumbar back pain  M54.50 724.2       Patient Active Problem List   Diagnosis   • Spinal stenosis of lumbar region with neurogenic claudication   • Lumbar spinal stenosis   • Frequent UTI   • Hypertension   • Stress incontinence   • Itching due to drug   • Acute respiratory failure with hypoxia (HCC)   • Tear of right rotator cuff   • Acute pain of right shoulder   • Hepatorenal syndrome (HCC)   • Ascites due to alcoholic hepatitis   • Bladder prolapse, female, acquired   • Sepsis associated hypotension (HCC)   • Acute liver failure   • Altered mental state   • Acute renal failure (HCC)   • Alcohol abuse   • Severe malnutrition (HCC)   • Gastrointestinal hemorrhage   • Gastrointestinal hemorrhage associated with alcoholic gastritis   • Gastric varices   • Alcoholic cirrhosis (HCC)   • Iron deficiency anemia due to chronic blood loss   • Bilateral carpal tunnel syndrome        Past Medical History:   Diagnosis Date   • CTS (carpal tunnel syndrome)     Bilateral   • Disease of thyroid gland    • Elevated liver enzymes    • Fatty liver    • Fatty liver    • Frequent UTI    • History of depression    • Hypertension    • IBS (irritable bowel syndrome)    • Lumbar stenosis    • Post-menopausal    • Right shoulder pain    • Stress incontinence    • Urinary retention         Past Surgical History:   Procedure Laterality Date   • ABDOMINOPLASTY     • BREAST BIOPSY Right    • BREAST SURGERY     • CARPAL TUNNEL RELEASE Left 5/3/2022    Procedure: CARPAL TUNNEL RELEASE;  Surgeon: David Marion MD;  Location: [facility] OR [facility] [place];  Service: Orthopedics;  Laterality: Left;   • CATARACT EXTRACTION Bilateral    •  SECTION      X3   • COLONOSCOPY         • COLONOSCOPY N/A 2016    Procedure:  COLONOSCOPY;  Surgeon: Ho Arenas MD;  Location: [facility] ENDOSCOPY;  Service:    • ENDOSCOPY N/A 12/11/2020    Procedure: ESOPHAGOGASTRODUODENOSCOPY;  Surgeon: Terrell Stanton MD;  Location: [facility] OR;  Service: Gastroenterology;  Laterality: N/A;  gastric ulcer  duodenal erosion  gastric varices   • LASIK     • LUMBAR DISCECTOMY FUSION INSTRUMENTATION N/A 10/01/2018    Procedure: L4-5, L5-S1  laminectomy and fusion with instrumentation;  Surgeon: Juno Kim MD;  Location: [facility] OR;  Service: Orthopedic Spine   • REDUCTION MAMMAPLASTY     • TIPS PROCEDURE  12/2021   • TONSILLECTOMY     • TOTAL SHOULDER ARTHROPLASTY W/ DISTAL CLAVICLE EXCISION Right 06/13/2019    Procedure: TOTAL SHOULDER REVERSE ARTHROPLASTY;  Surgeon: David Marion MD;  Location: [facility] OR;  Service: Orthopedics                        PT Assessment/Plan     Row Name 05/11/22 1200          PT Assessment    Assessment Comments Patient continues to do well with PT at this time.  -AS            PT Plan    PT Plan Comments Continue with current treatment plan.  -AS           User Key  (r) = Recorded By, (t) = Taken By, (c) = Cosigned By    Initials Name Provider Type    AS Stu Greenfield, PT Physical Therapist                   OP Exercises     Row Name 05/11/22 1258 05/11/22 1200          Subjective Comments    Subjective Comments -- Patient states she is doing very well. She states she was not as compliant with her HEP as she should have been, but states overall she is feeling better.  -AS            Total Minutes    12677 - PT Therapeutic Exercise Minutes 40  -AS --            Exercise 1    Exercise Name 1 -- Vasile. HS Stretch  -AS     Reps 1 -- 10  -AS     Time 1 -- 10 sec hold each  -AS            Exercise 2    Exercise Name 2 -- Vasile. Piriformis Stretch  -AS     Reps 2 -- 10  -AS     Time 2 -- 10 sec hold each  -AS            Exercise 3    Exercise Name 3 -- PPT  -AS     Reps 3 -- 25  -AS     Time 3 -- 5  sec hold each  -AS            Exercise 4    Exercise Name 4 -- PPT with Ball Squeeze  -AS     Reps 4 -- 25  -AS     Time 4 -- 5 sec hold each  -AS            Exercise 5    Exercise Name 5 -- Supine Clams  -AS     Reps 5 -- 25  -AS     Time 5 -- Blue  -AS            Exercise 6    Exercise Name 6 -- Bridge vs Band  -AS     Reps 6 -- 25  -AS     Time 6 -- Blue  -AS            Exercise 7    Exercise Name 7 -- Standing Hip ABD  -AS     Reps 7 -- 25 each  -AS            Exercise 8    Exercise Name 8 -- Prone Hip EXT  -AS     Reps 8 -- 25 each  -AS            Exercise 9    Exercise Name 9 -- Seated Hip IR/ER vs Band  -AS           User Key  (r) = Recorded By, (t) = Taken By, (c) = Cosigned By    Initials Name Provider Type    AS Stu Greenfield, PT Physical Therapist                                                Time Calculation:   Start Time: 1200  Stop Time: 1248  Time Calculation (min): 48 min  Timed Charges  14985 - PT Therapeutic Exercise Minutes: 40  Total Minutes  Timed Charges Total Minutes: 40   Total Minutes: 40  Therapy Charges for Today     Code Description Service Date Service Provider Modifiers Qty    [id number]  PT THER PROC EA 15 MIN 5/11/2022 Stu Greenfield, PT GP 2                    Stu Greenfield, PT  5/11/2022

## 2022-05-18 ENCOUNTER — OFFICE VISIT (OUTPATIENT)
Dept: ORTHOPEDIC SURGERY | Facility: CLINIC | Age: 72
End: 2022-05-18

## 2022-05-18 VITALS — WEIGHT: 113 LBS | TEMPERATURE: 98 F | HEIGHT: 62 IN | BODY MASS INDEX: 20.8 KG/M2

## 2022-05-18 DIAGNOSIS — Z09 SURGERY FOLLOW-UP: Primary | ICD-10-CM

## 2022-05-18 PROCEDURE — 99024 POSTOP FOLLOW-UP VISIT: CPT | Performed by: ORTHOPAEDIC SURGERY

## 2022-05-18 NOTE — PROGRESS NOTES
Tayler Lugo : 1950 MRN: [id number] DATE: 2022    CC: 2 weeks s/p left carpal tunnel release    HPI: Patient returns to clinic today stating pain is improving.  Denies fevers, chills, sweats.  No complaints.  Pain is well-controlled.    Vitals:    22 1537   Temp: 98 °F (36.7 °C)        Exam: Dressings were in place and subsequently removed.  Incision appears well approximated and benign.  No erythema, drainage or increased warmth.  Able to flex and extend wrist with minimal discomfort.  Good motor function in hand.  Brisk cap refill.    Impression:  2 weeks s/p left carpal tunnel release    Plan:    1.  Sutures removed and replaced with steri strips.  2.  Counseled patient about appropriate activity modifications and restrictions, including no soaking wound.  3.  Follow up in 4 weeks for final recheck.    David Marion MD

## 2022-05-25 ENCOUNTER — HOSPITAL ENCOUNTER (OUTPATIENT)
Dept: PHYSICAL THERAPY | Facility: HOSPITAL | Age: 72
Setting detail: THERAPIES SERIES
Discharge: HOME | End: 2022-05-25

## 2022-05-25 DIAGNOSIS — M54.50 LUMBAR BACK PAIN: Primary | ICD-10-CM

## 2022-05-25 PROCEDURE — 97110 THERAPEUTIC EXERCISES: CPT

## 2022-05-25 NOTE — THERAPY TREATMENT NOTE
Outpatient Physical Therapy Ortho Treatment Note  DORIS Cobos     Patient Name: Tayler Lugo  : 1950  MRN: [id number]  Today's Date: 2022      Visit Date: 2022    Visit Dx:    ICD-10-CM ICD-9-CM   1. Lumbar back pain  M54.50 724.2       Patient Active Problem List   Diagnosis   • Spinal stenosis of lumbar region with neurogenic claudication   • Lumbar spinal stenosis   • Frequent UTI   • Hypertension   • Stress incontinence   • Itching due to drug   • Acute respiratory failure with hypoxia (HCC)   • Tear of right rotator cuff   • Acute pain of right shoulder   • Hepatorenal syndrome (HCC)   • Ascites due to alcoholic hepatitis   • Bladder prolapse, female, acquired   • Sepsis associated hypotension (HCC)   • Acute liver failure   • Altered mental state   • Acute renal failure (HCC)   • Alcohol abuse   • Severe malnutrition (HCC)   • Gastrointestinal hemorrhage   • Gastrointestinal hemorrhage associated with alcoholic gastritis   • Gastric varices   • Alcoholic cirrhosis (HCC)   • Iron deficiency anemia due to chronic blood loss   • Bilateral carpal tunnel syndrome        Past Medical History:   Diagnosis Date   • CTS (carpal tunnel syndrome)     Bilateral   • Disease of thyroid gland    • Elevated liver enzymes    • Fatty liver    • Fatty liver    • Frequent UTI    • History of depression    • Hypertension    • IBS (irritable bowel syndrome)    • Lumbar stenosis    • Post-menopausal    • Right shoulder pain    • Stress incontinence    • Urinary retention         Past Surgical History:   Procedure Laterality Date   • ABDOMINOPLASTY     • BREAST BIOPSY Right    • BREAST SURGERY     • CARPAL TUNNEL RELEASE Left 5/3/2022    Procedure: CARPAL TUNNEL RELEASE;  Surgeon: David Marion MD;  Location: [facility] OR [facility] [place];  Service: Orthopedics;  Laterality: Left;   • CATARACT EXTRACTION Bilateral    •  SECTION      X3   • COLONOSCOPY         • COLONOSCOPY N/A 2016    Procedure:  COLONOSCOPY;  Surgeon: Ho Arenas MD;  Location: [facility] ENDOSCOPY;  Service:    • ENDOSCOPY N/A 12/11/2020    Procedure: ESOPHAGOGASTRODUODENOSCOPY;  Surgeon: Terrell Stanton MD;  Location: [facility] OR;  Service: Gastroenterology;  Laterality: N/A;  gastric ulcer  duodenal erosion  gastric varices   • LASIK     • LUMBAR DISCECTOMY FUSION INSTRUMENTATION N/A 10/01/2018    Procedure: L4-5, L5-S1  laminectomy and fusion with instrumentation;  Surgeon: Juno Kim MD;  Location: [facility] OR;  Service: Orthopedic Spine   • REDUCTION MAMMAPLASTY     • TIPS PROCEDURE  12/2021   • TONSILLECTOMY     • TOTAL SHOULDER ARTHROPLASTY W/ DISTAL CLAVICLE EXCISION Right 06/13/2019    Procedure: TOTAL SHOULDER REVERSE ARTHROPLASTY;  Surgeon: David Marion MD;  Location: [facility] OR;  Service: Orthopedics                        PT Assessment/Plan     Row Name 05/25/22 1100          PT Assessment    Assessment Comments Patient continues to do well with PT at this time. Progressed patient with strengthening exercises today and she tolerated this well.  -AS            PT Plan    PT Plan Comments Continue with current treatment plan.  -AS           User Key  (r) = Recorded By, (t) = Taken By, (c) = Cosigned By    Initials Name Provider Type    AS Stu Greenfield, PT Physical Therapist                   OP Exercises     Row Name 05/25/22 1236 05/25/22 1100          Subjective Comments    Subjective Comments -- Patient states she is doing well. Patient states she is doing better.  -AS            Total Minutes    75270 - PT Therapeutic Exercise Minutes 40  -AS --            Exercise 1    Exercise Name 1 -- Vasile. HS Stretch  -AS     Reps 1 -- 10  -AS     Time 1 -- 10 sec hold each  -AS            Exercise 2    Exercise Name 2 -- Vasile. Piriformis Stretch  -AS     Reps 2 -- 10  -AS     Time 2 -- 10 sec hold each  -AS            Exercise 3    Exercise Name 3 -- PPT  -AS     Reps 3 -- 25  -AS     Time 3 -- 5  sec hold each  -AS            Exercise 4    Exercise Name 4 -- PPT with Ball Squeeze  -AS     Reps 4 -- 25  -AS     Time 4 -- 5 sec hold each  -AS            Exercise 5    Exercise Name 5 -- Supine Clams  -AS     Reps 5 -- 25  -AS     Time 5 -- Black  -AS            Exercise 6    Exercise Name 6 -- Bridge vs Band  -AS     Reps 6 -- 25  -AS     Time 6 -- Black  -AS            Exercise 7    Exercise Name 7 -- S/L Hip ABD  -AS     Reps 7 -- 25 each  -AS            Exercise 8    Exercise Name 8 -- Prone Hip EXT  -AS     Reps 8 -- 25 each  -AS            Exercise 9    Exercise Name 9 -- Seated Hip IR/ER vs Band  -AS     Reps 9 -- 20 each  -AS     Time 9 -- Black  -AS            Exercise 10    Exercise Name 10 -- Mini Squats  -AS     Reps 10 -- 25  -AS           User Key  (r) = Recorded By, (t) = Taken By, (c) = Cosigned By    Initials Name Provider Type    AS Stu Greenfield, PT Physical Therapist                                                Time Calculation:   Start Time: 1155  Stop Time: 1236  Time Calculation (min): 41 min  Timed Charges  59858 - PT Therapeutic Exercise Minutes: 40  Total Minutes  Timed Charges Total Minutes: 40   Total Minutes: 40  Therapy Charges for Today     Code Description Service Date Service Provider Modifiers Qty    [id number]  PT THER PROC EA 15 MIN 5/25/2022 Stu Greenfield, PT GP 2                    Stu Greenfield, PT  5/25/2022

## 2022-06-07 NOTE — DISCHARGE INSTRUCTIONS
"We know you have a Choice in healthcare and appreciate you using [facility].  Our purpose is to provide you \"Excellent Care\".  We hope that you will always choose us in the future and continue to recommend us to your family and friends.          Albumin infusion 25 % (100 ml)  What is this medicine?  ALBUMIN (al BYOO min) is used to treat or prevent shock following serious injury, bleeding, surgery, or burns by increasing the volume of blood plasma. This medicine can also replace low blood protein.  This medicine may be used for other purposes; ask your health care provider or pharmacist if you have questions.  COMMON BRAND NAME(S): Albuked, Albumarc, Albuminar, Albuminex, AlbuRx, Albutein, Buminate, Flexbumin, Kedbumin, Macrotec, Plasbumin  What should I tell my health care provider before I take this medicine?  They need to know if you have any of the following conditions:  · anemia  · heart disease  · kidney disease  · an unusual or allergic reaction to albumin, other medicines, foods, dyes, or preservatives  · pregnant or trying to get pregnant  · breast-feeding  How should I use this medicine?  This medicine is for infusion into a vein. It is given by a health-care professional in a hospital or clinic.  Talk to your pediatrician regarding the use of this medicine in children. While this drug may be prescribed for selected conditions, precautions do apply.  Overdosage: If you think you have taken too much of this medicine contact a poison control center or emergency room at once.  NOTE: This medicine is only for you. Do not share this medicine with others.  What if I miss a dose?  This does not apply.  What may interact with this medicine?  Interactions are not expected.  This list may not describe all possible interactions. Give your health care provider a list of all the medicines, herbs, non-prescription drugs, or dietary supplements you use. Also tell them if you smoke, drink alcohol, or use " Pre-charting complete. Care gaps identified will be addressed at the time of visit. illegal drugs. Some items may interact with your medicine.  What should I watch for while using this medicine?  Your condition will be closely monitored while you receive this medicine.  Some products are derived from human plasma, and there is a small risk that these products may contain certain types of virus or bacteria. All products are processed to kill most viruses and bacteria. If you have questions concerning the risk of infections, discuss them with your doctor or health care professional.  What side effects may I notice from receiving this medicine?  Side effects that you should report to your doctor or health care professional as soon as possible:  · allergic reactions like skin rash, itching or hives, swelling of the face, lips, or tongue  · breathing problems  · changes in heartbeat  · fever, chills  · pain, redness or swelling at the injection site  · signs of viral infection including fever, drowsiness, chills, runny nose followed in about 2 weeks by a rash and joint pain  · tightness in the chest  Side effects that usually do not require medical attention (report to your doctor or health care professional if they continue or are bothersome):  · increased salivation  · nausea, vomiting  This list may not describe all possible side effects. Call your doctor for medical advice about side effects. You may report side effects to FDA at 3-827-FDA-6648.  Where should I keep my medicine?  This does not apply. You will not be given this medicine to store at home.  NOTE: This sheet is a summary. It may not cover all possible information. If you have questions about this medicine, talk to your doctor, pharmacist, or health care provider.  © 2020 Elsevier/Gold Standard (2009-03-12 10:18:55)  Paracentesis, Care After  This sheet gives you information about how to care for yourself after your procedure. Your health care provider may also give you more specific instructions. If you have problems or questions,  contact your health care provider.  What can I expect after the procedure?  After the procedure, it is common to have a small amount of clear fluid coming from the puncture site.  Follow these instructions at home:  Puncture site care    · Follow instructions from your health care provider about how to take care of your puncture site. Make sure you:  ? Wash your hands with soap and water before and after you change your bandage (dressing). If soap and water are not available, use hand .  ? Change your dressing as told by your health care provider.  · Check your puncture area every day for signs of infection. Check for:  ? Redness, swelling, or pain.  ? More fluid or blood.  ? Warmth.  ? Pus or a bad smell.  General instructions  · Return to your normal activities as told by your health care provider. Ask your health care provider what activities are safe for you.  · Take over-the-counter and prescription medicines only as told by your health care provider.  · Do not take baths, swim, or use a hot tub until your health care provider approves. Ask your health care provider if you may take showers. You may only be allowed to take sponge baths.  · Keep all follow-up visits as told by your health care provider. This is important.  Contact a health care provider if:  · You have redness, swelling, or pain at your puncture site.  · You have more fluid or blood coming from your puncture site.  · Your puncture site feels warm to the touch.  · You have pus or a bad smell coming from your puncture site.  · You have a fever.  Get help right away if:  · You have chest pain or shortness of breath.  · You develop increasing pain, discomfort, or swelling in your abdomen.  · You feel dizzy or light-headed or you faint.  Summary  · After the procedure, it is common to have a small amount of clear fluid coming from the puncture site.  · Follow instructions from your health care provider about how to take care of your puncture  site.  · Check your puncture area every day signs of infection.  · Keep all follow-up visits as told by your health care provider.  This information is not intended to replace advice given to you by your health care provider. Make sure you discuss any questions you have with your health care provider.  Document Revised: 06/30/2020 Document Reviewed: 10/08/2019  Elsevier Patient Education © 2020 Elsevier Inc.

## 2022-06-08 ENCOUNTER — HOSPITAL ENCOUNTER (OUTPATIENT)
Dept: PHYSICAL THERAPY | Facility: HOSPITAL | Age: 72
Setting detail: THERAPIES SERIES
Discharge: HOME | End: 2022-06-08

## 2022-06-08 DIAGNOSIS — M54.50 LUMBAR BACK PAIN: Primary | ICD-10-CM

## 2022-06-08 PROCEDURE — 97110 THERAPEUTIC EXERCISES: CPT

## 2022-06-08 NOTE — THERAPY TREATMENT NOTE
Outpatient Physical Therapy Ortho Treatment Note  DORIS Cobos     Patient Name: Tayler Lugo  : 1950  MRN: [id number]  Today's Date: 2022      Visit Date: 2022    Visit Dx:    ICD-10-CM ICD-9-CM   1. Lumbar back pain  M54.50 724.2       Patient Active Problem List   Diagnosis   • Spinal stenosis of lumbar region with neurogenic claudication   • Lumbar spinal stenosis   • Frequent UTI   • Hypertension   • Stress incontinence   • Itching due to drug   • Acute respiratory failure with hypoxia (HCC)   • Tear of right rotator cuff   • Acute pain of right shoulder   • Hepatorenal syndrome (HCC)   • Ascites due to alcoholic hepatitis   • Bladder prolapse, female, acquired   • Sepsis associated hypotension (HCC)   • Acute liver failure   • Altered mental state   • Acute renal failure (HCC)   • Alcohol abuse   • Severe malnutrition (HCC)   • Gastrointestinal hemorrhage   • Gastrointestinal hemorrhage associated with alcoholic gastritis   • Gastric varices   • Alcoholic cirrhosis (HCC)   • Iron deficiency anemia due to chronic blood loss   • Bilateral carpal tunnel syndrome        Past Medical History:   Diagnosis Date   • CTS (carpal tunnel syndrome)     Bilateral   • Disease of thyroid gland    • Elevated liver enzymes    • Fatty liver    • Fatty liver    • Frequent UTI    • History of depression    • Hypertension    • IBS (irritable bowel syndrome)    • Lumbar stenosis    • Post-menopausal    • Right shoulder pain    • Stress incontinence    • Urinary retention         Past Surgical History:   Procedure Laterality Date   • ABDOMINOPLASTY     • BREAST BIOPSY Right    • BREAST SURGERY     • CARPAL TUNNEL RELEASE Left 5/3/2022    Procedure: CARPAL TUNNEL RELEASE;  Surgeon: David Marion MD;  [place] [facility] [place] [facility] [place]  Service: Orthopedics;  Laterality: Left;   • CATARACT EXTRACTION Bilateral    •  SECTION      X3   • COLONOSCOPY         • COLONOSCOPY N/A 2016    Procedure:  COLONOSCOPY;  Surgeon: Ho Arenas MD;  Location: [facility] ENDOSCOPY;  Service:    • ENDOSCOPY N/A 12/11/2020    Procedure: ESOPHAGOGASTRODUODENOSCOPY;  Surgeon: Terrell Stanton MD;  Location: [facility] OR;  Service: Gastroenterology;  Laterality: N/A;  gastric ulcer  duodenal erosion  gastric varices   • LASIK     • LUMBAR DISCECTOMY FUSION INSTRUMENTATION N/A 10/01/2018    Procedure: L4-5, L5-S1  laminectomy and fusion with instrumentation;  Surgeon: Juno Kim MD;  Location: [facility] OR;  Service: Orthopedic Spine   • REDUCTION MAMMAPLASTY     • TIPS PROCEDURE  12/2021   • TONSILLECTOMY     • TOTAL SHOULDER ARTHROPLASTY W/ DISTAL CLAVICLE EXCISION Right 06/13/2019    Procedure: TOTAL SHOULDER REVERSE ARTHROPLASTY;  Surgeon: David Marion MD;  Location: [facility];  Service: Orthopedics                        PT Assessment/Plan     Row Name 06/08/22 1200          PT Assessment    Assessment Comments Patient is very efficient with her exercises and she is improving. PT and patient feel she is able to continue on a HEP only at this time.  -AS            PT Plan    PT Plan Comments D/C patient onto a HEP only at this time.  -AS           User Key  (r) = Recorded By, (t) = Taken By, (c) = Cosigned By    Initials Name Provider Type    AS Stu Greenfield, PT Physical Therapist                   OP Exercises     Row Name 06/08/22 1309 06/08/22 1200          Subjective Comments    Subjective Comments -- Patient states she is feeling better and she is no longer having the hip pain like she was before, especially getting in and out of bed.  -AS            Total Minutes    79185 - PT Therapeutic Exercise Minutes 45  -AS --            Exercise 1    Exercise Name 1 -- Vasile. HS Stretch  -AS     Reps 1 -- 10  -AS     Time 1 -- 10 sec hold each  -AS            Exercise 2    Exercise Name 2 -- Vasile. Piriformis Stretch  -AS     Reps 2 -- 10  -AS     Time 2 -- 10 sec hold each  -AS             Exercise 3    Exercise Name 3 -- PPT  -AS     Reps 3 -- 25  -AS     Time 3 -- 5 sec hold each  -AS            Exercise 4    Exercise Name 4 -- PPT with Ball Squeeze  -AS     Reps 4 -- 25  -AS     Time 4 -- 5 sec hold each  -AS            Exercise 5    Exercise Name 5 -- Supine Clams  -AS     Reps 5 -- 25  -AS     Time 5 -- Black  -AS            Exercise 6    Exercise Name 6 -- Bridge vs Band  -AS     Reps 6 -- 25  -AS     Time 6 -- Black  -AS            Exercise 7    Exercise Name 7 -- S/L Hip ABD  -AS     Reps 7 -- 25 each  -AS            Exercise 8    Exercise Name 8 -- Prone Hip EXT  -AS     Reps 8 -- 25 each  -AS            Exercise 9    Exercise Name 9 -- Seated Hip IR/ER vs Band  -AS     Reps 9 -- 20 each  -AS     Time 9 -- Black  -AS            Exercise 10    Exercise Name 10 -- Mini Squats  -AS     Reps 10 -- 25  -AS           User Key  (r) = Recorded By, (t) = Taken By, (c) = Cosigned By    Initials Name Provider Type    AS Stu Greenfield, PT Physical Therapist                                                Time Calculation:   Start Time: 1215  Stop Time: 1301  Time Calculation (min): 46 min  Timed Charges  42506 - PT Therapeutic Exercise Minutes: 45  Total Minutes  Timed Charges Total Minutes: 45   Total Minutes: 45  Therapy Charges for Today     Code Description Service Date Service Provider Modifiers Qty    [id number]  PT THER PROC EA 15 MIN 6/8/2022 Stu Greenfield, PT GP 1                    Stu Greenfield, PT  6/8/2022

## 2022-06-13 ENCOUNTER — OFFICE VISIT (OUTPATIENT)
Dept: ORTHOPEDIC SURGERY | Facility: CLINIC | Age: 72
End: 2022-06-13

## 2022-06-13 VITALS — TEMPERATURE: 97.8 F | WEIGHT: 116.1 LBS | BODY MASS INDEX: 20.57 KG/M2 | HEIGHT: 63 IN

## 2022-06-13 DIAGNOSIS — Z09 SURGERY FOLLOW-UP: Primary | ICD-10-CM

## 2022-06-13 PROCEDURE — 99024 POSTOP FOLLOW-UP VISIT: CPT | Performed by: NURSE PRACTITIONER

## 2022-06-13 NOTE — PROGRESS NOTES
Tayler Lugo : 1950 MRN: [id number] DATE: 2022    CC: 6 weeks s/p left carpal tunnel release    HPI: Pt. returns to clinic today stating pain is resolved.  Pre-op symptoms are significantly improved.  No complaints.    Vitals:    22 1341   Temp: 97.8 °F (36.6 °C)       Exam:  Incision is healed and benign.  Good wrist and hand motion.  Good motor and sensory function throughout hand.  Brisk cap refill    Imaging    none    Impression:  6 weeks s/p left carpal tunnel release    Plan:    Patient is doing well and can follow up as needed.  She says she is ready to proceed with surgery for her right elbow and wrist as discussed with Dr. Marion.  I will inform Dr. Marion of her wishes.  Our surgery scheduler will call her to make the necessary arrangements.  She understands she will need a preop appointment with Dr. Marion to discuss the details of surgery.      Yajaira Salazar, TIKA    2022

## 2022-07-25 ENCOUNTER — OFFICE VISIT (OUTPATIENT)
Dept: ORTHOPEDIC SURGERY | Facility: CLINIC | Age: 72
End: 2022-07-25

## 2022-07-25 VITALS — BODY MASS INDEX: 20.55 KG/M2 | WEIGHT: 116 LBS | HEIGHT: 63 IN | TEMPERATURE: 97.7 F

## 2022-07-25 DIAGNOSIS — G89.29 CHRONIC RIGHT SHOULDER PAIN: Primary | ICD-10-CM

## 2022-07-25 DIAGNOSIS — G56.21 CUBITAL TUNNEL SYNDROME ON RIGHT: ICD-10-CM

## 2022-07-25 DIAGNOSIS — G56.01 CARPAL TUNNEL SYNDROME ON RIGHT: ICD-10-CM

## 2022-07-25 DIAGNOSIS — Z09 SURGERY FOLLOW-UP: ICD-10-CM

## 2022-07-25 DIAGNOSIS — M25.511 CHRONIC RIGHT SHOULDER PAIN: Primary | ICD-10-CM

## 2022-07-25 PROCEDURE — 73030 X-RAY EXAM OF SHOULDER: CPT | Performed by: ORTHOPAEDIC SURGERY

## 2022-07-25 PROCEDURE — 99214 OFFICE O/P EST MOD 30 MIN: CPT | Performed by: ORTHOPAEDIC SURGERY

## 2022-07-25 NOTE — PROGRESS NOTES
Patient:Tayler Lugo    YOB: 1950    Medical Record Number:[id number]    Chief Complaints: Multiple issues including follow-up status post left carpal tunnel release, right carpal and cubital tunnel syndromes, right shoulder pain    History of Present Illness:     71 y.o. female patient who presents for multiple issues.  She is now roughly 2-1/2 months out from a left carpal tunnel release.  The left carpal tunnel syndrome seems to be much better.  She still has some weakness with  but it seems to be improving.  The carpal and cubital tunnel syndrome on the right continued to bother her but the symptoms are stable at this point.  She is considering having surgery for that issue but her primary complaint is the right shoulder.  She ports increased pain and dysfunction.  She reports an intermittent catch with rotation.    Allergies:No Known Allergies    Home Medications:    Current Outpatient Medications:   •  B Complex-Folic Acid (B Complex Formula 1) tablet, Take 1 tablet by mouth Daily. HOLD FOR SURGERY, Disp: , Rfl:   •  Calcium 600+D3 600-400 MG-UNIT tablet, Take 1 tablet by mouth 2 (Two) Times a Day. HOLD FOR SURGERY, Disp: , Rfl:   •  folic acid (FOLVITE) 1 MG tablet, Take 1 tablet by mouth Daily., Disp: 30 tablet, Rfl: 1  •  lactobacillus acidophilus (RISAQUAD) capsule capsule, Take 1 capsule by mouth Daily., Disp: 180 capsule, Rfl: 3  •  levothyroxine (SYNTHROID, LEVOTHROID) 50 MCG tablet, Take 1 tablet by mouth Daily. (Patient taking differently: Take 50 mcg by mouth Every Night.), Disp: 30 tablet, Rfl: 1  •  MAGnesium-Oxide 400 (241.3 Mg) MG tablet tablet, Take 400 mg by mouth 2 (Two) Times a Day. HOLD FOR SURGERY, Disp: , Rfl:   •  melatonin 1 MG tablet, Take 1 mg by mouth At Night As Needed., Disp: , Rfl:   •  ondansetron (Zofran) 4 MG tablet, Take 1 tablet by mouth Every 8 (Eight) Hours As Needed for Nausea or Vomiting., Disp: 30 tablet, Rfl: 0  •  Phenazopyridine HCl (AZO TABS PO),  Take 1 tablet by mouth 1 (One) Time Per Week. AS NEEDED FOR UTI, Disp: , Rfl:   •  potassium chloride (K-DUR,KLOR-CON) 20 MEQ CR tablet, Take 20 mEq by mouth 2 (Two) Times a Day., Disp: , Rfl:   •  Probiotic Product (Acidophilus/Goat Milk) capsule, Take 1 tablet by mouth Daily. HOLD FOR SURGERY, Disp: , Rfl:   •  thiamine (VITAMIN B-1) 100 MG tablet  tablet, Take 100 mg by mouth Daily. HOLD FOR SURGERY, Disp: , Rfl:   •  docusate sodium (COLACE) 100 MG capsule, Take 1 capsule by mouth 2 (Two) Times a Day., Disp: 60 capsule, Rfl: 0  •  HYDROcodone-acetaminophen (NORCO) 7.5-325 MG per tablet, Take 1-2 tablets by mouth Every 4 (Four) Hours As Needed for Moderate Pain  (Pain)., Disp: 42 tablet, Rfl: 0  No current facility-administered medications for this visit.    Facility-Administered Medications Ordered in Other Visits:   •  mupirocin (BACTROBAN) 2 % nasal ointment, , Nasal, BID, David Marion MD    Past Medical History:   Diagnosis Date   • CTS (carpal tunnel syndrome)     Bilateral   • Disease of thyroid gland    • Elevated liver enzymes    • Fatty liver    • Fatty liver    • Frequent UTI    • History of depression    • Hypertension    • IBS (irritable bowel syndrome)    • Lumbar stenosis    • Post-menopausal    • Right shoulder pain    • Stress incontinence    • Urinary retention        Past Surgical History:   Procedure Laterality Date   • ABDOMINOPLASTY     • BREAST BIOPSY Right    • BREAST SURGERY     • CARPAL TUNNEL RELEASE Left 5/3/2022    Procedure: CARPAL TUNNEL RELEASE;  Surgeon: David Marion MD;  Location: [facility] OR [facility];  Service: Orthopedics;  Laterality: Left;   • CATARACT EXTRACTION Bilateral    •  SECTION      X3   • COLONOSCOPY      2006   • COLONOSCOPY N/A 2016    Procedure: COLONOSCOPY;  Surgeon: Ho Arenas MD;  Location: [facility] ENDOSCOPY;  Service:    • ENDOSCOPY N/A 2020    Procedure: ESOPHAGOGASTRODUODENOSCOPY;  Surgeon: Terrell Stanton MD;   Location: [facility] OR;  Service: Gastroenterology;  Laterality: N/A;  gastric ulcer  duodenal erosion  gastric varices   • LASIK     • LUMBAR DISCECTOMY FUSION INSTRUMENTATION N/A 10/01/2018    Procedure: L4-5, L5-S1  laminectomy and fusion with instrumentation;  Surgeon: Juno Kim MD;  Location: [facility] OR;  Service: Orthopedic Spine   • REDUCTION MAMMAPLASTY     • TIPS PROCEDURE  12/2021   • TONSILLECTOMY     • TOTAL SHOULDER ARTHROPLASTY W/ DISTAL CLAVICLE EXCISION Right 06/13/2019    Procedure: TOTAL SHOULDER REVERSE ARTHROPLASTY;  Surgeon: David Marion MD;  Location: [facility] OR;  Service: Orthopedics       Social History     Occupational History   • Not on file   Tobacco Use   • Smoking status: Never Smoker   • Smokeless tobacco: Never Used   Vaping Use   • Vaping Use: Never used   Substance and Sexual Activity   • Alcohol use: Not Currently     Comment: no ETOH since 11/2021   • Drug use: No   • Sexual activity: Defer      Social History     Social History Narrative   • Not on file       Family History   Problem Relation Age of Onset   • Colon polyps Father    • Breast cancer Maternal Aunt    • Malig Hyperthermia Neg Hx        Review of Systems:      Constitutional: Denies fever, shaking or chills   Eyes: Denies change in visual acuity   HEENT: Denies nasal congestion or sore throat   Respiratory: Denies cough or shortness of breath   Cardiovascular: Denies chest pain or edema  Endocrine: Denies tremors, palpitations, intolerance of heat or cold, polyuria, polydipsia.  GI: Denies abdominal pain, nausea, vomiting, bloody stools or diarrhea  : Denies frequency, urgency, incontinence, retention, or nocturia.  Musculoskeletal: Denies numbness, tingling or loss of motor function except as above  Integument: Denies rash, lesion or ulceration   Neurologic: Denies headache or focal weakness, deficits  Heme: Denies spontaneous or excessive bleeding, epistaxis, hematuria, melena, fatigue, enlarged  "or tender lymph nodes.      All other pertinent positives and negatives as noted above in HPI.    Physical Exam:71 y.o. female  Vitals:    07/25/22 1529   Temp: 97.7 °F (36.5 °C)   Weight: 52.6 kg (116 lb)   Height: 160 cm (63\")       General:  Patient is awake and alert.  Appears in no acute distress or discomfort.    Psych:  Affect and demeanor are appropriate.    Extremities: The left hand is examined.  Her surgical incision is healed.  She is a little weak with  but she has normal motor function otherwise.  Intact sensation.  Brisk capillary refill.    Her right shoulder is examined.  Skin is benign.  No effusion.  She has good motion but she does have a palpable snapping phenomenon in the shoulder with external rotation.  She has discomfort and 5-5 strength with abduction and forward elevation in the scapular plane.  She does not have any tenderness around the shoulder but she is tender in her upper trapezius and a Spurling's to the right does cause her some pain in the upper trapezius as well.    Imaging: AP, scapular Y and axillary views right shoulder are ordered and reviewed to evaluate her implants.  These are compared to previous x-rays.  There is resorption of the proximal humerus around the implant.  In comparing back to her previous films from 3 years ago, there has been significant resorption.  Implants still appear to be in unchanged position.  No other significant findings.    Assessment/Plan:  1.  3 months s/p left carpal tunnel release  2.  Right carpal and cubital tunnel syndromes  3.  Painful right reverse total shoulder  4.  Cervical degenerative disc disease    Her left carpal tunnel seems to be doing better.  I encouraged her to the patient.  I expect that her strength should continue to improve.    For the right carpal and cubital tunnel syndromes, we discussed surgery.  She would prefer to focus on the shoulder for now.    She has a lot of resorption of the proximal humeral bone.  This " may be stress shielding but C acnes infection is also a possibility.  At this point I think it prudent to refer her for a bone scan and CT.  I told her I will call her once I see the results.  I do also think that some of the pain she is having in her upper trapezius may be coming from the neck.  We may have to address that at some point.  For now it seems that the shoulder is the most pressing issue so we will address that first.    David Marion MD    07/25/2022

## 2022-08-05 ENCOUNTER — HOSPITAL ENCOUNTER (OUTPATIENT)
Dept: NUCLEAR MEDICINE | Facility: HOSPITAL | Age: 72
Discharge: HOME | End: 2022-08-05

## 2022-08-05 ENCOUNTER — HOSPITAL ENCOUNTER (OUTPATIENT)
Dept: CT IMAGING | Facility: HOSPITAL | Age: 72
Discharge: HOME | End: 2022-08-05
Admitting: ORTHOPAEDIC SURGERY

## 2022-08-05 DIAGNOSIS — Z09 SURGERY FOLLOW-UP: ICD-10-CM

## 2022-08-05 PROCEDURE — 78315 BONE IMAGING 3 PHASE: CPT

## 2022-08-05 PROCEDURE — 0 TECHNETIUM MEDRONATE KIT: Performed by: ORTHOPAEDIC SURGERY

## 2022-08-05 PROCEDURE — 73200 CT UPPER EXTREMITY W/O DYE: CPT

## 2022-08-05 PROCEDURE — A9503 TC99M MEDRONATE: HCPCS | Performed by: ORTHOPAEDIC SURGERY

## 2022-08-05 RX ORDER — TC 99M MEDRONATE 20 MG/10ML
22 INJECTION, POWDER, LYOPHILIZED, FOR SOLUTION INTRAVENOUS
Status: COMPLETED | OUTPATIENT
Start: 2022-08-05 | End: 2022-08-05

## 2022-08-05 RX ADMIN — Medication 22 MILLICURIE: at 13:03

## 2022-08-10 ENCOUNTER — TELEPHONE (OUTPATIENT)
Dept: ORTHOPEDIC SURGERY | Facility: CLINIC | Age: 72
End: 2022-08-10

## 2022-08-10 NOTE — TELEPHONE ENCOUNTER
I spoke to Ms. Lugo.  I provided her with the bone scan and CT scan results of the right shoulder as reviewed by Dr. Marion.  The studies do not reveal any significant abnormalities, however, she does have some fluid in the shoulder.  Dr. Marion is not overly suspicious for infection, but he does recommend an arthroscopic biopsy to make sure.  She agreed.  Dr. Marion would like to see her in clinic to discuss in detail.  I will have a staff member call her to schedule the appointment.

## 2022-08-15 ENCOUNTER — OFFICE VISIT (OUTPATIENT)
Dept: ORTHOPEDIC SURGERY | Facility: CLINIC | Age: 72
End: 2022-08-15

## 2022-08-15 VITALS — WEIGHT: 118.8 LBS | BODY MASS INDEX: 21.05 KG/M2 | HEIGHT: 63 IN | TEMPERATURE: 97.5 F

## 2022-08-15 DIAGNOSIS — M25.512 LEFT SHOULDER PAIN, UNSPECIFIED CHRONICITY: ICD-10-CM

## 2022-08-15 DIAGNOSIS — G89.29 CHRONIC RIGHT SHOULDER PAIN: Primary | ICD-10-CM

## 2022-08-15 DIAGNOSIS — M25.511 CHRONIC RIGHT SHOULDER PAIN: Primary | ICD-10-CM

## 2022-08-15 PROCEDURE — 73030 X-RAY EXAM OF SHOULDER: CPT | Performed by: ORTHOPAEDIC SURGERY

## 2022-08-15 PROCEDURE — 20610 DRAIN/INJ JOINT/BURSA W/O US: CPT | Performed by: ORTHOPAEDIC SURGERY

## 2022-08-15 PROCEDURE — 99214 OFFICE O/P EST MOD 30 MIN: CPT | Performed by: ORTHOPAEDIC SURGERY

## 2022-08-15 RX ORDER — NAPROXEN SODIUM 220 MG
220 TABLET ORAL 2 TIMES DAILY PRN
COMMUNITY
End: 2022-08-31

## 2022-08-15 RX ADMIN — LIDOCAINE HYDROCHLORIDE 2 ML: 10 INJECTION, SOLUTION EPIDURAL; INFILTRATION; INTRACAUDAL; PERINEURAL at 16:35

## 2022-08-15 RX ADMIN — METHYLPREDNISOLONE ACETATE 80 MG: 80 INJECTION, SUSPENSION INTRA-ARTICULAR; INTRALESIONAL; INTRAMUSCULAR; SOFT TISSUE at 16:35

## 2022-08-15 NOTE — PROGRESS NOTES
Medical Record Number: [id number]    Chief Complaints: Follow-up regarding right shoulder pain, new complaint of left shoulder pain    History of Present Illness:     72 y.o. female patient who presents for follow-up of the right shoulder.  She reports persistent pain and symptoms in the right shoulder.  She recently the bone scan and CT.  She comes in today to discuss the results and further options.  She also mentions a new complaint of left shoulder pain.  The pain is moderate, constant and aching.  The pain is worse with reaching and lifting overhead and at night.  She says the symptoms are somewhat similar to those that she was having on the right side prior to surgery.    Allergies: No Known Allergies    Home Medications:    Current Outpatient Medications:   •  B Complex-Folic Acid (B Complex Formula 1) tablet, Take 1 tablet by mouth Daily. HOLD FOR SURGERY, Disp: , Rfl:   •  Calcium 600+D3 600-400 MG-UNIT tablet, Take 1 tablet by mouth 2 (Two) Times a Day. HOLD FOR SURGERY, Disp: , Rfl:   •  folic acid (FOLVITE) 1 MG tablet, Take 1 tablet by mouth Daily., Disp: 30 tablet, Rfl: 1  •  lactobacillus acidophilus (RISAQUAD) capsule capsule, Take 1 capsule by mouth Daily., Disp: 180 capsule, Rfl: 3  •  levothyroxine (SYNTHROID, LEVOTHROID) 50 MCG tablet, Take 1 tablet by mouth Daily. (Patient taking differently: Take 50 mcg by mouth Every Night.), Disp: 30 tablet, Rfl: 1  •  MAGnesium-Oxide 400 (241.3 Mg) MG tablet tablet, Take 400 mg by mouth 2 (Two) Times a Day. HOLD FOR SURGERY, Disp: , Rfl:   •  melatonin 1 MG tablet, Take 1 mg by mouth At Night As Needed., Disp: , Rfl:   •  naproxen sodium (ALEVE) 220 MG tablet, Take 220 mg by mouth 2 (Two) Times a Day As Needed., Disp: , Rfl:   •  Phenazopyridine HCl (AZO TABS PO), Take 1 tablet by mouth 1 (One) Time Per Week. AS NEEDED FOR UTI, Disp: , Rfl:   •  potassium chloride (K-DUR,KLOR-CON) 20 MEQ CR tablet, Take 20 mEq by mouth 2 (Two) Times a Day., Disp: , Rfl:    •  Probiotic Product (Acidophilus/Goat Milk) capsule, Take 1 tablet by mouth Daily. HOLD FOR SURGERY, Disp: , Rfl:   •  thiamine (VITAMIN B-1) 100 MG tablet  tablet, Take 100 mg by mouth Daily. HOLD FOR SURGERY, Disp: , Rfl:   •  docusate sodium (COLACE) 100 MG capsule, Take 1 capsule by mouth 2 (Two) Times a Day., Disp: 60 capsule, Rfl: 0  •  HYDROcodone-acetaminophen (NORCO) 7.5-325 MG per tablet, Take 1-2 tablets by mouth Every 4 (Four) Hours As Needed for Moderate Pain  (Pain)., Disp: 42 tablet, Rfl: 0  •  ondansetron (Zofran) 4 MG tablet, Take 1 tablet by mouth Every 8 (Eight) Hours As Needed for Nausea or Vomiting., Disp: 30 tablet, Rfl: 0  No current facility-administered medications for this visit.    Facility-Administered Medications Ordered in Other Visits:   •  mupirocin (BACTROBAN) 2 % nasal ointment, , Nasal, BID, David Marion MD    Past Medical History:   Diagnosis Date   • CTS (carpal tunnel syndrome)     Bilateral   • Disease of thyroid gland    • Elevated liver enzymes    • Fatty liver    • Fatty liver    • Frequent UTI    • History of depression    • Hypertension    • IBS (irritable bowel syndrome)    • Lumbar stenosis    • Post-menopausal    • Right shoulder pain    • Stress incontinence    • Urinary retention        Past Surgical History:   Procedure Laterality Date   • ABDOMINOPLASTY     • BREAST BIOPSY Right    • BREAST SURGERY     • CARPAL TUNNEL RELEASE Left 5/3/2022    Procedure: CARPAL TUNNEL RELEASE;  Surgeon: David Marion MD;  Location: [facility] [place];  Service: Orthopedics;  Laterality: Left;   • CATARACT EXTRACTION Bilateral    •  SECTION      X3   • COLONOSCOPY         • COLONOSCOPY N/A 2016    Procedure: COLONOSCOPY;  Surgeon: Ho Arenas MD;  Location: [facility] ENDOSCOPY;  Service:    • ENDOSCOPY N/A 2020    Procedure: ESOPHAGOGASTRODUODENOSCOPY;  Surgeon: Terrell Stanton MD;  Location: [facility];  Service: Gastroenterology;   Laterality: N/A;  gastric ulcer  duodenal erosion  gastric varices   • LASIK     • LUMBAR DISCECTOMY FUSION INSTRUMENTATION N/A 10/01/2018    Procedure: L4-5, L5-S1  laminectomy and fusion with instrumentation;  Surgeon: Juno Kim MD;  Location: [facility];  Service: Orthopedic Spine   • REDUCTION MAMMAPLASTY     • TIPS PROCEDURE  12/2021   • TONSILLECTOMY     • TOTAL SHOULDER ARTHROPLASTY W/ DISTAL CLAVICLE EXCISION Right 06/13/2019    Procedure: TOTAL SHOULDER REVERSE ARTHROPLASTY;  Surgeon: David Marion MD;  Location: [facility];  Service: Orthopedics       Social History     Occupational History   • Not on file   Tobacco Use   • Smoking status: Never Smoker   • Smokeless tobacco: Never Used   Vaping Use   • Vaping Use: Never used   Substance and Sexual Activity   • Alcohol use: Not Currently     Comment: no ETOH since 11/2021   • Drug use: No   • Sexual activity: Defer      Social History     Social History Narrative   • Not on file       Family History   Problem Relation Age of Onset   • Colon polyps Father    • Breast cancer Maternal Aunt    • Malig Hyperthermia Neg Hx        Review of Systems:      Constitutional: Denies fever, shaking or chills   Eyes: Denies change in visual acuity   HEENT: Denies nasal congestion or sore throat   Respiratory: Denies cough or shortness of breath   Cardiovascular: Denies chest pain or edema  Endocrine: Denies tremors, palpitations, intolerance of heat or cold, polyuria, polydipsia.  GI: Denies abdominal pain, nausea, vomiting, bloody stools or diarrhea  : Denies frequency, urgency, incontinence, retention, or nocturia.  Musculoskeletal: Denies numbness, tingling or loss of motor function except as above  Integument: Denies rash, lesion or ulceration   Neurologic: Denies headache or focal weakness, deficits  Heme: Denies spontaneous or excessive bleeding, epistaxis, hematuria, melena, fatigue, enlarged or tender lymph nodes.      All other pertinent  "positives and negatives as noted above in HPI.    Physical Exam:   72 y.o. female  Vitals:    08/15/22 1439   Temp: 97.5 °F (36.4 °C)   TempSrc: Temporal   Weight: 53.9 kg (118 lb 12.8 oz)   Height: 160 cm (62.99\")     General:  Patient is awake and alert.  Appears in no acute distress or discomfort.    Psych:  Affect and demeanor are appropriate.    Eyes:  Conjunctiva and sclera appear grossly normal.  Eyes track well and EOM seem to be intact.    Ears:  No gross abnormalities.  Hearing adequate for the exam.    Cardiovascular:  Regular rate and rhythm.    Lungs:  Good chest expansion.  Breathing unlabored.    Extremities: Left shoulder is examined.  Skin is benign.  Mild anterior tenderness without effusion.  Full motion.  She has pain and weakness with elevation in the scapular plane, 5- out of 5 strength.  Positive Neer and Clement impingement maneuvers.  Normal motor and sensory function in the lower arm and hand.  Palpable radial pulse.  Brisk capillary refill.    Imaging: Bone scan and CT scan right shoulder are both reviewed along with the reports.  The bone scan shows a little bit of uptake in the acromion but the implants look fine.  The CT shows a fluid collection in the subacromial space along with some heterotopic ossification.  Again, the implants look fine.  No acromion fracture on the CT.    Assessment/Plan:  1.  Painful right reverse total shoulder arthroplasty 2.  Left rotator cuff tear    I showed her the 2 studies and we talked about options for her right shoulder.  Given the extent of bone loss proximally, I think this is probably stress shielding but with the fluid collection in the subacromial space, I cannot exclude a possible C acnes infection.  I think the most prudent course of action at this point would be to consider an arthroscopic debridement and biopsy to look for C acnes.  Risk of this procedure were discussed including possibility of infection, wound healing problems, positioning " related complications and potentially anesthesia related complications.  I explained that this procedure will likely do nothing to help with her symptoms but it is for diagnostic purposes.  She is on board with that and acknowledged understanding of the goals of the procedure.  I will have my  contact her about setting this up.    We discussed options for her left shoulder as well.  The right shoulder seems to be the more symptomatic issue at this time and she would prefer to try to manage the left shoulder conservatively.  She is not interested in further work-up right now.  She would like to try an injection.  The risk, benefits and alternatives were discussed.  She consented and the injection was performed as described below.    David Marion MD    08/15/2022    Large Joint Arthrocentesis: L subacromial bursa  Date/Time: 8/15/2022 4:35 PM  Consent given by: patient  Site marked: site marked  Timeout: Immediately prior to procedure a time out was called to verify the correct patient, procedure, equipment, support staff and site/side marked as required   Supporting Documentation  Indications: pain   Procedure Details  Location: shoulder - L subacromial bursa  Preparation: Patient was prepped and draped in the usual sterile fashion  Needle gauge: 21G.  Approach: posterior  Medications administered: 80 mg methylPREDNISolone acetate 80 MG/ML; 2 mL lidocaine PF 1% 1 %  Patient tolerance: patient tolerated the procedure well with no immediate complications

## 2022-08-16 RX ORDER — LIDOCAINE HYDROCHLORIDE 10 MG/ML
2 INJECTION, SOLUTION EPIDURAL; INFILTRATION; INTRACAUDAL; PERINEURAL
Status: COMPLETED | OUTPATIENT
Start: 2022-08-15 | End: 2022-08-15

## 2022-08-16 RX ORDER — METHYLPREDNISOLONE ACETATE 80 MG/ML
80 INJECTION, SUSPENSION INTRA-ARTICULAR; INTRALESIONAL; INTRAMUSCULAR; SOFT TISSUE
Status: COMPLETED | OUTPATIENT
Start: 2022-08-15 | End: 2022-08-15

## 2022-08-16 RX ORDER — CEFAZOLIN SODIUM 2 G/100ML
2 INJECTION, SOLUTION INTRAVENOUS ONCE
Status: CANCELLED | OUTPATIENT
Start: 2022-09-06

## 2022-08-25 ENCOUNTER — TRANSCRIBE ORDERS (OUTPATIENT)
Dept: ADMINISTRATIVE | Facility: HOSPITAL | Age: 72
End: 2022-08-25

## 2022-08-25 DIAGNOSIS — Z12.31 SCREENING MAMMOGRAM, ENCOUNTER FOR: Primary | ICD-10-CM

## 2022-08-30 ENCOUNTER — APPOINTMENT (OUTPATIENT)
Dept: PREADMISSION TESTING | Facility: HOSPITAL | Age: 72
End: 2022-08-30

## 2022-08-31 ENCOUNTER — PRE-ADMISSION TESTING (OUTPATIENT)
Dept: PREADMISSION TESTING | Facility: HOSPITAL | Age: 72
End: 2022-08-31

## 2022-08-31 VITALS
RESPIRATION RATE: 18 BRPM | BODY MASS INDEX: 21.71 KG/M2 | WEIGHT: 118 LBS | OXYGEN SATURATION: 99 % | TEMPERATURE: 98 F | HEART RATE: 80 BPM | SYSTOLIC BLOOD PRESSURE: 157 MMHG | HEIGHT: 62 IN | DIASTOLIC BLOOD PRESSURE: 73 MMHG

## 2022-08-31 LAB
ANION GAP SERPL CALCULATED.3IONS-SCNC: 8 MMOL/L (ref 5–15)
BUN SERPL-MCNC: 10 MG/DL (ref 8–23)
BUN/CREAT SERPL: 20.4 (ref 7–25)
CALCIUM SPEC-SCNC: 9.1 MG/DL (ref 8.6–10.5)
CHLORIDE SERPL-SCNC: 106 MMOL/L (ref 98–107)
CO2 SERPL-SCNC: 24 MMOL/L (ref 22–29)
CREAT SERPL-MCNC: 0.49 MG/DL (ref 0.57–1)
DEPRECATED RDW RBC AUTO: 39.5 FL (ref 37–54)
EGFRCR SERPLBLD CKD-EPI 2021: 99 ML/MIN/1.73
ERYTHROCYTE [DISTWIDTH] IN BLOOD BY AUTOMATED COUNT: 12.1 % (ref 12.3–15.4)
GLUCOSE SERPL-MCNC: 79 MG/DL (ref 65–99)
HCT VFR BLD AUTO: 32.3 % (ref 34–46.6)
HGB BLD-MCNC: 11.3 G/DL (ref 12–15.9)
MCH RBC QN AUTO: 31 PG (ref 26.6–33)
MCHC RBC AUTO-ENTMCNC: 35 G/DL (ref 31.5–35.7)
MCV RBC AUTO: 88.5 FL (ref 79–97)
PLATELET # BLD AUTO: 158 10*3/MM3 (ref 140–450)
PMV BLD AUTO: 11.2 FL (ref 6–12)
POTASSIUM SERPL-SCNC: 3.7 MMOL/L (ref 3.5–5.2)
RBC # BLD AUTO: 3.65 10*6/MM3 (ref 3.77–5.28)
SODIUM SERPL-SCNC: 138 MMOL/L (ref 136–145)
WBC NRBC COR # BLD: 11.7 10*3/MM3 (ref 3.4–10.8)

## 2022-08-31 PROCEDURE — 85027 COMPLETE CBC AUTOMATED: CPT

## 2022-08-31 PROCEDURE — 36415 COLL VENOUS BLD VENIPUNCTURE: CPT

## 2022-08-31 PROCEDURE — 80048 BASIC METABOLIC PNL TOTAL CA: CPT

## 2022-09-06 ENCOUNTER — HOSPITAL ENCOUNTER (OUTPATIENT)
Facility: HOSPITAL | Age: 72
Setting detail: HOSPITAL OUTPATIENT SURGERY
Discharge: HOME | End: 2022-09-06
Attending: ORTHOPAEDIC SURGERY | Admitting: ORTHOPAEDIC SURGERY

## 2022-09-06 ENCOUNTER — ANESTHESIA EVENT (OUTPATIENT)
Dept: PERIOP | Facility: HOSPITAL | Age: 72
End: 2022-09-06

## 2022-09-06 ENCOUNTER — ANESTHESIA (OUTPATIENT)
Dept: PERIOP | Facility: HOSPITAL | Age: 72
End: 2022-09-06

## 2022-09-06 VITALS
BODY MASS INDEX: 21.7 KG/M2 | SYSTOLIC BLOOD PRESSURE: 102 MMHG | WEIGHT: 117.95 LBS | HEIGHT: 62 IN | HEART RATE: 66 BPM | TEMPERATURE: 97.4 F | RESPIRATION RATE: 16 BRPM | DIASTOLIC BLOOD PRESSURE: 88 MMHG | OXYGEN SATURATION: 97 %

## 2022-09-06 DIAGNOSIS — G89.29 CHRONIC RIGHT SHOULDER PAIN: ICD-10-CM

## 2022-09-06 DIAGNOSIS — M25.511 CHRONIC RIGHT SHOULDER PAIN: ICD-10-CM

## 2022-09-06 PROCEDURE — 25010000002 DEXAMETHASONE PER 1 MG: Performed by: STUDENT IN AN ORGANIZED HEALTH CARE EDUCATION/TRAINING PROGRAM

## 2022-09-06 PROCEDURE — 25010000002 EPINEPHRINE PER 0.1 MG: Performed by: ORTHOPAEDIC SURGERY

## 2022-09-06 PROCEDURE — 87205 SMEAR GRAM STAIN: CPT | Performed by: ORTHOPAEDIC SURGERY

## 2022-09-06 PROCEDURE — 25010000002 ROPIVACAINE PER 1 MG: Performed by: STUDENT IN AN ORGANIZED HEALTH CARE EDUCATION/TRAINING PROGRAM

## 2022-09-06 PROCEDURE — 87206 SMEAR FLUORESCENT/ACID STAI: CPT | Performed by: ORTHOPAEDIC SURGERY

## 2022-09-06 PROCEDURE — 87116 MYCOBACTERIA CULTURE: CPT | Performed by: ORTHOPAEDIC SURGERY

## 2022-09-06 PROCEDURE — 25010000002 FENTANYL CITRATE (PF) 50 MCG/ML SOLUTION: Performed by: NURSE ANESTHETIST, CERTIFIED REGISTERED

## 2022-09-06 PROCEDURE — 25010000002 FENTANYL CITRATE (PF) 50 MCG/ML SOLUTION: Performed by: STUDENT IN AN ORGANIZED HEALTH CARE EDUCATION/TRAINING PROGRAM

## 2022-09-06 PROCEDURE — 25010000002 CEFAZOLIN IN DEXTROSE 2-4 GM/100ML-% SOLUTION: Performed by: ORTHOPAEDIC SURGERY

## 2022-09-06 PROCEDURE — 25010000002 ONDANSETRON PER 1 MG: Performed by: NURSE ANESTHETIST, CERTIFIED REGISTERED

## 2022-09-06 PROCEDURE — 87102 FUNGUS ISOLATION CULTURE: CPT | Performed by: ORTHOPAEDIC SURGERY

## 2022-09-06 PROCEDURE — 87176 TISSUE HOMOGENIZATION CULTR: CPT | Performed by: ORTHOPAEDIC SURGERY

## 2022-09-06 PROCEDURE — 87070 CULTURE OTHR SPECIMN AEROBIC: CPT | Performed by: ORTHOPAEDIC SURGERY

## 2022-09-06 PROCEDURE — 25010000002 MIDAZOLAM PER 1 MG: Performed by: STUDENT IN AN ORGANIZED HEALTH CARE EDUCATION/TRAINING PROGRAM

## 2022-09-06 PROCEDURE — 87075 CULTR BACTERIA EXCEPT BLOOD: CPT | Performed by: ORTHOPAEDIC SURGERY

## 2022-09-06 PROCEDURE — 25010000002 PROPOFOL 10 MG/ML EMULSION: Performed by: NURSE ANESTHETIST, CERTIFIED REGISTERED

## 2022-09-06 PROCEDURE — 76942 ECHO GUIDE FOR BIOPSY: CPT | Performed by: ORTHOPAEDIC SURGERY

## 2022-09-06 PROCEDURE — 25010000002 NALOXONE PER 1 MG: Performed by: NURSE ANESTHETIST, CERTIFIED REGISTERED

## 2022-09-06 PROCEDURE — 25010000002 DEXAMETHASONE PER 1 MG: Performed by: NURSE ANESTHETIST, CERTIFIED REGISTERED

## 2022-09-06 PROCEDURE — 29822 SHO ARTHRS SRG LMTD DBRDMT: CPT | Performed by: ORTHOPAEDIC SURGERY

## 2022-09-06 PROCEDURE — 88305 TISSUE EXAM BY PATHOLOGIST: CPT | Performed by: ORTHOPAEDIC SURGERY

## 2022-09-06 RX ORDER — HYDROMORPHONE HYDROCHLORIDE 1 MG/ML
0.5 INJECTION, SOLUTION INTRAMUSCULAR; INTRAVENOUS; SUBCUTANEOUS
Status: DISCONTINUED | OUTPATIENT
Start: 2022-09-06 | End: 2022-09-06 | Stop reason: HOSPADM

## 2022-09-06 RX ORDER — DIPHENHYDRAMINE HYDROCHLORIDE 50 MG/ML
12.5 INJECTION INTRAMUSCULAR; INTRAVENOUS
Status: DISCONTINUED | OUTPATIENT
Start: 2022-09-06 | End: 2022-09-06 | Stop reason: HOSPADM

## 2022-09-06 RX ORDER — MIDAZOLAM HYDROCHLORIDE 1 MG/ML
0.5 INJECTION INTRAMUSCULAR; INTRAVENOUS
Status: DISCONTINUED | OUTPATIENT
Start: 2022-09-06 | End: 2022-09-06 | Stop reason: HOSPADM

## 2022-09-06 RX ORDER — PROMETHAZINE HYDROCHLORIDE 25 MG/1
25 TABLET ORAL ONCE AS NEEDED
Status: DISCONTINUED | OUTPATIENT
Start: 2022-09-06 | End: 2022-09-06 | Stop reason: HOSPADM

## 2022-09-06 RX ORDER — DIPHENHYDRAMINE HCL 25 MG
25 CAPSULE ORAL
Status: DISCONTINUED | OUTPATIENT
Start: 2022-09-06 | End: 2022-09-06 | Stop reason: HOSPADM

## 2022-09-06 RX ORDER — OXYCODONE AND ACETAMINOPHEN 7.5; 325 MG/1; MG/1
1 TABLET ORAL EVERY 4 HOURS PRN
Status: DISCONTINUED | OUTPATIENT
Start: 2022-09-06 | End: 2022-09-06 | Stop reason: HOSPADM

## 2022-09-06 RX ORDER — HYDRALAZINE HYDROCHLORIDE 20 MG/ML
5 INJECTION INTRAMUSCULAR; INTRAVENOUS
Status: DISCONTINUED | OUTPATIENT
Start: 2022-09-06 | End: 2022-09-06 | Stop reason: HOSPADM

## 2022-09-06 RX ORDER — SODIUM CHLORIDE 0.9 % (FLUSH) 0.9 %
3 SYRINGE (ML) INJECTION EVERY 12 HOURS SCHEDULED
Status: DISCONTINUED | OUTPATIENT
Start: 2022-09-06 | End: 2022-09-06 | Stop reason: HOSPADM

## 2022-09-06 RX ORDER — PROPOFOL 10 MG/ML
VIAL (ML) INTRAVENOUS AS NEEDED
Status: DISCONTINUED | OUTPATIENT
Start: 2022-09-06 | End: 2022-09-06 | Stop reason: SURG

## 2022-09-06 RX ORDER — LABETALOL HYDROCHLORIDE 5 MG/ML
5 INJECTION, SOLUTION INTRAVENOUS
Status: DISCONTINUED | OUTPATIENT
Start: 2022-09-06 | End: 2022-09-06 | Stop reason: HOSPADM

## 2022-09-06 RX ORDER — FENTANYL CITRATE 50 UG/ML
50 INJECTION, SOLUTION INTRAMUSCULAR; INTRAVENOUS
Status: DISCONTINUED | OUTPATIENT
Start: 2022-09-06 | End: 2022-09-06 | Stop reason: HOSPADM

## 2022-09-06 RX ORDER — DOCUSATE SODIUM 100 MG/1
100 CAPSULE, LIQUID FILLED ORAL 2 TIMES DAILY
Qty: 60 CAPSULE | Refills: 0 | Status: SHIPPED | OUTPATIENT
Start: 2022-09-06

## 2022-09-06 RX ORDER — LIDOCAINE HYDROCHLORIDE 20 MG/ML
INJECTION, SOLUTION INFILTRATION; PERINEURAL AS NEEDED
Status: DISCONTINUED | OUTPATIENT
Start: 2022-09-06 | End: 2022-09-06 | Stop reason: SURG

## 2022-09-06 RX ORDER — PROMETHAZINE HYDROCHLORIDE 25 MG/1
25 SUPPOSITORY RECTAL ONCE AS NEEDED
Status: DISCONTINUED | OUTPATIENT
Start: 2022-09-06 | End: 2022-09-06 | Stop reason: HOSPADM

## 2022-09-06 RX ORDER — ROCURONIUM BROMIDE 10 MG/ML
INJECTION, SOLUTION INTRAVENOUS AS NEEDED
Status: DISCONTINUED | OUTPATIENT
Start: 2022-09-06 | End: 2022-09-06 | Stop reason: SURG

## 2022-09-06 RX ORDER — NALOXONE HCL 0.4 MG/ML
0.2 VIAL (ML) INJECTION AS NEEDED
Status: DISCONTINUED | OUTPATIENT
Start: 2022-09-06 | End: 2022-09-06 | Stop reason: HOSPADM

## 2022-09-06 RX ORDER — ONDANSETRON 4 MG/1
4 TABLET, FILM COATED ORAL EVERY 8 HOURS PRN
Qty: 30 TABLET | Refills: 0 | Status: SHIPPED | OUTPATIENT
Start: 2022-09-06

## 2022-09-06 RX ORDER — EPHEDRINE SULFATE 50 MG/ML
5 INJECTION, SOLUTION INTRAVENOUS ONCE AS NEEDED
Status: DISCONTINUED | OUTPATIENT
Start: 2022-09-06 | End: 2022-09-06 | Stop reason: HOSPADM

## 2022-09-06 RX ORDER — IBUPROFEN 600 MG/1
600 TABLET ORAL ONCE AS NEEDED
Status: DISCONTINUED | OUTPATIENT
Start: 2022-09-06 | End: 2022-09-06 | Stop reason: HOSPADM

## 2022-09-06 RX ORDER — DEXAMETHASONE SODIUM PHOSPHATE 4 MG/ML
INJECTION, SOLUTION INTRA-ARTICULAR; INTRALESIONAL; INTRAMUSCULAR; INTRAVENOUS; SOFT TISSUE
Status: COMPLETED | OUTPATIENT
Start: 2022-09-06 | End: 2022-09-06

## 2022-09-06 RX ORDER — DEXAMETHASONE SODIUM PHOSPHATE 10 MG/ML
INJECTION INTRAMUSCULAR; INTRAVENOUS AS NEEDED
Status: DISCONTINUED | OUTPATIENT
Start: 2022-09-06 | End: 2022-09-06 | Stop reason: SURG

## 2022-09-06 RX ORDER — HYDROCODONE BITARTRATE AND ACETAMINOPHEN 7.5; 325 MG/1; MG/1
1-2 TABLET ORAL EVERY 4 HOURS PRN
Qty: 42 TABLET | Refills: 0 | Status: SHIPPED | OUTPATIENT
Start: 2022-09-06

## 2022-09-06 RX ORDER — CEFAZOLIN SODIUM 2 G/100ML
2 INJECTION, SOLUTION INTRAVENOUS ONCE
Status: COMPLETED | OUTPATIENT
Start: 2022-09-06 | End: 2022-09-06

## 2022-09-06 RX ORDER — FENTANYL CITRATE 50 UG/ML
INJECTION, SOLUTION INTRAMUSCULAR; INTRAVENOUS AS NEEDED
Status: DISCONTINUED | OUTPATIENT
Start: 2022-09-06 | End: 2022-09-06 | Stop reason: SURG

## 2022-09-06 RX ORDER — FLUMAZENIL 0.1 MG/ML
0.2 INJECTION INTRAVENOUS AS NEEDED
Status: DISCONTINUED | OUTPATIENT
Start: 2022-09-06 | End: 2022-09-06 | Stop reason: HOSPADM

## 2022-09-06 RX ORDER — ONDANSETRON 2 MG/ML
INJECTION INTRAMUSCULAR; INTRAVENOUS AS NEEDED
Status: DISCONTINUED | OUTPATIENT
Start: 2022-09-06 | End: 2022-09-06 | Stop reason: SURG

## 2022-09-06 RX ORDER — SODIUM CHLORIDE 0.9 % (FLUSH) 0.9 %
3-10 SYRINGE (ML) INJECTION AS NEEDED
Status: DISCONTINUED | OUTPATIENT
Start: 2022-09-06 | End: 2022-09-06 | Stop reason: HOSPADM

## 2022-09-06 RX ORDER — ROPIVACAINE HYDROCHLORIDE 5 MG/ML
INJECTION, SOLUTION EPIDURAL; INFILTRATION; PERINEURAL
Status: COMPLETED | OUTPATIENT
Start: 2022-09-06 | End: 2022-09-06

## 2022-09-06 RX ORDER — HYDROCODONE BITARTRATE AND ACETAMINOPHEN 7.5; 325 MG/1; MG/1
1 TABLET ORAL ONCE AS NEEDED
Status: DISCONTINUED | OUTPATIENT
Start: 2022-09-06 | End: 2022-09-06 | Stop reason: HOSPADM

## 2022-09-06 RX ORDER — ACETAMINOPHEN 650 MG
TABLET, EXTENDED RELEASE ORAL AS NEEDED
Status: DISCONTINUED | OUTPATIENT
Start: 2022-09-06 | End: 2022-09-06 | Stop reason: HOSPADM

## 2022-09-06 RX ORDER — ONDANSETRON 2 MG/ML
4 INJECTION INTRAMUSCULAR; INTRAVENOUS ONCE AS NEEDED
Status: COMPLETED | OUTPATIENT
Start: 2022-09-06 | End: 2022-09-06

## 2022-09-06 RX ORDER — SODIUM CHLORIDE, SODIUM LACTATE, POTASSIUM CHLORIDE, CALCIUM CHLORIDE 600; 310; 30; 20 MG/100ML; MG/100ML; MG/100ML; MG/100ML
9 INJECTION, SOLUTION INTRAVENOUS CONTINUOUS
Status: DISCONTINUED | OUTPATIENT
Start: 2022-09-06 | End: 2022-09-06 | Stop reason: HOSPADM

## 2022-09-06 RX ORDER — LIDOCAINE HYDROCHLORIDE 10 MG/ML
0.5 INJECTION, SOLUTION EPIDURAL; INFILTRATION; INTRACAUDAL; PERINEURAL ONCE AS NEEDED
Status: DISCONTINUED | OUTPATIENT
Start: 2022-09-06 | End: 2022-09-06 | Stop reason: HOSPADM

## 2022-09-06 RX ADMIN — LIDOCAINE HYDROCHLORIDE 80 MG: 20 INJECTION, SOLUTION INFILTRATION; PERINEURAL at 07:06

## 2022-09-06 RX ADMIN — DEXAMETHASONE SODIUM PHOSPHATE 6 MG: 10 INJECTION INTRAMUSCULAR; INTRAVENOUS at 07:17

## 2022-09-06 RX ADMIN — CEFAZOLIN SODIUM 2 G: 2 INJECTION, SOLUTION INTRAVENOUS at 06:51

## 2022-09-06 RX ADMIN — FENTANYL CITRATE 50 MCG: 50 INJECTION INTRAMUSCULAR; INTRAVENOUS at 06:23

## 2022-09-06 RX ADMIN — ROCURONIUM BROMIDE 50 MG: 50 INJECTION INTRAVENOUS at 07:07

## 2022-09-06 RX ADMIN — ONDANSETRON 4 MG: 2 INJECTION INTRAMUSCULAR; INTRAVENOUS at 07:36

## 2022-09-06 RX ADMIN — DEXAMETHASONE SODIUM PHOSPHATE 4 MG: 4 INJECTION, SOLUTION INTRAMUSCULAR; INTRAVENOUS at 06:37

## 2022-09-06 RX ADMIN — ROPIVACAINE HYDROCHLORIDE 24 ML: 5 INJECTION EPIDURAL; INFILTRATION; PERINEURAL at 06:37

## 2022-09-06 RX ADMIN — ONDANSETRON 4 MG: 2 INJECTION INTRAMUSCULAR; INTRAVENOUS at 08:38

## 2022-09-06 RX ADMIN — PROPOFOL 50 MG: 10 INJECTION, EMULSION INTRAVENOUS at 07:33

## 2022-09-06 RX ADMIN — SODIUM CHLORIDE, POTASSIUM CHLORIDE, SODIUM LACTATE AND CALCIUM CHLORIDE: 600; 310; 30; 20 INJECTION, SOLUTION INTRAVENOUS at 07:58

## 2022-09-06 RX ADMIN — SUGAMMADEX 200 MG: 100 INJECTION, SOLUTION INTRAVENOUS at 07:36

## 2022-09-06 RX ADMIN — FENTANYL CITRATE 50 MCG: 50 INJECTION INTRAMUSCULAR; INTRAVENOUS at 07:06

## 2022-09-06 RX ADMIN — NALOXONE HYDROCHLORIDE 0.04 MG: 0.4 INJECTION, SOLUTION INTRAMUSCULAR; INTRAVENOUS; SUBCUTANEOUS at 07:44

## 2022-09-06 RX ADMIN — PROPOFOL 130 MG: 10 INJECTION, EMULSION INTRAVENOUS at 07:06

## 2022-09-06 RX ADMIN — MIDAZOLAM HYDROCHLORIDE 1 MG: 1 INJECTION, SOLUTION INTRAMUSCULAR; INTRAVENOUS at 06:23

## 2022-09-06 NOTE — ANESTHESIA PREPROCEDURE EVALUATION
Anesthesia Evaluation     Patient summary reviewed and Nursing notes reviewed   no history of anesthetic complications:  NPO Solid Status: > 8 hours  NPO Liquid Status: > 2 hours           Airway   Mallampati: II  TM distance: >3 FB  Neck ROM: full  Dental      Pulmonary    Cardiovascular     ECG reviewed    (+) hypertension,       Neuro/Psych  GI/Hepatic/Renal/Endo    (+)   liver disease cirrhosis, thyroid problem hypothyroidism    ROS Comment: Cirrhosis s/p TIPS   Hx of hepatorenal but renal fx appears to be normal    Musculoskeletal     Abdominal    Substance History      OB/GYN          Other   blood dyscrasia anemia,                   Anesthesia Plan    ASA 3     general     intravenous induction     Anesthetic plan, risks, benefits, and alternatives have been provided, discussed and informed consent has been obtained with: patient.        CODE STATUS:

## 2022-09-06 NOTE — OP NOTE
Orthopaedic Operative Note    Facility: [facility]    Patient: Tayler Lugo    Medical Record Number: [id number]    YOB: 1950    Dictating Surgeon: David Marion M.D.*    Primary Care Physician: Tayler Trujillo MD    Date of Operation: 9/6/2022    Pre-Operative Diagnosis: Painful right reverse total shoulder arthroplasty    Post-Operative Diagnosis: Painful right reverse total shoulder arthroplasty    Procedure Performed: Arthroscopic debridement and synovial biopsy for painful right reverse total shoulder arthroplasty    Surgeon: David Marion MD     Assistant: Patt Poe whose assistance was critical for help with patient positioning, suctioning and irrigation, retraction, manipulation of the extremity for insertion of the implants, wound closure and application of the bandages.  Her assistance was critical to the success of this case.     Anesthesia: Regional followed by general    Complications: None.     Estimated Blood Loss: Less than 50 mL.     Implants: None    Specimens:   Order Name Source Comment Collection Info Order Time   ANAEROBIC CULTURE Shoulder, Right PLEASE RUN APPROPRIATE TESTING FOR C ACNES AS WELL  Collected By: David Marion MD 9/6/2022  7:32 AM     Release to patient   Routine Release        FUNGUS CULTURE Shoulder, Right PLEASE RUN APPROPRIATE TESTING FOR C ACNES AS WELL  Collected By: David Marion MD 9/6/2022  7:32 AM     Release to patient   Routine Release        TISSUE / BONE CULTURE Shoulder, Right PLEASE RUN APPROPRIATE TESTING FOR C ACNES AS WELL  Collected By: David Marion MD 9/6/2022  7:32 AM     Release to patient   Routine Release        AFB CULTURE Shoulder, Right PLEASE RUN APPROPRIATE TESTING FOR C ACNES AS WELL  Collected By: David Marion MD 9/6/2022  7:32 AM     Release to patient   Routine Release        TISSUE PATHOLOGY EXAM Shoulder, Right PLEASE RUN APPROPRIATE TESTING FOR C ACNES AS WELL  Collected By:  David Marion MD 9/6/2022  7:32 AM     Release to patient   Routine Release            Brief Operative Indication: The patient has persistent pain in her right shoulder status post reverse arthroplasty.  Her preoperative imaging showed a fluid collection in the subacromial space.  I was concerned about the possibility of C acnes or other occult infection.  I recommended arthroscopic biopsy for further analysis.  The risk, benefits and alternatives as well as the limited goals of the procedure were discussed.  She consented.    Description of the procedure in detail: The patient and operative site were identified in the preoperative holding area.  Surgical site was marked.  Adequate regional anesthesia was administered.  The patient was taken to the operating room and placed in the supine position.  Adequate general anesthesia was administered.  She was positioned on the operating table in the lateral decubitus position with the right side up.  All bony prominences were padded and protected.  Her head and neck were placed in neutral alignment.  A timeout was taken and preoperative antibiotics administered.  The right upper extremity was prepped and draped in the standard, sterile fashion.  I cleaned the extremity with alcohol.  A Hibiclens scrub was performed.  The extremity was prepped with 2 ChloraPrep's.  The extremity was draped in the typical fashion.  Her arm was placed into 10 pounds lateral traction.  I fashioned a standard posterior portal.  The scope was inserted into the joint.  I fashioned an anterior portal under direct visualization using needle localization.  I did not see any obvious fluid collection in the shoulder.  After inserting the scope, she had some normal-appearing joint fluid but no necrosis, purulence or other evidence for infection.  There did not look like any abnormal fluid collection or other pathology on inspection.  Her components looked fine as far as what I could tell just  looking at them through the scope.  There was some hyperplastic synovium both posteriorly and anteriorly.  This was debrided with a shaver and multiple shavings preserved for pathologic analysis.  No pathology was noted.  I made sure that we had good hemostasis.  The shoulder was flushed with about 6 L of sterile saline.  The instruments were withdrawn.  The portals were closed using Vicryl for the deep tissues and nylon for the skin.  Sterile dressings were applied.  The drapes were withdrawn.  Her arm was placed in a sling.  She was awakened and taken to recovery in good condition.    David Marion MD  09/06/22

## 2022-09-06 NOTE — ANESTHESIA PROCEDURE NOTES
Airway  Urgency: elective    Date/Time: 9/6/2022 7:08 AM  Airway not difficult    General Information and Staff    Patient location during procedure: OR  CRNA/CAA: Negrita Urias CRNA    Indications and Patient Condition    Preoxygenated: yes  Mask difficulty assessment: 1 - vent by mask    Final Airway Details  Final airway type: endotracheal airway      Successful airway: ETT  Cuffed: yes   Successful intubation technique: direct laryngoscopy  Endotracheal tube insertion site: oral  Blade: Zoila  Blade size: 3  ETT size (mm): 7.0  Cormack-Lehane Classification: grade IIa - partial view of glottis  Placement verified by: chest auscultation and capnometry   Measured from: teeth  ETT/EBT  to teeth (cm): 21  Number of attempts at approach: 1  Assessment: lips, teeth, and gum same as pre-op and atraumatic intubation    Additional Comments  Teeth, tongue, lips, and gums in preop condition. VSS throughout. Easy mask/easy airway.

## 2022-09-06 NOTE — BRIEF OP NOTE
SHOULDER ARTHROSCOPY  Progress Note    Tayler Lugo  9/6/2022    Pre-op Diagnosis:   Chronic right shoulder pain [M25.511, G89.29]       Post-Op Diagnosis Codes:     * Chronic right shoulder pain [M25.511, G89.29]    Procedure/CPT® Codes:        Procedure(s):  SHOULDER ARTHROSCOPY, debridement, synovial biopsy    Surgeon(s):  David Marion MD    Anesthesia: General with Block    Staff:   Circulator: Masha Holguin RN  Scrub Person: Luly Faustin  Assistant: Patt Poe  Assistant: Patt Poe      Estimated Blood Loss: minimal    Urine Voided: * No values recorded between 9/6/2022  6:55 AM and 9/6/2022  7:37 AM *    Specimens:                Specimens     ID Source Type Tests Collected By Collected At Frozen?    A Shoulder, Right Synovium · ANAEROBIC CULTURE  · FUNGAL CULTURE  · TISSUE / BONE CULTURE  · TISSUE PATHOLOGY EXAM  · AFB CULTURE   David Marion MD 9/6/22 5373 No    Description: RIGHT SHOULDER SYNOVIUM FOR CULTURE RUN APPROPRIATE TESTING FOR CACNES    Comment: PLEASE RUN APPROPRIATE TESTING FOR C ACNES AS WELL                 Drains: * No LDAs found *    Findings: See dictation        Complications: None    Assistant: Patt Poe  was responsible for performing the following activities: Retraction and their skilled assistance was necessary for the success of this case.    aDvid Marion MD     Date: 9/6/2022  Time: 07:50 EDT

## 2022-09-06 NOTE — ANESTHESIA POSTPROCEDURE EVALUATION
Patient: Tayler Lugo    Procedure Summary     Date: 09/06/22 Room / Location: [facility] OR [address] [place] [address] [facility] OR [facility] [place] [facility]    Anesthesia Start: 0655 Anesthesia Stop: 0759    Procedure: SHOULDER ARTHROSCOPY, debridement, synovial biopsy (Right Shoulder) Diagnosis:       Chronic right shoulder pain      (Chronic right shoulder pain [M25.511, G89.29])    Surgeons: David Marion MD Provider: Marek Doe MD    Anesthesia Type: general ASA Status: 3          Anesthesia Type: general    Vitals  Vitals Value Taken Time   /78 09/06/22 0915   Temp 36.3 °C (97.4 °F) 09/06/22 0915   Pulse 73 09/06/22 0929   Resp 16 09/06/22 0900   SpO2 99 % 09/06/22 0929   Vitals shown include unvalidated device data.        Post Anesthesia Care and Evaluation    Patient location during evaluation: bedside  Patient participation: complete - patient participated  Level of consciousness: awake  Pain management: adequate    Airway patency: patent  Anesthetic complications: No anesthetic complications  PONV Status: controlled  Cardiovascular status: acceptable  Respiratory status: acceptable  Hydration status: acceptable    Comments: --------------------            09/06/22               0935     --------------------   BP:       102/88     Pulse:      66       Resp:       16       Temp:                SpO2:      97%      --------------------

## 2022-09-06 NOTE — ANESTHESIA PROCEDURE NOTES
Peripheral Block    Pre-sedation assessment completed: 9/6/2022 6:24 AM    Patient reassessed immediately prior to procedure    Patient location during procedure: pre-op  Stop time: 9/6/2022 6:30 AM  Reason for block: at surgeon's request and post-op pain management  Performed by  Anesthesiologist: Elmer Fan MD  Preanesthetic Checklist  Completed: patient identified, IV checked, site marked, risks and benefits discussed, surgical consent, monitors and equipment checked, pre-op evaluation and timeout performed  Prep:  Pt Position: supine  Sterile barriers:cap, gloves and mask  Prep: ChloraPrep  Patient monitoring: blood pressure monitoring, continuous pulse oximetry and EKG  Procedure    Sedation: yes  Performed under: local infiltration  Guidance:ultrasound guided    ULTRASOUND INTERPRETATION. Using ultrasound guidance a 21 G gauge needle was placed in close proximity to the nerve, at which point, under ultrasound guidance anesthetic was injected in the area of the nerve and spread of the anesthesia was seen on ultrasound in close proximity thereto.  There were no abnormalities seen on ultrasound; a digital image was taken; and the patient tolerated the procedure with no complications. Images:still images obtained, printed/placed on chart    Laterality:right  Block Type:interscalene  Injection Technique:single-shot  Needle Type:echogenic and Tuohy  Needle Gauge:21 G  Resistance on Injection: none    Medications Used: ropivacaine (NAROPIN) 0.5 % injection, 24 mL  dexamethasone (DECADRON) injection, 4 mg      Post Assessment  Injection Assessment: negative aspiration for heme, no paresthesia on injection and incremental injection  Patient Tolerance:comfortable throughout block  Complications:no  Additional Notes  Ultrasound guidance used to visualize nerve anatomy, guide needle placement and verify local anesthetic disbursement.

## 2022-09-07 ENCOUNTER — TELEPHONE (OUTPATIENT)
Dept: ORTHOPEDIC SURGERY | Facility: CLINIC | Age: 72
End: 2022-09-07

## 2022-09-07 LAB
LAB AP CASE REPORT: NORMAL
LAB AP CLINICAL INFORMATION: NORMAL
LAB AP DIAGNOSIS COMMENT: NORMAL
PATH REPORT.FINAL DX SPEC: NORMAL
PATH REPORT.GROSS SPEC: NORMAL

## 2022-09-07 NOTE — TELEPHONE ENCOUNTER
Postop follow-up call.  I spoke to Ms. Lugo.  Reports she is doing well.  We discussed postop care instructions.  I reminded her of her follow-up appointment with Dr. Marion.  I encouraged her to call with any questions or concerns prior to her follow-up visit.  She verbalized understanding and appreciated the call.   Labs/EKG/Imaging Studies/Medications

## 2022-09-09 LAB
BACTERIA SPEC AEROBE CULT: NORMAL
GRAM STN SPEC: NORMAL

## 2022-09-14 ENCOUNTER — OFFICE VISIT (OUTPATIENT)
Dept: ORTHOPEDIC SURGERY | Facility: CLINIC | Age: 72
End: 2022-09-14

## 2022-09-14 VITALS — BODY MASS INDEX: 21.77 KG/M2 | HEIGHT: 62 IN | WEIGHT: 118.3 LBS | TEMPERATURE: 97.1 F

## 2022-09-14 DIAGNOSIS — Z09 SURGERY FOLLOW-UP: Primary | ICD-10-CM

## 2022-09-14 PROCEDURE — 99024 POSTOP FOLLOW-UP VISIT: CPT | Performed by: ORTHOPAEDIC SURGERY

## 2022-09-14 NOTE — PROGRESS NOTES
"Tayler Lugo : 1950 MRN: [id number] DATE: 2022    CC: 1 week s/p right shoulder arthroscopy    HPI: Patient returns to clinic today for follow up.  She is just a week out from surgery but she says that her shoulder already feels far better than before surgery.  She says that she feels like her motion is better than before the procedure.  She says that she feels \"more alive\".  Denies fevers, drainage, redness or other concerning symptoms.      Vitals:    22 1044   Temp: 97.1 °F (36.2 °C)       Exam:  Wounds appear well-approximated.  Arm and forearm soft.  Shoulder motion is remarkably better compared to before surgery.  She can FE to 165, ER roughly 60 and IR to her brastrap if she uses her left arm to assist.  Good motor and sensory function in the hand and wrist.  Palpable radial pulse with brisk capillary refill     Impression:  1 week s/p right shoulder arthroscopic debridement and biopsy    Plan:    She seems to be doing remarkably well.  I would say that her motion is definitely better than before surgery and her pain actually seems to be better.  I am going to get her into therapy to work on some strengthening.  We reviewed her culture results and everything is negative to date.  I need to make sure that the lab is holding those cultures for C acnes so I will confirm that today.  I want to see her back in 1 month.    David Marion MD    "

## 2022-09-16 LAB — BACTERIA SPEC ANAEROBE CULT: NORMAL

## 2022-09-21 ENCOUNTER — HOSPITAL ENCOUNTER (OUTPATIENT)
Dept: PHYSICAL THERAPY | Facility: HOSPITAL | Age: 72
Setting detail: THERAPIES SERIES
Discharge: HOME | End: 2022-09-21

## 2022-09-21 DIAGNOSIS — Z09 SURGERY FOLLOW-UP: Primary | ICD-10-CM

## 2022-09-21 DIAGNOSIS — Z96.611 S/P REVERSE TOTAL SHOULDER ARTHROPLASTY, RIGHT: ICD-10-CM

## 2022-09-21 PROCEDURE — 97161 PT EVAL LOW COMPLEX 20 MIN: CPT

## 2022-09-21 NOTE — THERAPY EVALUATION
Outpatient Physical Therapy Ortho Initial Evaluation  DORIS Cobos     Patient Name: Tayler Lugo  : 1950  MRN: [id number]  Today's Date: 2022      Visit Date: 2022    Patient Active Problem List   Diagnosis   • Spinal stenosis of lumbar region with neurogenic claudication   • Lumbar spinal stenosis   • Frequent UTI   • Hypertension   • Stress incontinence   • Itching due to drug   • Acute respiratory failure with hypoxia (HCC)   • Tear of right rotator cuff   • Acute pain of right shoulder   • Hepatorenal syndrome (HCC)   • Ascites due to alcoholic hepatitis   • Bladder prolapse, female, acquired   • Sepsis associated hypotension (HCC)   • Acute liver failure   • Altered mental state   • Acute renal failure (HCC)   • Alcohol abuse   • Severe malnutrition (HCC)   • Gastrointestinal hemorrhage   • Gastrointestinal hemorrhage associated with alcoholic gastritis   • Gastric varices   • Alcoholic cirrhosis (HCC)   • Iron deficiency anemia due to chronic blood loss   • Bilateral carpal tunnel syndrome        Past Medical History:   Diagnosis Date   • CTS (carpal tunnel syndrome)     Bilateral   • Disease of thyroid gland    • Elevated liver enzymes    • Fatty liver    • Fatty liver    • Frequent UTI    • History of Clostridium difficile infection    • History of depression    • History of skin cancer    • Hypertension    • IBS (irritable bowel syndrome)    • Lumbar stenosis    • Post-menopausal    • Right shoulder pain    • Stress incontinence    • Urinary retention         Past Surgical History:   Procedure Laterality Date   • ABDOMINOPLASTY     • BREAST BIOPSY Right    • BREAST SURGERY     • CARPAL TUNNEL RELEASE Left 2022    Procedure: CARPAL TUNNEL RELEASE;  Surgeon: David Marion MD;  Location: [facility];  Service: Orthopedics;  Laterality: Left;   • CATARACT EXTRACTION Bilateral    •  SECTION      X3   • COLONOSCOPY         • COLONOSCOPY N/A 2016     Procedure: COLONOSCOPY;  Surgeon: Ho Arenas MD;  Location: [facility]COPY;  Service:    • ENDOSCOPY N/A 12/11/2020    Procedure: ESOPHAGOGASTRODUODENOSCOPY;  Surgeon: Terrell Stanton MD;  Location: [facility] OR;  Service: Gastroenterology;  Laterality: N/A;  gastric ulcer  duodenal erosion  gastric varices   • LASIK     • LUMBAR DISCECTOMY FUSION INSTRUMENTATION N/A 10/01/2018    Procedure: L4-5, L5-S1  laminectomy and fusion with instrumentation;  Surgeon: Juno Kim MD;  Location: [facility] OR;  Service: Orthopedic Spine   • REDUCTION MAMMAPLASTY     • SHOULDER ARTHROSCOPY Right 9/6/2022    Procedure: SHOULDER ARTHROSCOPY, debridement, synovial biopsy;  Surgeon: David Marion MD;  Location: [facility];  Service: Orthopedics;  Laterality: Right;   • TIPS PROCEDURE  12/2021   • TONSILLECTOMY     • TOTAL SHOULDER ARTHROPLASTY W/ DISTAL CLAVICLE EXCISION Right 06/13/2019    Procedure: TOTAL SHOULDER REVERSE ARTHROPLASTY;  Surgeon: David Marion MD;  Location: [facility] OR;  Service: Orthopedics       Visit Dx:     ICD-10-CM ICD-9-CM   1. Surgery follow-up  Z09 V67.00   2. S/P reverse total shoulder arthroplasty, right  Z96.611 V43.61          Patient History     Row Name 09/21/22 1100             History    Chief Complaint Difficulty with daily activities;Joint stiffness;Muscle tenderness;Muscle weakness;Pain  -AS      Type of Pain Shoulder pain  Right  -AS      Date Current Problem(s) Began 09/06/22  -AS      Brief Description of Current Complaint Tayler Lugo presents to outpatient PT s/p right shoulder arthroscopic debridement and biopsy. Surgery was performed on 9-6-22 by Dr. Marion. Patient reports her pain and motion are better than before her surgery. Her MD has referred her to outpatient PT for improved ROM and strengthening.  -AS      Patient/Caregiver Goals Relieve pain;Return to prior level of function;Improve strength  -AS      Patient's Rating of General Health  Good  -AS      Hand Dominance right-handed  -AS      Occupation/sports/leisure activities Golf, swimming, tennis  -AS      Patient seeing anyone else for problem(s)? Dr. Marion  -AS              Pain     Pain Location Shoulder  -AS      Pain at Present 5  -AS      Pain at Best 2  -AS      Pain at Worst 8  -AS      Pain Frequency Intermittent  -AS      Pain Description Aching  -AS      What Performance Factors Make the Current Problem(s) WORSE? Use of RUE  -AS      What Performance Factors Make the Current Problem(s) BETTER? Rest  -AS              Daily Activities    Primary Language English  -AS      Are you able to read Yes  -AS      Are you able to write Yes  -AS      How does patient learn best? Listening;Reading;Demonstration  -AS      Teaching needs identified Home Exercise Program;Management of Condition  -AS      Patient is concerned about/has problems with Difficulty with self care (i.e. bathing, dressing, toileting:;Performing home management (household chores, shopping, care of dependents);Performing job responsibilities/community activities (work, school,;Performing sports, recreation, and play activities;Reaching over head;Repetitive movements of the hand, arm, shoulder  -AS      Does patient have problems with the following? None  -AS      Barriers to learning None  -AS      Pt Participated in POC and Goals Yes  -AS              Safety    Are you being hurt, hit, or frightened by anyone at home or in your life? No  -AS      Are you being neglected by a caregiver No  -AS            User Key  (r) = Recorded By, (t) = Taken By, (c) = Cosigned By    Initials Name Provider Type    AS Stu Greenfield, PT Physical Therapist                 PT Ortho     Row Name 09/21/22 1100       Precautions and Contraindications    Precautions/Limitations no known precautions/limitations  -AS       Subjective Pain    Able to rate subjective pain? yes  -AS    Pre-Treatment Pain Level 5  -AS    Post-Treatment Pain Level  4  -AS       Posture/Observations    Posture- WNL Posture is WNL  -AS    Observations Incision non-healing  -AS       Right Upper Ext    Rt Shoulder Abduction AROM 145  -AS    Rt Shoulder Abduction PROM 170  -AS    Rt Shoulder Flexion AROM 153  -AS    Rt Shoulder Flexion PROM 170  -AS    Rt Shoulder External Rotation AROM WNL  -AS    Rt Shoulder External Rotation PROM WNL  -AS    Rt Shoulder Internal Rotation AROM WNL  -AS    Rt Shoulder Internal Rotation PROM WNL  -AS       MMT Right Upper Ext    Rt Shoulder Flexion MMT, Gross Movement (3+/5) fair plus  -AS    Rt Shoulder ABduction MMT, Gross Movement (3+/5) fair plus  -AS    Rt Shoulder Internal Rotation MMT, Gross Movement (3+/5) fair plus  -AS    Rt Shoulder External Rotation MMT, Gross Movement (3+/5) fair plus  -AS       Sensation    Sensation WNL? WNL  -AS    Light Touch No apparent deficits  -AS          User Key  (r) = Recorded By, (t) = Taken By, (c) = Cosigned By    Initials Name Provider Type    AS Stu Greenfield, PT Physical Therapist                            Therapy Education  Given: HEP, Symptoms/condition management, Pain management  Program: New  How Provided: Verbal, Demonstration, Written  Provided to: Patient  Level of Understanding: Teach back education performed, Verbalized, Demonstrated      PT OP Goals     Row Name 09/21/22 1100          PT Short Term Goals    STG Date to Achieve 10/12/22  -AS     STG 1 Patient to demonstrate compliance with her initial HEP for flexibility, ROM and strengthening.  -AS     STG 2 Patient to report right shoulder pain on VAS of 4-5/10 at worst with activity.  -AS     STG 3 Patient to demonstrate improved right shoulder strength to 4/5 in all planes.  -AS     STG 4 Patient to demonstrate improved right shoulder PROM to within 10 degrees of contralateral shoulder.  -AS            Long Term Goals    LTG Date to Achieve 11/02/22  -AS     LTG 1 Patient to demonstrate compliance with her advanced HEP for  flexibility, ROM and strengthening.  -AS     LTG 2 Patient to report right shoulder pain on VAS of 0-1/10 at worst with activity.  -AS     LTG 3 Patient to demonstrate improved right shoulder strength to 4+/5 in all planes.  -AS     LTG 4 Patient to demonstrate improved right shoulder AROM to within 10 degrees of contralateral shoulder.  -AS     LTG 5 Patient to report improved function and decreased pain Quick DASH by >10-15 points.  -AS            Time Calculation    PT Goal Re-Cert Due Date 10/19/22  -AS           User Key  (r) = Recorded By, (t) = Taken By, (c) = Cosigned By    Initials Name Provider Type    AS Stu Greenfield, PT Physical Therapist                 PT Assessment/Plan     Row Name 09/21/22 1100          PT Assessment    Functional Limitations Limitation in home management;Limitations in community activities;Performance in leisure activities;Performance in self-care ADL;Performance in sport activities;Performance in work activities  -AS     Impairments Impaired flexibility;Muscle strength;Pain;Range of motion  -AS     Assessment Comments Patient presents to outpatient PT s/p right shoulder arthroscopic debridement and biopsy. Surgery was performed on 9-6-22 by Dr. Marion. She has limited right shoulder ROM, limited right shoulder and RUE strength, and increased pain and discomfort with activity. Patient has limited function at this time secondary to the above.  -AS     Please refer to paper survey for additional self-reported information Yes  -AS     Rehab Potential Good  -AS     Patient/caregiver participated in establishment of treatment plan and goals Yes  -AS     Patient would benefit from skilled therapy intervention Yes  -AS            PT Plan    PT Frequency 1x/week;2x/week  -AS     Predicted Duration of Therapy Intervention (PT) 4-6 weeks  -AS     Planned CPT's? PT RE-EVAL: 92883;PT THER PROC EA 15 MIN: 49958;PT THER ACT EA 15 MIN: 79992;PT MANUAL THERAPY EA 15 MIN: 98214;PT  NEUROMUSC RE-EDUCATION EA 15 MIN: 66117  -AS           User Key  (r) = Recorded By, (t) = Taken By, (c) = Cosigned By    Initials Name Provider Type    AS Stu Greenfield, PT Physical Therapist                 Modalities     Row Name 09/21/22 1100             Moist Heat    MH Applied Yes  -AS      Location Right Shoulder - Sitting  -AS      PT Moist Heat Minutes 10  -AS      MH Prior to Rx Yes  -AS            User Key  (r) = Recorded By, (t) = Taken By, (c) = Cosigned By    Initials Name Provider Type    AS Stu Greenfield, PT Physical Therapist               OP Exercises     Row Name 09/21/22 1100             Subjective Pain    Able to rate subjective pain? yes  -AS      Pre-Treatment Pain Level 5  -AS      Post-Treatment Pain Level 4  -AS              Exercise 1    Exercise Name 1 3-Way Cane  -AS      Reps 1 25 each  -AS              Exercise 2    Exercise Name 2 S/L ER  -AS      Reps 2 25  -AS              Exercise 3    Exercise Name 3 Prone Y, T  -AS              Exercise 4    Exercise Name 4 Empty/Full Can  -AS      Reps 4 25 each  -AS              Exercise 5    Exercise Name 5 Rows  -AS      Reps 5 25  -AS      Time 5 Blue  -AS              Exercise 6    Exercise Name 6 Extensions  -AS      Reps 6 25  -AS      Time 6 Blue  -AS              Exercise 7    Exercise Name 7 IR  -AS              Exercise 8    Exercise Name 8 ER  -AS              Exercise 9    Exercise Name 9 Pulleys - Flex & ABD  -AS      Time 9 3 min each  -AS            User Key  (r) = Recorded By, (t) = Taken By, (c) = Cosigned By    Initials Name Provider Type    AS Stu Greenfield, PT Physical Therapist              Manual Rx (last 36 hours)     Manual Treatments     Row Name 09/21/22 1100             Manual Rx 1    Manual Rx 1 Location Right Shoulder  -AS      Manual Rx 1 Type PROM - IR, ER, FLEX, ABD  -AS      Manual Rx 1 Duration 10 min  -AS            User Key  (r) = Recorded By, (t) = Taken By, (c) = Cosigned By     Initials Name Provider Type    AS Stu Greenfield, PT Physical Therapist                            Outcome Measure Options: Quick DASH  Quick DASH  Open a tight or new jar.: Moderate Difficulty  Do heavy household chores (e.g., wash walls, wash floors): Severe Difficulty  Carry a shopping bag or briefcase: Moderate Difficulty  Wash your back: Moderate Difficulty  Use a knife to cut food: Moderate Difficulty  Recreational activities in which you take some force or impact through your arm, should or hand (e.g. golf, hammering, tennis, etc.): Severe Difficulty  During the past week, to what extent has your arm, shoulder, or hand problem interfered with your normal social activites with family, friends, neighbors or groups?: Moderately  During the past week, were you limited in your work or other regular daily activities as a result of your arm, shoulder or hand problem?: Moderately Limited  Arm, Shoulder, or hand pain: Moderate  Tingling (pins and needles) in your arm, shoulder, or hand: Moderate  During the past week, how much difficulty have you had sleeping because of the pain in your arm, shoulder or hand?: Mild Difficulty  Number of Questions Answered: 11  Quick DASH Score: 52.27         Time Calculation:     Start Time: 1130  Stop Time: 1226  Time Calculation (min): 56 min  Untimed Charges  PT Moist Heat Minutes: 10  Total Minutes  Untimed Charges Total Minutes: 10   Total Minutes: 10     Therapy Charges for Today     Code Description Service Date Service Provider Modifiers Qty    69253469377 HC PT EVAL LOW COMPLEXITY 4 9/21/2022 Stu Greenfield, PT GP 1          PT G-Codes  Outcome Measure Options: Quick DASH  Quick DASH Score: 52.27         Stu Greenfield, LAMIN  9/21/2022

## 2022-09-27 ENCOUNTER — HOSPITAL ENCOUNTER (OUTPATIENT)
Dept: MAMMOGRAPHY | Facility: HOSPITAL | Age: 72
Discharge: HOME | End: 2022-09-27
Admitting: INTERNAL MEDICINE

## 2022-09-27 DIAGNOSIS — Z12.31 SCREENING MAMMOGRAM, ENCOUNTER FOR: ICD-10-CM

## 2022-09-27 PROCEDURE — 77067 SCR MAMMO BI INCL CAD: CPT

## 2022-09-27 PROCEDURE — 77063 BREAST TOMOSYNTHESIS BI: CPT

## 2022-09-29 ENCOUNTER — HOSPITAL ENCOUNTER (OUTPATIENT)
Dept: PHYSICAL THERAPY | Facility: HOSPITAL | Age: 72
Setting detail: THERAPIES SERIES
Discharge: HOME | End: 2022-09-29

## 2022-09-29 DIAGNOSIS — Z96.611 S/P REVERSE TOTAL SHOULDER ARTHROPLASTY, RIGHT: ICD-10-CM

## 2022-09-29 DIAGNOSIS — Z09 SURGERY FOLLOW-UP: Primary | ICD-10-CM

## 2022-09-29 PROCEDURE — 97110 THERAPEUTIC EXERCISES: CPT

## 2022-09-29 NOTE — THERAPY TREATMENT NOTE
Outpatient Physical Therapy Ortho Treatment Note  DORIS Cobos     Patient Name: Tayler Lugo  : 1950  MRN: [id number]  Today's Date: 2022      Visit Date: 2022    Visit Dx:    ICD-10-CM ICD-9-CM   1. Surgery follow-up  Z09 V67.00   2. S/P reverse total shoulder arthroplasty, right  Z96.611 V43.61       Patient Active Problem List   Diagnosis   • Spinal stenosis of lumbar region with neurogenic claudication   • Lumbar spinal stenosis   • Frequent UTI   • Hypertension   • Stress incontinence   • Itching due to drug   • Acute respiratory failure with hypoxia (HCC)   • Tear of right rotator cuff   • Acute pain of right shoulder   • Hepatorenal syndrome (HCC)   • Ascites due to alcoholic hepatitis   • Bladder prolapse, female, acquired   • Sepsis associated hypotension (HCC)   • Acute liver failure   • Altered mental state   • Acute renal failure (HCC)   • Alcohol abuse   • Severe malnutrition (HCC)   • Gastrointestinal hemorrhage   • Gastrointestinal hemorrhage associated with alcoholic gastritis   • Gastric varices   • Alcoholic cirrhosis (HCC)   • Iron deficiency anemia due to chronic blood loss   • Bilateral carpal tunnel syndrome        Past Medical History:   Diagnosis Date   • CTS (carpal tunnel syndrome)     Bilateral   • Disease of thyroid gland    • Elevated liver enzymes    • Fatty liver    • Fatty liver    • Frequent UTI    • History of Clostridium difficile infection    • History of depression    • History of skin cancer    • Hypertension    • IBS (irritable bowel syndrome)    • Lumbar stenosis    • Post-menopausal    • Right shoulder pain    • Stress incontinence    • Urinary retention         Past Surgical History:   Procedure Laterality Date   • ABDOMINOPLASTY     • BREAST BIOPSY Right    • BREAST SURGERY     • CARPAL TUNNEL RELEASE Left 2022    Procedure: CARPAL TUNNEL RELEASE;  Surgeon: David Marion MD;  [facility]: [facility];  Service: Orthopedics;   Laterality: Left;   • CATARACT EXTRACTION Bilateral    •  SECTION      X3   • COLONOSCOPY      2006   • COLONOSCOPY N/A 2016    Procedure: COLONOSCOPY;  Surgeon: Ho Arenas MD;  Location: [facility] END[facility]COPY;  Service:    • ENDOSCOPY N/A 2020    Procedure: ESOPHAGOGASTRODUODENOSCOPY;  Surgeon: Terrell Stanton MD;  Location:  [facility] OR;  Service: Gastroenterology;  Laterality: N/A;  gastric ulcer  duodenal erosion  gastric varices   • LASIK     • LUMBAR DISCECTOMY FUSION INSTRUMENTATION N/A 10/01/2018    Procedure: L4-5, L5-S1  laminectomy and fusion with instrumentation;  Surgeon: Juno Kim MD;  Location: [facility] MAIN OR;  Service: Orthopedic Spine   • REDUCTION MAMMAPLASTY     • SHOULDER ARTHROSCOPY Right 2022    Procedure: SHOULDER ARTHROSCOPY, debridement, synovial biopsy;  Surgeon: David Marion MD;  Location: [facility];  Service: Orthopedics;  Laterality: Right;   • TIPS PROCEDURE  2021   • TONSILLECTOMY     • TOTAL SHOULDER ARTHROPLASTY W/ DISTAL CLAVICLE EXCISION Right 2019    Procedure: TOTAL SHOULDER REVERSE ARTHROPLASTY;  Surgeon: David Marion MD;  Location: [facility] MAIN OR;  Service: Orthopedics                        PT Assessment/Plan     Row Name 22 1200          PT Assessment    Assessment Comments Progressed patient with RUE strengthening today and patient tolerated this well.  -AS            PT Plan    PT Plan Comments Continue with current treatment plan.  -AS           User Key  (r) = Recorded By, (t) = Taken By, (c) = Cosigned By    Initials Name Provider Type    AS Stu Greenfield, PT Physical Therapist                 Modalities     Row Name 22 1200             Moist Heat    MH Applied Yes  -AS      Location Right Shoulder - Sitting  -AS      PT Moist Heat Minutes 10  -AS      MH Prior to Rx Yes  -AS              Functional Testing    Outcome Measure Options Quick DASH  -AS            User Key  (r) = Recorded  By, (t) = Taken By, (c) = Cosigned By    Initials Name Provider Type    AS Stu Greenfield, PT Physical Therapist               OP Exercises     Row Name 09/29/22 1337 09/29/22 1200          Subjective Comments    Subjective Comments -- Patient reports she is doing well and continues to be compliant with her HEP. Patient states she is scheduled to see her MD on October 5th.  -AS            Total Minutes    57890 - PT Therapeutic Exercise Minutes 45  -AS --            Exercise 1    Exercise Name 1 -- 3-Way Cane  -AS     Reps 1 -- 25 each  -AS            Exercise 2    Exercise Name 2 -- S/L ER  -AS     Reps 2 -- 25  -AS            Exercise 3    Exercise Name 3 -- Prone Y, T  -AS            Exercise 4    Exercise Name 4 -- Empty/Full Can  -AS     Reps 4 -- 25 each  -AS            Exercise 5    Exercise Name 5 -- Rows  -AS     Reps 5 -- 25  -AS     Time 5 -- Blue  -AS            Exercise 6    Exercise Name 6 -- Extensions  -AS     Reps 6 -- 25  -AS     Time 6 -- Blue  -AS            Exercise 7    Exercise Name 7 -- IR  -AS     Reps 7 -- 25  -AS     Time 7 -- Blue  -AS            Exercise 8    Exercise Name 8 -- ER  -AS     Reps 8 -- 25  -AS     Time 8 -- Blue  -AS            Exercise 9    Exercise Name 9 -- Pulleys - Flex & ABD  -AS     Time 9 -- 3 min each  -AS           User Key  (r) = Recorded By, (t) = Taken By, (c) = Cosigned By    Initials Name Provider Type    AS Stu Greenfield, PT Physical Therapist                         Manual Rx (last 36 hours)     Manual Treatments     Row Name 09/29/22 1100             Manual Rx 1    Manual Rx 1 Location Right Shoulder  -AS      Manual Rx 1 Type PROM - IR, ER, FLEX, ABD  -AS      Manual Rx 1 Duration 10 min  -AS            User Key  (r) = Recorded By, (t) = Taken By, (c) = Cosigned By    Initials Name Provider Type    AS Stu Greenfield, PT Physical Therapist                         Outcome Measure Options: Quick DASH         Time Calculation:   Start  Time: 1231  Stop Time: 1333  Time Calculation (min): 62 min  Timed Charges  27293 - PT Therapeutic Exercise Minutes: 45  Untimed Charges  PT Moist Heat Minutes: 10  Total Minutes  Timed Charges Total Minutes: 45  Untimed Charges Total Minutes: 10   Total Minutes: 55  Therapy Charges for Today     Code Description Service Date Service Provider Modifiers Qty    30872218414 HC PT THER PROC EA 15 MIN 9/29/2022 Stu Greenfield, PT GP 2          PT G-Codes  Outcome Measure Options: Quick DASH         Stu Greenfield, PT  9/29/2022

## 2022-10-04 ENCOUNTER — HOSPITAL ENCOUNTER (OUTPATIENT)
Dept: PHYSICAL THERAPY | Facility: HOSPITAL | Age: 72
Setting detail: THERAPIES SERIES
Discharge: HOME | End: 2022-10-04

## 2022-10-04 DIAGNOSIS — Z09 SURGERY FOLLOW-UP: Primary | ICD-10-CM

## 2022-10-04 DIAGNOSIS — Z96.611 S/P REVERSE TOTAL SHOULDER ARTHROPLASTY, RIGHT: ICD-10-CM

## 2022-10-04 LAB — FUNGUS WND CULT: NORMAL

## 2022-10-04 PROCEDURE — 97110 THERAPEUTIC EXERCISES: CPT

## 2022-10-04 NOTE — THERAPY TREATMENT NOTE
Outpatient Physical Therapy Ortho Treatment Note  DORIS Cobos     Patient Name: Tayler Lugo  : 1950  MRN: [id number]  Today's Date: 10/4/2022      Visit Date: 10/04/2022    Visit Dx:    ICD-10-CM ICD-9-CM   1. Surgery follow-up  Z09 V67.00   2. S/P reverse total shoulder arthroplasty, right  Z96.611 V43.61       Patient Active Problem List   Diagnosis   • Spinal stenosis of lumbar region with neurogenic claudication   • Lumbar spinal stenosis   • Frequent UTI   • Hypertension   • Stress incontinence   • Itching due to drug   • Acute respiratory failure with hypoxia (HCC)   • Tear of right rotator cuff   • Acute pain of right shoulder   • Hepatorenal syndrome (HCC)   • Ascites due to alcoholic hepatitis   • Bladder prolapse, female, acquired   • Sepsis associated hypotension (HCC)   • Acute liver failure   • Altered mental state   • Acute renal failure (HCC)   • Alcohol abuse   • Severe malnutrition (HCC)   • Gastrointestinal hemorrhage   • Gastrointestinal hemorrhage associated with alcoholic gastritis   • Gastric varices   • Alcoholic cirrhosis (HCC)   • Iron deficiency anemia due to chronic blood loss   • Bilateral carpal tunnel syndrome        Past Medical History:   Diagnosis Date   • CTS (carpal tunnel syndrome)     Bilateral   • Disease of thyroid gland    • Elevated liver enzymes    • Fatty liver    • Fatty liver    • Frequent UTI    • History of Clostridium difficile infection    • History of depression    • History of skin cancer    • Hypertension    • IBS (irritable bowel syndrome)    • Lumbar stenosis    • Post-menopausal    • Right shoulder pain    • Stress incontinence    • Urinary retention         Past Surgical History:   Procedure Laterality Date   • ABDOMINOPLASTY     • BREAST BIOPSY Right    • BREAST SURGERY     • CARPAL TUNNEL RELEASE Left 2022    Procedure: CARPAL TUNNEL RELEASE;  Surgeon: David Marion MD;  Location: [facility] OR [facility];  Service: Orthopedics;   Laterality: Left;   • CATARACT EXTRACTION Bilateral    •  SECTION      X3   • COLONOSCOPY      2006   • COLONOSCOPY N/A 2016    Procedure: COLONOSCOPY;  Surgeon: Ho Arenas MD;  [facility];  Service:    • ENDOSCOPY N/A 2020    Procedure: ESOPHAGOGASTRODUODENOSCOPY;  Surgeon: Terrell Stanton MD;  Location:  [facility] OR;  Service: Gastroenterology;  Laterality: N/A;  gastric ulcer  duodenal erosion  gastric varices   • LASIK     • LUMBAR DISCECTOMY FUSION INSTRUMENTATION N/A 10/01/2018    Procedure: L4-5, L5-S1  laminectomy and fusion with instrumentation;  Surgeon: Juno Kim MD;  Location: [facility] MAIN OR;  Service: Orthopedic Spine   • REDUCTION MAMMAPLASTY     • SHOULDER ARTHROSCOPY Right 2022    Procedure: SHOULDER ARTHROSCOPY, debridement, synovial biopsy;  Surgeon: David Marion MD;  [facility];  Service: Orthopedics;  Laterality: Right;   • TIPS PROCEDURE  2021   • TONSILLECTOMY     • TOTAL SHOULDER ARTHROPLASTY W/ DISTAL CLAVICLE EXCISION Right 2019    Procedure: TOTAL SHOULDER REVERSE ARTHROPLASTY;  Surgeon: David Marion MD;  Location: [facility] MAIN OR;  Service: Orthopedics                        PT Assessment/Plan     Row Name 10/04/22 1200          PT Assessment    Assessment Comments Patient continues to do well with PT at this time.  -AS            PT Plan    PT Plan Comments Patient to see her MD tomorrow. Will continue with outpatient PT as advised.  -AS           User Key  (r) = Recorded By, (t) = Taken By, (c) = Cosigned By    Initials Name Provider Type    AS Stu Greenfield, PT Physical Therapist                 Modalities     Row Name 10/04/22 1200             Moist Heat    MH Applied Yes  -AS      Location Right Shoulder - Sitting  -AS      PT Moist Heat Minutes 10  -AS      MH Prior to Rx Yes  -AS              Functional Testing    Outcome Measure Options Quick DASH  -AS            User Key  (r) =  Recorded By, (t) = Taken By, (c) = Cosigned By    Initials Name Provider Type    AS Stu Greenfield, PT Physical Therapist               OP Exercises     Row Name 10/04/22 1306 10/04/22 1200          Subjective Comments    Subjective Comments -- Patient states her shoulder is doing well and she feels she is stronger with more ROM and function. She states she is scheduled to see her MD tomorrow.  -AS            Total Minutes    01697 - PT Therapeutic Exercise Minutes 30  -AS --            Exercise 1    Exercise Name 1 -- 3-Way Cane  -AS     Reps 1 -- 25 each  -AS            Exercise 2    Exercise Name 2 -- S/L ER  -AS     Reps 2 -- 25  -AS     Time 2 -- 1#  -AS            Exercise 3    Exercise Name 3 -- Prone Y, T  -AS            Exercise 4    Exercise Name 4 -- Empty/Full Can  -AS     Reps 4 -- 25 each  -AS     Time 4 -- 1#  -AS            Exercise 5    Exercise Name 5 -- Rows  -AS     Reps 5 -- 25  -AS     Time 5 -- Blue  -AS            Exercise 6    Exercise Name 6 -- Extensions  -AS     Reps 6 -- 25  -AS     Time 6 -- Blue  -AS            Exercise 7    Exercise Name 7 -- IR  -AS     Reps 7 -- 25  -AS     Time 7 -- Blue  -AS            Exercise 8    Exercise Name 8 -- ER  -AS     Reps 8 -- 25  -AS     Time 8 -- Blue  -AS            Exercise 9    Exercise Name 9 -- Pulleys - Flex & ABD  -AS     Time 9 -- 3 min each  -AS           User Key  (r) = Recorded By, (t) = Taken By, (c) = Cosigned By    Initials Name Provider Type    AS Stu Greenfield, PT Physical Therapist                         Manual Rx (last 36 hours)     Manual Treatments     Row Name 10/04/22 1100             Manual Rx 1    Manual Rx 1 Location --  -AS      Manual Rx 1 Type --  -AS      Manual Rx 1 Duration --  -AS            User Key  (r) = Recorded By, (t) = Taken By, (c) = Cosigned By    Initials Name Provider Type    AS Stu Greenfield, PT Physical Therapist                         Outcome Measure Options: Quick DASH          Time Calculation:   Start Time: 1226  Stop Time: 1306  Time Calculation (min): 40 min  Timed Charges  88275 - PT Therapeutic Exercise Minutes: 30  Untimed Charges  PT Moist Heat Minutes: 10  Total Minutes  Timed Charges Total Minutes: 30  Untimed Charges Total Minutes: 10   Total Minutes: 40  Therapy Charges for Today     Code Description Service Date Service Provider Modifiers Qty    76258110009 HC PT THER PROC EA 15 MIN 10/4/2022 tSu Greenfield, PT GP 2          PT G-Codes  Outcome Measure Options: Quick DASH         Stu Greenfield, PT  10/4/2022

## 2022-10-05 ENCOUNTER — OFFICE VISIT (OUTPATIENT)
Dept: ORTHOPEDIC SURGERY | Facility: CLINIC | Age: 72
End: 2022-10-05

## 2022-10-05 VITALS — WEIGHT: 116 LBS | RESPIRATION RATE: 14 BRPM | TEMPERATURE: 97 F | HEIGHT: 62 IN | BODY MASS INDEX: 21.35 KG/M2

## 2022-10-05 DIAGNOSIS — Z09 SURGERY FOLLOW-UP: Primary | ICD-10-CM

## 2022-10-05 PROCEDURE — 99024 POSTOP FOLLOW-UP VISIT: CPT | Performed by: NURSE PRACTITIONER

## 2022-10-05 NOTE — PROGRESS NOTES
Tayler Lugo : 1950 MRN: [id number] DATE: 10/5/2022    CC: 1 month s/p right shoulder arthroscopy    HPI: Patient returns to clinic today for follow up.  Reports pain is much better.  Reports good progress with therapy.  She continues to have painful popping in the shoulder especially with carrying items, but rolling the shoulder seems to make this feel better.  Overall, she says the symptom seems about 20% better since surgery.      Vitals:    10/05/22 1016   Resp: 14   Temp: 97 °F (36.1 °C)       Exam:  Wounds appear healed.  Shoulder motion is:  170° FE, 60° ER, IR to L1.  She has reach her bra strap when assisting with left hand.  Palpable mechanical popping with passive range of motion.  Good motor and sensory function in the hand and wrist.  Palpable radial pulse with brisk capillary refill.    Impression:  1 month s/p right shoulder arthroscopic debridement and biopsy    Plan: Overall, she seems to be doing well.  Her pain and dysfunction are improving.  We reviewed her culture results together and everything is negative today.  She still has 1 lab pending for C acnes.  I will notify her once we have those results.  I encouraged her to continue physical therapy and home exercises.  I encouraged her to call with any questions or concerns prior to her follow-up appointment with Dr. Marion in 1 month.      Yajaira Salazar, TIKA     10/05/2022

## 2022-10-12 ENCOUNTER — APPOINTMENT (OUTPATIENT)
Dept: PHYSICAL THERAPY | Facility: HOSPITAL | Age: 72
End: 2022-10-12

## 2022-10-14 ENCOUNTER — HOSPITAL ENCOUNTER (OUTPATIENT)
Dept: PHYSICAL THERAPY | Facility: HOSPITAL | Age: 72
Setting detail: THERAPIES SERIES
Discharge: HOME | End: 2022-10-14

## 2022-10-14 DIAGNOSIS — Z09 SURGERY FOLLOW-UP: Primary | ICD-10-CM

## 2022-10-14 DIAGNOSIS — Z96.611 S/P REVERSE TOTAL SHOULDER ARTHROPLASTY, RIGHT: ICD-10-CM

## 2022-10-14 PROCEDURE — 97110 THERAPEUTIC EXERCISES: CPT

## 2022-10-14 NOTE — THERAPY TREATMENT NOTE
Outpatient Physical Therapy Ortho Treatment Note  DORIS Cobos     Patient Name: Tayler Lugo  : 1950  MRN: [id number]  Today's Date: 10/14/2022      Visit Date: 10/14/2022    Visit Dx:    ICD-10-CM ICD-9-CM   1. Surgery follow-up  Z09 V67.00   2. S/P reverse total shoulder arthroplasty, right  Z96.611 V43.61       Patient Active Problem List   Diagnosis   • Spinal stenosis of lumbar region with neurogenic claudication   • Lumbar spinal stenosis   • Frequent UTI   • Hypertension   • Stress incontinence   • Itching due to drug   • Acute respiratory failure with hypoxia (HCC)   • Tear of right rotator cuff   • Acute pain of right shoulder   • Hepatorenal syndrome (HCC)   • Ascites due to alcoholic hepatitis   • Bladder prolapse, female, acquired   • Sepsis associated hypotension (HCC)   • Acute liver failure   • Altered mental state   • Acute renal failure (HCC)   • Alcohol abuse   • Severe malnutrition (HCC)   • Gastrointestinal hemorrhage   • Gastrointestinal hemorrhage associated with alcoholic gastritis   • Gastric varices   • Alcoholic cirrhosis (HCC)   • Iron deficiency anemia due to chronic blood loss   • Bilateral carpal tunnel syndrome        Past Medical History:   Diagnosis Date   • CTS (carpal tunnel syndrome)     Bilateral   • Disease of thyroid gland    • Elevated liver enzymes    • Fatty liver    • Fatty liver    • Frequent UTI    • History of Clostridium difficile infection    • History of depression    • History of skin cancer    • Hypertension    • IBS (irritable bowel syndrome)    • Lumbar stenosis    • Post-menopausal    • Right shoulder pain    • Stress incontinence    • Urinary retention         Past Surgical History:   Procedure Laterality Date   • ABDOMINOPLASTY     • BREAST BIOPSY Right    • BREAST SURGERY     • CARPAL TUNNEL RELEASE Left 2022    Procedure: CARPAL TUNNEL RELEASE;  Surgeon: David Marion MD;  [facility];  Service: Orthopedics;   Laterality: Left;   • CATARACT EXTRACTION Bilateral    •  SECTION      X3   • COLONOSCOPY      2006   • COLONOSCOPY N/A 2016    Procedure: COLONOSCOPY;  Surgeon: Ho Arenas MD;  Location: [facility] ENDOSCOPY;  Service:    • ENDOSCOPY N/A 2020    Procedure: ESOPHAGOGASTRODUODENOSCOPY;  Surgeon: Terrell Stanton MD;  Location: [facility] OR;  Service: Gastroenterology;  Laterality: N/A;  gastric ulcer  duodenal erosion  gastric varices   • LASIK     • LUMBAR DISCECTOMY FUSION INSTRUMENTATION N/A 10/01/2018    Procedure: L4-5, L5-S1  laminectomy and fusion with instrumentation;  Surgeon: Juno Kim MD;  Location: [facility] MAIN OR;  Service: Orthopedic Spine   • REDUCTION MAMMAPLASTY     • SHOULDER ARTHROSCOPY Right 2022    Procedure: SHOULDER ARTHROSCOPY, debridement, synovial biopsy;  Surgeon: David Marion MD;  [facility];  Service: Orthopedics;  Laterality: Right;   • TIPS PROCEDURE  2021   • TONSILLECTOMY     • TOTAL SHOULDER ARTHROPLASTY W/ DISTAL CLAVICLE EXCISION Right 2019    Procedure: TOTAL SHOULDER REVERSE ARTHROPLASTY;  Surgeon: David Marion MD;  Location: [facility] MAIN OR;  Service: Orthopedics                        PT Assessment/Plan     Row Name 10/14/22 1100          PT Assessment    Assessment Comments Continue to progress patient with strengthening exercises. Overall patient is responding well to PT treatment at this time.  -AS        PT Plan    PT Plan Comments Continue with current treatment plan.  -AS           User Key  (r) = Recorded By, (t) = Taken By, (c) = Cosigned By    Initials Name Provider Type    AS Stu Greenfield, PT Physical Therapist                 Modalities     Row Name 10/14/22 1100             Moist Heat    MH Applied Yes  -AS      Location Right Shoulder - Sitting  -AS      PT Moist Heat Minutes 10  -AS      MH Prior to Rx Yes  -AS         Functional Testing    Outcome Measure Options Quick DASH  -AS             User Key  (r) = Recorded By, (t) = Taken By, (c) = Cosigned By    Initials Name Provider Type    AS Stu Greenfield, PT Physical Therapist               OP Exercises     Row Name 10/14/22 1236 10/14/22 1100          Subjective Comments    Subjective Comments -- Patient states her MD is pleased with her progress. She states she does not have an infection and her MD would like her to continue with PT for strengthening.  -AS        Total Minutes    73330 - PT Therapeutic Exercise Minutes 30  -AS --        Exercise 1    Exercise Name 1 -- 3-Way Cane  -AS     Reps 1 -- HEP  -AS        Exercise 2    Exercise Name 2 -- S/L ER  -AS     Reps 2 -- 30  -AS     Time 2 -- 1#  -AS        Exercise 3    Exercise Name 3 -- Prone Y, T  -AS        Exercise 4    Exercise Name 4 -- Empty/Full Can  -AS     Reps 4 -- 30 each  -AS     Time 4 -- 1#  -AS        Exercise 5    Exercise Name 5 -- Rows  -AS     Reps 5 -- 30  -AS     Time 5 -- Blue  -AS        Exercise 6    Exercise Name 6 -- Extensions  -AS     Reps 6 -- 30  -AS     Time 6 -- Blue  -AS        Exercise 7    Exercise Name 7 -- IR  -AS     Reps 7 -- 30  -AS     Time 7 -- Blue  -AS        Exercise 8    Exercise Name 8 -- ER  -AS     Reps 8 -- 30  -AS     Time 8 -- Blue  -AS        Exercise 9    Exercise Name 9 -- Pulleys - Flex & ABD  -AS     Time 9 -- 3 min each  -AS           User Key  (r) = Recorded By, (t) = Taken By, (c) = Cosigned By    Initials Name Provider Type    AS Stu Greenfield, PT Physical Therapist                                      Outcome Measure Options: Quick DASH         Time Calculation:   Start Time: 1155  Stop Time: 1236  Time Calculation (min): 41 min  Timed Charges  07372 - PT Therapeutic Exercise Minutes: 30  Untimed Charges  PT Moist Heat Minutes: 10  Total Minutes  Timed Charges Total Minutes: 30  Untimed Charges Total Minutes: 10   Total Minutes: 40  Therapy Charges for Today     Code Description Service Date Service Provider  Modifiers Qty    11118466848 HC PT THER PROC EA 15 MIN 10/14/2022 Stu Greenfield, PT GP 2          PT G-Codes  Outcome Measure Options: Sheyla Greenfield, PT  10/14/2022

## 2022-10-18 LAB
MYCOBACTERIUM SPEC CULT: NORMAL
NIGHT BLUE STAIN TISS: NORMAL

## 2022-11-16 ENCOUNTER — OFFICE VISIT (OUTPATIENT)
Dept: ORTHOPEDIC SURGERY | Facility: CLINIC | Age: 72
End: 2022-11-16

## 2022-11-16 VITALS — HEIGHT: 62 IN | WEIGHT: 123.8 LBS | TEMPERATURE: 96.4 F | BODY MASS INDEX: 22.78 KG/M2

## 2022-11-16 DIAGNOSIS — M54.2 NECK PAIN: ICD-10-CM

## 2022-11-16 DIAGNOSIS — Z09 SURGERY FOLLOW-UP: Primary | ICD-10-CM

## 2022-11-16 PROCEDURE — 99213 OFFICE O/P EST LOW 20 MIN: CPT | Performed by: ORTHOPAEDIC SURGERY

## 2022-11-16 PROCEDURE — 72040 X-RAY EXAM NECK SPINE 2-3 VW: CPT | Performed by: ORTHOPAEDIC SURGERY

## 2022-11-16 NOTE — PROGRESS NOTES
"Chief complaint: Follow-up now approximately 2.5 months status post right shoulder arthroscopic debridement and biopsy, new complaint of neck pain     Ms. Lugo says that her shoulder \"feels the best that it has in a long time\".  She says she is able to move it normally.  It really only bothers her if she tries to lift something heavy.  She says that the symptoms are very manageable.  Unfortunately, she reports a new complaint of neck pain that occasionally radiates down into her scapula as well as occasionally down the arm.  She experiences intermittent numbness and tingling as well.  Denies weakness.  Denies any lower extremity dysfunction.    Right shoulder portals are healed.  No effusion or significant tenderness.  She has good active motion.  She remains weak with abduction and elevation and both are mildly uncomfortable for her.    Neck is briefly examined.  No step-offs or palpable defects.  Motion is good.  Spurling's to the right is uncomfortable for her and reproduces both neck and posterior scapular pain.    AP and lateral views cervical spine are ordered and reviewed evaluate her complaint of neck pain.  No comparison films are immediately available.  The x-rays show multilevel facet arthropathy and mild degenerative disc disease.  She appears to have degenerative listhesis at C4-5.    Assessment: 1.  2.5-month status post right shoulder arthroscopy debridement and synovial biopsy 2.  Neck pain, cervical degenerative disc disease    Plan: Her cultures from the synovial biopsy are all negative.  It seems that just the active cleaning things out with the scope has helped her.  She says that the shoulder is \"good enough\" and so we will continue to manage that issue expectantly for now.      We discussed options for her neck including a trial of conservative treatment versus further work-up with an MRI.  She says this has been a longstanding issue for her and she is skeptical that conservative treatment " would work.  Initially she decided to pursue the conservative treatment but then changed her mind.  I am going to send her for an MRI.  I told her I will call her when I see the results.    David Marion MD

## 2022-11-29 ENCOUNTER — TRANSCRIBE ORDERS (OUTPATIENT)
Dept: ADMINISTRATIVE | Facility: HOSPITAL | Age: 72
End: 2022-11-29

## 2022-11-29 DIAGNOSIS — N28.89 RIGHT RENAL MASS: Primary | ICD-10-CM

## 2022-12-07 ENCOUNTER — HOSPITAL ENCOUNTER (OUTPATIENT)
Dept: CT IMAGING | Facility: HOSPITAL | Age: 72
Discharge: HOME | End: 2022-12-07
Admitting: INTERNAL MEDICINE

## 2022-12-07 DIAGNOSIS — N28.89 RIGHT RENAL MASS: ICD-10-CM

## 2022-12-07 LAB — CREAT BLDA-MCNC: 0.4 MG/DL (ref 0.6–1.3)

## 2022-12-07 PROCEDURE — 74178 CT ABD&PLV WO CNTR FLWD CNTR: CPT

## 2022-12-07 PROCEDURE — 82565 ASSAY OF CREATININE: CPT

## 2022-12-07 PROCEDURE — 0 IOPAMIDOL PER 1 ML: Performed by: INTERNAL MEDICINE

## 2022-12-07 RX ADMIN — IOPAMIDOL 100 ML: 755 INJECTION, SOLUTION INTRAVENOUS at 14:38

## 2022-12-08 ENCOUNTER — HOSPITAL ENCOUNTER (OUTPATIENT)
Dept: MRI IMAGING | Facility: HOSPITAL | Age: 72
Discharge: HOME | End: 2022-12-08
Admitting: ORTHOPAEDIC SURGERY

## 2022-12-08 DIAGNOSIS — M54.2 NECK PAIN: ICD-10-CM

## 2022-12-08 PROCEDURE — 72141 MRI NECK SPINE W/O DYE: CPT

## 2022-12-12 NOTE — TELEPHONE ENCOUNTER
UNABLE TO WT   Caller: PIA COPE     Relationship to patient: PATIENT     Best call back number:[phone]    Patient is needing: MISSED CALL FROM DR KIMBALL REGARDING MRI RESULTS

## 2022-12-14 ENCOUNTER — TELEPHONE (OUTPATIENT)
Dept: ORTHOPEDIC SURGERY | Facility: CLINIC | Age: 72
End: 2022-12-14

## 2022-12-14 DIAGNOSIS — M54.2 NECK PAIN: Primary | ICD-10-CM

## 2022-12-21 NOTE — PROGRESS NOTES
"Subjective   History of Present Illness: Tayler Lugo is a 72 y.o. female is being seen for consultation today at the request of David Marion MD     Ms. Willoughby states she has had neck pain for approximately 3 years.  Pain is predominantly at the base of the neck and right greater than left.  When she looks to the right and left she has increased pain.  She apparently had a vascular procedure for liver stents and noted accelerated neck pain shortly after that procedure.  She denies numbness but she has a heavy sensation in both arms at times without true weakness.  Her pain level is 3 out of 10 on today's evaluation.  Says the amount of pain she has is not disabling but it does limit her from doing some of the athletic activities she likes to do in her home in [place] such as golf and walking.    While in the room and during my examination of the patient I wore a mask and eye protection.  I washed my hands before and after this patient encounter.  The patient was also wearing a mask.    History of Present Illness    Tobacco Use: Low Risk    • Smoking Tobacco Use: Never   • Smokeless Tobacco Use: Never   • Passive Exposure: Not on file        The following portions of the patient's history were reviewed and updated as appropriate: allergies, current medications, past family history, past medical history, past social history, past surgical history and problem list.    Review of Systems    Objective     Vitals:    12/29/22 0920   BP: 124/76   BP Location: Left arm   Patient Position: Sitting   Cuff Size: Adult   Pulse: 74   Temp: 97.2 °F (36.2 °C)   TempSrc: Temporal   SpO2: 100%   Weight: 54.3 kg (119 lb 9.6 oz)   Height: 157.5 cm (62\")   PainSc:   2   PainLoc: Neck     Body mass index is 21.88 kg/m².      Physical Exam  Neurologic Exam    Physical Exam:    CONSTITUTIONAL: This 72 year old  female appears well developed, well-nourished and in no acute distress.    HEAD & FACE: the head and face are " symmetric, normocephalic and atraumatic.  She is wearing a wig today because of her chronic shoulder issues she has difficulty fixing her hair.    EYES: Inspection of the conjunctivae and lids reveals no swelling, erythema or discharge.  Pupils are round, equal and reactive to light and there is no scleral icterus.    EARS, NOSE, MOUTH & THROAT: On inspection, the ears and nose are within normal limits.    NECK: the neck is supple and symmetric. The trachea is midline with no masses.    PULMONARY: Respiratory effort is normal with no increased work of breathing or signs of respiratory distress.    CARDIOVASCULAR: Pedal pulses are +2/4 bilaterally. Examination of the extremities shows no edema or varicosities.    MUSCULOSKELETAL: Gait and station are within normal limits. The spine has normal alignment and range of motion with some accelerated pain to the right side of the neck with extension and lateral bending to the right.  Also has marked decreased range of motion of both shoulders with pain in the shoulder with extremes of range of motion.    SKIN: The skin is warm, dry and intact    NEUROLOGIC:   Cranial Nerves 2-12 intact  Normal motor strength noted. Muscle bulk and tone are normal.  Sensory exam is normal to fine touch to confrontational testing bilaterally  Reflexes on the right side demonstrates 1/4 Triceps Reflex, 2/4 Biceps Reflex, 2/4 Brachioradialis Reflex, 2/4 Knee Jerk Reflex, 0/4 Ankle Jerk Reflex and no ankle clonus on the right.   Reflexes on the left side demonstrates 1/4 Triceps Reflex, 2/4 Biceps Reflex, 2/4 Brachioradialis Reflex, 2/4 Knee Jerk Reflex, 0/4 Jerk Reflex and no ankle clonus on the left.  Superficial/Primitive Reflexes: primitive reflexes were absent.  Borges's, Babinski, and Clonus signs all negative.  No coordination deficit observed.  Radicular testing showed a negative Spurling's maneuver  Cortical function is intact and without deficits. Speech is normal.    PSYCHIATRIC:  oriented to person, place and time. Patient's mood and affect are normal.    Assessment & Plan   Independent Review of Radiographic Studies:      I personally reviewed the images from the following studies.    MRI of the cervical spine done at [facility] on December 8, 2022 reveals degenerative changes throughout the cervical spine most notable anterolisthesis of C4 on C5 with severe right and mild left facet degeneration creating moderate to severe right neuroforaminal narrowing.  Disc osteophyte complexes also seen at C5-C6 and C6-C7 with moderate right neuroforaminal narrowing at C5-C6.    Medical Decision Making:      From the neurosurgical perspective there is no surgical option for isolated neck pain. There is no persistent radiculopathy or loss of nerve function on exam to justify surgery. Generally most people respond favorably to physical therapy, NSAIDs or oral steroids when appropriate. In difficult situations, pain management such as cervical epidural steroids or facet rhizotomies are an option.     Since surgical pathology was not identified radiographically or clinically, we will suggest conservative treatment she plans to do in [place] where she lives more than half the year and only plan on follow up as needed if surgical questions arise.    No follow-ups on file.    Diagnoses and all orders for this visit:    1. Neck pain (Primary)  -     Ambulatory Referral to Physical Therapy Evaluate and treat  -     Ambulatory Referral to Pain Management    2. Cervical spondylosis without myelopathy  -     Ambulatory Referral to Physical Therapy Evaluate and treat  -     Ambulatory Referral to Pain Management    3. Acquired spondylolisthesis of cervical vertebra  -     Ambulatory Referral to Physical Therapy Evaluate and treat  -     Ambulatory Referral to Pain Management             Luca Looney MD [facility]  Neurological Surgery

## 2022-12-29 ENCOUNTER — OFFICE VISIT (OUTPATIENT)
Dept: NEUROSURGERY | Facility: CLINIC | Age: 72
End: 2022-12-29

## 2022-12-29 VITALS
SYSTOLIC BLOOD PRESSURE: 124 MMHG | WEIGHT: 119.6 LBS | TEMPERATURE: 97.2 F | HEIGHT: 62 IN | DIASTOLIC BLOOD PRESSURE: 76 MMHG | HEART RATE: 74 BPM | BODY MASS INDEX: 22.01 KG/M2 | OXYGEN SATURATION: 100 %

## 2022-12-29 DIAGNOSIS — M47.812 CERVICAL SPONDYLOSIS WITHOUT MYELOPATHY: ICD-10-CM

## 2022-12-29 DIAGNOSIS — M43.12 ACQUIRED SPONDYLOLISTHESIS OF CERVICAL VERTEBRA: ICD-10-CM

## 2022-12-29 DIAGNOSIS — M54.2 NECK PAIN: Primary | ICD-10-CM

## 2022-12-29 PROCEDURE — 99204 OFFICE O/P NEW MOD 45 MIN: CPT | Performed by: NEUROLOGICAL SURGERY

## 2022-12-29 RX ORDER — METOPROLOL SUCCINATE 25 MG/1
25 TABLET, EXTENDED RELEASE ORAL DAILY
COMMUNITY
Start: 2022-12-09

## 2022-12-29 RX ORDER — ACETAMINOPHEN 325 MG/1
650 TABLET ORAL EVERY 6 HOURS PRN
COMMUNITY

## 2023-01-05 ENCOUNTER — TELEPHONE (OUTPATIENT)
Dept: NEUROSURGERY | Facility: CLINIC | Age: 73
End: 2023-01-05
Payer: MEDICARE

## 2023-01-05 NOTE — TELEPHONE ENCOUNTER
I thought she might be a better candidate for MBB which is why I referred her to pain management rather than a hospital RAPHAEL.  I thought she was going to set up everything in [place] through her primary there.

## 2023-01-05 NOTE — TELEPHONE ENCOUNTER
I called patient but was unable to LVM.    [facility] ok to give Dr. kelley's message

## 2023-01-05 NOTE — TELEPHONE ENCOUNTER
Patient was seen on 12-29-22 and was referred to PT and pain management. Patient is not leaving for [place] for another month. She is wanting to just have an injection to hold her over until she leaves for [place]. Ok to order cervical RAPHAEL?

## 2023-01-09 NOTE — TELEPHONE ENCOUNTER
FAXED MED RECS TO [facility] AND CALLED PATIENT TO INFORM HER [facility] WILL BE CALLING TO SCHEDULE/

## 2023-01-09 NOTE — TELEPHONE ENCOUNTER
Patient now wants to get the injection in [place] prior to leaving for [place].  She feels it will help her with driving.  She leaves at the end of the month.  Please advise if it is okay to change the current referral to reflect a local provider    [phone]

## 2023-05-16 NOTE — PROGRESS NOTES
History & Physical         Patient: Tayler Lugo    YOB: 1950    Medical Record Number: [id number]    Chief Complaints:   Chief Complaint   Patient presents with   • Right Shoulder - Pre-op Exam       History of Present Illness: 68 y.o. female comes in today of upcoming shoulder replacement surgery. Reports a long history of progressively worsening pain, motion loss, and dysfunction.  Describes current pain as severe.  Has failed prolonged conservative treatment.  Denies any new complaints or issues.    Allergies: No Known Allergies    Medications:   Home Medications:    Current Outpatient Medications:   •  amLODIPine (NORVASC) 10 MG tablet, Take 5 mg by mouth Every Morning., Disp: , Rfl: 3  •  enalapril (VASOTEC) 20 MG tablet, Take 20 mg by mouth Daily., Disp: , Rfl:   •  ESTRACE VAGINAL 0.1 MG/GM vaginal cream, Insert 2 g into the vagina Daily., Disp: , Rfl:   •  KLOR-CON 8 MEQ CR tablet, Take 8 mEq by mouth 2 (Two) Times a Day., Disp: , Rfl:   •  metoprolol succinate XL (TOPROL-XL) 25 MG 24 hr tablet, Take 25 mg by mouth Every Morning., Disp: , Rfl:   •  Chlorhexidine Gluconate 2 % pads, Apply  topically. AS DIRECTED FOR PRE OP, Disp: , Rfl:   •  mupirocin (BACTROBAN) 2 % ointment, Apply  topically to the appropriate area as directed. AS DIRECTED FOR PRE OP, Disp: , Rfl:   No current facility-administered medications for this visit.     Facility-Administered Medications Ordered in Other Visits:   •  mupirocin (BACTROBAN) 2 % nasal ointment, , Nasal, BID, David Marion MD    Past Medical History:   Diagnosis Date   • Elevated liver enzymes    • Fatty liver    • Frequent UTI    • History of depression    • Hypertension    • Lumbar stenosis    • OA (osteoarthritis)    • Spondylarthritis     LOW BACK   • Stress incontinence    • Urinary retention         Past Surgical History:   Procedure Laterality Date   • ABDOMINOPLASTY     • CATARACT EXTRACTION Bilateral    •  SECTION      X3   •  Dear Nellie Evangelista,     It was our pleasure to care for you here at [facility]  It is our hope that we were always able to exceed the expected standards for your care during your stay  You were hospitalized due to Stroke  You were cared for on the 3rd floor by Maryjo French MD under the service of Mirian Watts MD with the [facility] who covers for your primary care physician (PCP), Kathleen Vale DO, while you were hospitalized  If you have any questions or concerns related to this hospitalization, you may contact us at [phone]  For follow up as well as any medication refills, we recommend that you follow up with your primary care physician  A registered nurse will reach out to you by phone within a few days after your discharge to answer any additional questions that you may have after going home  However, at this time we provide for you here, the most important instructions / recommendations at discharge:     Notable Medication Adjustments -   Please start Eliquis 5 mg twice daily  Please start atorvastatin 40 mg once daily  Please continue metroprolol succinate 25 mg once daily  Testing Required after Discharge -   Please repeat CTA head and neck after 3 weeks  Neurology did order this for you  Please call regarding the scheduling of this study  Call cardiology regarding Loop recorder placement to detect arrythmias/afib  Important follow up information -   Please follow-up with primary care provider in 1-2 weeks  Call to make an appointment  Please follow-up with neurology  Call to make an appointment  Please follow-up with vascular surgery  Call to make an appointment  Please follow-up with cardiology   Call to make an appointment  Please follow-up with interventional cardiologist  Call to make an appointment  Other Instructions -   None  Please review this entire after visit summary as additional general instructions "COLONOSCOPY      2006   • COLONOSCOPY N/A 11/1/2016    Procedure: COLONOSCOPY;  Surgeon: Ho Arenas MD;  Location: [facility] ENDOSCOPY;  Service:    • LASIK     • LUMBAR DISCECTOMY FUSION INSTRUMENTATION N/A 10/1/2018    Procedure: L4-5, L5-S1  laminectomy and fusion with instrumentation;  Surgeon: Juno Kim MD;  Location: [facility] MAIN OR;  Service: Orthopedic Spine   • REDUCTION MAMMAPLASTY     • TONSILLECTOMY          Social History     Occupational History   • Not on file   Tobacco Use   • Smoking status: Never Smoker   • Smokeless tobacco: Never Used   Substance and Sexual Activity   • Alcohol use: Yes     Comment: 3-4 glasses of wine a week   • Drug use: No   • Sexual activity: Defer      Social History     Social History Narrative   • Not on file        Family History   Problem Relation Age of Onset   • Colon polyps Father    • Malig Hyperthermia Neg Hx      Review of Systems:     Constitutional:  Denies fever, shaking or chills   Eyes:  Denies change in visual acuity   HEENT:  Denies nasal congestion or sore throat   Respiratory:  Denies cough or shortness of breath   Cardiovascular:  Denies chest pain or edema   GI:  Denies abdominal pain, nausea, vomiting, bloody stools or diarrhea   Musculoskeletal:  Denies numbness tingling or loss of motor function except as outlined above in history of present illness.  Integument:  Denies rash, lesion or ulceration   Neurologic:  Denies headache or focal weakness, deficits      All other pertinent positives and negatives as noted above in HPI.    Physical Exam: 68 y.o. female    Vitals:    06/10/19 1115   Temp: 98.4 °F (36.9 °C)   Weight: 60.8 kg (134 lb)   Height: 157.5 cm (62\")     General:  Patient is awake and alert.  Appears in no acute distress or discomfort.    Psych:  Affect and demeanor are appropriate.    Eyes:  Conjunctiva and sclera appear grossly normal.  Eyes track well and EOM seem to be intact.    Ears:  No gross abnormalities.  Hearing " including medication list, appointments, activity, diet, any pertinent wound care, and other additional recommendations from your care team that may be provided for you        Sincerely,     Lio Pretty MD adequate for the exam.    Cardiovascular:  Regular rate and rhythm.    Lungs:  Good chest expansion.  Breathing unlabored.    Lymph:  No palpable adenopathy about neck or axilla.    Neck:  Supple.  Normal ROM.  Negative Spurling's for shoulder or arm pain.    Right upper extremity:  Skin benign and intact without evidence for swelling, masses or atrophy.  No palpable masses.  Moderate anterior tenderness.  Shoulder ROM limited and uncomfortable.  No evident instability or apprehension.  Weakness with elevation in the scapular plane and external rotation.  Good strength in deltoid, wrist and hand.  Intact sensation in arm, hand.  Palpable radial pulse with brisk cap refill.    Diagnostic Tests:  Lab Results   Component Value Date    GLUCOSE 120 (H) 06/06/2019    CALCIUM 9.2 06/06/2019     06/06/2019    K 3.8 06/06/2019    CO2 26.3 06/06/2019    CL 98 06/06/2019    BUN 11 06/06/2019    CREATININE 0.59 06/06/2019    EGFRIFNONA 101 06/06/2019    BCR 18.6 06/06/2019    ANIONGAP 13.7 06/06/2019     Lab Results   Component Value Date    WBC 6.42 06/06/2019    HGB 14.6 06/06/2019    HCT 43.6 06/06/2019    MCV 96.9 06/06/2019     06/06/2019     No results found for: INR, PROTIME    Imaging:  Previous x-rays of the shoulder are reviewed. The x-rays show significant rotator cuff tear arthropathy with a diminished acromiohumeral interval, joint space narrowing, osteophyte formation, and subchondral sclerosis.      Assessment:  Right shoulder rotator cuff tear arthropathy    Plan: We had a thorough discussion regarding the risks, benefits and alternatives to an arthroplasty and non-surgical management versus surgery.  I explained that surgical risks include infection, hematoma, hardware related complications including failure of fixation, loosening, fracture, persistent pain and/or loss of motion, iatrogenic nerve and/or blood vessel injury resulting in permanent weakness, numbness or dysfunction, DVT, PE,  positioning related neuropraxia, and anesthesia related complications resulting in death.  We discussed the indication for a reverse as opposed to a standard total shoulder and the risks inherent to the reverse including notching, glenoid loosening, instability, and traction related neuropraxia, any of which could result in persistent pain or problems requiring further surgery.  Lastly, we discussed the rehab and all that will be expected of the patient post operatively to ensure an optimal outcome.  The patient voiced understanding of the risks, benefits, and alternative forms of treatment that were discussed and the patient consents to proceed with the reverse arthroplasty.        Date: 6/10/2019    TIKA Brown

## (undated) DEVICE — PK SHLDR OPN 40

## (undated) DEVICE — PAD,ABDOMINAL,8"X10",ST,LF: Brand: MEDLINE

## (undated) DEVICE — MAT FLR ABSORBENT LG 4FT 10 2.5FT

## (undated) DEVICE — SKIN PREP TRAY W/CHG: Brand: MEDLINE INDUSTRIES, INC.

## (undated) DEVICE — GLV SURG TRIUMPH CLASSIC PF LTX 8.5 STRL

## (undated) DEVICE — TUBING, SUCTION, 1/4" X 20', STRAIGHT: Brand: MEDLINE INDUSTRIES, INC.

## (undated) DEVICE — GLV SURG BIOGEL LTX PF 7

## (undated) DEVICE — APPL CHLORAPREP W/TINT 26ML ORNG

## (undated) DEVICE — GLV SURG SIGNATURE ESSENTIAL PF LTX SZ8.5

## (undated) DEVICE — GLV SURG BIOGEL LTX PF 7 1/2

## (undated) DEVICE — ANTIBACTERIAL UNDYED BRAIDED (POLYGLACTIN 910), SYNTHETIC ABSORBABLE SUTURE: Brand: COATED VICRYL

## (undated) DEVICE — SUT ETHLN 3/0 PS1 18IN 1663H

## (undated) DEVICE — GLV SURG BIOGEL LTX PF 8 1/2

## (undated) DEVICE — ADHS SKIN DERMABOND TOP ADVANCED

## (undated) DEVICE — COL TISS GRAFTNET AUTOLOGOUS

## (undated) DEVICE — SUT SILK 2/0 TIES 18IN A185H

## (undated) DEVICE — JACKT LAB F/R KNIT CUFF/COLR XLG BLU

## (undated) DEVICE — SOL ISO/ALC RUB 70PCT 4OZ

## (undated) DEVICE — POOLE SUCTION HANDLE: Brand: CARDINAL HEALTH

## (undated) DEVICE — EMERALD ARTHROSCOPY SHEET: Brand: CONVERTORS

## (undated) DEVICE — SUT ETHLN 4/0 PS2 PLSTC 1667G

## (undated) DEVICE — SUT VIC 5/0 P3 18IN J493G

## (undated) DEVICE — DRSNG WND GZ CURAD OIL EMULSION 3X3IN STRL

## (undated) DEVICE — PK ATS CUST W CARDIOTOMY RESEVOIR

## (undated) DEVICE — Device

## (undated) DEVICE — SPONGE,LAP,12"X12",XR,ST,5/PK,40PK/CS: Brand: MEDLINE

## (undated) DEVICE — ENCORE® LATEX ORTHO SIZE 8, STERILE LATEX POWDER-FREE SURGICAL GLOVE: Brand: ENCORE

## (undated) DEVICE — BNDG ELAS MATRX  2IN 5YD LF STRL

## (undated) DEVICE — BW-412T DISP COMBO CLEANING BRUSH: Brand: SINGLE USE COMBINATION CLEANING BRUSH

## (undated) DEVICE — GOWN,ECLIPSE,FABRIC-REINFORCED,X-LARGE: Brand: MEDLINE

## (undated) DEVICE — GOWN,NON-REINFORCED,SIRUS,SET IN SLV,XXL: Brand: MEDLINE

## (undated) DEVICE — GLV SURG SIGNATURE ESSENTIAL PF LTX SZ7

## (undated) DEVICE — APPL CHLORAPREP HI/LITE 26ML ORNG

## (undated) DEVICE — 3M™ IOBAN™ 2 ANTIMICROBIAL INCISE DRAPE 6640EZ: Brand: IOBAN™ 2

## (undated) DEVICE — PREP SOL POVIDONE/IODINE BT 4OZ

## (undated) DEVICE — PIN STNMAN 3.2MM 9IN
Type: IMPLANTABLE DEVICE | Site: SHOULDER | Status: NON-FUNCTIONAL
Removed: 2019-06-13

## (undated) DEVICE — DRSNG TELFA PAD NONADH STR 1S 3X8IN

## (undated) DEVICE — GLV SURG BIOGEL LTX PF 6 1/2

## (undated) DEVICE — BIT DRL SCRW PERIPH 2.7MM

## (undated) DEVICE — SUT VIC 2/0 CT2 27IN J269H

## (undated) DEVICE — DRAPE,U/ SHT,SPLIT,PLAS,STERIL: Brand: MEDLINE

## (undated) DEVICE — BLD SHAVER BONECUTTER 4MM 13CM

## (undated) DEVICE — APPL DURAPREP IODOPHOR APL 26ML

## (undated) DEVICE — HANDPIECE SET WITH COAXIAL HIGH FLOW TIP AND SUCTION TUBE: Brand: INTERPULSE

## (undated) DEVICE — DISPOSABLE BIPOLAR CABLE 12FT. (3.6M): Brand: KIRWAN

## (undated) DEVICE — DRSNG SURESITE WNDW 4X4.5

## (undated) DEVICE — CANN TRPL DAM 7X7MM NO VLV

## (undated) DEVICE — DRSNG GZ PETROLTM XEROFORM CURAD 1X8IN STRL

## (undated) DEVICE — SUT VIC 1 OS8 27IN J699H

## (undated) DEVICE — SOL NACL 0.9PCT 1000ML

## (undated) DEVICE — THE BITE BLOCK MAXI, LATEX FREE STRAP IS USED TO PROTECT THE ENDOSCOPE INSERTION TUBE FROM BEING BITTEN BY THE PATIENT.

## (undated) DEVICE — ABL ASP APOLLO RF XL 90D

## (undated) DEVICE — SUT MNCRYL PLS ANTIB UD 4/0 PS2 18IN

## (undated) DEVICE — 6.0MM PRECISION ROUND

## (undated) DEVICE — GLV SURG SENSICARE PI MIC PF SZ7.5 LF STRL

## (undated) DEVICE — TUBING SET, GRAVITY, 4-SPIKE

## (undated) DEVICE — NDL SPINE 22G 31/2IN BLK

## (undated) DEVICE — 2108 SERIES SAGITTAL BLADE (19.5 X 1.27 X 81.0MM)

## (undated) DEVICE — MEDI-VAC YANKAUER SUCTION HANDLE W/BULBOUS TIP: Brand: CARDINAL HEALTH

## (undated) DEVICE — KT ORCA ORCAPOD DISP STRL

## (undated) DEVICE — PATIENT RETURN ELECTRODE, SINGLE-USE, CONTACT QUALITY MONITORING, ADULT, WITH 9FT CORD, FOR PATIENTS WEIGING OVER 33LBS. (15KG): Brand: MEGADYNE

## (undated) DEVICE — SUCTION CANISTER, 3000CC,SAFELINER: Brand: DEROYAL

## (undated) DEVICE — SPNG GZ WOVN 4X4IN 12PLY 10/BX STRL

## (undated) DEVICE — TOTAL TRAY, 16FR 10ML SIL FOLEY, URN: Brand: MEDLINE

## (undated) DEVICE — POSTN ARMSLV LAT/TRACTION DISP

## (undated) DEVICE — PK ARTHSCP SHLDR TOWER 40

## (undated) DEVICE — OCCLUSIVE GAUZE STRIP,3% BISMUTH TRIBROMOPHENATE IN PETROLATUM BLEND: Brand: XEROFORM

## (undated) DEVICE — SYR LL 3CC

## (undated) DEVICE — WEBRIL* CAST PADDING: Brand: DEROYAL

## (undated) DEVICE — DISPOSABLE BIPOLAR FORCEPS 4" (10.2CM) JEWELERS, STRAIGHT 0.4MM TIP AND 12 FT. (3.6M) CABLE: Brand: KIRWAN

## (undated) DEVICE — CODMAN® SURGICAL PATTIES 1/2" X 3" (1.27CM X 7.62CM): Brand: CODMAN®

## (undated) DEVICE — PK NEURO SPINE 40

## (undated) DEVICE — PK ORTHO MINOR TOWER 40

## (undated) DEVICE — SYR LUERLOK 30CC